# Patient Record
Sex: FEMALE | Race: WHITE | Employment: OTHER | URBAN - METROPOLITAN AREA
[De-identification: names, ages, dates, MRNs, and addresses within clinical notes are randomized per-mention and may not be internally consistent; named-entity substitution may affect disease eponyms.]

---

## 2017-03-21 PROBLEM — M79.89 PAIN AND SWELLING OF RIGHT LOWER LEG: Status: ACTIVE | Noted: 2017-03-21

## 2017-10-04 PROBLEM — M15.0 PRIMARY OSTEOARTHRITIS INVOLVING MULTIPLE JOINTS: Status: ACTIVE | Noted: 2017-10-04

## 2018-05-05 PROBLEM — I27.20 MODERATE TO SEVERE PULMONARY HYPERTENSION (HCC): Status: ACTIVE | Noted: 2018-05-05

## 2018-08-19 PROBLEM — M62.830 MUSCLE SPASM OF BACK: Status: ACTIVE | Noted: 2018-08-19

## 2018-09-17 PROBLEM — N18.30 STAGE 3 CHRONIC KIDNEY DISEASE (HCC): Status: ACTIVE | Noted: 2018-09-17

## 2018-11-30 PROBLEM — E66.813 CLASS 3 SEVERE OBESITY DUE TO EXCESS CALORIES WITH SERIOUS COMORBIDITY AND BODY MASS INDEX (BMI) OF 50.0 TO 59.9 IN ADULT: Status: ACTIVE | Noted: 2018-11-30

## 2018-11-30 PROBLEM — M15.0 PRIMARY OSTEOARTHRITIS INVOLVING MULTIPLE JOINTS: Status: RESOLVED | Noted: 2017-10-04 | Resolved: 2018-11-30

## 2020-11-09 ENCOUNTER — TELEPHONE (OUTPATIENT)
Dept: FAMILY MEDICINE CLINIC | Age: 65
End: 2020-11-09

## 2020-11-09 DIAGNOSIS — I82.511 CHRONIC DEEP VEIN THROMBOSIS (DVT) OF FEMORAL VEIN OF RIGHT LOWER EXTREMITY (HCC): Primary | ICD-10-CM

## 2021-08-24 PROBLEM — R06.02 CHRONIC SHORTNESS OF BREATH: Status: ACTIVE | Noted: 2021-08-24

## 2021-08-24 PROBLEM — C54.1 ENDOMETRIAL ADENOCARCINOMA (HCC): Status: ACTIVE | Noted: 2018-08-29

## 2023-07-25 PROBLEM — K43.9 VENTRAL HERNIA WITHOUT OBSTRUCTION OR GANGRENE: Status: ACTIVE | Noted: 2023-07-25

## 2023-08-03 PROBLEM — L98.492 ABDOMINAL WALL SKIN ULCER, WITH FAT LAYER EXPOSED (HCC): Status: ACTIVE | Noted: 2023-08-03

## 2023-09-08 NOTE — DISCHARGE INSTRUCTIONS
[address] -Phone: [phone] Fax: [phone]    Visit  Discharge Instructions / Physician Orders     DATE: 9/14/2023     Home Care: [facility] [address] [place] [address] [phone]     [address]: Home Health     Wound Location: Abdomen     Cleanse with: Vashe -let soak for at least 5 minutes during dressing changes     Dressing Orders: Collagen with Silver, Cover with silicone border bandage, Abdominal Binder     Frequency: 3 times per week     Additional Orders: Increase protein to diet (meat, cheese, eggs, fish, peanut butter, nuts and beans)  Referral to Dr. Case Marie, [place], [place]     Your next appointment with [address] [place] [address] is in 2 weeks     (Please note your next appointment above and if you are unable to keep, kindly give a 24 hour notice. Thank you.)  If more than 15 min late we cannot guarantee you will be seen due to clinician schedule  Per Policy, Excessive cancellation will call for dismissal from program.     If you experience any of the following, please call the [address] [place] [address] during business hours:  [phone]  Your Phone call may be forwarded to Duglas Portillo during business hours that [address] [place] [address] is closed. * Increase in Pain  * Temperature over 101  * Increase in drainage from your wound  * Drainage with a foul odor  * Bleeding  * Increase in swelling  * Need for compression bandage changes due to slippage, breakthrough drainage. If you need medical attention outside of the business hours of the [address] [place] [address] please contact your PCP or go to the nearest emergency room. The information contained in the After Visit Summary has been reviewed with me, the patient and/or responsible adult, by my health care provider(s). I had the opportunity to ask questions regarding this information.  I have elected to receive;      []After Visit Summary  [x]Comprehensive Discharge Instruction        Patient

## 2023-09-12 ENCOUNTER — HOSPITAL ENCOUNTER (OUTPATIENT)
Dept: PHARMACY | Age: 68
Setting detail: THERAPIES SERIES
Discharge: HOME | End: 2023-09-12
Payer: MEDICARE

## 2023-09-12 DIAGNOSIS — I26.99 PULMONARY EMBOLISM ON RIGHT (HCC): ICD-10-CM

## 2023-09-12 DIAGNOSIS — I82.511 CHRONIC DEEP VEIN THROMBOSIS (DVT) OF FEMORAL VEIN OF RIGHT LOWER EXTREMITY (HCC): Primary | ICD-10-CM

## 2023-09-12 DIAGNOSIS — Z86.718 HISTORY OF DVT (DEEP VEIN THROMBOSIS): ICD-10-CM

## 2023-09-12 LAB
INR BLD: 2.8
PROTIME: 33.3 SECONDS

## 2023-09-12 PROCEDURE — 99211 OFF/OP EST MAY X REQ PHY/QHP: CPT

## 2023-09-12 PROCEDURE — 85610 PROTHROMBIN TIME: CPT

## 2023-09-12 NOTE — PROGRESS NOTES
Patient seen in clinic for warfarin management due to DVT and PE with an INR goal of 2.5-3.5. Estimated duration of therapy is indefinite. Patient states compliant all of the time with regimen. No bleeding or thromboembolic side effects noted. No significant med or dietary changes. No significant recent illness or disease state changes. PT/INR done in office per protocol. INR is 2.8 which is therapeutic. Warfarin regimen will be continued at current dose 8mg Tues/Thurs, 4mg all other days. Will retest in 4 weeks. Patient understands dosing directions and information discussed. Dosing schedule and follow up appointment given to patient. Progress note routed to referring physicians office. Discussed with patient the Pharmacist Collaborative Practice Agreement. Patient provided verbal and/or electronic (ex. [facility]) consent to participate in the collaborative practice agreement between the pharmacist and referred patient. This is in lieu of paper consent due to COVID-19 precautions and the use of remote/virtual visits.        For Pharmacy Admin Tracking Only    Intervention Detail:   Total # of Interventions Recommended: 0  Total # of Interventions Accepted: 0  Time Spent (min): 15

## 2023-09-14 ENCOUNTER — HOSPITAL ENCOUNTER (OUTPATIENT)
Dept: WOUND CARE | Age: 68
Discharge: HOME | End: 2023-09-14

## 2023-09-18 NOTE — DISCHARGE INSTRUCTIONS
[address] -Phone: [phone] Fax: [phone]    Visit  Discharge Instructions / Physician Orders     DATE: 9/21/2023     [facility]: [facility] [address] [place] [address] [phone])     [address]: Home Health     Wound Location: Abdomen     Cleanse with: Vashe -let soak for at least 5 minutes during dressing changes     Dressing Orders: Collagen with Silver, Cover with silicone border bandage, Abdominal Binder     Frequency: 3 times per week     Additional Orders: Increase protein to diet (meat, cheese, eggs, fish, peanut butter, nuts and beans)  Referral to Dr. Lorraine Howard, [place], [place]     Your next appointment with Jennifer Cancino is in 2 weeks     (Please note your next appointment above and if you are unable to keep, kindly give a 24 hour notice. Thank you.)  If more than 15 min late we cannot guarantee you will be seen due to clinician schedule  Per Policy, Excessive cancellation will call for dismissal from program.     If you experience any of the following, please call the [facility] [place] during business hours:  [phone]  Your Phone call may be forwarded to Duglas Portillo during business hours that [facility] is closed. * Increase in Pain  * Temperature over 101  * Increase in drainage from your wound  * Drainage with a foul odor  * Bleeding  * Increase in swelling  * Need for compression bandage changes due to slippage, breakthrough drainage. If you need medical attention outside of the business hours of the [address] [place] [address] please contact your PCP or go to the nearest emergency room. The information contained in the After Visit Summary has been reviewed with me, the patient and/or responsible adult, by my health care provider(s). I had the opportunity to ask questions regarding this information.  I have elected to receive;      []After Visit Summary  [x]Comprehensive Discharge Instruction        Patient

## 2023-09-21 ENCOUNTER — HOSPITAL ENCOUNTER (OUTPATIENT)
Dept: WOUND CARE | Age: 68
Discharge: HOME | End: 2023-09-21
Payer: MEDICARE

## 2023-09-21 VITALS
DIASTOLIC BLOOD PRESSURE: 64 MMHG | HEART RATE: 87 BPM | SYSTOLIC BLOOD PRESSURE: 123 MMHG | RESPIRATION RATE: 16 BRPM | TEMPERATURE: 98.6 F

## 2023-09-21 DIAGNOSIS — L98.492 ABDOMINAL WALL SKIN ULCER, WITH FAT LAYER EXPOSED (HCC): Primary | ICD-10-CM

## 2023-09-21 PROCEDURE — 99213 OFFICE O/P EST LOW 20 MIN: CPT

## 2023-09-21 PROCEDURE — 99212 OFFICE O/P EST SF 10 MIN: CPT

## 2023-09-21 RX ORDER — GENTAMICIN SULFATE 1 MG/G
OINTMENT TOPICAL ONCE
OUTPATIENT
Start: 2023-09-21 | End: 2023-09-21

## 2023-09-21 RX ORDER — GINSENG 100 MG
CAPSULE ORAL ONCE
OUTPATIENT
Start: 2023-09-21 | End: 2023-09-21

## 2023-09-21 RX ORDER — LIDOCAINE HYDROCHLORIDE 20 MG/ML
JELLY TOPICAL ONCE
OUTPATIENT
Start: 2023-09-21 | End: 2023-09-21

## 2023-09-21 RX ORDER — TRIAMCINOLONE ACETONIDE 1 MG/G
OINTMENT TOPICAL ONCE
OUTPATIENT
Start: 2023-09-21 | End: 2023-09-21

## 2023-09-21 RX ORDER — IBUPROFEN 200 MG
TABLET ORAL ONCE
OUTPATIENT
Start: 2023-09-21 | End: 2023-09-21

## 2023-09-21 RX ORDER — BETAMETHASONE DIPROPIONATE 0.05 %
OINTMENT (GRAM) TOPICAL ONCE
OUTPATIENT
Start: 2023-09-21 | End: 2023-09-21

## 2023-09-21 RX ORDER — CLOBETASOL PROPIONATE 0.5 MG/G
OINTMENT TOPICAL ONCE
OUTPATIENT
Start: 2023-09-21 | End: 2023-09-21

## 2023-09-21 RX ORDER — SODIUM CHLOR/HYPOCHLOROUS ACID 0.033 %
SOLUTION, IRRIGATION IRRIGATION ONCE
OUTPATIENT
Start: 2023-09-21 | End: 2023-09-21

## 2023-09-21 RX ORDER — LIDOCAINE 50 MG/G
OINTMENT TOPICAL ONCE
OUTPATIENT
Start: 2023-09-21 | End: 2023-09-21

## 2023-09-21 RX ORDER — LIDOCAINE 40 MG/G
CREAM TOPICAL ONCE
OUTPATIENT
Start: 2023-09-21 | End: 2023-09-21

## 2023-09-21 RX ORDER — BACITRACIN ZINC AND POLYMYXIN B SULFATE 500; 1000 [USP'U]/G; [USP'U]/G
OINTMENT TOPICAL ONCE
OUTPATIENT
Start: 2023-09-21 | End: 2023-09-21

## 2023-09-21 RX ORDER — LIDOCAINE HYDROCHLORIDE 40 MG/ML
SOLUTION TOPICAL ONCE
OUTPATIENT
Start: 2023-09-21 | End: 2023-09-21

## 2023-09-21 RX ORDER — LIDOCAINE HYDROCHLORIDE 20 MG/ML
JELLY TOPICAL ONCE
Status: COMPLETED | OUTPATIENT
Start: 2023-09-21 | End: 2023-09-21

## 2023-09-21 RX ADMIN — LIDOCAINE HYDROCHLORIDE 6 ML: 20 JELLY TOPICAL at 13:20

## 2023-09-21 NOTE — PROGRESS NOTES
03/1966       FAMILY HISTORY    Family History   Problem Relation Age of Onset    Asthma Mother     Mental Illness Mother     COPD Mother     Cancer Father 80        hairy cell leukemia, and leimyoma sarcoma     Stroke Father 80        minor    Parkinsonism Maternal Grandfather     Cancer Paternal Grandmother     Stroke Paternal Grandmother        SOCIAL HISTORY    Social History     Tobacco Use    Smoking status: Never     Passive exposure: Past    Smokeless tobacco: Never   Vaping Use    Vaping Use: Never used   Substance Use Topics    Alcohol use: Not Currently     Comment: rare    Drug use: No       ALLERGIES    Allergies   Allergen Reactions    Amoxil [Amoxicillin Trihydrate] Hives    Penicillins     Codeine      headaches    Neosporin [Bacitracin-Neomycin-Polymyxin] Rash    Polysporin [Bacitracin-Polymyxin B] Rash       MEDICATIONS    Current Outpatient Medications on File Prior to Encounter   Medication Sig Dispense Refill    budesonide (PULMICORT) 0.5 MG/2ML nebulizer suspension inhale contents of 1 vial ( 2 milliliters ) in nebulizer twice a day 120 mL 3    warfarin (COUMADIN) 4 MG tablet Take 1-2 tablets by mouth daily As directed by [facility] 180 tablet 1    ipratropium 0.5 mg-albuterol 2.5 mg (DUONEB) 0.5-2.5 (3) MG/3ML SOLN nebulizer solution inhale contents of 1 vial ( 3 milliliters ) in nebulizer by mouth and INTO THE LUNGS three times a day 360 mL 1    pravastatin (PRAVACHOL) 80 MG tablet Take 1 tablet by mouth every evening 30 tablet 4    losartan (COZAAR) 100 MG tablet take 1 tablet by mouth once daily 90 tablet 3    dilTIAZem (CARTIA XT) 120 MG extended release capsule take 1 capsule by mouth once daily 90 capsule 3    montelukast (SINGULAIR) 10 MG tablet TAKE 1 TABLET NIGHTLY 90 tablet 3    busPIRone (BUSPAR) 5 MG tablet Take 1 tablet by mouth 2 times daily (Patient not taking: Reported on 6/22/2023)      metaxalone (SKELAXIN) 800 MG tablet Take 1 tablet by mouth 3 times

## 2023-09-29 ENCOUNTER — TELEMEDICINE (OUTPATIENT)
Dept: FAMILY MEDICINE CLINIC | Age: 68
End: 2023-09-29
Payer: MEDICARE

## 2023-09-29 ENCOUNTER — TELEPHONE (OUTPATIENT)
Dept: FAMILY MEDICINE CLINIC | Age: 68
End: 2023-09-29

## 2023-09-29 DIAGNOSIS — E78.2 HYPERLIPEMIA, MIXED: ICD-10-CM

## 2023-09-29 DIAGNOSIS — I50.813 ACUTE ON CHRONIC RIGHT-SIDED HEART FAILURE (HCC): ICD-10-CM

## 2023-09-29 DIAGNOSIS — R63.5 ABNORMAL WEIGHT GAIN: ICD-10-CM

## 2023-09-29 DIAGNOSIS — I50.9 ACUTE ON CHRONIC CONGESTIVE HEART FAILURE, UNSPECIFIED HEART FAILURE TYPE (HCC): ICD-10-CM

## 2023-09-29 DIAGNOSIS — E11.9 DIABETES MELLITUS TYPE 2, NONINSULIN DEPENDENT (HCC): ICD-10-CM

## 2023-09-29 DIAGNOSIS — I27.20 PULMONARY HTN (HCC): Primary | ICD-10-CM

## 2023-09-29 PROCEDURE — 99214 OFFICE O/P EST MOD 30 MIN: CPT | Performed by: NURSE PRACTITIONER

## 2023-09-29 PROCEDURE — 1123F ACP DISCUSS/DSCN MKR DOCD: CPT | Performed by: NURSE PRACTITIONER

## 2023-09-29 RX ORDER — ALBUTEROL SULFATE 90 UG/1
1 AEROSOL, METERED RESPIRATORY (INHALATION) EVERY 6 HOURS PRN
Qty: 3 EACH | Refills: 0 | Status: SHIPPED | OUTPATIENT
Start: 2023-09-29

## 2023-09-29 RX ORDER — HYDROCHLOROTHIAZIDE 12.5 MG/1
12.5 CAPSULE, GELATIN COATED ORAL EVERY MORNING
Qty: 90 CAPSULE | Refills: 1 | Status: SHIPPED | OUTPATIENT
Start: 2023-09-29

## 2023-09-29 RX ORDER — PRAVASTATIN SODIUM 80 MG/1
80 TABLET ORAL EVERY EVENING
Qty: 90 TABLET | Refills: 4 | Status: SHIPPED | OUTPATIENT
Start: 2023-09-29

## 2023-09-29 ASSESSMENT — PATIENT HEALTH QUESTIONNAIRE - PHQ9
SUM OF ALL RESPONSES TO PHQ QUESTIONS 1-9: 0
1. LITTLE INTEREST OR PLEASURE IN DOING THINGS: 0
10. IF YOU CHECKED OFF ANY PROBLEMS, HOW DIFFICULT HAVE THESE PROBLEMS MADE IT FOR YOU TO DO YOUR WORK, TAKE CARE OF THINGS AT HOME, OR GET ALONG WITH OTHER PEOPLE: 0
8. MOVING OR SPEAKING SO SLOWLY THAT OTHER PEOPLE COULD HAVE NOTICED. OR THE OPPOSITE, BEING SO FIGETY OR RESTLESS THAT YOU HAVE BEEN MOVING AROUND A LOT MORE THAN USUAL: 0
3. TROUBLE FALLING OR STAYING ASLEEP: 0
7. TROUBLE CONCENTRATING ON THINGS, SUCH AS READING THE NEWSPAPER OR WATCHING TELEVISION: 0
SUM OF ALL RESPONSES TO PHQ QUESTIONS 1-9: 0
2. FEELING DOWN, DEPRESSED OR HOPELESS: 0
SUM OF ALL RESPONSES TO PHQ9 QUESTIONS 1 & 2: 0
SUM OF ALL RESPONSES TO PHQ QUESTIONS 1-9: 0
SUM OF ALL RESPONSES TO PHQ QUESTIONS 1-9: 0
5. POOR APPETITE OR OVEREATING: 0
9. THOUGHTS THAT YOU WOULD BE BETTER OFF DEAD, OR OF HURTING YOURSELF: 0
4. FEELING TIRED OR HAVING LITTLE ENERGY: 0
6. FEELING BAD ABOUT YOURSELF - OR THAT YOU ARE A FAILURE OR HAVE LET YOURSELF OR YOUR FAMILY DOWN: 0

## 2023-09-29 ASSESSMENT — LIFESTYLE VARIABLES
HOW OFTEN DO YOU HAVE A DRINK CONTAINING ALCOHOL: NEVER
HOW MANY STANDARD DRINKS CONTAINING ALCOHOL DO YOU HAVE ON A TYPICAL DAY: PATIENT DOES NOT DRINK

## 2023-09-29 NOTE — PROGRESS NOTES
of ataxia         [x] Normal range of motion of neck        [] Abnormal -     Neurological:        [x] No Facial Asymmetry (Cranial nerve 7 motor function) (limited exam due to video visit)          [x] No gaze palsy        [] Abnormal -          Skin:        [x] No significant exanthematous lesions or discoloration noted on facial skin         [] Abnormal -            Psychiatric:       [x] Normal Affect [] Abnormal -        [x] No Hallucinations    Other pertinent observable physical exam findings:-     Pt wearing o2 cannula       Please note that this chart was generated using voice recognition Dragon dictation software. Although every effort was made to ensure the accuracy of this automated transcription, some errors in transcription may have occurred.       --Allan Alanis, APRBHAVIK - CNP

## 2023-09-29 NOTE — TELEPHONE ENCOUNTER
[facility] I need her to get to a [facility] lab in the next few days.  If worsening symptoms please be seen in ER

## 2023-09-29 NOTE — TELEPHONE ENCOUNTER
Pt called stating the [facility] are unable to draw her blood, and they did state she has pitting edema. Did not give grade of severity.

## 2023-10-05 ENCOUNTER — HOSPITAL ENCOUNTER (OUTPATIENT)
Dept: WOUND CARE | Age: 68
Discharge: HOME | End: 2023-10-05

## 2023-10-09 NOTE — DISCHARGE INSTRUCTIONS
[address] -Phone: [phone] Fax: [phone]    Visit  Discharge Instructions / Physician Orders     DATE: 10/12/2023     Home Care: [facility] [address] [place] [address] ([facility] [phone])     [address]: Home Health     Wound Location: Abdomen     Cleanse with: Vashe -let soak for at least 5 minutes during dressing changes     Dressing Orders: Collagen with Silver, Cover with silicone border bandage, Abdominal Binder     Frequency: 3 times per week     Additional Orders: Increase protein to diet (meat, cheese, eggs, fish, peanut butter, nuts and beans)  Referral to Dr. Aaron Cole, [place], [place]     [facility]     (Please note your next appointment above and if you are unable to keep, kindly give a 24 hour notice. Thank you.)  If more than 15 min late we cannot guarantee you will be seen due to clinician schedule  Per Policy, Excessive cancellation will call for dismissal from program.     If you experience any of the following, please call the [address] [place] [address] during business hours:  [phone]  Your Phone call may be forwarded to Duglas Portillo during business hours that [facility] is closed. * Increase in Pain  * Temperature over 101  * Increase in drainage from your wound  * Drainage with a foul odor  * Bleeding  * Increase in swelling  * Need for compression bandage changes due to slippage, breakthrough drainage. If you need medical attention outside of the business hours of the [address] [place] [address] please contact your PCP or go to the nearest emergency room. The information contained in the After Visit Summary has been reviewed with me, the patient and/or responsible adult, by my health care provider(s). I had the opportunity to ask questions regarding this information.  I have elected to receive;      []After Visit Summary  [x]Comprehensive Discharge Instruction        Patient
[Negative] : Gastrointestinal

## 2023-10-10 ENCOUNTER — APPOINTMENT (OUTPATIENT)
Dept: PHARMACY | Age: 68
End: 2023-10-10
Payer: MEDICARE

## 2023-10-12 ENCOUNTER — HOSPITAL ENCOUNTER (OUTPATIENT)
Dept: WOUND CARE | Age: 68
Discharge: HOME | End: 2023-10-12

## 2023-10-14 ENCOUNTER — HOSPITAL ENCOUNTER (INPATIENT)
Age: 68
LOS: 2 days | Discharge: HOME HEALTH | DRG: 178 | End: 2023-10-17
Attending: STUDENT IN AN ORGANIZED HEALTH CARE EDUCATION/TRAINING PROGRAM | Admitting: INTERNAL MEDICINE
Payer: MEDICARE

## 2023-10-14 DIAGNOSIS — R79.1 SUPRATHERAPEUTIC INR: ICD-10-CM

## 2023-10-14 DIAGNOSIS — T14.8XXA BLEEDING FROM WOUND: Primary | ICD-10-CM

## 2023-10-14 DIAGNOSIS — D64.9 ANEMIA, UNSPECIFIED TYPE: ICD-10-CM

## 2023-10-14 DIAGNOSIS — J96.20 ACUTE ON CHRONIC RESPIRATORY FAILURE, UNSPECIFIED WHETHER WITH HYPOXIA OR HYPERCAPNIA (HCC): ICD-10-CM

## 2023-10-14 LAB
ANION GAP SERPL CALCULATED.3IONS-SCNC: 4 MMOL/L (ref 9–17)
BASOPHILS # BLD: 0 K/UL (ref 0–0.2)
BASOPHILS NFR BLD: 0 %
BNP SERPL-MCNC: 116 PG/ML
BUN SERPL-MCNC: 23 MG/DL (ref 8–23)
BUN/CREAT SERPL: 19 (ref 9–20)
CALCIUM SERPL-MCNC: 9.7 MG/DL (ref 8.6–10.4)
CHLORIDE SERPL-SCNC: 94 MMOL/L (ref 98–107)
CO2 SERPL-SCNC: 41 MMOL/L (ref 20–31)
CREAT SERPL-MCNC: 1.2 MG/DL (ref 0.5–0.9)
EOSINOPHIL # BLD: 0.11 K/UL (ref 0–0.4)
EOSINOPHILS RELATIVE PERCENT: 2 % (ref 1–4)
ERYTHROCYTE [DISTWIDTH] IN BLOOD BY AUTOMATED COUNT: 17.6 % (ref 11.8–14.4)
GFR SERPL CREATININE-BSD FRML MDRD: 50 ML/MIN/1.73M2
GLUCOSE BLD-MCNC: 147 MG/DL (ref 65–105)
GLUCOSE BLD-MCNC: 154 MG/DL (ref 65–105)
GLUCOSE BLD-MCNC: 156 MG/DL (ref 65–105)
GLUCOSE SERPL-MCNC: 143 MG/DL (ref 70–99)
HCT VFR BLD AUTO: 32.5 % (ref 36.3–47.1)
HGB BLD-MCNC: 9 G/DL (ref 11.9–15.1)
IMM GRANULOCYTES # BLD AUTO: 0 K/UL (ref 0–0.3)
IMM GRANULOCYTES NFR BLD: 0 %
INR PPP: 6.8
LYMPHOCYTES NFR BLD: 0.97 K/UL (ref 1–4.8)
LYMPHOCYTES RELATIVE PERCENT: 18 % (ref 24–44)
MCH RBC QN AUTO: 25.6 PG (ref 25.2–33.5)
MCHC RBC AUTO-ENTMCNC: 27.7 G/DL (ref 28.4–34.8)
MCV RBC AUTO: 92.6 FL (ref 82.6–102.9)
MONOCYTES NFR BLD: 0.32 K/UL (ref 0.2–0.8)
MONOCYTES NFR BLD: 6 % (ref 1–7)
MORPHOLOGY: ABNORMAL
NEUTROPHILS NFR BLD: 74 % (ref 36–66)
NEUTS SEG NFR BLD: 4 K/UL (ref 1.8–7.7)
NRBC BLD-RTO: 0 PER 100 WBC
PARTIAL THROMBOPLASTIN TIME: 70.1 SEC (ref 23.9–33.8)
PLATELET # BLD AUTO: 275 K/UL (ref 138–453)
PMV BLD AUTO: 8.9 FL (ref 8.1–13.5)
POTASSIUM SERPL-SCNC: 4.6 MMOL/L (ref 3.7–5.3)
PROTHROMBIN TIME: 58.1 SEC (ref 11.5–14.2)
RBC # BLD AUTO: 3.51 M/UL (ref 3.95–5.11)
SODIUM SERPL-SCNC: 139 MMOL/L (ref 135–144)
WBC OTHER # BLD: 5.4 K/UL (ref 3.5–11.3)

## 2023-10-14 PROCEDURE — 82947 ASSAY GLUCOSE BLOOD QUANT: CPT

## 2023-10-14 PROCEDURE — 2700000000 HC OXYGEN THERAPY PER DAY

## 2023-10-14 PROCEDURE — 6370000000 HC RX 637 (ALT 250 FOR IP): Performed by: NURSE PRACTITIONER

## 2023-10-14 PROCEDURE — G0378 HOSPITAL OBSERVATION PER HR: HCPCS

## 2023-10-14 PROCEDURE — 99285 EMERGENCY DEPT VISIT HI MDM: CPT

## 2023-10-14 PROCEDURE — 80048 BASIC METABOLIC PNL TOTAL CA: CPT

## 2023-10-14 PROCEDURE — 83880 ASSAY OF NATRIURETIC PEPTIDE: CPT

## 2023-10-14 PROCEDURE — 94761 N-INVAS EAR/PLS OXIMETRY MLT: CPT

## 2023-10-14 PROCEDURE — 99223 1ST HOSP IP/OBS HIGH 75: CPT | Performed by: NURSE PRACTITIONER

## 2023-10-14 PROCEDURE — 6360000002 HC RX W HCPCS: Performed by: STUDENT IN AN ORGANIZED HEALTH CARE EDUCATION/TRAINING PROGRAM

## 2023-10-14 PROCEDURE — 83036 HEMOGLOBIN GLYCOSYLATED A1C: CPT

## 2023-10-14 PROCEDURE — 85730 THROMBOPLASTIN TIME PARTIAL: CPT

## 2023-10-14 PROCEDURE — 85025 COMPLETE CBC W/AUTO DIFF WBC: CPT

## 2023-10-14 PROCEDURE — 2580000003 HC RX 258: Performed by: NURSE PRACTITIONER

## 2023-10-14 PROCEDURE — 94640 AIRWAY INHALATION TREATMENT: CPT

## 2023-10-14 PROCEDURE — 85610 PROTHROMBIN TIME: CPT

## 2023-10-14 PROCEDURE — 6370000000 HC RX 637 (ALT 250 FOR IP): Performed by: STUDENT IN AN ORGANIZED HEALTH CARE EDUCATION/TRAINING PROGRAM

## 2023-10-14 RX ORDER — ALBUTEROL SULFATE 2.5 MG/3ML
2.5 SOLUTION RESPIRATORY (INHALATION) ONCE
Status: COMPLETED | OUTPATIENT
Start: 2023-10-14 | End: 2023-10-14

## 2023-10-14 RX ORDER — ALBUTEROL SULFATE 90 UG/1
1 AEROSOL, METERED RESPIRATORY (INHALATION) EVERY 6 HOURS PRN
Status: DISCONTINUED | OUTPATIENT
Start: 2023-10-14 | End: 2023-10-15

## 2023-10-14 RX ORDER — ONDANSETRON 4 MG/1
4 TABLET, ORALLY DISINTEGRATING ORAL EVERY 8 HOURS PRN
Status: DISCONTINUED | OUTPATIENT
Start: 2023-10-14 | End: 2023-10-17 | Stop reason: HOSPADM

## 2023-10-14 RX ORDER — SODIUM CHLORIDE 0.9 % (FLUSH) 0.9 %
5-40 SYRINGE (ML) INJECTION EVERY 12 HOURS SCHEDULED
Status: DISCONTINUED | OUTPATIENT
Start: 2023-10-14 | End: 2023-10-17 | Stop reason: HOSPADM

## 2023-10-14 RX ORDER — HYDROCHLOROTHIAZIDE 12.5 MG/1
12.5 TABLET ORAL EVERY MORNING
Status: DISCONTINUED | OUTPATIENT
Start: 2023-10-14 | End: 2023-10-17 | Stop reason: HOSPADM

## 2023-10-14 RX ORDER — PHYTONADIONE 5 MG/1
5 TABLET ORAL ONCE
Status: COMPLETED | OUTPATIENT
Start: 2023-10-14 | End: 2023-10-14

## 2023-10-14 RX ORDER — POTASSIUM CHLORIDE 7.45 MG/ML
10 INJECTION INTRAVENOUS PRN
Status: DISCONTINUED | OUTPATIENT
Start: 2023-10-14 | End: 2023-10-17 | Stop reason: HOSPADM

## 2023-10-14 RX ORDER — LOSARTAN POTASSIUM 100 MG/1
100 TABLET ORAL DAILY
Status: DISCONTINUED | OUTPATIENT
Start: 2023-10-14 | End: 2023-10-17 | Stop reason: HOSPADM

## 2023-10-14 RX ORDER — POLYETHYLENE GLYCOL 3350 17 G/17G
17 POWDER, FOR SOLUTION ORAL DAILY PRN
Status: DISCONTINUED | OUTPATIENT
Start: 2023-10-14 | End: 2023-10-17 | Stop reason: HOSPADM

## 2023-10-14 RX ORDER — SODIUM CHLORIDE 9 MG/ML
INJECTION, SOLUTION INTRAVENOUS PRN
Status: DISCONTINUED | OUTPATIENT
Start: 2023-10-14 | End: 2023-10-17 | Stop reason: HOSPADM

## 2023-10-14 RX ORDER — SODIUM CHLORIDE 0.9 % (FLUSH) 0.9 %
10 SYRINGE (ML) INJECTION PRN
Status: DISCONTINUED | OUTPATIENT
Start: 2023-10-14 | End: 2023-10-17 | Stop reason: HOSPADM

## 2023-10-14 RX ORDER — POTASSIUM CHLORIDE 20 MEQ/1
40 TABLET, EXTENDED RELEASE ORAL PRN
Status: DISCONTINUED | OUTPATIENT
Start: 2023-10-14 | End: 2023-10-17 | Stop reason: HOSPADM

## 2023-10-14 RX ORDER — BUDESONIDE 0.5 MG/2ML
0.5 INHALANT ORAL
Status: DISCONTINUED | OUTPATIENT
Start: 2023-10-14 | End: 2023-10-17 | Stop reason: HOSPADM

## 2023-10-14 RX ORDER — ACETAMINOPHEN 325 MG/1
650 TABLET ORAL EVERY 6 HOURS PRN
Status: DISCONTINUED | OUTPATIENT
Start: 2023-10-14 | End: 2023-10-17 | Stop reason: HOSPADM

## 2023-10-14 RX ORDER — DILTIAZEM HYDROCHLORIDE 120 MG/1
120 CAPSULE, COATED, EXTENDED RELEASE ORAL DAILY
Status: DISCONTINUED | OUTPATIENT
Start: 2023-10-14 | End: 2023-10-17 | Stop reason: HOSPADM

## 2023-10-14 RX ORDER — MAGNESIUM SULFATE 1 G/100ML
1000 INJECTION INTRAVENOUS PRN
Status: DISCONTINUED | OUTPATIENT
Start: 2023-10-14 | End: 2023-10-17 | Stop reason: HOSPADM

## 2023-10-14 RX ORDER — PRAVASTATIN SODIUM 40 MG
80 TABLET ORAL EVERY EVENING
Status: DISCONTINUED | OUTPATIENT
Start: 2023-10-14 | End: 2023-10-17 | Stop reason: HOSPADM

## 2023-10-14 RX ORDER — ONDANSETRON 2 MG/ML
4 INJECTION INTRAMUSCULAR; INTRAVENOUS EVERY 6 HOURS PRN
Status: DISCONTINUED | OUTPATIENT
Start: 2023-10-14 | End: 2023-10-17 | Stop reason: HOSPADM

## 2023-10-14 RX ORDER — ACETAMINOPHEN 650 MG/1
650 SUPPOSITORY RECTAL EVERY 6 HOURS PRN
Status: DISCONTINUED | OUTPATIENT
Start: 2023-10-14 | End: 2023-10-17 | Stop reason: HOSPADM

## 2023-10-14 RX ADMIN — PHYTONADIONE 5 MG: 5 TABLET ORAL at 07:37

## 2023-10-14 RX ADMIN — HYDROCHLOROTHIAZIDE 12.5 MG: 12.5 TABLET ORAL at 12:12

## 2023-10-14 RX ADMIN — SODIUM CHLORIDE, PRESERVATIVE FREE 10 ML: 5 INJECTION INTRAVENOUS at 21:50

## 2023-10-14 RX ADMIN — ALBUTEROL SULFATE 2.5 MG: 2.5 SOLUTION RESPIRATORY (INHALATION) at 06:44

## 2023-10-14 RX ADMIN — ALBUTEROL SULFATE 1 PUFF: 90 AEROSOL, METERED RESPIRATORY (INHALATION) at 20:21

## 2023-10-14 RX ADMIN — ALBUTEROL SULFATE 1 PUFF: 90 AEROSOL, METERED RESPIRATORY (INHALATION) at 10:51

## 2023-10-14 RX ADMIN — SODIUM CHLORIDE, PRESERVATIVE FREE 10 ML: 5 INJECTION INTRAVENOUS at 12:13

## 2023-10-14 RX ADMIN — DILTIAZEM HYDROCHLORIDE 120 MG: 120 CAPSULE, COATED, EXTENDED RELEASE ORAL at 12:12

## 2023-10-14 RX ADMIN — LOSARTAN POTASSIUM 100 MG: 100 TABLET, FILM COATED ORAL at 12:12

## 2023-10-14 ASSESSMENT — ENCOUNTER SYMPTOMS
WHEEZING: 0
SINUS PAIN: 0
SHORTNESS OF BREATH: 0
ABDOMINAL PAIN: 1
DIARRHEA: 0
VOMITING: 0
CONSTIPATION: 0
NAUSEA: 0
PHOTOPHOBIA: 0
SINUS PRESSURE: 0
COUGH: 0

## 2023-10-14 ASSESSMENT — PAIN - FUNCTIONAL ASSESSMENT: PAIN_FUNCTIONAL_ASSESSMENT: 0-10

## 2023-10-14 ASSESSMENT — PAIN SCALES - GENERAL: PAINLEVEL_OUTOF10: 2

## 2023-10-14 NOTE — PROGRESS NOTES
[address] NOTE    Room # 2005/2005-01   Name: Jeri Arroyo              Reason for visit: Routine    I visited the patient. Admit Date & Time: 10/14/2023  5:07 AM    Assessment:  Jeri Arroyo is a 78 y.o. female. Upon entering the room patient states well, states no major needs or prayers. Patient kindly declines Spiritual Care visit.  leaves prayer card for patient for possible follow up as needed. Intervention:   provided a ministry presence. Outcome:  Patient grateful for the visit. Plan:  Chaplains will remain available to offer spiritual and emotional support as needed. Electronically signed by Chaplain Tabitha, on 10/14/2023 at [address] [place] [address]      10/14/23 1330   Encounter Summary   Encounter Overview/Reason  Initial Encounter   Service Provided For: Patient   Referral/Consult From: [facility]   Support System Unknown   Last Encounter  10/14/23   Complexity of Encounter Low   Begin Time 1215   End Time  1217   Total Time Calculated 2 min   Assessment/Intervention/Outcome   Assessment Unable to assess;Passive   Intervention Sustaining Presence/Ministry of presence   Outcome Refused/Declined

## 2023-10-14 NOTE — ED NOTES
Patient reports she resides at [facility]. She does report having wound care. Noted the dressing does have red dried bloody discharge. No odor.        Heidi Purdy  10/14/23 8531

## 2023-10-14 NOTE — PROGRESS NOTES
[facility]  Office: [address], DO, Kendra Badillo, DO, Shirin Epps, DO, Smitha Thomas, DO, Richard Silverio MD, Tran Wilburn MD, Emeli Terrazas MD, Ioana Lamb MD,  Mak Ngo MD, Vanda West MD, Rishi Marquez, DO, Ata Mckeon MD,  Orion Smallwood MD, Lucius Cloud MD, Lorraine Wood, DO, Maira Calvillo MD,  Chiara Ortiz, DO, Radha Dowling MD, Robert Huber MD, Michelle Bright MD, Willie Minor MD,  Samaria Salazar MD, Ashley Mo MD, Nicholas Howe MD, Yael Cummins MD, Astrid Mix DO, Clifford Valentin MD,  Lexis Martinez MD, Andres Flores MD, Shell Alanis, CNP,  Paulino Sanders, CNP,, Klaudia Avendano, CNP,  Sakshi [facility], Lamine Benites CNP, Leandro Cabrera, CNP, Vikki Rodriguez, CNP, Raphael Wright, CNP, Ml Boateng, CNP, Fuentes Santiago, CNP, Paloma Nation, CNS, Ariella West, CNP, Ana Hewitt, [address] [place] [address]    HISTORY AND PHYSICAL EXAMINATION            Date:   10/14/2023  Patient name:  Olimpia Kwok  Date of admission:  10/14/2023  5:07 AM  MRN:   [id number]  Account:  [de-identified]  YOB: 1955  PCP:    ARACELIS Christian CNP  Room:   2005/2005-01  Code Status:    Full Code    Chief Complaint:     Chief Complaint   Patient presents with    Hernia     Bleeding from hernia site       History Obtained From:     patient    History of Present Illness:     Olimpia Kwok is a 78 y.o. Declined female who presents with Hernia (Bleeding from hernia site)   and is admitted to the hospital for the management of Supratherapeutic INR. Patient reports to the emergency department with acute blood loss to abdominal wall. Patient has a chronic skin ulcer which is been under the care of wound care. This morning at 4 AM she awoke and found her bed in person to be covered in blood. She takes Coumadin for chronic DVT/PE.   She has been on CKD  Continue home regimen, avoid nephrotoxic  Endometrial adenocarcinoma  Outpatient follow-up with oncology    Consultations:   IP CONSULT TO HOSPITALIST  IP CONSULT TO SOCIAL WORK        ARACELIS Michel NP  10/14/2023  11:04 AM    Copy sent to ARACELIS Fraser - LISA

## 2023-10-14 NOTE — ED TRIAGE NOTES
Pt arrived via squad from home d/t hernia pushing on skin and causing bleeding. Per pt went to the wound clinic here and had it dressed yesterday at 1pm and noticed the bleeding. Per pt has had issues with bleeding and needing wound treatment since late March.

## 2023-10-14 NOTE — ED PROVIDER NOTES
Sami      Pt Name: Sherrill Severino  MRN: [id number]  [address] 1955  Date of evaluation: 10/14/23    CHIEF COMPLAINT       Chief Complaint   Patient presents with    Hernia     Bleeding from hernia site       HISTORY OF PRESENT ILLNESS   Sherrill Severino is a 78 y.o. female who presents with bleeding from chronic abdominal wound. States she is anticoagulated on warfarin. Last had it checked a month ago due to feeling under the weather recently. Does see wound clinic. Most recent dressing was applied from wound clinic. On warfarin for history of DVT and PE. Noticed some bleeding this morning from site. Was controlled prior to arrival.    PASTMEDICAL HISTORY     Past Medical History:   Diagnosis Date    Anxiety     Asthma     Bronchitis     DDD (degenerative disc disease), lumbar     Depression     Diabetes mellitus ([address])     Elevated glucose     Headache(784.0)     Hernia of abdominal cavity     HH (hiatus hernia)     Hyperlipemia, mixed     Hypertension     Osteoarthritis     Right femoral vein DVT ([address]) May, 2009    Dr. Cris Gonzalez    Uterine hyperplasia      Past Problem List  Patient Active Problem List   Diagnosis Code    Anxiety F41.9    Depression F32. A    Asthma J45.909    DDD (degenerative disc disease), lumbar M51.36    Seasonal allergies J30.2    Hyperlipemia, mixed E78.2    Anticoagulated on Coumadin Z79.01    Family history of breast cancer Z80.3    Endometrial cancer (HCC) C54.1    S/P FABBY-BSO (total abdominal hysterectomy and bilateral salpingo-oophorectomy) Z90.710, Z90.722, Z90.79    Essential hypertension I10    Blood loss anemia D50.0    BPPV (benign paroxysmal positional vertigo) H81.10    Stage 3a chronic kidney disease (HCC) N18.31    Chronic deep vein thrombosis (DVT) of femoral vein of right lower extremity ([facility]) I82.511    Pain and swelling of right lower leg M79.661, M79.89    BMI 60.0-69.9, adult (HCC) Z68.44    Pulmonary embolism on right ([address])

## 2023-10-14 NOTE — CARE COORDINATION
Assistance with the following:, Cooking, Housework  Current functional level: Cooking, Housework    PT AM-PAC:   /24  OT AM-PAC:   /24    Family can provide assistance at DC: No  Would you like Case Management to discuss the discharge plan with any other family members/significant others, and if so, who? No  Plans to Return to Present Housing: Yes  Other Identified Issues/Barriers to RETURNING to current housing: none   Potential Assistance needed at discharge: Home Care            Potential DME:    Patient expects to discharge to: Assisted living  Plan for transportation at discharge: Rajan Chavez    Payor: Eric Avelar / Plan: Rene Gabriel PPO / Product Type: Medicare /     Does insurance require precert for SNF: Yes    Potential assistance Purchasing Medications: No  Meds-to-Beds request:        [facility] [phone]  [employer] [facility] [employer] [address]  Phone: [phone] Fax: [phone] [address] [place] [address] [phone] - f [phone]  [address] [place] [address]  Phone: [phone] Fax: [phone]      Notes:    Factors facilitating achievement of predicted outcomes: Cooperative and Pleasant    Barriers to discharge: No family support, Decreased endurance, Upper extremity weakness, Lower extremity weakness, Long standing deficits, and Medical complications    Additional Case Management Notes:   Patient lives at [address] [place] [address]. She has motorized wc that she uses to get down to dining márquez. She uses rw in [facility]. She wears 02 at 4 liters all the time. Has a pulse ox, sc,cane, neb, bsc, rw, motorized wheelchair. Her last PCP appt was virtual.      [facility] a [facility]. Sent over referral in epic to continue services.   [facility] and last visit was on 10/12    She comes to [facility]

## 2023-10-14 NOTE — PLAN OF CARE
Problem: Respiratory - Adult  Goal: Achieves optimal ventilation and oxygenation  Flowsheets (Taken 10/14/2023 1054)  Achieves optimal ventilation and oxygenation:   Assess for changes in respiratory status   Position to facilitate oxygenation and minimize respiratory effort   Encourage broncho-pulmonary hygiene including cough, deep breathe, incentive spirometry   Assess and instruct to report shortness of breath or any respiratory difficulty   Respiratory therapy support as indicated

## 2023-10-14 NOTE — ACP (ADVANCE CARE PLANNING)
Advance Care Planning     Advance Care Planning Activator (Inpatient)  Conversation Note      Date of ACP Conversation: 10/14/2023     Conversation Conducted with: Patient with Decision Making Capacity    ACP Activator: Emmanuelle Arnold RN          Health Care Decision Maker:     Current Designated Health Care Decision Maker:     Primary Decision Maker: Carmina Carnes - Other - [phone]    Secondary Decision Maker: Rosa Koo - Other - [phone]  Click here to complete Healthcare Decision Makers including section of the Healthcare Decision Maker Relationship (ie \"Primary\")  Today we discussed [address] The patient is considering options. Care Preferences    Ventilation: \"If you were in your present state of health and suddenly became very ill and were unable to breathe on your own, what would your preference be about the use of a ventilator (breathing machine) if it were available to you? \"      Would the patient desire the use of ventilator (breathing machine)?: yes    \"If your health worsens and it becomes clear that your chance of recovery is unlikely, what would your preference be about the use of a ventilator (breathing machine) if it were available to you? \"     Would the patient desire the use of ventilator (breathing machine)?: No      Resuscitation  \"CPR works best to restart the heart when there is a sudden event, like a heart attack, in someone who is otherwise healthy. Unfortunately, CPR does not typically restart the heart for people who have serious health conditions or who are very sick. \"    \"In the event your heart stopped as a result of an underlying serious health condition, would you want attempts to be made to restart your heart (answer \"yes\" for attempt to resuscitate) or would you prefer a natural death (answer \"no\" for do not attempt to resuscitate)? \" yes       [] Yes   [] No   Educated Patient / Elrabroderick Russell regarding differences between Advance

## 2023-10-14 NOTE — PLAN OF CARE
Problem: Discharge Planning  Goal: Discharge to home or other facility with appropriate resources  Outcome: Progressing   Will return to [facility] at d/c  Problem: Pain  Goal: Verbalizes/displays adequate comfort level or baseline comfort level  Outcome: Progressing   Minimal to no pain this shift

## 2023-10-15 ENCOUNTER — APPOINTMENT (OUTPATIENT)
Dept: GENERAL RADIOLOGY | Age: 68
DRG: 178 | End: 2023-10-15
Payer: MEDICARE

## 2023-10-15 PROBLEM — U07.1 COVID: Status: ACTIVE | Noted: 2023-10-15

## 2023-10-15 LAB
ANION GAP SERPL CALCULATED.3IONS-SCNC: 5 MMOL/L (ref 9–17)
BUN SERPL-MCNC: 21 MG/DL (ref 8–23)
BUN/CREAT SERPL: 19 (ref 9–20)
CALCIUM SERPL-MCNC: 9.5 MG/DL (ref 8.6–10.4)
CHLORIDE SERPL-SCNC: 97 MMOL/L (ref 98–107)
CO2 SERPL-SCNC: 39 MMOL/L (ref 20–31)
CREAT SERPL-MCNC: 1.1 MG/DL (ref 0.5–0.9)
ERYTHROCYTE [DISTWIDTH] IN BLOOD BY AUTOMATED COUNT: 17.5 % (ref 11.8–14.4)
EST. AVERAGE GLUCOSE BLD GHB EST-MCNC: 148 MG/DL
GFR SERPL CREATININE-BSD FRML MDRD: 55 ML/MIN/1.73M2
GLUCOSE BLD-MCNC: 130 MG/DL (ref 65–105)
GLUCOSE BLD-MCNC: 130 MG/DL (ref 65–105)
GLUCOSE BLD-MCNC: 131 MG/DL (ref 65–105)
GLUCOSE BLD-MCNC: 147 MG/DL (ref 65–105)
GLUCOSE SERPL-MCNC: 128 MG/DL (ref 70–99)
HBA1C MFR BLD: 6.8 % (ref 4–6)
HCT VFR BLD AUTO: 32 % (ref 36.3–47.1)
HGB BLD-MCNC: 8.9 G/DL (ref 11.9–15.1)
INR PPP: 4.5
INR PPP: 4.7
MCH RBC QN AUTO: 25.7 PG (ref 25.2–33.5)
MCHC RBC AUTO-ENTMCNC: 27.8 G/DL (ref 28.4–34.8)
MCV RBC AUTO: 92.5 FL (ref 82.6–102.9)
NRBC BLD-RTO: 0 PER 100 WBC
PLATELET # BLD AUTO: 275 K/UL (ref 138–453)
PMV BLD AUTO: 9.4 FL (ref 8.1–13.5)
POTASSIUM SERPL-SCNC: 4.2 MMOL/L (ref 3.7–5.3)
PROTHROMBIN TIME: 41.8 SEC (ref 11.5–14.2)
PROTHROMBIN TIME: 43.5 SEC (ref 11.5–14.2)
RBC # BLD AUTO: 3.46 M/UL (ref 3.95–5.11)
SARS-COV-2 RDRP RESP QL NAA+PROBE: DETECTED
SODIUM SERPL-SCNC: 141 MMOL/L (ref 135–144)
SPECIMEN DESCRIPTION: ABNORMAL
TROPONIN I SERPL HS-MCNC: 29 NG/L (ref 0–14)
TROPONIN I SERPL HS-MCNC: 32 NG/L (ref 0–14)
WBC OTHER # BLD: 6 K/UL (ref 3.5–11.3)

## 2023-10-15 PROCEDURE — 93005 ELECTROCARDIOGRAM TRACING: CPT | Performed by: NURSE PRACTITIONER

## 2023-10-15 PROCEDURE — 84484 ASSAY OF TROPONIN QUANT: CPT

## 2023-10-15 PROCEDURE — 87635 SARS-COV-2 COVID-19 AMP PRB: CPT

## 2023-10-15 PROCEDURE — 2580000003 HC RX 258: Performed by: NURSE PRACTITIONER

## 2023-10-15 PROCEDURE — 1200000000 HC SEMI PRIVATE

## 2023-10-15 PROCEDURE — 97530 THERAPEUTIC ACTIVITIES: CPT

## 2023-10-15 PROCEDURE — 80048 BASIC METABOLIC PNL TOTAL CA: CPT

## 2023-10-15 PROCEDURE — 71045 X-RAY EXAM CHEST 1 VIEW: CPT

## 2023-10-15 PROCEDURE — 94761 N-INVAS EAR/PLS OXIMETRY MLT: CPT

## 2023-10-15 PROCEDURE — 85027 COMPLETE CBC AUTOMATED: CPT

## 2023-10-15 PROCEDURE — 82947 ASSAY GLUCOSE BLOOD QUANT: CPT

## 2023-10-15 PROCEDURE — 2700000000 HC OXYGEN THERAPY PER DAY

## 2023-10-15 PROCEDURE — 6370000000 HC RX 637 (ALT 250 FOR IP): Performed by: NURSE PRACTITIONER

## 2023-10-15 PROCEDURE — 97163 PT EVAL HIGH COMPLEX 45 MIN: CPT

## 2023-10-15 PROCEDURE — 36415 COLL VENOUS BLD VENIPUNCTURE: CPT

## 2023-10-15 PROCEDURE — 94640 AIRWAY INHALATION TREATMENT: CPT

## 2023-10-15 PROCEDURE — 6360000002 HC RX W HCPCS: Performed by: NURSE PRACTITIONER

## 2023-10-15 PROCEDURE — 97166 OT EVAL MOD COMPLEX 45 MIN: CPT

## 2023-10-15 PROCEDURE — 6370000000 HC RX 637 (ALT 250 FOR IP): Performed by: INTERNAL MEDICINE

## 2023-10-15 PROCEDURE — 85610 PROTHROMBIN TIME: CPT

## 2023-10-15 PROCEDURE — 99232 SBSQ HOSP IP/OBS MODERATE 35: CPT | Performed by: NURSE PRACTITIONER

## 2023-10-15 PROCEDURE — 97535 SELF CARE MNGMENT TRAINING: CPT

## 2023-10-15 RX ORDER — AZITHROMYCIN 250 MG/1
500 TABLET, FILM COATED ORAL ONCE
Status: COMPLETED | OUTPATIENT
Start: 2023-10-15 | End: 2023-10-15

## 2023-10-15 RX ORDER — GUAIFENESIN 600 MG/1
600 TABLET, EXTENDED RELEASE ORAL 2 TIMES DAILY
Status: DISCONTINUED | OUTPATIENT
Start: 2023-10-15 | End: 2023-10-17 | Stop reason: HOSPADM

## 2023-10-15 RX ORDER — IPRATROPIUM BROMIDE AND ALBUTEROL SULFATE 2.5; .5 MG/3ML; MG/3ML
1 SOLUTION RESPIRATORY (INHALATION)
Status: DISCONTINUED | OUTPATIENT
Start: 2023-10-15 | End: 2023-10-17 | Stop reason: HOSPADM

## 2023-10-15 RX ORDER — IPRATROPIUM BROMIDE AND ALBUTEROL SULFATE 2.5; .5 MG/3ML; MG/3ML
1 SOLUTION RESPIRATORY (INHALATION)
Status: DISCONTINUED | OUTPATIENT
Start: 2023-10-15 | End: 2023-10-15

## 2023-10-15 RX ORDER — MONTELUKAST SODIUM 10 MG/1
10 TABLET ORAL NIGHTLY
Status: DISCONTINUED | OUTPATIENT
Start: 2023-10-15 | End: 2023-10-17 | Stop reason: HOSPADM

## 2023-10-15 RX ORDER — AZITHROMYCIN 250 MG/1
250 TABLET, FILM COATED ORAL DAILY
Status: DISCONTINUED | OUTPATIENT
Start: 2023-10-16 | End: 2023-10-17

## 2023-10-15 RX ORDER — ALBUTEROL SULFATE 90 UG/1
1 AEROSOL, METERED RESPIRATORY (INHALATION) EVERY 4 HOURS PRN
Status: DISCONTINUED | OUTPATIENT
Start: 2023-10-15 | End: 2023-10-17 | Stop reason: HOSPADM

## 2023-10-15 RX ORDER — ALBUTEROL SULFATE 90 UG/1
2 AEROSOL, METERED RESPIRATORY (INHALATION)
Status: DISCONTINUED | OUTPATIENT
Start: 2023-10-15 | End: 2023-10-15

## 2023-10-15 RX ADMIN — BUDESONIDE INHALATION 500 MCG: 0.5 SUSPENSION RESPIRATORY (INHALATION) at 09:35

## 2023-10-15 RX ADMIN — GUAIFENESIN 600 MG: 600 TABLET ORAL at 12:39

## 2023-10-15 RX ADMIN — PRAVASTATIN SODIUM 80 MG: 40 TABLET ORAL at 18:02

## 2023-10-15 RX ADMIN — AZITHROMYCIN DIHYDRATE 500 MG: 250 TABLET ORAL at 12:39

## 2023-10-15 RX ADMIN — MONTELUKAST 10 MG: 10 TABLET, FILM COATED ORAL at 22:03

## 2023-10-15 RX ADMIN — NIRMATRELVIR AND RITONAVIR 3 TABLET: KIT at 22:11

## 2023-10-15 RX ADMIN — SODIUM CHLORIDE, PRESERVATIVE FREE 10 ML: 5 INJECTION INTRAVENOUS at 22:04

## 2023-10-15 RX ADMIN — HYDROCHLOROTHIAZIDE 12.5 MG: 12.5 TABLET ORAL at 07:46

## 2023-10-15 RX ADMIN — IPRATROPIUM BROMIDE AND ALBUTEROL SULFATE 1 DOSE: 2.5; .5 SOLUTION RESPIRATORY (INHALATION) at 09:35

## 2023-10-15 RX ADMIN — SODIUM CHLORIDE, PRESERVATIVE FREE 10 ML: 5 INJECTION INTRAVENOUS at 07:46

## 2023-10-15 RX ADMIN — IPRATROPIUM BROMIDE AND ALBUTEROL SULFATE 1 DOSE: 2.5; .5 SOLUTION RESPIRATORY (INHALATION) at 14:59

## 2023-10-15 RX ADMIN — GUAIFENESIN 600 MG: 600 TABLET ORAL at 22:04

## 2023-10-15 RX ADMIN — IPRATROPIUM BROMIDE AND ALBUTEROL SULFATE 1 DOSE: 2.5; .5 SOLUTION RESPIRATORY (INHALATION) at 19:41

## 2023-10-15 RX ADMIN — DILTIAZEM HYDROCHLORIDE 120 MG: 120 CAPSULE, COATED, EXTENDED RELEASE ORAL at 07:46

## 2023-10-15 RX ADMIN — BUDESONIDE INHALATION 500 MCG: 0.5 SUSPENSION RESPIRATORY (INHALATION) at 19:41

## 2023-10-15 RX ADMIN — NIRMATRELVIR AND RITONAVIR 3 TABLET: KIT at 12:40

## 2023-10-15 RX ADMIN — LOSARTAN POTASSIUM 100 MG: 100 TABLET, FILM COATED ORAL at 07:46

## 2023-10-15 RX ADMIN — ALBUTEROL SULFATE 1 PUFF: 90 AEROSOL, METERED RESPIRATORY (INHALATION) at 02:33

## 2023-10-15 NOTE — PLAN OF CARE
Problem: Discharge Planning  Goal: Discharge to home or other facility with appropriate resources  10/15/2023 0223 by Haydee Aldana RN  Outcome: Progressing     Problem: Skin/Tissue Integrity  Goal: Absence of new skin breakdown  Outcome: Progressing     Problem: Safety - Adult  Goal: Free from fall injury  Outcome: Progressing     Problem: Respiratory - Adult  Goal: Achieves optimal ventilation and oxygenation  10/15/2023 0223 by Haydee Aldana RN  Outcome: Progressing     Problem: Chronic Conditions and Co-morbidities  Goal: Patient's chronic conditions and co-morbidity symptoms are monitored and maintained or improved  Outcome: Progressing

## 2023-10-15 NOTE — PROGRESS NOTES
restrictions: 3L O2 NC, up with assist    Subjective   General  Chart Reviewed: Yes  Patient assessed for rehabilitation services?: Yes  Family / Caregiver Present: No  Subjective  Subjective: pt is pleasant and cooperative for OT eval     Social/Functional History  Social/Functional History  Lives With: Alone  Type of Home: Assisted living [facility]  Home Layout: One level  Home Access: Level entry, Elevator  Bathroom Shower/Tub: Walk-in shower (with threshold)  Bathroom Toilet: Standard (commode with rails over toilet)  Bathroom Equipment: Shower chair, 3-in-1 commode  Home Equipment: Doristine Grecia, rolling, Wheelchair-manual, Cane, Oxygen (3L O2 24/7)  Has the patient had two or more falls in the past year or any fall with injury in the past year?: No (pt denies recent falls)  ADL Assistance: Needs assistance (assist with bathing)  Homemaking Assistance: Needs assistance  Homemaking Responsibilities: No (facility provides meals, housekeeping, laundry)  Ambulation Assistance: Independent (RW for short distances, electric w/c for longer distances)  Transfer Assistance: Independent  Active : No  Occupation: Retired  Type of Occupation: teacher  Leisure & Hobbies: tv, reading       Objective        Observation/Palpation  Observation: BMI 68.56  Edema: BLE/BUE  Safety Devices  Type of Devices: Call light within reach;Nurse notified;Gait belt;Left in bed    Bed Mobility Training  Bed Mobility Training: Yes  Overall Level of Assistance: Moderate assistance;Assist X2  Interventions: Verbal cues (Min cues for proper bed mob tech and assist with lines. HOB raised throughout. Pt reports she normally sleeps in a lift chair)  Supine to Sit: Contact-guard assistance  Sit to Supine: Moderate assistance;Assist X2 (assist with BLE and trunk)  Scooting:  Moderate assistance;Assist X2 (to boost toward [facility] in trendelendberg position)    Transfer Training  Transfer Training: Yes  Overall Level of Assistance: Contact-guard assistance (STS from EOB, pt stood ~2-3 mins)  Interventions: Safety awareness training;Verbal cues (Min cues for assist with lines, controlled sit<>stand, pacing, all to inc safety/reduce fall risk)  Sit to Stand: Contact-guard assistance  Stand to Sit: Contact-guard assistance    Functional Mobility  Overall Level of Assistance: Contact-guard assistance (With RW, pt completed functional mob from bedside toward door and back. Pt limited d/t short line length.)  Interventions: Safety awareness training (Min cues for RW safety/mgmt, assist with lines, and pacing)  Assistive Device: Walker, rolling;Gait belt       AROM: Generally decreased, functional (L shoulder ~70 deg, rest WFL)  PROM: Within functional limits  Strength: Generally decreased, functional (4/5)  Coordination: Generally decreased, functional  Tone: Normal  Sensation: Intact (pt denies N/T)    ADL  Feeding: Setup  Grooming: Stand by assistance (seated)  UE Bathing: Stand by assistance  LE Bathing: Moderate assistance  UE Dressing: Minimal assistance  UE Dressing Skilled Clinical Factors: to adjust hosp gown for modesty  LE Dressing: Maximum assistance;Dependent/Total  LE Dressing Skilled Clinical Factors: DEP to catracho B socks while sitting EOB and doff B socks while supine  Toileting: Maximum assistance  Toileting Skilled Clinical Factors: pt has ext cath  Additional Comments: self-care is limited d/t dec activity tolerance, weakness, and pain. Pt is very motivated for therapy and puts effort toward her mobility                Vision  Vision: Impaired  Vision Exceptions: Wears glasses at all times  Hearing  Hearing: Within functional limits  Cognition  Overall Cognitive Status: Exceptions  Arousal/Alertness: Appropriate responses to stimuli  Following Commands:  Follows all commands without difficulty  Attention Span: Appears intact  Memory: Appears intact  Safety Judgement: Decreased awareness of need for assistance;Decreased awareness of

## 2023-10-15 NOTE — PROGRESS NOTES
[facility]  Office: [address], DO, Sanam Coe, DO, Keaton Garcia, DO, Lauren Thomas, DO, Sonny De Leon MD, Chelsea Bobo MD, Cassy Prince MD, Alayna Villeda MD,  Yana Ruth MD, Alvino Quiros MD, Sara Braga DO, Lyle Cason MD,  Christophe Arauz MD, Jarrett Larson MD, Addison Norman DO, Herrera Alvarenga MD,  Tj Roberts DO, Gerson Chaves MD, Alexa Dow MD, John Hollingsworth MD, Janet Shannon MD,  Beverly Frausto MD, Elvin Euceda MD, Rodrick Eubanks MD, Chandrakant Hernández MD, Merry Venegas DO, John Carballo MD,  Roosevelt Vidal MD, Zayra Maddox MD, Dennis Langston, [facility],  Moustapha Byers, CNP,, Odalis Mccormick, CNP,  Zackery Barrientos, [facility], Juliann Coulter, CNP, Ninoska Edward, CNP, Jose Solomon, CNP, Gisele Mackey, CNP, Amna Durham, CNP, Toñito Montes, CNP, Sarah Chandler, CNS, Alin Ramos, CNP, [place], [address] [place] [address]    Progress Note    10/15/2023    10:15 AM    Name:   Kelly Bailey  MRN:     [id number]     [address]:      [de-identified]   Room:   2005/2005-01   Day:  0  Admit Date:  10/14/2023  5:07 AM    PCP:   ARACELIS Chicas CNP  Code Status:  Full Code    Subjective:     C/C:   Chief Complaint   Patient presents with    Hernia     Bleeding from hernia site     Interval History Status: improved. Condition has improved. INR trending down, 4.7 today. Does endorse chest tightness/pain with deep inspiration. Chest pain and respiratory work-up underway. Patient tested positive for COVID. This explains her chest tightness. She is having thick mucus cough this morning. On her baseline level of oxygen supplementation. Minimally elevated troponin, twelve-lead unremarkable, low suspicion for ACS this chest pain/tightness is explained by COVID.     Brief History:     10/14 - Patient reports to the emergency department with acute blood loss to abdominal wall. Patient has a chronic skin ulcer which is been under the care of wound care. This morning at 4 AM she awoke and found her bed in person to be covered in blood. She takes Coumadin for chronic DVT/PE. She has been on this for quite some time and had a recurrent DVT despite having therapeutic INR so her goal is 2.5-3.5. She advised that last week she was not feeling well so she skipped her INR checks and continues to take her Coumadin as scheduled dosages. Emergency department valuation is completed patient found to be supratherapeutic. She received a milligram of vitamin K.  Bleeding stopped. Endorses generalized fatigue but no evidence of acute internal hemorrhage. 10/15 - INR trending down, 4.7 today. Does endorse chest tightness/pain with deep inspiration. Chest pain and respiratory work-up underway. Patient tested positive for COVID. This explains her chest tightness. She is having thick mucus cough this morning. On her baseline level of oxygen supplementation. Review of Systems:     Constitutional:  negative for chills, fevers, sweats  Respiratory: Endorses cough and mucus production. Chest pain on deep inspiration. Cardiovascular:  negative for chest pain, chest pressure/discomfort, lower extremity edema, palpitations  Gastrointestinal:  negative for abdominal pain, constipation, diarrhea, nausea, vomiting  Neurological:  negative for dizziness, headache    Medications: Allergies:     Allergies   Allergen Reactions    Amoxil [Amoxicillin Trihydrate] Hives    Penicillins     Codeine      headaches    Neosporin [Bacitracin-Neomycin-Polymyxin] Rash    Polysporin [Bacitracin-Polymyxin B] Rash       Current Meds:   Scheduled Meds:    [START ON 10/16/2023] azithromycin  250 mg Oral Daily    azithromycin  500 mg Oral Once    ipratropium 0.5 mg-albuterol 2.5 mg  1 Dose Inhalation TID RT    sodium chloride flush  5-40 mL IntraVENous 2 times per day    budesonide  0.5 mg

## 2023-10-15 NOTE — PROGRESS NOTES
Patient tested positive for Covid-19 and is now in droplet plus isolation. Aside from this,she had an uneventful shift. She was free of pain/discomfort complaints this shift.

## 2023-10-15 NOTE — PLAN OF CARE
Problem: Respiratory - Adult  Goal: Achieves optimal ventilation and oxygenation  10/14/2023 2015 by Ole Monroe RCP  Outcome: Progressing

## 2023-10-15 NOTE — PLAN OF CARE
Problem: Respiratory - Adult  Goal: Achieves optimal ventilation and oxygenation  Outcome: Progressing  Flowsheets (Taken 10/15/2023 0800 by Chloe Shukla RN)  Achieves optimal ventilation and oxygenation: Assess for changes in mentation and behavior

## 2023-10-15 NOTE — PROGRESS NOTES
Physical Therapy  Facility/Department: [facility] SURG  Physical Therapy Initial Assessment    Name: Dwayne Butler  : 1955  MRN: [id number]  Date of Service: 10/15/2023    Discharge Recommendations:  Patient would benefit from continued therapy after discharge          Patient Diagnosis(es): The primary encounter diagnosis was Bleeding from wound. Diagnoses of Supratherapeutic INR, Anemia, unspecified type, and Acute on chronic respiratory failure, unspecified whether with hypoxia or hypercapnia ([address]) were also pertinent to this visit. Past Medical History:  has a past medical history of Anxiety, Asthma, Bronchitis, DDD (degenerative disc disease), lumbar, Depression, Diabetes mellitus ([address]), Elevated glucose, Headache(784.0), Hernia of abdominal cavity, HH (hiatus hernia), Hyperlipemia, mixed, Hypertension, Osteoarthritis, Right femoral vein DVT ([address]), and Uterine hyperplasia. Past Surgical History:  has a past surgical history that includes Dilation and curettage of uterus (10/08 and ); Breast reduction surgery (1997); Breast reduction surgery (1997); Tympanostomy tube placement (1967); Tonsillectomy and adenoidectomy (); and Hysterectomy (7/17/15). Assessment   Body Structures, Functions, Activity Limitations Requiring Skilled Therapeutic Intervention: Decreased functional mobility ; Decreased endurance;Decreased balance  Assessment: Pt limited by poor activity tolerance, and knee pain w/ mobility  Therapy Prognosis: Good  Decision Making: High Complexity  Requires PT Follow-Up: Yes  Activity Tolerance  Activity Tolerance: Patient limited by endurance; Patient tolerated treatment well     Plan   Physcial Therapy Plan  General Plan: 5-7 times per week  Current Treatment Recommendations: Balance training, Functional mobility training, Transfer training, Gait training, Endurance training, Therapeutic activities  Safety Devices  Type of Devices: Bed alarm in place, Left in bed, Call light Arm  MAP (Calculated): 80  Respirations: 17  SpO2: 96 %  O2 Device: Nasal cannula     Observation/Palpation  Posture: Fair  Gross Assessment  AROM: Within functional limits  Strength: Within functional limits  Coordination: Within functional limits  Tone: Normal  Sensation: Intact        Strength RLE  Strength RLE: WFL  Comment: 4+/5 to 5/5 B LE's  Strength LLE  Strength LLE: WFL  Strength RUE  Comment: See OT laura for B UE MMT        Bed Mobility Training  Bed Mobility Training: Yes  Overall Level of Assistance: Moderate assistance;Assist X2  Interventions: Verbal cues (Min cues for proper bed mob tech and assist with lines. HOB raised throughout. Pt reports she normally sleeps in a lift chair)  Supine to Sit: Contact-guard assistance  Sit to Supine: Moderate assistance;Assist X2 (assist with BLE and trunk)  Scooting: Moderate assistance;Assist X2 (to boost toward [facility] in trendelendberg position)  Transfer Training  Transfer Training: Yes  Overall Level of Assistance: Contact-guard assistance (STS from EOB, pt stood ~2-3 mins)  Interventions: Safety awareness training;Verbal cues (Min cues for assist with lines, controlled sit<>stand, pacing, all to inc safety/reduce fall risk)  Sit to Stand: Contact-guard assistance  Stand to Sit: Contact-guard assistance  Gait  Overall Level of Assistance: Contact-guard assistance (With RW, pt completed functional mob from bedside toward door and back. Pt limited d/t short line length.)  Interventions: Safety awareness training (Min cues for RW safety/mgmt, assist with lines, and pacing)  Assistive Device: Walker, rolling;Gait belt  Bed mobility  Rolling to Left: Stand by assistance  Rolling to Right: Stand by assistance  Supine to Sit: Contact guard assistance  Sit to Supine:  Moderate assistance;2 Person assistance  Scooting: Maximal assistance;2 Person assistance  Transfers  Sit to Stand: Contact guard assistance  Stand to Sit: Contact guard assistance  Stand Pivot Transfers:

## 2023-10-16 PROBLEM — T14.8XXA BLEEDING FROM WOUND: Status: ACTIVE | Noted: 2023-10-16

## 2023-10-16 LAB
EKG ATRIAL RATE: 77 BPM
EKG P AXIS: -3 DEGREES
EKG P-R INTERVAL: 154 MS
EKG Q-T INTERVAL: 360 MS
EKG QRS DURATION: 88 MS
EKG QTC CALCULATION (BAZETT): 407 MS
EKG R AXIS: 29 DEGREES
EKG T AXIS: 67 DEGREES
EKG VENTRICULAR RATE: 77 BPM
GLUCOSE BLD-MCNC: 126 MG/DL (ref 65–105)
GLUCOSE BLD-MCNC: 134 MG/DL (ref 65–105)
GLUCOSE BLD-MCNC: 161 MG/DL (ref 65–105)
GLUCOSE BLD-MCNC: 164 MG/DL (ref 65–105)
INR PPP: 3.8
INR PPP: 4
PROTHROMBIN TIME: 37.2 SEC (ref 11.5–14.2)
PROTHROMBIN TIME: 38.6 SEC (ref 11.5–14.2)

## 2023-10-16 PROCEDURE — 97110 THERAPEUTIC EXERCISES: CPT

## 2023-10-16 PROCEDURE — 97530 THERAPEUTIC ACTIVITIES: CPT

## 2023-10-16 PROCEDURE — 6370000000 HC RX 637 (ALT 250 FOR IP): Performed by: NURSE PRACTITIONER

## 2023-10-16 PROCEDURE — 36415 COLL VENOUS BLD VENIPUNCTURE: CPT

## 2023-10-16 PROCEDURE — 94640 AIRWAY INHALATION TREATMENT: CPT

## 2023-10-16 PROCEDURE — 6370000000 HC RX 637 (ALT 250 FOR IP): Performed by: INTERNAL MEDICINE

## 2023-10-16 PROCEDURE — 97116 GAIT TRAINING THERAPY: CPT

## 2023-10-16 PROCEDURE — 6360000002 HC RX W HCPCS: Performed by: NURSE PRACTITIONER

## 2023-10-16 PROCEDURE — 85610 PROTHROMBIN TIME: CPT

## 2023-10-16 PROCEDURE — 99213 OFFICE O/P EST LOW 20 MIN: CPT

## 2023-10-16 PROCEDURE — 99232 SBSQ HOSP IP/OBS MODERATE 35: CPT | Performed by: INTERNAL MEDICINE

## 2023-10-16 PROCEDURE — 94761 N-INVAS EAR/PLS OXIMETRY MLT: CPT

## 2023-10-16 PROCEDURE — 2580000003 HC RX 258: Performed by: NURSE PRACTITIONER

## 2023-10-16 PROCEDURE — 82947 ASSAY GLUCOSE BLOOD QUANT: CPT

## 2023-10-16 PROCEDURE — 1200000000 HC SEMI PRIVATE

## 2023-10-16 PROCEDURE — 97535 SELF CARE MNGMENT TRAINING: CPT

## 2023-10-16 PROCEDURE — 2700000000 HC OXYGEN THERAPY PER DAY

## 2023-10-16 RX ADMIN — IPRATROPIUM BROMIDE AND ALBUTEROL SULFATE 1 DOSE: 2.5; .5 SOLUTION RESPIRATORY (INHALATION) at 07:56

## 2023-10-16 RX ADMIN — PRAVASTATIN SODIUM 80 MG: 40 TABLET ORAL at 17:52

## 2023-10-16 RX ADMIN — GUAIFENESIN 600 MG: 600 TABLET ORAL at 08:53

## 2023-10-16 RX ADMIN — MONTELUKAST 10 MG: 10 TABLET, FILM COATED ORAL at 22:16

## 2023-10-16 RX ADMIN — BUDESONIDE INHALATION 500 MCG: 0.5 SUSPENSION RESPIRATORY (INHALATION) at 20:36

## 2023-10-16 RX ADMIN — SODIUM CHLORIDE, PRESERVATIVE FREE 10 ML: 5 INJECTION INTRAVENOUS at 22:20

## 2023-10-16 RX ADMIN — GUAIFENESIN 600 MG: 600 TABLET ORAL at 22:16

## 2023-10-16 RX ADMIN — LOSARTAN POTASSIUM 100 MG: 100 TABLET, FILM COATED ORAL at 08:53

## 2023-10-16 RX ADMIN — BUDESONIDE INHALATION 500 MCG: 0.5 SUSPENSION RESPIRATORY (INHALATION) at 07:56

## 2023-10-16 RX ADMIN — NIRMATRELVIR AND RITONAVIR 3 TABLET: KIT at 10:41

## 2023-10-16 RX ADMIN — IPRATROPIUM BROMIDE AND ALBUTEROL SULFATE 1 DOSE: 2.5; .5 SOLUTION RESPIRATORY (INHALATION) at 14:14

## 2023-10-16 RX ADMIN — IPRATROPIUM BROMIDE AND ALBUTEROL SULFATE 1 DOSE: 2.5; .5 SOLUTION RESPIRATORY (INHALATION) at 20:36

## 2023-10-16 RX ADMIN — HYDROCHLOROTHIAZIDE 12.5 MG: 12.5 TABLET ORAL at 08:53

## 2023-10-16 RX ADMIN — AZITHROMYCIN DIHYDRATE 250 MG: 250 TABLET ORAL at 08:53

## 2023-10-16 RX ADMIN — POLYETHYLENE GLYCOL 3350 17 G: 17 POWDER, FOR SOLUTION ORAL at 13:44

## 2023-10-16 RX ADMIN — NIRMATRELVIR AND RITONAVIR 3 TABLET: KIT at 22:17

## 2023-10-16 RX ADMIN — SODIUM CHLORIDE, PRESERVATIVE FREE 10 ML: 5 INJECTION INTRAVENOUS at 08:53

## 2023-10-16 RX ADMIN — DILTIAZEM HYDROCHLORIDE 120 MG: 120 CAPSULE, COATED, EXTENDED RELEASE ORAL at 08:53

## 2023-10-16 NOTE — DISCHARGE INSTR - COC
Continuity of Care Form    Patient Name: Roberta Reyes   :    MRN:  [id number]    Admit date:  10/14/2023  Discharge date:  10/17/23    Code Status Order: Full Code   Advance Directives:     Admitting Physician:  Joselito Solo DO  PCP: ARACELIS Jeffries - CNP    Discharging Nurse: [facility] Unit/Room#:   Discharging Unit Phone Number: [phone]    Emergency Contact:   Extended Emergency Contact Information  Primary Emergency Contact: [address]  Mobile Phone: [phone]  Relation: Other  Preferred language: English   needed?  No  Secondary Emergency Contact: Lea Bryan  Address: [facility] [address] [address] [address] Phone: [phone]  Mobile Phone: [phone]  Relation: Other    Past Surgical History:  Past Surgical History:   Procedure Laterality Date    BREAST REDUCTION SURGERY  1997    BREAST REDUCTION SURGERY  1997    DILATION AND CURETTAGE OF UTERUS  10/08 and     HYSTERECTOMY (CERVIX STATUS UNKNOWN)  7/17/15    Clive Miles, Dr Dalila Kern, endometial cancer, FIGO grade 1    TONSILLECTOMY AND ADENOIDECTOMY      TYMPANOSTOMY TUBE PLACEMENT  1967       Immunization History:   Immunization History   Administered Date(s) Administered    COVID-19, MODERNA Bivalent, (age 12y+), IM, 48 mcg/0.5 mL 2023    COVID-19, MODERNA Booster BLUE border, (age 18y+), IM, 50mcg/0.25mL 2022, 10/07/2022    FLUARIX 3 YEARS AND OVER 10/26/2015    Hepatitis B 1991    Influenza A (S8Y7-71) Vaccine IM 2009    Influenza Virus Vaccine 2014, 2014, 10/08/2018, 10/17/2019    Influenza Whole 2009    Influenza, AFLURIA (age 1 yrs+), FLUZONE, (age 10 mo+), MDV, 0.5mL 2016    Influenza, FLUAD, (age 72 y+), Adjuvanted, 0.5mL 10/07/2022    Influenza, FLUARIX, FLULAVAL, FLUZONE (age 10 mo+) AND AFLURIA, (age 1 y+), PF, 0.5mL 10/26/2015, 10/17/2019, 2020    Pneumococcal, PPSV23, erythema; Intact; Warm 10/16/23 1332   Margins Attached edges; Defined edges 10/16/23 1332   Number of days:        Wound Abdomen Distal inferior wound (Active)   Wound Image   10/16/23 1332   Wound Etiology Other 10/16/23 1332   Dressing Status Clean;Dry; Intact; New dressing applied 10/16/23 1332   Wound Cleansed Cleansed with saline 10/16/23 1332   Wound Length (cm) 0.4 cm 10/16/23 1332   Wound Width (cm) 0.6 cm 10/16/23 1332   Wound Depth (cm) 0.1 cm 10/16/23 1332   Wound Surface Area (cm^2) 0.24 cm^2 10/16/23 1332   Wound Volume (cm^3) 0.024 cm^3 10/16/23 1332   Wound Assessment Slough;Pink/red 10/16/23 1332   Drainage Amount Small (< 25%) 10/16/23 1332   Drainage Description Serosanguinous 10/16/23 1332   Odor None 10/16/23 1332   Suzette-wound Assessment Warm; Intact; Hyperpigmented 10/16/23 1332   Margins Defined edges; Attached edges 10/16/23 1332   Number of days:      Abdominal wounds - Cleanse with Vashe -let soak for at least 5 minutes during dressing changes,  Collagen with Silver, Cover with silicone border bandage, Abdominal Binder. Change 3 times weekly    Elimination:  Continence: Bowel: Yes  Bladder: Yes  Urinary Catheter: None   Colostomy/Ileostomy/Ileal Conduit: No       Date of Last BM: 10/16/23    Intake/Output Summary (Last 24 hours) at 10/16/2023 1348  Last data filed at 10/16/2023 1142  Gross per 24 hour   Intake --   Output 2475 ml   Net -2475 ml     I/O last 3 completed shifts:  In: -   Out: 4600 [Urine:4600]    Safety Concerns: At Risk for Falls    Impairments/Disabilities:      None    Nutrition Therapy:  Current Nutrition Therapy:   - Oral Diet:  General    Routes of Feeding: Oral  Liquids: No Restrictions  Daily Fluid Restriction: no  Last Modified Barium Swallow with Video (Video Swallowing Test): not done    Treatments at the Time of Hospital Discharge:   Respiratory Treatments: duoneb, albuterol inhaler  Oxygen Therapy:  is on oxygen at 3 L/min per nasal cannula.   Ventilator:    - No

## 2023-10-16 NOTE — PROGRESS NOTES
Wound Volume (cm^3) 0.494 cm^3 10/16/23 1332   Wound Assessment Pink/red 10/16/23 1332   Drainage Amount Scant (moist but unmeasurable) 10/16/23 1332   Drainage Description Serosanguinous 10/16/23 1332   Odor None 10/16/23 1332   Suzette-wound Assessment Blanchable erythema; Intact; Warm 10/16/23 1332   Margins Attached edges; Defined edges 10/16/23 1332   Number of days:        Wound Abdomen Distal inferior wound (Active)   Wound Image   10/16/23 1332   Wound Etiology Other 10/16/23 1332   Dressing Status Clean;Dry; Intact; New dressing applied 10/16/23 1332   Wound Cleansed Cleansed with saline 10/16/23 1332   Wound Length (cm) 0.4 cm 10/16/23 1332   Wound Width (cm) 0.6 cm 10/16/23 1332   Wound Depth (cm) 0.1 cm 10/16/23 1332   Wound Surface Area (cm^2) 0.24 cm^2 10/16/23 1332   Wound Volume (cm^3) 0.024 cm^3 10/16/23 1332   Wound Assessment Slough;Pink/red 10/16/23 1332   Drainage Amount Small (< 25%) 10/16/23 1332   Drainage Description Serosanguinous 10/16/23 1332   Odor None 10/16/23 1332   Suzette-wound Assessment Warm; Intact; Hyperpigmented 10/16/23 1332   Margins Defined edges; Attached edges 10/16/23 1332   Number of days:                  Plan:     Plan of Care:     -abdominal wounds -  cleanse with saline, pat dry. Cover wound with Maxorb cut to fit. Apply foam dressing to cover. Change three times weekly and as needed if loose or soiled. Upon discharge home - resume prior orders from [facility]. [x] Turn and reposition every 2 hours while in bed. [x] Float heels off of bed with pillows under calves. [] Heel protective boots (heel medix boots) at all times while in bed.    [] Sacral foam dressing to sacrococcygeal area. Use skin barrier film prior to placement. Peel back dressing, inspect skin beneath, and re-secure every shift. Change every 3 days and as needed if loose or soiled. Discontinue sacral foam if repeatedly soiled by incontinence.     [] Apply zinc oxide cream twice daily and as needed after

## 2023-10-16 NOTE — PLAN OF CARE
18)  Achieves optimal ventilation and oxygenation:   Assess for changes in respiratory status   Respiratory therapy support as indicated  Note: BRONCHOSPASM/BRONCHOCONSTRICTION    IMPROVE  AERATION/BREATHSOUNDS  ADMINISTER BRONCHODILATOR THERAPY AS APPROPRIATE  ASSESS BREATH SOUNDS  PATIENT EDUCATION AS NEEDED      Problem: Chronic Conditions and Co-morbidities  Goal: Patient's chronic conditions and co-morbidity symptoms are monitored and maintained or improved  Outcome: Progressing  Flowsheets (Taken 10/16/2023 2241)  Care Plan - Patient's Chronic Conditions and Co-Morbidity Symptoms are Monitored and Maintained or Improved:   Monitor and assess patient's chronic conditions and comorbid symptoms for stability, deterioration, or improvement   Collaborate with multidisciplinary team to address chronic and comorbid conditions and prevent exacerbation or deterioration   Update acute care plan with appropriate goals if chronic or comorbid symptoms are exacerbated and prevent overall improvement and discharge     Problem: Hematologic - Adult  Goal: Maintains hematologic stability  Outcome: Progressing

## 2023-10-16 NOTE — PLAN OF CARE
Problem: Discharge Planning  Goal: Discharge to home or other facility with appropriate resources  Outcome: Progressing     Problem: Pain  Goal: Verbalizes/displays adequate comfort level or baseline comfort level  Outcome: Progressing     Problem: ABCDS Injury Assessment  Goal: Absence of physical injury  Outcome: Progressing     Problem: Skin/Tissue Integrity  Goal: Absence of new skin breakdown  Description: 1. Monitor for areas of redness and/or skin breakdown  2. Assess vascular access sites hourly  3. Every 4-6 hours minimum:  Change oxygen saturation probe site  4. Every 4-6 hours:  If on nasal continuous positive airway pressure, respiratory therapy assess nares and determine need for appliance change or resting period.   Outcome: Progressing     Problem: Safety - Adult  Goal: Free from fall injury  Outcome: Progressing     Problem: Respiratory - Adult  Goal: Achieves optimal ventilation and oxygenation  10/16/2023 0052 by Melida Zacarias RN  Outcome: Progressing  10/15/2023 1942 by Maxwell Bal RCP  Outcome: Progressing  Flowsheets (Taken 10/15/2023 0800 by Rosa Smith RN)  Achieves optimal ventilation and oxygenation: Assess for changes in mentation and behavior     Problem: Chronic Conditions and Co-morbidities  Goal: Patient's chronic conditions and co-morbidity symptoms are monitored and maintained or improved  Outcome: Progressing

## 2023-10-16 NOTE — PROGRESS NOTES
Physical Therapy  Facility/Department: [facility]  Rehabilitation Physical Therapy Treatment Note    NAME: Irving Trejo  : 1955 (54 y.o.)  MRN: [id number]  CODE STATUS: Full Code    Date of Service: 10/16/23   Pt currently functioning below baseline. Recommend daily inpatient skilled therapy at time of discharge to maximize long term outcomes and prevent re-admission. Please refer to AM-PAC score for current level of function. Restrictions:  Restrictions/Precautions: Contact Precautions, Isolation, General Precautions, Up as Tolerated  Position Activity Restriction  Other position/activity restrictions: 3L O2 NC, up with assist     SUBJECTIVE  Subjective  Subjective: Pt in bed upon arrival and agreeable to therapy at this time. RN states patient is appropriate for PT treatment this date. OBJECTIVE  Cognition  Overall Cognitive Status: WNL  Arousal/Alertness: Appropriate responses to stimuli  Following Commands: Follows all commands without difficulty  Attention Span: Appears intact  Memory: Appears intact  Safety Judgement: Decreased awareness of need for assistance;Decreased awareness of need for safety  Problem Solving: Able to problem solve independently  Insights: Fully aware of deficits  Initiation: Does not require cues  Sequencing: Requires cues for some  Orientation  Overall Orientation Status: Within Normal Limits  Orientation Level: Oriented X4    Functional Mobility  Bed Mobility  Overall Assistance Level: Moderate Assistance; Requires x 2 Assistance  Additional Factors: Set-up; Verbal cues; Increased time to complete; Head of bed raised; With handrails  Roll Left  Assistance Level: Moderate assistance; Requires x 2 assistance  Roll Right  Assistance Level: Moderate assistance; Requires x 2 assistance  Skilled Clinical Factors: vc's for hand placement and progression technique. Sit to Supine  Assistance Level: Moderate assistance; Requires x 2 assistance  Supine to Sit  Assistance Level: skilled therapy professionals to address individual discipline's goals. PT addressing pre gait trunk strengthening, weight shifting prior to transfers, transfer training, and postural control in sitting.      Erwin Norwood, PTA, 10/16/23 at 12:31 PM

## 2023-10-16 NOTE — PLAN OF CARE
Problem: Respiratory - Adult  Goal: Achieves optimal ventilation and oxygenation  10/16/2023 0758 by Rey Barnard RCP  Outcome: Progressing  Flowsheets (Taken 10/16/2023 0758)  Achieves optimal ventilation and oxygenation:   Assess for changes in respiratory status   Respiratory therapy support as indicated  Note: BRONCHOSPASM/BRONCHOCONSTRICTION    IMPROVE  AERATION/BREATHSOUNDS  ADMINISTER BRONCHODILATOR THERAPY AS APPROPRIATE  ASSESS BREATH SOUNDS  PATIENT EDUCATION AS NEEDED   10/16/2023 0052 by Elsie Felton RN  Outcome: Progressing  10/15/2023 1942 by Prabhakar Suárez RCP  Outcome: Progressing  Flowsheets (Taken 10/15/2023 0800 by Khadijah Lovett RN)  Achieves optimal ventilation and oxygenation: Assess for changes in mentation and behavior

## 2023-10-16 NOTE — PROGRESS NOTES
[facility]  Office: [phone]  Martha Patrick, DO, Chin Curet, DO, Yebroderick Pineda, DO, Christine Dougherty Blood, DO, Dami Locke MD, Betito Ramirez MD, Mallory Jones MD, Flora Armendariz MD,  Kip Snyder MD, Balta Alvarez MD, Monika Carter, DO, Reid Ruiz MD,  Yvette Jernigan MD, Kinza Sky MD, Shannon Mensah DO, Markell Pisano MD,  Remy Montes De Oca DO, Tam Fernandez MD, Melissa Vanessa MD, Cori Castillo MD, René Page MD,  Amado Colmenares MD, Riki Balderas MD, Vilma Burton MD, Josephine Connolly MD, Rhea Ro, DO, Philis Halsted, MD,  Maria M Russell MD, Cheng Shields MD, Rashard Earl, CNP,  Emily Mac, CNP,, Shekhar Ortega, CNP,  Cheryle Balding, DNP, Valdemar Peabody, CNP, Yao Moscoso, CNP, Cecilia Godinez, CNP, Kb Lane, CNP, Karla Rodríguez, CNP, Rosa Valentin, CNP, Ori Aiken, CNS, Doc Blank, CNP, Neelam Carrillo, [address] [facility]    Progress Note    10/16/2023    3:21 PM    Name:   Jeri Arroyo  MRN:     [id number]     [address]:      [de-identified]   Room:   2005/2005-01   Day:  1  Admit Date:  10/14/2023  5:07 AM    PCP:   ARACELIS Warren CNP  Code Status:  Full Code    Subjective:     C/C:   Chief Complaint   Patient presents with    Hernia     Bleeding from hernia site     Interval History Status: improved. Patient is resting in bed, denies any complaints of chest pain, shortness of breath, nausea or vomiting, fevers or chills. No further bleeding at wound site. Brief History:     Per prior documentation  \"10/14 - Patient reports to the emergency department with acute blood loss to abdominal wall. Patient has a chronic skin ulcer which is been under the care of wound care. This morning at 4 AM she awoke and found her bed in person to be covered in blood. She takes Coumadin for chronic DVT/PE.   She has been on this for quite some time and had a recurrent splenomegaly  Extremities:  no edema, redness, tenderness in the calves  Skin:  no gross lesions, rashes, induration    Assessment:        Hospital Problems             Last Modified POA    * (Principal) Supratherapeutic INR 10/14/2023 Yes    Anticoagulated on Coumadin 10/14/2023 Yes    Essential hypertension 10/14/2023 Yes    Stage 3a chronic kidney disease ([address]) 10/14/2023 Yes    Pulmonary embolism on right ([address]) 10/14/2023 Yes    History of DVT (deep vein thrombosis) 10/14/2023 Yes    Endometrial adenocarcinoma ([address]) 10/14/2023 Yes    Ventral hernia without obstruction or gangrene 10/14/2023 Yes    Abdominal wall skin ulcer, with fat layer exposed ([address]) 10/14/2023 Yes    COVID 10/15/2023 Yes    Bleeding from wound 10/16/2023 Yes       Plan:        Continue to hold Coumadin, Coumadin per pharmacy  Monitor and control blood pressure  Droplet plus isolation, Paxlovid as ordered  GI and DVT prophylaxis, on chronic Coumadin therapy  Monitor renal function, avoid nephrotoxic agents  Outpatient follow-up at [facility], [facility] or [facility] for complex hernia, referral being arranged as an outpatient prior to admission.     Jayne Mack DO  10/16/2023  3:21 PM

## 2023-10-16 NOTE — PROGRESS NOTES
Occupational Therapy  Facility/Department: [facility] MED SURG  Rehabilitation Occupational Therapy Daily Treatment Note    Date: 10/16/23  Patient Name: Hugo Rob       Room:   MRN: [id number]  Account: [de-identified]   : 1955  (69 y.o.) Gender: female   Past Medical History:  has a past medical history of Anxiety, Asthma, Bronchitis, DDD (degenerative disc disease), lumbar, Depression, Diabetes mellitus ([address]), Elevated glucose, Headache(784.0), Hernia of abdominal cavity, HH (hiatus hernia), Hyperlipemia, mixed, Hypertension, Osteoarthritis, Right femoral vein DVT ([address]), and Uterine hyperplasia. Past Surgical History:   has a past surgical history that includes Dilation and curettage of uterus (10/08 and ); Breast reduction surgery (1997); Breast reduction surgery (1997); Tympanostomy tube placement (1967); Tonsillectomy and adenoidectomy (); and Hysterectomy (7/17/15). Restrictions  Restrictions/Precautions: Contact Precautions, Isolation, General Precautions, Up as Tolerated  Other position/activity restrictions: 3L O2 NC, up with assist  Required Braces or Orthoses?: No    Subjective  Subjective: Pt. supine in bed and agreeable for treatment. Restrictions/Precautions: Contact Precautions;Isolation;General Precautions; Up as Tolerated   Objective     Cognition  Overall Cognitive Status: WNL  Arousal/Alertness: Appropriate responses to stimuli  Following Commands:  Follows all commands without difficulty  Attention Span: Appears intact  Memory: Appears intact  Safety Judgement: Decreased awareness of need for assistance;Decreased awareness of need for safety  Problem Solving: Able to problem solve independently  Insights: Fully aware of deficits  Initiation: Does not require cues  Sequencing: Requires cues for some  Orientation  Overall Orientation Status: Within Normal Limits  Orientation Level: Oriented X4         ADL  Upper Extremity Dressing  Assistance Level: Minimal assistance  Skilled Clinical Factors: Min assist with gown while sitting EOB  Putting On/Taking Off Footwear  Assistance Level: Dependent  Skilled Clinical Factors: Dependent for donning socks  Toileting  Skilled Clinical Factors: No need at this time. Functional Mobility  Device: Rolling walker  Activity:  (ADL transfer)  Assistance Level: Moderate assistance; Requires x 2 assistance  Skilled Clinical Factors: Mod assist x2 with time and verbal cues on posture control to stand at EOB and side step to head of bed. Bed Mobility  Overall Assistance Level: Moderate Assistance; Requires x 2 Assistance  Additional Factors: Set-up; Verbal cues; Increased time to complete; Head of bed raised; With handrails  Bridging  Skilled Clinical Factors: unable  Sit to Supine  Assistance Level: Moderate assistance; Requires x 2 assistance  Skilled Clinical Factors: Mod assist x2 to return to supine with assist with lifting legs up to bed  Supine to Sit  Assistance Level: Moderate assistance; Requires x 2 assistance  Skilled Clinical Factors: Mod assist x2 with much time to adjust positioning with head of bed up with use of bedrails. Scooting  Assistance Level: Moderate assistance; Requires x 2 assistance  Skilled Clinical Factors: Mod assist x2 with time and tech to shift hips to move forward. Transfers  Surface: From bed; To bed  Additional Factors: Set-up; Verbal cues; Hand placement cues; Increased time to complete; With handrails  Device: Walker  Sit to Stand  Assistance Level: Moderate assistance; Requires x 2 assistance  Skilled Clinical Factors: Mod assist x2 with much time to push self up to standing position  Stand to Sit  Assistance Level: Moderate assistance; Requires x 2 assistance  Skilled Clinical Factors: Mod assist x2 with verbal cues to reach back for sitting surface. OT Exercises  Exercise Treatment: Pt. completed AROM with B UE to promote functional endurance and mobility for selfcare while sitting EOB.  Pt. completed one

## 2023-10-17 ENCOUNTER — TELEPHONE (OUTPATIENT)
Dept: PHARMACY | Age: 68
End: 2023-10-17

## 2023-10-17 ENCOUNTER — APPOINTMENT (OUTPATIENT)
Dept: PHARMACY | Age: 68
End: 2023-10-17
Payer: MEDICARE

## 2023-10-17 VITALS
HEIGHT: 65 IN | BODY MASS INDEX: 48.82 KG/M2 | HEART RATE: 77 BPM | DIASTOLIC BLOOD PRESSURE: 54 MMHG | OXYGEN SATURATION: 93 % | TEMPERATURE: 98.1 F | WEIGHT: 293 LBS | RESPIRATION RATE: 18 BRPM | SYSTOLIC BLOOD PRESSURE: 144 MMHG

## 2023-10-17 DIAGNOSIS — I82.511 CHRONIC DEEP VEIN THROMBOSIS (DVT) OF FEMORAL VEIN OF RIGHT LOWER EXTREMITY (HCC): Primary | ICD-10-CM

## 2023-10-17 DIAGNOSIS — Z86.718 HISTORY OF DVT (DEEP VEIN THROMBOSIS): ICD-10-CM

## 2023-10-17 DIAGNOSIS — I26.99 PULMONARY EMBOLISM ON RIGHT (HCC): ICD-10-CM

## 2023-10-17 LAB
GLUCOSE BLD-MCNC: 130 MG/DL (ref 65–105)
GLUCOSE BLD-MCNC: 140 MG/DL (ref 65–105)
GLUCOSE BLD-MCNC: 149 MG/DL (ref 65–105)
INR PPP: 3.4
PROTHROMBIN TIME: 33.4 SEC (ref 11.5–14.2)

## 2023-10-17 PROCEDURE — 2700000000 HC OXYGEN THERAPY PER DAY

## 2023-10-17 PROCEDURE — 2580000003 HC RX 258: Performed by: NURSE PRACTITIONER

## 2023-10-17 PROCEDURE — 6360000002 HC RX W HCPCS: Performed by: NURSE PRACTITIONER

## 2023-10-17 PROCEDURE — 99239 HOSP IP/OBS DSCHRG MGMT >30: CPT | Performed by: INTERNAL MEDICINE

## 2023-10-17 PROCEDURE — 36415 COLL VENOUS BLD VENIPUNCTURE: CPT

## 2023-10-17 PROCEDURE — 94761 N-INVAS EAR/PLS OXIMETRY MLT: CPT

## 2023-10-17 PROCEDURE — 6370000000 HC RX 637 (ALT 250 FOR IP): Performed by: NURSE PRACTITIONER

## 2023-10-17 PROCEDURE — 94640 AIRWAY INHALATION TREATMENT: CPT

## 2023-10-17 PROCEDURE — 82947 ASSAY GLUCOSE BLOOD QUANT: CPT

## 2023-10-17 PROCEDURE — 85610 PROTHROMBIN TIME: CPT

## 2023-10-17 PROCEDURE — 6370000000 HC RX 637 (ALT 250 FOR IP): Performed by: INTERNAL MEDICINE

## 2023-10-17 RX ADMIN — GUAIFENESIN 600 MG: 600 TABLET ORAL at 08:52

## 2023-10-17 RX ADMIN — AZITHROMYCIN DIHYDRATE 250 MG: 250 TABLET ORAL at 08:52

## 2023-10-17 RX ADMIN — LOSARTAN POTASSIUM 100 MG: 100 TABLET, FILM COATED ORAL at 08:52

## 2023-10-17 RX ADMIN — PRAVASTATIN SODIUM 80 MG: 40 TABLET ORAL at 17:01

## 2023-10-17 RX ADMIN — HYDROCHLOROTHIAZIDE 12.5 MG: 12.5 TABLET ORAL at 08:52

## 2023-10-17 RX ADMIN — DILTIAZEM HYDROCHLORIDE 120 MG: 120 CAPSULE, COATED, EXTENDED RELEASE ORAL at 08:52

## 2023-10-17 RX ADMIN — IPRATROPIUM BROMIDE AND ALBUTEROL SULFATE 1 DOSE: 2.5; .5 SOLUTION RESPIRATORY (INHALATION) at 14:23

## 2023-10-17 RX ADMIN — BUDESONIDE INHALATION 500 MCG: 0.5 SUSPENSION RESPIRATORY (INHALATION) at 07:44

## 2023-10-17 RX ADMIN — NIRMATRELVIR AND RITONAVIR 3 TABLET: KIT at 12:02

## 2023-10-17 RX ADMIN — IPRATROPIUM BROMIDE AND ALBUTEROL SULFATE 1 DOSE: 2.5; .5 SOLUTION RESPIRATORY (INHALATION) at 07:44

## 2023-10-17 RX ADMIN — SODIUM CHLORIDE, PRESERVATIVE FREE 10 ML: 5 INJECTION INTRAVENOUS at 08:53

## 2023-10-17 NOTE — PROGRESS NOTES
Physical Therapy  DATE: 10/17/2023    NAME: Nona Lanza  MRN: [id number]   : 1955    Patient not seen this date for Physical Therapy due to:      [] Cancel by RN or physician due to:    [] Hemodialysis    [] Critical Lab Value Level     [] Blood transfusion in progress    [] Acute or unstable cardiovascular status   _MAP < 55 or more than >115  _HR < 40 or > 130    [] Acute or unstable pulmonary status   -FiO2 > 60%   _RR < 5 or >40    _O2 sats < 85%    [] Strict Bedrest    [] Off Unit for surgery or procedure    [] Off Unit for testing       [] Pending imaging to R/O fracture    [x] Refusal by Patient  Patient states she does not wish to participate in therapy and only willing to eat breakfast even though staff offers to heat breakfast upon conclusion of treatment. [] Other      [] PT being discontinued at this time. Patient independent. No further needs. [] PT being discontinued at this time as the patient has been transferred to hospice care. No further needs.       Dior Mustafa, PTA

## 2023-10-17 NOTE — DISCHARGE SUMMARY
[facility]  Office: [address], DO, Yuli Wu DO, Sincere Uribe DO, Manuel Thomas DO, Brandon Lozoya MD, Adelina Barnard MD, Jeana Long MD, Leigha Martin MD,  Adalberto Salazar MD, Michael Jones MD, Moreno Zaldivar, DO, Isra Hernandez MD,  Prema Sims DO, Dayton Colmenares MD, Veronica Troy MD, Elaina Cota, DO, Marilynn Guidry MD,  Brandon Dickens DO, Darcy Garnett MD, Anil Peña MD, Abdoulaye Benites MD, Pravin iDa MD,  Trever Rosario MD, Alejandro Catherine MD, Carolina Ferrera MD, Cherie Deluca MD, Nadir De Paz DO, Kurtis Al MD,  Bunny White MD, Ann Washington MD, Prabhakar Ramos, CNP,  Lisset Ferrara, CNP,, Shonda Ruiz, CNP,  Luz Maria Betancourt, [facility], Augustine Ordoñez, CNP, Benny Butler, CNP, Robin Rice, CNP, Kun Vora, CNP, Yeison Gary, CNP, Alessandro Giordano, CNP, Silvana Leon, CNS, Minh Crockett, CNP, Alicia Harvey, [address]    Discharge Summary     Patient ID: Michelle Edwards  :  1955   MRN: [id number]     ACCOUNT:  [de-identified]   Patient's PCP: ARACELIS Khan CNP  Admit Date: 10/14/2023   Discharge Date: 10/17/2023     Length of Stay: 2  Code Status:  Full Code  Admitting Physician: Lang Cristobal DO  Discharge Physician: Sakshi Thomas DO     Active Discharge Diagnoses:     Hospital Problem Lists:  Principal Problem:    Supratherapeutic INR  Active Problems:    Anticoagulated on Coumadin    Essential hypertension    Stage 3a chronic kidney disease [facility])    Pulmonary embolism on right [facility])    History of DVT (deep vein thrombosis)    Endometrial adenocarcinoma ([address])    Ventral hernia without obstruction or gangrene    Abdominal wall skin ulcer, with fat layer exposed ([address])    COVID    Bleeding from wound  Resolved Problems:    * No resolved hospital problems.  *      Admission Condition:  fair     Discharged Condition: stable    Hospital Stay: Hospital Course:  Eunice Sosa is a 78 y.o. female who was admitted for the management of  Supratherapeutic INR , presented to ER with Hernia (Bleeding from hernia site)    61-year-old female from assisted living presented with a supratherapeutic INR with bleeding from her abdominal wound. She was given 5 mg of vitamin K in the emergency room and the bleeding subsequently stopped. Her INR was allowed to drift down from over 6 down to 3.4 upon discharge. Also tested positive for COVID was placed on Paxlovid.   Initially she was also placed on Zithromax for upper respiratory infection and then this was discontinued once it was determined that this was viral not bacterial      Significant therapeutic interventions: see above    Significant Diagnostic Studies:   Labs / Micro:  CBC:   Lab Results   Component Value Date/Time    WBC 6.0 10/15/2023 05:19 AM    RBC 3.46 10/15/2023 05:19 AM    RBC 4.44 02/25/2012 09:38 AM    HGB 8.9 10/15/2023 05:19 AM    HCT 32.0 10/15/2023 05:19 AM    MCV 92.5 10/15/2023 05:19 AM    MCH 25.7 10/15/2023 05:19 AM    MCHC 27.8 10/15/2023 05:19 AM    RDW 17.5 10/15/2023 05:19 AM     10/15/2023 05:19 AM     02/25/2012 09:38 AM     CMP:    Lab Results   Component Value Date/Time    GLUCOSE 128 10/15/2023 05:19 AM    GLUCOSE 119 02/25/2012 09:38 AM     10/15/2023 05:19 AM    K 4.2 10/15/2023 05:19 AM    CL 97 10/15/2023 05:19 AM    CO2 39 10/15/2023 05:19 AM    BUN 21 10/15/2023 05:19 AM    CREATININE 1.1 10/15/2023 05:19 AM    ANIONGAP 5 10/15/2023 05:19 AM    ALKPHOS 64 06/28/2018 05:30 AM    ALT 15 06/28/2018 05:30 AM    AST 16 06/28/2018 05:30 AM    BILITOT 0.42 06/28/2018 05:30 AM    LABALBU 3.2 06/28/2018 05:30 AM    LABALBU 4.0 02/25/2012 09:38 AM    ALBUMIN NOT REPORTED 06/28/2018 05:30 AM    LABGLOM 55 10/15/2023 05:19 AM    GFRAA 54 08/18/2022 06:17 AM    GFR      08/18/2022 06:17 AM    PROT 6.2 06/28/2018 05:30 AM    CALCIUM 9.5 10/15/2023 05:19 AM

## 2023-10-17 NOTE — PROGRESS NOTES
[facility]  Office: 7900  1826, DO, Neville Galan, DO, Yashira Hanson, DO, Brian Thomas, DO, Allen Parada MD, Yisel River MD, Rossy Lopez MD, Rosie Mccormick MD,  Shaista Doshi MD, Eunice Espana MD, Francia Hay, DO, Cierra Toribio MD,  Obdulio Sanchez MD, Wayne Oconnor MD, Seema Slater DO, Milton Calvin MD,  Olivia Hussein DO, Marjorie Montes MD, Aminta Hayes MD, Daryl Rogel MD, Ruma Ogden MD,  Kaiden Johnson MD, Nuris Flores MD, Bubba Schwab, MD, Matt Faulkner MD, Tae Gordon DO, Krish Cross MD,  Angelia Sweet MD, Tala Scott MD, Jodi Goode, CNP,  iEleen Tavarez CNP,, Sony Coreas, LISA,  Cleve Mckee, DIOGENES, Tran Butler, LISA, Adan Teran, CNP, Belén Limon, CNP, Tom Lennon, CNP, Janet Anthony, CNP, Roseanna Ceron, CNP, Kathy Ramirez, [facility], Garry Yo, CNP, Lisa Shaver, [address] [facility]    Progress Note    10/17/2023    12:11 PM    Name:   Amber Robison  MRN:     [id number]     [address]:      [de-identified]   Room:   2005/2005-01  IP Day:  2  Admit Date:  10/14/2023  5:07 AM    PCP:   ARACELIS Plummer CNP  Code Status:  Full Code    Subjective:     C/C:   Chief Complaint   Patient presents with    Hernia     Bleeding from hernia site     Interval History Status: improved. Overall feels better  No further bleeding seen    Inr drifting down    Brief History:     Per my JEROME:  \"Shazia Judge is a 78 y.o. Declined female who presents with Hernia (Bleeding from hernia site)   and is admitted to the hospital for the management of Supratherapeutic INR. Patient reports to the emergency department with acute blood loss to abdominal wall. Patient has a chronic skin ulcer which is been under the care of wound care. This morning at 4 AM she awoke and found her bed in person to be covered in blood.   She takes Coumadin for chronic right ([address]) 10/14/2023 Yes    History of DVT (deep vein thrombosis) 10/14/2023 Yes    Endometrial adenocarcinoma ([address]) 10/14/2023 Yes    Ventral hernia without obstruction or gangrene 10/14/2023 Yes    Abdominal wall skin ulcer, with fat layer exposed ([address]) 10/14/2023 Yes    COVID 10/15/2023 Yes    Bleeding from wound 10/16/2023 Yes       Plan:        Dc zithromax-this is covid, not bacterial  Dc back to [place]  Called and left message for coumadin clinic per patient request to determine what coumadin dose she should resume; I feel like should be her usual dose, with close monitoring (zithromax being stopped so less increase in inr, as well as had received 5mg vit k this admission in ER)-they want her on 4mg today and tomorrow and will recheck inr thursday    Judy Thomas, DO  10/17/2023  12:11 PM

## 2023-10-17 NOTE — PLAN OF CARE
Problem: Discharge Planning  Goal: Discharge to home or other facility with appropriate resources  10/16/2023 2018 by Diana Moulton RN  Outcome: Progressing  10/16/2023 1801 by Merry Mix RN  Outcome: Progressing  Flowsheets (Taken 10/16/2023 7073)  Discharge to home or other facility with appropriate resources:   Identify barriers to discharge with patient and caregiver   Arrange for needed discharge resources and transportation as appropriate   Identify discharge learning needs (meds, wound care, etc)   Refer to discharge planning if patient needs post-hospital services based on physician order or complex needs related to functional status, cognitive ability or social support system     Problem: Pain  Goal: Verbalizes/displays adequate comfort level or baseline comfort level  10/16/2023 2018 by Diana Moulton RN  Outcome: Progressing  10/16/2023 1801 by Merry Mix RN  Outcome: Progressing     Problem: ABCDS Injury Assessment  Goal: Absence of physical injury  10/16/2023 2018 by Diana Moulton RN  Outcome: Progressing  10/16/2023 1801 by Merry Mix RN  Outcome: Progressing     Problem: Skin/Tissue Integrity  Goal: Absence of new skin breakdown  Description: 1. Monitor for areas of redness and/or skin breakdown  2. Assess vascular access sites hourly  3. Every 4-6 hours minimum:  Change oxygen saturation probe site  4. Every 4-6 hours:  If on nasal continuous positive airway pressure, respiratory therapy assess nares and determine need for appliance change or resting period.   10/16/2023 2018 by Diana Moulton RN  Outcome: Progressing  10/16/2023 1801 by Merry Mix RN  Outcome: Progressing     Problem: Safety - Adult  Goal: Free from fall injury  10/16/2023 2018 by Diana Moulton RN  Outcome: Progressing  10/16/2023 1801 by Merry Mix RN  Outcome: Progressing     Problem: Respiratory - Adult  Goal: Achieves optimal ventilation and oxygenation  10/16/2023 2018 by Jennifer Bautista Kathren Duverney, RN  Outcome: Progressing  10/16/2023 1801 by Merle Love RN  Outcome: Progressing  10/16/2023 0758 by Prosper Koch RCP  Outcome: Progressing  Flowsheets (Taken 10/16/2023 0758)  Achieves optimal ventilation and oxygenation:   Assess for changes in respiratory status   Respiratory therapy support as indicated  Note: BRONCHOSPASM/BRONCHOCONSTRICTION    IMPROVE  AERATION/BREATHSOUNDS  ADMINISTER BRONCHODILATOR THERAPY AS APPROPRIATE  ASSESS BREATH SOUNDS  PATIENT EDUCATION AS NEEDED      Problem: Chronic Conditions and Co-morbidities  Goal: Patient's chronic conditions and co-morbidity symptoms are monitored and maintained or improved  10/16/2023 2018 by Terrance Lyles RN  Outcome: Progressing  10/16/2023 1801 by Merle Love RN  Outcome: Progressing  Flowsheets (Taken 10/16/2023 0850)  Care Plan - Patient's Chronic Conditions and Co-Morbidity Symptoms are Monitored and Maintained or Improved:   Monitor and assess patient's chronic conditions and comorbid symptoms for stability, deterioration, or improvement   Collaborate with multidisciplinary team to address chronic and comorbid conditions and prevent exacerbation or deterioration   Update acute care plan with appropriate goals if chronic or comorbid symptoms are exacerbated and prevent overall improvement and discharge     Problem: Hematologic - Adult  Goal: Maintains hematologic stability  10/16/2023 2018 by Terrance Lyles RN  Outcome: Progressing  10/16/2023 1802 by Merle Love RN  Outcome: Progressing

## 2023-10-17 NOTE — TELEPHONE ENCOUNTER
Spoke with Dr. Thomas regarding dosing plan for patient at discharge. Recommended 4mg daily x 2 days then retest in clinic following scheduled wound care appointment 10/19.

## 2023-10-17 NOTE — DISCHARGE INSTRUCTIONS
[address] -Phone: [phone] Fax: [phone]    Visit  Discharge Instructions / Physician Orders     DATE: 10/19/2023     Home Care: [facility] [address] [place] [address] [phone])     [address]: Home Health     Wound Location: Abdomen     Cleanse with: Vashe -let soak for at least 5 minutes during dressing changes     Dressing Orders: Collagen with Silver, Cover with silicone border bandage, Abdominal Binder     Frequency: 3 times per week     Additional Orders: Increase protein to diet (meat, cheese, eggs, fish, peanut butter, nuts and beans)  Referral to Dr. Radha Walden, [place], [place]     [facility]     (Please note your next appointment above and if you are unable to keep, kindly give a 24 hour notice. Thank you.)  If more than 15 min late we cannot guarantee you will be seen due to clinician schedule  Per Policy, Excessive cancellation will call for dismissal from program.     If you experience any of the following, please call the [address] [place] [address] during business hours:  [phone]  Your Phone call may be forwarded to Duglas Portillo during business hours that [facility] is closed. * Increase in Pain  * Temperature over 101  * Increase in drainage from your wound  * Drainage with a foul odor  * Bleeding  * Increase in swelling  * Need for compression bandage changes due to slippage, breakthrough drainage. If you need medical attention outside of the business hours of the [address] [place] [address] please contact your PCP or go to the nearest emergency room. The information contained in the After Visit Summary has been reviewed with me, the patient and/or responsible adult, by my health care provider(s). I had the opportunity to ask questions regarding this information.  I have elected to receive;      []After Visit Summary  [x]Comprehensive Discharge Instruction        Patient

## 2023-10-17 NOTE — PROGRESS NOTES
[address]  Occupational Therapy Not Seen    DATE: 10/17/2023    NAME: Roberta Reyes  MRN: [id number]   : 1955    Patient not seen this date for Occupational Therapy due to:      [] Cancel by RN or physician due to:    [] Hemodialysis    [] Critical Lab Value Level     [] Blood transfusion in progress    [] Acute or unstable cardiovascular status   _MAP < 55 or more than >115  _HR < 40 or > 130    [] Acute or unstable pulmonary status   -FiO2 > 60%   _RR < 5 or >40    _O2 sats < 85%    [] Strict Bedrest    [] Off Unit for surgery or procedure    [] Off Unit for testing       [] Pending imaging to R/O fracture    [x] Refusal by Patient ( Patient states she does not wish to participate in therapy and only willing to eat breakfast even though staff offers to heat breakfast upon conclusion of treatment). [] Other      [] OT being discontinued at this time. Patient independent. No further needs. [] OT being discontinued at this time as the patient has been transferred to hospice care. No further needs.       DOMENICA Longoria

## 2023-10-17 NOTE — TELEPHONE ENCOUNTER
Pt was scheduled today for INR appt in clinic but she is currently admitted to [facility]. Will follow up when appropriate after discharge.

## 2023-10-17 NOTE — PLAN OF CARE
Pt A&Ox4, abd midline lower dressings x2 remain CDI, pt verbalized understanding of ongoing education provided at this time.

## 2023-10-17 NOTE — PLAN OF CARE
Problem: Respiratory - Adult  Goal: Able to breathe comfortably  10/16/2023 2045 by Gregorio Chadwick RCP  Outcome: Progressing     Problem: Respiratory - Adult  Goal: Patient's breath sounds will be clear and equal  10/16/2023 2045 by Gregorio Chadwick RCP  Outcome: Progressing     Problem: Respiratory - Adult  Goal: Adequate oxygenation  Description: Adequate oxygenation  10/16/2023 2045 by Gregorio Chadwick RCP  Outcome: Progressing       BRONCHOSPASM/BRONCHOCONSTRICTION    IMPROVE  AERATION/BREATHSOUNDS  ADMINISTER BRONCHODILATOR THERAPY AS APPROPRIATE  ASSESS BREATH SOUNDS  INITIATE AEROSOL PROTOCOL IF ORDERED TO DO SO  PATIENT EDUCATION AS NEEDED       PROVIDE ADEQUATE OXYGENATION WITH ACCEPTABLE SP02/ABG'S    [x]  IDENTIFY APPROPRIATE OXYGEN THERAPY  [x]   MONITOR SP02/ABG'S AS NEEDED   [x]   PATIENT EDUCATION AS NEEDED

## 2023-10-17 NOTE — PLAN OF CARE
Problem: Respiratory - Adult  Goal: Able to breathe comfortably  10/17/2023 0747 by Pam Medal, RCP  Outcome: Progressing  10/16/2023 2045 by Slade Bail, RCP  Outcome: Progressing  10/16/2023 2045 by Slade Bail, RCP  Outcome: Progressing     Problem: Respiratory - Adult  Goal: Patient's breath sounds will be clear and equal  10/17/2023 0747 by Pamfredy Coppola, RCP  Outcome: Progressing  10/16/2023 2045 by Slade Bail, RCP  Outcome: Progressing  10/16/2023 2045 by Slade Bail, RCP  Outcome: Progressing     Problem: Respiratory - Adult  Goal: Adequate oxygenation  Description: Adequate oxygenation  10/17/2023 0747 by Pam Coppola RCP  Outcome: Progressing  10/16/2023 2045 by Slade Bail, RCP  Outcome: Progressing  10/16/2023 2045 by Slade Bail, RCP  Outcome: Progressing

## 2023-10-17 NOTE — PLAN OF CARE
Problem: Discharge Planning  Goal: Discharge to home or other facility with appropriate resources  10/17/2023 1919 by Gina Ortiz RN  Outcome: Completed  10/17/2023 1603 by Shiloh Yang RN  Outcome: Adequate for Discharge     Problem: Pain  Goal: Verbalizes/displays adequate comfort level or baseline comfort level  10/17/2023 1919 by Gina Ortiz RN  Outcome: Completed  10/17/2023 1603 by Shiloh Yang RN  Outcome: Adequate for Discharge     Problem: ABCDS Injury Assessment  Goal: Absence of physical injury  10/17/2023 1919 by Gina Ortiz RN  Outcome: Completed  10/17/2023 1603 by Shiloh Yang RN  Outcome: Adequate for Discharge     Problem: Skin/Tissue Integrity  Goal: Absence of new skin breakdown  Description: 1. Monitor for areas of redness and/or skin breakdown  2. Assess vascular access sites hourly  3. Every 4-6 hours minimum:  Change oxygen saturation probe site  4. Every 4-6 hours:  If on nasal continuous positive airway pressure, respiratory therapy assess nares and determine need for appliance change or resting period.   10/17/2023 1919 by Gina Ortiz RN  Outcome: Completed  10/17/2023 1603 by Shiloh Yang RN  Outcome: Progressing     Problem: Safety - Adult  Goal: Free from fall injury  10/17/2023 1919 by Gina Ortiz RN  Outcome: Completed  10/17/2023 1603 by Shiloh Yang RN  Outcome: Adequate for Discharge     Problem: Chronic Conditions and Co-morbidities  Goal: Patient's chronic conditions and co-morbidity symptoms are monitored and maintained or improved  10/17/2023 1919 by Gina Ortiz RN  Outcome: Completed  10/17/2023 1603 by hSiloh Yang RN  Outcome: Progressing     Problem: Hematologic - Adult  Goal: Maintains hematologic stability  10/17/2023 1919 by Gina Ortiz RN  Outcome: Completed  10/17/2023 1603 by Shiloh Yang RN  Outcome: Adequate for Discharge

## 2023-10-17 NOTE — PLAN OF CARE
Problem: Skin/Tissue Integrity  Goal: Absence of new skin breakdown  Description: 1. Monitor for areas of redness and/or skin breakdown  2. Assess vascular access sites hourly  3. Every 4-6 hours minimum:  Change oxygen saturation probe site  4. Every 4-6 hours:  If on nasal continuous positive airway pressure, respiratory therapy assess nares and determine need for appliance change or resting period.   Outcome: Progressing     Problem: Respiratory - Adult  Goal: Able to breathe comfortably  10/17/2023 0747 by Gisel Marroquin RCP  Outcome: Progressing  Goal: Patient's breath sounds will be clear and equal  10/17/2023 0747 by Gisel Marroquin RCP  Outcome: Progressing  Goal: Adequate oxygenation  Description: Adequate oxygenation  10/17/2023 0747 by Gisel Marroquin RCP  Outcome: Progressing     Problem: Chronic Conditions and Co-morbidities  Goal: Patient's chronic conditions and co-morbidity symptoms are monitored and maintained or improved  Outcome: Progressing

## 2023-10-17 NOTE — PROGRESS NOTES
Discharge instructions given and signed  Patient awaiting transportation at 2000, Cristina Allen RN taking over care until discharge.

## 2023-10-17 NOTE — PROGRESS NOTES
ANTIMICROBIAL STEWARDSHIP  Upon review of the patient's chart, the committee has the following recommendation for your consideration:    Indication: Covid positive   Day of Antimicrobial Therapy: 3    Recommendation   Patient is on empiric zithromax and has received it for 3 days. Covid + on paxlovid, no acute process on CXR    We recommend discontinuing zithromax. Recent Labs     10/15/23  0519   WBC 6.0     No results for input(s): \"PROCAL\" in the last 72 hours. Unnecessary or inappropriate antimicrobial use increases the risk of subsequent infections with resistant bacterial infections and drug-associated toxicities including C. difficile infection. Thank you. Beverley Bliss, [facility], PharmD  10/17/2023  9:48 AM    The Antimicrobial Stewardship Committee at [address] [place] [address] is led by  Daksha Garcia MD, Infectious Diseases Section Chair.

## 2023-10-18 ENCOUNTER — CARE COORDINATION (OUTPATIENT)
Dept: CASE MANAGEMENT | Age: 68
End: 2023-10-18

## 2023-10-18 DIAGNOSIS — R79.1 SUPRATHERAPEUTIC INR: Primary | ICD-10-CM

## 2023-10-18 PROCEDURE — 1111F DSCHRG MED/CURRENT MED MERGE: CPT | Performed by: NURSE PRACTITIONER

## 2023-10-18 RX ORDER — MONTELUKAST SODIUM 10 MG/1
TABLET ORAL
Qty: 90 TABLET | Refills: 3 | Status: SHIPPED | OUTPATIENT
Start: 2023-10-18

## 2023-10-18 NOTE — CARE COORDINATION
Care Transitions Initial Follow Up Call    Call within 2 business days of discharge: Yes    Patient Current Location:  Home: [address] [place] [address]    Care Transition Nurse contacted the patient by telephone to perform post hospital discharge assessment. Verified name and  with patient as identifiers. Provided introduction to self, and explanation of the Care Transition Nurse role. Patient: Hugo Rob Patient : 1955   MRN: [id number]  Reason for Admission: Bleeding from wound/supra therapeutic INR/Covid  Discharge Date: 10/17/23 RARS: Readmission Risk Score: 18.8      Last Discharge Facility       Date Complaint Diagnosis Description Type Department Provider    10/14/23 Hernia Bleeding from wound . .. ED to Hosp-Admission (Discharged) (ADMITTED) [facility], Pan Roberts DO; DEACON Walker. Was this an external facility discharge? No Discharge Facility: [facility]    Challenges to be reviewed by the provider   Additional needs identified to be addressed with provider: Yes  7 day hospital follow up appointment               Method of communication with provider: chart routing. Was able to contact [facility] for initial transitional outreach. She stated that she was doing all right. She said that her abdominal wound was not currently bleeding and that looks a lot better than it has before. She said that she still has some Covid symptoms, such as nasal congestion/drainage, and cough. She said that she is on oxygen / and does her nebulizer treatments 3 times a day for chest tightness. Reviewed medication and she stated that she had all her medications. Reviewed stopped medication. She did not think she was to stop the ASA and will check with medication management tomorrow about it. Attempted to schedule hospital follow up appointment, but she was unable to go to the days available. Will message office to assist with scheduling follow up appointment.   She does

## 2023-10-18 NOTE — FLOWSHEET NOTE
Discharge Note:      All discharge instructions given at this time as well as all patient belongings returned to patient. Pt denies any further questions regarding discharge at this time. Pt given discharge packet with unit letter, discharge instructions/restrictions and medication handouts regarding all discharge medications and side effects. Pt denies any further issues at this time. Pt wheeled out by wheelchair to front discharge doors with transport this time. Pt left premises without any issues with transport at this time.

## 2023-10-19 ENCOUNTER — APPOINTMENT (OUTPATIENT)
Dept: PHARMACY | Age: 68
End: 2023-10-19
Payer: MEDICARE

## 2023-10-19 ENCOUNTER — TELEPHONE (OUTPATIENT)
Dept: PHARMACY | Age: 68
End: 2023-10-19

## 2023-10-19 ENCOUNTER — HOSPITAL ENCOUNTER (OUTPATIENT)
Dept: WOUND CARE | Age: 68
Discharge: HOME | End: 2023-10-19

## 2023-10-19 DIAGNOSIS — I82.511 CHRONIC DEEP VEIN THROMBOSIS (DVT) OF FEMORAL VEIN OF RIGHT LOWER EXTREMITY (HCC): Primary | ICD-10-CM

## 2023-10-19 DIAGNOSIS — Z86.718 HISTORY OF DVT (DEEP VEIN THROMBOSIS): ICD-10-CM

## 2023-10-19 DIAGNOSIS — I26.99 PULMONARY EMBOLISM ON RIGHT (HCC): ICD-10-CM

## 2023-10-19 NOTE — TELEPHONE ENCOUNTER
Patient called to reschedule INR due to transportation issue at [facility]. She rescheduled for Tuesday 10/24 and was instructed to continue 4mg daily to be conservative given recent elevated INR along with the fact she would like to continue drinking about 6oz cranberry juice daily.

## 2023-10-20 ENCOUNTER — TELEMEDICINE (OUTPATIENT)
Dept: FAMILY MEDICINE CLINIC | Age: 68
End: 2023-10-20
Payer: MEDICARE

## 2023-10-20 DIAGNOSIS — M54.2 NECK PAIN, MUSCULOSKELETAL: ICD-10-CM

## 2023-10-20 DIAGNOSIS — D64.9 ANEMIA, UNSPECIFIED TYPE: ICD-10-CM

## 2023-10-20 DIAGNOSIS — U07.1 COVID-19: Primary | ICD-10-CM

## 2023-10-20 DIAGNOSIS — E11.9 DIABETES MELLITUS TYPE 2, NONINSULIN DEPENDENT (HCC): ICD-10-CM

## 2023-10-20 DIAGNOSIS — L98.499 ULCER OF ABDOMEN WALL, UNSPECIFIED ULCER STAGE (HCC): ICD-10-CM

## 2023-10-20 DIAGNOSIS — E11.9 WELL CONTROLLED TYPE 2 DIABETES MELLITUS (HCC): ICD-10-CM

## 2023-10-20 PROCEDURE — 99214 OFFICE O/P EST MOD 30 MIN: CPT | Performed by: NURSE PRACTITIONER

## 2023-10-20 PROCEDURE — 1124F ACP DISCUSS-NO DSCNMKR DOCD: CPT | Performed by: NURSE PRACTITIONER

## 2023-10-20 PROCEDURE — 3044F HG A1C LEVEL LT 7.0%: CPT | Performed by: NURSE PRACTITIONER

## 2023-10-20 RX ORDER — BLOOD-GLUCOSE METER
1 KIT MISCELLANEOUS DAILY
Qty: 200 EACH | Refills: 5 | Status: SHIPPED | OUTPATIENT
Start: 2023-10-20

## 2023-10-20 ASSESSMENT — PATIENT HEALTH QUESTIONNAIRE - PHQ9
SUM OF ALL RESPONSES TO PHQ QUESTIONS 1-9: 0
1. LITTLE INTEREST OR PLEASURE IN DOING THINGS: 0
7. TROUBLE CONCENTRATING ON THINGS, SUCH AS READING THE NEWSPAPER OR WATCHING TELEVISION: 0
4. FEELING TIRED OR HAVING LITTLE ENERGY: 0
8. MOVING OR SPEAKING SO SLOWLY THAT OTHER PEOPLE COULD HAVE NOTICED. OR THE OPPOSITE, BEING SO FIGETY OR RESTLESS THAT YOU HAVE BEEN MOVING AROUND A LOT MORE THAN USUAL: 0
6. FEELING BAD ABOUT YOURSELF - OR THAT YOU ARE A FAILURE OR HAVE LET YOURSELF OR YOUR FAMILY DOWN: 0
9. THOUGHTS THAT YOU WOULD BE BETTER OFF DEAD, OR OF HURTING YOURSELF: 0
10. IF YOU CHECKED OFF ANY PROBLEMS, HOW DIFFICULT HAVE THESE PROBLEMS MADE IT FOR YOU TO DO YOUR WORK, TAKE CARE OF THINGS AT HOME, OR GET ALONG WITH OTHER PEOPLE: 0
SUM OF ALL RESPONSES TO PHQ QUESTIONS 1-9: 0
SUM OF ALL RESPONSES TO PHQ QUESTIONS 1-9: 0
3. TROUBLE FALLING OR STAYING ASLEEP: 0
SUM OF ALL RESPONSES TO PHQ QUESTIONS 1-9: 0
5. POOR APPETITE OR OVEREATING: 0
2. FEELING DOWN, DEPRESSED OR HOPELESS: 0
SUM OF ALL RESPONSES TO PHQ9 QUESTIONS 1 & 2: 0

## 2023-10-20 ASSESSMENT — COLUMBIA-SUICIDE SEVERITY RATING SCALE - C-SSRS
4. HAVE YOU HAD THESE THOUGHTS AND HAD SOME INTENTION OF ACTING ON THEM?: NO
3. HAVE YOU BEEN THINKING ABOUT HOW YOU MIGHT KILL YOURSELF?: NO
7. DID THIS OCCUR IN THE LAST THREE MONTHS: NO
5. HAVE YOU STARTED TO WORK OUT OR WORKED OUT THE DETAILS OF HOW TO KILL YOURSELF? DO YOU INTEND TO CARRY OUT THIS PLAN?: NO

## 2023-10-20 NOTE — PROGRESS NOTES
Dirk Coreas, was evaluated through a synchronous (real-time) audio-video encounter. The patient (or guardian if applicable) is aware that this is a billable service, which includes applicable co-pays. This Virtual Visit was conducted with patient's (and/or legal guardian's) consent. Patient identification was verified, and a caregiver was present when appropriate. The patient was located at Home: [address] [place] [address]  Provider was located at [address] [place] [address] ([facility]): [address] [place] [address] (:  1955) is a Established patient, presenting virtually for evaluation of the following:    Assessment & Plan   Below is the assessment and plan developed based on review of pertinent history, physical exam, labs, studies, and medications. 1. COVID-19  Stable and resolving  Declines more testing or need for other meds  push fluids ( water, Gatorade, tea), and rest as much as able  Call INB or worsening    2. Ulcer of abdomen wall, unspecified ulcer stage ([address])  Continues with home care for wound care  Stable problem now    3. Well controlled type 2 diabetes mellitus ([address])  Converted to type 2  Low carb, low sugar, high fiber, protein diet  Smaller potions advised for weight loss and better sugar control  Declines meds for now and does not want metformin if needed in future  Monitor feet daily    -     blood glucose test strips (FREESTYLE LITE) strip; 1 each by In Vitro route daily, Disp-200 each, R-5Normal  4. Neck pain, musculoskeletal  Declines testing  Continue with home PT and stretches/good posture     5.  Anemia, unspecified type  Ongoing issue  Unable to complete colonoscopy d/t not able to do prep  Send back cologaurd kit asap  Lab Results   Component Value Date    YZIMTLCV85 512 2023     Lab Results   Component Value Date    IRON 43 2023    TIBC 236 (L) 2023    FERRITIN 175 (H) 2023     Lab Results   Component Value Date    FERRITIN 175 (H) 2023

## 2023-10-20 NOTE — PROGRESS NOTES
Visit Information    Have you changed or started any medications since your last visit including any over-the-counter medicines, vitamins, or herbal medicines? no   Have you stopped taking any of your medications? Is so, why? -  no  Are you having any side effects from any of your medications? - no    Have you seen any other physician or provider since your last visit?  no   Have you had any other diagnostic tests since your last visit?  no   Have you been seen in the emergency room and/or had an admission in a hospital since we last saw you?  no   Have you had your routine dental cleaning in the past 6 months?  no     Do you have an active MyChart account? If no, what is the barrier?   Yes    Patient Care Team:  ARACELIS Govea CNP as PCP - General (Family Nurse Practitioner)  ARACELIS Govea CNP as PCP - Empaneled Provider  Emily Ch MD (Obstetrics & Gynecology)  Kalyani Roth OD (Optometry)  Piedad Brown DO (Obstetrics & Gynecology)  Rosario Karimi RN as Care Transitions Nurse    Medical History Review  Past Medical, Family, and Social History reviewed and  contribute to the patient presenting condition    Health Maintenance   Topic Date Due    Hepatitis B vaccine (2 of 3 - Risk 3-dose series) 12/15/1991    Pneumococcal 65+ years Vaccine (2 - PCV) 01/12/1999    Colorectal Cancer Screen  Never done    Diabetic retinal exam  09/05/2018    Diabetic foot exam  08/14/2019    Diabetic Alb to Cr ratio (uACR) test  12/13/2020    Breast cancer screen  12/26/2020    COVID-19 Vaccine (2 - Caprice Model series) 06/02/2023    Flu vaccine (1) 08/01/2023    Annual Wellness Visit (AWV)  08/31/2023    Lipids  01/03/2024    Depression Monitoring  09/29/2024    A1C test (Diabetic or Prediabetic)  10/14/2024    GFR test (Diabetes, CKD 3-4, OR last GFR 15-59)  10/15/2024    DTaP/Tdap/Td vaccine (3 - Td or Tdap) 08/30/2028    DEXA (modify frequency per FRAX score)  Completed    Shingles vaccine

## 2023-10-23 NOTE — DISCHARGE INSTRUCTIONS
[address] -Phone: [phone] Fax: [phone]    Visit  Discharge Instructions / Physician Orders     DATE: 10/26/2023     Home Care: [facility] [address] [place] [address] [phone])     [address]: Home Health     Wound Location: Abdomen     Cleanse with: Vashe -let soak for at least 5 minutes during dressing changes     Dressing Orders: Collagen with Silver, Cover with silicone border bandage, Abdominal Binder     Frequency: 3 times per week     Additional Orders: Increase protein to diet (meat, cheese, eggs, fish, peanut butter, nuts and beans)  Referral to Dr. Case Marei [place], [place]  10/26/23 epifix IVR     Your next appointment with Jennifer Cancino is in 2 weeks     (Please note your next appointment above and if you are unable to keep, kindly give a 24 hour notice. Thank you.)  If more than 15 min late we cannot guarantee you will be seen due to clinician schedule  Per Policy, Excessive cancellation will call for dismissal from program.     If you experience any of the following, please call the [address] [place] [address] during business hours:  [phone]  Your Phone call may be forwarded to Duglas Portillo during business hours that [facility] is closed. * Increase in Pain  * Temperature over 101  * Increase in drainage from your wound  * Drainage with a foul odor  * Bleeding  * Increase in swelling  * Need for compression bandage changes due to slippage, breakthrough drainage. If you need medical attention outside of the business hours of the [address] [place] [address] please contact your PCP or go to the nearest emergency room. The information contained in the After Visit Summary has been reviewed with me, the patient and/or responsible adult, by my health care provider(s). I had the opportunity to ask questions regarding this information.  I have elected to receive;      []After Visit Summary  [x]Comprehensive Discharge Instruction

## 2023-10-24 ENCOUNTER — HOSPITAL ENCOUNTER (OUTPATIENT)
Dept: PHARMACY | Age: 68
Setting detail: THERAPIES SERIES
Discharge: HOME | End: 2023-10-24
Payer: MEDICARE

## 2023-10-24 DIAGNOSIS — Z86.718 HISTORY OF DVT (DEEP VEIN THROMBOSIS): ICD-10-CM

## 2023-10-24 DIAGNOSIS — I82.511 CHRONIC DEEP VEIN THROMBOSIS (DVT) OF FEMORAL VEIN OF RIGHT LOWER EXTREMITY (HCC): Primary | ICD-10-CM

## 2023-10-24 DIAGNOSIS — I26.99 PULMONARY EMBOLISM ON RIGHT (HCC): ICD-10-CM

## 2023-10-24 LAB
INR BLD: 1.7
PROTIME: 19.9 SECONDS

## 2023-10-24 PROCEDURE — 85610 PROTHROMBIN TIME: CPT

## 2023-10-24 PROCEDURE — 99212 OFFICE O/P EST SF 10 MIN: CPT

## 2023-10-24 NOTE — PROGRESS NOTES
Patient seen in clinic for warfarin management due to DVT and PE with an INR goal of 2.5-3.5. Estimated duration of therapy is indefinite. Patient states compliant all of the time with instructed reduced regimen of 4mg daily since hospital discharge. She stated she has also not been drinking her usual cranberry juice since discharge home last week but is restarting it today. Recently in hospital from 10/14 to 10/17 with elevated INR and bleeding from an abdominal wound. She was placed on Zithromax initially then later Paxlovid during the admission for COVID. PT/INR done in office per protocol. INR is 1.7 which is subtherapeutic from taking reduced dose. Warfarin regimen will be resumed at her usual dose of 8mg on Tue/Thur and 4mg all other days. Will retest in 2 weeks. Patient understands dosing directions and information discussed. Dosing schedule and follow up appointment given to patient. Progress note routed to referring physicians office. Discussed with patient the Pharmacist Collaborative Practice Agreement. Patient provided verbal and/or electronic (ex. Holidoghart) consent to participate in the collaborative practice agreement between the pharmacist and referred patient. This is in lieu of paper consent due to COVID-19 precautions and the use of remote/virtual visits.        For Pharmacy Admin Tracking Only    Intervention Detail: Dose Adjustment: 1, reason: Therapy Optimization  Total # of Interventions Recommended: 1  Total # of Interventions Accepted: 1  Time Spent (min): 15

## 2023-10-25 ENCOUNTER — CARE COORDINATION (OUTPATIENT)
Dept: CASE MANAGEMENT | Age: 68
End: 2023-10-25

## 2023-10-25 NOTE — CARE COORDINATION
Care Transitions Follow Up Call    Patient Current Location:  Home: [address] [place] [address]    Care Transition Nurse contacted the patient by telephone to follow up after admission on 10/14/23. Verified name and  with patient as identifiers. Patient: Santos Apgar  Patient : 1955   MRN: [id number]  Reason for Admission: Bleeding from wound/supra therapeutic INR/Covid  Discharge Date: 10/17/23 RARS: Readmission Risk Score: 18.8      Needs to be reviewed by the provider   Additional needs identified to be addressed with provider: No  none             Method of communication with provider: none. Was able to contact Todd [facility] for transitional outreach. She stated that she was doing \"good\". She stated that her Covid symptoms were better. She denied any bleeding from her abdomen wound. She did have her INR checked and it was sub therapeutic. Dosage was adjusted by Medication management. She said that she forgot to ask the pharmacist why her ASA was stopped. She will be calling them today. She did have a VV with her PCP and no new medications were added. She had no further questions or concerns. Addressed changes since last contact:  none  Discussed follow-up appointments. If no appointment was previously scheduled, appointment scheduling offered: Yes. Is follow up appointment scheduled within 7 days of discharge? Yes. Follow Up  Future Appointments   Date Time Provider [facility] [address]   10/26/2023 12:45 PM Odalis Chandler, APRN - CNP [facility]fe[facility]   2023  1:00 PM [facility] Transition Nurse reviewed medical action plan with patient and discussed any barriers to care and/or understanding of plan of care after discharge. Discussed appropriate site of care based on symptoms and resources available to patient including: PCP  Specialist  Home health  When to call 911.  The patient agrees to contact the PCP office for

## 2023-10-26 ENCOUNTER — HOSPITAL ENCOUNTER (OUTPATIENT)
Dept: WOUND CARE | Age: 68
Discharge: HOME | End: 2023-10-26
Payer: MEDICARE

## 2023-10-26 DIAGNOSIS — L98.492 ABDOMINAL WALL SKIN ULCER, WITH FAT LAYER EXPOSED (HCC): Primary | ICD-10-CM

## 2023-10-26 PROCEDURE — 11042 DBRDMT SUBQ TIS 1ST 20SQCM/<: CPT

## 2023-10-26 RX ORDER — LIDOCAINE HYDROCHLORIDE 20 MG/ML
JELLY TOPICAL ONCE
Status: COMPLETED | OUTPATIENT
Start: 2023-10-26 | End: 2023-10-26

## 2023-10-26 RX ORDER — GINSENG 100 MG
CAPSULE ORAL ONCE
OUTPATIENT
Start: 2023-10-26 | End: 2023-10-26

## 2023-10-26 RX ORDER — SODIUM CHLOR/HYPOCHLOROUS ACID 0.033 %
SOLUTION, IRRIGATION IRRIGATION ONCE
OUTPATIENT
Start: 2023-10-26 | End: 2023-10-26

## 2023-10-26 RX ORDER — BACITRACIN ZINC AND POLYMYXIN B SULFATE 500; 1000 [USP'U]/G; [USP'U]/G
OINTMENT TOPICAL ONCE
OUTPATIENT
Start: 2023-10-26 | End: 2023-10-26

## 2023-10-26 RX ORDER — LIDOCAINE HYDROCHLORIDE 40 MG/ML
SOLUTION TOPICAL ONCE
OUTPATIENT
Start: 2023-10-26 | End: 2023-10-26

## 2023-10-26 RX ORDER — GENTAMICIN SULFATE 1 MG/G
OINTMENT TOPICAL ONCE
OUTPATIENT
Start: 2023-10-26 | End: 2023-10-26

## 2023-10-26 RX ORDER — LIDOCAINE 40 MG/G
CREAM TOPICAL ONCE
OUTPATIENT
Start: 2023-10-26 | End: 2023-10-26

## 2023-10-26 RX ORDER — TRIAMCINOLONE ACETONIDE 1 MG/G
OINTMENT TOPICAL ONCE
OUTPATIENT
Start: 2023-10-26 | End: 2023-10-26

## 2023-10-26 RX ORDER — BETAMETHASONE DIPROPIONATE 0.05 %
OINTMENT (GRAM) TOPICAL ONCE
OUTPATIENT
Start: 2023-10-26 | End: 2023-10-26

## 2023-10-26 RX ORDER — LIDOCAINE HYDROCHLORIDE 20 MG/ML
JELLY TOPICAL ONCE
OUTPATIENT
Start: 2023-10-26 | End: 2023-10-26

## 2023-10-26 RX ORDER — LIDOCAINE 50 MG/G
OINTMENT TOPICAL ONCE
OUTPATIENT
Start: 2023-10-26 | End: 2023-10-26

## 2023-10-26 RX ORDER — CLOBETASOL PROPIONATE 0.5 MG/G
OINTMENT TOPICAL ONCE
OUTPATIENT
Start: 2023-10-26 | End: 2023-10-26

## 2023-10-26 RX ORDER — IBUPROFEN 200 MG
TABLET ORAL ONCE
OUTPATIENT
Start: 2023-10-26 | End: 2023-10-26

## 2023-10-26 RX ADMIN — LIDOCAINE HYDROCHLORIDE 6 ML: 20 JELLY TOPICAL at 13:04

## 2023-10-26 NOTE — PROGRESS NOTES
leimyoma sarcoma     Stroke Father 80        minor    Parkinsonism Maternal Grandfather     Cancer Paternal Grandmother     Stroke Paternal Grandmother        SOCIAL HISTORY    Social History     Tobacco Use    Smoking status: Never     Passive exposure: Past    Smokeless tobacco: Never   Vaping Use    Vaping Use: Never used   Substance Use Topics    Alcohol use: Not Currently     Comment: rare    Drug use: No       ALLERGIES    Allergies   Allergen Reactions    Amoxil [Amoxicillin Trihydrate] Hives    Penicillins     Codeine      headaches    Neosporin [Bacitracin-Neomycin-Polymyxin] Rash    Polysporin [Bacitracin-Polymyxin B] Rash       MEDICATIONS    Current Outpatient Medications on File Prior to Encounter   Medication Sig Dispense Refill    blood glucose test strips (FREESTYLE LITE) strip 1 each by In Vitro route daily 200 each 5    montelukast (SINGULAIR) 10 MG tablet take 1 tablet by mouth at bedtime 90 tablet 3    nirmatrelvir/ritonavir 300/100 (PAXLOVID) 20 x 150 MG & 10 x 100MG TBPK Take 3 tablets (two 150 mg nirmatrelvir and one 100 mg ritonavir tablets) by mouth every 12 hours for 5 days.  30 tablet 0    pravastatin (PRAVACHOL) 80 MG tablet Take 1 tablet by mouth every evening 90 tablet 4    albuterol sulfate HFA (PROVENTIL;VENTOLIN;PROAIR) 108 (90 Base) MCG/ACT inhaler Inhale 1 puff into the lungs every 6 hours as needed for Shortness of Breath 3 each 0    hydroCHLOROthiazide (MICROZIDE) 12.5 MG capsule Take 1 capsule by mouth every morning 90 capsule 1    budesonide (PULMICORT) 0.5 MG/2ML nebulizer suspension inhale contents of 1 vial ( 2 milliliters ) in nebulizer twice a day 120 mL 3    warfarin (COUMADIN) 4 MG tablet Take 1-2 tablets by mouth daily As directed by [facility] 180 tablet 1    ipratropium 0.5 mg-albuterol 2.5 mg (DUONEB) 0.5-2.5 (3) MG/3ML SOLN nebulizer solution inhale contents of 1 vial ( 3 milliliters ) in nebulizer by mouth and INTO THE LUNGS three times a day 360

## 2023-10-26 NOTE — PLAN OF CARE
Problem: Wound:  Goal: Will show signs of wound healing; wound closure and no evidence of infection  Description: Will show signs of wound healing; wound closure and no evidence of infection  Outcome: Progressing     Problem: Safety - Adult  Goal: Free from fall injury  Outcome: Progressing

## 2023-11-01 ENCOUNTER — CARE COORDINATION (OUTPATIENT)
Dept: CASE MANAGEMENT | Age: 68
End: 2023-11-01

## 2023-11-01 NOTE — CARE COORDINATION
Care Transitions Follow Up Call    Patient Current Location:  Home: [address] [place] [address]    Care Transition Nurse contacted the patient by telephone to follow up after admission on 10/14/23. Verified name and  with patient as identifiers. Patient: Carlton العراقي  Patient : 1955   MRN: [id number]  Reason for Admission: Bleeding from wound/supra therapeutic INR/Covid  Discharge Date: 10/17/23 RARS: Readmission Risk Score: 18.8      Needs to be reviewed by the provider   Additional needs identified to be addressed with provider: No  none             Method of communication with provider: none. Was able contact Kindra Troncoso for transitional outreach. She stated that she was doing \"pretty good\". She said that the only Covid symptom that she still has, is a raspy voice. She said that her wound was healing and will be having her INR checked next week. No further question/concerns. Addressed changes since last contact:  none  Discussed follow-up appointments. If no appointment was previously scheduled, appointment scheduling offered: Yes. Is follow up appointment scheduled within 7 days of discharge? Yes. Follow Up  Future Appointments   Date Time Provider [address]   2023  1:00 PM [facility]   2023  1:15 PM [facility]   2023 12:45 PM Eben Chandler, APRN - CNP [facility] Transition Nurse reviewed medical action plan with patient and discussed any barriers to care and/or understanding of plan of care after discharge. Discussed appropriate site of care based on symptoms and resources available to patient including: PCP  Specialist  When to call 911. The patient agrees to contact the PCP office for questions related to their healthcare.      Patients top risk factors for readmission: medical condition-elevated INR/covid  Interventions to address risk factors: Obtained and

## 2023-11-07 ENCOUNTER — APPOINTMENT (OUTPATIENT)
Dept: PHARMACY | Age: 68
End: 2023-11-07
Payer: MEDICARE

## 2023-11-07 ENCOUNTER — HOSPITAL ENCOUNTER (OUTPATIENT)
Dept: PHARMACY | Age: 68
Setting detail: THERAPIES SERIES
Discharge: HOME | End: 2023-11-07
Payer: MEDICARE

## 2023-11-07 DIAGNOSIS — Z86.718 HISTORY OF DVT (DEEP VEIN THROMBOSIS): ICD-10-CM

## 2023-11-07 DIAGNOSIS — I82.511 CHRONIC DEEP VEIN THROMBOSIS (DVT) OF FEMORAL VEIN OF RIGHT LOWER EXTREMITY (HCC): Primary | ICD-10-CM

## 2023-11-07 DIAGNOSIS — I26.99 PULMONARY EMBOLISM ON RIGHT (HCC): ICD-10-CM

## 2023-11-07 LAB
INR BLD: 4.7
PROTIME: 56.4 SECONDS

## 2023-11-07 PROCEDURE — 99212 OFFICE O/P EST SF 10 MIN: CPT

## 2023-11-07 PROCEDURE — 85610 PROTHROMBIN TIME: CPT

## 2023-11-07 NOTE — PROGRESS NOTES
Patient seen in clinic for warfarin management due to DVT and PE with an INR goal of 2.5-3.5. Estimated duration of therapy is indefinite. Patient states compliant all of the time with regimen. No bleeding or thromboembolic side effects noted. No significant med or dietary changes. No significant recent illness or disease state changes. She declined the flu vaccine today as she states she has a severe allergy to neomycin; per the package insert the vaccine may contain trace amounts of neomycin from the manufacturing process. She did receive the flu vaccine last year with no adverse reaction but she wanted to think on it some more; she will let us know if she decides to get the vaccine. PT/INR done in office per protocol. INR is 4.7 which is supratherapeutic. Warfarin regimen will be held for 1 day then decreased to 8mg Tuesdays, 4mg all other days. Will retest in 1 week. Patient understands dosing directions and information discussed. Dosing schedule and follow up appointment given to patient. Progress note routed to referring physicians office. Discussed with patient the Pharmacist Collaborative Practice Agreement. Patient provided verbal and/or electronic (ex. ADP) consent to participate in the collaborative practice agreement between the pharmacist and referred patient. This is in lieu of paper consent due to COVID-19 precautions and the use of remote/virtual visits.        For Pharmacy Admin Tracking Only    Intervention Detail: Dose Adjustment: 1, reason: Therapy De-escalation  Total # of Interventions Recommended: 1  Total # of Interventions Accepted: 1  Time Spent (min): 15

## 2023-11-08 ENCOUNTER — CARE COORDINATION (OUTPATIENT)
Dept: CASE MANAGEMENT | Age: 68
End: 2023-11-08

## 2023-11-08 RX ORDER — IPRATROPIUM BROMIDE AND ALBUTEROL SULFATE 2.5; .5 MG/3ML; MG/3ML
SOLUTION RESPIRATORY (INHALATION)
Qty: 360 ML | Refills: 1 | Status: SHIPPED | OUTPATIENT
Start: 2023-11-08

## 2023-11-08 NOTE — CARE COORDINATION
Care Transitions Follow Up Call      Patient: Dwayne Butler  Patient : 1955   MRN: [id number]  Reason for Admission: Bleeding from wound/supra therapeutic INR/Covid  Discharge Date: 10/17/23 RARS: Readmission Risk Score: 18.8      Needs to be reviewed by the provider   Additional needs identified to be addressed with provider: No  none             Method of communication with provider: none. 1st attempt to reach patient for Care Transitions. Located within [facility] requesting return call. Contact information provided.   [phone]      Follow Up  Future Appointments   Date Time Provider [address]   2023 12:45 PM Luis Chandler, APRN - CNP [facility] [place] [address]   2023  1:00 PM [facility] MEDICATION MGMT [facility] MGMT [place] Transitions Subsequent and Final Call    Subsequent and Final Calls  Are you currently active with any services?: Home Health  Care Transitions Interventions                          Other Interventions:             Plan for next call: symptom management-symptoms of covid and or bleeding can end if doing well  follow-up appointment-wound clinic    Chi Abernathy RN

## 2023-11-09 ENCOUNTER — HOSPITAL ENCOUNTER (OUTPATIENT)
Dept: WOUND CARE | Age: 68
Discharge: HOME | End: 2023-11-09

## 2023-11-09 NOTE — DISCHARGE INSTRUCTIONS
[address] -Phone: [phone] Fax: [phone]    Visit  Discharge Instructions / Physician Orders     DATE: 11/9/2023     Home Care: [facility] [address] [place] [address] (O [phone])     [address]: Home Health     Wound Location: Abdomen     Cleanse with: Vashe -let soak for at least 5 minutes during dressing changes     Dressing Orders: Collagen with Silver, Cover with silicone border bandage, Abdominal Binder     Frequency: 3 times per week     Additional Orders: Increase protein to diet (meat, cheese, eggs, fish, peanut butter, nuts and beans)  Referral to Dr. Marilynn Figueroa, [place], [place]  10/26/23 epifix IVR     Your next appointment with Jennifer Lang Pollock is in 2 weeks     (Please note your next appointment above and if you are unable to keep, kindly give a 24 hour notice. Thank you.)  If more than 15 min late we cannot guarantee you will be seen due to clinician schedule  Per Policy, Excessive cancellation will call for dismissal from program.     If you experience any of the following, please call the [address] [place] [address] during business hours:  [phone]  Your Phone call may be forwarded to Duglas Portillo during business hours that [facility] is closed. * Increase in Pain  * Temperature over 101  * Increase in drainage from your wound  * Drainage with a foul odor  * Bleeding  * Increase in swelling  * Need for compression bandage changes due to slippage, breakthrough drainage. If you need medical attention outside of the business hours of the [address] [place] [address] please contact your PCP or go to the nearest emergency room. The information contained in the After Visit Summary has been reviewed with me, the patient and/or responsible adult, by my health care provider(s). I had the opportunity to ask questions regarding this information.  I have elected to receive;      []After Visit Summary  [x]Comprehensive Discharge Instruction

## 2023-11-10 ENCOUNTER — CARE COORDINATION (OUTPATIENT)
Dept: CASE MANAGEMENT | Age: 68
End: 2023-11-10

## 2023-11-10 NOTE — CARE COORDINATION
Care Transitions Outreach Attempt #2      Attempt #2 to reach patient for transitions of care follow up. Unable to reach patient. Left VM requesting call back. Noted pt resides in asst living, has [address] 3 x per week, has home 02 at 4 LPM. Pt canceled wound care visit yesterday, is scheduled again on . CTN sign off if no return call received. Patient: Bernabe Lam Patient : 1955 MRN: [id number]    Last Discharge Facility       Date Complaint Diagnosis Description Type Department Provider    10/14/23 Hernia Bleeding from wound . .. ED to Hosp-Admission (Discharged) (ADMITTED) Lilly Burns, ; DEACON Walker. Was this an external facility discharge? No Discharge Facility Name: [facility]    Noted following upcoming appointments from discharge chart review:   [facility] follow up appointment(s):   Future Appointments   Date Time Provider [address]   2023  1:00 PM [facility] MEDICATION MGMT [facility] MGMT [facility]   2023  1:00 PM [address] Chandler, APRN - CNP [facility] HELMSD [place] [address] He[address] Henley BSN [facility]  Care Transitions Nurse  [phone]   [email or web address]

## 2023-11-13 NOTE — DISCHARGE INSTRUCTIONS
[address] -Phone: [phone] Fax: [phone]    Visit  Discharge Instructions / Physician Orders     DATE: 11/16/2023     [facility] Care: [facility] [address] [place] [address] (DANIA [phone])     [address]: Home Health     Wound Location: Abdomen     Cleanse with: Vashe -let soak for at least 5 minutes during dressing changes     Dressing Orders: Collagen with Silver (make sure natalie gets into undermining), Cover with silicone border bandage, Abdominal Binder     Frequency: 3 times per week     Additional Orders: Increase protein to diet (meat, cheese, eggs, fish, peanut butter, nuts and beans)  Referral to Dr. Lorraine Howard [place], [place]  10/26/23 epifix IVR     Your next appointment with Jennifer Cancino is in 2 weeks (11/28/23 @1:00pm) at [address] [place] [address]     (Please note your next appointment above and if you are unable to keep, kindly give a 24 hour notice. Thank you.)  If more than 15 min late we cannot guarantee you will be seen due to clinician schedule  Per Policy, Excessive cancellation will call for dismissal from program.     If you experience any of the following, please call the [facility] during business hours:  [phone]  Your Phone call may be forwarded to Duglas Portillo during business hours that [facility] is closed. * Increase in Pain  * Temperature over 101  * Increase in drainage from your wound  * Drainage with a foul odor  * Bleeding  * Increase in swelling  * Need for compression bandage changes due to slippage, breakthrough drainage. If you need medical attention outside of the business hours of the [facility] please contact your PCP or go to the nearest emergency room. The information contained in the After Visit Summary has been reviewed with me, the patient and/or responsible adult, by my health care provider(s). I had the opportunity to ask questions regarding this information.  I have elected to receive;

## 2023-11-14 ENCOUNTER — APPOINTMENT (OUTPATIENT)
Dept: GENERAL RADIOLOGY | Age: 68
End: 2023-11-14
Payer: MEDICARE

## 2023-11-14 ENCOUNTER — HOSPITAL ENCOUNTER (EMERGENCY)
Age: 68
Discharge: HOME | End: 2023-11-14
Attending: EMERGENCY MEDICINE
Payer: MEDICARE

## 2023-11-14 ENCOUNTER — HOSPITAL ENCOUNTER (OUTPATIENT)
Dept: PHARMACY | Age: 68
Setting detail: THERAPIES SERIES
Discharge: HOME | End: 2023-11-14
Payer: MEDICARE

## 2023-11-14 VITALS
DIASTOLIC BLOOD PRESSURE: 44 MMHG | RESPIRATION RATE: 24 BRPM | HEART RATE: 83 BPM | TEMPERATURE: 98.7 F | BODY MASS INDEX: 47.09 KG/M2 | OXYGEN SATURATION: 91 % | WEIGHT: 293 LBS | SYSTOLIC BLOOD PRESSURE: 117 MMHG | HEIGHT: 66 IN

## 2023-11-14 DIAGNOSIS — Z86.718 HISTORY OF DVT (DEEP VEIN THROMBOSIS): ICD-10-CM

## 2023-11-14 DIAGNOSIS — S83.92XA SPRAIN OF LEFT KNEE, UNSPECIFIED LIGAMENT, INITIAL ENCOUNTER: ICD-10-CM

## 2023-11-14 DIAGNOSIS — I82.511 CHRONIC DEEP VEIN THROMBOSIS (DVT) OF FEMORAL VEIN OF RIGHT LOWER EXTREMITY (HCC): Primary | ICD-10-CM

## 2023-11-14 DIAGNOSIS — I26.99 PULMONARY EMBOLISM ON RIGHT (HCC): ICD-10-CM

## 2023-11-14 DIAGNOSIS — S20.212A CONTUSION OF RIB ON LEFT SIDE, INITIAL ENCOUNTER: Primary | ICD-10-CM

## 2023-11-14 LAB
INR BLD: 2.9
PROTIME: 34.5 SECONDS

## 2023-11-14 PROCEDURE — 90694 VACC AIIV4 NO PRSRV 0.5ML IM: CPT | Performed by: INTERNAL MEDICINE

## 2023-11-14 PROCEDURE — 85610 PROTHROMBIN TIME: CPT

## 2023-11-14 PROCEDURE — 6360000002 HC RX W HCPCS: Performed by: INTERNAL MEDICINE

## 2023-11-14 PROCEDURE — G0008 ADMIN INFLUENZA VIRUS VAC: HCPCS | Performed by: INTERNAL MEDICINE

## 2023-11-14 PROCEDURE — 71101 X-RAY EXAM UNILAT RIBS/CHEST: CPT

## 2023-11-14 PROCEDURE — 73562 X-RAY EXAM OF KNEE 3: CPT

## 2023-11-14 PROCEDURE — 99283 EMERGENCY DEPT VISIT LOW MDM: CPT

## 2023-11-14 PROCEDURE — 99211 OFF/OP EST MAY X REQ PHY/QHP: CPT

## 2023-11-14 RX ORDER — TRAMADOL HYDROCHLORIDE 50 MG/1
50 TABLET ORAL EVERY 4 HOURS PRN
Qty: 18 TABLET | Refills: 0 | Status: SHIPPED | OUTPATIENT
Start: 2023-11-14 | End: 2023-11-17

## 2023-11-14 RX ADMIN — INFLUENZA A VIRUS A/VICTORIA/4897/2022 IVR-238 (H1N1) ANTIGEN (FORMALDEHYDE INACTIVATED), INFLUENZA A VIRUS A/DARWIN/6/2021 IVR-227 (H3N2) ANTIGEN (FORMALDEHYDE INACTIVATED), INFLUENZA B VIRUS B/AUSTRIA/1359417/2021 BVR-26 ANTIGEN (FORMALDEHYDE INACTIVATED), INFLUENZA B VIRUS B/PHUKET/3073/2013 BVR-1B ANTIGEN (FORMALDEHYDE INACTIVATED) 0.5 ML: 15; 15; 15; 15 INJECTION, SUSPENSION INTRAMUSCULAR at 13:00

## 2023-11-14 NOTE — PROGRESS NOTES
Patient seen in clinic for warfarin management due to DVT and PE with an INR goal of 2.0-3.0. Estimated duration of therapy is indefinite. Patient states compliant all of the time with regimen. No bleeding or thromboembolic side effects noted. No significant med changes. No significant recent illness or disease state changes. She stopped drinking her daily cranberry juice after last week's appt and she does not plan to resume it at this time. She elected to get the Fluad HD vaccine today. PT/INR done in office per protocol. INR is 2.9 which is therapeutic. Warfarin regimen will be continued at current dose 8mg Tuesdays, 4mg all other days. Will retest in 2 weeks. Patient understands dosing directions and information discussed. Dosing schedule and follow up appointment given to patient. Progress note routed to referring physicians office. Discussed with patient the Pharmacist Collaborative Practice Agreement. Patient provided verbal and/or electronic (ex. [email or web address]) consent to participate in the collaborative practice agreement between the pharmacist and referred patient. This is in lieu of paper consent due to COVID-19 precautions and the use of remote/virtual visits.        For Pharmacy Admin Tracking Only    Intervention Detail: Vaccine Recommended/Administered  Total # of Interventions Recommended: 1  Total # of Interventions Accepted: 1  Time Spent (min): 15
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
## 2023-11-15 ENCOUNTER — TELEPHONE (OUTPATIENT)
Dept: PHARMACY | Age: 68
End: 2023-11-15

## 2023-11-15 NOTE — TELEPHONE ENCOUNTER
Pt notified clinic that she fell yesterday after the appt. She did not hit her head. She has a bruise on her knee so she only took 4mg warfarin yesterday instead of the instructed 8mg. I advised this is appropriate for the circumstance and she can continue with her usual warfarin regimen.

## 2023-11-16 ENCOUNTER — HOSPITAL ENCOUNTER (OUTPATIENT)
Dept: WOUND CARE | Age: 68
Discharge: HOME | End: 2023-11-16
Payer: MEDICARE

## 2023-11-16 VITALS
WEIGHT: 293 LBS | BODY MASS INDEX: 48.82 KG/M2 | HEIGHT: 65 IN | DIASTOLIC BLOOD PRESSURE: 49 MMHG | RESPIRATION RATE: 22 BRPM | HEART RATE: 90 BPM | TEMPERATURE: 98.1 F | SYSTOLIC BLOOD PRESSURE: 117 MMHG

## 2023-11-16 DIAGNOSIS — L98.492 ABDOMINAL WALL SKIN ULCER, WITH FAT LAYER EXPOSED (HCC): Primary | ICD-10-CM

## 2023-11-16 DIAGNOSIS — S80.02XA TRAUMATIC HEMATOMA OF LEFT KNEE, INITIAL ENCOUNTER: ICD-10-CM

## 2023-11-16 PROCEDURE — 11042 DBRDMT SUBQ TIS 1ST 20SQCM/<: CPT

## 2023-11-16 RX ORDER — GINSENG 100 MG
CAPSULE ORAL ONCE
OUTPATIENT
Start: 2023-11-16 | End: 2023-11-16

## 2023-11-16 RX ORDER — IBUPROFEN 200 MG
TABLET ORAL ONCE
OUTPATIENT
Start: 2023-11-16 | End: 2023-11-16

## 2023-11-16 RX ORDER — LIDOCAINE HYDROCHLORIDE 20 MG/ML
JELLY TOPICAL ONCE
OUTPATIENT
Start: 2023-11-16 | End: 2023-11-16

## 2023-11-16 RX ORDER — LIDOCAINE 50 MG/G
OINTMENT TOPICAL ONCE
OUTPATIENT
Start: 2023-11-16 | End: 2023-11-16

## 2023-11-16 RX ORDER — BETAMETHASONE DIPROPIONATE 0.05 %
OINTMENT (GRAM) TOPICAL ONCE
OUTPATIENT
Start: 2023-11-16 | End: 2023-11-16

## 2023-11-16 RX ORDER — TRIAMCINOLONE ACETONIDE 1 MG/G
OINTMENT TOPICAL ONCE
OUTPATIENT
Start: 2023-11-16 | End: 2023-11-16

## 2023-11-16 RX ORDER — GENTAMICIN SULFATE 1 MG/G
OINTMENT TOPICAL ONCE
OUTPATIENT
Start: 2023-11-16 | End: 2023-11-16

## 2023-11-16 RX ORDER — SODIUM CHLOR/HYPOCHLOROUS ACID 0.033 %
SOLUTION, IRRIGATION IRRIGATION ONCE
OUTPATIENT
Start: 2023-11-16 | End: 2023-11-16

## 2023-11-16 RX ORDER — LIDOCAINE 40 MG/G
CREAM TOPICAL ONCE
OUTPATIENT
Start: 2023-11-16 | End: 2023-11-16

## 2023-11-16 RX ORDER — BACITRACIN ZINC AND POLYMYXIN B SULFATE 500; 1000 [USP'U]/G; [USP'U]/G
OINTMENT TOPICAL ONCE
OUTPATIENT
Start: 2023-11-16 | End: 2023-11-16

## 2023-11-16 RX ORDER — LIDOCAINE HYDROCHLORIDE 40 MG/ML
SOLUTION TOPICAL ONCE
OUTPATIENT
Start: 2023-11-16 | End: 2023-11-16

## 2023-11-16 RX ORDER — CLOBETASOL PROPIONATE 0.5 MG/G
OINTMENT TOPICAL ONCE
OUTPATIENT
Start: 2023-11-16 | End: 2023-11-16

## 2023-11-16 RX ORDER — DOXYCYCLINE HYCLATE 100 MG
100 TABLET ORAL 2 TIMES DAILY
Qty: 14 TABLET | Refills: 0 | Status: SHIPPED | OUTPATIENT
Start: 2023-11-16 | End: 2023-11-23

## 2023-11-16 RX ORDER — LIDOCAINE HYDROCHLORIDE 20 MG/ML
JELLY TOPICAL ONCE
Status: COMPLETED | OUTPATIENT
Start: 2023-11-16 | End: 2023-11-16

## 2023-11-16 RX ADMIN — LIDOCAINE HYDROCHLORIDE 6 ML: 20 JELLY TOPICAL at 13:10

## 2023-11-16 ASSESSMENT — PAIN SCALES - GENERAL: PAINLEVEL_OUTOF10: 2

## 2023-11-16 NOTE — PROGRESS NOTES
Response to treatment:  Well tolerated by patient. Plan:     -cont natalie, pack into wound undermining    -f/u with surgical wound clinic next week to eval abdominal wound (will likely go to [facility] as she is unable to have transportation on Mondays to see Dr. Eduardo Jacobs)    -I have reviewed instructions with the patient, answering all questions to satisfaction.    -Patient to call or return to clinic as needed if wound symptoms worsen or fail to improve as anticipated.    -See Discharge Instructions for wound management orders. Written patient dismissal instructions given to patient and signed by patient or POA.            Electronically signed by ARACELIS Liu CNP on 11/16/2023 at 1:35 PM

## 2023-11-26 NOTE — DISCHARGE INSTRUCTIONS
[facility]- Phone [phone] Fax [phone]  Visit  Discharge Instructions / Physician Orders     DATE: 11/282023     Home Care: [facility] [address] [place] [address] ([facility] [phone])     [address]: Home Health     Wound Location: Abdomen     Cleanse with: Vashe -let soak for at least 5 minutes during dressing changes     Dressing Orders: Collagen with Silver (make sure natalie gets into undermining), Cover with silicone border bandage, Abdominal Binder     Frequency: 3 times per week     Additional Orders: Increase protein to diet (meat, cheese, eggs, fish, peanut butter, nuts and beans)  Referral to Dr. Hilary Brunson, [place], [place]  10/26/23 epifix IVR     Your next appointment with Jennifer Cancino is in 2 weeks (11/28/23 @1:00pm) at [facility]     (Please note your next appointment above and if you are unable to keep, kindly give a 24 hour notice. Thank you.)  If more than 15 min late we cannot guarantee you will be seen due to clinician schedule  Per Policy, Excessive cancellation will call for dismissal from program.     If you experience any of the following, [facility] hours:  [phone]  Your Phone call may be forwarded to Duglas Portillo during business hours that [facility] is closed. * Increase in Pain  * Temperature over 101  * Increase in drainage from your wound  * Drainage with a foul odor  * Bleeding  * Increase in swelling  * Need for compression bandage changes due to slippage, breakthrough drainage. If you need medical attention outside of the business hours of the [facility] please contact your PCP or go to the nearest emergency room. The information contained in the After Visit Summary has been reviewed with me, the patient and/or responsible adult, by my health care provider(s). I had the opportunity to ask questions regarding this information.  I have

## 2023-11-28 ENCOUNTER — APPOINTMENT (OUTPATIENT)
Dept: PHARMACY | Age: 68
End: 2023-11-28
Payer: MEDICARE

## 2023-11-28 ENCOUNTER — HOSPITAL ENCOUNTER (OUTPATIENT)
Dept: WOUND CARE | Age: 68
Discharge: HOME | End: 2023-11-28

## 2023-11-29 NOTE — DISCHARGE INSTRUCTIONS
[address] -Phone: [phone] Fax: [phone]    Visit  Discharge Instructions / Physician Orders     DATE: 12/4/2023     Home Care: [facility] [address] [place] [address] (GRISEL [phone])     [address]: Home Health     Wound Location: Abdomen     Cleanse with: Vashe -let soak for at least 5 minutes during dressing changes     Dressing Orders: Collagen with Silver (make sure natalie gets into undermining), Cover with silicone border bandage, Abdominal Binder     Frequency: 3 times per week     Additional Orders: Increase protein to diet (meat, cheese, eggs, fish, peanut butter, nuts and beans)  Referral to Dr. Radha Walden [place], [place]  10/26/23 epifix IVR     [facility]      (Please note your next appointment above and if you are unable to keep, kindly give a 24 hour notice. Thank you.)  If more than 15 min late we cannot guarantee you will be seen due to clinician schedule  Per Policy, Excessive cancellation will call for dismissal from program.     If you experience any of the following, please call the [address] [place] [address] during business hours:  [phone]  Your Phone call may be forwarded to Duglas Zhongolas during business hours that [employer] is closed. * Increase in Pain  * Temperature over 101  * Increase in drainage from your wound  * Drainage with a foul odor  * Bleeding  * Increase in swelling  * Need for compression bandage changes due to slippage, breakthrough drainage. If you need medical attention outside of the business hours of the [address] [place] [address] please contact your PCP or go to the nearest emergency room. The information contained in the After Visit Summary has been reviewed with me, the patient and/or responsible adult, by my health care provider(s). I had the opportunity to ask questions regarding this information.  I have elected to receive;      []After Visit

## 2023-12-04 ENCOUNTER — HOSPITAL ENCOUNTER (OUTPATIENT)
Dept: WOUND CARE | Age: 68
Discharge: HOME | End: 2023-12-04
Payer: MEDICARE

## 2023-12-04 ENCOUNTER — HOSPITAL ENCOUNTER (OUTPATIENT)
Dept: PHARMACY | Age: 68
Setting detail: THERAPIES SERIES
Discharge: HOME | End: 2023-12-04
Payer: MEDICARE

## 2023-12-04 VITALS
SYSTOLIC BLOOD PRESSURE: 136 MMHG | RESPIRATION RATE: 20 BRPM | BODY MASS INDEX: 48.82 KG/M2 | HEIGHT: 65 IN | WEIGHT: 293 LBS | HEART RATE: 83 BPM | DIASTOLIC BLOOD PRESSURE: 64 MMHG | TEMPERATURE: 98.1 F

## 2023-12-04 DIAGNOSIS — I26.99 PULMONARY EMBOLISM ON RIGHT (HCC): ICD-10-CM

## 2023-12-04 DIAGNOSIS — Z86.718 HISTORY OF DVT (DEEP VEIN THROMBOSIS): ICD-10-CM

## 2023-12-04 DIAGNOSIS — L98.492 ABDOMINAL WALL SKIN ULCER, WITH FAT LAYER EXPOSED (HCC): Primary | ICD-10-CM

## 2023-12-04 DIAGNOSIS — I82.511 CHRONIC DEEP VEIN THROMBOSIS (DVT) OF FEMORAL VEIN OF RIGHT LOWER EXTREMITY (HCC): Primary | ICD-10-CM

## 2023-12-04 LAB
INR BLD: 5.3
PROTIME: 63.6 SECONDS

## 2023-12-04 PROCEDURE — 99213 OFFICE O/P EST LOW 20 MIN: CPT

## 2023-12-04 PROCEDURE — 85610 PROTHROMBIN TIME: CPT

## 2023-12-04 PROCEDURE — 99212 OFFICE O/P EST SF 10 MIN: CPT

## 2023-12-04 PROCEDURE — 99213 OFFICE O/P EST LOW 20 MIN: CPT | Performed by: PLASTIC SURGERY

## 2023-12-04 RX ORDER — SODIUM CHLOR/HYPOCHLOROUS ACID 0.033 %
SOLUTION, IRRIGATION IRRIGATION ONCE
OUTPATIENT
Start: 2023-12-04 | End: 2023-12-04

## 2023-12-04 RX ORDER — LIDOCAINE 40 MG/G
CREAM TOPICAL ONCE
OUTPATIENT
Start: 2023-12-04 | End: 2023-12-04

## 2023-12-04 RX ORDER — GENTAMICIN SULFATE 1 MG/G
OINTMENT TOPICAL ONCE
OUTPATIENT
Start: 2023-12-04 | End: 2023-12-04

## 2023-12-04 RX ORDER — BACITRACIN ZINC AND POLYMYXIN B SULFATE 500; 1000 [USP'U]/G; [USP'U]/G
OINTMENT TOPICAL ONCE
OUTPATIENT
Start: 2023-12-04 | End: 2023-12-04

## 2023-12-04 RX ORDER — LIDOCAINE HYDROCHLORIDE 20 MG/ML
JELLY TOPICAL ONCE
OUTPATIENT
Start: 2023-12-04 | End: 2023-12-04

## 2023-12-04 RX ORDER — BETAMETHASONE DIPROPIONATE 0.05 %
OINTMENT (GRAM) TOPICAL ONCE
OUTPATIENT
Start: 2023-12-04 | End: 2023-12-04

## 2023-12-04 RX ORDER — TRIAMCINOLONE ACETONIDE 1 MG/G
OINTMENT TOPICAL ONCE
OUTPATIENT
Start: 2023-12-04 | End: 2023-12-04

## 2023-12-04 RX ORDER — CLOBETASOL PROPIONATE 0.5 MG/G
OINTMENT TOPICAL ONCE
OUTPATIENT
Start: 2023-12-04 | End: 2023-12-04

## 2023-12-04 RX ORDER — IBUPROFEN 200 MG
TABLET ORAL ONCE
OUTPATIENT
Start: 2023-12-04 | End: 2023-12-04

## 2023-12-04 RX ORDER — LIDOCAINE HYDROCHLORIDE 40 MG/ML
SOLUTION TOPICAL ONCE
OUTPATIENT
Start: 2023-12-04 | End: 2023-12-04

## 2023-12-04 RX ORDER — LIDOCAINE 50 MG/G
OINTMENT TOPICAL ONCE
OUTPATIENT
Start: 2023-12-04 | End: 2023-12-04

## 2023-12-04 RX ORDER — GINSENG 100 MG
CAPSULE ORAL ONCE
OUTPATIENT
Start: 2023-12-04 | End: 2023-12-04

## 2023-12-04 ASSESSMENT — PAIN DESCRIPTION - ORIENTATION: ORIENTATION: RIGHT;LOWER

## 2023-12-04 ASSESSMENT — PAIN DESCRIPTION - LOCATION: LOCATION: ABDOMEN

## 2023-12-04 ASSESSMENT — PAIN DESCRIPTION - DESCRIPTORS: DESCRIPTORS: SHOOTING

## 2023-12-04 ASSESSMENT — PAIN SCALES - GENERAL: PAINLEVEL_OUTOF10: 2
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
## 2023-12-04 NOTE — PROGRESS NOTES
DISPLAY PLAN FREE TEXT
([address])    Acute on chronic respiratory failure with hypoxia ([address])    Diabetes mellitus type 2, noninsulin dependent ([address])    Pulmonary HTN ([address])    Urinary retention    History of DVT (deep vein thrombosis)    Bilateral leg edema    CKD (chronic kidney disease) stage 3, GFR 30-59 ml/min ([facility])    Class 3 severe obesity due to excess calories with serious comorbidity and body mass index (BMI) of 60.0 to 69.9 in adult [facility])    Osteopenia    Balance problems    Endometrial adenocarcinoma (HCC)    Dyspnea    Mild asthma    Open abdominal wall wound    Ventral hernia without obstruction or gangrene    Abdominal wall skin ulcer, with fat layer exposed ([address])    Supratherapeutic INR    COVID    Bleeding from wound    Traumatic hematoma of left knee       Plan:  Wound care orders as per discharge instructions. Patient's INR was 5.3 today. Patient was advised by the Coumadin clinic regarding how to take her Coumadin. No debridement was performed. Patient is also not checking her glucose patient was advised that she needs to do so. Patient is to follow-up in 2 weeks.        Electronically signed by:  Roberto Campa MD 12/4/2023

## 2023-12-07 ENCOUNTER — TELEPHONE (OUTPATIENT)
Dept: PHARMACY | Age: 68
End: 2023-12-07

## 2023-12-07 DIAGNOSIS — I26.99 PULMONARY EMBOLISM ON RIGHT (HCC): ICD-10-CM

## 2023-12-07 DIAGNOSIS — I82.511 CHRONIC DEEP VEIN THROMBOSIS (DVT) OF FEMORAL VEIN OF RIGHT LOWER EXTREMITY (HCC): Primary | ICD-10-CM

## 2023-12-07 DIAGNOSIS — Z86.718 HISTORY OF DVT (DEEP VEIN THROMBOSIS): ICD-10-CM

## 2023-12-07 NOTE — TELEPHONE ENCOUNTER
Patient called to let us know she was having some substantial bleeding from her wound and held her dose Tuesday 12/5 and took 2mg Wed 12/6. She is calling to get direction on how to proceed. Advised patient to resume 4mg tonight and we will keep INR check as scheduled on 12/12.

## 2023-12-12 ENCOUNTER — HOSPITAL ENCOUNTER (OUTPATIENT)
Dept: PHARMACY | Age: 68
Setting detail: THERAPIES SERIES
Discharge: HOME | End: 2023-12-12
Payer: MEDICARE

## 2023-12-12 DIAGNOSIS — Z86.718 HISTORY OF DVT (DEEP VEIN THROMBOSIS): ICD-10-CM

## 2023-12-12 DIAGNOSIS — I26.99 PULMONARY EMBOLISM ON RIGHT (HCC): ICD-10-CM

## 2023-12-12 DIAGNOSIS — I82.511 CHRONIC DEEP VEIN THROMBOSIS (DVT) OF FEMORAL VEIN OF RIGHT LOWER EXTREMITY (HCC): Primary | ICD-10-CM

## 2023-12-12 LAB
INR BLD: 2.7
PROTIME: 32.3 SECONDS

## 2023-12-12 PROCEDURE — 85610 PROTHROMBIN TIME: CPT

## 2023-12-12 PROCEDURE — 99211 OFF/OP EST MAY X REQ PHY/QHP: CPT
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
## 2023-12-12 NOTE — DISCHARGE INSTRUCTIONS
[address] -Phone: [phone] Fax: [phone]    Visit  Discharge Instructions / Physician Orders     DATE: 12/14/2023     Home Care: [facility] [address] [place] [address] [phone])     [address]: Home Health     Wound Location: Abdomen     Cleanse with: Vashe -let soak for at least 5 minutes during dressing changes     Dressing Orders: Collagen with Silver (make sure natalie gets into undermining), Cover with silicone border bandage, Abdominal Binder     Frequency: 3 times per week     Additional Orders: Increase protein to diet (meat, cheese, eggs, fish, peanut butter, nuts and beans)  Referral to Dr. Evita Mcnally [place], [place]  10/26/23 epifix IVR  Doxycycline Ordered 12/14/23     Your next appointment with Jennifer [facility] is in 1 week     (Please note your next appointment above and if you are unable to keep, kindly give a 24 hour notice. Thank you.)  If more than 15 min late we cannot guarantee you will be seen due to clinician schedule  Per Policy, Excessive cancellation will call for dismissal from program.     If you experience any of the following, please call the [address] [place] [address] during business hours:  [phone]  Your Phone call may be forwarded to Duglas Portillo during business hours that [address] [place] [address] is closed. * Increase in Pain  * Temperature over 101  * Increase in drainage from your wound  * Drainage with a foul odor  * Bleeding  * Increase in swelling  * Need for compression bandage changes due to slippage, breakthrough drainage. If you need medical attention outside of the business hours of the [address] [place] [address] please contact your PCP or go to the nearest emergency room. The information contained in the After Visit Summary has been reviewed with me, the patient and/or responsible adult, by my health care provider(s). I had the opportunity to ask questions regarding this information.  I have elected to receive;

## 2023-12-14 ENCOUNTER — HOSPITAL ENCOUNTER (OUTPATIENT)
Dept: WOUND CARE | Age: 68
Discharge: HOME | End: 2023-12-14
Payer: MEDICARE

## 2023-12-14 VITALS
HEART RATE: 91 BPM | TEMPERATURE: 97 F | RESPIRATION RATE: 18 BRPM | HEIGHT: 65 IN | WEIGHT: 293 LBS | SYSTOLIC BLOOD PRESSURE: 134 MMHG | DIASTOLIC BLOOD PRESSURE: 68 MMHG | BODY MASS INDEX: 48.82 KG/M2

## 2023-12-14 DIAGNOSIS — L03.311 CELLULITIS OF ABDOMINAL WALL: ICD-10-CM

## 2023-12-14 DIAGNOSIS — L98.492 ABDOMINAL WALL SKIN ULCER, WITH FAT LAYER EXPOSED (HCC): Primary | ICD-10-CM

## 2023-12-14 PROCEDURE — 99214 OFFICE O/P EST MOD 30 MIN: CPT | Performed by: INTERNAL MEDICINE

## 2023-12-14 PROCEDURE — 99213 OFFICE O/P EST LOW 20 MIN: CPT

## 2023-12-14 RX ORDER — TRIAMCINOLONE ACETONIDE 1 MG/G
OINTMENT TOPICAL ONCE
OUTPATIENT
Start: 2023-12-14 | End: 2023-12-14

## 2023-12-14 RX ORDER — BETAMETHASONE DIPROPIONATE 0.05 %
OINTMENT (GRAM) TOPICAL ONCE
OUTPATIENT
Start: 2023-12-14 | End: 2023-12-14

## 2023-12-14 RX ORDER — IBUPROFEN 200 MG
TABLET ORAL ONCE
OUTPATIENT
Start: 2023-12-14 | End: 2023-12-14

## 2023-12-14 RX ORDER — LIDOCAINE HYDROCHLORIDE 40 MG/ML
SOLUTION TOPICAL ONCE
OUTPATIENT
Start: 2023-12-14 | End: 2023-12-14

## 2023-12-14 RX ORDER — GINSENG 100 MG
CAPSULE ORAL ONCE
OUTPATIENT
Start: 2023-12-14 | End: 2023-12-14

## 2023-12-14 RX ORDER — CLOBETASOL PROPIONATE 0.5 MG/G
OINTMENT TOPICAL ONCE
OUTPATIENT
Start: 2023-12-14 | End: 2023-12-14

## 2023-12-14 RX ORDER — LIDOCAINE 40 MG/G
CREAM TOPICAL ONCE
OUTPATIENT
Start: 2023-12-14 | End: 2023-12-14

## 2023-12-14 RX ORDER — GENTAMICIN SULFATE 1 MG/G
OINTMENT TOPICAL ONCE
OUTPATIENT
Start: 2023-12-14 | End: 2023-12-14

## 2023-12-14 RX ORDER — BACITRACIN ZINC AND POLYMYXIN B SULFATE 500; 1000 [USP'U]/G; [USP'U]/G
OINTMENT TOPICAL ONCE
OUTPATIENT
Start: 2023-12-14 | End: 2023-12-14

## 2023-12-14 RX ORDER — LIDOCAINE HYDROCHLORIDE 20 MG/ML
JELLY TOPICAL ONCE
Status: COMPLETED | OUTPATIENT
Start: 2023-12-14 | End: 2023-12-14

## 2023-12-14 RX ORDER — LIDOCAINE HYDROCHLORIDE 20 MG/ML
JELLY TOPICAL ONCE
OUTPATIENT
Start: 2023-12-14 | End: 2023-12-14

## 2023-12-14 RX ORDER — DOXYCYCLINE HYCLATE 100 MG
100 TABLET ORAL 2 TIMES DAILY
Qty: 14 TABLET | Refills: 0 | Status: SHIPPED | OUTPATIENT
Start: 2023-12-14 | End: 2023-12-21

## 2023-12-14 RX ORDER — LIDOCAINE 50 MG/G
OINTMENT TOPICAL ONCE
OUTPATIENT
Start: 2023-12-14 | End: 2023-12-14

## 2023-12-14 RX ORDER — SODIUM CHLOR/HYPOCHLOROUS ACID 0.033 %
SOLUTION, IRRIGATION IRRIGATION ONCE
OUTPATIENT
Start: 2023-12-14 | End: 2023-12-14

## 2023-12-14 RX ADMIN — LIDOCAINE HYDROCHLORIDE 5 ML: 20 JELLY TOPICAL at 10:04

## 2023-12-14 ASSESSMENT — PAIN DESCRIPTION - LOCATION: LOCATION: ABDOMEN

## 2023-12-14 ASSESSMENT — PAIN SCALES - GENERAL: PAINLEVEL_OUTOF10: 2

## 2023-12-14 ASSESSMENT — PAIN DESCRIPTION - DESCRIPTORS: DESCRIPTORS: ACHING;BURNING

## 2023-12-14 ASSESSMENT — PAIN DESCRIPTION - ORIENTATION: ORIENTATION: RIGHT;LOWER

## 2023-12-14 NOTE — PROGRESS NOTES
[facility]   Progress Note and Procedure Note      [facility] RECORD NUMBER:  [id number]  AGE: 80 y.o. GENDER: female  : 1955  EPISODE DATE:  2023    Subjective:     Chief Complaint   Patient presents with    Wound Check     Abdominal          HISTORY of PRESENT ILLNESS HPI     Sherrill Severino is a 80 y.o. female who presents today for wound/ulcer evaluation. History of Wound Context: The patient is here for open abdominal wall wounds that bleeds easily. There is redness and swelling surrounding the proximal abdominal wall wound with no purulent drainage. The patient had a large abdominal hernia, was evaluated by surgery and was told to lose 100 pounds prior to surgical intervention. She does have chronic shortness of breath on home oxygen . The patient was evaluated at the ER on 2023 was treated with a course of oral clindamycin that was completed. She had history of pulmonary embolism is on Coumadin. The patient had an issue with elevated INR, most recent was 2.7 on 2023.   Ulcer Identification:  Ulcer Type: pressure  Contributing Factors: edema, diabetes, chronic pressure, obesity, and anticoagulation therapy    Wound: N/A        PAST MEDICAL HISTORY        Diagnosis Date    Anxiety     Asthma     Bronchitis     DDD (degenerative disc disease), lumbar     Depression     Diabetes mellitus (HCC)     Elevated glucose     Headache(784.0)     Hernia of abdominal cavity     HH (hiatus hernia)     Hyperlipemia, mixed     Hypertension     Osteoarthritis     Right femoral vein DVT ([address]) May, 2009    Dr. Cris Gonzalez    Uterine hyperplasia        PAST SURGICAL HISTORY    Past Surgical History:   Procedure Laterality Date    BREAST REDUCTION SURGERY  1997    BREAST REDUCTION SURGERY  1997    DILATION AND CURETTAGE OF UTERUS  10/08 and     HYSTERECTOMY (CERVIX STATUS UNKNOWN)  7/17/15    Clive Miles, Dr Cindy Curiel, endometial cancer, [facility]

## 2023-12-28 ENCOUNTER — HOSPITAL ENCOUNTER (OUTPATIENT)
Dept: PHARMACY | Age: 68
Setting detail: THERAPIES SERIES
Discharge: HOME | End: 2023-12-28
Payer: MEDICARE

## 2023-12-28 DIAGNOSIS — Z86.718 HISTORY OF DVT (DEEP VEIN THROMBOSIS): ICD-10-CM

## 2023-12-28 DIAGNOSIS — I26.99 PULMONARY EMBOLISM ON RIGHT (HCC): ICD-10-CM

## 2023-12-28 DIAGNOSIS — I82.511 CHRONIC DEEP VEIN THROMBOSIS (DVT) OF FEMORAL VEIN OF RIGHT LOWER EXTREMITY (HCC): Primary | ICD-10-CM

## 2023-12-28 LAB
INR BLD: 3.5
PROTIME: 41.7 SECONDS

## 2023-12-28 PROCEDURE — 99211 OFF/OP EST MAY X REQ PHY/QHP: CPT

## 2023-12-28 PROCEDURE — 85610 PROTHROMBIN TIME: CPT

## 2023-12-28 NOTE — PROGRESS NOTES
Patient seen in clinic for warfarin management due to DVT and PE with an INR goal of 2.5-3.5. Estimated duration of therapy is indefinite. Patient states compliant all of the time with regimen. No bleeding or thromboembolic side effects noted. No significant med or dietary changes. No significant recent illness or disease state changes. PT/INR done in office per protocol. INR is 3.5 which is therapeutic. Warfarin regimen will be continued at current dose 2mg Wednesdays, 4mg all other days. Will retest in 1 week to assess. Patient understands dosing directions and information discussed. Dosing schedule and follow up appointment given to patient. Progress note routed to referring physicians office. Discussed with patient the Pharmacist Collaborative Practice Agreement. Patient provided verbal and/or electronic (ex. [email or web address]) consent to participate in the collaborative practice agreement between the pharmacist and referred patient. This is in lieu of paper consent due to COVID-19 precautions and the use of remote/virtual visits.        For Pharmacy Admin Tracking Only    Intervention Detail:   Total # of Interventions Recommended: 0  Total # of Interventions Accepted: 0  Time Spent (min): 15

## 2023-12-29 NOTE — DISCHARGE INSTRUCTIONS
[facility] -Phone: [phone] Fax: [phone]    Visit  Discharge Instructions / Physician Orders     DATE: 1/4/2024     Home Care: [facility] (Fax [phone])     SUPPLIES ORDERED THRU: Home Health     Wound Location: Abdomen     Cleanse with: Vashe -let soak for at least 5 minutes during dressing changes     Dressing Orders: Collagen with Silver, Cover with silicone border bandage, Abdominal Binder     Frequency: 3 times per week     Additional Orders: Increase protein to diet (meat, cheese, eggs, fish, peanut butter, nuts and beans)  [facility]- [address], [place]  10/26/23 epifix IVR  Doxycycline Ordered 12/14/23     Your next appointment with Wound Care Center is in 2 weeks     (Please note your next appointment above and if you are unable to keep, kindly give a 24 hour notice. Thank you.)  If more than 15 min late we cannot guarantee you will be seen due to clinician schedule  Per Policy, Excessive cancellation will call for dismissal from program.     If you experience any of the following, please call the Wound Care Center during business hours:  [phone]  Your Phone call may be forwarded to [facility] during business hours that [place] is closed.     * Increase in Pain  * Temperature over 101  * Increase in drainage from your wound  * Drainage with a foul odor  * Bleeding  * Increase in swelling  * Need for compression bandage changes due to slippage, breakthrough drainage.     If you need medical attention outside of the business hours of the Wound Care Centers please contact your PCP or go to the nearest emergency room.     The information contained in the After Visit Summary has been reviewed with me, the patient and/or responsible adult, by my health care provider(s). I had the opportunity to ask questions regarding this information. I have elected to receive;      []After Visit Summary  [x]Comprehensive

## 2024-01-04 ENCOUNTER — HOSPITAL ENCOUNTER (OUTPATIENT)
Dept: PHARMACY | Age: 69
Setting detail: THERAPIES SERIES
Discharge: HOME | End: 2024-01-04
Payer: MEDICARE

## 2024-01-04 ENCOUNTER — HOSPITAL ENCOUNTER (OUTPATIENT)
Dept: WOUND CARE | Age: 69
Discharge: HOME | End: 2024-01-04
Payer: MEDICARE

## 2024-01-04 VITALS
HEIGHT: 65 IN | SYSTOLIC BLOOD PRESSURE: 100 MMHG | BODY MASS INDEX: 48.82 KG/M2 | HEART RATE: 93 BPM | TEMPERATURE: 96.8 F | WEIGHT: 293 LBS | DIASTOLIC BLOOD PRESSURE: 58 MMHG | RESPIRATION RATE: 19 BRPM

## 2024-01-04 DIAGNOSIS — L98.492 ABDOMINAL WALL SKIN ULCER, WITH FAT LAYER EXPOSED (HCC): Primary | ICD-10-CM

## 2024-01-04 DIAGNOSIS — I82.511 CHRONIC DEEP VEIN THROMBOSIS (DVT) OF FEMORAL VEIN OF RIGHT LOWER EXTREMITY (HCC): Primary | ICD-10-CM

## 2024-01-04 DIAGNOSIS — I26.99 PULMONARY EMBOLISM ON RIGHT (HCC): ICD-10-CM

## 2024-01-04 DIAGNOSIS — Z86.718 HISTORY OF DVT (DEEP VEIN THROMBOSIS): ICD-10-CM

## 2024-01-04 LAB
INR BLD: 2.9
PROTIME: 34.3 SECONDS

## 2024-01-04 PROCEDURE — 85610 PROTHROMBIN TIME: CPT

## 2024-01-04 PROCEDURE — 11042 DBRDMT SUBQ TIS 1ST 20SQCM/<: CPT

## 2024-01-04 PROCEDURE — 99211 OFF/OP EST MAY X REQ PHY/QHP: CPT

## 2024-01-04 RX ORDER — GINSENG 100 MG
CAPSULE ORAL ONCE
OUTPATIENT
Start: 2024-01-04 | End: 2024-01-04

## 2024-01-04 RX ORDER — LIDOCAINE 50 MG/G
OINTMENT TOPICAL ONCE
OUTPATIENT
Start: 2024-01-04 | End: 2024-01-04

## 2024-01-04 RX ORDER — LIDOCAINE HYDROCHLORIDE 20 MG/ML
JELLY TOPICAL ONCE
OUTPATIENT
Start: 2024-01-04 | End: 2024-01-04

## 2024-01-04 RX ORDER — LIDOCAINE HYDROCHLORIDE 40 MG/ML
SOLUTION TOPICAL ONCE
OUTPATIENT
Start: 2024-01-04 | End: 2024-01-04

## 2024-01-04 RX ORDER — BETAMETHASONE DIPROPIONATE 0.05 %
OINTMENT (GRAM) TOPICAL ONCE
OUTPATIENT
Start: 2024-01-04 | End: 2024-01-04

## 2024-01-04 RX ORDER — SODIUM CHLOR/HYPOCHLOROUS ACID 0.033 %
SOLUTION, IRRIGATION IRRIGATION ONCE
OUTPATIENT
Start: 2024-01-04 | End: 2024-01-04

## 2024-01-04 RX ORDER — IBUPROFEN 200 MG
TABLET ORAL ONCE
OUTPATIENT
Start: 2024-01-04 | End: 2024-01-04

## 2024-01-04 RX ORDER — BACITRACIN ZINC AND POLYMYXIN B SULFATE 500; 1000 [USP'U]/G; [USP'U]/G
OINTMENT TOPICAL ONCE
OUTPATIENT
Start: 2024-01-04 | End: 2024-01-04

## 2024-01-04 RX ORDER — GENTAMICIN SULFATE 1 MG/G
OINTMENT TOPICAL ONCE
OUTPATIENT
Start: 2024-01-04 | End: 2024-01-04

## 2024-01-04 RX ORDER — TRIAMCINOLONE ACETONIDE 1 MG/G
OINTMENT TOPICAL ONCE
OUTPATIENT
Start: 2024-01-04 | End: 2024-01-04

## 2024-01-04 RX ORDER — LIDOCAINE HYDROCHLORIDE 20 MG/ML
JELLY TOPICAL ONCE
Status: COMPLETED | OUTPATIENT
Start: 2024-01-04 | End: 2024-01-04

## 2024-01-04 RX ORDER — LIDOCAINE 40 MG/G
CREAM TOPICAL ONCE
OUTPATIENT
Start: 2024-01-04 | End: 2024-01-04

## 2024-01-04 RX ORDER — CLOBETASOL PROPIONATE 0.5 MG/G
OINTMENT TOPICAL ONCE
OUTPATIENT
Start: 2024-01-04 | End: 2024-01-04

## 2024-01-04 RX ADMIN — LIDOCAINE HYDROCHLORIDE 3 ML: 20 JELLY TOPICAL at 10:59

## 2024-01-04 ASSESSMENT — PAIN SCALES - GENERAL: PAINLEVEL_OUTOF10: 0

## 2024-01-04 NOTE — PROGRESS NOTES
[facility]   Progress Note and Procedure Note      Shazia Dickson  MEDICAL RECORD NUMBER:  [id number]  AGE: 68 y.o.   GENDER: female  : 1955  EPISODE DATE:  2024    Subjective:     Chief Complaint   Patient presents with    Wound Check     Abdominal         HISTORY of PRESENT ILLNESS HPI     Shazia Dickson is a 68 y.o. female who presents today for wound/ulcer evaluation.   History of Wound Context: The patient is here for open abdominal wall wound with no purulent drainage or surrounding redness.  She denied fever, no other complaints.  The patient had a large abdominal hernia  She does have chronic shortness of breath on home oxygen .  She had history of pulmonary embolism is on Coumadin.            PAST MEDICAL HISTORY        Diagnosis Date    Anxiety     Asthma     Bronchitis     DDD (degenerative disc disease), lumbar     Depression     Diabetes mellitus (HCC)     Elevated glucose     Headache(784.0)     Hernia of abdominal cavity     HH (hiatus hernia)     Hyperlipemia, mixed     Hypertension     Osteoarthritis     Right femoral vein DVT (HCC) May, 2009    Dr. Elizabeth    Uterine hyperplasia        PAST SURGICAL HISTORY    Past Surgical History:   Procedure Laterality Date    BREAST REDUCTION SURGERY  1997    BREAST REDUCTION SURGERY  1997    DILATION AND CURETTAGE OF UTERUS  10/08 and     HYSTERECTOMY (CERVIX STATUS UNKNOWN)  7/17/15    Dr Kaitlynn Jaime, endometial cancer, FIGO grade 1    TONSILLECTOMY AND ADENOIDECTOMY      TYMPANOSTOMY TUBE PLACEMENT  1967       FAMILY HISTORY    Family History   Problem Relation Age of Onset    Asthma Mother     Mental Illness Mother     COPD Mother     Cancer Father 86        hairy cell leukemia, and leimyoma sarcoma     Stroke Father 86        minor    Parkinsonism Maternal Grandfather     Cancer Paternal Grandmother     Stroke Paternal Grandmother        SOCIAL HISTORY    Social History     Tobacco

## 2024-01-04 NOTE — PROGRESS NOTES
Patient seen in clinic for warfarin management due to DVT and PE with an INR goal of 2.5-3.5.  Estimated duration of therapy is indefinite.     Patient states  she missed her dose  .  No bleeding or thromboembolic side effects noted.  No significant med or dietary changes.  No significant recent illness or disease state changes.      PT/INR done in office per protocol.  INR is 2.9 which is therapeutic.     Warfarin regimen will be continued at current dose of 2mg Wednesday and 4mg all other days.  Will retest in 2 weeks.    Patient understands dosing directions and information discussed. Dosing schedule and follow up appointment given to patient.   Progress note routed to referring physicians office. Discussed with patient the Pharmacist Collaborative Practice Agreement.  Patient provided verbal and/or electronic (ex. [employer]) consent to participate in the collaborative practice agreement between the pharmacist and referred patient. This is in lieu of paper consent due to COVID-19 precautions and the use of remote/virtual visits.       For Pharmacy Admin Tracking Only    Intervention Detail: Adherence Monitorin  Total # of Interventions Recommended: 1  Total # of Interventions Accepted: 1  Time Spent (min): 15

## 2024-01-09 RX ORDER — BUDESONIDE 0.5 MG/2ML
INHALANT ORAL
Qty: 120 ML | Refills: 3 | Status: SHIPPED | OUTPATIENT
Start: 2024-01-09

## 2024-01-15 RX ORDER — BUDESONIDE 0.5 MG/2ML
INHALANT ORAL
Qty: 120 ML | Refills: 3 | Status: SHIPPED | OUTPATIENT
Start: 2024-01-15

## 2024-01-16 NOTE — DISCHARGE INSTRUCTIONS
[facility] -Phone: [phone] Fax: [phone]    Visit  Discharge Instructions / Physician Orders     DATE: 1/18/2024     Home Care: [facility] (Fax [phone])     SUPPLIES ORDERED THRU: Home Health     Wound Location: Abdomen     Cleanse with: Vashe -let soak for at least 5 minutes during dressing changes     Dressing Orders: Collagen with Silver, Cover with silicone border bandage, Abdominal Binder     Frequency: 3 times per week     Additional Orders: Increase protein to diet (meat, cheese, eggs, fish, peanut butter, nuts and beans)  [facility] to [facility]- [address], [place]  1/19/24 epifix IVR  Doxycycline Ordered 12/14/23     Your next appointment with Wound Care Center is in 2 weeks      (Please note your next appointment above and if you are unable to keep, kindly give a 24 hour notice. Thank you.)  If more than 15 min late we cannot guarantee you will be seen due to clinician schedule  Per Policy, Excessive cancellation will call for dismissal from program.     If you experience any of the following, please call the Wound Care Center during business hours:  [phone]  Your Phone call may be forwarded to [facility] during business hours that [place] is closed.     * Increase in Pain  * Temperature over 101  * Increase in drainage from your wound  * Drainage with a foul odor  * Bleeding  * Increase in swelling  * Need for compression bandage changes due to slippage, breakthrough drainage.     If you need medical attention outside of the business hours of the Wound Care Centers please contact your PCP or go to the nearest emergency room.     The information contained in the After Visit Summary has been reviewed with me, the patient and/or responsible adult, by my health care provider(s). I had the opportunity to ask questions regarding this information. I have elected to receive;      []After Visit Summary  [x]Comprehensive

## 2024-01-18 ENCOUNTER — HOSPITAL ENCOUNTER (OUTPATIENT)
Dept: WOUND CARE | Age: 69
Discharge: HOME | End: 2024-01-18
Payer: MEDICARE

## 2024-01-18 ENCOUNTER — HOSPITAL ENCOUNTER (OUTPATIENT)
Dept: PHARMACY | Age: 69
Setting detail: THERAPIES SERIES
Discharge: HOME | End: 2024-01-18
Payer: MEDICARE

## 2024-01-18 VITALS
WEIGHT: 293 LBS | HEIGHT: 65 IN | RESPIRATION RATE: 20 BRPM | SYSTOLIC BLOOD PRESSURE: 124 MMHG | DIASTOLIC BLOOD PRESSURE: 60 MMHG | TEMPERATURE: 98.6 F | HEART RATE: 93 BPM | BODY MASS INDEX: 48.82 KG/M2

## 2024-01-18 DIAGNOSIS — I82.511 CHRONIC DEEP VEIN THROMBOSIS (DVT) OF FEMORAL VEIN OF RIGHT LOWER EXTREMITY (HCC): Primary | ICD-10-CM

## 2024-01-18 DIAGNOSIS — I26.99 PULMONARY EMBOLISM ON RIGHT (HCC): ICD-10-CM

## 2024-01-18 DIAGNOSIS — S31.109D OPEN WOUND OF ABDOMINAL WALL, SUBSEQUENT ENCOUNTER: ICD-10-CM

## 2024-01-18 DIAGNOSIS — L98.492 ABDOMINAL WALL SKIN ULCER, WITH FAT LAYER EXPOSED (HCC): Primary | ICD-10-CM

## 2024-01-18 DIAGNOSIS — Z86.718 HISTORY OF DVT (DEEP VEIN THROMBOSIS): ICD-10-CM

## 2024-01-18 LAB
INR BLD: 4.1
PROTIME: 49.8 SECONDS

## 2024-01-18 PROCEDURE — 85610 PROTHROMBIN TIME: CPT

## 2024-01-18 PROCEDURE — 99211 OFF/OP EST MAY X REQ PHY/QHP: CPT

## 2024-01-18 PROCEDURE — 99212 OFFICE O/P EST SF 10 MIN: CPT

## 2024-01-18 PROCEDURE — 11042 DBRDMT SUBQ TIS 1ST 20SQCM/<: CPT

## 2024-01-18 RX ORDER — LIDOCAINE 40 MG/G
CREAM TOPICAL ONCE
OUTPATIENT
Start: 2024-01-18 | End: 2024-01-18

## 2024-01-18 RX ORDER — LIDOCAINE HYDROCHLORIDE 40 MG/ML
SOLUTION TOPICAL ONCE
OUTPATIENT
Start: 2024-01-18 | End: 2024-01-18

## 2024-01-18 RX ORDER — GENTAMICIN SULFATE 1 MG/G
OINTMENT TOPICAL ONCE
OUTPATIENT
Start: 2024-01-18 | End: 2024-01-18

## 2024-01-18 RX ORDER — SODIUM CHLOR/HYPOCHLOROUS ACID 0.033 %
SOLUTION, IRRIGATION IRRIGATION ONCE
OUTPATIENT
Start: 2024-01-18 | End: 2024-01-18

## 2024-01-18 RX ORDER — LIDOCAINE 50 MG/G
OINTMENT TOPICAL ONCE
OUTPATIENT
Start: 2024-01-18 | End: 2024-01-18

## 2024-01-18 RX ORDER — LIDOCAINE HYDROCHLORIDE 20 MG/ML
JELLY TOPICAL ONCE
OUTPATIENT
Start: 2024-01-18 | End: 2024-01-18

## 2024-01-18 RX ORDER — TRIAMCINOLONE ACETONIDE 1 MG/G
OINTMENT TOPICAL ONCE
OUTPATIENT
Start: 2024-01-18 | End: 2024-01-18

## 2024-01-18 RX ORDER — LIDOCAINE HYDROCHLORIDE 20 MG/ML
JELLY TOPICAL ONCE
Status: COMPLETED | OUTPATIENT
Start: 2024-01-18 | End: 2024-01-18

## 2024-01-18 RX ORDER — BACITRACIN ZINC AND POLYMYXIN B SULFATE 500; 1000 [USP'U]/G; [USP'U]/G
OINTMENT TOPICAL ONCE
OUTPATIENT
Start: 2024-01-18 | End: 2024-01-18

## 2024-01-18 RX ORDER — CLOBETASOL PROPIONATE 0.5 MG/G
OINTMENT TOPICAL ONCE
OUTPATIENT
Start: 2024-01-18 | End: 2024-01-18

## 2024-01-18 RX ORDER — IBUPROFEN 200 MG
TABLET ORAL ONCE
OUTPATIENT
Start: 2024-01-18 | End: 2024-01-18

## 2024-01-18 RX ORDER — BETAMETHASONE DIPROPIONATE 0.05 %
OINTMENT (GRAM) TOPICAL ONCE
OUTPATIENT
Start: 2024-01-18 | End: 2024-01-18

## 2024-01-18 RX ORDER — GINSENG 100 MG
CAPSULE ORAL ONCE
OUTPATIENT
Start: 2024-01-18 | End: 2024-01-18

## 2024-01-18 RX ADMIN — LIDOCAINE HYDROCHLORIDE 6 ML: 20 JELLY TOPICAL at 11:04

## 2024-01-18 ASSESSMENT — PAIN SCALES - GENERAL: PAINLEVEL_OUTOF10: 3

## 2024-01-18 NOTE — PROGRESS NOTES
Patient seen in clinic for warfarin management due to DVT and PE with an INR goal of 2.5-3.5.  Estimated duration of therapy is indefinite.     Patient states compliant all of the time with regimen.  No bleeding or thromboembolic side effects noted.  No significant med or dietary changes.  No significant recent illness or disease state changes.      PT/INR done in office per protocol.  INR is 4.1 which is subtherapeutic.     Warfarin regimen will be held today and then reduced to a total weekly target of 24m mg every Wed, Sat and 4 mg all other days.  Will retest in 2 weeks per patient request.    Patient understands dosing directions and information discussed. Dosing schedule and follow up appointment given to patient.   Progress note routed to referring physicians office. Discussed with patient the Pharmacist Collaborative Practice Agreement.  Patient provided verbal and/or electronic (ex. [employer]) consent to participate in the collaborative practice agreement between the pharmacist and referred patient.       For Pharmacy Admin Tracking Only    Intervention Detail: Dose Adjustment: 1, reason: Therapy De-escalation  Total # of Interventions Recommended: 0  Total # of Interventions Accepted: 0  Time Spent (min): 15

## 2024-01-18 NOTE — PLAN OF CARE
Problem: Chronic Conditions and Co-morbidities  Goal: Patient's chronic conditions and co-morbidity symptoms are monitored and maintained or improved  Outcome: Progressing     Problem: Pain  Goal: Verbalizes/displays adequate comfort level or baseline comfort level  Outcome: Progressing     Problem: Wound:  Goal: Will show signs of wound healing; wound closure and no evidence of infection  Description: Will show signs of wound healing; wound closure and no evidence of infection  Outcome: Progressing

## 2024-01-18 NOTE — PROGRESS NOTES
Starts ___ O'Clock 0600 12/21/23 1028   Undermining Ends___ O'Clock 1000 12/21/23 1028   Undermining Maxium Distance (cm) 1.3 @8 12/21/23 1028   Wound Assessment Pink/red;Slough 01/18/24 1107   Drainage Amount Moderate (25-50%) 01/18/24 1107   Drainage Description Serosanguinous 01/18/24 1107   Odor None 01/18/24 1107   Suzette-wound Assessment Intact 01/18/24 1107   Margins Defined edges 01/18/24 1107   Wound Thickness Description not for Pressure Injury Full thickness 01/18/24 1107   Number of days: 139       Wound 12/04/23 Abdomen Lower;Right;Proximal #1 (Active)   Wound Image   01/04/24 1048   Wound Etiology Other 01/18/24 1107   Dressing Status New drainage noted;Old drainage noted 01/18/24 1107   Wound Cleansed Cleansed with saline 01/18/24 1107   Dressing/Treatment Other (comment) 12/04/23 1006   Wound Length (cm) 2.7 cm 01/18/24 1107   Wound Width (cm) 1.3 cm 01/18/24 1107   Wound Depth (cm) 0.1 cm 01/18/24 1107   Wound Surface Area (cm^2) 3.51 cm^2 01/18/24 1107   Change in Wound Size % (l*w) -387.5 01/18/24 1107   Wound Volume (cm^3) 0.351 cm^3 01/18/24 1107   Wound Healing % -144 01/18/24 1107   Post-Procedure Length (cm) 2.7 cm 01/18/24 1107   Post-Procedure Width (cm) 1.3 cm 01/18/24 1107   Post-Procedure Depth (cm) 0.1 cm 01/18/24 1107   Post-Procedure Surface Area (cm^2) 3.51 cm^2 01/18/24 1107   Post-Procedure Volume (cm^3) 0.351 cm^3 01/18/24 1107   Undermining Starts ___ O'Clock 0700 12/04/23 1006   Undermining Ends___ O'Clock 0900 12/04/23 1006   Undermining Maxium Distance (cm) .3 @ 9 12/04/23 1006   Wound Assessment Pink/red 01/18/24 1107   Drainage Amount Moderate (25-50%) 01/18/24 1107   Drainage Description Serosanguinous 01/18/24 1107   Odor None 01/18/24 1107   Suzette-wound Assessment Blanchable erythema 01/18/24 1107   Margins Defined edges 01/18/24 1107   Wound Thickness Description not for Pressure Injury Full thickness 01/18/24 1107   Number of days: 45                 Percent of

## 2024-02-01 ENCOUNTER — HOSPITAL ENCOUNTER (OUTPATIENT)
Dept: WOUND CARE | Age: 69
Discharge: HOME | End: 2024-02-01
Payer: MEDICARE

## 2024-02-01 ENCOUNTER — HOSPITAL ENCOUNTER (OUTPATIENT)
Dept: PHARMACY | Age: 69
Setting detail: THERAPIES SERIES
Discharge: HOME | End: 2024-02-01
Payer: MEDICARE

## 2024-02-01 VITALS
TEMPERATURE: 97.5 F | BODY MASS INDEX: 48.82 KG/M2 | HEART RATE: 98 BPM | WEIGHT: 293 LBS | RESPIRATION RATE: 19 BRPM | HEIGHT: 65 IN | SYSTOLIC BLOOD PRESSURE: 129 MMHG | DIASTOLIC BLOOD PRESSURE: 62 MMHG

## 2024-02-01 DIAGNOSIS — I82.511 CHRONIC DEEP VEIN THROMBOSIS (DVT) OF FEMORAL VEIN OF RIGHT LOWER EXTREMITY (HCC): Primary | ICD-10-CM

## 2024-02-01 DIAGNOSIS — I26.99 PULMONARY EMBOLISM ON RIGHT (HCC): ICD-10-CM

## 2024-02-01 DIAGNOSIS — Z86.718 HISTORY OF DVT (DEEP VEIN THROMBOSIS): ICD-10-CM

## 2024-02-01 DIAGNOSIS — S31.109D OPEN WOUND OF ABDOMINAL WALL, SUBSEQUENT ENCOUNTER: ICD-10-CM

## 2024-02-01 DIAGNOSIS — L98.492 ABDOMINAL WALL SKIN ULCER, WITH FAT LAYER EXPOSED (HCC): Primary | ICD-10-CM

## 2024-02-01 LAB
INR BLD: 3.5
PROTIME: 41.5 SECONDS

## 2024-02-01 PROCEDURE — 11042 DBRDMT SUBQ TIS 1ST 20SQCM/<: CPT

## 2024-02-01 PROCEDURE — 99212 OFFICE O/P EST SF 10 MIN: CPT

## 2024-02-01 PROCEDURE — 85610 PROTHROMBIN TIME: CPT

## 2024-02-01 RX ORDER — LIDOCAINE HYDROCHLORIDE 20 MG/ML
JELLY TOPICAL ONCE
Status: COMPLETED | OUTPATIENT
Start: 2024-02-01 | End: 2024-02-01

## 2024-02-01 RX ORDER — LIDOCAINE 50 MG/G
OINTMENT TOPICAL ONCE
OUTPATIENT
Start: 2024-02-01 | End: 2024-02-01

## 2024-02-01 RX ORDER — LIDOCAINE HYDROCHLORIDE 20 MG/ML
JELLY TOPICAL ONCE
OUTPATIENT
Start: 2024-02-01 | End: 2024-02-01

## 2024-02-01 RX ORDER — SODIUM CHLOR/HYPOCHLOROUS ACID 0.033 %
SOLUTION, IRRIGATION IRRIGATION ONCE
OUTPATIENT
Start: 2024-02-01 | End: 2024-02-01

## 2024-02-01 RX ORDER — GINSENG 100 MG
CAPSULE ORAL ONCE
OUTPATIENT
Start: 2024-02-01 | End: 2024-02-01

## 2024-02-01 RX ORDER — BETAMETHASONE DIPROPIONATE 0.05 %
OINTMENT (GRAM) TOPICAL ONCE
OUTPATIENT
Start: 2024-02-01 | End: 2024-02-01

## 2024-02-01 RX ORDER — TRIAMCINOLONE ACETONIDE 1 MG/G
OINTMENT TOPICAL ONCE
OUTPATIENT
Start: 2024-02-01 | End: 2024-02-01

## 2024-02-01 RX ORDER — BACITRACIN ZINC AND POLYMYXIN B SULFATE 500; 1000 [USP'U]/G; [USP'U]/G
OINTMENT TOPICAL ONCE
OUTPATIENT
Start: 2024-02-01 | End: 2024-02-01

## 2024-02-01 RX ORDER — CLOBETASOL PROPIONATE 0.5 MG/G
OINTMENT TOPICAL ONCE
OUTPATIENT
Start: 2024-02-01 | End: 2024-02-01

## 2024-02-01 RX ORDER — IBUPROFEN 200 MG
TABLET ORAL ONCE
OUTPATIENT
Start: 2024-02-01 | End: 2024-02-01

## 2024-02-01 RX ORDER — LIDOCAINE HYDROCHLORIDE 40 MG/ML
SOLUTION TOPICAL ONCE
OUTPATIENT
Start: 2024-02-01 | End: 2024-02-01

## 2024-02-01 RX ORDER — LIDOCAINE 40 MG/G
CREAM TOPICAL ONCE
OUTPATIENT
Start: 2024-02-01 | End: 2024-02-01

## 2024-02-01 RX ORDER — GENTAMICIN SULFATE 1 MG/G
OINTMENT TOPICAL ONCE
OUTPATIENT
Start: 2024-02-01 | End: 2024-02-01

## 2024-02-01 RX ADMIN — LIDOCAINE HYDROCHLORIDE 6 ML: 20 JELLY TOPICAL at 10:37

## 2024-02-01 NOTE — DISCHARGE INSTRUCTIONS
Discharge Instruction        Patient signature______________________________________Date:________  Electronically signed by Iraj Katz RN on 2/1/2024 at 10:46 AM  Electronically signed by Davion Cha MD on 2/1/2024 at 10:48 AM

## 2024-02-01 NOTE — PROGRESS NOTES
(COZAAR) 100 MG tablet take 1 tablet by mouth once daily 90 tablet 3    dilTIAZem (CARTIA XT) 120 MG extended release capsule take 1 capsule by mouth once daily 90 capsule 3    metaxalone (SKELAXIN) 800 MG tablet Take 1 tablet by mouth 3 times daily as needed for Pain      traZODone (DESYREL) 50 MG tablet Take 1 tablet by mouth nightly      OXYGEN Inhale 4 L into the lungs daily      Cholecalciferol (VITAMIN D) 50 MCG (2000 UT) TABS tablet Take 1 tablet by mouth daily 90 tablet 3    FREESTYLE LANCETS MISC Test daily and if needed 200 each 5    Blood Glucose Monitoring Suppl (BLOOD GLUCOSE MONITOR SYSTEM) w/Device KIT Check glucose daily and as needed. 1 kit 0    Omega-3 Fatty Acids (FISH OIL) 1200 MG CAPS Take 1,200 mg by mouth 2 times daily      Coenzyme Q10 (COQ10) 200 MG CAPS Take  by mouth daily.        acetaminophen (TYLENOL) 500 MG tablet Take 1 tablet by mouth every 6 hours as needed      Multiple Vitamin (MULTI-VITAMIN PO) Take by mouth daily       Glucosamine-Chondroit-Vit C-Mn (GLUCOSAMINE CHONDR 500 COMPLEX) CAPS Take  by mouth 2 times daily.         No current facility-administered medications on file prior to encounter.       REVIEW OF SYSTEMS    Pertinent items are noted in HPI.  Other than above 12 system review was negative    Objective:      /62   Pulse 98   Temp 97.5 °F (36.4 °C) (Tympanic)   Resp 19   Ht 1.651 m (5' 5\")   Wt (!) 186 kg (410 lb)   LMP 07/16/2014   BMI 68.23 kg/m²     Wt Readings from Last 3 Encounters:   02/01/24 (!) 186 kg (410 lb)   01/18/24 (!) 186 kg (410 lb)   01/04/24 (!) 186 kg (410 lb)       PHYSICAL EXAM    General Appearance: alert and oriented to person, place and time, well developed and well- nourished, in no acute distress  Skin: warm and dry, open abdominal wounds with mild redness and swelling to the proximal abdominal wound.  Head: normocephalic and atraumatic  Eyes: pupils equal, round,  conjunctivae normal  Neck: supple and non-tender

## 2024-02-01 NOTE — PROGRESS NOTES
Patient seen in clinic for warfarin management due to DVT and PE with an INR goal of 2.5-3.5.  Estimated duration of therapy is indefinite.     Patient states compliant all of the time with regimen.  No bleeding or thromboembolic side effects noted.  No significant med or dietary changes.  No significant recent illness or disease state changes.      PT/INR done in office per protocol.  INR is 3.5 which is therapeutic.     Warfarin regimen will be continued with slightly reduced target as patient would like to integrate cranberry juice into her diet regularly for urinary tract health and we anticipate possible elevation of the INR. Given we are on the upper limit of normal we would like to reduce dose a bit to accommodate for potential elevation.  Will retest in 2 weeks to assess.    Patient understands dosing directions and information discussed. Dosing schedule and follow up appointment given to patient.   Progress note routed to referring physicians office. Discussed with patient the Pharmacist Collaborative Practice Agreement.  Patient provided verbal and/or electronic (ex. [employer]) consent to participate in the collaborative practice agreement between the pharmacist and referred patient. This is in lieu of paper consent due to COVID-19 precautions and the use of remote/virtual visits.       For Pharmacy Admin Tracking Only    Intervention Detail: Dose Adjustment: 1, reason: Therapy De-escalation  Total # of Interventions Recommended: 1  Total # of Interventions Accepted: 1  Time Spent (min): 15

## 2024-02-06 DIAGNOSIS — I10 ESSENTIAL HYPERTENSION: ICD-10-CM

## 2024-02-07 RX ORDER — DILTIAZEM HYDROCHLORIDE 120 MG/1
CAPSULE, COATED, EXTENDED RELEASE ORAL
Qty: 90 CAPSULE | Refills: 3 | Status: SHIPPED | OUTPATIENT
Start: 2024-02-07

## 2024-02-09 ENCOUNTER — TELEPHONE (OUTPATIENT)
Dept: FAMILY MEDICINE CLINIC | Age: 69
End: 2024-02-09

## 2024-02-09 DIAGNOSIS — N18.31 STAGE 3A CHRONIC KIDNEY DISEASE (HCC): ICD-10-CM

## 2024-02-09 DIAGNOSIS — I27.20 PULMONARY HTN (HCC): ICD-10-CM

## 2024-02-09 DIAGNOSIS — I50.813 ACUTE ON CHRONIC RIGHT-SIDED HEART FAILURE (HCC): ICD-10-CM

## 2024-02-09 DIAGNOSIS — Z99.81 ON HOME O2: ICD-10-CM

## 2024-02-09 DIAGNOSIS — E11.9 DIABETES MELLITUS TYPE 2, NONINSULIN DEPENDENT (HCC): ICD-10-CM

## 2024-02-09 DIAGNOSIS — R26.89 BALANCE PROBLEMS: ICD-10-CM

## 2024-02-09 DIAGNOSIS — I10 ESSENTIAL HYPERTENSION: Primary | ICD-10-CM

## 2024-02-09 NOTE — TELEPHONE ENCOUNTER
She can double up on her HCTZ over next 3 days to see if this helps and if not advise ER. I would also like to set her up with home visit providers to take over care since it very difficult for her to come into office to see me, let me know if she is ok with referral.

## 2024-02-09 NOTE — TELEPHONE ENCOUNTER
Yusef called stating that she weighed the pt Wednesday and her weight was 409, today she weighed the pt and she weighs 415.6. States that you can see water retaining in her feet and bilateral lower w/ extremities. Yusef comes in for wound care 3x/week. Pt is also having some nasal congestion but she thinks it is her allergies from construction being done in the facility     Please advise

## 2024-02-15 ENCOUNTER — HOSPITAL ENCOUNTER (OUTPATIENT)
Dept: PHARMACY | Age: 69
Setting detail: THERAPIES SERIES
Discharge: HOME | End: 2024-02-15
Payer: MEDICARE

## 2024-02-15 DIAGNOSIS — Z86.718 HISTORY OF DVT (DEEP VEIN THROMBOSIS): ICD-10-CM

## 2024-02-15 DIAGNOSIS — I82.511 CHRONIC DEEP VEIN THROMBOSIS (DVT) OF FEMORAL VEIN OF RIGHT LOWER EXTREMITY (HCC): Primary | ICD-10-CM

## 2024-02-15 DIAGNOSIS — I26.99 PULMONARY EMBOLISM ON RIGHT (HCC): ICD-10-CM

## 2024-02-15 LAB
INR BLD: 3
PROTIME: 36.4 SECONDS

## 2024-02-15 PROCEDURE — 85610 PROTHROMBIN TIME: CPT

## 2024-02-15 PROCEDURE — 99211 OFF/OP EST MAY X REQ PHY/QHP: CPT

## 2024-02-15 NOTE — PROGRESS NOTES
Patient seen in clinic for warfarin management due to DVT and PE with an INR goal of 2.0-3.0.  Estimated duration of therapy is indefinite.     Patient states compliant all of the time with regimen.  No bleeding or thromboembolic side effects noted.  No significant med or dietary changes.  No significant recent illness or disease state changes.      PT/INR done in office per protocol.  INR is 3.0 which is therapeutic.     Warfarin regimen will be continued at current dose of 2mg Mon/Wed/Sat and 4mg all other days.  Will retest in 2 weeks to coordinate with wound care f/u appt. If stable will move to 4 week check.    Patient understands dosing directions and information discussed. Dosing schedule and follow up appointment given to patient.   Progress note routed to referring physicians office. Discussed with patient the Pharmacist Collaborative Practice Agreement.  Patient provided verbal and/or electronic (ex. [employer]) consent to participate in the collaborative practice agreement between the pharmacist and referred patient.     For Pharmacy Admin Tracking Only    Intervention Detail:   Total # of Interventions Recommended: 0  Total # of Interventions Accepted: 0  Time Spent (min): 15

## 2024-02-20 DIAGNOSIS — I10 ESSENTIAL HYPERTENSION: ICD-10-CM

## 2024-02-21 RX ORDER — LOSARTAN POTASSIUM 100 MG/1
TABLET ORAL
Qty: 90 TABLET | Refills: 3 | Status: SHIPPED | OUTPATIENT
Start: 2024-02-21

## 2024-02-26 RX ORDER — ALBUTEROL SULFATE 90 UG/1
1 AEROSOL, METERED RESPIRATORY (INHALATION) EVERY 6 HOURS PRN
Qty: 3 EACH | Refills: 0 | Status: SHIPPED | OUTPATIENT
Start: 2024-02-26

## 2024-02-27 NOTE — DISCHARGE INSTRUCTIONS
[facility] -Phone: [phone] Fax: [phone]    Visit  Discharge Instructions / Physician Orders     DATE: 2/29/2024     Home Care: [facility] (Fax [phone])     SUPPLIES ORDERED THRU: Home Health     Wound Location: Abdomen     Cleanse with: Vashe -let soak for at least 5 minutes during dressing changes     Dressing Orders: Collagen with Silver, Cover with silicone border bandage, Abdominal Binder     Frequency: 3 times per week     Additional Orders: Increase protein to diet (meat, cheese, eggs, fish, peanut butter, nuts and beans)  [facility]- [address], [place]  Doxycycline Ordered 12/14/23     Your next appointment with Wound Care Center is in 2 weeks      (Please note your next appointment above and if you are unable to keep, kindly give a 24 hour notice. Thank you.)  If more than 15 min late we cannot guarantee you will be seen due to clinician schedule  Per Policy, Excessive cancellation will call for dismissal from program.     If you experience any of the following, please call the Wound Care Center during business hours:  [phone]  Your Phone call may be forwarded to [facility] during business hours that [place] is closed.     * Increase in Pain  * Temperature over 101  * Increase in drainage from your wound  * Drainage with a foul odor  * Bleeding  * Increase in swelling  * Need for compression bandage changes due to slippage, breakthrough drainage.     If you need medical attention outside of the business hours of the Wound Care Centers please contact your PCP or go to the nearest emergency room.     The information contained in the After Visit Summary has been reviewed with me, the patient and/or responsible adult, by my health care provider(s). I had the opportunity to ask questions regarding this information. I have elected to receive;      []After Visit Summary  [x]Comprehensive Discharge

## 2024-02-28 ENCOUNTER — TELEPHONE (OUTPATIENT)
Dept: FAMILY MEDICINE CLINIC | Age: 69
End: 2024-02-28

## 2024-02-28 NOTE — TELEPHONE ENCOUNTER
I had referred pt to home care earlier in feb. With home visiting providers to take over her care as I can not get her into office for appts. Did they not get set up with her?

## 2024-02-28 NOTE — TELEPHONE ENCOUNTER
[facility]ef [facility] stating that the pt has some sinuses and congestion going on. No fever. Crackles on the right side of her throat, RR increased. 02 is 94%. Cough. 4L of oxygen. Asking if something can be prescribed     Please advise

## 2024-02-29 ENCOUNTER — HOSPITAL ENCOUNTER (OUTPATIENT)
Dept: WOUND CARE | Age: 69
Discharge: HOME | End: 2024-02-29
Payer: MEDICARE

## 2024-02-29 ENCOUNTER — APPOINTMENT (OUTPATIENT)
Dept: PHARMACY | Age: 69
End: 2024-02-29
Payer: MEDICARE

## 2024-02-29 VITALS
HEART RATE: 90 BPM | RESPIRATION RATE: 18 BRPM | TEMPERATURE: 97.1 F | DIASTOLIC BLOOD PRESSURE: 67 MMHG | SYSTOLIC BLOOD PRESSURE: 135 MMHG | WEIGHT: 293 LBS | HEIGHT: 65 IN | BODY MASS INDEX: 48.82 KG/M2

## 2024-02-29 DIAGNOSIS — L98.492 ABDOMINAL WALL SKIN ULCER, WITH FAT LAYER EXPOSED (HCC): Primary | ICD-10-CM

## 2024-02-29 DIAGNOSIS — I26.99 PULMONARY EMBOLISM ON RIGHT (HCC): ICD-10-CM

## 2024-02-29 DIAGNOSIS — I82.511 CHRONIC DEEP VEIN THROMBOSIS (DVT) OF FEMORAL VEIN OF RIGHT LOWER EXTREMITY (HCC): Primary | ICD-10-CM

## 2024-02-29 DIAGNOSIS — Z86.718 HISTORY OF DVT (DEEP VEIN THROMBOSIS): ICD-10-CM

## 2024-02-29 LAB
INR BLD: 2.9
PROTIME: 34.5 SECONDS

## 2024-02-29 PROCEDURE — 11042 DBRDMT SUBQ TIS 1ST 20SQCM/<: CPT

## 2024-02-29 PROCEDURE — 85610 PROTHROMBIN TIME: CPT

## 2024-02-29 PROCEDURE — 99211 OFF/OP EST MAY X REQ PHY/QHP: CPT

## 2024-02-29 PROCEDURE — 11042 DBRDMT SUBQ TIS 1ST 20SQCM/<: CPT | Performed by: INTERNAL MEDICINE

## 2024-02-29 RX ORDER — LIDOCAINE HYDROCHLORIDE 20 MG/ML
JELLY TOPICAL ONCE
OUTPATIENT
Start: 2024-02-29 | End: 2024-02-29

## 2024-02-29 RX ORDER — GENTAMICIN SULFATE 1 MG/G
OINTMENT TOPICAL ONCE
OUTPATIENT
Start: 2024-02-29 | End: 2024-02-29

## 2024-02-29 RX ORDER — LIDOCAINE HYDROCHLORIDE 20 MG/ML
JELLY TOPICAL ONCE
Status: COMPLETED | OUTPATIENT
Start: 2024-02-29 | End: 2024-02-29

## 2024-02-29 RX ORDER — CLOBETASOL PROPIONATE 0.5 MG/G
OINTMENT TOPICAL ONCE
OUTPATIENT
Start: 2024-02-29 | End: 2024-02-29

## 2024-02-29 RX ORDER — IBUPROFEN 200 MG
TABLET ORAL ONCE
OUTPATIENT
Start: 2024-02-29 | End: 2024-02-29

## 2024-02-29 RX ORDER — SODIUM CHLOR/HYPOCHLOROUS ACID 0.033 %
SOLUTION, IRRIGATION IRRIGATION ONCE
OUTPATIENT
Start: 2024-02-29 | End: 2024-02-29

## 2024-02-29 RX ORDER — BETAMETHASONE DIPROPIONATE 0.05 %
OINTMENT (GRAM) TOPICAL ONCE
OUTPATIENT
Start: 2024-02-29 | End: 2024-02-29

## 2024-02-29 RX ORDER — LIDOCAINE HYDROCHLORIDE 40 MG/ML
SOLUTION TOPICAL ONCE
OUTPATIENT
Start: 2024-02-29 | End: 2024-02-29

## 2024-02-29 RX ORDER — GINSENG 100 MG
CAPSULE ORAL ONCE
OUTPATIENT
Start: 2024-02-29 | End: 2024-02-29

## 2024-02-29 RX ORDER — BACITRACIN ZINC AND POLYMYXIN B SULFATE 500; 1000 [USP'U]/G; [USP'U]/G
OINTMENT TOPICAL ONCE
OUTPATIENT
Start: 2024-02-29 | End: 2024-02-29

## 2024-02-29 RX ORDER — TRIAMCINOLONE ACETONIDE 1 MG/G
OINTMENT TOPICAL ONCE
OUTPATIENT
Start: 2024-02-29 | End: 2024-02-29

## 2024-02-29 RX ORDER — LIDOCAINE 40 MG/G
CREAM TOPICAL ONCE
OUTPATIENT
Start: 2024-02-29 | End: 2024-02-29

## 2024-02-29 RX ORDER — LIDOCAINE 50 MG/G
OINTMENT TOPICAL ONCE
OUTPATIENT
Start: 2024-02-29 | End: 2024-02-29

## 2024-02-29 RX ADMIN — LIDOCAINE HYDROCHLORIDE 6 ML: 20 JELLY TOPICAL at 10:21

## 2024-02-29 NOTE — TELEPHONE ENCOUNTER
Pt has never been set up with a in home provider Annamarie stated was not aware of this is asking for a z pac to be sent to pt pharmacy to get over the problem at hand please call annamarie at [phone]

## 2024-02-29 NOTE — TELEPHONE ENCOUNTER
Please see last home care referral and fax and update pt on this. Does she have any fever>? Colored mucous? Symptoms given are very vague

## 2024-02-29 NOTE — PROGRESS NOTES
and non-tender   Pulmonary/Chest:  normal air movement, no respiratory distress  Abdomen: soft, abdominal hernia with skin laceration to the hernia site, reducible.  Extremities: no cyanosis, clubbing, bilateral lower extremity edema  Neurologic:coordination and speech normal      Assessment:     Problem List Items Addressed This Visit       Abdominal wall skin ulcer, with fat layer exposed (HCC) - Primary    Relevant Orders    Initiate Outpatient Wound Care Protocol        Procedure Note  Indications:  Based on my examination of this patient's wound(s)/ulcer(s) today, debridement is required to promote healing and evaluate the wound base.          Consent obtained:  Yes     Time out taken:  Yes     Pain Control: Anesthetic  Anesthetic: 2% Lidocaine Gel Topical      Debridement:Excisional Debridement     Using curette the wound(s)/ulcer(s) was/were sharply debrided down through and including the removal of subcutaneous tissue.         Devitalized Tissue Debrided:  fibrin, biofilm, and slough     Pre Debridement Measurements:  Are located in the Wound/Ulcer Documentation Flow Sheet     Wound/Ulcer #: 3      Wound 08/31/23 Abdomen Lower;Medial;Distal #3 (Active)   Wound Image   02/29/24 1017   Wound Etiology Other 02/29/24 1017   Dressing Status Old drainage noted;New drainage noted 02/29/24 1017   Wound Cleansed Cleansed with saline 02/29/24 1017   Dressing/Treatment Other (comment) 12/04/23 1006   Wound Length (cm) 0.3 cm 02/29/24 1017   Wound Width (cm) 1.1 cm 02/29/24 1017   Wound Depth (cm) 0.3 cm 02/29/24 1017   Wound Surface Area (cm^2) 0.33 cm^2 02/29/24 1017   Change in Wound Size % (l*w) 34 02/29/24 1017   Wound Volume (cm^3) 0.099 cm^3 02/29/24 1017   Wound Healing % -98 02/29/24 1017   Post-Procedure Length (cm) 0.3 cm 02/29/24 1017   Post-Procedure Width (cm) 1.1 cm 02/29/24 1017   Post-Procedure Depth (cm) 0.3 cm 02/29/24 1017   Post-Procedure Surface Area (cm^2) 0.33 cm^2 02/29/24 1017   Post-Procedure

## 2024-02-29 NOTE — PROGRESS NOTES
"Chief Complaint   Patient presents with     IUD     removal       Initial /78 mmHg  Pulse 130  Temp(Src) 97  F (36.1  C) (Temporal)  Resp 18  Ht 5' 11.75\" (1.822 m)  Wt 339 lb 1.6 oz (153.815 kg)  BMI 46.33 kg/m2  SpO2 97%  Breastfeeding? No Estimated body mass index is 46.33 kg/(m^2) as calculated from the following:    Height as of this encounter: 5' 11.75\" (1.822 m).    Weight as of this encounter: 339 lb 1.6 oz (153.815 kg).  BP completed using cuff size: large    Health Maintenance Due   Topic Date Due     HPV IMMUNIZATION (3 of 3 - Female 3 Dose Series) 06/15/2008     PAP SCREENING Q3 YR (SYSTEM ASSIGNED)  11/19/2014     TETANUS IMMUNIZATION (SYSTEM ASSIGNED)  08/10/2015     Angelia Porter CMA      " Patient seen in clinic for warfarin management due to DVT and PE with an INR goal of 2.5-3.5.  Estimated duration of therapy is indefinite.     Patient states compliant all of the time with regimen.  No bleeding or thromboembolic side effects noted.  No significant med or dietary changes.  No significant recent illness or disease state changes.      PT/INR done in office per protocol.  INR is 2.9 which is therapeutic.     Warfarin regimen will be continued at current dose of 2mg Mon/Wed/Sat and 4mg all other days.  Will retest in 2 weeks due to wound care schedule. Will move to 4 weeks if therapeutic and patient schedule permits.    Patient understands dosing directions and information discussed. Dosing schedule and follow up appointment given to patient.   Progress note routed to referring physicians office. Discussed with patient the Pharmacist Collaborative Practice Agreement.  Patient provided verbal and/or electronic (ex. [employer]) consent to participate in the collaborative practice agreement between the pharmacist and referred patient.     For Pharmacy Admin Tracking Only    Intervention Detail:   Total # of Interventions Recommended: 0  Total # of Interventions Accepted: 0  Time Spent (min): 15

## 2024-03-04 RX ORDER — DOXYCYCLINE HYCLATE 100 MG
100 TABLET ORAL 2 TIMES DAILY
Qty: 14 TABLET | Refills: 0 | Status: SHIPPED | OUTPATIENT
Start: 2024-03-04 | End: 2024-03-11

## 2024-03-04 NOTE — TELEPHONE ENCOUNTER
Rx called in continue with coumadin clinic for INR mgmt and continue with plan to have home care team take over care

## 2024-03-04 NOTE — TELEPHONE ENCOUNTER
Called and spoke with patient, I asked if she was feeling any better, she states no she is still coughing up \"crud\" patient states no fever, her mucous is sometimes cloudy, sometimes green and also has been clear. She is also experiencing nose bleeds currently. She does state that nose bleeds for her this time of year are normal for her but since she's had congestion and runny nose she's been having lots of blood come out when she's blowing her nose. ([facility] on [address] for pharmacy)     I did inquire about visiting physician taking over care, patient states she was not aware of who to contact or any referrals that were placed.     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 She sees her 3 times a week for her abdominal wound along with seeing . Ann wound care weekly.     Called [facility] and they did not receive patients referral that was faxed on 02/09, I have re-faxed referral along with demographics and last office notes/med list and dx list. They stated once referral is received they'll review to make sure they can accept patient and they are scheduling out 2-3 weeks.

## 2024-03-04 NOTE — TELEPHONE ENCOUNTER
Called and left message for patient to call back,    Needs to know about message miri sent and that referral has been re-faxed to [facility] and can call to follow up/schedule. [phone]

## 2024-03-07 RX ORDER — IPRATROPIUM BROMIDE AND ALBUTEROL SULFATE 2.5; .5 MG/3ML; MG/3ML
SOLUTION RESPIRATORY (INHALATION)
Qty: 360 ML | Refills: 1 | Status: SHIPPED | OUTPATIENT
Start: 2024-03-07

## 2024-03-11 NOTE — DISCHARGE INSTRUCTIONS
[facility] -Phone: [phone] Fax: [phone]    Visit  Discharge Instructions / Physician Orders     DATE: 3/14/2024     Home Care: [facility] (Fax [phone])     SUPPLIES ORDERED THRU: Home Health     Wound Location: Abdomen     Cleanse with: Vashe -let soak for at least 5 minutes during dressing changes     Dressing Orders: Collagen with Silver, Cover with silicone border bandage, Abdominal Binder     Frequency: 3 times per week     Additional Orders: Increase protein to diet (meat, cheese, eggs, fish, peanut butter, nuts and beans)  [facility]- [address], [place]  Doxycycline Ordered 12/14/23     Your next appointment with Wound Care Center is in 2 weeks      (Please note your next appointment above and if you are unable to keep, kindly give a 24 hour notice. Thank you.)  If more than 15 min late we cannot guarantee you will be seen due to clinician schedule  Per Policy, Excessive cancellation will call for dismissal from program.     If you experience any of the following, please call the Wound Care Center during business hours:  [phone]  Your Phone call may be forwarded to [facility] during business hours that [place] is closed.     * Increase in Pain  * Temperature over 101  * Increase in drainage from your wound  * Drainage with a foul odor  * Bleeding  * Increase in swelling  * Need for compression bandage changes due to slippage, breakthrough drainage.     If you need medical attention outside of the business hours of the Wound Care Centers please contact your PCP or go to the nearest emergency room.     The information contained in the After Visit Summary has been reviewed with me, the patient and/or responsible adult, by my health care provider(s). I had the opportunity to ask questions regarding this information. I have elected to receive;      []After Visit Summary  [x]Comprehensive Discharge

## 2024-03-14 ENCOUNTER — HOSPITAL ENCOUNTER (OUTPATIENT)
Dept: WOUND CARE | Age: 69
Discharge: HOME | End: 2024-03-14
Payer: MEDICARE

## 2024-03-14 ENCOUNTER — HOSPITAL ENCOUNTER (OUTPATIENT)
Dept: PHARMACY | Age: 69
Setting detail: THERAPIES SERIES
Discharge: HOME | End: 2024-03-14
Payer: MEDICARE

## 2024-03-14 VITALS
HEART RATE: 88 BPM | TEMPERATURE: 97.1 F | HEIGHT: 65 IN | RESPIRATION RATE: 20 BRPM | WEIGHT: 293 LBS | SYSTOLIC BLOOD PRESSURE: 127 MMHG | DIASTOLIC BLOOD PRESSURE: 56 MMHG | BODY MASS INDEX: 48.82 KG/M2

## 2024-03-14 DIAGNOSIS — I26.99 PULMONARY EMBOLISM ON RIGHT (HCC): ICD-10-CM

## 2024-03-14 DIAGNOSIS — I82.511 CHRONIC DEEP VEIN THROMBOSIS (DVT) OF FEMORAL VEIN OF RIGHT LOWER EXTREMITY (HCC): Primary | ICD-10-CM

## 2024-03-14 DIAGNOSIS — L98.492 ABDOMINAL WALL SKIN ULCER, WITH FAT LAYER EXPOSED (HCC): Primary | ICD-10-CM

## 2024-03-14 DIAGNOSIS — Z86.718 HISTORY OF DVT (DEEP VEIN THROMBOSIS): ICD-10-CM

## 2024-03-14 LAB
INR BLD: 2.5
PROTIME: 30.1 SECONDS

## 2024-03-14 PROCEDURE — 99213 OFFICE O/P EST LOW 20 MIN: CPT

## 2024-03-14 PROCEDURE — 99213 OFFICE O/P EST LOW 20 MIN: CPT | Performed by: INTERNAL MEDICINE

## 2024-03-14 PROCEDURE — 99211 OFF/OP EST MAY X REQ PHY/QHP: CPT

## 2024-03-14 PROCEDURE — 85610 PROTHROMBIN TIME: CPT

## 2024-03-14 RX ORDER — GENTAMICIN SULFATE 1 MG/G
OINTMENT TOPICAL ONCE
OUTPATIENT
Start: 2024-03-14 | End: 2024-03-14

## 2024-03-14 RX ORDER — LIDOCAINE HYDROCHLORIDE 20 MG/ML
JELLY TOPICAL ONCE
Status: COMPLETED | OUTPATIENT
Start: 2024-03-14 | End: 2024-03-14

## 2024-03-14 RX ORDER — LIDOCAINE HYDROCHLORIDE 20 MG/ML
JELLY TOPICAL ONCE
OUTPATIENT
Start: 2024-03-14 | End: 2024-03-14

## 2024-03-14 RX ORDER — LIDOCAINE 40 MG/G
CREAM TOPICAL ONCE
OUTPATIENT
Start: 2024-03-14 | End: 2024-03-14

## 2024-03-14 RX ORDER — BACITRACIN ZINC AND POLYMYXIN B SULFATE 500; 1000 [USP'U]/G; [USP'U]/G
OINTMENT TOPICAL ONCE
OUTPATIENT
Start: 2024-03-14 | End: 2024-03-14

## 2024-03-14 RX ORDER — TRIAMCINOLONE ACETONIDE 1 MG/G
OINTMENT TOPICAL ONCE
OUTPATIENT
Start: 2024-03-14 | End: 2024-03-14

## 2024-03-14 RX ORDER — BETAMETHASONE DIPROPIONATE 0.05 %
OINTMENT (GRAM) TOPICAL ONCE
OUTPATIENT
Start: 2024-03-14 | End: 2024-03-14

## 2024-03-14 RX ORDER — SODIUM CHLOR/HYPOCHLOROUS ACID 0.033 %
SOLUTION, IRRIGATION IRRIGATION ONCE
OUTPATIENT
Start: 2024-03-14 | End: 2024-03-14

## 2024-03-14 RX ORDER — GINSENG 100 MG
CAPSULE ORAL ONCE
OUTPATIENT
Start: 2024-03-14 | End: 2024-03-14

## 2024-03-14 RX ORDER — LIDOCAINE HYDROCHLORIDE 40 MG/ML
SOLUTION TOPICAL ONCE
OUTPATIENT
Start: 2024-03-14 | End: 2024-03-14

## 2024-03-14 RX ORDER — LIDOCAINE 50 MG/G
OINTMENT TOPICAL ONCE
OUTPATIENT
Start: 2024-03-14 | End: 2024-03-14

## 2024-03-14 RX ORDER — IBUPROFEN 200 MG
TABLET ORAL ONCE
OUTPATIENT
Start: 2024-03-14 | End: 2024-03-14

## 2024-03-14 RX ORDER — CLOBETASOL PROPIONATE 0.5 MG/G
OINTMENT TOPICAL ONCE
OUTPATIENT
Start: 2024-03-14 | End: 2024-03-14

## 2024-03-14 RX ADMIN — LIDOCAINE HYDROCHLORIDE 6 ML: 20 JELLY TOPICAL at 10:32

## 2024-03-14 NOTE — PROGRESS NOTES
Wound Image   02/29/24 1017   Wound Etiology Other 02/29/24 1017   Dressing Status New drainage noted;Old drainage noted 02/29/24 1017   Wound Cleansed Cleansed with saline 02/29/24 1017   Dressing/Treatment Other (comment) 12/04/23 1006   Wound Length (cm) 0 cm 03/14/24 1032   Wound Width (cm) 0 cm 03/14/24 1032   Wound Depth (cm) 0 cm 03/14/24 1032   Wound Surface Area (cm^2) 0 cm^2 03/14/24 1032   Change in Wound Size % (l*w) 100 03/14/24 1032   Wound Volume (cm^3) 0 cm^3 03/14/24 1032   Wound Healing % 100 03/14/24 1032   Post-Procedure Length (cm) 0 cm 03/14/24 1032   Post-Procedure Width (cm) 0 cm 03/14/24 1032   Post-Procedure Depth (cm) 0 cm 03/14/24 1032   Post-Procedure Surface Area (cm^2) 0 cm^2 03/14/24 1032   Post-Procedure Volume (cm^3) 0 cm^3 03/14/24 1032   Undermining Starts ___ O'Clock 0700 12/04/23 1006   Undermining Ends___ O'Clock 0900 12/04/23 1006   Undermining Maxium Distance (cm) .3 @ 9 12/04/23 1006   Wound Assessment Pink/red 02/29/24 1017   Drainage Amount Moderate (25-50%) 02/29/24 1017   Drainage Description Serosanguinous 02/29/24 1017   Odor None 02/29/24 1017   Suzette-wound Assessment Blanchable erythema 02/29/24 1017   Margins Defined edges 02/29/24 1017   Wound Thickness Description not for Pressure Injury Full thickness 02/29/24 1017   Number of days: 100              Plan:     Treatment Note please see Discharge Instructions    Written patient dismissal instructions given to patient and signed by patient or POA.             Electronically signed by Davion Cha MD on 3/14/2024 at 10:50 AM

## 2024-03-14 NOTE — PROGRESS NOTES
Patient seen in clinic for warfarin management due to DVT and PE with an INR goal of 2.5-3.5.  Estimated duration of therapy is indefinite.     Patient states compliant all of the time with regimen.  No bleeding or thromboembolic side effects noted.  Patient states that her dietary vitamin K intake can be unpredictable as she eats the meals provided by her senior living facility. Patient also started a 1 week course of doxycycline last Thursday. No significant recent illness or disease state changes.      PT/INR done in office per protocol.  INR is 2.5 which is therapeutic.     Warfarin regimen will be continued at current dose 2 mg every Mon, Wed, Sat; 4 mg all other days.  Will retest in 2 weeks due to wound care schedule. Will move to 4 weeks if therapeutic and patient schedule permits.     Patient understands dosing directions and information discussed. Dosing schedule and follow up appointment given to patient.   Progress note routed to referring physicians office. Discussed with patient the Pharmacist Collaborative Practice Agreement.  Patient provided verbal and/or electronic (ex. Ghosteryhart) consent to participate in the collaborative practice agreement between the pharmacist and referred patient.     For Pharmacy Admin Tracking Only    Intervention Detail:   Total # of Interventions Recommended:   Total # of Interventions Accepted:   Time Spent (min): 15

## 2024-03-26 NOTE — DISCHARGE INSTRUCTIONS
[facility] -Phone: [phone] Fax: [phone]    Visit  Discharge Instructions / Physician Orders     DATE: 3/28/2024     Home Care: [facility] (Fax [phone])     SUPPLIES ORDERED THRU: Home Health     Wound Location: Abdomen     Cleanse with: Vashe -let soak for at least 5 minutes during dressing changes     Dressing Orders: Collagen with Silver, Cover with silicone border bandage, Abdominal Binder     Frequency: 3 times per week     Additional Orders: Increase protein to diet (meat, cheese, eggs, fish, peanut butter, nuts and beans)  [facility]erral to [facility]- [address], [place]  Doxycycline Ordered 12/14/23     Your next appointment with Wound Care Center is in 2 weeks      (Please note your next appointment above and if you are unable to keep, kindly give a 24 hour notice. Thank you.)  If more than 15 min late we cannot guarantee you will be seen due to clinician schedule  Per Policy, Excessive cancellation will call for dismissal from program.     If you experience any of the following, please call the Wound Care Center during business hours:  [phone]  Your Phone call may be forwarded to [place] during business hours that [place] is closed.     * Increase in Pain  * Temperature over 101  * Increase in drainage from your wound  * Drainage with a foul odor  * Bleeding  * Increase in swelling  * Need for compression bandage changes due to slippage, breakthrough drainage.     If you need medical attention outside of the business hours of the Wound Care Centers please contact your PCP or go to the nearest emergency room.     The information contained in the After Visit Summary has been reviewed with me, the patient and/or responsible adult, by my health care provider(s). I had the opportunity to ask questions regarding this information. I have elected to receive;      []After Visit Summary  [x]Comprehensive Discharge

## 2024-03-28 ENCOUNTER — HOSPITAL ENCOUNTER (OUTPATIENT)
Dept: WOUND CARE | Age: 69
Discharge: HOME | End: 2024-03-28
Payer: MEDICARE

## 2024-03-28 ENCOUNTER — HOSPITAL ENCOUNTER (OUTPATIENT)
Dept: PHARMACY | Age: 69
Setting detail: THERAPIES SERIES
Discharge: HOME | End: 2024-03-28
Payer: MEDICARE

## 2024-03-28 VITALS
HEIGHT: 65 IN | RESPIRATION RATE: 20 BRPM | WEIGHT: 293 LBS | DIASTOLIC BLOOD PRESSURE: 64 MMHG | SYSTOLIC BLOOD PRESSURE: 120 MMHG | HEART RATE: 85 BPM | BODY MASS INDEX: 48.82 KG/M2 | TEMPERATURE: 97.4 F

## 2024-03-28 DIAGNOSIS — Z86.718 HISTORY OF DVT (DEEP VEIN THROMBOSIS): ICD-10-CM

## 2024-03-28 DIAGNOSIS — L98.492 ABDOMINAL WALL SKIN ULCER, WITH FAT LAYER EXPOSED (HCC): Primary | ICD-10-CM

## 2024-03-28 DIAGNOSIS — I82.511 CHRONIC DEEP VEIN THROMBOSIS (DVT) OF FEMORAL VEIN OF RIGHT LOWER EXTREMITY (HCC): Primary | ICD-10-CM

## 2024-03-28 DIAGNOSIS — I26.99 PULMONARY EMBOLISM ON RIGHT (HCC): ICD-10-CM

## 2024-03-28 LAB
INR BLD: 2.5
PROTIME: 29.7 SECONDS

## 2024-03-28 PROCEDURE — 99213 OFFICE O/P EST LOW 20 MIN: CPT | Performed by: INTERNAL MEDICINE

## 2024-03-28 PROCEDURE — 99211 OFF/OP EST MAY X REQ PHY/QHP: CPT

## 2024-03-28 PROCEDURE — 85610 PROTHROMBIN TIME: CPT

## 2024-03-28 PROCEDURE — 99213 OFFICE O/P EST LOW 20 MIN: CPT

## 2024-03-28 RX ORDER — LIDOCAINE HYDROCHLORIDE 20 MG/ML
JELLY TOPICAL ONCE
OUTPATIENT
Start: 2024-03-28 | End: 2024-03-28

## 2024-03-28 RX ORDER — LIDOCAINE 40 MG/G
CREAM TOPICAL ONCE
OUTPATIENT
Start: 2024-03-28 | End: 2024-03-28

## 2024-03-28 RX ORDER — LIDOCAINE 50 MG/G
OINTMENT TOPICAL ONCE
OUTPATIENT
Start: 2024-03-28 | End: 2024-03-28

## 2024-03-28 RX ORDER — TRIAMCINOLONE ACETONIDE 1 MG/G
OINTMENT TOPICAL ONCE
OUTPATIENT
Start: 2024-03-28 | End: 2024-03-28

## 2024-03-28 RX ORDER — GENTAMICIN SULFATE 1 MG/G
OINTMENT TOPICAL ONCE
OUTPATIENT
Start: 2024-03-28 | End: 2024-03-28

## 2024-03-28 RX ORDER — IBUPROFEN 200 MG
TABLET ORAL ONCE
OUTPATIENT
Start: 2024-03-28 | End: 2024-03-28

## 2024-03-28 RX ORDER — BACITRACIN ZINC AND POLYMYXIN B SULFATE 500; 1000 [USP'U]/G; [USP'U]/G
OINTMENT TOPICAL ONCE
OUTPATIENT
Start: 2024-03-28 | End: 2024-03-28

## 2024-03-28 RX ORDER — SODIUM CHLOR/HYPOCHLOROUS ACID 0.033 %
SOLUTION, IRRIGATION IRRIGATION ONCE
OUTPATIENT
Start: 2024-03-28 | End: 2024-03-28

## 2024-03-28 RX ORDER — GINSENG 100 MG
CAPSULE ORAL ONCE
OUTPATIENT
Start: 2024-03-28 | End: 2024-03-28

## 2024-03-28 RX ORDER — LIDOCAINE HYDROCHLORIDE 40 MG/ML
SOLUTION TOPICAL ONCE
OUTPATIENT
Start: 2024-03-28 | End: 2024-03-28

## 2024-03-28 RX ORDER — CLOBETASOL PROPIONATE 0.5 MG/G
OINTMENT TOPICAL ONCE
OUTPATIENT
Start: 2024-03-28 | End: 2024-03-28

## 2024-03-28 RX ORDER — LIDOCAINE HYDROCHLORIDE 20 MG/ML
JELLY TOPICAL ONCE
Status: COMPLETED | OUTPATIENT
Start: 2024-03-28 | End: 2024-03-28

## 2024-03-28 RX ORDER — BETAMETHASONE DIPROPIONATE 0.05 %
OINTMENT (GRAM) TOPICAL ONCE
OUTPATIENT
Start: 2024-03-28 | End: 2024-03-28

## 2024-03-28 RX ADMIN — LIDOCAINE HYDROCHLORIDE 6 ML: 20 JELLY TOPICAL at 10:15

## 2024-03-28 NOTE — PROGRESS NOTES
Patient seen in clinic for warfarin management due to DVT and PE with an INR goal of 2.5-3.5.  Estimated duration of therapy is indefinite.     Patient states compliant all of the time with regimen.  No bleeding or thromboembolic side effects noted.  No significant dietary changes. Patient reports finishing her doxycycline course on 3/15. No significant recent illness or disease state changes.      PT/INR done in office per protocol.  INR is 2.5 which is therapeutic.     Warfarin regimen will be continued at current dose 2 mg every Mon, Wed, Sat; 4 mg all other days. Patient reports that she will add some cranberry juice into her diet to help raise her INR.  Will retest in 4 weeks.    Patient understands dosing directions and information discussed. Dosing schedule and follow up appointment given to patient.   Progress note routed to referring physicians office. Discussed with patient the Pharmacist Collaborative Practice Agreement.  Patient provided verbal and/or electronic (ex. Keepstreamhart) consent to participate in the collaborative practice agreement between the pharmacist and referred patient.     For Pharmacy Admin Tracking Only    Intervention Detail:   Total # of Interventions Recommended: 0  Total # of Interventions Accepted: 0  Time Spent (min): 15

## 2024-03-28 NOTE — PROGRESS NOTES
Take 1-2 tablets by mouth daily As directed by  [facility] 180 tablet 1    metaxalone (SKELAXIN) 800 MG tablet Take 1 tablet by mouth 3 times daily as needed for Pain      traZODone (DESYREL) 50 MG tablet Take 1 tablet by mouth nightly      OXYGEN Inhale 4 L into the lungs daily      Cholecalciferol (VITAMIN D) 50 MCG (2000 UT) TABS tablet Take 1 tablet by mouth daily 90 tablet 3    FREESTYLE LANCETS MISC Test daily and if needed 200 each 5    Blood Glucose Monitoring Suppl (BLOOD GLUCOSE MONITOR SYSTEM) w/Device KIT Check glucose daily and as needed. 1 kit 0    Omega-3 Fatty Acids (FISH OIL) 1200 MG CAPS Take 1,200 mg by mouth 2 times daily      Coenzyme Q10 (COQ10) 200 MG CAPS Take  by mouth daily.        acetaminophen (TYLENOL) 500 MG tablet Take 1 tablet by mouth every 6 hours as needed      Multiple Vitamin (MULTI-VITAMIN PO) Take by mouth daily       Glucosamine-Chondroit-Vit C-Mn (GLUCOSAMINE CHONDR 500 COMPLEX) CAPS Take  by mouth 2 times daily.         No current facility-administered medications on file prior to encounter.       REVIEW OF SYSTEMS    Pertinent items are noted in HPI.  Other than above 12 system review was negative    Objective:      /64   Pulse 85   Temp 97.4 °F (36.3 °C) (Tympanic)   Resp 20   Ht 1.651 m (5' 5\")   Wt (!) 186 kg (410 lb)   LMP 07/16/2014   BMI 68.23 kg/m²     Wt Readings from Last 3 Encounters:   03/28/24 (!) 186 kg (410 lb)   03/14/24 (!) 186 kg (410 lb)   02/29/24 (!) 186 kg (410 lb)       PHYSICAL EXAM    General Appearance: alert and oriented to person, place and time, well developed and well- nourished, in no acute distress  Skin: warm and dry  Head: normocephalic and atraumatic  Eyes: pupils equal, round,  conjunctivae normal  Neck: supple and non-tender   Pulmonary/Chest:  normal air movement, no respiratory distress  Abdomen: soft, abdominal hernia , small open wound with no purulent drainage or surrounding erythema  Extremities: no

## 2024-04-03 ENCOUNTER — TELEPHONE (OUTPATIENT)
Dept: FAMILY MEDICINE CLINIC | Age: 69
End: 2024-04-03

## 2024-04-03 RX ORDER — DOXYCYCLINE HYCLATE 100 MG
100 TABLET ORAL 2 TIMES DAILY
Qty: 14 TABLET | Refills: 0 | Status: ON HOLD | OUTPATIENT
Start: 2024-04-03 | End: 2024-04-05

## 2024-04-03 NOTE — TELEPHONE ENCOUNTER
Patient advised referral faxed. She is asking if there is any nasal decongestion she can take while she is on warfarin

## 2024-04-03 NOTE — TELEPHONE ENCOUNTER
Home health nurse annamarie is calling, where patient lives at [facility] there is a lot of sickness going around. Patient has bronchitis in both lungs, no fever, she is currently on 4L of oxygen, states patient is having a hard time and requesting antibiotics to be sent in if possible     She did find Linh Calderón with [facility] was in the same building as the home care and was able to speak with Linh regarding patient and she advised to refax referral. Referral faxed to [phone]    Annamarie # [phone]

## 2024-04-04 ENCOUNTER — APPOINTMENT (OUTPATIENT)
Dept: GENERAL RADIOLOGY | Age: 69
End: 2024-04-04
Payer: MEDICARE

## 2024-04-04 ENCOUNTER — HOSPITAL ENCOUNTER (INPATIENT)
Age: 69
LOS: 15 days | Discharge: OTHER INSTITUTION | End: 2024-04-19
Attending: EMERGENCY MEDICINE
Payer: MEDICARE

## 2024-04-04 DIAGNOSIS — J10.1 INFLUENZA A: ICD-10-CM

## 2024-04-04 DIAGNOSIS — R68.89 INCREASED OXYGEN DEMAND: ICD-10-CM

## 2024-04-04 DIAGNOSIS — I27.20 PULMONARY HTN (HCC): ICD-10-CM

## 2024-04-04 DIAGNOSIS — R79.1 SUPRATHERAPEUTIC INR: ICD-10-CM

## 2024-04-04 DIAGNOSIS — J44.1 COPD EXACERBATION (HCC): Primary | ICD-10-CM

## 2024-04-04 PROBLEM — J96.01 ACUTE RESPIRATORY FAILURE WITH HYPOXIA (HCC): Status: ACTIVE | Noted: 2024-04-04

## 2024-04-04 LAB
ABSOLUTE BANDS: 0.05 K/UL
ALBUMIN SERPL-MCNC: 3.3 G/DL (ref 3.5–5.2)
ALP SERPL-CCNC: 63 U/L (ref 35–104)
ALT SERPL-CCNC: 19 U/L (ref 5–33)
ANION GAP SERPL CALCULATED.3IONS-SCNC: 5 MMOL/L (ref 9–17)
AST SERPL-CCNC: 28 U/L
ATYPICAL LYMPHOCYTE ABSOLUTE COUNT: 0.03 K/UL
ATYPICAL LYMPHOCYTES: 1 %
BANDS: 2 % (ref 1–15)
BASOPHILS # BLD: 0 K/UL (ref 0–0.2)
BASOPHILS NFR BLD: 0 %
BILIRUB DIRECT SERPL-MCNC: <0.1 MG/DL
BILIRUB INDIRECT SERPL-MCNC: ABNORMAL MG/DL (ref 0–1)
BILIRUB SERPL-MCNC: 0.2 MG/DL (ref 0.3–1.2)
BNP SERPL-MCNC: 109 PG/ML
BUN SERPL-MCNC: 26 MG/DL (ref 8–23)
BUN/CREAT SERPL: 22 (ref 9–20)
CALCIUM SERPL-MCNC: 9.2 MG/DL (ref 8.6–10.4)
CHLORIDE SERPL-SCNC: 95 MMOL/L (ref 98–107)
CO2 SERPL-SCNC: 38 MMOL/L (ref 20–31)
CREAT SERPL-MCNC: 1.2 MG/DL (ref 0.5–0.9)
CRP SERPL HS-MCNC: 29.5 MG/L (ref 0–5)
EOSINOPHIL # BLD: 0.08 K/UL (ref 0–0.4)
EOSINOPHILS RELATIVE PERCENT: 3 % (ref 1–4)
ERYTHROCYTE [DISTWIDTH] IN BLOOD BY AUTOMATED COUNT: 18.6 % (ref 11.8–14.4)
ERYTHROCYTE [SEDIMENTATION RATE] IN BLOOD BY PHOTOMETRIC METHOD: 101 MM/HR (ref 0–30)
FLUAV RNA RESP QL NAA+PROBE: DETECTED
FLUBV RNA RESP QL NAA+PROBE: NOT DETECTED
GFR SERPL CREATININE-BSD FRML MDRD: 50 ML/MIN/1.73M2
GLUCOSE SERPL-MCNC: 116 MG/DL (ref 70–99)
HCT VFR BLD AUTO: 33.4 % (ref 36.3–47.1)
HGB BLD-MCNC: 9.1 G/DL (ref 11.9–15.1)
IMM GRANULOCYTES # BLD AUTO: 0 K/UL (ref 0–0.3)
IMM GRANULOCYTES NFR BLD: 0 %
IMM RETICS NFR: 18.8 % (ref 2.7–18.3)
INR PPP: 2.2
LYMPHOCYTES NFR BLD: 0.59 K/UL (ref 1–4.8)
LYMPHOCYTES RELATIVE PERCENT: 22 % (ref 24–44)
MAGNESIUM SERPL-MCNC: 2.1 MG/DL (ref 1.6–2.6)
MCH RBC QN AUTO: 25.3 PG (ref 25.2–33.5)
MCHC RBC AUTO-ENTMCNC: 27.2 G/DL (ref 28.4–34.8)
MCV RBC AUTO: 92.8 FL (ref 82.6–102.9)
MONOCYTES NFR BLD: 0.16 K/UL (ref 0.2–0.8)
MONOCYTES NFR BLD: 6 % (ref 1–7)
MORPHOLOGY: ABNORMAL
NEUTROPHILS NFR BLD: 66 % (ref 36–66)
NEUTS SEG NFR BLD: 1.79 K/UL (ref 1.8–7.7)
NRBC BLD-RTO: 0 PER 100 WBC
PLATELET # BLD AUTO: 153 K/UL (ref 138–453)
PMV BLD AUTO: 8.9 FL (ref 8.1–13.5)
POTASSIUM SERPL-SCNC: 4.2 MMOL/L (ref 3.7–5.3)
PROT SERPL-MCNC: 7.4 G/DL (ref 6.4–8.3)
PROTHROMBIN TIME: 24.4 SEC (ref 11.5–14.2)
RBC # BLD AUTO: 3.6 M/UL (ref 3.95–5.11)
RETIC HEMOGLOBIN: 21.3 PG (ref 28.2–35.7)
RETICS # AUTO: 0.03 M/UL (ref 0.03–0.08)
RETICS/RBC NFR AUTO: 0.9 % (ref 0.5–1.9)
SARS-COV-2 RNA RESP QL NAA+PROBE: NOT DETECTED
SODIUM SERPL-SCNC: 138 MMOL/L (ref 135–144)
SOURCE: ABNORMAL
SPECIMEN DESCRIPTION: ABNORMAL
TROPONIN I SERPL HS-MCNC: 24 NG/L (ref 0–14)
TROPONIN I SERPL HS-MCNC: 25 NG/L (ref 0–14)
WBC OTHER # BLD: 2.7 K/UL (ref 3.5–11.3)

## 2024-04-04 PROCEDURE — 6370000000 HC RX 637 (ALT 250 FOR IP): Performed by: EMERGENCY MEDICINE

## 2024-04-04 PROCEDURE — 80076 HEPATIC FUNCTION PANEL: CPT

## 2024-04-04 PROCEDURE — 2700000000 HC OXYGEN THERAPY PER DAY

## 2024-04-04 PROCEDURE — 6360000002 HC RX W HCPCS: Performed by: EMERGENCY MEDICINE

## 2024-04-04 PROCEDURE — 2580000003 HC RX 258: Performed by: EMERGENCY MEDICINE

## 2024-04-04 PROCEDURE — 85025 COMPLETE CBC W/AUTO DIFF WBC: CPT

## 2024-04-04 PROCEDURE — 99223 1ST HOSP IP/OBS HIGH 75: CPT | Performed by: INTERNAL MEDICINE

## 2024-04-04 PROCEDURE — 85045 AUTOMATED RETICULOCYTE COUNT: CPT

## 2024-04-04 PROCEDURE — 94761 N-INVAS EAR/PLS OXIMETRY MLT: CPT

## 2024-04-04 PROCEDURE — 83735 ASSAY OF MAGNESIUM: CPT

## 2024-04-04 PROCEDURE — 87636 SARSCOV2 & INF A&B AMP PRB: CPT

## 2024-04-04 PROCEDURE — 2060000000 HC ICU INTERMEDIATE R&B

## 2024-04-04 PROCEDURE — 80048 BASIC METABOLIC PNL TOTAL CA: CPT

## 2024-04-04 PROCEDURE — 83880 ASSAY OF NATRIURETIC PEPTIDE: CPT

## 2024-04-04 PROCEDURE — 86140 C-REACTIVE PROTEIN: CPT

## 2024-04-04 PROCEDURE — 96375 TX/PRO/DX INJ NEW DRUG ADDON: CPT

## 2024-04-04 PROCEDURE — 94640 AIRWAY INHALATION TREATMENT: CPT

## 2024-04-04 PROCEDURE — 96365 THER/PROPH/DIAG IV INF INIT: CPT

## 2024-04-04 PROCEDURE — 84484 ASSAY OF TROPONIN QUANT: CPT

## 2024-04-04 PROCEDURE — 99285 EMERGENCY DEPT VISIT HI MDM: CPT

## 2024-04-04 PROCEDURE — 71045 X-RAY EXAM CHEST 1 VIEW: CPT

## 2024-04-04 PROCEDURE — 85652 RBC SED RATE AUTOMATED: CPT

## 2024-04-04 PROCEDURE — 84145 PROCALCITONIN (PCT): CPT

## 2024-04-04 PROCEDURE — 93005 ELECTROCARDIOGRAM TRACING: CPT | Performed by: EMERGENCY MEDICINE

## 2024-04-04 PROCEDURE — 85610 PROTHROMBIN TIME: CPT

## 2024-04-04 RX ORDER — OSELTAMIVIR PHOSPHATE 75 MG/1
75 CAPSULE ORAL 2 TIMES DAILY
Status: DISCONTINUED | OUTPATIENT
Start: 2024-04-04 | End: 2024-04-04 | Stop reason: SDUPTHER

## 2024-04-04 RX ORDER — POLYETHYLENE GLYCOL 3350 17 G/17G
17 POWDER, FOR SOLUTION ORAL DAILY PRN
Status: DISCONTINUED | OUTPATIENT
Start: 2024-04-04 | End: 2024-04-19 | Stop reason: HOSPADM

## 2024-04-04 RX ORDER — OSELTAMIVIR PHOSPHATE 75 MG/1
75 CAPSULE ORAL ONCE
Status: COMPLETED | OUTPATIENT
Start: 2024-04-04 | End: 2024-04-04

## 2024-04-04 RX ORDER — POTASSIUM CHLORIDE 7.45 MG/ML
10 INJECTION INTRAVENOUS PRN
Status: DISCONTINUED | OUTPATIENT
Start: 2024-04-04 | End: 2024-04-19 | Stop reason: HOSPADM

## 2024-04-04 RX ORDER — MONTELUKAST SODIUM 10 MG/1
10 TABLET ORAL NIGHTLY
Status: DISCONTINUED | OUTPATIENT
Start: 2024-04-04 | End: 2024-04-19 | Stop reason: HOSPADM

## 2024-04-04 RX ORDER — OSELTAMIVIR PHOSPHATE 75 MG/1
75 CAPSULE ORAL 2 TIMES DAILY
Status: COMPLETED | OUTPATIENT
Start: 2024-04-05 | End: 2024-04-08

## 2024-04-04 RX ORDER — SODIUM CHLORIDE 9 MG/ML
INJECTION, SOLUTION INTRAVENOUS PRN
Status: DISCONTINUED | OUTPATIENT
Start: 2024-04-04 | End: 2024-04-19 | Stop reason: HOSPADM

## 2024-04-04 RX ORDER — BUDESONIDE 0.5 MG/2ML
0.5 INHALANT ORAL ONCE
Status: DISCONTINUED | OUTPATIENT
Start: 2024-04-04 | End: 2024-04-05 | Stop reason: SDUPTHER

## 2024-04-04 RX ORDER — WARFARIN SODIUM 2 MG/1
4 TABLET ORAL ONCE
Status: COMPLETED | OUTPATIENT
Start: 2024-04-04 | End: 2024-04-05

## 2024-04-04 RX ORDER — ONDANSETRON 2 MG/ML
4 INJECTION INTRAMUSCULAR; INTRAVENOUS EVERY 6 HOURS PRN
Status: DISCONTINUED | OUTPATIENT
Start: 2024-04-04 | End: 2024-04-19 | Stop reason: HOSPADM

## 2024-04-04 RX ORDER — ONDANSETRON 4 MG/1
4 TABLET, ORALLY DISINTEGRATING ORAL EVERY 8 HOURS PRN
Status: DISCONTINUED | OUTPATIENT
Start: 2024-04-04 | End: 2024-04-19 | Stop reason: HOSPADM

## 2024-04-04 RX ORDER — MIDODRINE HYDROCHLORIDE 5 MG/1
5 TABLET ORAL 3 TIMES DAILY PRN
Status: DISCONTINUED | OUTPATIENT
Start: 2024-04-04 | End: 2024-04-19 | Stop reason: HOSPADM

## 2024-04-04 RX ORDER — ACETAMINOPHEN 325 MG/1
650 TABLET ORAL EVERY 6 HOURS PRN
Status: DISCONTINUED | OUTPATIENT
Start: 2024-04-04 | End: 2024-04-19 | Stop reason: HOSPADM

## 2024-04-04 RX ORDER — SODIUM CHLORIDE 0.9 % (FLUSH) 0.9 %
5-40 SYRINGE (ML) INJECTION PRN
Status: DISCONTINUED | OUTPATIENT
Start: 2024-04-04 | End: 2024-04-19 | Stop reason: HOSPADM

## 2024-04-04 RX ORDER — ALBUTEROL SULFATE 2.5 MG/3ML
10 SOLUTION RESPIRATORY (INHALATION) CONTINUOUS
Status: ACTIVE | OUTPATIENT
Start: 2024-04-04 | End: 2024-04-04

## 2024-04-04 RX ORDER — SODIUM CHLORIDE 0.9 % (FLUSH) 0.9 %
5-40 SYRINGE (ML) INJECTION EVERY 12 HOURS SCHEDULED
Status: DISCONTINUED | OUTPATIENT
Start: 2024-04-04 | End: 2024-04-19 | Stop reason: HOSPADM

## 2024-04-04 RX ORDER — MAGNESIUM SULFATE IN WATER 40 MG/ML
2000 INJECTION, SOLUTION INTRAVENOUS ONCE
Status: COMPLETED | OUTPATIENT
Start: 2024-04-04 | End: 2024-04-04

## 2024-04-04 RX ORDER — ALBUTEROL SULFATE 2.5 MG/3ML
10 SOLUTION RESPIRATORY (INHALATION) EVERY 4 HOURS PRN
Status: DISCONTINUED | OUTPATIENT
Start: 2024-04-04 | End: 2024-04-05

## 2024-04-04 RX ORDER — ALBUTEROL SULFATE 90 UG/1
1 AEROSOL, METERED RESPIRATORY (INHALATION) EVERY 6 HOURS PRN
Status: DISCONTINUED | OUTPATIENT
Start: 2024-04-04 | End: 2024-04-05

## 2024-04-04 RX ORDER — POTASSIUM CHLORIDE 20 MEQ/1
40 TABLET, EXTENDED RELEASE ORAL PRN
Status: DISCONTINUED | OUTPATIENT
Start: 2024-04-04 | End: 2024-04-19 | Stop reason: HOSPADM

## 2024-04-04 RX ORDER — LORAZEPAM 2 MG/ML
1 INJECTION INTRAMUSCULAR EVERY 6 HOURS PRN
Status: DISCONTINUED | OUTPATIENT
Start: 2024-04-04 | End: 2024-04-19 | Stop reason: HOSPADM

## 2024-04-04 RX ORDER — IPRATROPIUM BROMIDE AND ALBUTEROL SULFATE 2.5; .5 MG/3ML; MG/3ML
1 SOLUTION RESPIRATORY (INHALATION)
Status: DISCONTINUED | OUTPATIENT
Start: 2024-04-04 | End: 2024-04-05

## 2024-04-04 RX ORDER — ACETAMINOPHEN 650 MG/1
650 SUPPOSITORY RECTAL EVERY 6 HOURS PRN
Status: DISCONTINUED | OUTPATIENT
Start: 2024-04-04 | End: 2024-04-19 | Stop reason: HOSPADM

## 2024-04-04 RX ORDER — MAGNESIUM SULFATE IN WATER 40 MG/ML
2000 INJECTION, SOLUTION INTRAVENOUS PRN
Status: DISCONTINUED | OUTPATIENT
Start: 2024-04-04 | End: 2024-04-19 | Stop reason: HOSPADM

## 2024-04-04 RX ORDER — DILTIAZEM HYDROCHLORIDE 120 MG/1
120 CAPSULE, COATED, EXTENDED RELEASE ORAL DAILY
Status: DISCONTINUED | OUTPATIENT
Start: 2024-04-05 | End: 2024-04-19 | Stop reason: HOSPADM

## 2024-04-04 RX ADMIN — IPRATROPIUM BROMIDE 0.5 MG: 0.5 SOLUTION RESPIRATORY (INHALATION) at 19:21

## 2024-04-04 RX ADMIN — MAGNESIUM SULFATE HEPTAHYDRATE 2000 MG: 40 INJECTION, SOLUTION INTRAVENOUS at 19:24

## 2024-04-04 RX ADMIN — ALBUTEROL SULFATE 10 MG: 2.5 SOLUTION RESPIRATORY (INHALATION) at 19:25

## 2024-04-04 RX ADMIN — OSELTAMIVIR PHOSPHATE 75 MG: 75 CAPSULE ORAL at 22:06

## 2024-04-04 RX ADMIN — WATER 125 MG: 1 INJECTION INTRAMUSCULAR; INTRAVENOUS; SUBCUTANEOUS at 19:16

## 2024-04-04 ASSESSMENT — PAIN SCALES - GENERAL: PAINLEVEL_OUTOF10: 10

## 2024-04-04 ASSESSMENT — PAIN - FUNCTIONAL ASSESSMENT: PAIN_FUNCTIONAL_ASSESSMENT: 0-10

## 2024-04-04 NOTE — RT PROTOCOL NOTE
RT Inhaler-Nebulizer Bronchodilator Protocol Note    There is a bronchodilator order in the chart from a provider indicating to follow the RT Bronchodilator Protocol and there is an “Initiate RT Inhaler-Nebulizer Bronchodilator Protocol” order as well (see protocol at bottom of note).    CXR Findings:  No results found.    The findings from the last RT Protocol Assessment were as follows:   History Pulmonary Disease: Chronic pulmonary disease  Respiratory Pattern: Mild dyspnea at rest, irregular pattern, or RR 21-25 bpm  Breath Sounds: Inspiratory and expiratory or bilateral wheezing and/or rhonchi  Cough: Strong, spontaneous, non-productive  Indication for Bronchodilator Therapy: Decreased or absent breath sounds, On home bronchodilators, Wheezing associated with pulm disorder  Bronchodilator Assessment Score: 13    Aerosolized bronchodilator medication orders have been revised according to the RT Inhaler-Nebulizer Bronchodilator Protocol below.    Respiratory Therapist to perform RT Therapy Protocol Assessment initially then follow the protocol.  Repeat RT Therapy Protocol Assessment PRN for score 0-3 or on second treatment, BID, and PRN for scores above 3.    No Indications - adjust the frequency to every 6 hours PRN wheezing or bronchospasm, if no treatments needed after 48 hours then discontinue using Per Protocol order mode.     If indication present, adjust the RT bronchodilator orders based on the Bronchodilator Assessment Score as indicated below.  Use Inhaler orders unless patient has one or more of the following: on home nebulizer, not able to hold breath for 10 seconds, is not alert and oriented, cannot activate and use MDI correctly, or respiratory rate 25 breaths per minute or more, then use the equivalent nebulizer order(s) with same Frequency and PRN reasons based on the score.  If a patient is on this medication at home then do not decrease Frequency below that used at home.    0-3 - enter or revise  RT bronchodilator order(s) to equivalent RT Bronchodilator order with Frequency of every 4 hours PRN for wheezing or increased work of breathing using Per Protocol order mode.        4-6 - enter or revise RT Bronchodilator order(s) to two equivalent RT bronchodilator orders with one order with BID Frequency and one order with Frequency of every 4 hours PRN wheezing or increased work of breathing using Per Protocol order mode.        7-10 - enter or revise RT Bronchodilator order(s) to two equivalent RT bronchodilator orders with one order with TID Frequency and one order with Frequency of every 4 hours PRN wheezing or increased work of breathing using Per Protocol order mode.       11-13 - enter or revise RT Bronchodilator order(s) to one equivalent RT bronchodilator order with QID Frequency and an Albuterol order with Frequency of every 4 hours PRN wheezing or increased work of breathing using Per Protocol order mode.      Greater than 13 - enter or revise RT Bronchodilator order(s) to one equivalent RT bronchodilator order with every 4 hours Frequency and an Albuterol order with Frequency of every 2 hours PRN wheezing or increased work of breathing using Per Protocol order mode.     RT to enter RT Home Evaluation for COPD & MDI Assessment order using Per Protocol order mode.    Electronically signed by MONY TEJADA RCP on 4/4/2024 at 7:33 PM

## 2024-04-04 NOTE — PLAN OF CARE
Problem: Respiratory - Adult  Goal: Able to breathe comfortably  Outcome: Progressing     Problem: Respiratory - Adult  Goal: Patient's breath sounds will be clear and equal  Outcome: Progressing     Problem: Respiratory - Adult  Goal: Adequate oxygenation  Description: Adequate oxygenation  Outcome: Progressing      BRONCHOSPASM/BRONCHOCONSTRICTION    IMPROVE  AERATION/BREATHSOUNDS  ADMINISTER BRONCHODILATOR THERAPY AS APPROPRIATE  ASSESS BREATH SOUNDS  INITIATE AEROSOL PROTOCOL IF ORDERED TO DO SO  PATIENT EDUCATION AS NEEDED       PROVIDE ADEQUATE OXYGENATION WITH ACCEPTABLE SP02/ABG'S    [x]  IDENTIFY APPROPRIATE OXYGEN THERAPY  [x]   MONITOR SP02/ABG'S AS NEEDED   [x]   PATIENT EDUCATION AS NEEDED

## 2024-04-04 NOTE — ED NOTES
Pt presenting to the ED with complaints of shortness of breath. Pt called 911. Pt reports she wears 4 LO NC at all times. Pt reports she has been dealing with respiratory illnesses for the last few weeks. EMS gave duo-neb en route. Pt 62% on EMS arrival to ED with 4 L NC, pt placed on 6 L NC with improvement up to 86%. Dr. Taylor called to bedside for eval.

## 2024-04-05 ENCOUNTER — APPOINTMENT (OUTPATIENT)
Dept: GENERAL RADIOLOGY | Age: 69
End: 2024-04-05
Payer: MEDICARE

## 2024-04-05 PROBLEM — R68.89 INCREASED OXYGEN DEMAND: Status: ACTIVE | Noted: 2024-04-05

## 2024-04-05 PROBLEM — J10.1 INFLUENZA A: Status: ACTIVE | Noted: 2024-04-05

## 2024-04-05 PROBLEM — D75.9 CYTOPENIA: Status: ACTIVE | Noted: 2024-04-05

## 2024-04-05 PROBLEM — J44.1 COPD EXACERBATION (HCC): Status: ACTIVE | Noted: 2024-04-05

## 2024-04-05 LAB
ANION GAP SERPL CALCULATED.3IONS-SCNC: 4 MMOL/L (ref 9–17)
BASOPHILS # BLD: 0 K/UL (ref 0–0.2)
BASOPHILS NFR BLD: 0 %
BUN SERPL-MCNC: 25 MG/DL (ref 8–23)
BUN/CREAT SERPL: 23 (ref 9–20)
CALCIUM SERPL-MCNC: 9.2 MG/DL (ref 8.6–10.4)
CHLORIDE SERPL-SCNC: 96 MMOL/L (ref 98–107)
CO2 SERPL-SCNC: 42 MMOL/L (ref 20–31)
CREAT SERPL-MCNC: 1.1 MG/DL (ref 0.5–0.9)
CRITICAL ACTION: NORMAL
CRITICAL NOTIFICATION DATE/TIME: NORMAL
CRITICAL NOTIFICATION: NORMAL
CRITICAL VALUE READ BACK: YES
EKG ATRIAL RATE: 97 BPM
EKG Q-T INTERVAL: 322 MS
EKG QRS DURATION: 84 MS
EKG QTC CALCULATION (BAZETT): 408 MS
EKG R AXIS: -17 DEGREES
EKG T AXIS: 56 DEGREES
EKG VENTRICULAR RATE: 97 BPM
EOSINOPHIL # BLD: 0 K/UL (ref 0–0.4)
EOSINOPHILS RELATIVE PERCENT: 0 % (ref 1–4)
ERYTHROCYTE [DISTWIDTH] IN BLOOD BY AUTOMATED COUNT: 18.5 % (ref 11.8–14.4)
FOLATE SERPL-MCNC: >20 NG/ML (ref 4.8–24.2)
GFR SERPL CREATININE-BSD FRML MDRD: 55 ML/MIN/1.73M2
GLUCOSE SERPL-MCNC: 183 MG/DL (ref 70–99)
HCO3 VENOUS: 37.8 MMOL/L (ref 22–29)
HCT VFR BLD AUTO: 35.6 % (ref 36.3–47.1)
HGB BLD-MCNC: 9.6 G/DL (ref 11.9–15.1)
IMM GRANULOCYTES # BLD AUTO: 0.03 K/UL (ref 0–0.3)
IMM GRANULOCYTES NFR BLD: 1 %
IMM RETICS NFR: 21 % (ref 2.7–18.3)
INR PPP: 2.3
LYMPHOCYTES NFR BLD: 0.28 K/UL (ref 1–4.8)
LYMPHOCYTES RELATIVE PERCENT: 10 % (ref 24–44)
MCH RBC QN AUTO: 25.2 PG (ref 25.2–33.5)
MCHC RBC AUTO-ENTMCNC: 27 G/DL (ref 28.4–34.8)
MCV RBC AUTO: 93.4 FL (ref 82.6–102.9)
MONOCYTES NFR BLD: 0.08 K/UL (ref 0.2–0.8)
MONOCYTES NFR BLD: 3 % (ref 1–7)
MORPHOLOGY: ABNORMAL
NEUTROPHILS NFR BLD: 86 % (ref 36–66)
NEUTS SEG NFR BLD: 2.41 K/UL (ref 1.8–7.7)
NRBC BLD-RTO: 0 PER 100 WBC
O2 SAT, VEN: 86.9 % (ref 60–85)
PCO2, VEN: 74.9 MM HG (ref 41–51)
PH VENOUS: 7.31 (ref 7.32–7.43)
PLATELET # BLD AUTO: 147 K/UL (ref 138–453)
PMV BLD AUTO: 9 FL (ref 8.1–13.5)
PO2, VEN: 60.6 MM HG (ref 30–50)
POSITIVE BASE EXCESS, VEN: 8.9 MMOL/L (ref 0–3)
POTASSIUM SERPL-SCNC: 4.9 MMOL/L (ref 3.7–5.3)
PROCALCITONIN SERPL-MCNC: 0.09 NG/ML (ref 0–0.09)
PROTHROMBIN TIME: 24.7 SEC (ref 11.5–14.2)
RBC # BLD AUTO: 3.81 M/UL (ref 3.95–5.11)
RETIC HEMOGLOBIN: 20.3 PG (ref 28.2–35.7)
RETICS # AUTO: 0.04 M/UL (ref 0.03–0.08)
RETICS/RBC NFR AUTO: 1.2 % (ref 0.5–1.9)
SODIUM SERPL-SCNC: 142 MMOL/L (ref 135–144)
VIT B12 SERPL-MCNC: 847 PG/ML (ref 232–1245)
WBC OTHER # BLD: 2.8 K/UL (ref 3.5–11.3)

## 2024-04-05 PROCEDURE — 82607 VITAMIN B-12: CPT

## 2024-04-05 PROCEDURE — 80048 BASIC METABOLIC PNL TOTAL CA: CPT

## 2024-04-05 PROCEDURE — 94761 N-INVAS EAR/PLS OXIMETRY MLT: CPT

## 2024-04-05 PROCEDURE — 86038 ANTINUCLEAR ANTIBODIES: CPT

## 2024-04-05 PROCEDURE — 85045 AUTOMATED RETICULOCYTE COUNT: CPT

## 2024-04-05 PROCEDURE — 97163 PT EVAL HIGH COMPLEX 45 MIN: CPT

## 2024-04-05 PROCEDURE — 82803 BLOOD GASES ANY COMBINATION: CPT

## 2024-04-05 PROCEDURE — 6360000002 HC RX W HCPCS: Performed by: INTERNAL MEDICINE

## 2024-04-05 PROCEDURE — 6370000000 HC RX 637 (ALT 250 FOR IP): Performed by: INTERNAL MEDICINE

## 2024-04-05 PROCEDURE — 99213 OFFICE O/P EST LOW 20 MIN: CPT

## 2024-04-05 PROCEDURE — 86225 DNA ANTIBODY NATIVE: CPT

## 2024-04-05 PROCEDURE — 97535 SELF CARE MNGMENT TRAINING: CPT

## 2024-04-05 PROCEDURE — 99232 SBSQ HOSP IP/OBS MODERATE 35: CPT | Performed by: STUDENT IN AN ORGANIZED HEALTH CARE EDUCATION/TRAINING PROGRAM

## 2024-04-05 PROCEDURE — 85025 COMPLETE CBC W/AUTO DIFF WBC: CPT

## 2024-04-05 PROCEDURE — 6370000000 HC RX 637 (ALT 250 FOR IP): Performed by: NURSE PRACTITIONER

## 2024-04-05 PROCEDURE — 2060000000 HC ICU INTERMEDIATE R&B

## 2024-04-05 PROCEDURE — 94669 MECHANICAL CHEST WALL OSCILL: CPT

## 2024-04-05 PROCEDURE — 2580000003 HC RX 258: Performed by: INTERNAL MEDICINE

## 2024-04-05 PROCEDURE — 85610 PROTHROMBIN TIME: CPT

## 2024-04-05 PROCEDURE — 2700000000 HC OXYGEN THERAPY PER DAY

## 2024-04-05 PROCEDURE — 97167 OT EVAL HIGH COMPLEX 60 MIN: CPT

## 2024-04-05 PROCEDURE — 99223 1ST HOSP IP/OBS HIGH 75: CPT | Performed by: INTERNAL MEDICINE

## 2024-04-05 PROCEDURE — 36415 COLL VENOUS BLD VENIPUNCTURE: CPT

## 2024-04-05 PROCEDURE — 82746 ASSAY OF FOLIC ACID SERUM: CPT

## 2024-04-05 PROCEDURE — 94640 AIRWAY INHALATION TREATMENT: CPT

## 2024-04-05 PROCEDURE — 97530 THERAPEUTIC ACTIVITIES: CPT

## 2024-04-05 PROCEDURE — 6370000000 HC RX 637 (ALT 250 FOR IP): Performed by: STUDENT IN AN ORGANIZED HEALTH CARE EDUCATION/TRAINING PROGRAM

## 2024-04-05 PROCEDURE — 71045 X-RAY EXAM CHEST 1 VIEW: CPT

## 2024-04-05 PROCEDURE — 97116 GAIT TRAINING THERAPY: CPT

## 2024-04-05 RX ORDER — IPRATROPIUM BROMIDE AND ALBUTEROL SULFATE 2.5; .5 MG/3ML; MG/3ML
1 SOLUTION RESPIRATORY (INHALATION)
Status: DISCONTINUED | OUTPATIENT
Start: 2024-04-05 | End: 2024-04-15

## 2024-04-05 RX ORDER — WARFARIN SODIUM 2 MG/1
2 TABLET ORAL
Status: COMPLETED | OUTPATIENT
Start: 2024-04-05 | End: 2024-04-05

## 2024-04-05 RX ORDER — PANTOPRAZOLE SODIUM 40 MG/1
40 TABLET, DELAYED RELEASE ORAL
Status: DISCONTINUED | OUTPATIENT
Start: 2024-04-06 | End: 2024-04-19 | Stop reason: HOSPADM

## 2024-04-05 RX ORDER — FLUTICASONE PROPIONATE 50 MCG
1 SPRAY, SUSPENSION (ML) NASAL DAILY
Status: DISCONTINUED | OUTPATIENT
Start: 2024-04-05 | End: 2024-04-19 | Stop reason: HOSPADM

## 2024-04-05 RX ORDER — BUDESONIDE 0.5 MG/2ML
0.5 INHALANT ORAL
Status: DISCONTINUED | OUTPATIENT
Start: 2024-04-05 | End: 2024-04-17

## 2024-04-05 RX ORDER — ALBUTEROL SULFATE 2.5 MG/3ML
2.5 SOLUTION RESPIRATORY (INHALATION)
Status: DISCONTINUED | OUTPATIENT
Start: 2024-04-05 | End: 2024-04-19 | Stop reason: HOSPADM

## 2024-04-05 RX ORDER — PRAVASTATIN SODIUM 40 MG
80 TABLET ORAL EVERY EVENING
Status: DISCONTINUED | OUTPATIENT
Start: 2024-04-05 | End: 2024-04-19 | Stop reason: HOSPADM

## 2024-04-05 RX ORDER — METAXALONE 800 MG/1
800 TABLET ORAL 3 TIMES DAILY PRN
Status: DISCONTINUED | OUTPATIENT
Start: 2024-04-05 | End: 2024-04-05

## 2024-04-05 RX ADMIN — SODIUM CHLORIDE, PRESERVATIVE FREE 10 ML: 5 INJECTION INTRAVENOUS at 20:22

## 2024-04-05 RX ADMIN — IPRATROPIUM BROMIDE AND ALBUTEROL SULFATE 1 DOSE: 2.5; .5 SOLUTION RESPIRATORY (INHALATION) at 06:08

## 2024-04-05 RX ADMIN — PRAVASTATIN SODIUM 80 MG: 40 TABLET ORAL at 02:14

## 2024-04-05 RX ADMIN — WARFARIN SODIUM 4 MG: 2 TABLET ORAL at 00:48

## 2024-04-05 RX ADMIN — IPRATROPIUM BROMIDE AND ALBUTEROL SULFATE 1 DOSE: 2.5; .5 SOLUTION RESPIRATORY (INHALATION) at 09:48

## 2024-04-05 RX ADMIN — MONTELUKAST 10 MG: 10 TABLET, FILM COATED ORAL at 20:20

## 2024-04-05 RX ADMIN — WATER 60 MG: 1 INJECTION INTRAMUSCULAR; INTRAVENOUS; SUBCUTANEOUS at 06:18

## 2024-04-05 RX ADMIN — OSELTAMIVIR PHOSPHATE 75 MG: 75 CAPSULE ORAL at 20:20

## 2024-04-05 RX ADMIN — SODIUM CHLORIDE, PRESERVATIVE FREE 10 ML: 5 INJECTION INTRAVENOUS at 02:14

## 2024-04-05 RX ADMIN — DILTIAZEM HYDROCHLORIDE 120 MG: 120 CAPSULE, EXTENDED RELEASE ORAL at 09:03

## 2024-04-05 RX ADMIN — FLUTICASONE PROPIONATE 1 SPRAY: 50 SPRAY, METERED NASAL at 09:03

## 2024-04-05 RX ADMIN — IPRATROPIUM BROMIDE AND ALBUTEROL SULFATE 1 DOSE: 2.5; .5 SOLUTION RESPIRATORY (INHALATION) at 20:52

## 2024-04-05 RX ADMIN — BUDESONIDE 500 MCG: 0.5 INHALANT ORAL at 20:53

## 2024-04-05 RX ADMIN — MONTELUKAST 10 MG: 10 TABLET, FILM COATED ORAL at 02:14

## 2024-04-05 RX ADMIN — WATER 60 MG: 1 INJECTION INTRAMUSCULAR; INTRAVENOUS; SUBCUTANEOUS at 20:20

## 2024-04-05 RX ADMIN — BUDESONIDE 500 MCG: 0.5 INHALANT ORAL at 06:10

## 2024-04-05 RX ADMIN — SODIUM CHLORIDE, PRESERVATIVE FREE 10 ML: 5 INJECTION INTRAVENOUS at 09:03

## 2024-04-05 RX ADMIN — WARFARIN SODIUM 2 MG: 2 TABLET ORAL at 16:42

## 2024-04-05 RX ADMIN — OSELTAMIVIR PHOSPHATE 75 MG: 75 CAPSULE ORAL at 09:03

## 2024-04-05 RX ADMIN — IPRATROPIUM BROMIDE AND ALBUTEROL SULFATE 1 DOSE: 2.5; .5 SOLUTION RESPIRATORY (INHALATION) at 13:33

## 2024-04-05 RX ADMIN — PRAVASTATIN SODIUM 80 MG: 40 TABLET ORAL at 16:42

## 2024-04-05 NOTE — H&P
[facility]  Office: [phone]  Carlos Caro DO, Felipe Drew, DO, Jerman Mack DO, Leonel Thomas, DO, Larry Pisano MD, Monserrat Rosado MD, Sharon Perez MD, Beverley Curtis MD,  Jimi Messina MD, Kevin Cowan MD, Theresa Chavira MD,  Luis Coffman DO, Florencia Hernandez MD, Giorgi Salas MD, Ramone Caro DO, Mariel Bradshaw MD,  Bandar Anderson DO, Michelle Helm MD, Mireille Hermosillo MD, Yamileth Chong MD, Zo Montgomery MD,  Jordan Hernandez MD, Pilar Salcedo MD, Kevin Greenwood MD, Kevin Landon MD, Ramon Ellsworth MD, Ken Luna MD, Luis French DO, Porter Moore DO, Shayna Woodall MD,  Alvaro Reid MD, Shirley Waterhouse, CNP,  Octavia Coreas CNP, Chemo Pelayo, CNP,  Юлия Calero, DNP, Kaci Rodgers, CNP, Princess Mandujano, CNP, Madelaine Burns CNP, Ivone Mancia, CNP, Val Hanson, PA-C, Joyce Jackson PA-C, Isabel Bustos, CNP, Johnna Horta, CNP, Karma Yost, CNP, Rosio Resendez, CNP, Jessi Montiel, CNP, Sandie Palmer, CNS, Balbina Hernández, CNP, Dolly Tamez CNP, Tracy Schwab, CNP         [facility]   IN-PATIENT SERVICE   [facility]    HISTORY AND PHYSICAL EXAMINATION            Date:   4/4/2024  Patient name:  Shazia Dickson  Date of admission:  4/4/2024  6:36 PM  MRN:   [id number]  Account:  [id number]  YOB: 1955  PCP:    Jyoti Mauricio APRN - [facility]  Room:   [place] [address]  Code Status:    Prior    Chief Complaint:     Chief Complaint   Patient presents with    Shortness of Breath     60% on 4 L @ home       History Obtained From:     patient    History of Present Illness:     Shazia Dickson is a 68 y.o. with dm2, asthma, COPD on home 4 L, hypertension, DVT on warfarin presents the ED for several days of cough, congestion, runny nose and worsening shortness of breath mostly with exertion. She endorses body achs and URI symptoms. She dneies leg swelling , pnd, orthopnea. She reports sob, post nasal drainage causing  Value Ref Range    Protime 24.4 (H) 11.5 - 14.2 sec    INR 2.2    COVID-19 & Influenza Combo    Collection Time: 04/04/24  6:50 PM    Specimen: Nasopharyngeal Swab   Result Value Ref Range    Specimen Description .NASOPHARYNGEAL SWAB     Source .NASOPHARYNGEAL SWAB     SARS-CoV-2 RNA, RT PCR Not Detected Not Detected    INFLUENZA A DETECTED (A) Not Detected    INFLUENZA B Not Detected Not Detected   Troponin    Collection Time: 04/04/24  9:47 PM   Result Value Ref Range    Troponin, High Sensitivity 25 (H) 0 - 14 ng/L       Imaging/Diagnostics:  XR CHEST PORTABLE    Result Date: 4/4/2024  1. Significantly limited study due to underexposure. 2. Cardiomegaly and vascular congestion without interstitial prominence to suggest interstitial edema, but the interstitium is evaluated to a limited degree. 3. No confluent airspace consolidation, but evaluation of the left lung base is limited due to cardiomegaly and underexposure.       Assessment :      #acute on chronic hypoxic  hypercarbic respiratory failure  #influenza  -on coumadin therapeutic inr, pe less likely  -influenza +, neutropenic, concern for superimposed infection risks  -chronic co2 retainer  -probnp unrevealing  -wbc 2.7, hgb unchanged, on coumadin  -concern for pulmonary hypertension and Right CHF component  -cxr poor study: Cardiomegaly and vascular congestion without interstitial prominence to suggest interstitial edema,  -echo given concern for coexisting pathology, pulmonary hypertension. No signs of acute chf unless right heart chf but lactic , lft, bnp unrevealing.   -improving on steroids, can reduce dose, continue inhalers, tamiful, symptomatic mgt, oncoloy for neurtropenia. Close monitoring for progression to bacterial infection. Neutropenic precautions   -cpap/bipap prn , wean down to home 02 if possible. 88 to 92% is goal spo2.   -respiratory panel      #Hx of DVT  -stable, on coumadin, inr at goal, no signs of bleeding   -lfts

## 2024-04-05 NOTE — CONSULTS
Today's Date: 4/5/2024  Patient Name: Shazia Dickson  Date of admission: 4/4/2024  6:36 PM  Patient's age: 68 y.o., 1955  Admission Dx: Influenza A [J10.1]  COPD exacerbation (HCC) [J44.1]  Acute respiratory failure with hypoxia (HCC) [J96.01]  Increased oxygen demand [R68.89]    Reason for Consult: management recommendations  Requesting Physician: Leonel Thomas DO    CHIEF COMPLAINT: Shortness of breath.  Leukopenia.    History Obtained From:  patient, electronic medical record    HISTORY OF PRESENT ILLNESS:      The patient is a 68 y.o.  female who is admitted to the hospital with chief complaint of shortness of breath.  Complains of generalized bodyaches and upper respite tract infection symptoms.  Patient recently had been seen infectious disease team for healing abdominal wall wound.  Dr. Nunez.  Per records patient has chronic shortness of breath.    Hematology oncology team consulted due to cytopenias.  Patient CBC from 10/23 shows WBC count 6.0 hemoglobin 8.9 platelet count 225.  Patient CBC at presentation shows WBC count of 2.7 hemoglobin 9.1 and creatinine of 153.  Patient ANC was 1.79.  Patient just finished course of antibiotics.    Plan past Medical History:   has a past medical history of Anxiety, Asthma, Bronchitis, DDD (degenerative disc disease), lumbar, Depression, Diabetes mellitus (HCC), Elevated glucose, Headache(784.0), Hernia of abdominal cavity, HH (hiatus hernia), Hyperlipemia, mixed, Hypertension, Osteoarthritis, Right femoral vein DVT (HCC), and Uterine hyperplasia.    Past Surgical History:   has a past surgical history that includes Dilation and curettage of uterus (10/08 and 09/07); Breast reduction surgery (12/1997); Breast reduction surgery (12/1997); Tympanostomy tube placement (03/1967); Tonsillectomy and adenoidectomy (1962); and Hysterectomy (7/17/15).     Medications:    Prior to Admission medications    Medication Sig Start Date End Date Taking?  Phosphatase 63 35 - 104 U/L    ALT 19 5 - 33 U/L    AST 28 <32 U/L    Total Bilirubin 0.2 (L) 0.3 - 1.2 mg/dL    Bilirubin, Direct <0.1 <0.3 mg/dL    Bilirubin, Indirect Can not be calculated 0.0 - 1.0 mg/dL    Total Protein 7.4 6.4 - 8.3 g/dL   Magnesium   Result Value Ref Range    Magnesium 2.1 1.6 - 2.6 mg/dL   Procalcitonin   Result Value Ref Range    Procalcitonin 0.09 0.00 - 0.09 ng/mL   Reticulocytes   Result Value Ref Range    Retic % 0.9 0.5 - 1.9 %    Absolute Retic # 0.032 0.030 - 0.080 M/uL    Immature Retic Fract 18.8 (H) 2.7 - 18.3 %    Retic Hemoglobin 21.3 (L) 28.2 - 35.7 pg   Sedimentation Rate   Result Value Ref Range    Sed Rate 101 (H) 0 - 30 mm/Hr   Venous Blood Gas, POC   Result Value Ref Range    pH, Miles 7.311 (L) 7.320 - 7.430    pCO2, Miles 74.9 (HH) 41.0 - 51.0 mm Hg    pO2, Miles 60.6 (H) 30.0 - 50.0 mm Hg    HCO3, Venous 37.8 (H) 22.0 - 29.0 mmol/L    Positive Base Excess, Miles 8.9 (H) 0.0 - 3.0 mmol/L    O2 Sat, Miles 86.9 (H) 60.0 - 85.0 %   Notification Panel, POC   Result Value Ref Range    CRITICAL ACTION NotifyRN     Critical Notification emilyp     Critical Value Read Back Yes     CRITICAL NOTIFICATION DATE/TIME 04/05/202406:29:00          IMAGING DATA:    XR CHEST PORTABLE    Result Date: 4/4/2024  EXAMINATION: ONE XRAY VIEW OF THE CHEST 4/4/2024 6:04 pm COMPARISON: 11/14/2023. HISTORY: ORDERING SYSTEM PROVIDED HISTORY: SOB, cough TECHNOLOGIST PROVIDED HISTORY: SOB, cough Reason for Exam: SOB FINDINGS: The study is significantly under exposed.  There is cardiomegaly and vascular congestion.  There is no interstitial prominence.  No airspace consolidation or effusion is identified, but evaluation of the left lung base is limited.     1. Significantly limited study due to underexposure. 2. Cardiomegaly and vascular congestion without interstitial prominence to suggest interstitial edema, but the interstitium is evaluated to a limited degree. 3. No confluent airspace consolidation, but

## 2024-04-05 NOTE — RT PROTOCOL NOTE
RT Inhaler-Nebulizer Bronchodilator Protocol Note    There is a bronchodilator order in the chart from a provider indicating to follow the RT Bronchodilator Protocol and there is an “Initiate RT Inhaler-Nebulizer Bronchodilator Protocol” order as well (see protocol at bottom of note).    CXR Findings:  XR CHEST PORTABLE    Result Date: 4/5/2024  Cardiomegaly with scattered infiltrates.     XR CHEST PORTABLE    Result Date: 4/4/2024  1. Significantly limited study due to underexposure. 2. Cardiomegaly and vascular congestion without interstitial prominence to suggest interstitial edema, but the interstitium is evaluated to a limited degree. 3. No confluent airspace consolidation, but evaluation of the left lung base is limited due to cardiomegaly and underexposure.       The findings from the last RT Protocol Assessment were as follows:   History Pulmonary Disease: Chronic pulmonary disease  Respiratory Pattern: Mild dyspnea at rest, irregular pattern, or RR 21-25 bpm  Breath Sounds: Inspiratory and expiratory or bilateral wheezing and/or rhonchi  Cough: Strong, spontaneous, non-productive  Indication for Bronchodilator Therapy: Decreased or absent breath sounds, On home bronchodilators, Wheezing associated with pulm disorder  Bronchodilator Assessment Score: 12    Aerosolized bronchodilator medication orders have been revised according to the RT Inhaler-Nebulizer Bronchodilator Protocol below.    Respiratory Therapist to perform RT Therapy Protocol Assessment initially then follow the protocol.  Repeat RT Therapy Protocol Assessment PRN for score 0-3 or on second treatment, BID, and PRN for scores above 3.    No Indications - adjust the frequency to every 6 hours PRN wheezing or bronchospasm, if no treatments needed after 48 hours then discontinue using Per Protocol order mode.     If indication present, adjust the RT bronchodilator orders based on the Bronchodilator Assessment Score as indicated below.  Use Inhaler

## 2024-04-05 NOTE — ED NOTES
Patient incontinent of urine. Patient cleaned new chucks applied and purewick  attached to suction. Tolerated. Call light in reach. Ice water provided . Denies any needs.

## 2024-04-05 NOTE — CONSULTS
Warfarin Dosing - Pharmacy Consult Note  Consulting Provider: Dr. Barbara Dillard  Indication:  History of  VTE/PE  Warfarin Dose prior to admission: 2mg MWSat, 4mg AOD   Concurrent anticoagulants/antiplatelets: none  Significant Drug Interactions:  solumedrol, budesonide  Recent Labs     04/04/24  1847   INR 2.2   HGB 9.1*        Recent warfarin administrations        No warfarin orders with administrations found.            Orders not given:            warfarin (COUMADIN) tablet 4 mg    warfarin placeholder: dosing by pharmacy                   Date   INR    Dose  4/4/24    2.2    Assessment/Plan  (Goal INR: 2.5 - 3.5)  Warfarin 4mg tonight    Active problem list reviewed.  INR orders are placed.  Chart reviewed for pertinent labs, drug/diet interactions, and past doses.  Documentation of patient's clinical condition was reviewed.    Pharmacy Dosing:  Pharmacy will continue to follow.

## 2024-04-05 NOTE — PLAN OF CARE
Tara is resting well at this time with no s/s or c/o pain. She has supplemental O2 in place via nc and reports she feels better than she did when she first came in to ER. Inspiratory/expiratory wheeze and crackles noted on auscultation. She has purewick in place draining clear, yellow urine. Reviewed medications with patient and restarted home meds. Clarified Tamiflu vs. Doxy. She has a wound to her abdomen that she says someone comes out and cleanses it with Vasche and covers with Mepilex. NP ordered wound care eval and tx. Pt has call light in reach and is using appropriately; safety maintained.    Problem: Respiratory - Adult  Goal: Achieves optimal ventilation and oxygenation  Outcome: Progressing  Flowsheets (Taken 4/5/2024 0135)  Achieves optimal ventilation and oxygenation:   Assess for changes in respiratory status   Assess for changes in mentation and behavior   Position to facilitate oxygenation and minimize respiratory effort   Oxygen supplementation based on oxygen saturation or arterial blood gases   Respiratory therapy support as indicated     Problem: Pain  Goal: Verbalizes/displays adequate comfort level or baseline comfort level  Outcome: Progressing  Flowsheets (Taken 4/5/2024 0115)  Verbalizes/displays adequate comfort level or baseline comfort level:   Encourage patient to monitor pain and request assistance   Assess pain using appropriate pain scale     Problem: Skin/Tissue Integrity - Adult  Goal: Incisions, wounds, or drain sites healing without S/S of infection  Outcome: Progressing  Flowsheets (Taken 4/5/2024 0135)  Incisions, Wounds, or Drain Sites Healing Without Sign and Symptoms of Infection: ADMISSION and DAILY: Assess and document risk factors for pressure ulcer development     Problem: Musculoskeletal - Adult  Goal: Return ADL status to a safe level of function  Outcome: Progressing  Flowsheets (Taken 4/5/2024 0135)  Return ADL Status to a Safe Level of Function:   Assess activities

## 2024-04-05 NOTE — PROGRESS NOTES
Physician Progress Note      PATIENT:               SHOBHA GEORGE  CSN #:                  [id number]  :                       1955  ADMIT DATE:       2024 6:36 PM  DISCH DATE:  RESPONDING  PROVIDER #:        Theresa AMARAL MD          QUERY TEXT:    Pt admitted with acute on chronic hypoxic  hypercarbic respiratory failure and   influenza. Pt noted to have WBC 2.7, RR 18-25, HR . If possible, please   document in the progress notes and discharge summary if you are evaluating   and /or treating any of the following:    The medical record reflects the following:  Risk Factors: Influenza, advanced age  Clinical Indicators: WBC 2.7, RR 18-25, HR , acute on chronic hypoxic    hypercarbic respiratory failure and influenza  Treatment: tamiflu, labs, oxygen, imaging    Thank you,  Madelaine Gonzalez RN BSN  Clinical   Options provided:  -- Viral Sepsis, present on admission due to Influenza  -- Influenza without Sepsis  -- Other - I will add my own diagnosis  -- Disagree - Not applicable / Not valid  -- Disagree - Clinically unable to determine / Unknown  -- Refer to Clinical Documentation Reviewer    PROVIDER RESPONSE TEXT:    This patient has Viral sepsis due to Influenza which was present on admission.    Query created by: Madelaine Gonzalez on 2024 2:53 PM      Electronically signed by:  Theresa AMARAL MD 2024 3:30 PM

## 2024-04-05 NOTE — CARE COORDINATION
Case Management Assessment  Initial Evaluation    Date/Time of Evaluation: 4/5/2024 2:25 PM  Assessment Completed by: Clair Ramirez    If patient is discharged prior to next notation, then this note serves as note for discharge by case management.    Patient Name: Shazia Dickson                   YOB: 1955  Diagnosis: Influenza A [J10.1]  COPD exacerbation (HCC) [J44.1]  Acute respiratory failure with hypoxia (HCC) [J96.01]  Increased oxygen demand [R68.89]                   Date / Time: 4/4/2024  6:36 PM    Patient Admission Status: Inpatient   Readmission Risk (Low < 19, Mod (19-27), High > 27): Readmission Risk Score: 20.1    Current PCP: Jyoti Mauricio, ARACELIS - CNP  PCP verified by CM? Yes    Chart Reviewed: Yes      History Provided by: Patient  Patient Orientation: Alert and Oriented    Patient Cognition: Alert    Hospitalization in the last 30 days (Readmission):  No    If yes, Readmission Assessment in CM Navigator will be completed.    Advance Directives:      Code Status: Full Code   Patient's Primary Decision Maker is:  (states her friend Edita is her POA)    Primary Decision Maker: AnthonyEdita - Other - [phone]    Secondary Decision Maker: Lea Bryan - Other - [phone]    Discharge Planning:    Patient lives with: Alone Type of Home: Assisted living  Primary Care Giver: Self  Patient Support Systems include: Friends/Neighbors   Current Financial resources: Medicare  Current community resources: Assisted Living (lives at [facility])  Current services prior to admission: Durable Medical Equipment, Home Care            Current DME: Bedside Commode, Oxygen Therapy (Comment), Shower Chair, Walker, Home Aerosol, Other (Comment) (motorized scooter, O2 4L 24/7 with portability supplier unknown, lift chair)            Type of Home Care services:  OT, PT, Aide Services, Nursing Services    ADLS  Prior functional level: Assistance with the following:, Bathing, Cooking,

## 2024-04-05 NOTE — ACP (ADVANCE CARE PLANNING)
Advance Care Planning   Advance Care Planning Clinical Specialist  Conversation Note      Date of ACP Conversation: 4/5/2024    Conversation Conducted with:   Patient with Decision Making Capacity   Healthcare Decision Maker: Named in Advance Directive or Healthcare Power of      ACP Clinical Specialist: Clair Ramirez      *When Decision Maker makes decisions on behalf of the incapacitated patient: Decision Maker is asked to consider and make decisions based on patient values, known preferences, or best interests.       Current Designated Health Care Decision Maker:   Primary Decision Maker: Edita Cruz - Other - [phone]    Secondary Decision Maker: Lea Bryan - Other - [phone]  (as entered in ACP Activity Healthcare Decision Maker field.  Validate  this information as still accurate & up-to-date; edit Healthcare Decision Maker field as needed.)    If no Decision Maker listed above or available through scanned documents, then:    Choose Health Care Decision Maker   Who do you trust to make healthcare decisions for you?  Name:   Edita Cruz POA  Phone  Number: [phone]  Can this person be reached and be able to respond quickly, such as within a few minutes or hours? Yes    Who would be your back-up decision maker?  Name Lea Bryan  Phone Number:[phone]    For below questions, when conducting conversation with Health Care Decision Maker, substitute \"she\" and \"her\" for \"you\" and \"your\".      Hospitalization  If your health were to worsen and it became clear that your chance of recovery was unlikely, what would your preferences be regarding hospitalization?:    Choice:  []  The patient would want hospitalization  []  The patient would prefer comfort-focused treatment without hospitalization.    Ventilation  If you were in your present state of health and suddenly became very ill and were unable to breathe on your own, what would your preference be about the use of a  ventilator (breathing machine) if it were available to you?      If patient would desire the use of a ventilator (breathing machine), answer \"yes\", if not \"no\":yes    If your health were to worsen and it became clear that your chance of recovery was unlikely, would that change your answer? No    Resuscitation  CPR works best to restart the heart when there is a sudden event, like a heart attack, in someone who is otherwise healthy. Unfortunately, CPR does not typically restart the heart for people who have serious health conditions or who are very sick.    In the event your heart stopped, would you want attempts to restart your heart (answer \"yes\") or would you prefer a natural death (answer \"no\")? yes    If your health were to worsen and it became clear that your chance of recovery was unlikely, would that change your answer? No    [] Yes  [] No   Educated Patient / Decision Maker regarding differences between Advance Directives and portable DNR orders.    Length of ACP Conversation in minutes:  5 minutes    Conversation Outcomes:  [] ACP discussion completed  [] Existing advance directive reviewed with patient; no changes to patient's previously recorded wishes   [] New Advance Directive completed   [] Portable Do Not Rescitate prepared for Provider review and signature  [] POLST/POST/MOLST/MOST prepared for Provider review and signature      Follow-up plan:    [] Schedule follow-up conversation to continue planning  [] Referred individual to Provider for additional questions/concerns   [] Advised patient/agent/surrogate to review completed ACP document and update if needed with changes in condition, patient preferences or care setting     [] This note routed to one or more involved healthcare providers

## 2024-04-05 NOTE — PROGRESS NOTES
Physical Therapy  Facility/Department: [facility] CARE  Physical Therapy Initial Assessment    Name: Shazia Dickson  : 1955  MRN: [id number]  Date of Service: 2024    Discharge Recommendations:  Therapy recommended at discharge. .Pt currently functioning below baseline.  Recommend daily inpatient skilled therapy at time of discharge to maximize long term outcomes and prevent re-admission. Please refer to AM-PAC score for current level of function.       HPI (per chart):  68-year-old female with history of asthma, COPD on home 4 L, hypertension, DVT on warfarin presents the ED for several days of cough, congestion, runny nose and worsening shortness of breath. Patient states that this evening the shortness of breath was much more severe so she came to the ED. Patient states that she has been using her breathing machine 3 times per day as prescribed but it does not seem to be enough. Patient denies any chest pain, fever/chills, abdominal pain, diarrhea, dysuria or any other acute complaints.      Patient Diagnosis(es): The primary encounter diagnosis was COPD exacerbation (HCC). Diagnoses of Influenza A and Increased oxygen demand were also pertinent to this visit.  Past Medical History:  has a past medical history of Anxiety, Asthma, Bronchitis, DDD (degenerative disc disease), lumbar, Depression, Diabetes mellitus (HCC), Elevated glucose, Headache(784.0), Hernia of abdominal cavity, HH (hiatus hernia), Hyperlipemia, mixed, Hypertension, Osteoarthritis, Right femoral vein DVT (HCC), and Uterine hyperplasia.  Past Surgical History:  has a past surgical history that includes Dilation and curettage of uterus (10/08 and ); Breast reduction surgery (1997); Breast reduction surgery (1997); Tympanostomy tube placement (1967); Tonsillectomy and adenoidectomy (); and Hysterectomy (7/17/15).    Assessment   Body Structures, Functions, Activity Limitations Requiring Skilled Therapeutic  identify errors made  Insights: Fully aware of deficits  Initiation: Requires cues for some  Sequencing: Requires cues for some  Cognition Comment: J Luis jones some word finding problems.     Objective   SP02 94% at rest, down to 84% with transfers and standing.     Observation/Palpation  Posture: Fair (flexed posture)  Observation: Intermittent N/T in feet and left hand. BMI 65.  Edema: BLE edema        AROM RLE (degrees)  RLE General AROM: hip and knee 0-90 (limited by adipose tissue), ankle WFL  AROM LLE (degrees)  LLE General AROM: hip and knee 0-90 (limited by adipose tissue), ankle WFL  Strength RLE  Comment: hip 3-/5, knee 3-/5, ankle WFL  Strength LLE  Comment: hip 3+/5, knee 4-/5, ankle WFL           Bed mobility  Rolling to Left: Maximum assistance;2 Person assistance  Rolling to Right: Maximum assistance;2 Person assistance  Supine to Sit: Maximum assistance;2 Person assistance (Head of bed fully elevated and required use of bed rails)  Sit to Supine: Maximum assistance;2 Person assistance  Scooting: Maximal assistance;2 Person assistance (with bed in trendelenburg)  Bed Mobility Comments: Required bed rails for all bed mobility. Patient requires verbal cues for sequencing and techniques. Patient very SOB with bed mobility, SP02 86% with supine to sit.  Transfers  Sit to Stand: Maximum Assistance;2 Person Assistance  Stand to Sit: Maximum Assistance;2 Person Assistance  Comment: Patient performs sit to stand with left leg outside of RW and pulling on walker with Left hand, pushing from bed with right hand. Patient not receptive to safety education with transfers. Patient uses momentum to complete sit to stand.  Ambulation  Surface: Level tile  Device: Rolling Walker  Assistance: Maximum assistance;2 Person assistance  Quality of Gait: Patient very SOB with gait. Sp02 down to 82%, required about 5 minutes to recover to 90%  Gait Deviations: Slow Marleni;Increased ALPA;Decreased step length;Decreased step

## 2024-04-05 NOTE — PLAN OF CARE
Problem: Respiratory - Adult  Goal: Able to breathe comfortably  Outcome: Progressing  Goal: Patient's breath sounds will be clear and equal  Outcome: Progressing  Goal: Adequate oxygenation  Description: Adequate oxygenation  Outcome: Progressing  Goal: Achieves optimal ventilation and oxygenation  4/5/2024 0951 by Sabine Garcia RCP  Outcome: Progressing  4/5/2024 0421 by Mena Camargo, RN  Outcome: Progressing  Flowsheets (Taken 4/5/2024 0135)  Achieves optimal ventilation and oxygenation:   Assess for changes in respiratory status   Assess for changes in mentation and behavior   Position to facilitate oxygenation and minimize respiratory effort   Oxygen supplementation based on oxygen saturation or arterial blood gases   Respiratory therapy support as indicated

## 2024-04-05 NOTE — ED PROVIDER NOTES
EMERGENCY DEPARTMENT ENCOUNTER    Pt Name: Shazia Dickson  MRN: [id number]  Birthdate 1955  Date of evaluation: 4/4/24  CHIEF COMPLAINT       Chief Complaint   Patient presents with    Shortness of Breath     60% on 4 L @ home     HISTORY OF PRESENT ILLNESS   HPI    68-year-old female with history of asthma, COPD on home 4 L, hypertension, DVT on warfarin presents the ED for several days of cough, congestion, runny nose and worsening shortness of breath.  Patient states that this evening the shortness of breath was much more severe so she came to the ED.  Patient states that she has been using her breathing machine 3 times per day as prescribed but it does not seem to be enough.  Patient denies any chest pain, fever/chills, abdominal pain, diarrhea, dysuria or any other acute complaints.    REVIEW OF SYSTEMS     Review of Systems   All other systems reviewed and are negative.    PASTMEDICAL HISTORY     Past Medical History:   Diagnosis Date    Anxiety     Asthma     Bronchitis     DDD (degenerative disc disease), lumbar     Depression     Diabetes mellitus (HCC)     Elevated glucose     Headache(784.0)     Hernia of abdominal cavity     HH (hiatus hernia)     Hyperlipemia, mixed     Hypertension     Osteoarthritis     Right femoral vein DVT (HCC) May, 2009    Dr. Elizabeth    Uterine hyperplasia      SURGICAL HISTORY       Past Surgical History:   Procedure Laterality Date    BREAST REDUCTION SURGERY  12/1997    BREAST REDUCTION SURGERY  12/1997    DILATION AND CURETTAGE OF UTERUS  10/08 and 09/07    HYSTERECTOMY (CERVIX STATUS UNKNOWN)  7/17/15    Dr Kaitlynn Jaime, endometial cancer, FIGO grade 1    TONSILLECTOMY AND ADENOIDECTOMY  1962    TYMPANOSTOMY TUBE PLACEMENT  03/1967     CURRENT MEDICATIONS       Previous Medications    ACETAMINOPHEN (TYLENOL) 500 MG TABLET    Take 1 tablet by mouth every 6 hours as needed    ALBUTEROL SULFATE HFA (PROVENTIL;VENTOLIN;PROAIR) 108 (90 BASE) MCG/ACT INHALER    Inhale 1  components:    Chloride 95 (*)     CO2 38 (*)     Anion Gap 5 (*)     Glucose 116 (*)     BUN 26 (*)     Creatinine 1.2 (*)     Est, Glom Filt Rate 50 (*)     Bun/Cre Ratio 22 (*)     All other components within normal limits   TROPONIN - Abnormal; Notable for the following components:    Troponin, High Sensitivity 24 (*)     All other components within normal limits   PROTIME-INR   TROPONIN                Vitals:    Vitals:    04/04/24 1841 04/04/24 1842 04/04/24 1853 04/04/24 1921   BP:       Pulse:   98 96   Resp:   18 18   SpO2: (!) 88% (!) 88% 90% 90%   Weight:       Height:           The patient was given the following medications while in the emergency department:  Orders Placed This Encounter   Medications    magnesium sulfate 2000 mg in 50 mL IVPB premix    methylPREDNISolone sodium succ (SOLU-MEDROL) 125 mg in sterile water 2 mL injection    albuterol (PROVENTIL) (2.5 MG/3ML) 0.083% nebulizer solution     Order Specific Question:   Initiate RT Bronchodilator Protocol     Answer:   No    ipratropium (ATROVENT) 0.02 % nebulizer solution 0.5 mg     Order Specific Question:   Initiate RT Bronchodilator Protocol     Answer:   No    albuterol (PROVENTIL) (2.5 MG/3ML) 0.083% nebulizer solution 10 mg     Order Specific Question:   Initiate RT Bronchodilator Protocol     Answer:   No    oseltamivir (TAMIFLU) capsule 75 mg     CONSULTS:  IP CONSULT TO INTERNAL MEDICINE    FINAL IMPRESSION      1. COPD exacerbation (HCC)    2. Influenza A    3. Increased oxygen demand          DISPOSITION/PLAN   DISPOSITION Decision To Admit 04/04/2024 08:15:10 PM      PATIENT REFERRED TO:  No follow-up provider specified.  DISCHARGE MEDICATIONS:  New Prescriptions    No medications on file     Sherif Arita MD  Attending Emergency Physician      CRITICAL CARE: There was a high probability of clinically significant/life threatening deterioration in this patient's condition which required my urgent intervention.  Total critical

## 2024-04-05 NOTE — PROGRESS NOTES
[facility]  Consult Note       NAME:  Shazia Dickson  MEDICAL RECORD NUMBER:  [id number]  AGE: 68 y.o.   GENDER: female  : 1955  TODAY'S DATE:  2024    Subjective:      Shazia Dickson is a 68 y.o. female with inpatient referral to [facility] for:  \"abd wound\"       Wound Identification:  Wound Type:  other  Contributing Factors: edema, lymphedema, decreased mobility, and obesity    Wound History: patient with wound to abdomen that is being treated at [facility]. Last visit 3/28/2024.   Current Wound Care Treatment:  foam dressing removed at time of [facility] nurse assessment    Patient Goal of Care:  [x] Wound Healing  [] Odor Control  [] Palliative Care  [] Pain Control   [] Other:         PAST MEDICAL HISTORY        Diagnosis Date    Anxiety     Asthma     Bronchitis     DDD (degenerative disc disease), lumbar     Depression     Diabetes mellitus (HCC)     Elevated glucose     Headache(784.0)     Hernia of abdominal cavity     HH (hiatus hernia)     Hyperlipemia, mixed     Hypertension     Osteoarthritis     Right femoral vein DVT (HCC) May, 2009    Dr. Elizabeth    Uterine hyperplasia        PAST SURGICAL HISTORY    Past Surgical History:   Procedure Laterality Date    BREAST REDUCTION SURGERY  1997    BREAST REDUCTION SURGERY  1997    DILATION AND CURETTAGE OF UTERUS  10/08 and     HYSTERECTOMY (CERVIX STATUS UNKNOWN)  7/17/15    Dr Kaitlynn Jaime, endometial cancer, FIGO grade 1    TONSILLECTOMY AND ADENOIDECTOMY      TYMPANOSTOMY TUBE PLACEMENT  1967       FAMILY HISTORY    Family History   Problem Relation Age of Onset    Asthma Mother     Mental Illness Mother     COPD Mother     Cancer Father 86        hairy cell leukemia, and leimyoma sarcoma     Stroke Father 86        minor    Parkinsonism Maternal Grandfather     Cancer Paternal Grandmother     Stroke Paternal Grandmother        SOCIAL HISTORY    Social

## 2024-04-05 NOTE — DISCHARGE INSTR - COC
Continuity of Care Form    Patient Name: Shazia Dickson   :  1955  MRN:  [id number]    Admit date:  2024  Discharge date:  2024    Code Status Order: Full Code   Advance Directives:     Admitting Physician:  Leonel Thomas DO  PCP: Jyoti Mauricio, APRN - CNP    Discharging Nurse: Z[facility] Mack [facility] Unit/Room#: 1023/1023-01  Discharging Unit Phone Number: [phone]    Emergency Contact:   Extended Emergency Contact Information  Primary Emergency Contact: DequanLea juarez  Address: [place] [id number] [facility]  Home Phone: [phone]  Mobile Phone: [phone]  Relation: Other  Secondary Emergency Contact: Edita Cruz  Mobile Phone: [phone]  Relation: Other  Preferred language: English   needed? No    Past Surgical History:  Past Surgical History:   Procedure Laterality Date    BREAST REDUCTION SURGERY  1997    BREAST REDUCTION SURGERY  1997    DILATION AND CURETTAGE OF UTERUS  10/08 and     HYSTERECTOMY (CERVIX STATUS UNKNOWN)  7/17/15    Dr Kaitlynn Jaime, endometial cancer, FIGO grade 1    TONSILLECTOMY AND ADENOIDECTOMY      TYMPANOSTOMY TUBE PLACEMENT  1967       Immunization History:   Immunization History   Administered Date(s) Administered    COVID-19, MODERNA Bivalent, (age 12y+), IM, 50 mcg/0.5 mL 2023    COVID-19, MODERNA Booster BLUE border, (age 18y+), IM, 50mcg/0.25mL 2022, 10/07/2022    FLUARIX 3 YEARS AND OVER 10/26/2015    Hepatitis B 1991    Influenza A (Q3T1-18) Vaccine IM 2009    Influenza Virus Vaccine 2014, 2014, 10/08/2018, 10/17/2019    Influenza Whole 2009    Influenza, AFLURIA (age 3 yrs+), FLUZONE, (age 6 mo+), MDV, 0.5mL 2016    Influenza, FLUAD, (age 65 y+), Adjuvanted, 0.5mL 10/07/2022, 2023    Influenza, FLUARIX, FLULAVAL, FLUZONE (age 6 mo+) AND AFLURIA, (age 3 y+), PF, 0.5mL 10/26/2015, 10/17/2019, 2020    Pneumococcal,

## 2024-04-05 NOTE — PROGRESS NOTES
[facility]  Office: [phone]  Carlos Caro, DO, Felipe Drew, DO, Jerman Mack DO, Leonel Thomas, DO, Larry Pisano MD, Monserrat Rosado MD, Sharon Perez MD, Beverley Curtis MD,  Jimi Messina MD, Kevin Cowan MD, Theresa Chavira MD,  Luis Coffman DO, Florencia Hernandez MD, Giorgi Salas MD, Ramone Caro DO, Mariel Bradshaw MD,  Bandar Anderson DO, Michelle Helm MD, Mireille Hermosillo MD, Yamileth Chong MD, Zo Montgomery MD,  Jordan Hernandez MD, Pilar Salcedo MD, Kevin Greenwood MD, Kevin Landon MD, Ramon Ellsworth MD, Ken Luna MD, Luis French DO, Porter Moore DO, Shayna Woodall MD,  Alvaro Reid MD, Shirley Waterhouse, CNP,  Octavia Coreas CNP, Chemo Pelayo, CNP,  Юлия Calero, DNP, Kaci Rodgers, CNP, Princess Mandujano, CNP, Madelaine Burns, CNP, Ivone Mancia, CNP, Val Hanson, PA-C, Joyce Jackson PA-C, Isabel Bustos, CNP, Johnna Horta, CNP, Karma Yost, CNP, Rosio Resendez, CNP, Jessi Montiel, CNP, Sandie Palmer, CNS, Balbina Hernández, CNP, Dolly Tamez CNP, Tracy Schwab, CNP         [facility]   IN-PATIENT SERVICE   [facility]    Progress Note    4/5/2024    4:09 PM    Name:   Shazia Dickson  MRN:     [id number]     Acct:      [id number]   Room:   1023/1023-01   Day:  1  Admit Date:  4/4/2024  6:36 PM    PCP:   Jyoti Mauricio APRN - CNP  Code Status:  Full Code    Subjective:     C/C:   Chief Complaint   Patient presents with    Shortness of Breath     60% on 4 L @ home     Interval History Status: improved.     Kasia seen and examined. Still ahving cough, shorntess of breath improving. No other issues.    Brief History:     68 year old female with past medical history history of COPD 4L , DVT on warfarin, Depression, hypertension, history of PE, obesity, history of endometrial carcinoma, allergic rhinitis, anxiety who presents with cough shortness of breath and hypoxia.  Found to be positive for influenza.  Currently being  **OR** potassium chloride, magnesium sulfate, ondansetron **OR** ondansetron, polyethylene glycol, acetaminophen **OR** acetaminophen, midodrine, LORazepam    Data:     Past Medical History:   has a past medical history of Anxiety, Asthma, Bronchitis, DDD (degenerative disc disease), lumbar, Depression, Diabetes mellitus (HCC), Elevated glucose, Headache(784.0), Hernia of abdominal cavity, HH (hiatus hernia), Hyperlipemia, mixed, Hypertension, Osteoarthritis, Right femoral vein DVT (HCC), and Uterine hyperplasia.    Social History:   reports that she has never smoked. She has been exposed to tobacco smoke. She has never used smokeless tobacco. She reports that she does not currently use alcohol. She reports that she does not use drugs.     Family History:   Family History   Problem Relation Age of Onset    Asthma Mother     Mental Illness Mother     COPD Mother     Cancer Father 86        hairy cell leukemia, and leimyoma sarcoma     Stroke Father 86        minor    Parkinsonism Maternal Grandfather     Cancer Paternal Grandmother     Stroke Paternal Grandmother        Vitals:  BP (!) 141/75   Pulse 97   Temp 98.7 °F (37.1 °C) (Oral)   Resp 20   Ht 1.651 m (5' 5\")   Wt (!) 177.3 kg (390 lb 14.4 oz)   LMP 2014   SpO2 92%   BMI 65.05 kg/m²   Temp (24hrs), Av.3 °F (36.8 °C), Min:97.8 °F (36.6 °C), Max:98.7 °F (37.1 °C)    No results for input(s): \"POCGLU\" in the last 72 hours.    I/O (24Hr):    Intake/Output Summary (Last 24 hours) at 2024 1609  Last data filed at 2024 0745  Gross per 24 hour   Intake 50 ml   Output 900 ml   Net -850 ml       Labs:  Hematology:  Recent Labs     24  1847 24  2147 24  0608   WBC 2.7*  --  2.8*   RBC 3.60*  --  3.81*   HGB 9.1*  --  9.6*   HCT 33.4*  --  35.6*   MCV 92.8  --  93.4   MCH 25.3  --  25.2   MCHC 27.2*  --  27.0*   RDW 18.6*  --  18.5*     --  147   MPV 8.9  --  9.0   SEDRATE 101*  --   --    CRP  --  29.5*  --    INR 2.2  --

## 2024-04-05 NOTE — PROGRESS NOTES
Occupational Therapy  Facility/Department: [facility] PROGRESSIVE CARE  Occupational Therapy Initial Assessment    Name: Shazia Dickson  : 1955  MRN: [id number]  Date of Service: 2024    JOSUÉ PEREIRA reports patient is medically stable for therapy treatment this date.    Chart reviewed prior to treatment and patient is agreeable for therapy.  All lines intact and patient positioned comfortably at end of treatment.  All patient needs addressed prior to ending therapy session.         Pt currently functioning below baseline.  Recommend daily inpatient skilled therapy at time of discharge to maximize long term outcomes and prevent re-admission. Please refer to AM-PAC score for current level of function.         Discharge Recommendations:  Patient would benefit from continued therapy after discharge  OT Equipment Recommendations  Equipment Needed: Yes  Mobility Devices: ADL Assistive Devices  ADL Assistive Devices: Reacher;Sock-Aid Soft;Long-handled Shoe Horn;Long-handled Sponge;Emergency Alert System       Patient Diagnosis(es): The primary encounter diagnosis was COPD exacerbation (HCC). Diagnoses of Influenza A and Increased oxygen demand were also pertinent to this visit.  Past Medical History:  has a past medical history of Anxiety, Asthma, Bronchitis, DDD (degenerative disc disease), lumbar, Depression, Diabetes mellitus (HCC), Elevated glucose, Headache(784.0), Hernia of abdominal cavity, HH (hiatus hernia), Hyperlipemia, mixed, Hypertension, Osteoarthritis, Right femoral vein DVT (HCC), and Uterine hyperplasia.  Past Surgical History:  has a past surgical history that includes Dilation and curettage of uterus (10/08 and ); Breast reduction surgery (1997); Breast reduction surgery (1997); Tympanostomy tube placement (1967); Tonsillectomy and adenoidectomy (); and Hysterectomy (7/17/15).      PER H&P:    Shazia Dickson is a 68 y.o. with dm2, asthma, COPD on home 4 L, hypertension, DVT on

## 2024-04-05 NOTE — PLAN OF CARE
Pt had a good day overall. Pt had no complaints of pain wcm. Pt turned as needed. Pt has had 1600 out of puriwick. Puriwick replaced. Pt is on 5L nc. Bariatric ordered for pt. Bed in lowest position, call light within reach, all questions asked and answered wcm. Pt safety maintained, VSS. Pt is Aox4.  Problem: Respiratory - Adult  Goal: Achieves optimal ventilation and oxygenation  4/5/2024 1824 by Luis Simon RN  Outcome: Progressing     Problem: Skin/Tissue Integrity - Adult  Goal: Incisions, wounds, or drain sites healing without S/S of infection  Outcome: Progressing     Problem: Musculoskeletal - Adult  Goal: Return ADL status to a safe level of function  Outcome: Progressing     Problem: Infection - Adult  Goal: Absence of infection at discharge  Outcome: Progressing     Problem: Discharge Planning  Goal: Discharge to home or other facility with appropriate resources  Outcome: Progressing     Problem: Pain  Goal: Verbalizes/displays adequate comfort level or baseline comfort level  Outcome: Progressing     Problem: Skin/Tissue Integrity  Goal: Absence of new skin breakdown  Description: 1.  Monitor for areas of redness and/or skin breakdown  2.  Assess vascular access sites hourly  3.  Every 4-6 hours minimum:  Change oxygen saturation probe site  4.  Every 4-6 hours:  If on nasal continuous positive airway pressure, respiratory therapy assess nares and determine need for appliance change or resting period.  Outcome: Progressing     Problem: Safety - Adult  Goal: Free from fall injury  Outcome: Progressing     Problem: ABCDS Injury Assessment  Goal: Absence of physical injury  Outcome: Progressing     Problem: Chronic Conditions and Co-morbidities  Goal: Patient's chronic conditions and co-morbidity symptoms are monitored and maintained or improved  Outcome: Progressing

## 2024-04-05 NOTE — PROGRESS NOTES
Warfarin Dosing - Pharmacy Consult Note  Consulting Provider: Dr. Barbara Dillard  Indication:  History of  VTE/PE  Warfarin Dose prior to admission: 2mg M-W-Sat, 4mg all other days   Concurrent anticoagulants/antiplatelets: none  Significant Drug Interactions:  Solu-medrol  Recent Labs     04/04/24  1847 04/04/24  2147 04/05/24  0608   INR 2.2  --  2.3   HGB 9.1*  --  9.6*     --  147   LABALBU  --  3.3*  --      Recent warfarin administrations                     warfarin (COUMADIN) tablet 4 mg (mg) 4 mg Given 04/05/24 0048                   Date   INR    Dose  4/4         2.2        4 mg  4/5         2.3    Assessment/Plan  (Goal INR: 2 - 3)  INR within goal. Continue home dosing. Coumadin 2mg today. INR in AM.    Active problem list reviewed.  INR orders are placed.  Chart reviewed for pertinent labs, drug/diet interactions, and past doses.  Documentation of patient's clinical condition was reviewed.    Pharmacy Dosing:  Pharmacy will continue to follow.

## 2024-04-06 LAB
ANION GAP SERPL CALCULATED.3IONS-SCNC: 4 MMOL/L (ref 9–17)
BASOPHILS # BLD: 0 K/UL (ref 0–0.2)
BASOPHILS NFR BLD: 0 % (ref 0–2)
BUN SERPL-MCNC: 29 MG/DL (ref 8–23)
BUN/CREAT SERPL: 32 (ref 9–20)
CALCIUM SERPL-MCNC: 9 MG/DL (ref 8.6–10.4)
CHLORIDE SERPL-SCNC: 95 MMOL/L (ref 98–107)
CO2 SERPL-SCNC: 38 MMOL/L (ref 20–31)
CREAT SERPL-MCNC: 0.9 MG/DL (ref 0.5–0.9)
EOSINOPHIL # BLD: 0 K/UL (ref 0–0.44)
EOSINOPHILS RELATIVE PERCENT: 0 % (ref 1–4)
ERYTHROCYTE [DISTWIDTH] IN BLOOD BY AUTOMATED COUNT: 18 % (ref 11.8–14.4)
GFR SERPL CREATININE-BSD FRML MDRD: 70 ML/MIN/1.73M2
GLUCOSE SERPL-MCNC: 201 MG/DL (ref 70–99)
HCT VFR BLD AUTO: 34.3 % (ref 36.3–47.1)
HGB BLD-MCNC: 9.3 G/DL (ref 11.9–15.1)
IMM GRANULOCYTES # BLD AUTO: 0 K/UL (ref 0–0.3)
IMM GRANULOCYTES NFR BLD: 0 %
INR PPP: 3.2
LYMPHOCYTES NFR BLD: 0.71 K/UL (ref 1.1–3.7)
LYMPHOCYTES RELATIVE PERCENT: 10 % (ref 24–43)
MCH RBC QN AUTO: 25.3 PG (ref 25.2–33.5)
MCHC RBC AUTO-ENTMCNC: 27.1 G/DL (ref 28.4–34.8)
MCV RBC AUTO: 93.2 FL (ref 82.6–102.9)
MONOCYTES NFR BLD: 0.14 K/UL (ref 0.1–1.2)
MONOCYTES NFR BLD: 2 % (ref 3–12)
MORPHOLOGY: ABNORMAL
NEUTROPHILS NFR BLD: 88 % (ref 36–65)
NEUTS SEG NFR BLD: 6.25 K/UL (ref 1.5–8.1)
NRBC BLD-RTO: 0 PER 100 WBC
PLATELET # BLD AUTO: 159 K/UL (ref 138–453)
PMV BLD AUTO: 9.3 FL (ref 8.1–13.5)
POTASSIUM SERPL-SCNC: 5 MMOL/L (ref 3.7–5.3)
PROTHROMBIN TIME: 32.5 SEC (ref 11.5–14.2)
RBC # BLD AUTO: 3.68 M/UL (ref 3.95–5.11)
SODIUM SERPL-SCNC: 137 MMOL/L (ref 135–144)
WBC OTHER # BLD: 7.1 K/UL (ref 3.5–11.3)

## 2024-04-06 PROCEDURE — 6360000002 HC RX W HCPCS: Performed by: INTERNAL MEDICINE

## 2024-04-06 PROCEDURE — 99232 SBSQ HOSP IP/OBS MODERATE 35: CPT | Performed by: STUDENT IN AN ORGANIZED HEALTH CARE EDUCATION/TRAINING PROGRAM

## 2024-04-06 PROCEDURE — 6370000000 HC RX 637 (ALT 250 FOR IP): Performed by: INTERNAL MEDICINE

## 2024-04-06 PROCEDURE — 6370000000 HC RX 637 (ALT 250 FOR IP): Performed by: STUDENT IN AN ORGANIZED HEALTH CARE EDUCATION/TRAINING PROGRAM

## 2024-04-06 PROCEDURE — 36415 COLL VENOUS BLD VENIPUNCTURE: CPT

## 2024-04-06 PROCEDURE — 94640 AIRWAY INHALATION TREATMENT: CPT

## 2024-04-06 PROCEDURE — 80048 BASIC METABOLIC PNL TOTAL CA: CPT

## 2024-04-06 PROCEDURE — 94660 CPAP INITIATION&MGMT: CPT

## 2024-04-06 PROCEDURE — 94761 N-INVAS EAR/PLS OXIMETRY MLT: CPT

## 2024-04-06 PROCEDURE — 2060000000 HC ICU INTERMEDIATE R&B

## 2024-04-06 PROCEDURE — 6360000002 HC RX W HCPCS: Performed by: STUDENT IN AN ORGANIZED HEALTH CARE EDUCATION/TRAINING PROGRAM

## 2024-04-06 PROCEDURE — 2580000003 HC RX 258: Performed by: STUDENT IN AN ORGANIZED HEALTH CARE EDUCATION/TRAINING PROGRAM

## 2024-04-06 PROCEDURE — 99232 SBSQ HOSP IP/OBS MODERATE 35: CPT | Performed by: INTERNAL MEDICINE

## 2024-04-06 PROCEDURE — 2580000003 HC RX 258: Performed by: INTERNAL MEDICINE

## 2024-04-06 PROCEDURE — 85025 COMPLETE CBC W/AUTO DIFF WBC: CPT

## 2024-04-06 PROCEDURE — 85610 PROTHROMBIN TIME: CPT

## 2024-04-06 PROCEDURE — 6370000000 HC RX 637 (ALT 250 FOR IP): Performed by: NURSE PRACTITIONER

## 2024-04-06 PROCEDURE — 2700000000 HC OXYGEN THERAPY PER DAY

## 2024-04-06 RX ORDER — TRAMADOL HYDROCHLORIDE 50 MG/1
50 TABLET ORAL PRN
COMMUNITY

## 2024-04-06 RX ORDER — FUROSEMIDE 10 MG/ML
40 INJECTION INTRAMUSCULAR; INTRAVENOUS ONCE
Status: COMPLETED | OUTPATIENT
Start: 2024-04-06 | End: 2024-04-06

## 2024-04-06 RX ORDER — DOXYCYCLINE HYCLATE 100 MG
100 TABLET ORAL 2 TIMES DAILY
Status: ON HOLD | COMMUNITY
End: 2024-04-16 | Stop reason: HOSPADM

## 2024-04-06 RX ADMIN — IPRATROPIUM BROMIDE AND ALBUTEROL SULFATE 1 DOSE: 2.5; .5 SOLUTION RESPIRATORY (INHALATION) at 11:17

## 2024-04-06 RX ADMIN — AZITHROMYCIN MONOHYDRATE 500 MG: 500 INJECTION, POWDER, LYOPHILIZED, FOR SOLUTION INTRAVENOUS at 12:46

## 2024-04-06 RX ADMIN — PRAVASTATIN SODIUM 80 MG: 40 TABLET ORAL at 17:13

## 2024-04-06 RX ADMIN — OSELTAMIVIR PHOSPHATE 75 MG: 75 CAPSULE ORAL at 08:16

## 2024-04-06 RX ADMIN — BUDESONIDE 500 MCG: 0.5 INHALANT ORAL at 20:38

## 2024-04-06 RX ADMIN — WATER 60 MG: 1 INJECTION INTRAMUSCULAR; INTRAVENOUS; SUBCUTANEOUS at 18:24

## 2024-04-06 RX ADMIN — SODIUM CHLORIDE: 9 INJECTION, SOLUTION INTRAVENOUS at 11:23

## 2024-04-06 RX ADMIN — OSELTAMIVIR PHOSPHATE 75 MG: 75 CAPSULE ORAL at 20:51

## 2024-04-06 RX ADMIN — SODIUM CHLORIDE, PRESERVATIVE FREE 10 ML: 5 INJECTION INTRAVENOUS at 08:17

## 2024-04-06 RX ADMIN — FLUTICASONE PROPIONATE 1 SPRAY: 50 SPRAY, METERED NASAL at 08:17

## 2024-04-06 RX ADMIN — WATER 60 MG: 1 INJECTION INTRAMUSCULAR; INTRAVENOUS; SUBCUTANEOUS at 06:36

## 2024-04-06 RX ADMIN — ALBUTEROL SULFATE 2.5 MG: 2.5 SOLUTION RESPIRATORY (INHALATION) at 00:32

## 2024-04-06 RX ADMIN — BUDESONIDE 500 MCG: 0.5 INHALANT ORAL at 08:22

## 2024-04-06 RX ADMIN — SODIUM CHLORIDE, PRESERVATIVE FREE 10 ML: 5 INJECTION INTRAVENOUS at 20:52

## 2024-04-06 RX ADMIN — FUROSEMIDE 40 MG: 10 INJECTION, SOLUTION INTRAMUSCULAR; INTRAVENOUS at 17:11

## 2024-04-06 RX ADMIN — LORAZEPAM 1 MG: 2 INJECTION INTRAMUSCULAR; INTRAVENOUS at 21:38

## 2024-04-06 RX ADMIN — IPRATROPIUM BROMIDE AND ALBUTEROL SULFATE 1 DOSE: 2.5; .5 SOLUTION RESPIRATORY (INHALATION) at 15:11

## 2024-04-06 RX ADMIN — MONTELUKAST 10 MG: 10 TABLET, FILM COATED ORAL at 20:51

## 2024-04-06 RX ADMIN — DILTIAZEM HYDROCHLORIDE 120 MG: 120 CAPSULE, EXTENDED RELEASE ORAL at 08:16

## 2024-04-06 RX ADMIN — IPRATROPIUM BROMIDE AND ALBUTEROL SULFATE 1 DOSE: 2.5; .5 SOLUTION RESPIRATORY (INHALATION) at 20:38

## 2024-04-06 RX ADMIN — PANTOPRAZOLE SODIUM 40 MG: 40 TABLET, DELAYED RELEASE ORAL at 06:36

## 2024-04-06 RX ADMIN — IPRATROPIUM BROMIDE AND ALBUTEROL SULFATE 1 DOSE: 2.5; .5 SOLUTION RESPIRATORY (INHALATION) at 08:22

## 2024-04-06 RX ADMIN — POLYETHYLENE GLYCOL 3350 17 G: 17 POWDER, FOR SOLUTION ORAL at 08:16

## 2024-04-06 NOTE — PROGRESS NOTES
Today's Date: 4/6/2024  Patient Name: Shazia Dickson  Date of admission: 4/4/2024  6:36 PM  Patient's age: 68 y.o., 1955  Admission Dx: Influenza A [J10.1]  COPD exacerbation (HCC) [J44.1]  Acute respiratory failure with hypoxia (HCC) [J96.01]  Increased oxygen demand [R68.89]    Reason for Consult: management recommendations  Requesting Physician: Leonel Thomas DO    CHIEF COMPLAINT: Shortness of breath.  Leukopenia.    INTERIM HISTORY  Patient is seen and evaluated.  Overall feels well.  No bleeding but she has multiple bruises.  No fever or chills.  Abdominal wound is not bleeding.    HISTORY OF PRESENT ILLNESS:      The patient is a 68 y.o.  female who is admitted to the hospital with chief complaint of shortness of breath.  Complains of generalized bodyaches and upper respite tract infection symptoms.  Patient recently had been seen infectious disease team for healing abdominal wall wound.  Dr. Nunez.  Per records patient has chronic shortness of breath.    Hematology oncology team consulted due to cytopenias.  Patient CBC from 10/23 shows WBC count 6.0 hemoglobin 8.9 platelet count 225.  Patient CBC at presentation shows WBC count of 2.7 hemoglobin 9.1 and creatinine of 153.  Patient ANC was 1.79.  Patient just finished course of antibiotics.    Plan past Medical History:   has a past medical history of Anxiety, Asthma, Bronchitis, DDD (degenerative disc disease), lumbar, Depression, Diabetes mellitus (HCC), Elevated glucose, Headache(784.0), Hernia of abdominal cavity, HH (hiatus hernia), Hyperlipemia, mixed, Hypertension, Osteoarthritis, Right femoral vein DVT (HCC), and Uterine hyperplasia.    Past Surgical History:   has a past surgical history that includes Dilation and curettage of uterus (10/08 and 09/07); Breast reduction surgery (12/1997); Breast reduction surgery (12/1997); Tympanostomy tube placement (03/1967); Tonsillectomy and adenoidectomy (1962); and Hysterectomy  Range    Ventricular Rate 97 BPM    Atrial Rate 97 BPM    QRS Duration 84 ms    Q-T Interval 322 ms    QTc Calculation (Bazett) 408 ms    R Axis -17 degrees    T Axis 56 degrees         IMAGING DATA:    XR CHEST PORTABLE    Result Date: 4/4/2024  EXAMINATION: ONE XRAY VIEW OF THE CHEST 4/4/2024 6:04 pm COMPARISON: 11/14/2023. HISTORY: ORDERING SYSTEM PROVIDED HISTORY: SOB, cough TECHNOLOGIST PROVIDED HISTORY: SOB, cough Reason for Exam: SOB FINDINGS: The study is significantly under exposed.  There is cardiomegaly and vascular congestion.  There is no interstitial prominence.  No airspace consolidation or effusion is identified, but evaluation of the left lung base is limited.     1. Significantly limited study due to underexposure. 2. Cardiomegaly and vascular congestion without interstitial prominence to suggest interstitial edema, but the interstitium is evaluated to a limited degree. 3. No confluent airspace consolidation, but evaluation of the left lung base is limited due to cardiomegaly and underexposure.         IMPRESSION:   Primary Problem  Acute respiratory failure with hypoxia (HCC)    Active Hospital Problems    Diagnosis Date Noted    Cytopenia [D75.9] 04/05/2024    Influenza A [J10.1] 04/05/2024    COPD exacerbation (HCC) [J44.1] 04/05/2024    Increased oxygen demand [R68.89] 04/05/2024    Acute respiratory failure with hypoxia (HCC) [J96.01] 04/04/2024    Pulmonary HTN (HCC) [I27.20] 05/05/2018    Diabetes mellitus type 2, noninsulin dependent (HCC) [E11.9] 05/04/2018    Acute on chronic respiratory failure with hypoxia (HCC) [J96.21] 05/04/2018    Pulmonary embolism on right (HCC) [I26.99] 05/04/2018    BMI 60.0-69.9, adult (HCC) [Z68.44] 10/04/2017    Chronic deep vein thrombosis (DVT) of femoral vein of right lower extremity (HCC) [I82.511] 06/08/2016    Stage 3a chronic kidney disease (HCC) [N18.31] 02/03/2016    Essential hypertension [I10] 08/30/2015    Endometrial cancer (HCC) [C54.1]

## 2024-04-06 NOTE — PLAN OF CARE
Problem: Respiratory - Adult  Goal: Able to breathe comfortably  4/5/2024 2057 by Balbina Mcdaniels RCP  Outcome: Progressing     Problem: Respiratory - Adult  Goal: Patient's breath sounds will be clear and equal  4/5/2024 2057 by Balbina Mcdaniels RCP  Outcome: Progressing     Problem: Respiratory - Adult  Goal: Adequate oxygenation  Description: Adequate oxygenation  4/5/2024 2057 by Balbina Mcdaniels RCP  Outcome: Progressing     Problem: Respiratory - Adult  Goal: Achieves optimal ventilation and oxygenation  4/5/2024 2057 by Balbina Mcdaniels RCP  Outcome: Progressing        BRONCHOSPASM/BRONCHOCONSTRICTION    IMPROVE  AERATION/BREATHSOUNDS  ADMINISTER BRONCHODILATOR THERAPY AS APPROPRIATE  ASSESS BREATH SOUNDS  INITIATE AEROSOL PROTOCOL IF ORDERED TO DO SO  PATIENT EDUCATION AS NEEDED      PROVIDE ADEQUATE OXYGENATION WITH ACCEPTABLE SP02/ABG'S    [x]  IDENTIFY APPROPRIATE OXYGEN THERAPY  [x]   MONITOR SP02/ABG'S AS NEEDED   [x]   PATIENT EDUCATION AS NEEDED

## 2024-04-06 NOTE — PLAN OF CARE
Pt transferred into [facility] at 0300. She is currently lying in bed comfortably with no complaints of pain. Purewick replaced and complete bed change done. Vitals remained stable. All morning scheduled meds given, see MAR.     Standard safety measures in place. Call light within reach. Bed in locked and lowest position.     Problem: Respiratory - Adult  Goal: Achieves optimal ventilation and oxygenation  4/6/2024 0452 by Chip Goodman RN  Outcome: Progressing     Problem: Discharge Planning  Goal: Discharge to home or other facility with appropriate resources  4/6/2024 0452 by Chip Goodman RN  Outcome: Progressing     Problem: Pain  Goal: Verbalizes/displays adequate comfort level or baseline comfort level  4/6/2024 0452 by Chip Goodman RN  Outcome: Progressing     Problem: Skin/Tissue Integrity - Adult  Goal: Incisions, wounds, or drain sites healing without S/S of infection  4/6/2024 0452 by Chip Goodman RN  Outcome: Progressing     Problem: Musculoskeletal - Adult  Goal: Return ADL status to a safe level of function  4/6/2024 0452 by Chip Goodman RN  Outcome: Progressing     Problem: Infection - Adult  Goal: Absence of infection at discharge  4/6/2024 0452 by Chip Goodman RN  Outcome: Progressing

## 2024-04-06 NOTE — RT PROTOCOL NOTE
RT Inhaler-Nebulizer Bronchodilator Protocol Note    There is a bronchodilator order in the chart from a provider indicating to follow the RT Bronchodilator Protocol and there is an “Initiate RT Inhaler-Nebulizer Bronchodilator Protocol” order as well (see protocol at bottom of note).    CXR Findings:  XR CHEST PORTABLE    Result Date: 4/5/2024  Cardiomegaly with scattered infiltrates.     XR CHEST PORTABLE    Result Date: 4/4/2024  1. Significantly limited study due to underexposure. 2. Cardiomegaly and vascular congestion without interstitial prominence to suggest interstitial edema, but the interstitium is evaluated to a limited degree. 3. No confluent airspace consolidation, but evaluation of the left lung base is limited due to cardiomegaly and underexposure.       The findings from the last RT Protocol Assessment were as follows:   History Pulmonary Disease: Chronic pulmonary disease  Respiratory Pattern: Dyspnea on exertion or RR 21-25 bpm  Breath Sounds: Severe inspiratory and expiratory wheezing or severely diminished (No wheezing. Very diminished,.)  Cough: Strong, productive  Indication for Bronchodilator Therapy: Decreased or absent breath sounds, On home bronchodilators  Bronchodilator Assessment Score: 13    Aerosolized bronchodilator medication orders have been revised according to the RT Inhaler-Nebulizer Bronchodilator Protocol below.    Respiratory Therapist to perform RT Therapy Protocol Assessment initially then follow the protocol.  Repeat RT Therapy Protocol Assessment PRN for score 0-3 or on second treatment, BID, and PRN for scores above 3.    No Indications - adjust the frequency to every 6 hours PRN wheezing or bronchospasm, if no treatments needed after 48 hours then discontinue using Per Protocol order mode.     If indication present, adjust the RT bronchodilator orders based on the Bronchodilator Assessment Score as indicated below.  Use Inhaler orders unless patient has one or more of

## 2024-04-06 NOTE — PLAN OF CARE
Pt remains free of falls as well as acute events throughout the shift.  Vital Signs Stable.  Pt on 4L of oxygen via nasal cannula.   Pt received P.O Tamiflu.  Pt received IV Zithromax, IV Lasix, and IV Solumedrol.  Safety measures in place, call light within reach, all questions answered.   Problem: Respiratory - Adult  Goal: Achieves optimal ventilation and oxygenation  4/6/2024 1325 by Sorin Caban RN  Outcome: Progressing     Problem: Discharge Planning  Goal: Discharge to home or other facility with appropriate resources  4/6/2024 1325 by Sorin Caban RN  Outcome: Progressing     Problem: Pain  Goal: Verbalizes/displays adequate comfort level or baseline comfort level  4/6/2024 1325 by Sorin Caban RN  Outcome: Progressing     Problem: Skin/Tissue Integrity  Goal: Absence of new skin breakdown  Description: 1.  Monitor for areas of redness and/or skin breakdown  2.  Assess vascular access sites hourly  3.  Every 4-6 hours minimum:  Change oxygen saturation probe site  4.  Every 4-6 hours:  If on nasal continuous positive airway pressure, respiratory therapy assess nares and determine need for appliance change or resting period.  Outcome: Progressing     Problem: Safety - Adult  Goal: Free from fall injury  4/6/2024 1325 by Sorin Caban RN  Outcome: Progressing     Problem: ABCDS Injury Assessment  Goal: Absence of physical injury  Outcome: Progressing     Problem: Skin/Tissue Integrity - Adult  Goal: Incisions, wounds, or drain sites healing without S/S of infection  4/6/2024 1325 by Sorin Caban RN  Outcome: Progressing     Problem: Musculoskeletal - Adult  Goal: Return ADL status to a safe level of function  4/6/2024 1325 by Sorin Caban RN  Outcome: Progressing     Problem: Infection - Adult  Goal: Absence of infection at discharge  4/6/2024 1325 by Sorin Caban RN  Outcome: Progressing     Problem: Chronic Conditions and Co-morbidities  Goal: Patient's chronic conditions and co-morbidity

## 2024-04-06 NOTE — PLAN OF CARE
Tara is resting well with no s/s or c/o pain this shift. She is incontinent of bladder and has pure wick in place. Pt is afebrile on therapy for influenza with no s/s of adverse reaction noted. Lung sounds continue to be adventitious. Pt has call light in reach and is using appropriately; safety maintained.    Problem: Pain  Goal: Verbalizes/displays adequate comfort level or baseline comfort level  4/6/2024 0601 by Mena Camargo, RN  Flowsheets (Taken 4/5/2024 1957)  Verbalizes/displays adequate comfort level or baseline comfort level:   Encourage patient to monitor pain and request assistance   Assess pain using appropriate pain scale     Problem: Respiratory - Adult  Goal: Achieves optimal ventilation and oxygenation  4/6/2024 0558 by Mena Camargo RN  Outcome: Progressing  Flowsheets (Taken 4/5/2024 2000 by Mena Camargo, RN)  Achieves optimal ventilation and oxygenation:   Assess for changes in respiratory status   Assess for changes in mentation and behavior   Position to facilitate oxygenation and minimize respiratory effort   Oxygen supplementation based on oxygen saturation or arterial blood gases   Respiratory therapy support as indicated     Problem: Skin/Tissue Integrity - Adult  Goal: Incisions, wounds, or drain sites healing without S/S of infection  4/6/2024 0558 by Mena Camargo, RN  Outcome: Progressing     Problem: Safety - Adult  Goal: Free from fall injury  4/6/2024 0558 by Mena Camargo, RN  Outcome: Progressing

## 2024-04-06 NOTE — PROGRESS NOTES
Warfarin Dosing - Pharmacy Consult Note  Consulting Provider: Dr. Barbara Dillard  Indication:  History of  VTE/PE  Warfarin Dose prior to admission: 2mg M-W-Sat, 4mg all other days   Concurrent anticoagulants/antiplatelets: none  Significant Drug Interactions:  Solu-medrol  Recent Labs     04/04/24  1847 04/04/24  2147 04/05/24  0608 04/06/24  0504   INR 2.2  --  2.3 3.2   HGB 9.1*  --  9.6* 9.3*     --  147 159   LABALBU  --  3.3*  --   --      Recent warfarin administrations                     warfarin (COUMADIN) tablet 2 mg (mg) 2 mg Given 04/05/24 1642    warfarin (COUMADIN) tablet 4 mg (mg) 4 mg Given 04/05/24 0048                   Date   INR    Dose  4/4         2.2        4 mg  4/5         2.3        2 mg  4/6         3.2     Assessment/Plan  (Goal INR: 2 - 3)  INR increased rapidly to 3.2 this AM. Hold warfarin dose.     Active problem list reviewed.  INR orders are placed.  Chart reviewed for pertinent labs, drug/diet interactions, and past doses.  Documentation of patient's clinical condition was reviewed.    Pharmacy Dosing:  Pharmacy will continue to follow.

## 2024-04-06 NOTE — PROGRESS NOTES
05/05/2018 03:12 PM    [id number] 93 05/05/2018 03:12 PM    FIO2 100.0 05/05/2018 03:12 PM     Lab Results   Component Value Date/Time    SPECIAL NOT REPORTED 05/18/2018 03:00 AM     Lab Results   Component Value Date/Time    CULTURE NORMAL RESPIRATORY SHEBA HEAVY GROWTH 08/10/2022 05:20 AM       Radiology:  XR CHEST PORTABLE    Result Date: 4/5/2024  Cardiomegaly with scattered infiltrates.     XR CHEST PORTABLE    Result Date: 4/4/2024  1. Significantly limited study due to underexposure. 2. Cardiomegaly and vascular congestion without interstitial prominence to suggest interstitial edema, but the interstitium is evaluated to a limited degree. 3. No confluent airspace consolidation, but evaluation of the left lung base is limited due to cardiomegaly and underexposure.       Physical Examination:     Physical Exam  Constitutional:       Appearance: She is obese.   HENT:      Mouth/Throat:      Mouth: Mucous membranes are moist.   Cardiovascular:      Rate and Rhythm: Normal rate and regular rhythm.   Abdominal:      General: Abdomen is flat.      Palpations: Abdomen is soft.   Musculoskeletal:      Comments: Lymphedema in lower extremity   Skin:     General: Skin is warm.   Neurological:      General: No focal deficit present.      Mental Status: She is alert.   Psychiatric:         Mood and Affect: Mood normal.         Assessment:     Hospital Problems             Last Modified POA    * (Principal) Acute respiratory failure with hypoxia (HCC) 4/4/2024 Yes    Anxiety 4/5/2024 Yes    Depression 4/5/2024 Yes    Seasonal allergies 4/5/2024 Yes    Hyperlipemia, mixed 4/5/2024 Yes    Anticoagulated on Coumadin 4/5/2024 Yes    Endometrial cancer (HCC) 4/5/2024 Yes    Overview Signed 3/21/2017  5:39 PM by Rajinder Tejada DO     Following with Dr Calderón of gynecology.          Essential hypertension 4/5/2024 Yes    Stage 3a chronic kidney disease (HCC) 4/5/2024 Yes    Chronic deep vein thrombosis (DVT) of femoral vein of

## 2024-04-06 NOTE — RT PROTOCOL NOTE
RT Inhaler-Nebulizer Bronchodilator Protocol Note    There is a bronchodilator order in the chart from a provider indicating to follow the RT Bronchodilator Protocol and there is an “Initiate RT Inhaler-Nebulizer Bronchodilator Protocol” order as well (see protocol at bottom of note).    CXR Findings:  XR CHEST PORTABLE    Result Date: 4/5/2024  Cardiomegaly with scattered infiltrates.     XR CHEST PORTABLE    Result Date: 4/4/2024  1. Significantly limited study due to underexposure. 2. Cardiomegaly and vascular congestion without interstitial prominence to suggest interstitial edema, but the interstitium is evaluated to a limited degree. 3. No confluent airspace consolidation, but evaluation of the left lung base is limited due to cardiomegaly and underexposure.       The findings from the last RT Protocol Assessment were as follows:   History Pulmonary Disease: Chronic pulmonary disease  Respiratory Pattern: Mild dyspnea at rest, irregular pattern, or RR 21-25 bpm  Breath Sounds: Inspiratory and expiratory or bilateral wheezing and/or rhonchi  Cough: Strong, spontaneous, non-productive  Indication for Bronchodilator Therapy: Decreased or absent breath sounds  Bronchodilator Assessment Score: 12    Aerosolized bronchodilator medication orders have been revised according to the RT Inhaler-Nebulizer Bronchodilator Protocol below.    Respiratory Therapist to perform RT Therapy Protocol Assessment initially then follow the protocol.  Repeat RT Therapy Protocol Assessment PRN for score 0-3 or on second treatment, BID, and PRN for scores above 3.    No Indications - adjust the frequency to every 6 hours PRN wheezing or bronchospasm, if no treatments needed after 48 hours then discontinue using Per Protocol order mode.     If indication present, adjust the RT bronchodilator orders based on the Bronchodilator Assessment Score as indicated below.  Use Inhaler orders unless patient has one or more of the following: on home  nebulizer, not able to hold breath for 10 seconds, is not alert and oriented, cannot activate and use MDI correctly, or respiratory rate 25 breaths per minute or more, then use the equivalent nebulizer order(s) with same Frequency and PRN reasons based on the score.  If a patient is on this medication at home then do not decrease Frequency below that used at home.    0-3 - enter or revise RT bronchodilator order(s) to equivalent RT Bronchodilator order with Frequency of every 4 hours PRN for wheezing or increased work of breathing using Per Protocol order mode.        4-6 - enter or revise RT Bronchodilator order(s) to two equivalent RT bronchodilator orders with one order with BID Frequency and one order with Frequency of every 4 hours PRN wheezing or increased work of breathing using Per Protocol order mode.        7-10 - enter or revise RT Bronchodilator order(s) to two equivalent RT bronchodilator orders with one order with TID Frequency and one order with Frequency of every 4 hours PRN wheezing or increased work of breathing using Per Protocol order mode.       11-13 - enter or revise RT Bronchodilator order(s) to one equivalent RT bronchodilator order with QID Frequency and an Albuterol order with Frequency of every 4 hours PRN wheezing or increased work of breathing using Per Protocol order mode.      Greater than 13 - enter or revise RT Bronchodilator order(s) to one equivalent RT bronchodilator order with every 4 hours Frequency and an Albuterol order with Frequency of every 2 hours PRN wheezing or increased work of breathing using Per Protocol order mode.     RT to enter RT Home Evaluation for COPD & MDI Assessment order using Per Protocol order mode.    Electronically signed by Sabine Stewart RCP on 4/6/2024 at 8:24 AM

## 2024-04-06 NOTE — PROGRESS NOTES
Transitions of Care Pharmacy Service   Medication Review    The patient's list of current home medications has been reviewed.     Source(s) of information: Patient; Surescripts    Based on information provided by the above source(s), I have updated the patient's home med list as described below.     Please review the ACTION REQUESTED section of this note below for any discrepancies on current hospital orders.      I changed or updated the following medications on the patient's home medication list:  Removed Hydrochlorothiazide  Paxlovid     Added Doxycycline  Tramadol     Adjusted   None   Other Notes None         PROVIDER ACTION REQUESTED  Medications that need to be addressed by a physician/nurse practitioner:    Medication Action Requested        Home med list updated and ready for provider review.          Please feel free to call me with any questions about this encounter. Thank you.    Edin Meyers RPH   Transitions of Care Pharmacy Service  Phone:  [phone]  Fax: [phone]      Electronically signed by Edin Meyers RPH on 4/6/2024 at 4:06 PM         Medications Prior to Admission:   traMADol (ULTRAM) 50 MG tablet, Take 1 tablet by mouth as needed for Pain.  doxycycline hyclate (VIBRA-TABS) 100 MG tablet, Take 1 tablet by mouth 2 times daily Prescribed for possible bronchitis; pt has not started yet.  ipratropium 0.5 mg-albuterol 2.5 mg (DUONEB) 0.5-2.5 (3) MG/3ML SOLN nebulizer solution, inhale contents of 1 vial ( 3 milliliters ) in nebulizer by mouth and INTO THE LUNGS three times a day  albuterol sulfate HFA (PROVENTIL;VENTOLIN;PROAIR) 108 (90 Base) MCG/ACT inhaler, Inhale 1 puff into the lungs every 6 hours as needed for Shortness of Breath  losartan (COZAAR) 100 MG tablet, take 1 tablet by mouth once daily  dilTIAZem (CARTIA XT) 120 MG extended release capsule, take 1 capsule by mouth once daily  budesonide (PULMICORT) 0.5 MG/2ML nebulizer suspension, inhale contents of 1 vial ( 2  milliliters ) in nebulizer twice a day  blood glucose test strips (FREESTYLE LITE) strip, 1 each by In Vitro route daily  montelukast (SINGULAIR) 10 MG tablet, take 1 tablet by mouth at bedtime  pravastatin (PRAVACHOL) 80 MG tablet, Take 1 tablet by mouth every evening  warfarin (COUMADIN) 4 MG tablet, Take 1-2 tablets by mouth daily As directed by [facility] Medication Management  metaxalone (SKELAXIN) 800 MG tablet, Take 1 tablet by mouth 3 times daily as needed for Pain  OXYGEN, Inhale 4 L into the lungs daily  Cholecalciferol (VITAMIN D) 50 MCG (2000 UT) TABS tablet, Take 1 tablet by mouth daily  FREESTYLE LANCETS MISC, Test daily and if needed  Blood Glucose Monitoring Suppl (BLOOD GLUCOSE MONITOR SYSTEM) w/Device KIT, Check glucose daily and as needed.  Omega-3 Fatty Acids (FISH OIL) 1200 MG CAPS, Take 1,200 mg by mouth 2 times daily  Coenzyme Q10 (COQ10) 200 MG CAPS, Take  by mouth daily.    acetaminophen (TYLENOL) 500 MG tablet, Take 1 tablet by mouth every 6 hours as needed  Multiple Vitamin (MULTI-VITAMIN PO), Take 1 tablet by mouth daily  Glucosamine-Chondroit-Vit C-Mn (GLUCOSAMINE CHONDR 500 COMPLEX) CAPS, Take  by mouth 2 times daily.

## 2024-04-06 NOTE — PLAN OF CARE
Problem: Respiratory - Adult  Goal: Able to breathe comfortably  4/6/2024 0822 by Sabine Stewart RCP  Outcome: Progressing     Problem: Respiratory - Adult  Goal: Patient's breath sounds will be clear and equal  4/6/2024 0822 by Sabine Stewart RCP  Outcome: Progressing     Problem: Respiratory - Adult  Goal: Adequate oxygenation  Description: Adequate oxygenation  4/6/2024 0822 by Sabine Stewart RCP  Outcome: Progressing     Problem: Respiratory - Adult  Goal: Achieves optimal ventilation and oxygenation  4/6/2024 0822 by Sabine Stewart RCP  Outcome: Progressing

## 2024-04-06 NOTE — CONSULTS
04/06/2024 05:04 AM     02/25/2012 09:38 AM    MCV 93.2 04/06/2024 05:04 AM    MCH 25.3 04/06/2024 05:04 AM    MCHC 27.1 04/06/2024 05:04 AM    RDW 18.0 04/06/2024 05:04 AM    LYMPHOPCT 10 04/06/2024 05:04 AM    MONOPCT 2 04/06/2024 05:04 AM    BASOPCT 0 04/06/2024 05:04 AM    MONOSABS 0.14 04/06/2024 05:04 AM    LYMPHSABS 0.71 04/06/2024 05:04 AM    EOSABS 0.00 04/06/2024 05:04 AM    BASOSABS 0.00 04/06/2024 05:04 AM    DIFFTYPE NOT REPORTED 12/13/2019 02:19 PM     BMP   Lab Results   Component Value Date/Time     04/06/2024 05:04 AM    K 5.0 04/06/2024 05:04 AM    CL 95 04/06/2024 05:04 AM    CO2 38 04/06/2024 05:04 AM    BUN 29 04/06/2024 05:04 AM    CREATININE 0.9 04/06/2024 05:04 AM    GLUCOSE 201 04/06/2024 05:04 AM    GLUCOSE 119 02/25/2012 09:38 AM    CALCIUM 9.0 04/06/2024 05:04 AM    MG 2.1 04/04/2024 09:47 PM    PHOS 4.2 05/21/2018 04:49 AM     LFTS  Lab Results   Component Value Date/Time    ALKPHOS 63 04/04/2024 09:47 PM    ALT 19 04/04/2024 09:47 PM    AST 28 04/04/2024 09:47 PM    PROT 7.4 04/04/2024 09:47 PM    BILITOT 0.2 04/04/2024 09:47 PM    BILIDIR <0.1 04/04/2024 09:47 PM    IBILI Can not be calculated 04/04/2024 09:47 PM    LABALBU 3.3 04/04/2024 09:47 PM    LABALBU 4.0 02/25/2012 09:38 AM       Lab Results   Component Value Date    APTT 70.1 (H) 10/14/2023     INR   Lab Results   Component Value Date    INR 3.2 04/06/2024    INR 2.3 04/05/2024    INR 2.2 04/04/2024    PROTIME 32.5 (H) 04/06/2024    PROTIME 24.7 (H) 04/05/2024    PROTIME 24.4 (H) 04/04/2024      Latest Reference Range & Units 04/05/24 06:26   pH, Miles 7.320 - 7.430  7.311 (L)   pCO2, Miles 41.0 - 51.0 mm Hg 74.9 (HH)   pO2, Miles 30.0 - 50.0 mm Hg 60.6 (H)   HCO3, Venous 22.0 - 29.0 mmol/L 37.8 (H)   Positive Base Excess, Miles 0.0 - 3.0 mmol/L 8.9 (H)   O2 Sat, Miles 60.0 - 85.0 % 86.9 (H)   Critical Notification  emilyp   (HH): Data is critically high  (L): Data is abnormally low  (H): Data is abnormally  high    Radiology    CXR  4/5  There are scattered infiltrates.  There is no pneumothorax.  The heart is enlarged.  The upper abdomen is unremarkable.  The extrathoracic soft tissues are unremarkable.      (See actual reports for details)    \"Thank you for asking us to see this patient\"    Case discussed with nurse and patient/family.  Questions and concerns addressed.    Electronically signed by     Keon Collins MD on 4/6/2024 at 4:24 PM

## 2024-04-07 ENCOUNTER — APPOINTMENT (OUTPATIENT)
Dept: GENERAL RADIOLOGY | Age: 69
End: 2024-04-07
Payer: MEDICARE

## 2024-04-07 LAB
INR PPP: 3.5
PROTHROMBIN TIME: 34.9 SEC (ref 11.5–14.2)

## 2024-04-07 PROCEDURE — 36415 COLL VENOUS BLD VENIPUNCTURE: CPT

## 2024-04-07 PROCEDURE — 2700000000 HC OXYGEN THERAPY PER DAY

## 2024-04-07 PROCEDURE — 2580000003 HC RX 258: Performed by: STUDENT IN AN ORGANIZED HEALTH CARE EDUCATION/TRAINING PROGRAM

## 2024-04-07 PROCEDURE — 2060000000 HC ICU INTERMEDIATE R&B

## 2024-04-07 PROCEDURE — 6370000000 HC RX 637 (ALT 250 FOR IP): Performed by: INTERNAL MEDICINE

## 2024-04-07 PROCEDURE — 94660 CPAP INITIATION&MGMT: CPT

## 2024-04-07 PROCEDURE — 6360000002 HC RX W HCPCS: Performed by: INTERNAL MEDICINE

## 2024-04-07 PROCEDURE — 85610 PROTHROMBIN TIME: CPT

## 2024-04-07 PROCEDURE — 5A09357 ASSISTANCE WITH RESPIRATORY VENTILATION, LESS THAN 24 CONSECUTIVE HOURS, CONTINUOUS POSITIVE AIRWAY PRESSURE: ICD-10-PCS | Performed by: STUDENT IN AN ORGANIZED HEALTH CARE EDUCATION/TRAINING PROGRAM

## 2024-04-07 PROCEDURE — 71045 X-RAY EXAM CHEST 1 VIEW: CPT

## 2024-04-07 PROCEDURE — 6370000000 HC RX 637 (ALT 250 FOR IP): Performed by: NURSE PRACTITIONER

## 2024-04-07 PROCEDURE — 6370000000 HC RX 637 (ALT 250 FOR IP): Performed by: STUDENT IN AN ORGANIZED HEALTH CARE EDUCATION/TRAINING PROGRAM

## 2024-04-07 PROCEDURE — 6360000002 HC RX W HCPCS: Performed by: STUDENT IN AN ORGANIZED HEALTH CARE EDUCATION/TRAINING PROGRAM

## 2024-04-07 PROCEDURE — 2580000003 HC RX 258: Performed by: INTERNAL MEDICINE

## 2024-04-07 PROCEDURE — 94761 N-INVAS EAR/PLS OXIMETRY MLT: CPT

## 2024-04-07 PROCEDURE — 99232 SBSQ HOSP IP/OBS MODERATE 35: CPT | Performed by: INTERNAL MEDICINE

## 2024-04-07 PROCEDURE — 94640 AIRWAY INHALATION TREATMENT: CPT

## 2024-04-07 RX ORDER — WARFARIN SODIUM 2 MG/1
2 TABLET ORAL
COMMUNITY

## 2024-04-07 RX ORDER — FUROSEMIDE 10 MG/ML
40 INJECTION INTRAMUSCULAR; INTRAVENOUS ONCE
Status: COMPLETED | OUTPATIENT
Start: 2024-04-07 | End: 2024-04-07

## 2024-04-07 RX ADMIN — PRAVASTATIN SODIUM 80 MG: 40 TABLET ORAL at 17:21

## 2024-04-07 RX ADMIN — SODIUM CHLORIDE: 9 INJECTION, SOLUTION INTRAVENOUS at 09:45

## 2024-04-07 RX ADMIN — MICONAZOLE NITRATE: 20 CREAM TOPICAL at 20:15

## 2024-04-07 RX ADMIN — OSELTAMIVIR PHOSPHATE 75 MG: 75 CAPSULE ORAL at 20:14

## 2024-04-07 RX ADMIN — MICONAZOLE NITRATE: 20 CREAM TOPICAL at 14:30

## 2024-04-07 RX ADMIN — MONTELUKAST 10 MG: 10 TABLET, FILM COATED ORAL at 20:14

## 2024-04-07 RX ADMIN — BUDESONIDE 500 MCG: 0.5 INHALANT ORAL at 06:16

## 2024-04-07 RX ADMIN — ACETAMINOPHEN 650 MG: 325 TABLET ORAL at 20:29

## 2024-04-07 RX ADMIN — SODIUM CHLORIDE, PRESERVATIVE FREE 10 ML: 5 INJECTION INTRAVENOUS at 20:30

## 2024-04-07 RX ADMIN — OSELTAMIVIR PHOSPHATE 75 MG: 75 CAPSULE ORAL at 08:49

## 2024-04-07 RX ADMIN — IPRATROPIUM BROMIDE AND ALBUTEROL SULFATE 1 DOSE: 2.5; .5 SOLUTION RESPIRATORY (INHALATION) at 20:51

## 2024-04-07 RX ADMIN — IPRATROPIUM BROMIDE AND ALBUTEROL SULFATE 1 DOSE: 2.5; .5 SOLUTION RESPIRATORY (INHALATION) at 14:25

## 2024-04-07 RX ADMIN — BUDESONIDE 500 MCG: 0.5 INHALANT ORAL at 20:51

## 2024-04-07 RX ADMIN — FUROSEMIDE 40 MG: 10 INJECTION, SOLUTION INTRAMUSCULAR; INTRAVENOUS at 16:43

## 2024-04-07 RX ADMIN — FLUTICASONE PROPIONATE 1 SPRAY: 50 SPRAY, METERED NASAL at 08:50

## 2024-04-07 RX ADMIN — IPRATROPIUM BROMIDE AND ALBUTEROL SULFATE 1 DOSE: 2.5; .5 SOLUTION RESPIRATORY (INHALATION) at 09:54

## 2024-04-07 RX ADMIN — SODIUM CHLORIDE, PRESERVATIVE FREE 10 ML: 5 INJECTION INTRAVENOUS at 08:52

## 2024-04-07 RX ADMIN — WATER 40 MG: 1 INJECTION INTRAMUSCULAR; INTRAVENOUS; SUBCUTANEOUS at 23:44

## 2024-04-07 RX ADMIN — AZITHROMYCIN MONOHYDRATE 500 MG: 500 INJECTION, POWDER, LYOPHILIZED, FOR SOLUTION INTRAVENOUS at 09:46

## 2024-04-07 RX ADMIN — PANTOPRAZOLE SODIUM 40 MG: 40 TABLET, DELAYED RELEASE ORAL at 05:23

## 2024-04-07 RX ADMIN — DILTIAZEM HYDROCHLORIDE 120 MG: 120 CAPSULE, EXTENDED RELEASE ORAL at 08:48

## 2024-04-07 RX ADMIN — WATER 60 MG: 1 INJECTION INTRAMUSCULAR; INTRAVENOUS; SUBCUTANEOUS at 05:23

## 2024-04-07 RX ADMIN — WATER 40 MG: 1 INJECTION INTRAMUSCULAR; INTRAVENOUS; SUBCUTANEOUS at 16:39

## 2024-04-07 RX ADMIN — IPRATROPIUM BROMIDE AND ALBUTEROL SULFATE 1 DOSE: 2.5; .5 SOLUTION RESPIRATORY (INHALATION) at 06:16

## 2024-04-07 ASSESSMENT — PAIN DESCRIPTION - ORIENTATION
ORIENTATION: LOWER
ORIENTATION: LOWER

## 2024-04-07 ASSESSMENT — PAIN DESCRIPTION - PAIN TYPE
TYPE: ACUTE PAIN
TYPE: ACUTE PAIN

## 2024-04-07 ASSESSMENT — PAIN DESCRIPTION - FREQUENCY
FREQUENCY: INTERMITTENT
FREQUENCY: INTERMITTENT

## 2024-04-07 ASSESSMENT — PAIN DESCRIPTION - LOCATION
LOCATION: BACK
LOCATION: BACK

## 2024-04-07 ASSESSMENT — PAIN DESCRIPTION - DESCRIPTORS
DESCRIPTORS: ACHING;DULL
DESCRIPTORS: ACHING;DULL

## 2024-04-07 ASSESSMENT — PAIN SCALES - GENERAL
PAINLEVEL_OUTOF10: 3
PAINLEVEL_OUTOF10: 0

## 2024-04-07 ASSESSMENT — PAIN DESCRIPTION - ONSET
ONSET: GRADUAL
ONSET: GRADUAL

## 2024-04-07 ASSESSMENT — PAIN - FUNCTIONAL ASSESSMENT
PAIN_FUNCTIONAL_ASSESSMENT: ACTIVITIES ARE NOT PREVENTED
PAIN_FUNCTIONAL_ASSESSMENT: ACTIVITIES ARE NOT PREVENTED

## 2024-04-07 NOTE — PLAN OF CARE
Tara is resting on and off with no s/s or c/o pain this shift. She is incontinent of bladder and has pure wick in place. Pt is afebrile on therapy for influenza with no s/s of adverse reaction noted. Lung sounds continue to be adventitious and saturations not maintaining on supplemental O2 via salter. Respiratory placed pt on bipap, and pt stated she didn't think she could keep it on. Asked her if she wanted Ativan and she said yes. Gave medication and this helped her sleep until shortly before 0200, at which time she woke up confused. She took off bipap and staff placed O2 via nc. She didn't know where she was, why she was 'in this rubber bed', or why she was here. She stated she was not going to put 'that mask back on'. Was able to reorient pt and get her to replace bipap. She fell asleep again, and staff alerted to room when continuous pulse ox started alarming. Pt's saturations decreased to low 80s on bipap and no alerts noted on bipap. Pt's sats rebounded once she woke. Respiratory rounded on patient and updated her on shift events; CXR ordered for AM. She is satting 90 to 92 at this time. Pt has call light in reach and is using appropriately; safety maintained.     Problem: Respiratory - Adult  Goal: Achieves optimal ventilation and oxygenation  4/7/2024 0352 by Mena Camargo, RN  Outcome: Not Progressing  Flowsheets  Taken 4/7/2024 0305 by Annika Thompson RCP  Achieves optimal ventilation and oxygenation:   Assess for changes in respiratory status   Oxygen supplementation based on oxygen saturation or arterial blood gases   Respiratory therapy support as indicated    Problem: Pain  Goal: Verbalizes/displays adequate comfort level or baseline comfort level  Outcome: Progressing  Flowsheets (Taken 4/6/2024 1942)  Verbalizes/displays adequate comfort level or baseline comfort level:   Encourage patient to monitor pain and request assistance   Assess pain using appropriate pain scale     Problem: Infection -  Adult  Goal: Absence of infection at discharge  Outcome: Progressing  Flowsheets (Taken 4/6/2024 2030)  Absence of infection at discharge:   Assess and monitor for signs and symptoms of infection   Monitor lab/diagnostic results   Monitor all insertion sites i.e., indwelling lines, tubes and drains   Administer medications as ordered     Problem: Safety - Adult  Goal: Free from fall injury  Outcome: Progressing

## 2024-04-07 NOTE — PROGRESS NOTES
Warfarin Dosing - Pharmacy Consult Note  Consulting Provider: Dr. Barbara Dillard  Indication:  History of  VTE/PE  Warfarin Dose prior to admission: 2mg M-W-Sat, 4mg all other days   Concurrent anticoagulants/antiplatelets: none  Significant Drug Interactions:  Solu-medrol , azithromycin  Recent Labs     04/04/24  1847 04/04/24  2147 04/05/24  0608 04/06/24  0504 04/07/24  0501   INR 2.2  --  2.3 3.2 3.5   HGB 9.1*  --  9.6* 9.3*  --      --  147 159  --    LABALBU  --  3.3*  --   --   --      Recent warfarin administrations                     warfarin (COUMADIN) tablet 2 mg (mg) 2 mg Given 04/05/24 1642    warfarin (COUMADIN) tablet 4 mg (mg) 4 mg Given 04/05/24 0048                   Date   INR    Dose  4/4         2.2        4 mg  4/5         2.3        2 mg  4/6         3.2       hold  4/7         3.5     Assessment/Plan  (Goal INR: 2 - 3)  INR up to 3.5 this AM. Hold warfarin dose.     Active problem list reviewed.  INR orders are placed.  Chart reviewed for pertinent labs, drug/diet interactions, and past doses.  Documentation of patient's clinical condition was reviewed.    Pharmacy Dosing:  Pharmacy will continue to follow.

## 2024-04-07 NOTE — PROGRESS NOTES
[facility]  Office: [phone]  Carlos Caro DO, Felipe Drew, DO, Jerman Mack DO, Leonel Thomas, DO, Larry Pisano MD, Monserrat Rosado MD, Sharon Perez MD, Beverley Curtis MD,  Jimi Messina MD, Kevin Cowan MD, Theresa Chavira MD,  Luis Coffman DO, Florencia Hernandez MD, Giorgi Salas MD, Ramone Caro DO, Mariel Bradshaw MD,  Bandar Anderson DO, Michelle Helm MD, Mireille Hermosillo MD, Yamileth Chong MD, Zo Montgomery MD,  Jordan Hernandez MD, Pilar Salcedo MD, Kevin Greenwood MD, Kevin Landon MD, Ramon Ellsworth MD, Ken Luna MD, Luis French DO, Porter Moore DO, Shayna Woodall MD,  Alvaro Reid MD, Shirley Waterhouse, CNP,  Octavia Coreas CNP, Chemo Pelayo, CNP,  Юлия Calero, DNP, Kaci Rodgers, CNP, Princess Mandujano, CNP, Madelaine Burns CNP, Ivone Mancia CNP, Val Hanson, PA-C, Joyce Jackson PA-C, Isabel Bustos, CNP, Johnna Horta, CNP, Karma Yost, CNP, Rosio Resendez, CNP, Jessi Montiel CNP, Sandie Palmer, CNS, Balbina Hernández, CNP, Dolly Tamez CNP, Tracy Schwab, CNP         [facility]   IN-PATIENT SERVICE   [facility]    Progress Note    4/7/2024    8:34 AM    Name:   Shazia Dickson  MRN:     [id number]     Acct:      [id number]   Room:   [facility]/1023-01   Day:  3  Admit Date:  4/4/2024  6:36 PM    PCP:   Jyoti Mauricio APRN - CNP  Code Status:  Full Code    Subjective:     She is feeling about 70% of her normal.  Overnight has some issues with her oxygen delivery system since has resolved.  SpO2 is 100% on 4 L, goal is 88 to 92%.  No fevers, chills.      Brief History:     68-year-old female present to the hospital for evaluation of shortness of breath.  Has history of COPD and on admission did test positive for influenza.    Medications:     Allergies:    Allergies   Allergen Reactions    Amoxil [Amoxicillin Trihydrate] Hives    Neomycin     Penicillins     Codeine      headaches    Neosporin

## 2024-04-07 NOTE — PLAN OF CARE
Problem: Respiratory - Adult  Goal: Able to breathe comfortably  4/6/2024 2036 by Annika Thompson RCP  Outcome: Progressing     Problem: Respiratory - Adult  Goal: Patient's breath sounds will be clear and equal  4/6/2024 2036 by Annika Thompson RCP  Outcome: Progressing     Problem: Respiratory - Adult  Goal: Adequate oxygenation  Description: Adequate oxygenation  4/6/2024 2036 by Annika Thompson RCP  Outcome: Progressing     Problem: Respiratory - Adult  Goal: Achieves optimal ventilation and oxygenation  4/6/2024 2036 by Annika Thompson RCP  Outcome: Progressing

## 2024-04-07 NOTE — PROGRESS NOTES
Pulmonary Critical Care Progress Note    Patient seen for the follow up of Acute respiratory failure with hypoxia (HCC)     Subjective:     She developed desaturation on 4 L oxygen I was contacted by RN advised that RT tried 6 L and still she had desaturation.  She was placed on BiPAP and tolerated it well.  She was down weaned down to oxygen at 4 L after changing the circuit claims that was an issue with the connection as she reports.  Shortness breath improved.  She has occasional dry cough.    Examination:    Vitals: BP (!) 148/61   Pulse 89   Temp 98.1 °F (36.7 °C) (Oral)   Resp 18   Ht 1.651 m (5' 5\")   Wt (!) 176.9 kg (390 lb)   LMP 2014   SpO2 95%   BMI 64.90 kg/m²   SpO2  Av.4 %  Min: 86 %  Max: 100 %  General appearance: alert and cooperative with exam  Neck: No JVD  Lungs:  decreased breath sounds no crackles or wheezing  Heart: regular rate and rhythm, S1, S2 normal, no gallop  Abdomen: Soft, non tender, + BS  Extremities: no cyanosis or clubbing. No significant edema    LABs:    CBC:   Recent Labs     24  0608 24  0504   WBC 2.8* 7.1   HGB 9.6* 9.3*   HCT 35.6* 34.3*    159     BMP:   Recent Labs     24  1740 24  0504    137   K 4.9 5.0   CO2 42* 38*   BUN 25* 29*   CREATININE 1.1* 0.9   LABGLOM 55* 70   GLUCOSE 183* 201*     PT/INR:   Recent Labs     24  0504 24  0501   PROTIME 32.5* 34.9*   INR 3.2 3.5     APTT:No results for input(s): \"APTT\" in the last 72 hours.  LIVER PROFILE:  Recent Labs     24  2147   AST 28   ALT 19   LABALBU 3.3*       Radiology:     Chest x-ray   1. Stable diffuse bilateral airspace disease throughout the lungs, unchanged  from the prior exam.  2. No new focal airspace disease.     Impression /recommendations;    Chronic hypoxic and hypercarbic respiratory failure   Oxygen by nasal cannula   Incentive spirometer q.1h awake   BiPAP support       Acute exacerbation of Asthma /influenza A  DuoNeb every 4

## 2024-04-07 NOTE — PROGRESS NOTES
Today's Date: 4/7/2024  Patient Name: Shazia Dickson  Date of admission: 4/4/2024  6:36 PM  Patient's age: 68 y.o., 1955  Admission Dx: Influenza A [J10.1]  COPD exacerbation (HCC) [J44.1]  Acute respiratory failure with hypoxia (HCC) [J96.01]  Increased oxygen demand [R68.89]    Reason for Consult: management recommendations  Requesting Physician: Leonel Thomas DO    CHIEF COMPLAINT: Shortness of breath.  Leukopenia.    INTERIM HISTORY  Patient is seen and evaluated.  Overall feels well.  Overnight, she was put on BiPAP and she is having hard time weaning off at this moment.  She feels okay.  No bleeding but she has multiple bruises.  No fever or chills.  Abdominal wound is not bleeding.    HISTORY OF PRESENT ILLNESS:      The patient is a 68 y.o.  female who is admitted to the hospital with chief complaint of shortness of breath.  Complains of generalized bodyaches and upper respite tract infection symptoms.  Patient recently had been seen infectious disease team for healing abdominal wall wound.  Dr. Nunez.  Per records patient has chronic shortness of breath.    Hematology oncology team consulted due to cytopenias.  Patient CBC from 10/23 shows WBC count 6.0 hemoglobin 8.9 platelet count 225.  Patient CBC at presentation shows WBC count of 2.7 hemoglobin 9.1 and creatinine of 153.  Patient ANC was 1.79.  Patient just finished course of antibiotics.    Plan past Medical History:   has a past medical history of Anxiety, Asthma, Bronchitis, DDD (degenerative disc disease), lumbar, Depression, Diabetes mellitus (HCC), Elevated glucose, Headache(784.0), Hernia of abdominal cavity, HH (hiatus hernia), Hyperlipemia, mixed, Hypertension, Osteoarthritis, Right femoral vein DVT (HCC), and Uterine hyperplasia.    Past Surgical History:   has a past surgical history that includes Dilation and curettage of uterus (10/08 and 09/07); Breast reduction surgery (12/1997); Breast reduction surgery  Automated 0.0 0.0 per 100 WBC    Neutrophils % 66 36 - 66 %    Lymphocytes % 22 (L) 24 - 44 %    Atypical Lymphocytes 1 %    Monocytes % 6 1 - 7 %    Eosinophils % 3 1 - 4 %    Basophils % 0 %    Bands 2 1 - 15 %    Immature Granulocytes % 0 0 %    Neutrophils Absolute 1.79 (L) 1.8 - 7.7 k/uL    Lymphocytes Absolute 0.59 (L) 1.0 - 4.8 k/uL    Atypical Lymphocytes Absolute 0.03 k/uL    Monocytes Absolute 0.16 (L) 0.2 - 0.8 k/uL    Eosinophils Absolute 0.08 0.0 - 0.4 k/uL    Basophils Absolute 0.00 0.0 - 0.2 k/uL    Absolute Bands 0.05 k/uL    Immature Granulocytes Absolute 0.00 0.00 - 0.30 k/uL    Morphology HYPOCHROMIA PRESENT    BMP   Result Value Ref Range    Sodium 138 135 - 144 mmol/L    Potassium 4.2 3.7 - 5.3 mmol/L    Chloride 95 (L) 98 - 107 mmol/L    CO2 38 (H) 20 - 31 mmol/L    Anion Gap 5 (L) 9 - 17 mmol/L    Glucose 116 (H) 70 - 99 mg/dL    BUN 26 (H) 8 - 23 mg/dL    Creatinine 1.2 (H) 0.5 - 0.9 mg/dL    Est, Glom Filt Rate 50 (L) >60 mL/min/1.73m2    Bun/Cre Ratio 22 (H) 9 - 20    Calcium 9.2 8.6 - 10.4 mg/dL   Troponin   Result Value Ref Range    Troponin, High Sensitivity 24 (H) 0 - 14 ng/L   Troponin   Result Value Ref Range    Troponin, High Sensitivity 25 (H) 0 - 14 ng/L   Protime-INR   Result Value Ref Range    Protime 24.4 (H) 11.5 - 14.2 sec    INR 2.2    CBC auto differential   Result Value Ref Range    WBC 2.8 (L) 3.5 - 11.3 k/uL    RBC 3.81 (L) 3.95 - 5.11 m/uL    Hemoglobin 9.6 (L) 11.9 - 15.1 g/dL    Hematocrit 35.6 (L) 36.3 - 47.1 %    MCV 93.4 82.6 - 102.9 fL    MCH 25.2 25.2 - 33.5 pg    MCHC 27.0 (L) 28.4 - 34.8 g/dL    RDW 18.5 (H) 11.8 - 14.4 %    Platelets 147 138 - 453 k/uL    MPV 9.0 8.1 - 13.5 fL    NRBC Automated 0.0 0.0 per 100 WBC    Neutrophils % 86 (H) 36 - 66 %    Lymphocytes % 10 (L) 24 - 44 %    Monocytes % 3 1 - 7 %    Eosinophils % 0 (L) 1 - 4 %    Basophils % 0 %    Immature Granulocytes % 1 (H) 0 %    Neutrophils Absolute 2.41 1.8 - 7.7 k/uL    Lymphocytes Absolute

## 2024-04-07 NOTE — RT PROTOCOL NOTE
RT Inhaler-Nebulizer Bronchodilator Protocol Note    There is a bronchodilator order in the chart from a provider indicating to follow the RT Bronchodilator Protocol and there is an “Initiate RT Inhaler-Nebulizer Bronchodilator Protocol” order as well (see protocol at bottom of note).    CXR Findings:  XR CHEST PORTABLE    Result Date: 4/5/2024  Cardiomegaly with scattered infiltrates.       The findings from the last RT Protocol Assessment were as follows:   History Pulmonary Disease: Chronic pulmonary disease  Respiratory Pattern: Dyspnea on exertion or RR 21-25 bpm  Breath Sounds: Severe inspiratory and expiratory wheezing or severely diminished  Cough: Strong, productive  Indication for Bronchodilator Therapy: On home bronchodilators, Decreased or absent breath sounds  Bronchodilator Assessment Score: 13    Aerosolized bronchodilator medication orders have been revised according to the RT Inhaler-Nebulizer Bronchodilator Protocol below.    Respiratory Therapist to perform RT Therapy Protocol Assessment initially then follow the protocol.  Repeat RT Therapy Protocol Assessment PRN for score 0-3 or on second treatment, BID, and PRN for scores above 3.    No Indications - adjust the frequency to every 6 hours PRN wheezing or bronchospasm, if no treatments needed after 48 hours then discontinue using Per Protocol order mode.     If indication present, adjust the RT bronchodilator orders based on the Bronchodilator Assessment Score as indicated below.  Use Inhaler orders unless patient has one or more of the following: on home nebulizer, not able to hold breath for 10 seconds, is not alert and oriented, cannot activate and use MDI correctly, or respiratory rate 25 breaths per minute or more, then use the equivalent nebulizer order(s) with same Frequency and PRN reasons based on the score.  If a patient is on this medication at home then do not decrease Frequency below that used at home.    0-3 - enter or revise RT  bronchodilator order(s) to equivalent RT Bronchodilator order with Frequency of every 4 hours PRN for wheezing or increased work of breathing using Per Protocol order mode.        4-6 - enter or revise RT Bronchodilator order(s) to two equivalent RT bronchodilator orders with one order with BID Frequency and one order with Frequency of every 4 hours PRN wheezing or increased work of breathing using Per Protocol order mode.        7-10 - enter or revise RT Bronchodilator order(s) to two equivalent RT bronchodilator orders with one order with TID Frequency and one order with Frequency of every 4 hours PRN wheezing or increased work of breathing using Per Protocol order mode.       11-13 - enter or revise RT Bronchodilator order(s) to one equivalent RT bronchodilator order with QID Frequency and an Albuterol order with Frequency of every 4 hours PRN wheezing or increased work of breathing using Per Protocol order mode.      Greater than 13 - enter or revise RT Bronchodilator order(s) to one equivalent RT bronchodilator order with every 4 hours Frequency and an Albuterol order with Frequency of every 2 hours PRN wheezing or increased work of breathing using Per Protocol order mode.     RT to enter RT Home Evaluation for COPD & MDI Assessment order using Per Protocol order mode.    Electronically signed by Annika Thompson RCP on 4/6/2024 at 8:46 PM

## 2024-04-07 NOTE — PLAN OF CARE
Pt resting comfortably in bed and remains free of fall as well as acute events throughout the shift.   Pt taken off Bipap and placed on 3.5 liters of oxygen.   Vital Signs Stable.   Pt received IV Zithro and IV Solumedrol.   Vital signs stable.   Safety measures in place, call light within reach, all questions addressed.   Problem: Respiratory - Adult  Goal: Achieves optimal ventilation and oxygenation  4/7/2024 1347 by Sorin Caban, RN  Outcome: Progressing  Flowsheets (Taken 4/7/2024 0616 by Annika Thompson RCP)  Achieves optimal ventilation and oxygenation:   Assess for changes in respiratory status   Oxygen supplementation based on oxygen saturation or arterial blood gases   Respiratory therapy support as indicated  4/7/2024 0352 by Mena Camargo, RN  Outcome: Not Progressing  Flowsheets  Taken 4/7/2024 0305 by Annika Thompson RCP  Achieves optimal ventilation and oxygenation:   Assess for changes in respiratory status   Oxygen supplementation based on oxygen saturation or arterial blood gases   Respiratory therapy support as indicated  Taken 4/6/2024 2130 by Annika Thompson RCP  Achieves optimal ventilation and oxygenation:   Assess for changes in respiratory status   Oxygen supplementation based on oxygen saturation or arterial blood gases   Respiratory therapy support as indicated

## 2024-04-08 ENCOUNTER — APPOINTMENT (OUTPATIENT)
Age: 69
End: 2024-04-08
Attending: NURSE PRACTITIONER
Payer: MEDICARE

## 2024-04-08 ENCOUNTER — APPOINTMENT (OUTPATIENT)
Dept: GENERAL RADIOLOGY | Age: 69
End: 2024-04-08
Payer: MEDICARE

## 2024-04-08 PROBLEM — D61.818 PANCYTOPENIA (HCC): Status: ACTIVE | Noted: 2024-04-08

## 2024-04-08 LAB
ANA SER QL IA: NEGATIVE
DSDNA IGG SER QL IA: <0.5 IU/ML
INR PPP: 3.5
NUCLEAR IGG SER IA-RTO: 0.2 U/ML
PATH REV BLD -IMP: NORMAL
PROTHROMBIN TIME: 34.4 SEC (ref 11.5–14.2)
SURGICAL PATHOLOGY REPORT: NORMAL

## 2024-04-08 PROCEDURE — 2580000003 HC RX 258: Performed by: INTERNAL MEDICINE

## 2024-04-08 PROCEDURE — 6370000000 HC RX 637 (ALT 250 FOR IP): Performed by: STUDENT IN AN ORGANIZED HEALTH CARE EDUCATION/TRAINING PROGRAM

## 2024-04-08 PROCEDURE — 99231 SBSQ HOSP IP/OBS SF/LOW 25: CPT | Performed by: INTERNAL MEDICINE

## 2024-04-08 PROCEDURE — 94660 CPAP INITIATION&MGMT: CPT

## 2024-04-08 PROCEDURE — 6370000000 HC RX 637 (ALT 250 FOR IP): Performed by: INTERNAL MEDICINE

## 2024-04-08 PROCEDURE — 36415 COLL VENOUS BLD VENIPUNCTURE: CPT

## 2024-04-08 PROCEDURE — 94669 MECHANICAL CHEST WALL OSCILL: CPT

## 2024-04-08 PROCEDURE — 85610 PROTHROMBIN TIME: CPT

## 2024-04-08 PROCEDURE — 6370000000 HC RX 637 (ALT 250 FOR IP): Performed by: NURSE PRACTITIONER

## 2024-04-08 PROCEDURE — 6360000002 HC RX W HCPCS: Performed by: INTERNAL MEDICINE

## 2024-04-08 PROCEDURE — 2060000000 HC ICU INTERMEDIATE R&B

## 2024-04-08 PROCEDURE — 71045 X-RAY EXAM CHEST 1 VIEW: CPT

## 2024-04-08 PROCEDURE — 2580000003 HC RX 258: Performed by: STUDENT IN AN ORGANIZED HEALTH CARE EDUCATION/TRAINING PROGRAM

## 2024-04-08 PROCEDURE — 94761 N-INVAS EAR/PLS OXIMETRY MLT: CPT

## 2024-04-08 PROCEDURE — 94640 AIRWAY INHALATION TREATMENT: CPT

## 2024-04-08 PROCEDURE — 97535 SELF CARE MNGMENT TRAINING: CPT

## 2024-04-08 PROCEDURE — 2700000000 HC OXYGEN THERAPY PER DAY

## 2024-04-08 PROCEDURE — 97530 THERAPEUTIC ACTIVITIES: CPT

## 2024-04-08 PROCEDURE — 99232 SBSQ HOSP IP/OBS MODERATE 35: CPT | Performed by: INTERNAL MEDICINE

## 2024-04-08 PROCEDURE — 6360000002 HC RX W HCPCS: Performed by: STUDENT IN AN ORGANIZED HEALTH CARE EDUCATION/TRAINING PROGRAM

## 2024-04-08 RX ORDER — METOPROLOL TARTRATE 1 MG/ML
2.5 INJECTION, SOLUTION INTRAVENOUS ONCE
Status: COMPLETED | OUTPATIENT
Start: 2024-04-09 | End: 2024-04-09

## 2024-04-08 RX ADMIN — BUDESONIDE 500 MCG: 0.5 INHALANT ORAL at 18:10

## 2024-04-08 RX ADMIN — SODIUM CHLORIDE: 9 INJECTION, SOLUTION INTRAVENOUS at 10:59

## 2024-04-08 RX ADMIN — BUDESONIDE 500 MCG: 0.5 INHALANT ORAL at 05:20

## 2024-04-08 RX ADMIN — IPRATROPIUM BROMIDE AND ALBUTEROL SULFATE 1 DOSE: 2.5; .5 SOLUTION RESPIRATORY (INHALATION) at 05:19

## 2024-04-08 RX ADMIN — WATER 40 MG: 1 INJECTION INTRAMUSCULAR; INTRAVENOUS; SUBCUTANEOUS at 16:19

## 2024-04-08 RX ADMIN — MICONAZOLE NITRATE: 20 CREAM TOPICAL at 09:47

## 2024-04-08 RX ADMIN — IPRATROPIUM BROMIDE AND ALBUTEROL SULFATE 1 DOSE: 2.5; .5 SOLUTION RESPIRATORY (INHALATION) at 10:34

## 2024-04-08 RX ADMIN — IPRATROPIUM BROMIDE AND ALBUTEROL SULFATE 1 DOSE: 2.5; .5 SOLUTION RESPIRATORY (INHALATION) at 14:07

## 2024-04-08 RX ADMIN — OSELTAMIVIR PHOSPHATE 75 MG: 75 CAPSULE ORAL at 21:07

## 2024-04-08 RX ADMIN — MICONAZOLE NITRATE: 20 CREAM TOPICAL at 21:15

## 2024-04-08 RX ADMIN — PRAVASTATIN SODIUM 80 MG: 40 TABLET ORAL at 17:40

## 2024-04-08 RX ADMIN — WATER 40 MG: 1 INJECTION INTRAMUSCULAR; INTRAVENOUS; SUBCUTANEOUS at 09:40

## 2024-04-08 RX ADMIN — OSELTAMIVIR PHOSPHATE 75 MG: 75 CAPSULE ORAL at 09:40

## 2024-04-08 RX ADMIN — AZITHROMYCIN MONOHYDRATE 500 MG: 500 INJECTION, POWDER, LYOPHILIZED, FOR SOLUTION INTRAVENOUS at 10:55

## 2024-04-08 RX ADMIN — DILTIAZEM HYDROCHLORIDE 120 MG: 120 CAPSULE, EXTENDED RELEASE ORAL at 09:40

## 2024-04-08 RX ADMIN — PANTOPRAZOLE SODIUM 40 MG: 40 TABLET, DELAYED RELEASE ORAL at 05:39

## 2024-04-08 RX ADMIN — IPRATROPIUM BROMIDE AND ALBUTEROL SULFATE 1 DOSE: 2.5; .5 SOLUTION RESPIRATORY (INHALATION) at 18:10

## 2024-04-08 RX ADMIN — SODIUM CHLORIDE, PRESERVATIVE FREE 10 ML: 5 INJECTION INTRAVENOUS at 09:42

## 2024-04-08 RX ADMIN — FLUTICASONE PROPIONATE 1 SPRAY: 50 SPRAY, METERED NASAL at 09:40

## 2024-04-08 RX ADMIN — MONTELUKAST 10 MG: 10 TABLET, FILM COATED ORAL at 21:07

## 2024-04-08 ASSESSMENT — PAIN SCALES - GENERAL
PAINLEVEL_OUTOF10: 0
PAINLEVEL_OUTOF10: 2
PAINLEVEL_OUTOF10: 0
PAINLEVEL_OUTOF10: 2

## 2024-04-08 ASSESSMENT — PAIN DESCRIPTION - ONSET
ONSET: GRADUAL
ONSET: GRADUAL

## 2024-04-08 ASSESSMENT — PAIN DESCRIPTION - LOCATION
LOCATION: BACK
LOCATION: BACK

## 2024-04-08 ASSESSMENT — PAIN DESCRIPTION - FREQUENCY
FREQUENCY: INTERMITTENT
FREQUENCY: INTERMITTENT

## 2024-04-08 ASSESSMENT — PAIN DESCRIPTION - DESCRIPTORS
DESCRIPTORS: ACHING;DULL
DESCRIPTORS: ACHING;DULL

## 2024-04-08 ASSESSMENT — PAIN DESCRIPTION - PAIN TYPE
TYPE: ACUTE PAIN
TYPE: ACUTE PAIN

## 2024-04-08 ASSESSMENT — PAIN DESCRIPTION - ORIENTATION
ORIENTATION: LOWER
ORIENTATION: LOWER

## 2024-04-08 NOTE — PROGRESS NOTES
Occupational Therapy  Facility/Department: [facility] PROGRESSIVE CARE  Rehabilitation Occupational Therapy Daily Treatment Note    Date: 24  Patient Name: Shazia Dickson       Room: 1026/1026-01  MRN: [id number]  Account: [id number]   : 1955  (68 y.o.) Gender: female                    Past Medical History:  has a past medical history of Anxiety, Asthma, Bronchitis, DDD (degenerative disc disease), lumbar, Depression, Diabetes mellitus (HCC), Elevated glucose, Headache(784.0), Hernia of abdominal cavity, HH (hiatus hernia), Hyperlipemia, mixed, Hypertension, Osteoarthritis, Right femoral vein DVT (HCC), and Uterine hyperplasia.  Past Surgical History:   has a past surgical history that includes Dilation and curettage of uterus (10/08 and ); Breast reduction surgery (1997); Breast reduction surgery (1997); Tympanostomy tube placement (1967); Tonsillectomy and adenoidectomy (); and Hysterectomy (7/17/15).    Restrictions  Restrictions/Precautions: Fall Risk, General Precautions, Up as Tolerated  Other position/activity restrictions: pure wick, 3 liters 02, telemetry, continuous Sp02, alarms, pt is 390lbs, droplet precautions/neutropenic  Required Braces or Orthoses?: No    Subjective  Subjective: Pt in bed upon arrival. Pt initially hesitant about therapy due to concerns with IV running and pure wick not working. Pt agreeable after encouragement and reassurance of all lines staying intact.  Restrictions/Precautions: Fall Risk;General Precautions;Up as Tolerated             Objective     Cognition  Overall Cognitive Status: Exceptions  Arousal/Alertness: Appropriate responses to stimuli  Following Commands: Follows multistep commands with increased time;Follows multistep commands with repitition  Attention Span: Appears intact  Memory: Appears intact  Safety Judgement: Decreased awareness of need for safety;Decreased awareness of need for assistance  Problem Solving: Assistance required to  T-Scale Score : 32.03 (04/08/24 1230)  ADL Inpatient CMS 0-100% Score: 63.03 (04/08/24 1230)  ADL Inpatient CMS G-Code Modifier : CL (04/08/24 1230)      Therapy Time   Individual Concurrent Group Co-treatment   Time In       1127   Time Out       1205   Minutes       38       Co-treatment with PT warranted secondary to decreased safety and independence requiring 2 skilled therapy professionals to address individual discipline's goals. OT addressing preparation for ADL transfer, sitting balance for increased ADL performance, sitting/activity tolerance, functional reaching, environmental safety/scanning, fall prevention, functional mobility for ADL transfers, ability to sequence and follow directions, bed mobility tech, and functional UE strength.      Upon Co-Signature of this note, appropriate supervision of DOMENICA has been delivered with regards to plan of care, evaluation/re-evaluation findings, any appropriate modifications to treatment plan, and relevant discharge planning. Instructions and training unique to this patient and this practitioner have been considered and implemented.      DOMENICA Wakefield

## 2024-04-08 NOTE — PLAN OF CARE
Problem: Respiratory - Adult  Goal: Able to breathe comfortably  Outcome: Progressing     Problem: Respiratory - Adult  Goal: Patient's breath sounds will be clear and equal  Outcome: Progressing     Problem: Respiratory - Adult  Goal: Adequate oxygenation  Description: Adequate oxygenation  Outcome: Progressing     Problem: Respiratory - Adult  Goal: Achieves optimal ventilation and oxygenation  4/7/2024 2034 by Annika Thompson RCP  Outcome: Progressing

## 2024-04-08 NOTE — PROGRESS NOTES
SaO2 = 95% on 3L/NC.  Wears BIPAP at nighttime.  PRN Tylenol given for back pain, which was effective.  Utilized Maxi-Armand to lift patient up in the bed.  Incontinent of urine, Purewick & brief changed.  Patient concerned about not receiving Warfarin, stating that her therapeutic range was changed at [facility] to 2.5 to 3.5.  Informed by pharmacist that INR increased from 3.2 to 3.5, which is why Warfarin was held.  Patient turned in bed every two hours.  Side rails x2 raised.    Patient states she had difficulty sleeping with BIPAP on last evening.

## 2024-04-08 NOTE — PLAN OF CARE
Pt has been resting comfortably for majority of the day. Pt got washed up and got up in the chair with PT/OT. Pt got back to bed using the lift after lunch. Pt uses external cath, and can be incontinent at times. Pt received IV Zithromax and solu-medrol. Pt remains on 4L of O2 NC which is her home use. Writer changed dressing on abdomen per orders. All questions and concerns addressed. Bed is locked and in the lowest position with the call light in reach.    Problem: Respiratory - Adult  Goal: Achieves optimal ventilation and oxygenation  Outcome: Progressing    Problem: Discharge Planning  Goal: Discharge to home or other facility with appropriate resources  Outcome: Progressing     Problem: Pain  Goal: Verbalizes/displays adequate comfort level or baseline comfort level  Outcome: Progressing     Problem: Skin/Tissue Integrity  Goal: Absence of new skin breakdown  Description: 1.  Monitor for areas of redness and/or skin breakdown  2.  Assess vascular access sites hourly  3.  Every 4-6 hours minimum:  Change oxygen saturation probe site  4.  Every 4-6 hours:  If on nasal continuous positive airway pressure, respiratory therapy assess nares and determine need for appliance change or resting period.  Outcome: Progressing     Problem: Safety - Adult  Goal: Free from fall injury  Outcome: Progressing     Problem: ABCDS Injury Assessment  Goal: Absence of physical injury  Outcome: Progressing     Problem: Skin/Tissue Integrity - Adult  Goal: Incisions, wounds, or drain sites healing without S/S of infection  Outcome: Progressing     Problem: Musculoskeletal - Adult  Goal: Return ADL status to a safe level of function  Outcome: Progressing     Problem: Infection - Adult  Goal: Absence of infection at discharge  Outcome: Progressing     Problem: Chronic Conditions and Co-morbidities  Goal: Patient's chronic conditions and co-morbidity symptoms are monitored and maintained or improved  Outcome: Progressing

## 2024-04-08 NOTE — CARE COORDINATION
Therapy recommending bariatric SNF.    Spoke to pt and she would like to discuss options with friend that is a nurse.    Informed Юлия NAZARIO and they will follow for choices.  Pt would also want a facility that has a pulmonary dept.    SNF list given.

## 2024-04-08 NOTE — PROGRESS NOTES
Today's Date: 4/8/2024  Patient Name: Shazia Dickson  Date of admission: 4/4/2024  6:36 PM  Patient's age: 68 y.o., 1955  Admission Dx: Influenza A [J10.1]  COPD exacerbation (HCC) [J44.1]  Acute respiratory failure with hypoxia (HCC) [J96.01]  Increased oxygen demand [R68.89]    Reason for Consult: management recommendations  Requesting Physician: Leonel Thomas DO    CHIEF COMPLAINT: Shortness of breath.  Leukopenia.    INTERIM HISTORY  Patient is seen and evaluated.  Overall feels well.  O she is off the BiPAP and tolerating oxygen by nasal cannula fairly well.  No nausea or vomiting.  INR is 3.5.    HISTORY OF PRESENT ILLNESS:      The patient is a 68 y.o.  female who is admitted to the hospital with chief complaint of shortness of breath.  Complains of generalized bodyaches and upper respite tract infection symptoms.  Patient recently had been seen infectious disease team for healing abdominal wall wound.  Dr. Nunez.  Per records patient has chronic shortness of breath.    Hematology oncology team consulted due to cytopenias.  Patient CBC from 10/23 shows WBC count 6.0 hemoglobin 8.9 platelet count 225.  Patient CBC at presentation shows WBC count of 2.7 hemoglobin 9.1 and creatinine of 153.  Patient ANC was 1.79.  Patient just finished course of antibiotics.    Plan past Medical History:   has a past medical history of Anxiety, Asthma, Bronchitis, DDD (degenerative disc disease), lumbar, Depression, Diabetes mellitus (HCC), Elevated glucose, Headache(784.0), Hernia of abdominal cavity, HH (hiatus hernia), Hyperlipemia, mixed, Hypertension, Osteoarthritis, Right femoral vein DVT (HCC), and Uterine hyperplasia.    Past Surgical History:   has a past surgical history that includes Dilation and curettage of uterus (10/08 and 09/07); Breast reduction surgery (12/1997); Breast reduction surgery (12/1997); Tympanostomy tube placement (03/1967); Tonsillectomy and adenoidectomy (1962); and  Basophils % 0 %    Immature Granulocytes % 1 (H) 0 %    Neutrophils Absolute 2.41 1.8 - 7.7 k/uL    Lymphocytes Absolute 0.28 (L) 1.0 - 4.8 k/uL    Monocytes Absolute 0.08 (L) 0.2 - 0.8 k/uL    Eosinophils Absolute 0.00 0.0 - 0.4 k/uL    Basophils Absolute 0.00 0.0 - 0.2 k/uL    Immature Granulocytes Absolute 0.03 0.00 - 0.30 k/uL    Morphology HYPOCHROMIA PRESENT    Protime-INR   Result Value Ref Range    Protime 24.7 (H) 11.5 - 14.2 sec    INR 2.3    Brain Natriuretic Peptide   Result Value Ref Range    Pro- <300 pg/mL   C-Reactive Protein   Result Value Ref Range    CRP 29.5 (H) 0.0 - 5.0 mg/L   Hepatic Function Panel   Result Value Ref Range    Albumin 3.3 (L) 3.5 - 5.2 g/dL    Alkaline Phosphatase 63 35 - 104 U/L    ALT 19 5 - 33 U/L    AST 28 <32 U/L    Total Bilirubin 0.2 (L) 0.3 - 1.2 mg/dL    Bilirubin, Direct <0.1 <0.3 mg/dL    Bilirubin, Indirect Can not be calculated 0.0 - 1.0 mg/dL    Total Protein 7.4 6.4 - 8.3 g/dL   Magnesium   Result Value Ref Range    Magnesium 2.1 1.6 - 2.6 mg/dL   Procalcitonin   Result Value Ref Range    Procalcitonin 0.09 0.00 - 0.09 ng/mL   Reticulocytes   Result Value Ref Range    Retic % 0.9 0.5 - 1.9 %    Absolute Retic # 0.032 0.030 - 0.080 M/uL    Immature Retic Fract 18.8 (H) 2.7 - 18.3 %    Retic Hemoglobin 21.3 (L) 28.2 - 35.7 pg   Sedimentation Rate   Result Value Ref Range    Sed Rate 101 (H) 0 - 30 mm/Hr   Path Review, Smear   Result Value Ref Range    Pathologist Review ELECTRONICALLY SIGNED. TARA MOYER M.D.    RANJAN   Result Value Ref Range    RANJAN NEGATIVE NEGATIVE    CHUCKY Antibodies Screen 0.2 <0.7 U/mL    Anti ds DNA <0.5 <10.0 IU/mL   Vitamin B12 & Folate   Result Value Ref Range    Vitamin B-12 847 232 - 1245 pg/mL    Folate >20.0 4.8 - 24.2 ng/mL   Reticulocytes   Result Value Ref Range    Retic % 1.2 0.5 - 1.9 %    Absolute Retic # 0.040 0.030 - 0.080 M/uL    Immature Retic Fract 21.0 (H) 2.7 - 18.3 %    Retic Hemoglobin 20.3 (L) 28.2 - 35.7 pg   Basic

## 2024-04-08 NOTE — PLAN OF CARE
for appliance change or resting period.  4/7/2024 2233 by Rios Ching RN  Outcome: Progressing  Note: Continuing to monitor for skin integrity risks. Patient independent with turning/repositioning. Turning/repositioning encouraged at least once every 2 hrs, and prn basis. Hygiene care being completed independently per patient; assistance provided when deemed necessary.  4/7/2024 2233 by Rios Ching RN  Outcome: Not Progressing  4/7/2024 1347 by Sorin Caban RN  Outcome: Progressing     Problem: Safety - Adult  Goal: Free from fall injury  4/7/2024 2233 by Rios Ching RN  Outcome: Progressing  Note: Pt fall risk, fall band present, falling star, safety alarm activated and in use as needed. Hourly rounding performed. Pt encouraged to use call light. See Das fall risk assessment.  4/7/2024 2233 by Rios Ching RN  Outcome: Not Progressing  4/7/2024 1347 by Sorin Caban RN  Outcome: Progressing     Problem: ABCDS Injury Assessment  Goal: Absence of physical injury  4/7/2024 2233 by Rios Ching RN  Outcome: Progressing  Note: Non-skid socks in place, up with assistance, bed in lowest position, bed exit & alarm as needed, provide toileting every 2 hours an d as needed.  4/7/2024 2233 by Rios Ching RN  Outcome: Not Progressing  4/7/2024 1347 by Sorin Caban RN  Outcome: Progressing     Problem: Skin/Tissue Integrity - Adult  Goal: Incisions, wounds, or drain sites healing without S/S of infection  4/7/2024 2233 by Rios Ching RN  Outcome: Progressing  Note: Continuing to monitor for skin integrity risks. Patient independent with turning/repositioning. Turning/repositioning encouraged at least once every 2 hrs, and prn basis. Hygiene care being completed independently per patient; assistance provided when deemed necessary.  4/7/2024 2233 by Rios Ching RN  Outcome: Not Progressing  4/7/2024 1347 by Sorin Caban RN  Outcome: Progressing

## 2024-04-08 NOTE — PROGRESS NOTES
[facility]  Office: [phone]  Carlos Caro DO, Felipe Drew, DO, Jerman Mack DO, Leonel Thomas, DO, Larry Pisano MD, Monserrat Rosado MD, Sharon Perez MD, Beverley Curtis MD,  Jimi Messina MD, Kevin Cowan MD, Theresa Chavira MD,  Luis Coffman DO, Florencia Hernandez MD, Giorgi Salas MD, Ramone Caro DO, Mariel Bradshaw MD,  Bandar Anderson DO, Michelle Helm MD, Mireille Hermosillo MD, Yamileth Chong MD, Zo Montgomery MD,  Jordan Hernandez MD, Pilar Salcedo MD, Kevin Greenwood MD, Kevin Landon MD, Ramon Ellsworth MD, Ken Luna MD, Luis French DO, Porter Moore DO, Shayna Woodall MD,  Alvaro Reid MD, Shirley Waterhouse, CNP,  Octavia Coreas CNP, Chemo Pelayo, CNP,  Юлия Calero, DNP, Kaci Rodgers, CNP, Princess Mandujano, CNP, Madelaine Burns CNP, Ivone Mancia CNP, Val Hanson, PA-C, Joyce Jackson PA-C, Isabel Bustos, CNP, Johnna Horta, CNP, Karma Yost, CNP, Rosio Resendez, CNP, Jessi Montiel CNP, Sandie Palmer, CNS, Balbina Hernández, CNP, Dolly Tamez CNP, Tracy Schwab, CNP         [facility]   IN-PATIENT SERVICE   [facility]    Progress Note    4/8/2024    10:03 AM    Name:   Shazia Dickson  MRN:     [id number]     Acct:      [id number]   Room:   1026/1026-01   Day:  4  Admit Date:  4/4/2024  6:36 PM    PCP:   Jyoti Mauricio APRN - CNP  Code Status:  Full Code    Subjective:     She is feeling back to  normal. No issues overnight. She is still very weak.       Brief History:     68-year-old female present to the hospital for evaluation of shortness of breath.  Has history of COPD and on admission did test positive for influenza.    Medications:     Allergies:    Allergies   Allergen Reactions    Amoxil [Amoxicillin Trihydrate] Hives    Neomycin     Penicillins     Codeine      headaches    Neosporin [Bacitracin-Neomycin-Polymyxin] Rash    Polysporin [Bacitracin-Polymyxin B] Rash       Current Meds:   Scheduled Meds:

## 2024-04-08 NOTE — PROGRESS NOTES
Warfarin Dosing - Pharmacy Consult Note  Consulting Provider: Barbara Dillard MD  Indication:  History of  VTE/PE  Warfarin Dose prior to admission: 2mg M-W-Sat, 4mg all other days    Concurrent anticoagulants/antiplatelets: none  Significant Drug Interactions:  methylprednisolone, azithromycin  Recent Labs     04/06/24  0504 04/07/24  0501 04/08/24  0518   INR 3.2 3.5 3.5   HGB 9.3*  --   --      --   --      Recent warfarin administrations                     warfarin (COUMADIN) tablet 2 mg (mg) 2 mg Given 04/05/24 1642                   Date   INR    Dose  4/4         2.2        4 mg  4/5         2.3        2 mg  4/6         3.2        hold  4/7         3.5        hold  4/8         3.5    Assessment/Plan  (Goal INR: 2.5 - 3.5)  INR is at the very high end of therapeutic range. Hold warfarin again today to avoid going above range. Will proceed cautiously tomorrow depending on INR result. INR in the morning.    Active problem list reviewed.  INR orders are placed.  Chart reviewed for pertinent labs, drug/diet interactions, and past doses.  Documentation of patient's clinical condition was reviewed.    Pharmacy Dosing:  Pharmacy will continue to follow.       [Large Joint Injection] : Large joint injection [Left] : of the left [Knee] : knee [Pain] : pain [Inflammation] : inflammation [X-ray evidence of Osteoarthritis on this or prior visit] : x-ray evidence of Osteoarthritis on this or prior visit [Alcohol] : alcohol [Betadine] : betadine [Ethyl Chloride sprayed topically] : ethyl chloride sprayed topically [Sterile technique used] : sterile technique used [Euflexxa(20mg)] : 20mg of Euflexxa [#1] : series #1 [] : Patient tolerated procedure well [Call if redness, pain or fever occur] : call if redness, pain or fever occur [Apply ice for 15min out of every hour for the next 12-24 hours as tolerated] : apply ice for 15 minutes out of every hour for the next 12-24 hours as tolerated [Previous OTC use and PT nontherapeutic] : patient has tried OTC's including aspirin, Ibuprofen, Aleve, etc or prescription NSAIDS, and/or exercises at home and/or physical therapy without satisfactory response [Patient had decreased mobility in the joint] : patient had decreased mobility in the joint [Risks, benefits, alternatives discussed / Verbal consent obtained] : the risks benefits, and alternatives have been discussed, and verbal consent was obtained [Prior failure or difficult injection] : prior failure or difficult injection [Altered anatomic landmarks d/t erosive arthritis] : altered anatomic landmarks d/t erosive arthritis [All ultrasound images have been permanently captured and stored accordingly in our picture archiving and communication system] : All ultrasound images have been permanently captured and stored accordingly in our picture archiving and communication system

## 2024-04-08 NOTE — CARE COORDINATION
Social work: spoke to Edita one of the two friends on the contact list. She said [facility] are both ok [facility]. Pt was sleeping she has only one brother in [place] so friends are the helpers for this pt.  Will need narcisa at discharge. Referrals sent. Юлия luong

## 2024-04-08 NOTE — RT PROTOCOL NOTE
RT Inhaler-Nebulizer Bronchodilator Protocol Note    There is a bronchodilator order in the chart from a provider indicating to follow the RT Bronchodilator Protocol and there is an “Initiate RT Inhaler-Nebulizer Bronchodilator Protocol” order as well (see protocol at bottom of note).    CXR Findings:  XR CHEST PORTABLE    Result Date: 4/7/2024  1. Stable diffuse bilateral airspace disease throughout the lungs, unchanged from the prior exam. 2. No new focal airspace disease.       The findings from the last RT Protocol Assessment were as follows:   History Pulmonary Disease: Chronic pulmonary disease  Respiratory Pattern: Dyspnea on exertion or RR 21-25 bpm  Breath Sounds: Severe inspiratory and expiratory wheezing or severely diminished  Cough: Strong, productive  Indication for Bronchodilator Therapy: On home bronchodilators, Decreased or absent breath sounds  Bronchodilator Assessment Score: 13    Aerosolized bronchodilator medication orders have been revised according to the RT Inhaler-Nebulizer Bronchodilator Protocol below.    Respiratory Therapist to perform RT Therapy Protocol Assessment initially then follow the protocol.  Repeat RT Therapy Protocol Assessment PRN for score 0-3 or on second treatment, BID, and PRN for scores above 3.    No Indications - adjust the frequency to every 6 hours PRN wheezing or bronchospasm, if no treatments needed after 48 hours then discontinue using Per Protocol order mode.     If indication present, adjust the RT bronchodilator orders based on the Bronchodilator Assessment Score as indicated below.  Use Inhaler orders unless patient has one or more of the following: on home nebulizer, not able to hold breath for 10 seconds, is not alert and oriented, cannot activate and use MDI correctly, or respiratory rate 25 breaths per minute or more, then use the equivalent nebulizer order(s) with same Frequency and PRN reasons based on the score.  If a patient is on this medication at

## 2024-04-08 NOTE — PROGRESS NOTES
Physical Therapy  Facility/Department: [facility] PROGRESSIVE CARE  Daily Treatment Note  NAME: Shazia Dickson  : 1955  MRN: [id number]    Date of Service: 2024    Discharge Recommendations:  Therapy recommended at discharge        Patient Diagnosis(es): The primary encounter diagnosis was COPD exacerbation (HCC). Diagnoses of Influenza A, Increased oxygen demand, and Pulmonary HTN (HCC) were also pertinent to this visit.    Assessment   Assessment: Pt presents with tachicardia w/ light activity and O2 sats dropping temporarily during mobility. Pt motivted and hopeful to mobilize but during attempts to stand almost slid off bed. Pt assisted back into bed and used lift to assist patient to chair. Pt fearful of falling with attempt - pt normally uses lift chair at home so did attempt to raise bed but the push was so strong - bed moved. Will continue to work towards regaining baseline function. Would recommend to work on sit to stand from chair instead of bariatric bed. Will continue to progress as baseline function is patient able to mobilize independently at home with her chair lift and other devices.    Activity Tolerance: Other (comment) (weakness)     Plan    Physical Therapy Plan  General Plan: 5-7 times per week  Specific Instructions for Next Treatment: Bed mobility, sit to stand transfers  Current Treatment Recommendations: Strengthening;Balance training;Functional mobility training;Transfer training;Endurance training;Gait training;Home exercise program;Safety education & training;Patient/Caregiver education & training;Therapeutic activities;Co-Treatment     Restrictions  Restrictions/Precautions  Restrictions/Precautions: Fall Risk, General Precautions, Up as Tolerated  Required Braces or Orthoses?: No  Position Activity Restriction  Other position/activity restrictions: pure wick, 3 liters 02, telemetry, continuous Sp02, alarms, pt is 390lbs, droplet precautions/neutropenic     Subjective     Subjective  Subjective: Pt in bed; concerned that the bed has a big hole in it and it isn't working correctly. Per report; physician would like patient out of bed.  Pain: Pain controlled during therapy.      Objective   Vitals  O2 Device: Nasal cannula  Bed Mobility Training  Bed Mobility Training: Yes  Rolling: Maximum assistance;Assist X2;Additional time;Moderate assistance  Supine to Sit: Maximum assistance;Moderate assistance;Assist X2  Sit to Supine: Maximum assistance;Assist X2;Additional time  Balance  Sitting: Intact  Standing: With support  Transfer Training  Transfer Training: No  Overall Level of Assistance: Total assistance  Interventions: Safety awareness training  Sit to Stand: Total assistance    Attempted sit to stand from bed height and from elevated height - pt unable to achieve full standing and nearly slipped off edge of bed. Pt requiring max 2 assist to return to supine.      Ceiling lift to recliner to allow safe transfer to chair and improve strength.  Pt reports she hasn't been up since Friday.      Neuromuscular Education  NDT Treatment: Sitting  PT Exercises  Exercise Treatment: Bed mobility; arom willie LE's - heel slides; ankle pumps; hip rotations.        Other Treatments/Modalities: pressure relief; deep breathing; OOB  Safety Devices  Type of Devices: Call light within reach;Gait belt;Nurse notified;Left in chair       Goals  Short Term Goals  Time Frame for Short Term Goals: 12 visits  Short Term Goal 1: Patient will be mod assist for bed mobility.  Short Term Goal 2: Patient will be mod assist for transfers.  Short Term Goal 3: Patient will amb 20 feet with RW and min assist.  Short Term Goal 4: Patient will tolerate 30 minutes of ther-ex and ther-act.  Short Term Goal 5: Patient will be positioned to prevent skin breakdown.  Patient Goals   Patient Goals : Return to [facility]  Patient Education  Education Given To: Patient  Education Provided: Role of Therapy;Plan

## 2024-04-08 NOTE — PROGRESS NOTES
Pulmonary Critical Care Progress Note       Patient seen for the follow up of  Acute respiratory failure with hypoxia (HCC)     Subjective:  Patient resting in bed, complains of trouble sleeping while wearing BiPAP. She denies chest pain. Mild occasional cough, mostly dry. Shortness of breath not is a little better.    Examination:  Vitals: BP (!) 159/82   Pulse 78   Temp 98.1 °F (36.7 °C) (Oral)   Resp 18   Ht 1.651 m (5' 5\")   Wt (!) 176.9 kg (390 lb)   LMP 07/16/2014   SpO2 96%   BMI 64.90 kg/m²   General appearance: in no acute distress, alert and cooperative with exam  Neck: No JVD  Lungs: decreased air exchange, no wheeze or crackles   Heart: regular rate and rhythm, S1, S2 normal, no gallop  Abdomen: Soft, non tender, + BS  Extremities: no cyanosis or clubbing. No significant edema    LABs:  CBC:   Recent Labs     04/06/24  0504   WBC 7.1   HGB 9.3*   HCT 34.3*        BMP:   Recent Labs     04/05/24  1740 04/06/24  0504    137   K 4.9 5.0   CO2 42* 38*   BUN 25* 29*   CREATININE 1.1* 0.9   LABGLOM 55* 70   GLUCOSE 183* 201*     PT/INR:   Recent Labs     04/06/24  0504 04/07/24  0501 04/08/24  0518   PROTIME 32.5* 34.9* 34.4*   INR 3.2 3.5 3.5     APTT:No results for input(s): \"APTT\" in the last 72 hours.  LIVER PROFILE:No results for input(s): \"AST\", \"ALT\", \"LABALBU\" in the last 72 hours.  ABG:  Lab Results   Component Value Date/Time    [id number] 44 05/05/2018 03:12 PM    FIO2 100.0 05/05/2018 03:12 PM       Lab Results   Component Value Date/Time    POCPH 7.29 05/05/2018 03:12 PM    POCPCO2 85 05/05/2018 03:12 PM    POCPO2 81 05/05/2018 03:12 PM    POCHCO3 41.2 05/05/2018 03:12 PM    PBEA 15 05/05/2018 03:12 PM    CTT4BNA 44 05/05/2018 03:12 PM    UNZH4ROE 93 05/05/2018 03:12 PM    FIO2 100.0 05/05/2018 03:12 PM     Radiology:  X-ray chest 4/8/2024      Impression & Recommendations:  Chronic hypoxic and hypercarbic respiratory failure  Oxygen by nasal cannula  Incentive spirometer q.1h  awake  BiPAP support    Acute exacerbation of Asthma /influenza A  DuoNeb every 4 hours while awake and as needed   Pulmicort 0.5 b.I.d. by nebulizer  Keep Solu-Medrol 40 IV q8h   Zithromax  Tamiflu  Droplet isolation      Pulmonary edema/Pulmonary hypertension, RVSP 55-60 mmHG by echo 2018   Follow-up echocardiogram    Obstructive sleep apnea/ Morbid Obesity  Outpatient Sleep evaluation     History of PE/DVT  on Coumadin    peptic ulcer disease prophylaxis  DVT prophylaxis on Coumadin    Electronically signed by   Ayah Ragland MD on 4/8/2024 at 10:24 AM  Pulmonary Critical Care and Sleep Medicine,  [facility]  Office: [phone]

## 2024-04-09 ENCOUNTER — APPOINTMENT (OUTPATIENT)
Age: 69
End: 2024-04-09
Attending: NURSE PRACTITIONER
Payer: MEDICARE

## 2024-04-09 LAB
ANION GAP SERPL CALCULATED.3IONS-SCNC: 2 MMOL/L (ref 9–17)
BASOPHILS # BLD: 0 K/UL (ref 0–0.2)
BASOPHILS NFR BLD: 0 %
BUN SERPL-MCNC: 41 MG/DL (ref 8–23)
BUN/CREAT SERPL: 41 (ref 9–20)
CALCIUM SERPL-MCNC: 8.7 MG/DL (ref 8.6–10.4)
CHLORIDE SERPL-SCNC: 94 MMOL/L (ref 98–107)
CO2 SERPL-SCNC: 42 MMOL/L (ref 20–31)
CREAT SERPL-MCNC: 1 MG/DL (ref 0.5–0.9)
EOSINOPHIL # BLD: 0 K/UL (ref 0–0.4)
EOSINOPHILS RELATIVE PERCENT: 0 % (ref 1–4)
ERYTHROCYTE [DISTWIDTH] IN BLOOD BY AUTOMATED COUNT: 17.9 % (ref 11.8–14.4)
EST. AVERAGE GLUCOSE BLD GHB EST-MCNC: 134 MG/DL
GFR SERPL CREATININE-BSD FRML MDRD: 62 ML/MIN/1.73M2
GLUCOSE BLD-MCNC: 233 MG/DL (ref 65–105)
GLUCOSE BLD-MCNC: 254 MG/DL (ref 65–105)
GLUCOSE BLD-MCNC: 268 MG/DL (ref 65–105)
GLUCOSE BLD-MCNC: 295 MG/DL (ref 65–105)
GLUCOSE SERPL-MCNC: 317 MG/DL (ref 70–99)
HBA1C MFR BLD: 6.3 % (ref 4–6)
HCT VFR BLD AUTO: 36.5 % (ref 36.3–47.1)
HGB BLD-MCNC: 10.2 G/DL (ref 11.9–15.1)
IMM GRANULOCYTES # BLD AUTO: 0 K/UL (ref 0–0.3)
IMM GRANULOCYTES NFR BLD: 0 %
INR PPP: 3.4
LYMPHOCYTES NFR BLD: 0.48 K/UL (ref 1–4.8)
LYMPHOCYTES RELATIVE PERCENT: 7 % (ref 24–44)
MCH RBC QN AUTO: 25.4 PG (ref 25.2–33.5)
MCHC RBC AUTO-ENTMCNC: 27.9 G/DL (ref 28.4–34.8)
MCV RBC AUTO: 91 FL (ref 82.6–102.9)
MONOCYTES NFR BLD: 0.14 K/UL (ref 0.2–0.8)
MONOCYTES NFR BLD: 2 % (ref 1–7)
MORPHOLOGY: ABNORMAL
NEUTROPHILS NFR BLD: 91 % (ref 36–66)
NEUTS SEG NFR BLD: 6.28 K/UL (ref 1.8–7.7)
NRBC BLD-RTO: 0 PER 100 WBC
PLATELET # BLD AUTO: 178 K/UL (ref 138–453)
PMV BLD AUTO: 9.7 FL (ref 8.1–13.5)
POTASSIUM SERPL-SCNC: 5 MMOL/L (ref 3.7–5.3)
PROTHROMBIN TIME: 33.9 SEC (ref 11.5–14.2)
RBC # BLD AUTO: 4.01 M/UL (ref 3.95–5.11)
SODIUM SERPL-SCNC: 138 MMOL/L (ref 135–144)
WBC OTHER # BLD: 6.9 K/UL (ref 3.5–11.3)

## 2024-04-09 PROCEDURE — 85610 PROTHROMBIN TIME: CPT

## 2024-04-09 PROCEDURE — 2580000003 HC RX 258: Performed by: INTERNAL MEDICINE

## 2024-04-09 PROCEDURE — 93306 TTE W/DOPPLER COMPLETE: CPT

## 2024-04-09 PROCEDURE — 6370000000 HC RX 637 (ALT 250 FOR IP): Performed by: NURSE PRACTITIONER

## 2024-04-09 PROCEDURE — 94761 N-INVAS EAR/PLS OXIMETRY MLT: CPT

## 2024-04-09 PROCEDURE — 99231 SBSQ HOSP IP/OBS SF/LOW 25: CPT | Performed by: INTERNAL MEDICINE

## 2024-04-09 PROCEDURE — 6370000000 HC RX 637 (ALT 250 FOR IP): Performed by: STUDENT IN AN ORGANIZED HEALTH CARE EDUCATION/TRAINING PROGRAM

## 2024-04-09 PROCEDURE — 2500000003 HC RX 250 WO HCPCS: Performed by: NURSE PRACTITIONER

## 2024-04-09 PROCEDURE — 2060000000 HC ICU INTERMEDIATE R&B

## 2024-04-09 PROCEDURE — 94640 AIRWAY INHALATION TREATMENT: CPT

## 2024-04-09 PROCEDURE — 6360000002 HC RX W HCPCS: Performed by: INTERNAL MEDICINE

## 2024-04-09 PROCEDURE — 82947 ASSAY GLUCOSE BLOOD QUANT: CPT

## 2024-04-09 PROCEDURE — 2580000003 HC RX 258: Performed by: NURSE PRACTITIONER

## 2024-04-09 PROCEDURE — 2700000000 HC OXYGEN THERAPY PER DAY

## 2024-04-09 PROCEDURE — 6360000002 HC RX W HCPCS: Performed by: NURSE PRACTITIONER

## 2024-04-09 PROCEDURE — 83036 HEMOGLOBIN GLYCOSYLATED A1C: CPT

## 2024-04-09 PROCEDURE — 99232 SBSQ HOSP IP/OBS MODERATE 35: CPT | Performed by: STUDENT IN AN ORGANIZED HEALTH CARE EDUCATION/TRAINING PROGRAM

## 2024-04-09 PROCEDURE — 85025 COMPLETE CBC W/AUTO DIFF WBC: CPT

## 2024-04-09 PROCEDURE — 6370000000 HC RX 637 (ALT 250 FOR IP): Performed by: INTERNAL MEDICINE

## 2024-04-09 PROCEDURE — 94669 MECHANICAL CHEST WALL OSCILL: CPT

## 2024-04-09 PROCEDURE — 80048 BASIC METABOLIC PNL TOTAL CA: CPT

## 2024-04-09 PROCEDURE — 36415 COLL VENOUS BLD VENIPUNCTURE: CPT

## 2024-04-09 RX ORDER — FENTANYL CITRATE 0.05 MG/ML
25 INJECTION, SOLUTION INTRAMUSCULAR; INTRAVENOUS ONCE
Status: DISCONTINUED | OUTPATIENT
Start: 2024-04-09 | End: 2024-04-09

## 2024-04-09 RX ORDER — GUAIFENESIN 600 MG/1
600 TABLET, EXTENDED RELEASE ORAL 2 TIMES DAILY
Status: DISCONTINUED | OUTPATIENT
Start: 2024-04-09 | End: 2024-04-19 | Stop reason: HOSPADM

## 2024-04-09 RX ORDER — CARVEDILOL 12.5 MG/1
12.5 TABLET ORAL 2 TIMES DAILY WITH MEALS
Status: DISCONTINUED | OUTPATIENT
Start: 2024-04-09 | End: 2024-04-19 | Stop reason: HOSPADM

## 2024-04-09 RX ORDER — INSULIN LISPRO 100 [IU]/ML
0-4 INJECTION, SOLUTION INTRAVENOUS; SUBCUTANEOUS NIGHTLY
Status: DISCONTINUED | OUTPATIENT
Start: 2024-04-09 | End: 2024-04-10

## 2024-04-09 RX ORDER — INSULIN LISPRO 100 [IU]/ML
0-8 INJECTION, SOLUTION INTRAVENOUS; SUBCUTANEOUS
Status: DISCONTINUED | OUTPATIENT
Start: 2024-04-09 | End: 2024-04-10

## 2024-04-09 RX ORDER — HYDRALAZINE HYDROCHLORIDE 20 MG/ML
5 INJECTION INTRAMUSCULAR; INTRAVENOUS EVERY 6 HOURS PRN
Status: DISCONTINUED | OUTPATIENT
Start: 2024-04-09 | End: 2024-04-19 | Stop reason: HOSPADM

## 2024-04-09 RX ORDER — INSULIN GLARGINE 100 [IU]/ML
10 INJECTION, SOLUTION SUBCUTANEOUS 2 TIMES DAILY
Status: DISCONTINUED | OUTPATIENT
Start: 2024-04-09 | End: 2024-04-11

## 2024-04-09 RX ORDER — DEXTROSE MONOHYDRATE 100 MG/ML
INJECTION, SOLUTION INTRAVENOUS CONTINUOUS PRN
Status: DISCONTINUED | OUTPATIENT
Start: 2024-04-09 | End: 2024-04-19 | Stop reason: HOSPADM

## 2024-04-09 RX ADMIN — SODIUM CHLORIDE, PRESERVATIVE FREE 10 ML: 5 INJECTION INTRAVENOUS at 21:11

## 2024-04-09 RX ADMIN — PRAVASTATIN SODIUM 80 MG: 40 TABLET ORAL at 17:49

## 2024-04-09 RX ADMIN — BUDESONIDE 500 MCG: 0.5 INHALANT ORAL at 18:22

## 2024-04-09 RX ADMIN — INSULIN LISPRO 4 UNITS: 100 INJECTION, SOLUTION INTRAVENOUS; SUBCUTANEOUS at 17:49

## 2024-04-09 RX ADMIN — DILTIAZEM HYDROCHLORIDE 120 MG: 120 CAPSULE, EXTENDED RELEASE ORAL at 09:18

## 2024-04-09 RX ADMIN — INSULIN GLARGINE 10 UNITS: 100 INJECTION, SOLUTION SUBCUTANEOUS at 21:13

## 2024-04-09 RX ADMIN — BUDESONIDE 500 MCG: 0.5 INHALANT ORAL at 05:12

## 2024-04-09 RX ADMIN — CARVEDILOL 12.5 MG: 12.5 TABLET, FILM COATED ORAL at 17:49

## 2024-04-09 RX ADMIN — GUAIFENESIN 600 MG: 600 TABLET ORAL at 11:05

## 2024-04-09 RX ADMIN — METOPROLOL TARTRATE 2.5 MG: 5 INJECTION INTRAVENOUS at 00:16

## 2024-04-09 RX ADMIN — MICONAZOLE NITRATE: 20 CREAM TOPICAL at 21:22

## 2024-04-09 RX ADMIN — WATER 40 MG: 1 INJECTION INTRAMUSCULAR; INTRAVENOUS; SUBCUTANEOUS at 00:14

## 2024-04-09 RX ADMIN — IPRATROPIUM BROMIDE AND ALBUTEROL SULFATE 1 DOSE: 2.5; .5 SOLUTION RESPIRATORY (INHALATION) at 05:12

## 2024-04-09 RX ADMIN — INSULIN GLARGINE 10 UNITS: 100 INJECTION, SOLUTION SUBCUTANEOUS at 09:18

## 2024-04-09 RX ADMIN — PANTOPRAZOLE SODIUM 40 MG: 40 TABLET, DELAYED RELEASE ORAL at 05:54

## 2024-04-09 RX ADMIN — SODIUM CHLORIDE, PRESERVATIVE FREE 10 ML: 5 INJECTION INTRAVENOUS at 00:15

## 2024-04-09 RX ADMIN — GUAIFENESIN 600 MG: 600 TABLET ORAL at 21:12

## 2024-04-09 RX ADMIN — IPRATROPIUM BROMIDE AND ALBUTEROL SULFATE 1 DOSE: 2.5; .5 SOLUTION RESPIRATORY (INHALATION) at 10:49

## 2024-04-09 RX ADMIN — INSULIN LISPRO 4 UNITS: 100 INJECTION, SOLUTION INTRAVENOUS; SUBCUTANEOUS at 09:19

## 2024-04-09 RX ADMIN — CARVEDILOL 12.5 MG: 12.5 TABLET, FILM COATED ORAL at 09:18

## 2024-04-09 RX ADMIN — IPRATROPIUM BROMIDE AND ALBUTEROL SULFATE 1 DOSE: 2.5; .5 SOLUTION RESPIRATORY (INHALATION) at 13:48

## 2024-04-09 RX ADMIN — MICONAZOLE NITRATE: 20 CREAM TOPICAL at 09:18

## 2024-04-09 RX ADMIN — SODIUM CHLORIDE, PRESERVATIVE FREE 10 ML: 5 INJECTION INTRAVENOUS at 09:18

## 2024-04-09 RX ADMIN — INSULIN LISPRO 2 UNITS: 100 INJECTION, SOLUTION INTRAVENOUS; SUBCUTANEOUS at 12:23

## 2024-04-09 RX ADMIN — IPRATROPIUM BROMIDE AND ALBUTEROL SULFATE 1 DOSE: 2.5; .5 SOLUTION RESPIRATORY (INHALATION) at 18:22

## 2024-04-09 RX ADMIN — POLYETHYLENE GLYCOL 3350 17 G: 17 POWDER, FOR SOLUTION ORAL at 21:19

## 2024-04-09 RX ADMIN — ALBUTEROL SULFATE 2.5 MG: 2.5 SOLUTION RESPIRATORY (INHALATION) at 23:08

## 2024-04-09 RX ADMIN — WATER 40 MG: 1 INJECTION INTRAMUSCULAR; INTRAVENOUS; SUBCUTANEOUS at 09:18

## 2024-04-09 RX ADMIN — MONTELUKAST 10 MG: 10 TABLET, FILM COATED ORAL at 21:14

## 2024-04-09 RX ADMIN — FLUTICASONE PROPIONATE 1 SPRAY: 50 SPRAY, METERED NASAL at 09:18

## 2024-04-09 RX ADMIN — WATER 30 MG: 1 INJECTION INTRAMUSCULAR; INTRAVENOUS; SUBCUTANEOUS at 21:09

## 2024-04-09 ASSESSMENT — PAIN SCALES - GENERAL
PAINLEVEL_OUTOF10: 0
PAINLEVEL_OUTOF10: 0

## 2024-04-09 NOTE — PLAN OF CARE
Problem: Respiratory - Adult  Goal: Able to breathe comfortably  4/9/2024 0553 by Balbina Mcdaniels RCP  Outcome: Progressing     Problem: Respiratory - Adult  Goal: Patient's breath sounds will be clear and equal  4/9/2024 0553 by Balbina Mcdaniels RCP  Outcome: Progressing     Problem: Respiratory - Adult  Goal: Adequate oxygenation  Description: Adequate oxygenation  4/9/2024 0553 by Balbina Mcdaniels RCP  Outcome: Progressing     Problem: Respiratory - Adult  Goal: Achieves optimal ventilation and oxygenation  4/9/2024 0553 by Balbina Mcdaniels RCP  Outcome: Progressing           BRONCHOSPASM/BRONCHOCONSTRICTION    IMPROVE  AERATION/BREATHSOUNDS  ADMINISTER BRONCHODILATOR THERAPY AS APPROPRIATE  ASSESS BREATH SOUNDS  INITIATE AEROSOL PROTOCOL IF ORDERED TO DO SO  PATIENT EDUCATION AS NEEDED      PROVIDE ADEQUATE OXYGENATION WITH ACCEPTABLE SP02/ABG'S    [x]  IDENTIFY APPROPRIATE OXYGEN THERAPY  [x]   MONITOR SP02/ABG'S AS NEEDED   [x]   PATIENT EDUCATION AS NEEDED

## 2024-04-09 NOTE — PROGRESS NOTES
Occupational Therapy  [facility]  Occupational Therapy Not Seen    DATE: 2024    NAME: Shazia Dickson  MRN: [id number]   : 1955    Patient not seen this date for Occupational Therapy due to:      [] Cancel by RN or physician due to:    [] Hemodialysis    [] Critical Lab Value Level     [] Blood transfusion in progress    [] Acute or unstable cardiovascular status   _MAP < 55 or more than >115  _HR < 40 or > 130    [] Acute or unstable pulmonary status   -FiO2 > 60%   _RR < 5 or >40    _O2 sats < 85%    [] Strict Bedrest    [] Off Unit for surgery or procedure    [] Off Unit for testing       [] Pending imaging to R/O fracture    [x] Refusal by Patient (Pt reported \"I can't breathe!\" Refusing therapy at this time and RN notified. Pt's SpO2 97%).      [] Other      [] OT being discontinued at this time. Patient independent. No further needs.     [] OT being discontinued at this time as the patient has been transferred to hospice care. No further needs.      DOMENICA VARGAS

## 2024-04-09 NOTE — PLAN OF CARE
Problem: Respiratory - Adult  Goal: Able to breathe comfortably  Outcome: Progressing     Problem: Respiratory - Adult  Goal: Patient's breath sounds will be clear and equal  Outcome: Progressing     Problem: Respiratory - Adult  Goal: Adequate oxygenation  Description: Adequate oxygenation  Outcome: Progressing     Problem: Respiratory - Adult  Goal: Achieves optimal ventilation and oxygenation  4/9/2024 0432 by Nirmal Salazar, MATT  Outcome: Progressing

## 2024-04-09 NOTE — PLAN OF CARE
Problem: Respiratory - Adult  Goal: Achieves optimal ventilation and oxygenation  4/8/2024 2021 by Rios Ching RN  Outcome: Progressing  Note: Assess breath sounds every shift and as needed.  Assess oxygenation level & respiration rate.  Encourage coughing & deep breathing.  Encourage use of incentive spirometer.  Assess cough & sputum.  Administer oxygen as needed.  4/8/2024 1437 by Elly Denny RN  Outcome: Progressing  Flowsheets (Taken 4/8/2024 0335 by Annika Thompson ANEESH)  Achieves optimal ventilation and oxygenation:   Assess for changes in respiratory status   Oxygen supplementation based on oxygen saturation or arterial blood gases   Respiratory therapy support as indicated     Problem: Discharge Planning  Goal: Discharge to home or other facility with appropriate resources  4/8/2024 2021 by Rios Ching RN  Outcome: Progressing  Note: Discharge teaching and instructions for diagnosis/procedure explained with patient using teachback method. Patient voiced understanding regarding prescriptions, follow up appointments, and care of self at home.   4/8/2024 1437 by Elly Denny RN  Outcome: Progressing     Problem: Pain  Goal: Verbalizes/displays adequate comfort level or baseline comfort level  4/8/2024 2021 by Rios Ching RN  Outcome: Progressing  Note: Monitoring pain with each assessment and prn. AXEL 0-10 pain scale utilized. Non-pharmacological measures to be encouraged prior to pharmacological measures.   4/8/2024 1437 by Elly Denny RN  Outcome: Progressing     Problem: Skin/Tissue Integrity  Goal: Absence of new skin breakdown  Description: 1.  Monitor for areas of redness and/or skin breakdown  2.  Assess vascular access sites hourly  3.  Every 4-6 hours minimum:  Change oxygen saturation probe site  4.  Every 4-6 hours:  If on nasal continuous positive airway pressure, respiratory therapy assess nares and determine need for appliance change or resting  period.  4/8/2024 2021 by Rios Ching RN  Outcome: Progressing  Note: Continuing to monitor for skin integrity risks. Patient independent with turning/repositioning. Turning/repositioning encouraged at least once every 2 hrs, and prn basis. Hygiene care being completed independently per patient; assistance provided when deemed necessary.  4/8/2024 1437 by Elly Denny RN  Outcome: Progressing     Problem: Safety - Adult  Goal: Free from fall injury  4/8/2024 2021 by Rios Ching RN  Outcome: Progressing  Note: Pt fall risk, fall band present, falling star, safety alarm activated and in use as needed. Hourly rounding performed. Pt encouraged to use call light. See Das fall risk assessment.  4/8/2024 1437 by Elly Denny RN  Outcome: Progressing     Problem: ABCDS Injury Assessment  Goal: Absence of physical injury  4/8/2024 2021 by Rios Ching RN  Outcome: Progressing  Note: Non-skid socks in place, up with assistance, bed in lowest position, bed exit & alarm as needed, provide toileting every 2 hours an d as needed.  4/8/2024 1437 by Elly Denny RN  Outcome: Progressing     Problem: Skin/Tissue Integrity - Adult  Goal: Incisions, wounds, or drain sites healing without S/S of infection  4/8/2024 2021 by Rios Ching RN  Outcome: Progressing  Note: Continuing to monitor for skin integrity risks. Patient independent with turning/repositioning. Turning/repositioning encouraged at least once every 2 hrs, and prn basis. Hygiene care being completed independently per patient; assistance provided when deemed necessary.  4/8/2024 1437 by Elly Denny RN  Outcome: Progressing     Problem: Musculoskeletal - Adult  Goal: Return ADL status to a safe level of function  4/8/2024 2021 by Rios Ching RN  Outcome: Progressing  Note: Assessed musculoskeletal functional level.  Assessed ROM & ability to care for self.  4/8/2024 1437 by Elly Denny RN  Outcome: Progressing

## 2024-04-09 NOTE — PLAN OF CARE
Problem: Discharge Planning  Goal: Discharge to home or other facility with appropriate resources  Outcome: Progressing  Note: Patient remains free from falls this shift. Patient had been in bed most of day, but did get up to chair for approximately an hour and a a half, tolerated well. No new signs of skin breakdown noted, barbara changed this shift. Patient started on sliding scale insulin as well as lantus BID. Patient's warfarin continued to be held, INR this morning is WNL. Patient also started on carvedilol BID (see mar for detail). Discharge planning pending to rehab, safety measures continued.      Problem: Skin/Tissue Integrity  Goal: Absence of new skin breakdown  Description: 1.  Monitor for areas of redness and/or skin breakdown  2.  Assess vascular access sites hourly  3.  Every 4-6 hours minimum:  Change oxygen saturation probe site  4.  Every 4-6 hours:  If on nasal continuous positive airway pressure, respiratory therapy assess nares and determine need for appliance change or resting period.  Outcome: Progressing     Problem: Chronic Conditions and Co-morbidities  Goal: Patient's chronic conditions and co-morbidity symptoms are monitored and maintained or improved  Outcome: Progressing     Problem: Musculoskeletal - Adult  Goal: Return ADL status to a safe level of function  Outcome: Progressing     Problem: Skin/Tissue Integrity - Adult  Goal: Incisions, wounds, or drain sites healing without S/S of infection  Outcome: Progressing     Problem: Respiratory - Adult  Goal: Achieves optimal ventilation and oxygenation  4/9/2024 1731 by Grey Nelson RN  Outcome: Progressing

## 2024-04-09 NOTE — PROGRESS NOTES
Pulmonary Critical Care Progress Note       Patient seen for the follow up of  Acute respiratory failure with hypoxia (HCC)     Subjective:  Patient resting in bed, slept better overnight without BiPAP.  She complains of difficulty expectorating phlegm.  She denies chest pain. Shortness of breath not is a little better.    Examination:  Vitals: BP (!) 154/70   Pulse 80   Temp 97.7 °F (36.5 °C) (Oral)   Resp 18   Ht 1.651 m (5' 5\")   Wt (!) 176.9 kg (390 lb)   LMP 07/16/2014   SpO2 97%   BMI 64.90 kg/m²   General appearance: in no acute distress, alert and cooperative with exam  Neck: No JVD  Lungs: decreased air exchange, no wheeze or crackles   Heart: regular rate and rhythm, S1, S2 normal, no gallop  Abdomen: Soft, non tender, + BS  Extremities: no cyanosis or clubbing. No significant edema    LABs:  CBC:   Recent Labs     04/09/24  0457   WBC 6.9   HGB 10.2*   HCT 36.5          BMP:   Recent Labs     04/09/24  0457      K 5.0   CO2 42*   BUN 41*   CREATININE 1.0*   LABGLOM 62   GLUCOSE 317*       PT/INR:   Recent Labs     04/07/24  0501 04/08/24  0518 04/09/24  0457   PROTIME 34.9* 34.4* 33.9*   INR 3.5 3.5 3.4       APTT:No results for input(s): \"APTT\" in the last 72 hours.  LIVER PROFILE:No results for input(s): \"AST\", \"ALT\", \"LABALBU\" in the last 72 hours.  ABG:  Lab Results   Component Value Date/Time    [id number] 44 05/05/2018 03:12 PM    FIO2 100.0 05/05/2018 03:12 PM       Lab Results   Component Value Date/Time    POCPH 7.29 05/05/2018 03:12 PM    POCPCO2 85 05/05/2018 03:12 PM    POCPO2 81 05/05/2018 03:12 PM    POCHCO3 41.2 05/05/2018 03:12 PM    PBEA 15 05/05/2018 03:12 PM    VQJ1CHP 44 05/05/2018 03:12 PM    MCBN6QJI 93 05/05/2018 03:12 PM    FIO2 100.0 05/05/2018 03:12 PM     Radiology:  X-ray chest 4/8/2024      Impression & Recommendations:  Chronic hypoxic and hypercarbic respiratory failure  Oxygen by nasal cannula  Incentive spirometer q.1h awake  BiPAP support    Acute

## 2024-04-09 NOTE — PROGRESS NOTES
cardiomegaly and underexposure.       Physical Examination:     General appearance:  alert, chronically ill-appearing, obese body habitus, on supplemental oxygen.  Mental Status:  oriented to person, place and time and normal affect  Lungs:  clear to auscultation bilaterally however diminished at the bases with normal respiratory rate.  On oxygen nasal cannula.  Heart:  regular rate and rhythm, no murmur  Abdomen:  soft, nontender, nondistended, normal bowel sounds, no masses, hepatomegaly, splenomegaly,   Extremities:  no edema, redness, tenderness in the calves  Skin:  no gross lesions, rashes, induration, erythema noted skin folds    Assessment:     Hospital Problems             Last Modified POA    * (Principal) Acute respiratory failure with hypoxia ([facility]) 4/4/2024 Yes    Anxiety 4/5/2024 Yes    Depression 4/5/2024 Yes    Hyperlipemia, mixed 4/5/2024 Yes    Anticoagulated on Coumadin 4/5/2024 Yes    Essential hypertension 4/5/2024 Yes    Stage 3a chronic kidney disease ([facility]) 4/5/2024 Yes    Chronic deep vein thrombosis (DVT) of femoral vein of right lower extremity ([facility]) 4/5/2024 Yes    BMI 60.0-69.9, adult ([facility]) 4/5/2024 Yes    Pulmonary embolism on right ([facility]) 4/5/2024 Yes    Acute on chronic respiratory failure with hypoxia ([facility]) 4/5/2024 Yes    Diabetes mellitus type 2, noninsulin dependent ([facility]) 4/5/2024 Yes    Cytopenia 4/5/2024 Yes    Influenza A 4/5/2024 Yes    COPD exacerbation ([facility]) 4/5/2024 Yes    Pancytopenia ([facility]) 4/8/2024 Yes     Plan:     Acute on chronic hypoxic respiratory failure: requiring oxygen supplement likely due to COPD exacerbation due to influenza, continue with COPD management, DuoNeb, oxygen supplement, IV azithromycin, Tamiflu, patient to her baseline oxygen 4 L.  Continue IV steroids, respiratory treatment with inhaled steroids and bronchodilator.  History of DVT and PE, on Coumadin  GERD continue PPI  Pancytopenia resolved  Hypertension continue Cardizem, blood pressure on the  lower side, started her on Coreg 12.5.  Continue to monitor her blood pressure.  If her respiratory distress worsens, will DC beta-blocker and start her on Norvasc.  Although due to concern for bilateral lower extremity swelling, currently did not start her on Norvasc.  Dyslipidemia continue statin  PT-OT  DVT prophylaxis on warfarin  Patient is back to her baseline oxygen, just started her insulin today and added Coreg for appropriate blood pressure control.  Anticipate discharge in 24 hours if okay with consult services.  Medical Decision Making: Medium    Dispo: stable for d/c once SNF arranged.   Ramon Ellsworth MD  4/9/2024  6:23 PM

## 2024-04-09 NOTE — CARE COORDINATION
Social work: [facility] cannot accept.   Second choice is [facility] and [facility] submitted precert.   Writer met with patient and provided update; she would like to speak with liaison from [facility] to get further details on facility.  [facility] will come today.  If snf, patient would like snf with pulmonary rehab, which would include [facility], [facility] and [facility].

## 2024-04-09 NOTE — PROGRESS NOTES
Physical Therapy  DATE: 2024    NAME: Shazia Dickson  MRN: [id number]   : 1955    Patient not seen this date for Physical Therapy due to:      [] Cancel by RN or physician due to:    [] Hemodialysis    [] Critical Lab Value Level     [] Blood transfusion in progress    [] Acute or unstable cardiovascular status   _MAP < 55 or more than >115  _HR < 40 or > 130    [] Acute or unstable pulmonary status   -FiO2 > 60%   _RR < 5 or >40    _O2 sats < 85%    [] Strict Bedrest    [] Off Unit for surgery or procedure    [] Off Unit for testing       [] Pending imaging to R/O fracture    [x] Refusal by Patient   (Pt reported \"I can't breathe!\" Refusing therapy at this time and RN notified. Pt's SpO2 97%).    will check back if time allows                 [] Other      [] PT being discontinued at this time. Patient independent. No further needs.     [] PT being discontinued at this time as the patient has been transferred to hospice care. No further needs.      KELLIE NARAYANAN, PTA

## 2024-04-09 NOTE — PROGRESS NOTES
Today's Date: 4/9/2024  Patient Name: Shazia Dickson  Date of admission: 4/4/2024  6:36 PM  Patient's age: 68 y.o., 1955  Admission Dx: Influenza A [J10.1]  COPD exacerbation (HCC) [J44.1]  Acute respiratory failure with hypoxia (HCC) [J96.01]  Increased oxygen demand [R68.89]    Reason for Consult: management recommendations  Requesting Physician: Monserrat Rosado MD    CHIEF COMPLAINT: Shortness of breath.  Leukopenia.    INTERIM HISTORY  Patient is seen and evaluated.  Overall feels well. Slept much better .  Off BIPAP  No bleeding      HISTORY OF PRESENT ILLNESS:      The patient is a 68 y.o.  female who is admitted to the hospital with chief complaint of shortness of breath.  Complains of generalized bodyaches and upper respite tract infection symptoms.  Patient recently had been seen infectious disease team for healing abdominal wall wound.  Dr. Nunez.  Per records patient has chronic shortness of breath.    Hematology oncology team consulted due to cytopenias.  Patient CBC from 10/23 shows WBC count 6.0 hemoglobin 8.9 platelet count 225.  Patient CBC at presentation shows WBC count of 2.7 hemoglobin 9.1 and creatinine of 153.  Patient ANC was 1.79.  Patient just finished course of antibiotics.    Plan past Medical History:   has a past medical history of Anxiety, Asthma, Bronchitis, DDD (degenerative disc disease), lumbar, Depression, Diabetes mellitus (HCC), Elevated glucose, Headache(784.0), Hernia of abdominal cavity, HH (hiatus hernia), Hyperlipemia, mixed, Hypertension, Osteoarthritis, Right femoral vein DVT (HCC), and Uterine hyperplasia.    Past Surgical History:   has a past surgical history that includes Dilation and curettage of uterus (10/08 and 09/07); Breast reduction surgery (12/1997); Breast reduction surgery (12/1997); Tympanostomy tube placement (03/1967); Tonsillectomy and adenoidectomy (1962); and Hysterectomy (7/17/15).     Medications:    Prior to Admission  failure with hypoxia (HCC) [J96.21] 05/04/2018    Pulmonary embolism on right (HCC) [I26.99] 05/04/2018    BMI 60.0-69.9, adult (HCC) [Z68.44] 10/04/2017    Chronic deep vein thrombosis (DVT) of femoral vein of right lower extremity (HCC) [I82.511] 06/08/2016    Stage 3a chronic kidney disease (HCC) [N18.31] 02/03/2016    Essential hypertension [I10] 08/30/2015    Anticoagulated on Coumadin [Z79.01] 01/14/2014    Hyperlipemia, mixed [E78.2]     Depression [F32.A]     Anxiety [F41.9]        Leukopenia, due to acute illness, resolved   Anemia, improving   Abdominal wall wound  COPD  Acute influenza, improving     RECOMMENDATIONS:  I reviewed the labs/imaging available to me,outside records and discussed with the patient.I explained to the patient the nature of this problem. I explained the significance of these abnormalities and possible etiology and management options  CBC is improving , WBC and  PLT are normal, Hgb above 10   Continue anticoagulation, INR is within range (2.5-3.5)  Continue current therapy unchanged   Respiratory status is improving   Discharge planning is underway   Please call us as needed                      Luis Meza MD  Hematologist/Medical Oncologist  [facility]            This note is created with the assistance of a speech recognition program.  While intending to generate a document that actually reflects the content of the visit, the document can still have some errors including those of syntax and sound a like substitutions which may escape proof reading.  It such instances, actual meaning can be extrapolated by contextual diversion.

## 2024-04-09 NOTE — PROGRESS NOTES
Patient remains in droplet precautions from flu.  SBP elevated in 160s treated with one-time Lopressor.  Patient concerned her home dose of Losartan not ordered.  Per NP, informed her potassium = 5.  BMP ordered for the morning.  Patient refused BIPAP, stating she did not sleep well last evening.  Wears 4L/NC last night.  Incontinent of urine in time despite having Purewick.  Patient has daily abdominal wound care, with Mepilex.  Patient lifted in bed via Maxi-Armand.  Warfarin held due to elevated INR.  Side rails x2 raised for patient safety.  Will monitor.

## 2024-04-09 NOTE — PROGRESS NOTES
Warfarin Dosing - Pharmacy Consult Note  Consulting Provider: Nishant Jimenez MD  Indication:  History of  VTE/PE  Warfarin Dose prior to admission: 2mg M-W-Sat, 4mg all other days   Concurrent anticoagulants/antiplatelets: none  Significant Drug Interactions:  Solu-Medrol   Recent Labs     04/07/24  0501 04/08/24  0518 04/09/24  0457   INR 3.5 3.5 3.4   HGB  --   --  10.2*   PLT  --   --  178     Recent warfarin administrations        No warfarin orders with administrations found.            Orders not given:            warfarin placeholder: dosing by pharmacy                   Date   INR    Dose  4/4         2.2        4 mg  4/5         2.3        2 mg  4/6         3.2        hold  4/7         3.5        none  4/8         3.5        none  4/9         3.4     Assessment/Plan  (Goal INR: 2.5 - 3.5)  INR still within goal. Continue to hold warfarin. INR in AM.    Active problem list reviewed.  INR orders are placed.  Chart reviewed for pertinent labs, drug/diet interactions, and past doses.  Documentation of patient's clinical condition was reviewed.    Pharmacy Dosing:  Pharmacy will continue to follow.

## 2024-04-10 ENCOUNTER — APPOINTMENT (OUTPATIENT)
Dept: GENERAL RADIOLOGY | Age: 69
End: 2024-04-10
Payer: MEDICARE

## 2024-04-10 LAB
ANION GAP SERPL CALCULATED.3IONS-SCNC: 3 MMOL/L
BASOPHILS # BLD: 0 K/UL (ref 0–0.2)
BASOPHILS NFR BLD: 0 % (ref 0–2)
BUN SERPL-MCNC: 45 MG/DL (ref 8–23)
BUN/CREAT SERPL: 45 (ref 9–20)
CALCIUM SERPL-MCNC: 8.5 MG/DL (ref 8.6–10.4)
CHLORIDE SERPL-SCNC: 96 MMOL/L (ref 98–107)
CO2 SERPL-SCNC: 41 MMOL/L (ref 20–31)
CREAT SERPL-MCNC: 1 MG/DL (ref 0.5–0.9)
ECHO AO ROOT DIAM: 2.9 CM
ECHO AO ROOT INDEX: 1.1 CM/M2
ECHO AV MEAN GRADIENT: 5 MMHG
ECHO AV MEAN VELOCITY: 1 M/S
ECHO AV PEAK GRADIENT: 12 MMHG
ECHO AV PEAK VELOCITY: 1.8 M/S
ECHO AV VELOCITY RATIO: 0.61
ECHO AV VTI: 28.3 CM
ECHO BSA: 2.85 M2
ECHO LA AREA 2C: 22.1 CM2
ECHO LA AREA 4C: 25.7 CM2
ECHO LA DIAMETER INDEX: 1.52 CM/M2
ECHO LA DIAMETER: 4 CM
ECHO LA MAJOR AXIS: 6.6 CM
ECHO LA MINOR AXIS: 5.3 CM
ECHO LA TO AORTIC ROOT RATIO: 1.38
ECHO LA VOL BP: 86 ML (ref 22–52)
ECHO LA VOL MOD A2C: 74 ML (ref 22–52)
ECHO LA VOL MOD A4C: 80 ML (ref 22–52)
ECHO LA VOL/BSA BIPLANE: 33 ML/M2 (ref 16–34)
ECHO LA VOLUME INDEX MOD A2C: 28 ML/M2 (ref 16–34)
ECHO LA VOLUME INDEX MOD A4C: 30 ML/M2 (ref 16–34)
ECHO LV E' LATERAL VELOCITY: 12 CM/S
ECHO LV E' SEPTAL VELOCITY: 7 CM/S
ECHO LV FRACTIONAL SHORTENING: 31 % (ref 28–44)
ECHO LV INTERNAL DIMENSION DIASTOLE INDEX: 1.83 CM/M2
ECHO LV INTERNAL DIMENSION DIASTOLIC: 4.8 CM (ref 3.9–5.3)
ECHO LV INTERNAL DIMENSION SYSTOLIC INDEX: 1.25 CM/M2
ECHO LV INTERNAL DIMENSION SYSTOLIC: 3.3 CM
ECHO LV IVSD: 1.3 CM (ref 0.6–0.9)
ECHO LV MASS 2D: 245.7 G (ref 67–162)
ECHO LV MASS INDEX 2D: 93.4 G/M2 (ref 43–95)
ECHO LV POSTERIOR WALL DIASTOLIC: 1.3 CM (ref 0.6–0.9)
ECHO LV RELATIVE WALL THICKNESS RATIO: 0.54
ECHO LVOT PEAK GRADIENT: 5 MMHG
ECHO LVOT PEAK VELOCITY: 1.1 M/S
ECHO MV A VELOCITY: 0.94 M/S
ECHO MV E DECELERATION TIME (DT): 123 MS
ECHO MV E VELOCITY: 1.1 M/S
ECHO MV E/A RATIO: 1.17
ECHO MV E/E' LATERAL: 9.17
ECHO MV E/E' RATIO (AVERAGED): 12.44
EOSINOPHIL # BLD: 0 K/UL (ref 0–0.44)
EOSINOPHILS RELATIVE PERCENT: 0 % (ref 1–4)
ERYTHROCYTE [DISTWIDTH] IN BLOOD BY AUTOMATED COUNT: 17.5 % (ref 11.8–14.4)
GFR SERPL CREATININE-BSD FRML MDRD: 62 ML/MIN/1.73M2
GLUCOSE BLD-MCNC: 238 MG/DL (ref 65–105)
GLUCOSE BLD-MCNC: 244 MG/DL (ref 65–105)
GLUCOSE BLD-MCNC: 272 MG/DL (ref 65–105)
GLUCOSE BLD-MCNC: 272 MG/DL (ref 65–105)
GLUCOSE SERPL-MCNC: 284 MG/DL (ref 70–99)
HCT VFR BLD AUTO: 37.8 % (ref 36.3–47.1)
HGB BLD-MCNC: 10.4 G/DL (ref 11.9–15.1)
IMM GRANULOCYTES # BLD AUTO: 0 K/UL (ref 0–0.3)
IMM GRANULOCYTES NFR BLD: 0 %
INR PPP: 3.4
LYMPHOCYTES NFR BLD: 0.42 K/UL (ref 1.1–3.7)
LYMPHOCYTES RELATIVE PERCENT: 5 % (ref 24–43)
MCH RBC QN AUTO: 25.2 PG (ref 25.2–33.5)
MCHC RBC AUTO-ENTMCNC: 27.5 G/DL (ref 28.4–34.8)
MCV RBC AUTO: 91.7 FL (ref 82.6–102.9)
MONOCYTES NFR BLD: 0.34 K/UL (ref 0.1–1.2)
MONOCYTES NFR BLD: 4 % (ref 3–12)
MORPHOLOGY: ABNORMAL
NEUTROPHILS NFR BLD: 91 % (ref 36–65)
NEUTS SEG NFR BLD: 7.64 K/UL (ref 1.5–8.1)
NRBC BLD-RTO: 0 PER 100 WBC
PLATELET # BLD AUTO: 170 K/UL (ref 138–453)
PMV BLD AUTO: 9 FL (ref 8.1–13.5)
POTASSIUM SERPL-SCNC: 5.3 MMOL/L (ref 3.7–5.3)
PROTHROMBIN TIME: 33.7 SEC (ref 11.5–14.2)
RBC # BLD AUTO: 4.12 M/UL (ref 3.95–5.11)
SODIUM SERPL-SCNC: 140 MMOL/L (ref 135–144)
WBC OTHER # BLD: 8.4 K/UL (ref 3.5–11.3)

## 2024-04-10 PROCEDURE — 6370000000 HC RX 637 (ALT 250 FOR IP): Performed by: NURSE PRACTITIONER

## 2024-04-10 PROCEDURE — 2060000000 HC ICU INTERMEDIATE R&B

## 2024-04-10 PROCEDURE — 80048 BASIC METABOLIC PNL TOTAL CA: CPT

## 2024-04-10 PROCEDURE — 82947 ASSAY GLUCOSE BLOOD QUANT: CPT

## 2024-04-10 PROCEDURE — 97530 THERAPEUTIC ACTIVITIES: CPT

## 2024-04-10 PROCEDURE — 97110 THERAPEUTIC EXERCISES: CPT

## 2024-04-10 PROCEDURE — 6360000002 HC RX W HCPCS: Performed by: INTERNAL MEDICINE

## 2024-04-10 PROCEDURE — 99231 SBSQ HOSP IP/OBS SF/LOW 25: CPT | Performed by: INTERNAL MEDICINE

## 2024-04-10 PROCEDURE — 2580000003 HC RX 258: Performed by: INTERNAL MEDICINE

## 2024-04-10 PROCEDURE — 6370000000 HC RX 637 (ALT 250 FOR IP): Performed by: INTERNAL MEDICINE

## 2024-04-10 PROCEDURE — 2580000003 HC RX 258: Performed by: NURSE PRACTITIONER

## 2024-04-10 PROCEDURE — 94669 MECHANICAL CHEST WALL OSCILL: CPT

## 2024-04-10 PROCEDURE — 6370000000 HC RX 637 (ALT 250 FOR IP): Performed by: STUDENT IN AN ORGANIZED HEALTH CARE EDUCATION/TRAINING PROGRAM

## 2024-04-10 PROCEDURE — 6360000002 HC RX W HCPCS: Performed by: NURSE PRACTITIONER

## 2024-04-10 PROCEDURE — 99232 SBSQ HOSP IP/OBS MODERATE 35: CPT | Performed by: STUDENT IN AN ORGANIZED HEALTH CARE EDUCATION/TRAINING PROGRAM

## 2024-04-10 PROCEDURE — 94640 AIRWAY INHALATION TREATMENT: CPT

## 2024-04-10 PROCEDURE — 94761 N-INVAS EAR/PLS OXIMETRY MLT: CPT

## 2024-04-10 PROCEDURE — 85610 PROTHROMBIN TIME: CPT

## 2024-04-10 PROCEDURE — 74018 RADEX ABDOMEN 1 VIEW: CPT

## 2024-04-10 PROCEDURE — 36415 COLL VENOUS BLD VENIPUNCTURE: CPT

## 2024-04-10 PROCEDURE — 2700000000 HC OXYGEN THERAPY PER DAY

## 2024-04-10 PROCEDURE — 97535 SELF CARE MNGMENT TRAINING: CPT

## 2024-04-10 PROCEDURE — 85025 COMPLETE CBC W/AUTO DIFF WBC: CPT

## 2024-04-10 RX ORDER — PREDNISONE 20 MG/1
40 TABLET ORAL DAILY
Status: COMPLETED | OUTPATIENT
Start: 2024-04-10 | End: 2024-04-13

## 2024-04-10 RX ORDER — PREDNISONE 20 MG/1
20 TABLET ORAL DAILY
Status: DISCONTINUED | OUTPATIENT
Start: 2024-04-18 | End: 2024-04-19 | Stop reason: HOSPADM

## 2024-04-10 RX ORDER — INSULIN LISPRO 100 [IU]/ML
0-4 INJECTION, SOLUTION INTRAVENOUS; SUBCUTANEOUS NIGHTLY
Status: DISCONTINUED | OUTPATIENT
Start: 2024-04-10 | End: 2024-04-18

## 2024-04-10 RX ORDER — INSULIN LISPRO 100 [IU]/ML
0-16 INJECTION, SOLUTION INTRAVENOUS; SUBCUTANEOUS
Status: DISCONTINUED | OUTPATIENT
Start: 2024-04-10 | End: 2024-04-18

## 2024-04-10 RX ORDER — PREDNISONE 10 MG/1
10 TABLET ORAL DAILY
Status: DISCONTINUED | OUTPATIENT
Start: 2024-04-22 | End: 2024-04-19 | Stop reason: HOSPADM

## 2024-04-10 RX ADMIN — BUDESONIDE 500 MCG: 0.5 INHALANT ORAL at 06:09

## 2024-04-10 RX ADMIN — MICONAZOLE NITRATE: 20 CREAM TOPICAL at 08:34

## 2024-04-10 RX ADMIN — INSULIN LISPRO 8 UNITS: 100 INJECTION, SOLUTION INTRAVENOUS; SUBCUTANEOUS at 12:49

## 2024-04-10 RX ADMIN — BUDESONIDE 500 MCG: 0.5 INHALANT ORAL at 20:53

## 2024-04-10 RX ADMIN — CARVEDILOL 12.5 MG: 12.5 TABLET, FILM COATED ORAL at 08:31

## 2024-04-10 RX ADMIN — INSULIN GLARGINE 10 UNITS: 100 INJECTION, SOLUTION SUBCUTANEOUS at 20:49

## 2024-04-10 RX ADMIN — IPRATROPIUM BROMIDE AND ALBUTEROL SULFATE 1 DOSE: 2.5; .5 SOLUTION RESPIRATORY (INHALATION) at 10:58

## 2024-04-10 RX ADMIN — WATER 30 MG: 1 INJECTION INTRAMUSCULAR; INTRAVENOUS; SUBCUTANEOUS at 08:31

## 2024-04-10 RX ADMIN — GUAIFENESIN 600 MG: 600 TABLET ORAL at 20:49

## 2024-04-10 RX ADMIN — SODIUM CHLORIDE, PRESERVATIVE FREE 10 ML: 5 INJECTION INTRAVENOUS at 20:49

## 2024-04-10 RX ADMIN — IPRATROPIUM BROMIDE AND ALBUTEROL SULFATE 1 DOSE: 2.5; .5 SOLUTION RESPIRATORY (INHALATION) at 15:47

## 2024-04-10 RX ADMIN — DILTIAZEM HYDROCHLORIDE 120 MG: 120 CAPSULE, EXTENDED RELEASE ORAL at 08:31

## 2024-04-10 RX ADMIN — IPRATROPIUM BROMIDE AND ALBUTEROL SULFATE 1 DOSE: 2.5; .5 SOLUTION RESPIRATORY (INHALATION) at 06:09

## 2024-04-10 RX ADMIN — MICONAZOLE NITRATE: 20 CREAM TOPICAL at 21:21

## 2024-04-10 RX ADMIN — FLUTICASONE PROPIONATE 1 SPRAY: 50 SPRAY, METERED NASAL at 08:34

## 2024-04-10 RX ADMIN — GUAIFENESIN 600 MG: 600 TABLET ORAL at 08:35

## 2024-04-10 RX ADMIN — INSULIN LISPRO 2 UNITS: 100 INJECTION, SOLUTION INTRAVENOUS; SUBCUTANEOUS at 08:30

## 2024-04-10 RX ADMIN — PRAVASTATIN SODIUM 80 MG: 40 TABLET ORAL at 17:28

## 2024-04-10 RX ADMIN — INSULIN LISPRO 4 UNITS: 100 INJECTION, SOLUTION INTRAVENOUS; SUBCUTANEOUS at 17:29

## 2024-04-10 RX ADMIN — CARVEDILOL 12.5 MG: 12.5 TABLET, FILM COATED ORAL at 17:29

## 2024-04-10 RX ADMIN — IPRATROPIUM BROMIDE AND ALBUTEROL SULFATE 1 DOSE: 2.5; .5 SOLUTION RESPIRATORY (INHALATION) at 20:53

## 2024-04-10 RX ADMIN — PANTOPRAZOLE SODIUM 40 MG: 40 TABLET, DELAYED RELEASE ORAL at 05:22

## 2024-04-10 RX ADMIN — INSULIN GLARGINE 10 UNITS: 100 INJECTION, SOLUTION SUBCUTANEOUS at 08:30

## 2024-04-10 RX ADMIN — MONTELUKAST 10 MG: 10 TABLET, FILM COATED ORAL at 20:49

## 2024-04-10 RX ADMIN — SODIUM CHLORIDE, PRESERVATIVE FREE 10 ML: 5 INJECTION INTRAVENOUS at 08:35

## 2024-04-10 RX ADMIN — PREDNISONE 40 MG: 20 TABLET ORAL at 17:29

## 2024-04-10 ASSESSMENT — PAIN SCALES - GENERAL
PAINLEVEL_OUTOF10: 0
PAINLEVEL_OUTOF10: 0

## 2024-04-10 NOTE — PLAN OF CARE
Problem: Respiratory - Adult  Goal: Able to breathe comfortably  4/10/2024 1101 by Sabine Garcia RCP  Outcome: Progressing  4/10/2024 0544 by Nirmal Salazar RCP  Outcome: Progressing  Goal: Patient's breath sounds will be clear and equal  4/10/2024 1101 by Sabine Garcia RCP  Outcome: Progressing  4/10/2024 0544 by Nirmal Salazar RCP  Outcome: Progressing  Goal: Adequate oxygenation  Description: Adequate oxygenation  4/10/2024 1101 by Sabine Garcia RCP  Outcome: Progressing  4/10/2024 0544 by Nirmal Salazar RCP  Outcome: Progressing  Goal: Achieves optimal ventilation and oxygenation  4/10/2024 1101 by Sabine Garcia RCP  Outcome: Progressing  4/10/2024 0544 by Nirmal Salazar RCP  Outcome: Progressing

## 2024-04-10 NOTE — PROGRESS NOTES
Today's Date: 4/10/2024  Patient Name: Shazia Dickson  Date of admission: 4/4/2024  6:36 PM  Patient's age: 68 y.o., 1955  Admission Dx: Influenza A [J10.1]  COPD exacerbation (HCC) [J44.1]  Acute respiratory failure with hypoxia (HCC) [J96.01]  Increased oxygen demand [R68.89]    Reason for Consult: management recommendations  Requesting Physician: No admitting provider for patient encounter.    CHIEF COMPLAINT: Shortness of breath.  Leukopenia.    INTERIM HISTORY  Patient is seen and evaluated.  Overall feels well. Slept much better .  Off BIPAP  No bleeding, she has multiple bruises.  Hemoglobin and platelets are almost at baseline      HISTORY OF PRESENT ILLNESS:      The patient is a 68 y.o.  female who is admitted to the hospital with chief complaint of shortness of breath.  Complains of generalized bodyaches and upper respite tract infection symptoms.  Patient recently had been seen infectious disease team for healing abdominal wall wound.  Dr. Nunez.  Per records patient has chronic shortness of breath.    Hematology oncology team consulted due to cytopenias.  Patient CBC from 10/23 shows WBC count 6.0 hemoglobin 8.9 platelet count 225.  Patient CBC at presentation shows WBC count of 2.7 hemoglobin 9.1 and creatinine of 153.  Patient ANC was 1.79.  Patient just finished course of antibiotics.    Plan past Medical History:   has a past medical history of Anxiety, Asthma, Bronchitis, DDD (degenerative disc disease), lumbar, Depression, Diabetes mellitus (HCC), Elevated glucose, Headache(784.0), Hernia of abdominal cavity, HH (hiatus hernia), Hyperlipemia, mixed, Hypertension, Osteoarthritis, Right femoral vein DVT (HCC), and Uterine hyperplasia.    Past Surgical History:   has a past surgical history that includes Dilation and curettage of uterus (10/08 and 09/07); Breast reduction surgery (12/1997); Breast reduction surgery (12/1997); Tympanostomy tube placement (03/1967); Tonsillectomy

## 2024-04-10 NOTE — PROGRESS NOTES
Comprehensive Nutrition Assessment    Type and Reason for Visit:  Initial, RD Nutrition Re-Screen/LOS    Nutrition Recommendations/Plan:   Provide diet rx as ordered   Monitor labs as they become available   Monitor skin integrity/weights      Malnutrition Assessment:  Malnutrition Status:  No malnutrition (04/10/24 1043)    Context:        Findings of the 6 clinical characteristics of malnutrition:  Energy Intake:     Weight Loss:        Body Fat Loss:        Muscle Mass Loss:       Fluid Accumulation:        Strength:       Nutrition Assessment:    68-year-old female with history of asthma, COPD on home 4 L, hypertension, DVT on warfarin presents the ED for several days of cough, congestion, runny nose and worsening shortness of breath.  Patient states that this evening the shortness of breath was much more severe so she came to the ED. Seen by this writer for length of stay she is eating and drinking well, weight on 4/9 was 390#, 4/4 was 397#, 2/29 was 410# had a 20# loss may be related to scale error. Monitor po intakes, weights, labs, skin integrity.    Nutrition Related Findings:    Edema BLE +2 non-pitting, active sounds, labs and meds reviewed. Wound Type: None       Current Nutrition Intake & Therapies:    Average Meal Intake: %     ADULT DIET; Regular    Anthropometric Measures:  Height: 165.1 cm (5' 5\")  Ideal Body Weight (IBW): 125 lbs (57 kg)    Admission Body Weight: 176.9 kg (390 lb)  Current Body Weight: 176.9 kg (390 lb), 312 % IBW. Weight Source: Not Specified  Current BMI (kg/m2): 64.9        Weight Adjustment For: No Adjustment                 BMI Categories: Obese Class 3 (BMI 40.0 or greater)    Estimated Daily Nutrient Needs:  Energy Requirements Based On: Kcal/kg  Weight Used for Energy Requirements: Current  Energy (kcal/day): 9321-9522 kcals per day using 10-12 g/kg of actual body weight  Weight Used for Protein Requirements: Ideal  Protein (g/day): 68-74 grams per day basilia

## 2024-04-10 NOTE — PLAN OF CARE
Pt A&Ox4 during shift has remained free from falls and injuries. Pt got up to chair with 2 assist with maxisky lift during shift.  Pt on 4L nasal cannula sating mid-high 90s and she states 4L is her home baseline.   Vital signs stable no complaints of pain and pt normal sinus on monitor.   Pt has wound on mid lower abd dressing changed during this shift per wound care order. All other skin remains intact upon assessment.  Blood sugar elevated for break, lunch, and dinner checks are required Humalog coverage.   Pt switched from IV solumedrol to PO prednisone taper during this shift per pulm.   Pt awaiting referrals sent to SNFs.     Problem: Respiratory - Adult  Goal: Achieves optimal ventilation and oxygenation  4/10/2024 1937 by Desiree Arthur RN  Outcome: Progressing     Problem: Discharge Planning  Goal: Discharge to home or other facility with appropriate resources  Outcome: Progressing     Problem: Pain  Goal: Verbalizes/displays adequate comfort level or baseline comfort level  Outcome: Progressing     Problem: Skin/Tissue Integrity  Goal: Absence of new skin breakdown  Description: 1.  Monitor for areas of redness and/or skin breakdown  2.  Assess vascular access sites hourly  3.  Every 4-6 hours minimum:  Change oxygen saturation probe site  4.  Every 4-6 hours:  If on nasal continuous positive airway pressure, respiratory therapy assess nares and determine need for appliance change or resting period.  Outcome: Progressing     Problem: Safety - Adult  Goal: Free from fall injury  Outcome: Progressing     Problem: ABCDS Injury Assessment  Goal: Absence of physical injury  Outcome: Progressing     Problem: Skin/Tissue Integrity - Adult  Goal: Incisions, wounds, or drain sites healing without S/S of infection  Outcome: Progressing     Problem: Musculoskeletal - Adult  Goal: Return ADL status to a safe level of function  Outcome: Progressing     Problem: Infection - Adult  Goal: Absence of infection at  discharge  Outcome: Progressing     Problem: Chronic Conditions and Co-morbidities  Goal: Patient's chronic conditions and co-morbidity symptoms are monitored and maintained or improved  Outcome: Progressing     Problem: Nutrition Deficit:  Goal: Optimize nutritional status  Outcome: Progressing

## 2024-04-10 NOTE — PLAN OF CARE
Problem: Respiratory - Adult  Goal: Achieves optimal ventilation and oxygenation  4/9/2024 2021 by Rios Ching, RN  Outcome: Progressing  Note: Assess breath sounds every shift and as needed.  Assess oxygenation level & respiration rate.  Encourage coughing & deep breathing.  Encourage use of incentive spirometer.  Assess cough & sputum.  Administer oxygen as needed.  4/9/2024 1731 by Grey Nelson RN  Outcome: Progressing     Problem: Discharge Planning  Goal: Discharge to home or other facility with appropriate resources  4/9/2024 2021 by Rios Ching RN  Outcome: Progressing  Note: Discharge teaching and instructions for diagnosis/procedure explained with patient using teachback method. Patient voiced understanding regarding prescriptions, follow up appointments, and care of self at home.   4/9/2024 1731 by Grey Nelson RN  Outcome: Progressing  Note: Patient remains free from falls this shift. Patient had been in bed most of day, but did get up to chair for approximately an hour and a a half, tolerated well. No new signs of skin breakdown noted, purewick changed this shift. Patient started on sliding scale insulin as well as lantus BID. Patient's warfarin continued to be held, INR this morning is WNL. Patient also started on carvedilol BID (see mar for detail). Discharge planning pending to rehab, safety measures continued.      Problem: Pain  Goal: Verbalizes/displays adequate comfort level or baseline comfort level  Outcome: Progressing  Note: Monitoring pain with each assessment and prn. AXEL 0-10 pain scale utilized. Non-pharmacological measures to be encouraged prior to pharmacological measures.      Problem: Skin/Tissue Integrity  Goal: Absence of new skin breakdown  Description: 1.  Monitor for areas of redness and/or skin breakdown  2.  Assess vascular access sites hourly  3.  Every 4-6 hours minimum:  Change oxygen saturation probe site  4.  Every 4-6 hours:  If on nasal continuous

## 2024-04-10 NOTE — PROGRESS NOTES
[facility]  Office: [phone]  Carlos Caro DO, Felipe Drew, DO, Jerman Mack DO, Leonel Thomas DO, Larry Pisano MD, Monserrat Rosado MD, Sharon Perez MD, Beverley Curtis MD,  Jimi Messina MD, Kevin Cowan MD, Theresa Chavira MD,  Luis Coffman DO, Florencia Hernandez MD, Giorgi Salas MD, Ramone Caro DO, Mariel Bradshaw MD,  Bandar Anderson DO, Michelle Helm MD, Mireille Hermosillo MD, Yamileth Chong MD, Zo Montgomery MD,  Joradn Hernandez MD, Pilar Salcedo MD, Kevin Greenwood MD, Kevin Landon MD, Ramon Ellsworth MD, Ken Luna MD, Luis French DO, Porter Moore DO, Shayna Woodall MD,  Alvaro Reid MD, Shirley Waterhouse, CNP,  Octavia Coreas CNP, Chemo Pelayo, CNP,  Юлия Calero, DNP, Kaci Rodgers, CNP, Princess Mandujano, CNP, Madelaine Burns CNP, Ivone Mancia, CNP, Val Hanson, PA-C, Joyce Jackson PA-C, Isabel Bustos, CNP, Johnna Horta, CNP, Karma Yost, CNP, Rosio Resendez, CNP, Jessi Montiel, CNP, Sandie Palmer, CNS, Balbina Hernández, CNP, Dolly Tamez CNP, Tracy Schwab, CNP         [facility]   IN-PATIENT SERVICE   [facility]    Progress Note    4/10/2024    9:50 AM    Name:   Shazia Dickson  MRN:     [id number]     Acct:      [id number]   Room:   1026/1026-01   Day:  6  Admit Date:  4/4/2024  6:36 PM    PCP:   Jyoti Mauricio, ARACELIS - CNP  Code Status:  Full Code    Subjective:     Pt seen and examined, sat well on 4L NC  Remained afebrile, BP still in 150-160s overnight.  Tolerating PO diet, didn't have BM since Saturday. Getting miralax.  Will get KUB.  B glucose 244.   CXR from yesterday still showing congestion.  Pul following.    Brief History:     68-year-old female present to the hospital for evaluation of shortness of breath.  Has history of COPD and on admission did test positive for influenza.    Medications:     Allergies:    Allergies   Allergen Reactions    Amoxil [Amoxicillin Trihydrate] Hives    Neomycin

## 2024-04-10 NOTE — PLAN OF CARE
Problem: Respiratory - Adult  Goal: Able to breathe comfortably  Outcome: Progressing     Problem: Respiratory - Adult  Goal: Patient's breath sounds will be clear and equal  Outcome: Progressing     Problem: Respiratory - Adult  Goal: Adequate oxygenation  Description: Adequate oxygenation  Outcome: Progressing     Problem: Respiratory - Adult  Goal: Achieves optimal ventilation and oxygenation  4/10/2024 0544 by Nirmal Salazar, MATT  Outcome: Progressing

## 2024-04-10 NOTE — PROGRESS NOTES
Occupational Therapy  Facility/Department: [facility] PROGRESSIVE CARE  Rehabilitation Occupational Therapy Daily Treatment Note    Date: 4/10/24  Patient Name: Shazia Dickson       Room: 1026/1026-01  MRN: [id number]  Account: [id number]   : 1955  (68 y.o.) Gender: female     Past Medical History:  has a past medical history of Anxiety, Asthma, Bronchitis, DDD (degenerative disc disease), lumbar, Depression, Diabetes mellitus (HCC), Elevated glucose, Headache(784.0), Hernia of abdominal cavity, HH (hiatus hernia), Hyperlipemia, mixed, Hypertension, Osteoarthritis, Right femoral vein DVT (HCC), and Uterine hyperplasia.  Past Surgical History:   has a past surgical history that includes Dilation and curettage of uterus (10/08 and ); Breast reduction surgery (1997); Breast reduction surgery (1997); Tympanostomy tube placement (1967); Tonsillectomy and adenoidectomy (); and Hysterectomy (7/17/15).    Restrictions  Restrictions/Precautions: Fall Risk, General Precautions, Up as Tolerated  Other position/activity restrictions: pure wick, 3 liters 02, telemetry, continuous Sp02, alarms, pt is 390lbs, droplet precautions/neutropenic  Required Braces or Orthoses?: No    Subjective  Subjective: Pt. in bed and requesting breathing treatment. Pt. required positive encouragement and agreed for treatment.  Restrictions/Precautions: Fall Risk;General Precautions;Up as Tolerated   Objective     Cognition  Overall Cognitive Status: Exceptions  Arousal/Alertness: Appropriate responses to stimuli  Following Commands: Follows multistep commands with increased time;Follows multistep commands with repitition  Attention Span: Appears intact  Memory: Appears intact  Safety Judgement: Decreased awareness of need for safety;Decreased awareness of need for assistance  Problem Solving: Assistance required to identify errors made;Assistance required to correct errors made;Decreased awareness of errors  Insights:  by fatigue;Patient limited by endurance  Discharge Recommendations: Patient would benefit from continued therapy after discharge  OT Equipment Recommendations  Equipment Needed: Yes  ADL Assistive Devices: Reacher;Sock-Aid Soft;Long-handled Shoe Horn;Long-handled Sponge;Emergency Alert System  Safety Devices  Safety Devices in place: Yes  Type of devices: All fall risk precautions in place;Left in chair;Nurse notified;Call light within reach;Gait belt;Patient at risk for falls  Restraints  Initially in place: No    Patient Education  Education  Education Given To: Patient  Education Provided: Mobility Training;Fall Prevention Strategies;Transfer Training;Energy Conservation;Precautions;Safety;Equipment  Education Provided Comments: Pt. educated on importance of changing position and sitting upright to promote functional endurance. Pt. attentive to information.  Education Method: Verbal  Barriers to Learning: None  Education Outcome: Verbalized understanding;Demonstrated understanding    Plan  Occupational Therapy Plan  Times Per Week: 5x/week 1x/day as jenny  Current Treatment Recommendations: Strengthening;ROM;Balance training;Functional mobility training;Endurance training;Safety education & training;Patient/Caregiver education & training;Neuromuscular re-education;Equipment evaluation, education, & procurement;Pain management;Positioning;Self-Care / ADL;Home management training;Cognitive/Perceptual training;Coordination training  Additional Comments: Cont with stated POC    Goals  Patient Goals   Patient goals : Pt states she wants to go home!  Short Term Goals  Time Frame for Short Term Goals: by discharge, pt to demo  Short Term Goal 1: bed mob tasks with use of rail as needed to min assist of 1.   JM OTR  Short Term Goal 2: increase L shld AROM by a 10-15 degrees/increase BUE stength by a 1/2 grade to assist with ADL's.  Short Term Goal 3: UB ADL to set up and LB ADL to max assist of 1 supine in bed/standing

## 2024-04-10 NOTE — PROGRESS NOTES
Physical Therapy  Facility/Department: [facility] CARE  Rehabilitation Physical Therapy Treatment Note    NAME: Shazia Dickson  : 1955 (68 y.o.)  MRN: [id number]  CODE STATUS: Full Code    Date of Service: 4/10/24       Restrictions:  Restrictions/Precautions: Fall Risk, General Precautions, Up as Tolerated  Position Activity Restriction  Other position/activity restrictions: pure wick, 3 liters 02, telemetry, continuous Sp02, alarms, pt is 390lbs, droplet precautions/neutropenic     SUBJECTIVE  Subjective  Subjective: pt agreeable to PT with MAX encouragement              OBJECTIVE  Cognition  Overall Cognitive Status: Exceptions  Arousal/Alertness: Appropriate responses to stimuli  Following Commands: Follows multistep commands with increased time;Follows multistep commands with repitition  Attention Span: Appears intact  Memory: Appears intact  Safety Judgement: Decreased awareness of need for safety;Decreased awareness of need for assistance  Problem Solving: Assistance required to identify errors made;Assistance required to correct errors made;Decreased awareness of errors  Insights: Decreased awareness of deficits  Initiation: Requires cues for all  Sequencing: Requires cues for some  Cognition Comment: J Luis jones some word finding problems.  Orientation  Overall Orientation Status: Within Normal Limits  Orientation Level: Oriented X4    Functional Mobility  Bed Mobility  Overall Assistance Level: Moderate Assistance;Requires x 2 Assistance  Roll Left  Assistance Level: Minimal assistance;Moderate assistance;Requires x 2 assistance  Roll Right  Assistance Level: Minimal assistance;Moderate assistance;Requires x 2 assistance  Transfers  Surface: From bed;To chair with arms  Device:  (marsha lift)             PT Exercises  Exercise Treatment: Bed mobility; arom willie LE's - heel slides; ankle pumps; hip rotations.      ASSESSMENT/PROGRESS TOWARDS GOALS  Vital Signs  BP Location: Right lower arm  O2

## 2024-04-10 NOTE — PROGRESS NOTES
Pulmonary Critical Care Progress Note       Patient seen for the follow up of  Acute respiratory failure with hypoxia (HCC)     Subjective:  Patient resting in bed, slept better overnight without BiPAP.  She feels a little better today and is able to cough up phlegm.  She denies chest pain. Shortness of breath not is a little better.    Examination:  Vitals: BP (!) 150/66   Pulse 97   Temp 98.1 °F (36.7 °C) (Oral)   Resp 18   Ht 1.651 m (5' 5\")   Wt (!) 176.9 kg (390 lb)   LMP 07/16/2014   SpO2 94%   BMI 64.90 kg/m²   General appearance: in no acute distress, alert and cooperative with exam  Neck: No JVD  Lungs: decreased air exchange, no wheeze or crackles   Heart: regular rate and rhythm, S1, S2 normal, no gallop  Abdomen: Soft, non tender, + BS  Extremities: no cyanosis or clubbing. No significant edema    LABs:  CBC:   Recent Labs     04/09/24  0457 04/10/24  0956   WBC 6.9 8.4   HGB 10.2* 10.4*   HCT 36.5 37.8    170       BMP:   Recent Labs     04/09/24  0457 04/10/24  0956    140   K 5.0 5.3   CO2 42* 41*   BUN 41* 45*   CREATININE 1.0* 1.0*   LABGLOM 62 62   GLUCOSE 317* 284*       PT/INR:   Recent Labs     04/08/24  0518 04/09/24  0457 04/10/24  0517   PROTIME 34.4* 33.9* 33.7*   INR 3.5 3.4 3.4     ABG:  Lab Results   Component Value Date/Time    SUF1DWJ 44 05/05/2018 03:12 PM    FIO2 100.0 05/05/2018 03:12 PM       Lab Results   Component Value Date/Time    POCPH 7.29 05/05/2018 03:12 PM    POCPCO2 85 05/05/2018 03:12 PM    POCPO2 81 05/05/2018 03:12 PM    POCHCO3 41.2 05/05/2018 03:12 PM    PBEA 15 05/05/2018 03:12 PM    WZB9HLW 44 05/05/2018 03:12 PM    AEVD7TMJ 93 05/05/2018 03:12 PM    FIO2 100.0 05/05/2018 03:12 PM     Radiology:  X-ray chest 4/8/2024      Impression & Recommendations:  Chronic hypoxic and hypercarbic respiratory failure  Oxygen by nasal cannula  Incentive spirometer q.1h awake  BiPAP support    Acute exacerbation of Asthma /influenza A  DuoNeb every 4 hours  while awake and as needed   Pulmicort 0.5 b.I.d. by nebulizer  Stop Solu-medrol and start prednisone taper  Zithromax complete  Tamiflu complete  Continue guaifenesin  Droplet isolation      Pulmonary edema/Pulmonary hypertension, RVSP 55-60 mmHG by echo 2018   Follow-up echocardiogram    Obstructive sleep apnea/ Morbid Obesity  Outpatient Sleep evaluation     History of PE/DVT  on Coumadin    peptic ulcer disease prophylaxis  DVT prophylaxis on Coumadin  Discharge planning    Electronically signed by     Ayah Ragland MD on 4/10/2024 at 3:51 PM  Pulmonary Critical Care and Sleep Medicine,  [facility]  Office: [phone]

## 2024-04-10 NOTE — PROGRESS NOTES
Warfarin Dosing - Pharmacy Consult Note  Consulting Provider: Nishant Jimenez MD  Indication:  History of  VTE/PE  Warfarin Dose prior to admission: 2mg M-W-Sat, 4mg all other days    Concurrent anticoagulants/antiplatelets: none  Significant Drug Interactions: methylprednisolone  Recent Labs     04/08/24  0518 04/09/24  0457 04/10/24  0517   INR 3.5 3.4 3.4   HGB  --  10.2*  --    PLT  --  178  --         Date   INR    Dose  4/4         2.2        4 mg  4/5         2.3        2 mg  4/6         3.2        hold  4/7         3.5        none  4/8         3.5        none  4/9         3.4        none  4/10       3.4    Assessment/Plan  (Goal INR: 2.5 - 3.5)  INR therapeutic. Hold warfarin tonight to avoid going above range.   INR in the morning.    Active problem list reviewed.  INR orders are placed.  Chart reviewed for pertinent labs, drug/diet interactions, and past doses.  Documentation of patient's clinical condition was reviewed.    Pharmacy Dosing:  Pharmacy will continue to follow.

## 2024-04-11 LAB
GLUCOSE BLD-MCNC: 208 MG/DL (ref 65–105)
GLUCOSE BLD-MCNC: 221 MG/DL (ref 65–105)
GLUCOSE BLD-MCNC: 223 MG/DL (ref 65–105)
GLUCOSE BLD-MCNC: 252 MG/DL (ref 65–105)
INR PPP: 3.2
PROTHROMBIN TIME: 32.4 SEC (ref 11.5–14.2)

## 2024-04-11 PROCEDURE — 94669 MECHANICAL CHEST WALL OSCILL: CPT

## 2024-04-11 PROCEDURE — 6360000002 HC RX W HCPCS: Performed by: INTERNAL MEDICINE

## 2024-04-11 PROCEDURE — 85610 PROTHROMBIN TIME: CPT

## 2024-04-11 PROCEDURE — 97535 SELF CARE MNGMENT TRAINING: CPT

## 2024-04-11 PROCEDURE — 6370000000 HC RX 637 (ALT 250 FOR IP): Performed by: INTERNAL MEDICINE

## 2024-04-11 PROCEDURE — 6370000000 HC RX 637 (ALT 250 FOR IP): Performed by: STUDENT IN AN ORGANIZED HEALTH CARE EDUCATION/TRAINING PROGRAM

## 2024-04-11 PROCEDURE — 82947 ASSAY GLUCOSE BLOOD QUANT: CPT

## 2024-04-11 PROCEDURE — 97530 THERAPEUTIC ACTIVITIES: CPT

## 2024-04-11 PROCEDURE — 94640 AIRWAY INHALATION TREATMENT: CPT

## 2024-04-11 PROCEDURE — 99232 SBSQ HOSP IP/OBS MODERATE 35: CPT | Performed by: STUDENT IN AN ORGANIZED HEALTH CARE EDUCATION/TRAINING PROGRAM

## 2024-04-11 PROCEDURE — 6370000000 HC RX 637 (ALT 250 FOR IP): Performed by: NURSE PRACTITIONER

## 2024-04-11 PROCEDURE — 2580000003 HC RX 258: Performed by: INTERNAL MEDICINE

## 2024-04-11 PROCEDURE — 94761 N-INVAS EAR/PLS OXIMETRY MLT: CPT

## 2024-04-11 PROCEDURE — 2060000000 HC ICU INTERMEDIATE R&B

## 2024-04-11 PROCEDURE — 97116 GAIT TRAINING THERAPY: CPT

## 2024-04-11 PROCEDURE — 2700000000 HC OXYGEN THERAPY PER DAY

## 2024-04-11 PROCEDURE — 36415 COLL VENOUS BLD VENIPUNCTURE: CPT

## 2024-04-11 RX ORDER — WARFARIN SODIUM 2 MG/1
2 TABLET ORAL
Status: COMPLETED | OUTPATIENT
Start: 2024-04-11 | End: 2024-04-11

## 2024-04-11 RX ORDER — INSULIN GLARGINE 100 [IU]/ML
15 INJECTION, SOLUTION SUBCUTANEOUS 2 TIMES DAILY
Status: DISCONTINUED | OUTPATIENT
Start: 2024-04-11 | End: 2024-04-19 | Stop reason: HOSPADM

## 2024-04-11 RX ADMIN — DILTIAZEM HYDROCHLORIDE 120 MG: 120 CAPSULE, EXTENDED RELEASE ORAL at 08:27

## 2024-04-11 RX ADMIN — PRAVASTATIN SODIUM 80 MG: 40 TABLET ORAL at 17:45

## 2024-04-11 RX ADMIN — SODIUM CHLORIDE, PRESERVATIVE FREE 10 ML: 5 INJECTION INTRAVENOUS at 20:55

## 2024-04-11 RX ADMIN — IPRATROPIUM BROMIDE AND ALBUTEROL SULFATE 1 DOSE: 2.5; .5 SOLUTION RESPIRATORY (INHALATION) at 15:22

## 2024-04-11 RX ADMIN — CARVEDILOL 12.5 MG: 12.5 TABLET, FILM COATED ORAL at 08:27

## 2024-04-11 RX ADMIN — GUAIFENESIN 600 MG: 600 TABLET ORAL at 08:28

## 2024-04-11 RX ADMIN — INSULIN LISPRO 8 UNITS: 100 INJECTION, SOLUTION INTRAVENOUS; SUBCUTANEOUS at 17:45

## 2024-04-11 RX ADMIN — SODIUM CHLORIDE, PRESERVATIVE FREE 10 ML: 5 INJECTION INTRAVENOUS at 08:29

## 2024-04-11 RX ADMIN — IPRATROPIUM BROMIDE AND ALBUTEROL SULFATE 1 DOSE: 2.5; .5 SOLUTION RESPIRATORY (INHALATION) at 11:00

## 2024-04-11 RX ADMIN — WARFARIN SODIUM 2 MG: 2 TABLET ORAL at 17:45

## 2024-04-11 RX ADMIN — MONTELUKAST 10 MG: 10 TABLET, FILM COATED ORAL at 20:54

## 2024-04-11 RX ADMIN — PREDNISONE 40 MG: 20 TABLET ORAL at 08:27

## 2024-04-11 RX ADMIN — MICONAZOLE NITRATE: 20 CREAM TOPICAL at 15:01

## 2024-04-11 RX ADMIN — FLUTICASONE PROPIONATE 1 SPRAY: 50 SPRAY, METERED NASAL at 08:29

## 2024-04-11 RX ADMIN — BUDESONIDE 500 MCG: 0.5 INHALANT ORAL at 07:34

## 2024-04-11 RX ADMIN — IPRATROPIUM BROMIDE AND ALBUTEROL SULFATE 1 DOSE: 2.5; .5 SOLUTION RESPIRATORY (INHALATION) at 20:19

## 2024-04-11 RX ADMIN — CARVEDILOL 12.5 MG: 12.5 TABLET, FILM COATED ORAL at 17:44

## 2024-04-11 RX ADMIN — INSULIN GLARGINE 15 UNITS: 100 INJECTION, SOLUTION SUBCUTANEOUS at 20:54

## 2024-04-11 RX ADMIN — INSULIN LISPRO 4 UNITS: 100 INJECTION, SOLUTION INTRAVENOUS; SUBCUTANEOUS at 13:05

## 2024-04-11 RX ADMIN — INSULIN LISPRO 4 UNITS: 100 INJECTION, SOLUTION INTRAVENOUS; SUBCUTANEOUS at 08:28

## 2024-04-11 RX ADMIN — PANTOPRAZOLE SODIUM 40 MG: 40 TABLET, DELAYED RELEASE ORAL at 08:31

## 2024-04-11 RX ADMIN — INSULIN GLARGINE 10 UNITS: 100 INJECTION, SOLUTION SUBCUTANEOUS at 08:28

## 2024-04-11 RX ADMIN — IPRATROPIUM BROMIDE AND ALBUTEROL SULFATE 1 DOSE: 2.5; .5 SOLUTION RESPIRATORY (INHALATION) at 07:34

## 2024-04-11 RX ADMIN — BUDESONIDE 500 MCG: 0.5 INHALANT ORAL at 20:19

## 2024-04-11 RX ADMIN — MICONAZOLE NITRATE: 20 CREAM TOPICAL at 21:02

## 2024-04-11 RX ADMIN — ALBUTEROL SULFATE 2.5 MG: 2.5 SOLUTION RESPIRATORY (INHALATION) at 04:43

## 2024-04-11 ASSESSMENT — PAIN SCALES - GENERAL: PAINLEVEL_OUTOF10: 3

## 2024-04-11 ASSESSMENT — PAIN DESCRIPTION - DESCRIPTORS: DESCRIPTORS: ACHING

## 2024-04-11 ASSESSMENT — PAIN DESCRIPTION - LOCATION: LOCATION: THROAT

## 2024-04-11 ASSESSMENT — PAIN DESCRIPTION - ORIENTATION: ORIENTATION: MID

## 2024-04-11 NOTE — PROGRESS NOTES
Physician Progress Note      PATIENT:               SHOBHA GEORGE  CSN #:                  [id number]  :                       1955  ADMIT DATE:       2024 6:36 PM  DISCH DATE:  RESPONDING  PROVIDER #:        Ramon Ellsworth MD          QUERY TEXT:    Pt admitted with Sepsis and Acute hypoxic respiratory failure requiring oxygen   supplement likely due to COPD exacerbation due to influenza.  Pulmonology   note states \"Pulmonary edema\". Patient received 2 doses of IV lasix. If   possible, please document in the progress notes and discharge summary if you   are evaluating and/or treating any of the following:    The medical record reflects the following:  Risk Factors: Pulmonary hypertension, advanced age  Clinical Indicators:  Pulmonology note states \"Pulmonary edema\". Acute   hypoxic respiratory failure  Treatment: IV lasix x2, labs, imaging, increased oxygen, Pulmonology consult    Thank you,  Madelaine Gonzalez RN BSN  Clinical   Options provided:  -- Noncardiogenic acute pulmonary edema  -- Other - I will add my own diagnosis  -- Disagree - Not applicable / Not valid  -- Disagree - Clinically unable to determine / Unknown  -- Refer to Clinical Documentation Reviewer    PROVIDER RESPONSE TEXT:    This patient has noncardiogenic acute pulmonary edema.    Query created by: Madelaine Gonzalez on 2024 10:08 AM      Electronically signed by:  Ramon Ellsworth MD 2024 1:16 PM

## 2024-04-11 NOTE — PROGRESS NOTES
Physical Therapy  Facility/Department: [facility] CARE  Rehabilitation Physical Therapy Treatment Note    NAME: Shazia Dickson  : 1955 (68 y.o.)  MRN: [id number]  CODE STATUS: Full Code    Date of Service: 24       Restrictions:  Restrictions/Precautions: Fall Risk, General Precautions, Up as Tolerated  Position Activity Restriction  Other position/activity restrictions: pure wick, 3 liters 02, telemetry, continuous Sp02, alarms, pt is 390lbs, droplet precautions/neutropenic     SUBJECTIVE  Subjective  Subjective: pt agreeable to PT with MAX encouragement               OBJECTIVE  Cognition  Overall Cognitive Status: Exceptions  Arousal/Alertness: Appropriate responses to stimuli  Following Commands: Follows multistep commands with increased time;Follows multistep commands with repitition  Attention Span: Appears intact  Memory: Appears intact  Safety Judgement: Decreased awareness of need for safety;Decreased awareness of need for assistance  Problem Solving: Assistance required to identify errors made;Assistance required to correct errors made;Decreased awareness of errors  Insights: Decreased awareness of deficits  Initiation: Requires cues for all  Sequencing: Requires cues for some  Cognition Comment: Pt. required cues for all movements and positive encouragement.  Orientation  Overall Orientation Status: Within Normal Limits  Orientation Level: Oriented X4    Functional Mobility  Bed Mobility  Overall Assistance Level: Moderate Assistance;Requires x 2 Assistance  Roll Right  Assistance Level: Minimal assistance;Moderate assistance;Requires x 2 assistance  Supine to Sit  Assistance Level: Moderate assistance;Requires x 2 assistance  Scooting  Assistance Level: Moderate assistance;Requires x 2 assistance  Transfers  Surface: From bed;To chair with arms  Additional Factors: Mat raised;Verbal cues  Device: Walker  Sit to Stand  Assistance Level: Moderate assistance;Requires x 2 assistance  Stand

## 2024-04-11 NOTE — PROGRESS NOTES
Warfarin Dosing - Pharmacy Consult Note  Consulting Provider: Nishant Jimenez MD   Indication:  History of  VTE/PE  Warfarin Dose prior to admission: 2mg M-W-Sat, 4mg all other days    Concurrent anticoagulants/antiplatelets: none  Significant Drug Interactions:  prednisone  Recent Labs     04/09/24  0457 04/10/24  0517 04/10/24  0956 04/11/24  0508   INR 3.4 3.4  --  3.2   HGB 10.2*  --  10.4*  --      --  170  --      Recent warfarin administrations        No warfarin orders with administrations found.            Orders not given:            warfarin placeholder: dosing by pharmacy                   Date   INR    Dose  4/4         2.2        4 mg  4/5         2.3        2 mg  4/6         3.2        hold  4/7         3.5        none  4/8         3.5        none  4/9         3.4        none  4/10       3.4        none  4/11       3.2    Assessment/Plan  (Goal INR: 2.5 - 3.5)  INR therapeutic, will give 2 mg tonight and follow INR in AM.     Active problem list reviewed.  INR orders are placed.  Chart reviewed for pertinent labs, drug/diet interactions, and past doses.  Documentation of patient's clinical condition was reviewed.    Pharmacy Dosing:  Pharmacy will continue to follow.

## 2024-04-11 NOTE — PLAN OF CARE
Problem: Respiratory - Adult  Goal: Achieves optimal ventilation and oxygenation  4/11/2024 1938 by Desiree Arthur RN  Outcome: Progressing     Problem: Discharge Planning  Goal: Discharge to home or other facility with appropriate resources  4/11/2024 1938 by Desiree Arthur RN  Outcome: Progressing     Problem: Pain  Goal: Verbalizes/displays adequate comfort level or baseline comfort level  4/11/2024 1938 by Desiree Arthur RN  Outcome: Progressing     Problem: Skin/Tissue Integrity  Goal: Absence of new skin breakdown  Description: 1.  Monitor for areas of redness and/or skin breakdown  2.  Assess vascular access sites hourly  3.  Every 4-6 hours minimum:  Change oxygen saturation probe site  4.  Every 4-6 hours:  If on nasal continuous positive airway pressure, respiratory therapy assess nares and determine need for appliance change or resting period.  4/11/2024 1938 by Desiree Arthur RN  Outcome: Progressing     Problem: Safety - Adult  Goal: Free from fall injury  4/11/2024 1938 by Desiree Arthur RN  Outcome: Progressing     Problem: ABCDS Injury Assessment  Goal: Absence of physical injury  4/11/2024 1938 by Desiree Arthur RN  Outcome: Progressing     Problem: Skin/Tissue Integrity - Adult  Goal: Incisions, wounds, or drain sites healing without S/S of infection  4/11/2024 1938 by Desiree Arthur RN  Outcome: Progressing     Problem: Musculoskeletal - Adult  Goal: Return ADL status to a safe level of function  4/11/2024 1938 by Desiree Arthur RN  Outcome: Progressing     Problem: Infection - Adult  Goal: Absence of infection at discharge  4/11/2024 1938 by Desiree Arthur RN  Outcome: Progressing     Problem: Chronic Conditions and Co-morbidities  Goal: Patient's chronic conditions and co-morbidity symptoms are monitored and maintained or improved  4/11/2024 1938 by Desiree Arthur RN  Outcome: Progressing

## 2024-04-11 NOTE — PROGRESS NOTES
Occupational Therapy  Facility/Department: [facility] PROGRESSIVE CARE  Rehabilitation Occupational Therapy Daily Treatment Note    Date: 24  Patient Name: Shazia Dickson       Room: 1026/1026-01  MRN: [id number]  Account: [id number]   : 1955  (68 y.o.) Gender: female   Past Medical History:  has a past medical history of Anxiety, Asthma, Bronchitis, DDD (degenerative disc disease), lumbar, Depression, Diabetes mellitus (HCC), Elevated glucose, Headache(784.0), Hernia of abdominal cavity, HH (hiatus hernia), Hyperlipemia, mixed, Hypertension, Osteoarthritis, Right femoral vein DVT (HCC), and Uterine hyperplasia.  Past Surgical History:   has a past surgical history that includes Dilation and curettage of uterus (10/08 and ); Breast reduction surgery (1997); Breast reduction surgery (1997); Tympanostomy tube placement (1967); Tonsillectomy and adenoidectomy (); and Hysterectomy (7/17/15).    Restrictions  Restrictions/Precautions: Fall Risk, General Precautions, Up as Tolerated  Other position/activity restrictions: pure wick, 3 liters 02, telemetry, continuous Sp02, alarms, pt is 390lbs, droplet precautions/neutropenic  Required Braces or Orthoses?: No    Subjective  Subjective: \"I am scared.....I haven't been on my feet in a week\".  Restrictions/Precautions: Fall Risk;General Precautions;Up as Tolerated  Objective     Cognition  Overall Cognitive Status: Exceptions  Arousal/Alertness: Appropriate responses to stimuli  Following Commands: Follows multistep commands with increased time;Follows multistep commands with repitition  Attention Span: Appears intact  Memory: Appears intact  Safety Judgement: Decreased awareness of need for safety;Decreased awareness of need for assistance  Problem Solving: Assistance required to identify errors made;Assistance required to correct errors made;Decreased awareness of errors  Insights: Decreased awareness of deficits  Initiation: Requires cues for  RENETTA, DOMENICA

## 2024-04-11 NOTE — PLAN OF CARE
Problem: Respiratory - Adult  Goal: Able to breathe comfortably  4/11/2024 0738 by Sharon Marvin, MATT  Outcome: Progressing  4/10/2024 2050 by Annika Thompson RCP  Outcome: Progressing  4/10/2024 2050 by Annika Thompson RCP  Outcome: Progressing  Goal: Patient's breath sounds will be clear and equal  4/11/2024 0738 by Sharon Marvin RCP  Outcome: Progressing  4/10/2024 2050 by Annika Thompson RCP  Outcome: Progressing  Goal: Adequate oxygenation  Description: Adequate oxygenation  4/11/2024 0738 by Sharon Marvin RCP  Outcome: Progressing  4/10/2024 2050 by Annika Thompson RCP  Outcome: Progressing  Goal: Achieves optimal ventilation and oxygenation  4/11/2024 0738 by Sharon Marvin RCP  Outcome: Progressing  4/11/2024 0551 by Dasia Rogers, RN  Outcome: Progressing  4/10/2024 2050 by Annika Thompson RCP  Outcome: Progressing  4/10/2024 2050 by Annika Thompson RCP  Outcome: Progressing  Flowsheets (Taken 4/10/2024 2000 by Dasia Rogers, RN)  Achieves optimal ventilation and oxygenation: Assess for changes in respiratory status  4/10/2024 1937 by Desiree Arthur, RN  Outcome: Progressing

## 2024-04-11 NOTE — RT PROTOCOL NOTE
RT Inhaler-Nebulizer Bronchodilator Protocol Note    There is a bronchodilator order in the chart from a provider indicating to follow the RT Bronchodilator Protocol and there is an “Initiate RT Inhaler-Nebulizer Bronchodilator Protocol” order as well (see protocol at bottom of note).    CXR Findings:  No results found.    The findings from the last RT Protocol Assessment were as follows:   History Pulmonary Disease: Chronic pulmonary disease  Respiratory Pattern: Dyspnea on exertion or RR 21-25 bpm  Breath Sounds: Severe inspiratory and expiratory wheezing or severely diminished  Cough: Strong, productive  Indication for Bronchodilator Therapy: Decreased or absent breath sounds, On home bronchodilators, Wheezing associated with pulm disorder  Bronchodilator Assessment Score: 13    Aerosolized bronchodilator medication orders have been revised according to the RT Inhaler-Nebulizer Bronchodilator Protocol below.    Respiratory Therapist to perform RT Therapy Protocol Assessment initially then follow the protocol.  Repeat RT Therapy Protocol Assessment PRN for score 0-3 or on second treatment, BID, and PRN for scores above 3.    No Indications - adjust the frequency to every 6 hours PRN wheezing or bronchospasm, if no treatments needed after 48 hours then discontinue using Per Protocol order mode.     If indication present, adjust the RT bronchodilator orders based on the Bronchodilator Assessment Score as indicated below.  Use Inhaler orders unless patient has one or more of the following: on home nebulizer, not able to hold breath for 10 seconds, is not alert and oriented, cannot activate and use MDI correctly, or respiratory rate 25 breaths per minute or more, then use the equivalent nebulizer order(s) with same Frequency and PRN reasons based on the score.  If a patient is on this medication at home then do not decrease Frequency below that used at home.    0-3 - enter or revise RT bronchodilator order(s) to  equivalent RT Bronchodilator order with Frequency of every 4 hours PRN for wheezing or increased work of breathing using Per Protocol order mode.        4-6 - enter or revise RT Bronchodilator order(s) to two equivalent RT bronchodilator orders with one order with BID Frequency and one order with Frequency of every 4 hours PRN wheezing or increased work of breathing using Per Protocol order mode.        7-10 - enter or revise RT Bronchodilator order(s) to two equivalent RT bronchodilator orders with one order with TID Frequency and one order with Frequency of every 4 hours PRN wheezing or increased work of breathing using Per Protocol order mode.       11-13 - enter or revise RT Bronchodilator order(s) to one equivalent RT bronchodilator order with QID Frequency and an Albuterol order with Frequency of every 4 hours PRN wheezing or increased work of breathing using Per Protocol order mode.      Greater than 13 - enter or revise RT Bronchodilator order(s) to one equivalent RT bronchodilator order with every 4 hours Frequency and an Albuterol order with Frequency of every 2 hours PRN wheezing or increased work of breathing using Per Protocol order mode.     RT to enter RT Home Evaluation for COPD & MDI Assessment order using Per Protocol order mode.    Electronically signed by KIMBERLY KILLIAN RCP on 4/11/2024 at 7:39 AM

## 2024-04-11 NOTE — PLAN OF CARE
Pt continues on 4L NC overnight.   Repositioned pt twice, has maxisky lift in room to assist with repositioning.   Purewick remains in place and is working well.   Bed locked and in lowest position, call light within reach, safety maintained.     Problem: Respiratory - Adult  Goal: Achieves optimal ventilation and oxygenation  4/11/2024 0551 by Dasia Rogers RN  Outcome: Progressing     Problem: Discharge Planning  Goal: Discharge to home or other facility with appropriate resources  4/11/2024 0551 by Dasia Rogers RN  Outcome: Progressing  Flowsheets (Taken 4/10/2024 2000)  Discharge to home or other facility with appropriate resources: Identify barriers to discharge with patient and caregiver     Problem: Pain  Goal: Verbalizes/displays adequate comfort level or baseline comfort level  4/11/2024 0551 by Dasia Rogers RN  Outcome: Progressing     Problem: Skin/Tissue Integrity  Goal: Absence of new skin breakdown  Description: 1.  Monitor for areas of redness and/or skin breakdown  2.  Assess vascular access sites hourly  3.  Every 4-6 hours minimum:  Change oxygen saturation probe site  4.  Every 4-6 hours:  If on nasal continuous positive airway pressure, respiratory therapy assess nares and determine need for appliance change or resting period.  4/11/2024 0551 by Dasia Rogers RN  Outcome: Progressing     Problem: Safety - Adult  Goal: Free from fall injury  4/11/2024 0551 by Dasia Rogers RN  Outcome: Progressing     Problem: ABCDS Injury Assessment  Goal: Absence of physical injury  4/11/2024 0551 by Dasia Rogers RN  Outcome: Progressing  4/10/2024 1937 by Desiree Arthur RN  Outcome: Progressing     Problem: Skin/Tissue Integrity - Adult  Goal: Incisions, wounds, or drain sites healing without S/S of infection  4/11/2024 0551 by Dasia Rogers RN  Outcome: Progressing  Flowsheets (Taken 4/10/2024 2000)  Incisions, Wounds, or Drain Sites Healing Without

## 2024-04-11 NOTE — RT PROTOCOL NOTE
RT Inhaler-Nebulizer Bronchodilator Protocol Note    There is a bronchodilator order in the chart from a provider indicating to follow the RT Bronchodilator Protocol and there is an “Initiate RT Inhaler-Nebulizer Bronchodilator Protocol” order as well (see protocol at bottom of note).    CXR Findings:  No results found.    The findings from the last RT Protocol Assessment were as follows:   History Pulmonary Disease: Chronic pulmonary disease  Respiratory Pattern: Dyspnea on exertion or RR 21-25 bpm  Breath Sounds: Severe inspiratory and expiratory wheezing or severely diminished  Cough: Strong, productive  Indication for Bronchodilator Therapy: On home bronchodilators  Bronchodilator Assessment Score: 13    Aerosolized bronchodilator medication orders have been revised according to the RT Inhaler-Nebulizer Bronchodilator Protocol below.    Respiratory Therapist to perform RT Therapy Protocol Assessment initially then follow the protocol.  Repeat RT Therapy Protocol Assessment PRN for score 0-3 or on second treatment, BID, and PRN for scores above 3.    No Indications - adjust the frequency to every 6 hours PRN wheezing or bronchospasm, if no treatments needed after 48 hours then discontinue using Per Protocol order mode.     If indication present, adjust the RT bronchodilator orders based on the Bronchodilator Assessment Score as indicated below.  Use Inhaler orders unless patient has one or more of the following: on home nebulizer, not able to hold breath for 10 seconds, is not alert and oriented, cannot activate and use MDI correctly, or respiratory rate 25 breaths per minute or more, then use the equivalent nebulizer order(s) with same Frequency and PRN reasons based on the score.  If a patient is on this medication at home then do not decrease Frequency below that used at home.    0-3 - enter or revise RT bronchodilator order(s) to equivalent RT Bronchodilator order with Frequency of every 4 hours PRN for

## 2024-04-11 NOTE — PLAN OF CARE
Problem: Respiratory - Adult  Goal: Able to breathe comfortably  4/10/2024 2050 by Annika Thompson RCP  Outcome: Progressing     Problem: Respiratory - Adult  Goal: Patient's breath sounds will be clear and equal  4/10/2024 1101 by Sabine Garcia RCP  Outcome: Progressing     Problem: Respiratory - Adult  Goal: Able to breathe comfortably  4/10/2024 2050 by Annika Thompson RCP  Outcome: Progressing     Problem: Respiratory - Adult  Goal: Patient's breath sounds will be clear and equal  4/10/2024 2050 by Annika Thompson RCP  Outcome: Progressing     Problem: Respiratory - Adult  Goal: Adequate oxygenation  Description: Adequate oxygenation  4/10/2024 2050 by Annika Thompson RCP  Outcome: Progressing     Problem: Respiratory - Adult  Goal: Achieves optimal ventilation and oxygenation  4/10/2024 2050 by Annika Thompson RCP  Outcome: Progressing

## 2024-04-11 NOTE — CARE COORDINATION
Social work: P2P being offered for admission to [facility].   Dr Viera to assist.  SW called [phone] option 4 to schedule for between 1-3p today.   Ref # [id number]    UPDATE 14:52- P2P denied for admission to [facility].   Met with patient at bedside to discuss alternative plan, she would like writer to contact her brother, Álvaro, for arrangements.   Of note, Tara would like a facility with pulmonary rehab.  Referral to [facility], [facility], [facility], advanced SNF, [facility] and [facility].   [facility] and  cannot accept.           Take NPH (cloudy insulin) 10 units in the morning and 5 units at night.      Take Regular insulin (mealtime insulin) 4 units 30 minutes before eating.     Goal is to have most readings under 180.      Please call if you continue seeing numbers over 250 mg/dL.      A1c is down to 8.6%!!!

## 2024-04-11 NOTE — PROGRESS NOTES
Pulmonary Critical Care Progress Note       Patient seen for the follow up of  Acute respiratory failure with hypoxia (HCC)     Subjective:  Patient resting in bedside chair, she still will not wear BiPAP overnight.  She states she does not feel any better today and did not sleep well overnight.  She denies chest pain. Shortness of breath not is a little better.    Examination:  Vitals: /64   Pulse 87   Temp 98 °F (36.7 °C) (Oral)   Resp 16   Ht 1.651 m (5' 5\")   Wt (!) 179.2 kg (395 lb)   LMP 07/16/2014   SpO2 97%   BMI 65.73 kg/m²   General appearance: in no acute distress, alert and cooperative with exam  Neck: No JVD  Lungs: decreased air exchange, no wheeze or crackles   Heart: regular rate and rhythm, S1, S2 normal, no gallop  Abdomen: Soft, non tender, + BS  Extremities: no cyanosis or clubbing. No significant edema    LABs:  CBC:   Recent Labs     04/09/24  0457 04/10/24  0956   WBC 6.9 8.4   HGB 10.2* 10.4*   HCT 36.5 37.8    170       BMP:   Recent Labs     04/09/24  0457 04/10/24  0956    140   K 5.0 5.3   CO2 42* 41*   BUN 41* 45*   CREATININE 1.0* 1.0*   LABGLOM 62 62   GLUCOSE 317* 284*       PT/INR:   Recent Labs     04/09/24  0457 04/10/24  0517 04/11/24  0508   PROTIME 33.9* 33.7* 32.4*   INR 3.4 3.4 3.2     ABG:  Lab Results   Component Value Date/Time    PMN3TCI 44 05/05/2018 03:12 PM    FIO2 100.0 05/05/2018 03:12 PM       Lab Results   Component Value Date/Time    POCPH 7.29 05/05/2018 03:12 PM    POCPCO2 85 05/05/2018 03:12 PM    POCPO2 81 05/05/2018 03:12 PM    POCHCO3 41.2 05/05/2018 03:12 PM    PBEA 15 05/05/2018 03:12 PM    IHK6TRC 44 05/05/2018 03:12 PM    YGQE1TAV 93 05/05/2018 03:12 PM    FIO2 100.0 05/05/2018 03:12 PM     Radiology:  X-ray chest 4/8/2024      Impression & Recommendations:  Chronic hypoxic and hypercarbic respiratory failure  Oxygen by nasal cannula  Incentive spirometer q.1h awake  BiPAP support    Acute exacerbation of Asthma /influenza  A  DuoNeb every 4 hours while awake and as needed   Pulmicort 0.5 b.I.d. 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  Office: [phone]

## 2024-04-11 NOTE — CARE COORDINATION
Discharge planning    Patient chart reviewed.  Appreciate CM/SS notes. Patient lives at [facility] alone. DME in the home: motorized wc, rw, 02 4 L 24/7, p ox, sc, cane, neb, and bsc. Coumadin is a home med for PE.     Social work notes [facility] started pre cert but patient wanted to speak with liaison.  [facility], [facility] with [facility] wanted. .     Admitted with a/c respiratory failure due to COPD. Pulmonary following and on home dose of 4 L. Oncology following for leukopenia and anemia.

## 2024-04-12 LAB
GLUCOSE BLD-MCNC: 103 MG/DL (ref 65–105)
GLUCOSE BLD-MCNC: 112 MG/DL (ref 65–105)
GLUCOSE BLD-MCNC: 275 MG/DL (ref 65–105)
GLUCOSE BLD-MCNC: 287 MG/DL (ref 65–105)
INR PPP: 2.7
PROTHROMBIN TIME: 28.5 SEC (ref 11.5–14.2)

## 2024-04-12 PROCEDURE — 94640 AIRWAY INHALATION TREATMENT: CPT

## 2024-04-12 PROCEDURE — 6370000000 HC RX 637 (ALT 250 FOR IP): Performed by: STUDENT IN AN ORGANIZED HEALTH CARE EDUCATION/TRAINING PROGRAM

## 2024-04-12 PROCEDURE — 6360000002 HC RX W HCPCS: Performed by: INTERNAL MEDICINE

## 2024-04-12 PROCEDURE — 94761 N-INVAS EAR/PLS OXIMETRY MLT: CPT

## 2024-04-12 PROCEDURE — 6370000000 HC RX 637 (ALT 250 FOR IP): Performed by: NURSE PRACTITIONER

## 2024-04-12 PROCEDURE — 2060000000 HC ICU INTERMEDIATE R&B

## 2024-04-12 PROCEDURE — 85610 PROTHROMBIN TIME: CPT

## 2024-04-12 PROCEDURE — 2700000000 HC OXYGEN THERAPY PER DAY

## 2024-04-12 PROCEDURE — 6370000000 HC RX 637 (ALT 250 FOR IP): Performed by: INTERNAL MEDICINE

## 2024-04-12 PROCEDURE — 36415 COLL VENOUS BLD VENIPUNCTURE: CPT

## 2024-04-12 PROCEDURE — 82947 ASSAY GLUCOSE BLOOD QUANT: CPT

## 2024-04-12 PROCEDURE — 2580000003 HC RX 258: Performed by: INTERNAL MEDICINE

## 2024-04-12 PROCEDURE — 99232 SBSQ HOSP IP/OBS MODERATE 35: CPT | Performed by: STUDENT IN AN ORGANIZED HEALTH CARE EDUCATION/TRAINING PROGRAM

## 2024-04-12 RX ORDER — GUAIFENESIN/DEXTROMETHORPHAN 100-10MG/5
5 SYRUP ORAL EVERY 4 HOURS PRN
Status: DISCONTINUED | OUTPATIENT
Start: 2024-04-12 | End: 2024-04-19 | Stop reason: HOSPADM

## 2024-04-12 RX ORDER — WARFARIN SODIUM 2 MG/1
2 TABLET ORAL
Status: COMPLETED | OUTPATIENT
Start: 2024-04-12 | End: 2024-04-12

## 2024-04-12 RX ORDER — WARFARIN SODIUM 2 MG/1
4 TABLET ORAL
Status: DISCONTINUED | OUTPATIENT
Start: 2024-04-12 | End: 2024-04-12

## 2024-04-12 RX ADMIN — MONTELUKAST 10 MG: 10 TABLET, FILM COATED ORAL at 21:04

## 2024-04-12 RX ADMIN — GUAIFENESIN 600 MG: 600 TABLET ORAL at 09:14

## 2024-04-12 RX ADMIN — FLUTICASONE PROPIONATE 1 SPRAY: 50 SPRAY, METERED NASAL at 11:47

## 2024-04-12 RX ADMIN — BUDESONIDE 500 MCG: 0.5 INHALANT ORAL at 19:40

## 2024-04-12 RX ADMIN — CARVEDILOL 12.5 MG: 12.5 TABLET, FILM COATED ORAL at 18:27

## 2024-04-12 RX ADMIN — IPRATROPIUM BROMIDE AND ALBUTEROL SULFATE 1 DOSE: 2.5; .5 SOLUTION RESPIRATORY (INHALATION) at 14:52

## 2024-04-12 RX ADMIN — CARVEDILOL 12.5 MG: 12.5 TABLET, FILM COATED ORAL at 09:12

## 2024-04-12 RX ADMIN — INSULIN GLARGINE 15 UNITS: 100 INJECTION, SOLUTION SUBCUTANEOUS at 21:04

## 2024-04-12 RX ADMIN — GUAIFENESIN AND DEXTROMETHORPHAN 5 ML: 100; 10 SYRUP ORAL at 21:04

## 2024-04-12 RX ADMIN — SODIUM CHLORIDE, PRESERVATIVE FREE 10 ML: 5 INJECTION INTRAVENOUS at 21:05

## 2024-04-12 RX ADMIN — INSULIN GLARGINE 15 UNITS: 100 INJECTION, SOLUTION SUBCUTANEOUS at 09:12

## 2024-04-12 RX ADMIN — IPRATROPIUM BROMIDE AND ALBUTEROL SULFATE 1 DOSE: 2.5; .5 SOLUTION RESPIRATORY (INHALATION) at 11:31

## 2024-04-12 RX ADMIN — PANTOPRAZOLE SODIUM 40 MG: 40 TABLET, DELAYED RELEASE ORAL at 13:05

## 2024-04-12 RX ADMIN — PRAVASTATIN SODIUM 80 MG: 40 TABLET ORAL at 18:28

## 2024-04-12 RX ADMIN — MICONAZOLE NITRATE: 20 CREAM TOPICAL at 11:46

## 2024-04-12 RX ADMIN — ACETAMINOPHEN 650 MG: 325 TABLET ORAL at 09:13

## 2024-04-12 RX ADMIN — SODIUM CHLORIDE, PRESERVATIVE FREE 10 ML: 5 INJECTION INTRAVENOUS at 11:47

## 2024-04-12 RX ADMIN — IPRATROPIUM BROMIDE AND ALBUTEROL SULFATE 1 DOSE: 2.5; .5 SOLUTION RESPIRATORY (INHALATION) at 19:40

## 2024-04-12 RX ADMIN — MICONAZOLE NITRATE: 20 CREAM TOPICAL at 21:24

## 2024-04-12 RX ADMIN — INSULIN LISPRO 8 UNITS: 100 INJECTION, SOLUTION INTRAVENOUS; SUBCUTANEOUS at 18:27

## 2024-04-12 RX ADMIN — WARFARIN SODIUM 2 MG: 2 TABLET ORAL at 18:27

## 2024-04-12 RX ADMIN — BUDESONIDE 500 MCG: 0.5 INHALANT ORAL at 07:39

## 2024-04-12 RX ADMIN — PREDNISONE 40 MG: 20 TABLET ORAL at 09:12

## 2024-04-12 RX ADMIN — ALBUTEROL SULFATE 2.5 MG: 2.5 SOLUTION RESPIRATORY (INHALATION) at 23:02

## 2024-04-12 RX ADMIN — IPRATROPIUM BROMIDE AND ALBUTEROL SULFATE 1 DOSE: 2.5; .5 SOLUTION RESPIRATORY (INHALATION) at 07:39

## 2024-04-12 RX ADMIN — DILTIAZEM HYDROCHLORIDE 120 MG: 120 CAPSULE, EXTENDED RELEASE ORAL at 09:14

## 2024-04-12 RX ADMIN — GUAIFENESIN 600 MG: 600 TABLET ORAL at 21:04

## 2024-04-12 RX ADMIN — ACETAMINOPHEN 650 MG: 325 TABLET ORAL at 04:17

## 2024-04-12 ASSESSMENT — PAIN DESCRIPTION - LOCATION
LOCATION: NECK
LOCATION: NECK

## 2024-04-12 ASSESSMENT — PAIN DESCRIPTION - DESCRIPTORS
DESCRIPTORS: ACHING
DESCRIPTORS: ACHING

## 2024-04-12 ASSESSMENT — PAIN SCALES - GENERAL
PAINLEVEL_OUTOF10: 1
PAINLEVEL_OUTOF10: 5
PAINLEVEL_OUTOF10: 3

## 2024-04-12 ASSESSMENT — PAIN DESCRIPTION - ORIENTATION: ORIENTATION: POSTERIOR;LOWER;MID;UPPER

## 2024-04-12 ASSESSMENT — PAIN - FUNCTIONAL ASSESSMENT: PAIN_FUNCTIONAL_ASSESSMENT: ACTIVITIES ARE NOT PREVENTED

## 2024-04-12 NOTE — RT PROTOCOL NOTE
RT Inhaler-Nebulizer Bronchodilator Protocol Note    There is a bronchodilator order in the chart from a provider indicating to follow the RT Bronchodilator Protocol and there is an “Initiate RT Inhaler-Nebulizer Bronchodilator Protocol” order as well (see protocol at bottom of note).    CXR Findings:  No results found.    The findings from the last RT Protocol Assessment were as follows:   History Pulmonary Disease: Chronic pulmonary disease  Respiratory Pattern: Dyspnea on exertion or RR 21-25 bpm  Breath Sounds: Severe inspiratory and expiratory wheezing or severely diminished  Cough: Strong, productive  Indication for Bronchodilator Therapy: Decreased or absent breath sounds, On home bronchodilators  Bronchodilator Assessment Score: 13    Aerosolized bronchodilator medication orders have been revised according to the RT Inhaler-Nebulizer Bronchodilator Protocol below.    Respiratory Therapist to perform RT Therapy Protocol Assessment initially then follow the protocol.  Repeat RT Therapy Protocol Assessment PRN for score 0-3 or on second treatment, BID, and PRN for scores above 3.    No Indications - adjust the frequency to every 6 hours PRN wheezing or bronchospasm, if no treatments needed after 48 hours then discontinue using Per Protocol order mode.     If indication present, adjust the RT bronchodilator orders based on the Bronchodilator Assessment Score as indicated below.  Use Inhaler orders unless patient has one or more of the following: on home nebulizer, not able to hold breath for 10 seconds, is not alert and oriented, cannot activate and use MDI correctly, or respiratory rate 25 breaths per minute or more, then use the equivalent nebulizer order(s) with same Frequency and PRN reasons based on the score.  If a patient is on this medication at home then do not decrease Frequency below that used at home.    0-3 - enter or revise RT bronchodilator order(s) to equivalent RT Bronchodilator order with

## 2024-04-12 NOTE — PROGRESS NOTES
Warfarin Dosing - Pharmacy Consult Note  Consulting Provider: Nishant Jimenez MD  Indication:  History of  VTE/PE  Warfarin Dose prior to admission: 2mg M-W-Sat, 4mg all other days    Concurrent anticoagulants/antiplatelets: none  Significant Drug Interactions:  Prednisone  Recent Labs     04/10/24  0517 04/10/24  0956 04/11/24  0508 04/12/24  0521   INR 3.4  --  3.2 2.7   HGB  --  10.4*  --   --    PLT  --  170  --   --      Recent warfarin administrations                     warfarin (COUMADIN) tablet 2 mg (mg) 2 mg Given 04/11/24 1745                   Date   INR    Dose  4/4         2.2        4 mg  4/5         2.3        2 mg  4/6         3.2        hold  4/7         3.5        none  4/8         3.5        none  4/9         3.4        none  4/10       3.4        none  4/11       3.2        2 mg  4/12       2.7    Assessment/Plan  (Goal INR: 2.5 - 3.5)  INR in therapeutic range. Warfarin 2mg today. INR in the morning.    Active problem list reviewed.  INR orders are placed.  Chart reviewed for pertinent labs, drug/diet interactions, and past doses.  Documentation of patient's clinical condition was reviewed.    Pharmacy Dosing:  Pharmacy will continue to follow.

## 2024-04-12 NOTE — PROGRESS NOTES
Pulmonary Critical Care Progress Note       Patient seen for the follow up of  Acute respiratory failure with hypoxia (HCC)     Subjective:  Patient resting in bedside chair, she still will not wear BiPAP overnight.  She complains of neck pain and cites this as a reason for not being out of bed.  She states she does not feel any better today and did not sleep well overnight.  She denies chest pain. Shortness of breath not is a little better.    Examination:  Vitals: BP (!) 151/70   Pulse 97   Temp 98.6 °F (37 °C) (Oral)   Resp 15   Ht 1.651 m (5' 5\")   Wt (!) 178.6 kg (393 lb 11.2 oz)   LMP 07/16/2014   SpO2 96%   BMI 65.52 kg/m²   General appearance: in no acute distress, alert and cooperative with exam  Neck: No JVD  Lungs: decreased air exchange, no wheeze or crackles   Heart: regular rate and rhythm, S1, S2 normal, no gallop  Abdomen: Soft, non tender, + BS  Extremities: no cyanosis or clubbing. No significant edema    LABs:  CBC:   Recent Labs     04/10/24  0956   WBC 8.4   HGB 10.4*   HCT 37.8          BMP:   Recent Labs     04/10/24  0956      K 5.3   CO2 41*   BUN 45*   CREATININE 1.0*   LABGLOM 62   GLUCOSE 284*       PT/INR:   Recent Labs     04/10/24  0517 04/11/24  0508 04/12/24  0521   PROTIME 33.7* 32.4* 28.5*   INR 3.4 3.2 2.7     ABG:  Lab Results   Component Value Date/Time    XVW1LAY 44 05/05/2018 03:12 PM    FIO2 100.0 05/05/2018 03:12 PM       Lab Results   Component Value Date/Time    POCPH 7.29 05/05/2018 03:12 PM    POCPCO2 85 05/05/2018 03:12 PM    POCPO2 81 05/05/2018 03:12 PM    POCHCO3 41.2 05/05/2018 03:12 PM    PBEA 15 05/05/2018 03:12 PM    MPC1CLE 44 05/05/2018 03:12 PM    FWGA6LRA 93 05/05/2018 03:12 PM    FIO2 100.0 05/05/2018 03:12 PM     Radiology:  X-ray chest 4/8/2024      Impression & Recommendations:  Chronic hypoxic and hypercarbic respiratory failure  Oxygen by nasal cannula  Incentive spirometer q.1h awake  BiPAP support    Acute exacerbation of Asthma

## 2024-04-12 NOTE — PROGRESS NOTES
Physical Therapy  DATE: 2024    NAME: Shazia Dickson  MRN: [id number]   : 1955    Patient not seen this date for Physical Therapy due to:      [] Cancel by RN or physician due to:    [] Hemodialysis    [] Critical Lab Value Level     [] Blood transfusion in progress    [] Acute or unstable cardiovascular status   _MAP < 55 or more than >115  _HR < 40 or > 130    [] Acute or unstable pulmonary status   -FiO2 > 60%   _RR < 5 or >40    _O2 sats < 85%    [] Strict Bedrest    [] Off Unit for surgery or procedure    [] Off Unit for testing       [] Pending imaging to R/O fracture    [x] Refusal by Patient  Pt. Refused treatment stating \"Therapy wrecked my neck from yesterday!\" Pt. Agitated and declined all therapy treatment d/t sitting upright in chair yesterday. Nursing informed and PT will cont to follow.                   [] Other      [] PT being discontinued at this time. Patient independent. No further needs.     [] PT being discontinued at this time as the patient has been transferred to hospice care. No further needs.      KELLIE NARAYANAN, PTA

## 2024-04-12 NOTE — PLAN OF CARE
Problem: Respiratory - Adult  Goal: Able to breathe comfortably  4/12/2024 0311 by Earline Saeed RCP  Outcome: Progressing     Problem: Respiratory - Adult  Goal: Patient's breath sounds will be clear and equal  4/12/2024 0311 by Earline Saeed RCP  Outcome: Progressing     Problem: Respiratory - Adult  Goal: Adequate oxygenation  Description: Adequate oxygenation  4/12/2024 0311 by Earline Saeed RCP  Outcome: Progressing     Problem: Respiratory - Adult  Goal: Achieves optimal ventilation and oxygenation  4/12/2024 0311 by Earline Saeed RCP  Outcome: Progressing

## 2024-04-12 NOTE — PLAN OF CARE
Pt A&Ox4 has remained free from falls and injuries.   Pt moved with maxisky lift and two assist.   VS stable BP elevated and received scheduled coreg.   Pt had complaints of pain in neck this morning given PRN tylenol she reports this helped to reduce discomfort.   Pt completed isolation for influenza today.   Wound dressing changed on abd during this shift and remains clean dry and intact.   Pt awaiting precert to [facility].   Problem: Respiratory - Adult  Goal: Achieves optimal ventilation and oxygenation  4/12/2024 1936 by Desiree Arthur RN  Outcome: Progressing     Problem: Discharge Planning  Goal: Discharge to home or other facility with appropriate resources  Outcome: Progressing     Problem: Pain  Goal: Verbalizes/displays adequate comfort level or baseline comfort level  Outcome: Progressing     Problem: Skin/Tissue Integrity  Goal: Absence of new skin breakdown  Description: 1.  Monitor for areas of redness and/or skin breakdown  2.  Assess vascular access sites hourly  3.  Every 4-6 hours minimum:  Change oxygen saturation probe site  4.  Every 4-6 hours:  If on nasal continuous positive airway pressure, respiratory therapy assess nares and determine need for appliance change or resting period.  Outcome: Progressing     Problem: Safety - Adult  Goal: Free from fall injury  Outcome: Progressing     Problem: ABCDS Injury Assessment  Goal: Absence of physical injury  Outcome: Progressing     Problem: Skin/Tissue Integrity - Adult  Goal: Incisions, wounds, or drain sites healing without S/S of infection  Outcome: Progressing     Problem: Musculoskeletal - Adult  Goal: Return ADL status to a safe level of function  Outcome: Progressing     Problem: Infection - Adult  Goal: Absence of infection at discharge  Outcome: Progressing     Problem: Chronic Conditions and Co-morbidities  Goal: Patient's chronic conditions and co-morbidity symptoms are monitored and maintained or improved  Outcome: Progressing

## 2024-04-12 NOTE — PROGRESS NOTES
[facility]  Office: [phone]  Carlos Caro DO, Felipe Drew, DO, Jermna Mack DO, Leonel Thomas, DO, Larry Pisano MD, Monserrat Rosado MD, Sharon Perez MD, Beverley Curtis MD,  Jimi Messina MD, Kevin Cowan MD, Theresa Chavira MD,  Luis Coffman DO, Florencia Hernandez MD, Giorgi Salas MD, Ramone Caro DO, Mariel Bradshaw MD,  Bandar Anderson DO, Michelle Helm MD, Mireille Hermosillo MD, Yamileth Chong MD, Zo Montgomery MD,  Jordan Hernandez MD, Pilar Salcedo MD, Kevin Greenwood MD, Kevin Landon MD, Ramon Ellsworth MD, Ken Luna MD, Luis French DO, Porter Moore DO, Shayna Woodall MD,  Alvaro Reid MD, Shirley Waterhouse, CNP,  Octavia Coreas CNP, Chemo Pelayo, CNP,  Юлия Calero, DNP, Kaci Rodgers, CNP, Princess Mandujano, CNP, Madelaine Burns CNP, Ivone Mancia CNP, Val Hanson, PA-C, Joyce Jackson PA-C, Isabel Bustos, CNP, Johnna Horta, CNP, Karma Yost, CNP, Rosio Resendez, CNP, Jessi Montiel, CNP, Sandie Palmer, CNS, Balbina Hernández, CNP, Dolly Tamez CNP, Tracy Schwab, CNP         [facility]   IN-PATIENT SERVICE   [facility]    Progress Note    4/12/2024    7:22 PM    Name:   Shazia Dickson  MRN:     [id number]     Acct:      [id number]   Room:   [facility] Day:  8  Admit Date:  4/4/2024  6:36 PM    PCP:   Jyoti Mauricio APRN - CNP  Code Status:  Full Code    Subjective:     Patient seen and examined this morning.  Resting comfortably.  On supplemental oxygen, refusing BiPAP overnight.  Consult regarding ambulation.  Vitals within normal range, lab work reviewed.    Brief History:     68-year-old female present to the hospital for evaluation of shortness of breath.  Has history of COPD and on admission did test positive for influenza.    Medications:     Allergies:    Allergies   Allergen Reactions    Amoxil [Amoxicillin Trihydrate] Hives    Neomycin     Penicillins     Codeine      headaches    Neosporin  filed at 4/11/2024 2356  Gross per 24 hour   Intake --   Output 550 ml   Net -550 ml         Labs:  Hematology:  Recent Labs     04/10/24  0517 04/10/24  0956 04/11/24  0508 04/12/24  0521   WBC  --  8.4  --   --    RBC  --  4.12  --   --    HGB  --  10.4*  --   --    HCT  --  37.8  --   --    MCV  --  91.7  --   --    MCH  --  25.2  --   --    MCHC  --  27.5*  --   --    RDW  --  17.5*  --   --    PLT  --  170  --   --    MPV  --  9.0  --   --    INR 3.4  --  3.2 2.7       Chemistry:  Recent Labs     04/10/24  0956      K 5.3   CL 96*   CO2 41*   GLUCOSE 284*   BUN 45*   CREATININE 1.0*   ANIONGAP 3   LABGLOM 62   CALCIUM 8.5*       Recent Labs     04/11/24  1126 04/11/24  1657 04/11/24  2102 04/12/24  0824 04/12/24  1248 04/12/24  1614   POCGLU 208* 252* 221* 112* 103 275*       ABG:  Lab Results   Component Value Date/Time    POCPH 7.29 05/05/2018 03:12 PM    POCPCO2 85 05/05/2018 03:12 PM    POCPO2 81 05/05/2018 03:12 PM    POCHCO3 41.2 05/05/2018 03:12 PM    NBEA NOT REPORTED 05/05/2018 03:12 PM    PBEA 15 05/05/2018 03:12 PM    SLG9NCQ 44 05/05/2018 03:12 PM    LQEU6AMZ 93 05/05/2018 03:12 PM    FIO2 100.0 05/05/2018 03:12 PM     Lab Results   Component Value Date/Time    SPECIAL NOT REPORTED 05/18/2018 03:00 AM     Lab Results   Component Value Date/Time    CULTURE NORMAL RESPIRATORY SHEBA HEAVY GROWTH 08/10/2022 05:20 AM       Radiology:  XR CHEST PORTABLE    Result Date: 4/7/2024  1. Stable diffuse bilateral airspace disease throughout the lungs, unchanged from the prior exam. 2. No new focal airspace disease.     XR CHEST PORTABLE    Result Date: 4/5/2024  Cardiomegaly with scattered infiltrates.     XR CHEST PORTABLE    Result Date: 4/4/2024  1. Significantly limited study due to underexposure. 2. Cardiomegaly and vascular congestion without interstitial prominence to suggest interstitial edema, but the interstitium is evaluated to a limited degree. 3. No confluent airspace consolidation, but

## 2024-04-12 NOTE — CARE COORDINATION
Social work: Ki[facility] at [facility] is able to accept and will submit for precert. JOSE started.   Patient requests transportation before 5:00 to SNF.

## 2024-04-12 NOTE — PLAN OF CARE
Pt resting in bed all evening with no complaints of pain. Pt repositioned with maxisky lift, bed linens changed and new purewick placed.  Pt hopeful to stay inpatient through the weekend, is worried about finding placement at a rehab, wants a place that has resources for pulmonary care.  Pt had some minor neck pain, tylenol given.   Bed locked and in lowest position, call light within reach, safety maintained.     Problem: Respiratory - Adult  Goal: Achieves optimal ventilation and oxygenation  4/12/2024 0109 by Dasia Rogers RN  Outcome: Progressing     Problem: Discharge Planning  Goal: Discharge to home or other facility with appropriate resources  4/12/2024 0109 by Dasia Rogers RN  Outcome: Progressing  Flowsheets (Taken 4/11/2024 2000)  Discharge to home or other facility with appropriate resources: Identify barriers to discharge with patient and caregiver     Problem: Pain  Goal: Verbalizes/displays adequate comfort level or baseline comfort level  4/12/2024 0109 by Dasia Rogers RN  Outcome: Progressing     Problem: Skin/Tissue Integrity  Goal: Absence of new skin breakdown  Description: 1.  Monitor for areas of redness and/or skin breakdown  2.  Assess vascular access sites hourly  3.  Every 4-6 hours minimum:  Change oxygen saturation probe site  4.  Every 4-6 hours:  If on nasal continuous positive airway pressure, respiratory therapy assess nares and determine need for appliance change or resting period.  4/12/2024 0109 by Dasia Rogers RN  Outcome: Progressing     Problem: Safety - Adult  Goal: Free from fall injury  4/12/2024 0109 by Dasia Rogers RN  Outcome: Progressing     Problem: ABCDS Injury Assessment  Goal: Absence of physical injury  4/12/2024 0109 by Dasia Rogers RN  Outcome: Progressing     Problem: Skin/Tissue Integrity - Adult  Goal: Incisions, wounds, or drain sites healing without S/S of infection  4/12/2024 0109 by Dasia Rogers

## 2024-04-12 NOTE — PLAN OF CARE
Problem: Respiratory - Adult  Goal: Able to breathe comfortably  4/11/2024 2021 by Rai Brito RCP  Outcome: Progressing  4/11/2024 0738 by Sharon Marvin, RCANEESH  Outcome: Progressing     Problem: Respiratory - Adult  Goal: Patient's breath sounds will be clear and equal  4/11/2024 2021 by Rai Brito, MARTHAP  Outcome: Progressing  4/11/2024 0738 by Sharon Marvin, RCANEESH  Outcome: Progressing     Problem: Respiratory - Adult  Goal: Adequate oxygenation  Description: Adequate oxygenation  4/11/2024 2021 by Rai Brito RCP  Outcome: Progressing  4/11/2024 0738 by Sharon Marvin, RCP  Outcome: Progressing     Problem: Respiratory - Adult  Goal: Achieves optimal ventilation and oxygenation  4/11/2024 2021 by Rai Brito RCP  Outcome: Progressing  4/11/2024 1938 by Desiree Arthur, RN  Outcome: Progressing  4/11/2024 0738 by Sharon Marvin, RCP  Outcome: Progressing

## 2024-04-12 NOTE — PLAN OF CARE
Problem: Respiratory - Adult  Goal: Able to breathe comfortably  4/12/2024 1919 by Annika Thompson RCP  Outcome: Progressing     Problem: Respiratory - Adult  Goal: Patient's breath sounds will be clear and equal  4/12/2024 1919 by Annika Thompson RCP  Outcome: Progressing     Problem: Respiratory - Adult  Goal: Adequate oxygenation  Description: Adequate oxygenation  4/12/2024 1919 by Annika Thompson RCP  Outcome: Progressing     Problem: Respiratory - Adult  Goal: Achieves optimal ventilation and oxygenation  4/12/2024 1919 by Annika Thompson RCP  Outcome: Progressing      Seroquel

## 2024-04-12 NOTE — RT PROTOCOL NOTE
RT Inhaler-Nebulizer Bronchodilator Protocol Note    There is a bronchodilator order in the chart from a provider indicating to follow the RT Bronchodilator Protocol and there is an “Initiate RT Inhaler-Nebulizer Bronchodilator Protocol” order as well (see protocol at bottom of note).    CXR Findings:  No results found.    The findings from the last RT Protocol Assessment were as follows:   History Pulmonary Disease: Chronic pulmonary disease  Respiratory Pattern: Dyspnea on exertion or RR 21-25 bpm  Breath Sounds: Severe inspiratory and expiratory wheezing or severely diminished  Cough: Strong, productive  Indication for Bronchodilator Therapy: Decreased or absent breath sounds, On home bronchodilators, Wheezing associated with pulm disorder  Bronchodilator Assessment Score: 13    Aerosolized bronchodilator medication orders have been revised according to the RT Inhaler-Nebulizer Bronchodilator Protocol below.    Respiratory Therapist to perform RT Therapy Protocol Assessment initially then follow the protocol.  Repeat RT Therapy Protocol Assessment PRN for score 0-3 or on second treatment, BID, and PRN for scores above 3.    No Indications - adjust the frequency to every 6 hours PRN wheezing or bronchospasm, if no treatments needed after 48 hours then discontinue using Per Protocol order mode.     If indication present, adjust the RT bronchodilator orders based on the Bronchodilator Assessment Score as indicated below.  Use Inhaler orders unless patient has one or more of the following: on home nebulizer, not able to hold breath for 10 seconds, is not alert and oriented, cannot activate and use MDI correctly, or respiratory rate 25 breaths per minute or more, then use the equivalent nebulizer order(s) with same Frequency and PRN reasons based on the score.  If a patient is on this medication at home then do not decrease Frequency below that used at home.    0-3 - enter or revise RT bronchodilator order(s) to

## 2024-04-12 NOTE — PLAN OF CARE
Problem: Respiratory - Adult  Goal: Able to breathe comfortably  4/12/2024 0733 by Sharon Marvin, RCP  Outcome: Progressing  4/12/2024 0311 by Earline Saeed RCP  Outcome: Progressing  4/11/2024 2021 by Rai Brito RCP  Outcome: Progressing  Goal: Patient's breath sounds will be clear and equal  4/12/2024 0733 by Sharon Marvin, RCP  Outcome: Progressing  4/12/2024 0311 by Earline Saeed RCP  Outcome: Progressing  4/11/2024 2021 by Rai Brito RCP  Outcome: Progressing  Goal: Adequate oxygenation  Description: Adequate oxygenation  4/12/2024 0733 by Sharon Marvin, MATT  Outcome: Progressing  4/12/2024 0311 by Earline Saeed RCP  Outcome: Progressing  4/11/2024 2021 by Rai Brito RCP  Outcome: Progressing  Goal: Achieves optimal ventilation and oxygenation  4/12/2024 0733 by Sharon Marvin, MATT  Outcome: Progressing  4/12/2024 0311 by Earline Saeed RCP  Outcome: Progressing  4/12/2024 0109 by Dasia Rogers, RN  Outcome: Progressing  4/11/2024 2021 by Rai Brito RCP  Outcome: Progressing  Flowsheets (Taken 4/11/2024 2000 by Dasia Rogers, RN)  Achieves optimal ventilation and oxygenation: Assess for changes in respiratory status  4/11/2024 1938 by Desiree Arthur, RN  Outcome: Progressing

## 2024-04-12 NOTE — PROGRESS NOTES
[facility]  Office: [phone]  Carlos Caro DO, Felipe Drew, DO, Jerman Mack DO, Leonel Thomas, DO, Larry Pisano MD, Monserrat Rosado MD, Sharon Perez MD, Beverley Curtis MD,  Jimi Messina MD, Kevin Cowan MD, Theresa Chavira MD,  Luis Coffman DO, Florencia Hernandez MD, Giorgi Salas MD, Ramone Caro DO, Mariel Bradshaw MD,  Bandar Anderson DO, Michelle Helm MD, Mireille Hermosillo MD, Yamileth Chong MD, Zo Montgomery MD,  Jordan Hernandez MD, Pilar Salcedo MD, Kevin Greenwood MD, Kevin Landon MD, Ramon Ellsworth MD, Ken Luna MD, Luis French DO, Porter Moore DO, Shayna Woodall MD,  Alvaro Reid MD, Shirley Waterhouse, CNP,  Octavia Coreas CNP, Chemo Pelayo, CNP,  Юлия Calero, DNP, Kaci Rodgers, CNP, Princess Mandujano, CNP, Madelaine Burns CNP, Ivone Mancia CNP, Val Hanson, PA-C, Joyce Jackson PA-C, Isabel Bustos, CNP, Johnna Horta, CNP, Karma Yost, CNP, Rosio Resendez, CNP, Jessi Montiel CNP, Sandie Palmer, CNS, Balbina Hernández, CNP, Dolly Tamez CNP, Tracy Schwab, CNP         [facility]   IN-PATIENT SERVICE   [facility]    Progress Note    4/11/2024    10:29 PM    Name:   Shazia Dickson  MRN:     [id number]     Acct:      [id number]   Room:   [facility] Day:  7  Admit Date:  4/4/2024  6:36 PM    PCP:   Jyoti Mauricio APRN - CNP  Code Status:  Full Code    Subjective:     Pt seen and examined. Sat well on sup O2.  Not ambulating much. Bring out lot of mucous. Encouraged to use IS and acapella.  Still c/o constipation. KUB 4/10 s/o non obstructing bowel gas pattern with moderate stool.  Pul following. CM working on placement.    Brief History:     68-year-old female present to the hospital for evaluation of shortness of breath.  Has history of COPD and on admission did test positive for influenza.    Medications:     Allergies:    Allergies   Allergen Reactions    Amoxil [Amoxicillin Trihydrate] Hives    Neomycin      oxygen supplement, IV azithromycin, Tamiflu, patient to her baseline oxygen 4 L.  Continue IV steroids, respiratory treatment with inhaled steroids and bronchodilator.  History of DVT and PE, on Coumadin  GERD continue PPI  Pancytopenia resolved  Hypertension continue Cardizem, blood pressure on the lower side, started her on Coreg 12.5.  Continue to monitor her blood pressure.  If her respiratory distress worsens, will DC beta-blocker and start her on Norvasc.  Although due to concern for bilateral lower extremity swelling, currently did not start her on Norvasc.   Constipation: Getting bowel regimen as per protocol. S/p KUB s/o moderate stool burden. Encourage ambulation. Due to her body habitus she is not ambulatory.  Dyslipidemia continue statin  PT-OT  DVT prophylaxis on warfarin  Pt is at her baseline 4L O2. CM working on placement.    Medical Decision Making: Medium    Dispo: stable for d/c once SNF arranged.   Ramon Ellsworth MD  4/11/2024  10:29 PM

## 2024-04-12 NOTE — PROGRESS NOTES
Occupational Therapy  DATE: 2024    NAME: Shazia Dickson  MRN: [id number]   : 1955    Patient not seen this date for Occupational Therapy due to:      [] Cancel by RN or physician due to:    [] Hemodialysis    [] Critical Lab Value Level     [] Blood transfusion in progress    [] Acute or unstable cardiovascular status   _MAP < 55 or more than >115  _HR < 40 or > 130    [] Acute or unstable pulmonary status   -FiO2 > 60%   _RR < 5 or >40    _O2 sats < 85%    [] Strict Bedrest    [] Off Unit for surgery or procedure    [] Off Unit for testing       [] Pending imaging to R/O fracture    [x] Refusal by Patient : Pt. Refused treatment stating \"Therapy wrecked my neck from yesterday!\" Pt. Agitated and declined treatment d/t sitting upright in chair yesterday. Nursing informed and OT will cont to follow.     [] Other      [] OT being discontinued at this time. Patient independent. No further needs.     [] OT being discontinued at this time as the patient has been transferred to hospice care. No further needs.      DOMENICA MEJIA

## 2024-04-13 PROBLEM — D69.6 THROMBOCYTOPENIA (HCC): Status: ACTIVE | Noted: 2024-04-13

## 2024-04-13 LAB
GLUCOSE BLD-MCNC: 116 MG/DL (ref 65–105)
GLUCOSE BLD-MCNC: 163 MG/DL (ref 65–105)
GLUCOSE BLD-MCNC: 223 MG/DL (ref 65–105)
GLUCOSE BLD-MCNC: 242 MG/DL (ref 65–105)
INR PPP: 2.6
PROTHROMBIN TIME: 27.8 SEC (ref 11.5–14.2)

## 2024-04-13 PROCEDURE — 99232 SBSQ HOSP IP/OBS MODERATE 35: CPT | Performed by: INTERNAL MEDICINE

## 2024-04-13 PROCEDURE — 6370000000 HC RX 637 (ALT 250 FOR IP): Performed by: INTERNAL MEDICINE

## 2024-04-13 PROCEDURE — 6370000000 HC RX 637 (ALT 250 FOR IP): Performed by: STUDENT IN AN ORGANIZED HEALTH CARE EDUCATION/TRAINING PROGRAM

## 2024-04-13 PROCEDURE — 6370000000 HC RX 637 (ALT 250 FOR IP): Performed by: NURSE PRACTITIONER

## 2024-04-13 PROCEDURE — 2580000003 HC RX 258: Performed by: INTERNAL MEDICINE

## 2024-04-13 PROCEDURE — 94640 AIRWAY INHALATION TREATMENT: CPT

## 2024-04-13 PROCEDURE — 36415 COLL VENOUS BLD VENIPUNCTURE: CPT

## 2024-04-13 PROCEDURE — 99232 SBSQ HOSP IP/OBS MODERATE 35: CPT | Performed by: STUDENT IN AN ORGANIZED HEALTH CARE EDUCATION/TRAINING PROGRAM

## 2024-04-13 PROCEDURE — 2060000000 HC ICU INTERMEDIATE R&B

## 2024-04-13 PROCEDURE — 85610 PROTHROMBIN TIME: CPT

## 2024-04-13 PROCEDURE — 94669 MECHANICAL CHEST WALL OSCILL: CPT

## 2024-04-13 PROCEDURE — 2700000000 HC OXYGEN THERAPY PER DAY

## 2024-04-13 PROCEDURE — 94761 N-INVAS EAR/PLS OXIMETRY MLT: CPT

## 2024-04-13 PROCEDURE — 6360000002 HC RX W HCPCS: Performed by: INTERNAL MEDICINE

## 2024-04-13 PROCEDURE — 82947 ASSAY GLUCOSE BLOOD QUANT: CPT

## 2024-04-13 RX ORDER — WARFARIN SODIUM 2 MG/1
2 TABLET ORAL
Status: COMPLETED | OUTPATIENT
Start: 2024-04-13 | End: 2024-04-13

## 2024-04-13 RX ADMIN — INSULIN GLARGINE 15 UNITS: 100 INJECTION, SOLUTION SUBCUTANEOUS at 09:12

## 2024-04-13 RX ADMIN — CARVEDILOL 12.5 MG: 12.5 TABLET, FILM COATED ORAL at 09:13

## 2024-04-13 RX ADMIN — MONTELUKAST 10 MG: 10 TABLET, FILM COATED ORAL at 20:40

## 2024-04-13 RX ADMIN — GUAIFENESIN 600 MG: 600 TABLET ORAL at 09:12

## 2024-04-13 RX ADMIN — SODIUM CHLORIDE, PRESERVATIVE FREE 10 ML: 5 INJECTION INTRAVENOUS at 20:40

## 2024-04-13 RX ADMIN — FLUTICASONE PROPIONATE 1 SPRAY: 50 SPRAY, METERED NASAL at 09:13

## 2024-04-13 RX ADMIN — IPRATROPIUM BROMIDE AND ALBUTEROL SULFATE 1 DOSE: 2.5; .5 SOLUTION RESPIRATORY (INHALATION) at 14:32

## 2024-04-13 RX ADMIN — MICONAZOLE NITRATE: 20 CREAM TOPICAL at 09:13

## 2024-04-13 RX ADMIN — BUDESONIDE 500 MCG: 0.5 INHALANT ORAL at 05:57

## 2024-04-13 RX ADMIN — CARVEDILOL 12.5 MG: 12.5 TABLET, FILM COATED ORAL at 17:34

## 2024-04-13 RX ADMIN — WARFARIN SODIUM 2 MG: 2 TABLET ORAL at 17:34

## 2024-04-13 RX ADMIN — GUAIFENESIN AND DEXTROMETHORPHAN 5 ML: 100; 10 SYRUP ORAL at 12:34

## 2024-04-13 RX ADMIN — IPRATROPIUM BROMIDE AND ALBUTEROL SULFATE 1 DOSE: 2.5; .5 SOLUTION RESPIRATORY (INHALATION) at 18:12

## 2024-04-13 RX ADMIN — BUDESONIDE 500 MCG: 0.5 INHALANT ORAL at 18:12

## 2024-04-13 RX ADMIN — DILTIAZEM HYDROCHLORIDE 120 MG: 120 CAPSULE, EXTENDED RELEASE ORAL at 09:12

## 2024-04-13 RX ADMIN — IPRATROPIUM BROMIDE AND ALBUTEROL SULFATE 1 DOSE: 2.5; .5 SOLUTION RESPIRATORY (INHALATION) at 10:36

## 2024-04-13 RX ADMIN — INSULIN GLARGINE 15 UNITS: 100 INJECTION, SOLUTION SUBCUTANEOUS at 20:40

## 2024-04-13 RX ADMIN — PANTOPRAZOLE SODIUM 40 MG: 40 TABLET, DELAYED RELEASE ORAL at 05:55

## 2024-04-13 RX ADMIN — ACETAMINOPHEN 650 MG: 325 TABLET ORAL at 12:58

## 2024-04-13 RX ADMIN — SODIUM CHLORIDE, PRESERVATIVE FREE 10 ML: 5 INJECTION INTRAVENOUS at 09:13

## 2024-04-13 RX ADMIN — ACETAMINOPHEN 650 MG: 325 TABLET ORAL at 07:05

## 2024-04-13 RX ADMIN — INSULIN LISPRO 4 UNITS: 100 INJECTION, SOLUTION INTRAVENOUS; SUBCUTANEOUS at 17:34

## 2024-04-13 RX ADMIN — PRAVASTATIN SODIUM 80 MG: 40 TABLET ORAL at 17:34

## 2024-04-13 RX ADMIN — GUAIFENESIN 600 MG: 600 TABLET ORAL at 20:40

## 2024-04-13 RX ADMIN — PREDNISONE 40 MG: 20 TABLET ORAL at 09:12

## 2024-04-13 RX ADMIN — MICONAZOLE NITRATE: 20 CREAM TOPICAL at 20:39

## 2024-04-13 RX ADMIN — IPRATROPIUM BROMIDE AND ALBUTEROL SULFATE 1 DOSE: 2.5; .5 SOLUTION RESPIRATORY (INHALATION) at 05:57

## 2024-04-13 ASSESSMENT — PAIN - FUNCTIONAL ASSESSMENT: PAIN_FUNCTIONAL_ASSESSMENT: ACTIVITIES ARE NOT PREVENTED

## 2024-04-13 ASSESSMENT — PAIN SCALES - GENERAL
PAINLEVEL_OUTOF10: 3
PAINLEVEL_OUTOF10: 0
PAINLEVEL_OUTOF10: 3

## 2024-04-13 ASSESSMENT — PAIN DESCRIPTION - DESCRIPTORS
DESCRIPTORS: ACHING
DESCRIPTORS: ACHING;DISCOMFORT

## 2024-04-13 ASSESSMENT — PAIN DESCRIPTION - LOCATION
LOCATION: ABDOMEN;NECK;BACK
LOCATION: NECK;HEAD

## 2024-04-13 ASSESSMENT — PAIN DESCRIPTION - ORIENTATION: ORIENTATION: MID;RIGHT

## 2024-04-13 NOTE — PROGRESS NOTES
Warfarin Dosing - Pharmacy Consult Note  Consulting Provider: Nishant Jimenez MD  Indication:  History of  VTE/PE  Warfarin Dose prior to admission: 2mg M-W-Sat, 4mg all other days    Concurrent anticoagulants/antiplatelets: none  Significant Drug Interactions:  Prednisone  Recent Labs     04/10/24  0956 04/11/24  0508 04/12/24  0521 04/13/24  0450   INR  --  3.2 2.7 2.6   HGB 10.4*  --   --   --      --   --   --      Recent warfarin administrations                     warfarin (COUMADIN) tablet 2 mg (mg) 2 mg Given 04/12/24 1827    warfarin (COUMADIN) tablet 2 mg (mg) 2 mg Given 04/11/24 1745                   Date   INR    Dose  4/4         2.2        4 mg  4/5         2.3        2 mg  4/6         3.2        hold  4/7         3.5        none  4/8         3.5        none  4/9         3.4        none  4/10       3.4        none  4/11       3.2        2 mg  4/12       2.7        2 mg  4/13       2.6    Assessment/Plan  (Goal INR: 2.5 - 3.5)  INR therapeutic. Warfarin 2mg today. INR in the morning.    Active problem list reviewed.  INR orders are placed.  Chart reviewed for pertinent labs, drug/diet interactions, and past doses.  Documentation of patient's clinical condition was reviewed.    Pharmacy Dosing:  Pharmacy will continue to follow.

## 2024-04-13 NOTE — PROGRESS NOTES
Today's Date: 4/13/2024  Patient Name: Shazia Dickson  Date of admission: 4/4/2024  6:36 PM  Patient's age: 68 y.o., 1955  Admission Dx: Influenza A [J10.1]  COPD exacerbation (HCC) [J44.1]  Acute respiratory failure with hypoxia (HCC) [J96.01]  Increased oxygen demand [R68.89]    Reason for Consult: management recommendations  Requesting Physician: No admitting provider for patient encounter.    CHIEF COMPLAINT: Shortness of breath.  Leukopenia.    INTERIM HISTORY  Patient is seen and evaluated.  Overall feels well. Slept much better .  She is on oxygen 4 L/min.  No respiratory distress.  Labs were reviewed.  Stable results.  No active bleeding.  No fever.    HISTORY OF PRESENT ILLNESS:      The patient is a 68 y.o.  female who is admitted to the hospital with chief complaint of shortness of breath.  Complains of generalized bodyaches and upper respite tract infection symptoms.  Patient recently had been seen infectious disease team for healing abdominal wall wound.  Dr. Nunez.  Per records patient has chronic shortness of breath.    Hematology oncology team consulted due to cytopenias.  Patient CBC from 10/23 shows WBC count 6.0 hemoglobin 8.9 platelet count 225.  Patient CBC at presentation shows WBC count of 2.7 hemoglobin 9.1 and creatinine of 153.  Patient ANC was 1.79.  Patient just finished course of antibiotics.    Plan past Medical History:   has a past medical history of Anxiety, Asthma, Bronchitis, DDD (degenerative disc disease), lumbar, Depression, Diabetes mellitus (HCC), Elevated glucose, Headache(784.0), Hernia of abdominal cavity, HH (hiatus hernia), Hyperlipemia, mixed, Hypertension, Osteoarthritis, Right femoral vein DVT (HCC), and Uterine hyperplasia.    Past Surgical History:   has a past surgical history that includes Dilation and curettage of uterus (10/08 and 09/07); Breast reduction surgery (12/1997); Breast reduction surgery (12/1997); Tympanostomy tube placement

## 2024-04-13 NOTE — PLAN OF CARE
Pt remains free from falls. 2x with use of marsha lift. Pt remains on 4L NC. Connected to continuous pulse ox. VSS. Will continue to monitor.   ACHS. . No Humalog coverage. 15 units of lantus given.   Precert pending to [facility].   Problem: Skin/Tissue Integrity - Adult  Goal: Incisions, wounds, or drain sites healing without S/S of infection  4/13/2024 0506 by Ayla Sadler RN  Outcome: Progressing     Problem: Respiratory - Adult  Goal: Achieves optimal ventilation and oxygenation  4/13/2024 0506 by Ayla Sadler RN  Outcome: Progressing     Problem: Discharge Planning  Goal: Discharge to home or other facility with appropriate resources  4/13/2024 0506 by Ayla Sadler RN  Outcome: Progressing     Problem: Safety - Adult  Goal: Free from fall injury  4/13/2024 0506 by Ayla Sadler, RN  Outcome: Progressing

## 2024-04-13 NOTE — RT PROTOCOL NOTE
RT Inhaler-Nebulizer Bronchodilator Protocol Note    There is a bronchodilator order in the chart from a provider indicating to follow the RT Bronchodilator Protocol and there is an “Initiate RT Inhaler-Nebulizer Bronchodilator Protocol” order as well (see protocol at bottom of note).    CXR Findings:  No results found.    The findings from the last RT Protocol Assessment were as follows:   History Pulmonary Disease: Chronic pulmonary disease  Respiratory Pattern: Dyspnea on exertion or RR 21-25 bpm  Breath Sounds: Severe inspiratory and expiratory wheezing or severely diminished  Cough: Strong, spontaneous, non-productive  Indication for Bronchodilator Therapy: Decreased or absent breath sounds, On home bronchodilators  Bronchodilator Assessment Score: 12    Aerosolized bronchodilator medication orders have been revised according to the RT Inhaler-Nebulizer Bronchodilator Protocol below.    Respiratory Therapist to perform RT Therapy Protocol Assessment initially then follow the protocol.  Repeat RT Therapy Protocol Assessment PRN for score 0-3 or on second treatment, BID, and PRN for scores above 3.    No Indications - adjust the frequency to every 6 hours PRN wheezing or bronchospasm, if no treatments needed after 48 hours then discontinue using Per Protocol order mode.     If indication present, adjust the RT bronchodilator orders based on the Bronchodilator Assessment Score as indicated below.  Use Inhaler orders unless patient has one or more of the following: on home nebulizer, not able to hold breath for 10 seconds, is not alert and oriented, cannot activate and use MDI correctly, or respiratory rate 25 breaths per minute or more, then use the equivalent nebulizer order(s) with same Frequency and PRN reasons based on the score.  If a patient is on this medication at home then do not decrease Frequency below that used at home.    0-3 - enter or revise RT bronchodilator order(s) to equivalent RT Bronchodilator

## 2024-04-13 NOTE — PLAN OF CARE
Problem: Respiratory - Adult  Goal: Able to breathe comfortably  Outcome: Progressing  Goal: Patient's breath sounds will be clear and equal  Outcome: Progressing  Goal: Adequate oxygenation  Description: Adequate oxygenation  Outcome: Progressing  Goal: Achieves optimal ventilation and oxygenation  4/13/2024 1038 by Maribel Read RCP  Outcome: Progressing  4/13/2024 0506 by Ayla Sadler RN  Outcome: Progressing

## 2024-04-13 NOTE — PROGRESS NOTES
[facility]  Office: [phone]  Carlos Caro DO, Felipe Drew, DO, Jerman Mack DO, Leonel Thomas, DO, Larry Pisano MD, Monserrat Rosado MD, Sharon Perez MD, Beverley Curtis MD,  Jimi Messina MD, Kevin Cowan MD, Theresa Chavira MD,  Luis Coffman DO, Florencia Hernandez MD, Giorgi Salas MD, Ramone Caro DO, Mariel Bradshaw MD,  Bandar Anderson DO, Michelle Helm MD, Mireille Hermosillo MD, Yamileth Chong MD, Zo Montgomery MD,  Jordan Hernandez MD, Pilar Salcedo MD, Kevin Greenwood MD, Kevin Landon MD, Ramon Ellsworth MD, Ken Luna MD, Luis French DO, Porter Moore DO, Shayna Woodall MD,  Alvaro Reid MD, Shirley Waterhouse, CNP,  Octavia Coreas CNP, Chemo Pelayo, CNP,  Юлия Calero, DNP, Kaci Rodgers, CNP, Princess Mandujano, CNP, Madelaine Bunrs CNP, Ivone Mancia CNP, Val Hanson, PA-C, Joyce Jackson PA-C, Isabel Bustos, CNP, Johnna Horta, CNP, Karma Yost, CNP, Rosio Resendez, CNP, Jessi Montiel, CNP, Sandie Palmer, CNS, Balbina Hernández, CNP, Dolly Tamez CNP, Tracy Schwab, CNP         [facility]   IN-PATIENT SERVICE   [facility]    Progress Note    4/13/2024    5:37 PM    Name:   Shazia Dickson  MRN:     [id number]     Acct:      [id number]   Room:   [facility]/1026-[facility] Day:  9  Admit Date:  4/4/2024  6:36 PM    PCP:   Jyoti Mauricio APRN - CNP  Code Status:  Full Code    Subjective:     Patient seen and examined, resting comfortably in the bed.  Sitting propped up.  Complaining that she needs to move her legs and she is not able to.  Calling for help.  Remained afebrile, vitals within normal range, 4 L supplemental oxygen.  Blood glucose 163 this morning.  Denied any chest pain or pressure.  No nausea or vomiting.  She did mention that she had a bowel movement.  His constipation has been resolved.    Brief History:     68-year-old female present to the hospital for evaluation of shortness of breath.  Has history of COPD and on  °C)    Recent Labs     04/12/24  1923 04/13/24  0835 04/13/24  1243 04/13/24  1633   POCGLU 287* 116* 163* 242*         I/O (24Hr):    Intake/Output Summary (Last 24 hours) at 4/13/2024 1737  Last data filed at 4/13/2024 0020  Gross per 24 hour   Intake --   Output 1850 ml   Net -1850 ml         Labs:  Hematology:  Recent Labs     04/11/24  0508 04/12/24  0521 04/13/24  0450   INR 3.2 2.7 2.6       Chemistry:  No results for input(s): \"NA\", \"K\", \"CL\", \"CO2\", \"GLUCOSE\", \"BUN\", \"CREATININE\", \"MG\", \"ANIONGAP\", \"LABGLOM\", \"GFRAA\", \"CALCIUM\", \"CAION\", \"PHOS\", \"PSA\", \"PROBNP\", \"TROPHS\", \"CKTOTAL\", \"CKMB\", \"CKMBINDEX\", \"MYOGLOBIN\", \"DIGOXIN\", \"LACTACIDWB\" in the last 72 hours.    Recent Labs     04/12/24  1248 04/12/24  1614 04/12/24  1923 04/13/24  0835 04/13/24  1243 04/13/24  1633   POCGLU 103 275* 287* 116* 163* 242*       ABG:  Lab Results   Component Value Date/Time    POCPH 7.29 05/05/2018 03:12 PM    POCPCO2 85 05/05/2018 03:12 PM    POCPO2 81 05/05/2018 03:12 PM    POCHCO3 41.2 05/05/2018 03:12 PM    NBEA NOT REPORTED 05/05/2018 03:12 PM    PBEA 15 05/05/2018 03:12 PM    QOH3HOH 44 05/05/2018 03:12 PM    RJIR7MLO 93 05/05/2018 03:12 PM    FIO2 100.0 05/05/2018 03:12 PM     Lab Results   Component Value Date/Time    SPECIAL NOT REPORTED 05/18/2018 03:00 AM     Lab Results   Component Value Date/Time    CULTURE NORMAL RESPIRATORY SHEBA HEAVY GROWTH 08/10/2022 05:20 AM       Radiology:  XR CHEST PORTABLE    Result Date: 4/7/2024  1. Stable diffuse bilateral airspace disease throughout the lungs, unchanged from the prior exam. 2. No new focal airspace disease.     XR CHEST PORTABLE    Result Date: 4/5/2024  Cardiomegaly with scattered infiltrates.     XR CHEST PORTABLE    Result Date: 4/4/2024  1. Significantly limited study due to underexposure. 2. Cardiomegaly and vascular congestion without interstitial prominence to suggest interstitial edema, but the interstitium is evaluated to a limited degree. 3. No confluent

## 2024-04-13 NOTE — RT PROTOCOL NOTE
RT Inhaler-Nebulizer Bronchodilator Protocol Note    There is a bronchodilator order in the chart from a provider indicating to follow the RT Bronchodilator Protocol and there is an “Initiate RT Inhaler-Nebulizer Bronchodilator Protocol” order as well (see protocol at bottom of note).    CXR Findings:  No results found.    The findings from the last RT Protocol Assessment were as follows:   History Pulmonary Disease: Chronic pulmonary disease  Respiratory Pattern: Dyspnea on exertion or RR 21-25 bpm  Breath Sounds: Severe inspiratory and expiratory wheezing or severely diminished  Cough: Strong, spontaneous, non-productive  Indication for Bronchodilator Therapy: Decreased or absent breath sounds, On home bronchodilators  Bronchodilator Assessment Score: 12    Aerosolized bronchodilator medication orders have been revised according to the RT Inhaler-Nebulizer Bronchodilator Protocol below.    Respiratory Therapist to perform RT Therapy Protocol Assessment initially then follow the protocol.  Repeat RT Therapy Protocol Assessment PRN for score 0-3 or on second treatment, BID, and PRN for scores above 3.    No Indications - adjust the frequency to every 6 hours PRN wheezing or bronchospasm, if no treatments needed after 48 hours then discontinue using Per Protocol order mode.     If indication present, adjust the RT bronchodilator orders based on the Bronchodilator Assessment Score as indicated below.  Use Inhaler orders unless patient has one or more of the following: on home nebulizer, not able to hold breath for 10 seconds, is not alert and oriented, cannot activate and use MDI correctly, or respiratory rate 25 breaths per minute or more, then use the equivalent nebulizer order(s) with same Frequency and PRN reasons based on the score.  If a patient is on this medication at home then do not decrease Frequency below that used at home.    0-3 - enter or revise RT bronchodilator order(s) to equivalent RT Bronchodilator  order with Frequency of every 4 hours PRN for wheezing or increased work of breathing using Per Protocol order mode.        4-6 - enter or revise RT Bronchodilator order(s) to two equivalent RT bronchodilator orders with one order with BID Frequency and one order with Frequency of every 4 hours PRN wheezing or increased work of breathing using Per Protocol order mode.        7-10 - enter or revise RT Bronchodilator order(s) to two equivalent RT bronchodilator orders with one order with TID Frequency and one order with Frequency of every 4 hours PRN wheezing or increased work of breathing using Per Protocol order mode.       11-13 - enter or revise RT Bronchodilator order(s) to one equivalent RT bronchodilator order with QID Frequency and an Albuterol order with Frequency of every 4 hours PRN wheezing or increased work of breathing using Per Protocol order mode.      Greater than 13 - enter or revise RT Bronchodilator order(s) to one equivalent RT bronchodilator order with every 4 hours Frequency and an Albuterol order with Frequency of every 2 hours PRN wheezing or increased work of breathing using Per Protocol order mode.     RT to enter RT Home Evaluation for COPD & MDI Assessment order using Per Protocol order mode.    Electronically signed by shukri morejon RCP on 4/13/2024 at 10:39 AM

## 2024-04-13 NOTE — PROGRESS NOTES
Pulmonary Critical Care Progress Note       Patient seen for the follow up of  Acute respiratory failure with hypoxia (HCC)     Subjective:  Patient resting in bed.  She complains of neck pain and cites this as a reason for not being out of bed.  She states she does not feel any better today and did not sleep well overnight.  She denies chest pain. Shortness of breath not is a little better.    Examination:  Vitals: BP (!) 135/54   Pulse 89   Temp 98.6 °F (37 °C) (Oral)   Resp 18   Ht 1.651 m (5' 5\")   Wt (!) 175.3 kg (386 lb 6.4 oz)   LMP 07/16/2014   SpO2 94%   BMI 64.30 kg/m²   General appearance: in no acute distress, alert and cooperative with exam  Neck: No JVD  Lungs: decreased air exchange, no wheeze or crackles   Heart: regular rate and rhythm, S1, S2 normal, no gallop  Abdomen: Soft, non tender, + BS  Extremities: no cyanosis or clubbing. No significant edema    LABs:  CBC:   No results for input(s): \"WBC\", \"HGB\", \"HCT\", \"PLT\" in the last 72 hours.    BMP:   No results for input(s): \"NA\", \"K\", \"CO2\", \"BUN\", \"CREATININE\", \"LABGLOM\", \"GLUCOSE\" in the last 72 hours.    PT/INR:   Recent Labs     04/11/24  0508 04/12/24  0521 04/13/24  0450   PROTIME 32.4* 28.5* 27.8*   INR 3.2 2.7 2.6     ABG:  Lab Results   Component Value Date/Time    TDR3QCH 44 05/05/2018 03:12 PM    FIO2 100.0 05/05/2018 03:12 PM       Lab Results   Component Value Date/Time    POCPH 7.29 05/05/2018 03:12 PM    POCPCO2 85 05/05/2018 03:12 PM    POCPO2 81 05/05/2018 03:12 PM    POCHCO3 41.2 05/05/2018 03:12 PM    PBEA 15 05/05/2018 03:12 PM    REQ6QDI 44 05/05/2018 03:12 PM    EDNS7AIV 93 05/05/2018 03:12 PM    FIO2 100.0 05/05/2018 03:12 PM     Radiology:  X-ray chest 4/8/2024      Impression & Recommendations:  Chronic hypoxic and hypercarbic respiratory failure  Oxygen by nasal cannula  Incentive spirometer q.1h awake  BiPAP support    Acute exacerbation of Asthma /influenza A  DuoNeb every 4 hours while awake and as needed    Pulmicort 0.5 b.I.d. 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

## 2024-04-13 NOTE — PLAN OF CARE
Pt is currently on 4L nc at 90-92%. Pt had complaints of pain treated with prn tylenol twice. Pt satisfied. Pt is voiding. Pt VSS, pt is Aox4. Pt safety maintained, call light within reach. Pt turned as needed, abd dressing changed. All questions asked and addressed. Care continues. Awaiting precert for [facility].  Problem: Respiratory - Adult  Goal: Achieves optimal ventilation and oxygenation  4/13/2024 1537 by Luis Simon RN  Outcome: Progressing     Problem: Skin/Tissue Integrity - Adult  Goal: Incisions, wounds, or drain sites healing without S/S of infection  4/13/2024 1537 by Luis Simon RN  Outcome: Progressing     Problem: Musculoskeletal - Adult  Goal: Return ADL status to a safe level of function  Outcome: Progressing     Problem: Infection - Adult  Goal: Absence of infection at discharge  Outcome: Progressing     Problem: Discharge Planning  Goal: Discharge to home or other facility with appropriate resources  4/13/2024 1537 by Luis Simon RN  Outcome: Progressing     Problem: Pain  Goal: Verbalizes/displays adequate comfort level or baseline comfort level  Outcome: Progressing     Problem: Skin/Tissue Integrity  Goal: Absence of new skin breakdown  Description: 1.  Monitor for areas of redness and/or skin breakdown  2.  Assess vascular access sites hourly  3.  Every 4-6 hours minimum:  Change oxygen saturation probe site  4.  Every 4-6 hours:  If on nasal continuous positive airway pressure, respiratory therapy assess nares and determine need for appliance change or resting period.  Outcome: Progressing     Problem: Safety - Adult  Goal: Free from fall injury  4/13/2024 1537 by Luis Simon RN  Outcome: Progressing     Problem: ABCDS Injury Assessment  Goal: Absence of physical injury  Outcome: Progressing     Problem: Chronic Conditions and Co-morbidities  Goal: Patient's chronic conditions and co-morbidity symptoms are monitored and maintained or  improved  Outcome: Progressing

## 2024-04-14 LAB
GLUCOSE BLD-MCNC: 113 MG/DL (ref 65–105)
GLUCOSE BLD-MCNC: 177 MG/DL (ref 65–105)
GLUCOSE BLD-MCNC: 182 MG/DL (ref 65–105)
GLUCOSE BLD-MCNC: 236 MG/DL (ref 65–105)
INR PPP: 2.5
PROTHROMBIN TIME: 26.4 SEC (ref 11.5–14.2)

## 2024-04-14 PROCEDURE — 6370000000 HC RX 637 (ALT 250 FOR IP): Performed by: INTERNAL MEDICINE

## 2024-04-14 PROCEDURE — 85610 PROTHROMBIN TIME: CPT

## 2024-04-14 PROCEDURE — 36415 COLL VENOUS BLD VENIPUNCTURE: CPT

## 2024-04-14 PROCEDURE — 6360000002 HC RX W HCPCS: Performed by: INTERNAL MEDICINE

## 2024-04-14 PROCEDURE — 2060000000 HC ICU INTERMEDIATE R&B

## 2024-04-14 PROCEDURE — 6370000000 HC RX 637 (ALT 250 FOR IP): Performed by: NURSE PRACTITIONER

## 2024-04-14 PROCEDURE — 99232 SBSQ HOSP IP/OBS MODERATE 35: CPT | Performed by: INTERNAL MEDICINE

## 2024-04-14 PROCEDURE — 2580000003 HC RX 258: Performed by: INTERNAL MEDICINE

## 2024-04-14 PROCEDURE — 6370000000 HC RX 637 (ALT 250 FOR IP): Performed by: STUDENT IN AN ORGANIZED HEALTH CARE EDUCATION/TRAINING PROGRAM

## 2024-04-14 PROCEDURE — 99232 SBSQ HOSP IP/OBS MODERATE 35: CPT | Performed by: STUDENT IN AN ORGANIZED HEALTH CARE EDUCATION/TRAINING PROGRAM

## 2024-04-14 PROCEDURE — 94640 AIRWAY INHALATION TREATMENT: CPT

## 2024-04-14 PROCEDURE — 94761 N-INVAS EAR/PLS OXIMETRY MLT: CPT

## 2024-04-14 PROCEDURE — 82947 ASSAY GLUCOSE BLOOD QUANT: CPT

## 2024-04-14 PROCEDURE — 2700000000 HC OXYGEN THERAPY PER DAY

## 2024-04-14 RX ORDER — WARFARIN SODIUM 2 MG/1
4 TABLET ORAL
Status: COMPLETED | OUTPATIENT
Start: 2024-04-14 | End: 2024-04-14

## 2024-04-14 RX ADMIN — BUDESONIDE 500 MCG: 0.5 INHALANT ORAL at 06:27

## 2024-04-14 RX ADMIN — FLUTICASONE PROPIONATE 1 SPRAY: 50 SPRAY, METERED NASAL at 08:03

## 2024-04-14 RX ADMIN — IPRATROPIUM BROMIDE AND ALBUTEROL SULFATE 1 DOSE: 2.5; .5 SOLUTION RESPIRATORY (INHALATION) at 06:28

## 2024-04-14 RX ADMIN — PREDNISONE 30 MG: 20 TABLET ORAL at 08:00

## 2024-04-14 RX ADMIN — MICONAZOLE NITRATE: 20 CREAM TOPICAL at 08:03

## 2024-04-14 RX ADMIN — INSULIN LISPRO 4 UNITS: 100 INJECTION, SOLUTION INTRAVENOUS; SUBCUTANEOUS at 18:18

## 2024-04-14 RX ADMIN — SODIUM CHLORIDE, PRESERVATIVE FREE 10 ML: 5 INJECTION INTRAVENOUS at 08:02

## 2024-04-14 RX ADMIN — GUAIFENESIN 600 MG: 600 TABLET ORAL at 07:59

## 2024-04-14 RX ADMIN — GUAIFENESIN AND DEXTROMETHORPHAN 5 ML: 100; 10 SYRUP ORAL at 01:27

## 2024-04-14 RX ADMIN — DILTIAZEM HYDROCHLORIDE 120 MG: 120 CAPSULE, EXTENDED RELEASE ORAL at 08:00

## 2024-04-14 RX ADMIN — INSULIN GLARGINE 15 UNITS: 100 INJECTION, SOLUTION SUBCUTANEOUS at 07:59

## 2024-04-14 RX ADMIN — MONTELUKAST 10 MG: 10 TABLET, FILM COATED ORAL at 22:38

## 2024-04-14 RX ADMIN — GUAIFENESIN 600 MG: 600 TABLET ORAL at 22:38

## 2024-04-14 RX ADMIN — CARVEDILOL 12.5 MG: 12.5 TABLET, FILM COATED ORAL at 08:00

## 2024-04-14 RX ADMIN — PRAVASTATIN SODIUM 80 MG: 40 TABLET ORAL at 18:17

## 2024-04-14 RX ADMIN — SODIUM CHLORIDE, PRESERVATIVE FREE 10 ML: 5 INJECTION INTRAVENOUS at 22:39

## 2024-04-14 RX ADMIN — BUDESONIDE 500 MCG: 0.5 INHALANT ORAL at 18:27

## 2024-04-14 RX ADMIN — INSULIN GLARGINE 15 UNITS: 100 INJECTION, SOLUTION SUBCUTANEOUS at 22:38

## 2024-04-14 RX ADMIN — WARFARIN SODIUM 4 MG: 2 TABLET ORAL at 18:18

## 2024-04-14 RX ADMIN — ALBUTEROL SULFATE 2.5 MG: 2.5 SOLUTION RESPIRATORY (INHALATION) at 01:29

## 2024-04-14 RX ADMIN — PANTOPRAZOLE SODIUM 40 MG: 40 TABLET, DELAYED RELEASE ORAL at 05:14

## 2024-04-14 RX ADMIN — IPRATROPIUM BROMIDE AND ALBUTEROL SULFATE 1 DOSE: 2.5; .5 SOLUTION RESPIRATORY (INHALATION) at 18:27

## 2024-04-14 RX ADMIN — IPRATROPIUM BROMIDE AND ALBUTEROL SULFATE 1 DOSE: 2.5; .5 SOLUTION RESPIRATORY (INHALATION) at 10:52

## 2024-04-14 RX ADMIN — CARVEDILOL 12.5 MG: 12.5 TABLET, FILM COATED ORAL at 18:18

## 2024-04-14 RX ADMIN — IPRATROPIUM BROMIDE AND ALBUTEROL SULFATE 1 DOSE: 2.5; .5 SOLUTION RESPIRATORY (INHALATION) at 14:05

## 2024-04-14 RX ADMIN — GUAIFENESIN AND DEXTROMETHORPHAN 5 ML: 100; 10 SYRUP ORAL at 08:00

## 2024-04-14 RX ADMIN — MICONAZOLE NITRATE: 20 CREAM TOPICAL at 22:41

## 2024-04-14 RX ADMIN — GUAIFENESIN AND DEXTROMETHORPHAN 5 ML: 100; 10 SYRUP ORAL at 14:46

## 2024-04-14 ASSESSMENT — PAIN SCALES - GENERAL
PAINLEVEL_OUTOF10: 0
PAINLEVEL_OUTOF10: 0

## 2024-04-14 NOTE — PLAN OF CARE
Pt is on 5L nasal canula at 94%. Pt turned as needed. Pt had 1100 out from [employer]. Abd dressing changed per order. Pt has compression socks on. Pt safety maintained, call light within reach, all questions asked and answered wcm. VSS, Aox4. Pt to discharge once precert obtained for [facility].    Problem: Respiratory - Adult  Goal: Achieves optimal ventilation and oxygenation  4/14/2024 1651 by Luis Simon RN  Outcome: Progressing     Problem: Skin/Tissue Integrity - Adult  Goal: Incisions, wounds, or drain sites healing without S/S of infection  4/14/2024 1651 by Luis Simon RN  Outcome: Progressing     Problem: Musculoskeletal - Adult  Goal: Return ADL status to a safe level of function  4/14/2024 1651 by Luis Simon RN  Outcome: Progressing     Problem: Infection - Adult  Goal: Absence of infection at discharge  4/14/2024 1651 by Luis Simon RN  Outcome: Progressing     Problem: Discharge Planning  Goal: Discharge to home or other facility with appropriate resources  4/14/2024 1651 by Luis Simon RN  Outcome: Progressing     Problem: Pain  Goal: Verbalizes/displays adequate comfort level or baseline comfort level  4/14/2024 1651 by Luis Simon RN  Outcome: Progressing     Problem: Skin/Tissue Integrity  Goal: Absence of new skin breakdown  Description: 1.  Monitor for areas of redness and/or skin breakdown  2.  Assess vascular access sites hourly  3.  Every 4-6 hours minimum:  Change oxygen saturation probe site  4.  Every 4-6 hours:  If on nasal continuous positive airway pressure, respiratory therapy assess nares and determine need for appliance change or resting period.  4/14/2024 1651 by Luis Simon RN  Outcome: Progressing     Problem: Safety - Adult  Goal: Free from fall injury  4/14/2024 1651 by Luis Simon RN  Outcome: Progressing     Problem: ABCDS Injury Assessment  Goal: Absence of physical

## 2024-04-14 NOTE — PROGRESS NOTES
Today's Date: 4/14/2024  Patient Name: Shazia Dickson  Date of admission: 4/4/2024  6:36 PM  Patient's age: 68 y.o., 1955  Admission Dx: Influenza A [J10.1]  COPD exacerbation (HCC) [J44.1]  Acute respiratory failure with hypoxia (HCC) [J96.01]  Increased oxygen demand [R68.89]    Reason for Consult: management recommendations  Requesting Physician: No admitting provider for patient encounter.    CHIEF COMPLAINT: Shortness of breath.  Leukopenia.    INTERIM HISTORY  Patient is seen and evaluated.  Overall feels well. Slept much better .  She is on oxygen 4 L/min.  No respiratory distress.  Labs were reviewed.  Stable results.  No active bleeding.  No fever.    HISTORY OF PRESENT ILLNESS:      The patient is a 68 y.o.  female who is admitted to the hospital with chief complaint of shortness of breath.  Complains of generalized bodyaches and upper respite tract infection symptoms.  Patient recently had been seen infectious disease team for healing abdominal wall wound.  Dr. Nunez.  Per records patient has chronic shortness of breath.    Hematology oncology team consulted due to cytopenias.  Patient CBC from 10/23 shows WBC count 6.0 hemoglobin 8.9 platelet count 225.  Patient CBC at presentation shows WBC count of 2.7 hemoglobin 9.1 and creatinine of 153.  Patient ANC was 1.79.  Patient just finished course of antibiotics.    Plan past Medical History:   has a past medical history of Anxiety, Asthma, Bronchitis, DDD (degenerative disc disease), lumbar, Depression, Diabetes mellitus (HCC), Elevated glucose, Headache(784.0), Hernia of abdominal cavity, HH (hiatus hernia), Hyperlipemia, mixed, Hypertension, Osteoarthritis, Right femoral vein DVT (HCC), and Uterine hyperplasia.    Past Surgical History:   has a past surgical history that includes Dilation and curettage of uterus (10/08 and 09/07); Breast reduction surgery (12/1997); Breast reduction surgery (12/1997); Tympanostomy tube placement  Essential hypertension [I10] 08/30/2015    Normocytic anemia [D64.9] 06/02/2015    Anticoagulated on Coumadin [Z79.01] 01/14/2014    Hyperlipemia, mixed [E78.2]     Depression [F32.A]     Anxiety [F41.9]        Leukopenia, due to acute illness, resolved   Anemia, improving   Abdominal wall wound  COPD  Acute influenza, improving     RECOMMENDATIONS:  I reviewed the labs/imaging available to me,outside records and discussed with the patient.I explained to the patient the nature of this problem. I explained the significance of these abnormalities and possible etiology and management options  CBC is improving , WBC and  PLT are normal, Hgb above 10   Continue anticoagulation, INR is within range (2.5-3.5)  Continue current therapy unchanged   Respiratory status is improving.  Currently on oxygen 4 L/min by nasal cannula.  Discharge planning is underway.  Probable placement.                                    Donna Reid MD                          [facility]                            This note is created with the assistance of a speech recognition program.  While intending to generate a document that actually reflects the content of the visit, the document can still have some errors including those of syntax and sound a like substitutions which may escape proof reading.  It such instances, actual meaning can be extrapolated by contextual diversion.

## 2024-04-14 NOTE — PROGRESS NOTES
Warfarin Dosing - Pharmacy Consult Note  Consulting Provider: Nishant Jimenez MD  Indication:  History of  VTE/PE  Warfarin Dose prior to admission: 2mg M-W-Sat, 4mg all other days    Concurrent anticoagulants/antiplatelets: none  Significant Drug Interactions:  prednisone  Recent Labs     04/12/24  0521 04/13/24  0450 04/14/24  0504   INR 2.7 2.6 2.5     Recent warfarin administrations                     warfarin (COUMADIN) tablet 2 mg (mg) 2 mg Given 04/13/24 1734    warfarin (COUMADIN) tablet 2 mg (mg) 2 mg Given 04/12/24 1827    warfarin (COUMADIN) tablet 2 mg (mg) 2 mg Given 04/11/24 1745                   Date   INR    Dose  4/4         2.2        4 mg  4/5         2.3        2 mg  4/6         3.2        hold  4/7         3.5        none  4/8         3.5        none  4/9         3.4        none  4/10       3.4        none  4/11       3.2        2 mg  4/12       2.7        2 mg  4/13       2.6        2 mg  4/14       2.5    Assessment/Plan  (Goal INR: 2.5 - 3.5)  INR therapeutic. Resuming home dose.  Warfarin 4mg today. INR in the morning.    Active problem list reviewed.  INR orders are placed.  Chart reviewed for pertinent labs, drug/diet interactions, and past doses.  Documentation of patient's clinical condition was reviewed.    Pharmacy Dosing:  Pharmacy will continue to follow.

## 2024-04-14 NOTE — PLAN OF CARE
Problem: Respiratory - Adult  Goal: Able to breathe comfortably  Outcome: Progressing     Problem: Respiratory - Adult  Goal: Patient's breath sounds will be clear and equal  Outcome: Progressing     Problem: Respiratory - Adult  Goal: Adequate oxygenation  Description: Adequate oxygenation  Outcome: Progressing     Problem: Respiratory - Adult  Goal: Achieves optimal ventilation and oxygenation  Outcome: Progressing         BRONCHOSPASM/BRONCHOCONSTRICTION    IMPROVE  AERATION/BREATHSOUNDS  ADMINISTER BRONCHODILATOR THERAPY AS APPROPRIATE  ASSESS BREATH SOUNDS  INITIATE AEROSOL PROTOCOL IF ORDERED TO DO SO  PATIENT EDUCATION AS NEEDED      PROVIDE ADEQUATE OXYGENATION WITH ACCEPTABLE SP02/ABG'S    [x]  IDENTIFY APPROPRIATE OXYGEN THERAPY  [x]   MONITOR SP02/ABG'S AS NEEDED   [x]   PATIENT EDUCATION AS NEEDED

## 2024-04-14 NOTE — PROGRESS NOTES
[facility]  Office: [phone]  Carlos Caro DO, Felipe Drew DO, Jerman Mack DO, Leonel Thomas DO, Larry Pisano MD, Monserrat Rosado MD, Sharon Perez MD, Beverley Curtis MD,  Jimi Messina MD, Kevin Cowan MD, Theresa Chavira MD,  Luis Coffman DO, Florencia Hernandez MD, Giorgi Salas MD, Ramone Caro DO, Mariel Bradshaw MD,  Bandar Anderson DO, Michelle Helm MD, Mireille Hermosillo MD, Yamileth Chong MD, Zo Montgomery MD,  Jordan Hernandez MD, Pilar Salcedo MD, Kevin Greenwood MD, Kevin Landon MD, Ramon Ellsworth MD, Ken Luna MD, Luis French DO, Porter Moore DO, Shayna Woodall MD,  Alvaro Reid MD, Shirley Waterhouse, CNP,  Octavia Coreas CNP, Chemo Pelayo, CNP,  Юлия Calero, DIOGENES, Kaci Rodgers, CNP, Princess Mandujano, CNP, Madelaine Burns CNP, Ivone Mancia CNP, Val Hanson, PA-C, Joyce Jackson PA-C, Isabel Bustos, CNP, Johnna Horta, CNP, Karma Yost CNP, Rosio Resendez, CNP, Jessi Montiel CNP, Sandie Palmer, CNS, Balbina Hernández, CNP, Dolly Tamez CNP, Tracy Schwab, CNP         [facility]   IN-PATIENT SERVICE   [facility]    Progress Note    4/14/2024    2:37 PM    Name:   Shazia Dickson  MRN:     [id number]     Acct:      [id number]   Room:   [facility]/1026-01   Day:  10  Admit Date:  4/4/2024  6:36 PM    PCP:   Jyoti Mauricio APRN - CNP  Code Status:  Full Code    Subjective:     Patient seen and examined.  Resting in her bed.  Having her meal.  Did mention that she had a bowel movement yesterday with no bowel.  PT and OT on board.  Although patient is not moving much.  Remained afebrile, vitals within normal range.  Requiring supplemental oxygen 5 L.  Lab work reviewed glucose 177.  Brief History:     68-year-old female present to the hospital for evaluation of shortness of breath.  Has history of COPD and on admission did test positive for influenza.    Medications:     Allergies:    Allergies   Allergen Reactions

## 2024-04-14 NOTE — PROGRESS NOTES
Pulmonary Critical Care Progress Note       Patient seen for the follow up of  Acute respiratory failure with hypoxia (HCC)     Subjective:  Patient resting in bed.  Still states she does not feel any better and did not sleep well overnight.  She denies chest pain. Shortness of breath not is a little better.    Examination:  Vitals: BP (!) 142/57   Pulse 93   Temp 97.9 °F (36.6 °C) (Oral)   Resp 18   Ht 1.651 m (5' 5\")   Wt (!) 175.3 kg (386 lb 6.4 oz)   LMP 07/16/2014   SpO2 94%   BMI 64.30 kg/m²   General appearance: in no acute distress, alert and cooperative with exam  Neck: No JVD  Lungs: decreased air exchange, no wheeze or crackles   Heart: regular rate and rhythm, S1, S2 normal, no gallop  Abdomen: Soft, non tender, + BS  Extremities: no cyanosis or clubbing. No significant edema    LABs:  PT/INR:   Recent Labs     04/12/24  0521 04/13/24  0450 04/14/24  0504   PROTIME 28.5* 27.8* 26.4*   INR 2.7 2.6 2.5     Radiology:  X-ray chest 4/8/2024      Impression & Recommendations:  Chronic hypoxic and hypercarbic respiratory failure  Oxygen by nasal cannula  Incentive spirometer q.1h awake  BiPAP support encouraged    Acute exacerbation of Asthma /influenza A  DuoNeb every 4 hours while awake and as needed   Pulmicort 0.5 b.I.d. 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

## 2024-04-14 NOTE — PLAN OF CARE
Problem: Respiratory - Adult  Goal: Able to breathe comfortably  4/14/2024 1054 by Maribel Read RCP  Outcome: Progressing  4/14/2024 0637 by Balbina Mcdaniels RCP  Outcome: Progressing  Goal: Patient's breath sounds will be clear and equal  4/14/2024 1054 by Maribel Read RCP  Outcome: Progressing  4/14/2024 0637 by Balbina Mcdaniels RCP  Outcome: Progressing  Goal: Adequate oxygenation  Description: Adequate oxygenation  4/14/2024 1054 by Maribel Read RCP  Outcome: Progressing  4/14/2024 0637 by Balbina Mcdaniels RCP  Outcome: Progressing  Goal: Achieves optimal ventilation and oxygenation  4/14/2024 1054 by Maribel Read RCP  Outcome: Progressing  4/14/2024 0637 by Balbina Mcdaniels RCP  Outcome: Progressing

## 2024-04-14 NOTE — PLAN OF CARE
Patient alert and oriented x4 this shift. Pure wick remains in place with adequate output. On 5L NC [place]ter. Given robitussin and albuterol one time this shift. Bed locked in lowest position with alarm on, call light in reach, safety maintained.     Problem: Discharge Planning  Goal: Discharge to home or other facility with appropriate resources  4/14/2024 0420 by Jeny Thompson RN  Outcome: Progressing     Problem: Pain  Goal: Verbalizes/displays adequate comfort level or baseline comfort level  4/14/2024 0420 by Jeny Thompson RN  Outcome: Progressing     Problem: Skin/Tissue Integrity  Goal: Absence of new skin breakdown  Description: 1.  Monitor for areas of redness and/or skin breakdown  2.  Assess vascular access sites hourly  3.  Every 4-6 hours minimum:  Change oxygen saturation probe site  4.  Every 4-6 hours:  If on nasal continuous positive airway pressure, respiratory therapy assess nares and determine need for appliance change or resting period.  4/14/2024 0420 by Jeny Thompson RN  Outcome: Progressing     Problem: Safety - Adult  Goal: Free from fall injury  4/14/2024 0420 by Jeny Thompson RN  Outcome: Progressing     Problem: Skin/Tissue Integrity - Adult  Goal: Incisions, wounds, or drain sites healing without S/S of infection  4/14/2024 0420 by Jeny Thompson RN  Outcome: Progressing     Problem: Musculoskeletal - Adult  Goal: Return ADL status to a safe level of function  4/14/2024 0420 by Jeny Thompson RN  Outcome: Progressing     Problem: Infection - Adult  Goal: Absence of infection at discharge  4/14/2024 0420 by Jeny Thompson RN  Outcome: Progressing     Problem: Chronic Conditions and Co-morbidities  Goal: Patient's chronic conditions and co-morbidity symptoms are monitored and maintained or improved  4/14/2024 0420 by Jeny Thompson RN  Outcome: Progressing     Problem: Nutrition Deficit:  Goal: Optimize nutritional status  4/14/2024 0420 by Jay

## 2024-04-14 NOTE — RT PROTOCOL NOTE
RT Inhaler-Nebulizer Bronchodilator Protocol Note    There is a bronchodilator order in the chart from a provider indicating to follow the RT Bronchodilator Protocol and there is an “Initiate RT Inhaler-Nebulizer Bronchodilator Protocol” order as well (see protocol at bottom of note).    CXR Findings:  No results found.    The findings from the last RT Protocol Assessment were as follows:   History Pulmonary Disease: Chronic pulmonary disease  Respiratory Pattern: Dyspnea on exertion or RR 21-25 bpm  Breath Sounds: Inspiratory and expiratory or bilateral wheezing and/or rhonchi  Cough: Strong, productive  Indication for Bronchodilator Therapy: Decreased or absent breath sounds, On home bronchodilators  Bronchodilator Assessment Score: 11    Aerosolized bronchodilator medication orders have been revised according to the RT Inhaler-Nebulizer Bronchodilator Protocol below.    Respiratory Therapist to perform RT Therapy Protocol Assessment initially then follow the protocol.  Repeat RT Therapy Protocol Assessment PRN for score 0-3 or on second treatment, BID, and PRN for scores above 3.    No Indications - adjust the frequency to every 6 hours PRN wheezing or bronchospasm, if no treatments needed after 48 hours then discontinue using Per Protocol order mode.     If indication present, adjust the RT bronchodilator orders based on the Bronchodilator Assessment Score as indicated below.  Use Inhaler orders unless patient has one or more of the following: on home nebulizer, not able to hold breath for 10 seconds, is not alert and oriented, cannot activate and use MDI correctly, or respiratory rate 25 breaths per minute or more, then use the equivalent nebulizer order(s) with same Frequency and PRN reasons based on the score.  If a patient is on this medication at home then do not decrease Frequency below that used at home.    0-3 - enter or revise RT bronchodilator order(s) to equivalent RT Bronchodilator order with

## 2024-04-15 ENCOUNTER — TELEPHONE (OUTPATIENT)
Dept: PHARMACY | Age: 69
End: 2024-04-15

## 2024-04-15 DIAGNOSIS — I26.99 PULMONARY EMBOLISM ON RIGHT (HCC): ICD-10-CM

## 2024-04-15 DIAGNOSIS — I82.511 CHRONIC DEEP VEIN THROMBOSIS (DVT) OF FEMORAL VEIN OF RIGHT LOWER EXTREMITY (HCC): Primary | ICD-10-CM

## 2024-04-15 DIAGNOSIS — Z86.718 HISTORY OF DVT (DEEP VEIN THROMBOSIS): ICD-10-CM

## 2024-04-15 LAB
GLUCOSE BLD-MCNC: 105 MG/DL (ref 65–105)
GLUCOSE BLD-MCNC: 184 MG/DL (ref 65–105)
GLUCOSE BLD-MCNC: 195 MG/DL (ref 65–105)
GLUCOSE BLD-MCNC: 251 MG/DL (ref 65–105)
INR PPP: 2.4
PROTHROMBIN TIME: 26.1 SEC (ref 11.5–14.2)

## 2024-04-15 PROCEDURE — 85610 PROTHROMBIN TIME: CPT

## 2024-04-15 PROCEDURE — 97110 THERAPEUTIC EXERCISES: CPT

## 2024-04-15 PROCEDURE — 6370000000 HC RX 637 (ALT 250 FOR IP): Performed by: STUDENT IN AN ORGANIZED HEALTH CARE EDUCATION/TRAINING PROGRAM

## 2024-04-15 PROCEDURE — 94761 N-INVAS EAR/PLS OXIMETRY MLT: CPT

## 2024-04-15 PROCEDURE — 82947 ASSAY GLUCOSE BLOOD QUANT: CPT

## 2024-04-15 PROCEDURE — 97535 SELF CARE MNGMENT TRAINING: CPT

## 2024-04-15 PROCEDURE — 2700000000 HC OXYGEN THERAPY PER DAY

## 2024-04-15 PROCEDURE — 94640 AIRWAY INHALATION TREATMENT: CPT

## 2024-04-15 PROCEDURE — 6370000000 HC RX 637 (ALT 250 FOR IP): Performed by: INTERNAL MEDICINE

## 2024-04-15 PROCEDURE — 99232 SBSQ HOSP IP/OBS MODERATE 35: CPT | Performed by: INTERNAL MEDICINE

## 2024-04-15 PROCEDURE — 97530 THERAPEUTIC ACTIVITIES: CPT

## 2024-04-15 PROCEDURE — 94669 MECHANICAL CHEST WALL OSCILL: CPT

## 2024-04-15 PROCEDURE — 36415 COLL VENOUS BLD VENIPUNCTURE: CPT

## 2024-04-15 PROCEDURE — 99232 SBSQ HOSP IP/OBS MODERATE 35: CPT | Performed by: STUDENT IN AN ORGANIZED HEALTH CARE EDUCATION/TRAINING PROGRAM

## 2024-04-15 PROCEDURE — 2580000003 HC RX 258: Performed by: INTERNAL MEDICINE

## 2024-04-15 PROCEDURE — 6370000000 HC RX 637 (ALT 250 FOR IP): Performed by: NURSE PRACTITIONER

## 2024-04-15 PROCEDURE — 2060000000 HC ICU INTERMEDIATE R&B

## 2024-04-15 PROCEDURE — 6360000002 HC RX W HCPCS: Performed by: INTERNAL MEDICINE

## 2024-04-15 RX ORDER — WARFARIN SODIUM 2 MG/1
4 TABLET ORAL
Status: COMPLETED | OUTPATIENT
Start: 2024-04-15 | End: 2024-04-15

## 2024-04-15 RX ORDER — WARFARIN SODIUM 2 MG/1
2 TABLET ORAL
Status: DISCONTINUED | OUTPATIENT
Start: 2024-04-15 | End: 2024-04-15

## 2024-04-15 RX ORDER — IPRATROPIUM BROMIDE AND ALBUTEROL SULFATE 2.5; .5 MG/3ML; MG/3ML
1 SOLUTION RESPIRATORY (INHALATION) 3 TIMES DAILY
Status: DISCONTINUED | OUTPATIENT
Start: 2024-04-15 | End: 2024-04-19 | Stop reason: HOSPADM

## 2024-04-15 RX ADMIN — ACETAMINOPHEN 650 MG: 325 TABLET ORAL at 07:21

## 2024-04-15 RX ADMIN — MICONAZOLE NITRATE: 20 CREAM TOPICAL at 20:57

## 2024-04-15 RX ADMIN — IPRATROPIUM BROMIDE AND ALBUTEROL SULFATE 1 DOSE: 2.5; .5 SOLUTION RESPIRATORY (INHALATION) at 04:31

## 2024-04-15 RX ADMIN — CARVEDILOL 12.5 MG: 12.5 TABLET, FILM COATED ORAL at 08:30

## 2024-04-15 RX ADMIN — MICONAZOLE NITRATE: 20 CREAM TOPICAL at 08:34

## 2024-04-15 RX ADMIN — PRAVASTATIN SODIUM 80 MG: 40 TABLET ORAL at 17:41

## 2024-04-15 RX ADMIN — MONTELUKAST 10 MG: 10 TABLET, FILM COATED ORAL at 20:48

## 2024-04-15 RX ADMIN — GUAIFENESIN 600 MG: 600 TABLET ORAL at 08:28

## 2024-04-15 RX ADMIN — INSULIN LISPRO 8 UNITS: 100 INJECTION, SOLUTION INTRAVENOUS; SUBCUTANEOUS at 17:41

## 2024-04-15 RX ADMIN — SODIUM CHLORIDE, PRESERVATIVE FREE 10 ML: 5 INJECTION INTRAVENOUS at 09:34

## 2024-04-15 RX ADMIN — GUAIFENESIN AND DEXTROMETHORPHAN 5 ML: 100; 10 SYRUP ORAL at 03:53

## 2024-04-15 RX ADMIN — INSULIN GLARGINE 15 UNITS: 100 INJECTION, SOLUTION SUBCUTANEOUS at 20:48

## 2024-04-15 RX ADMIN — IPRATROPIUM BROMIDE AND ALBUTEROL SULFATE 1 DOSE: 2.5; .5 SOLUTION RESPIRATORY (INHALATION) at 21:10

## 2024-04-15 RX ADMIN — WARFARIN SODIUM 4 MG: 2 TABLET ORAL at 17:42

## 2024-04-15 RX ADMIN — BUDESONIDE 500 MCG: 0.5 INHALANT ORAL at 04:31

## 2024-04-15 RX ADMIN — FLUTICASONE PROPIONATE 1 SPRAY: 50 SPRAY, METERED NASAL at 08:33

## 2024-04-15 RX ADMIN — DILTIAZEM HYDROCHLORIDE 120 MG: 120 CAPSULE, EXTENDED RELEASE ORAL at 08:29

## 2024-04-15 RX ADMIN — BUDESONIDE 500 MCG: 0.5 INHALANT ORAL at 21:10

## 2024-04-15 RX ADMIN — PREDNISONE 30 MG: 20 TABLET ORAL at 08:29

## 2024-04-15 RX ADMIN — IPRATROPIUM BROMIDE AND ALBUTEROL SULFATE 1 DOSE: 2.5; .5 SOLUTION RESPIRATORY (INHALATION) at 14:20

## 2024-04-15 RX ADMIN — PANTOPRAZOLE SODIUM 40 MG: 40 TABLET, DELAYED RELEASE ORAL at 06:21

## 2024-04-15 RX ADMIN — GUAIFENESIN AND DEXTROMETHORPHAN 5 ML: 100; 10 SYRUP ORAL at 20:57

## 2024-04-15 RX ADMIN — SODIUM CHLORIDE, PRESERVATIVE FREE 10 ML: 5 INJECTION INTRAVENOUS at 20:49

## 2024-04-15 RX ADMIN — INSULIN GLARGINE 15 UNITS: 100 INJECTION, SOLUTION SUBCUTANEOUS at 08:30

## 2024-04-15 RX ADMIN — GUAIFENESIN 600 MG: 600 TABLET ORAL at 20:48

## 2024-04-15 RX ADMIN — CARVEDILOL 12.5 MG: 12.5 TABLET, FILM COATED ORAL at 17:42

## 2024-04-15 ASSESSMENT — PAIN DESCRIPTION - LOCATION: LOCATION: HEAD

## 2024-04-15 ASSESSMENT — PAIN SCALES - GENERAL
PAINLEVEL_OUTOF10: 3
PAINLEVEL_OUTOF10: 0

## 2024-04-15 ASSESSMENT — PAIN DESCRIPTION - ORIENTATION: ORIENTATION: POSTERIOR

## 2024-04-15 ASSESSMENT — PAIN DESCRIPTION - DESCRIPTORS: DESCRIPTORS: ACHING

## 2024-04-15 NOTE — PROGRESS NOTES
Patient remains on 5-6 L/NC.  Received PRN Robitussin for cough.  Purewick suctioning urine in canister.  Awaiting precert for [facility].  Utilized Max-Armand to elevate patient up in bed.  Will monitor.

## 2024-04-15 NOTE — PROGRESS NOTES
Warfarin Dosing - Pharmacy Consult Note  Consulting Provider: Nishant Jimenez MD  Indication:  History of  VTE/PE  Warfarin Dose prior to admission: 2mg M-W-F, 4mg all other days   Concurrent anticoagulants/antiplatelets: none  Significant Drug Interactions:  Prednisone  Recent Labs     04/13/24  0450 04/14/24  0504 04/15/24  0508   INR 2.6 2.5 2.4     Recent warfarin administrations                     warfarin (COUMADIN) tablet 4 mg (mg) 4 mg Given 04/14/24 1818    warfarin (COUMADIN) tablet 2 mg (mg) 2 mg Given 04/13/24 1734    warfarin (COUMADIN) tablet 2 mg (mg) 2 mg Given 04/12/24 1827                   Date   INR    Dose  4/4         2.2        4 mg  4/5         2.3        2 mg  4/6         3.2        none  4/7         3.5        none  4/8         3.5        none  4/9         3.4        none  4/10       3.4        none  4/11       3.2        2 mg  4/12       2.7        2 mg  4/13       2.6        2 mg  4/14       2.5        4 mg  4/15       2.4     Assessment/Plan  (Goal INR: 2.5 - 3.5)  INR slightly sub-therapeutic. Give warfarin 4mg dose tonight. INR in the morning.    Active problem list reviewed.  INR orders are placed.  Chart reviewed for pertinent labs, drug/diet interactions, and past doses.  Documentation of patient's clinical condition was reviewed.    Pharmacy Dosing:  Pharmacy will continue to follow.

## 2024-04-15 NOTE — CARE COORDINATION
Discharge planning    Pre cert pending [facility].  Updated Grace from PT will need updated therapy notes for precert. Patient is on 5 L NC. Will need to monitor for 02 status. If continues to increase may need to consider LTAC.

## 2024-04-15 NOTE — PROGRESS NOTES
Today's Date: 4/15/2024  Patient Name: Shazia Dickson  Date of admission: 4/4/2024  6:36 PM  Patient's age: 68 y.o., 1955  Admission Dx: Influenza A [J10.1]  COPD exacerbation (HCC) [J44.1]  Acute respiratory failure with hypoxia (HCC) [J96.01]  Increased oxygen demand [R68.89]    Reason for Consult: management recommendations  Requesting Physician: No admitting provider for patient encounter.    CHIEF COMPLAINT: Shortness of breath.  Leukopenia.    INTERIM HISTORY  Patient is seen and evaluated.  Overall feels well. Slept much better .  She is on oxygen 4 L/min.  No respiratory distress.  Labs were reviewed.  Stable results.  No active bleeding.  No fever.    HISTORY OF PRESENT ILLNESS:      The patient is a 68 y.o.  female who is admitted to the hospital with chief complaint of shortness of breath.  Complains of generalized bodyaches and upper respite tract infection symptoms.  Patient recently had been seen infectious disease team for healing abdominal wall wound.  Dr. Nunez.  Per records patient has chronic shortness of breath.    Hematology oncology team consulted due to cytopenias.  Patient CBC from 10/23 shows WBC count 6.0 hemoglobin 8.9 platelet count 225.  Patient CBC at presentation shows WBC count of 2.7 hemoglobin 9.1 and creatinine of 153.  Patient ANC was 1.79.  Patient just finished course of antibiotics.    Plan past Medical History:   has a past medical history of Anxiety, Asthma, Bronchitis, DDD (degenerative disc disease), lumbar, Depression, Diabetes mellitus (HCC), Elevated glucose, Headache(784.0), Hernia of abdominal cavity, HH (hiatus hernia), Hyperlipemia, mixed, Hypertension, Osteoarthritis, Right femoral vein DVT (HCC), and Uterine hyperplasia.    Past Surgical History:   has a past surgical history that includes Dilation and curettage of uterus (10/08 and 09/07); Breast reduction surgery (12/1997); Breast reduction surgery (12/1997); Tympanostomy tube placement

## 2024-04-15 NOTE — PROGRESS NOTES
Pulmonary Critical Care Progress Note       Patient seen for the follow up of  Acute respiratory failure with hypoxia (HCC)     Subjective:  Patient is not ambulating.  She is still on 5 L oxygen.  She still states she does not feel any better and did not sleep well overnight.  She denies chest pain. Shortness of breath  is a little better.    Examination:  Vitals: BP (!) 152/62   Pulse 80   Temp 99 °F (37.2 °C) (Oral)   Resp 18   Ht 1.651 m (5' 5\")   Wt (!) 177.4 kg (391 lb 3.2 oz)   LMP 07/16/2014   SpO2 93%   BMI 65.10 kg/m²   General appearance: in no acute distress, alert and cooperative with exam  Neck: No JVD  Lungs: decreased air exchange, no wheeze or crackles   Heart: regular rate and rhythm, S1, S2 normal, no gallop  Abdomen: Soft, non tender, + BS  Extremities: no cyanosis or clubbing. No significant edema    LABs:  PT/INR:   Recent Labs     04/13/24  0450 04/14/24  0504 04/15/24  0508   PROTIME 27.8* 26.4* 26.1*   INR 2.6 2.5 2.4     Radiology:  X-ray chest 4/8/2024  The cardiac and mediastinal contours appear unchanged. Similar appearance of  vascular congestion.  No new airspace disease identified in the interval.  No  evidence for pneumothorax.        Impression & Recommendations:  Chronic hypoxic and hypercarbic respiratory failure  Oxygen by nasal cannula  Incentive spirometer q.1h awake  BiPAP support encouraged    Acute exacerbation of Asthma /influenza A  DuoNeb every 4 hours while awake and as needed   Pulmicort 0.5 b.I.d. by nebulizer  Make prednisone 30 mg daily taper to stop  Finished Zithromax course  Finished Tamiflu  Continue guaifenesin  Droplet isolation      Pulmonary edema/Pulmonary hypertension, RVSP 55-60 mmHG by echo 2018  Echocardiogram 4/10/24-normal RVSP    Obstructive sleep apnea/ Morbid Obesity  Outpatient Sleep evaluation     History of PE/DVT  on Coumadin/monitor INR    Physical therapy/ambulate  Rehab discharge planning  peptic ulcer disease prophylaxis  DVT

## 2024-04-15 NOTE — PROGRESS NOTES
End Of Shift Note  [facility]  Summary of shift: Pt has been calm and cooperative. Medicated as ordered. Denied pain or further needs. Call light has remained in reach. Pt has been checked on for safety.    Vitals:    Vitals:    04/15/24 0817 04/15/24 1204 04/15/24 1420 04/15/24 1549   BP: (!) 135/57 (!) 148/65  (!) 152/62   Pulse: 85 89  80   Resp: 18 18  18   Temp: 98.6 °F (37 °C) 98.2 °F (36.8 °C)  99 °F (37.2 °C)   TempSrc: Oral Oral  Oral   SpO2: 94% 94% 95% 93%   Weight:       Height:            I&O:   Intake/Output Summary (Last 24 hours) at 4/15/2024 1802  Last data filed at 4/15/2024 0621  Gross per 24 hour   Intake --   Output 1050 ml   Net -1050 ml       Resp Status: 5L nc    Ventilator Settings:     / / /FiO2 : 30 %    Critical Care IV infusions:   dextrose      sodium chloride 100 mL/hr at 04/08/24 1059        LDA:   Peripheral IV 04/06/24 Left Forearm (Active)   Number of days: 9       External Urinary Catheter (Active)   Number of days: 10       Wound 08/31/23 Abdomen Lower;Medial;Distal #3 (Active)   Number of days: 228

## 2024-04-15 NOTE — PLAN OF CARE
breakdown  Description: 1.  Monitor for areas of redness and/or skin breakdown  2.  Assess vascular access sites hourly  3.  Every 4-6 hours minimum:  Change oxygen saturation probe site  4.  Every 4-6 hours:  If on nasal continuous positive airway pressure, respiratory therapy assess nares and determine need for appliance change or resting period.  Outcome: Progressing     Problem: Safety - Adult  Goal: Free from fall injury  Outcome: Progressing     Problem: ABCDS Injury Assessment  Goal: Absence of physical injury  Outcome: Progressing     Problem: Chronic Conditions and Co-morbidities  Goal: Patient's chronic conditions and co-morbidity symptoms are monitored and maintained or improved  Outcome: Progressing  Flowsheets (Taken 4/15/2024 0800)  Care Plan - Patient's Chronic Conditions and Co-Morbidity Symptoms are Monitored and Maintained or Improved: Monitor and assess patient's chronic conditions and comorbid symptoms for stability, deterioration, or improvement     Problem: Nutrition Deficit:  Goal: Optimize nutritional status  Outcome: Progressing

## 2024-04-15 NOTE — PLAN OF CARE
Problem: Respiratory - Adult  Goal: Able to breathe comfortably  4/14/2024 1054 by Maribel Read RCP  Outcome: Progressing  Goal: Patient's breath sounds will be clear and equal  4/14/2024 1054 by Maribel Read RCP  Outcome: Progressing  Goal: Adequate oxygenation  Description: Adequate oxygenation  4/14/2024 1054 by Maribel Read RCP  Outcome: Progressing  Goal: Achieves optimal ventilation and oxygenation  4/14/2024 2332 by Rios Ching RN  Outcome: Progressing  Note: Assess breath sounds every shift and as needed.  Assess oxygenation level & respiration rate.  Encourage coughing & deep breathing.  Encourage use of incentive spirometer.  Assess cough & sputum.  Administer oxygen as needed.  4/14/2024 1651 by Luis Simon RN  Outcome: Progressing  4/14/2024 1054 by Maribel Read RCP  Outcome: Progressing     Problem: Discharge Planning  Goal: Discharge to home or other facility with appropriate resources  4/14/2024 2332 by Rios Ching RN  Outcome: Progressing  Note: Discharge teaching and instructions for diagnosis/procedure explained with patient using teachback method.  Patient voiced understanding regarding prescriptions, follow up appointments, and care of self at home.   4/14/2024 1651 by Luis Simon RN  Outcome: Progressing     Problem: Pain  Goal: Verbalizes/displays adequate comfort level or baseline comfort level  4/14/2024 2332 by Rios Ching RN  Outcome: Progressing  Note: Monitoring pain with each assessment and prn. AXEL 0-10 pain scale utilized. Non-pharmacological measures to be encouraged prior to pharmacological measures.   4/14/2024 1651 by Luis Simon RN  Outcome: Progressing     Problem: Skin/Tissue Integrity  Goal: Absence of new skin breakdown  Description: 1.  Monitor for areas of redness and/or skin breakdown  2.  Assess vascular access sites hourly  3.  Every 4-6 hours minimum:  Change oxygen  saturation probe site  4.  Every 4-6 hours:  If on nasal continuous positive airway pressure, respiratory therapy assess nares and determine need for appliance change or resting period.  4/14/2024 2332 by Rios Ching RN  Outcome: Progressing  Note: Continuing to monitor for skin integrity risks. Patient independent with turning/repositioning. Turning/repositioning encouraged at least once every 2 hrs, and prn basis. Hygiene care being completed independently per patient; assistance provided when deemed necessary.  4/14/2024 1651 by Luis Simon RN  Outcome: Progressing     Problem: Safety - Adult  Goal: Free from fall injury  4/14/2024 2332 by Rios Ching RN  Outcome: Progressing  Note: Pt fall risk, fall band present, falling star, safety alarm activated and in use as needed. Hourly rounding performed. Pt encouraged to use call light. See [place] fall risk assessment.  4/14/2024 1651 by Luis Simon RN  Outcome: Progressing     Problem: ABCDS Injury Assessment  Goal: Absence of physical injury  4/14/2024 2332 by Rios Ching RN  Outcome: Progressing  Note: Non-skid socks in place, up with assistance, bed in lowest position, bed exit & alarm as needed, provide toileting every 2 hours an d as needed.  4/14/2024 1651 by Luis Simon RN  Outcome: Progressing     Problem: Skin/Tissue Integrity - Adult  Goal: Incisions, wounds, or drain sites healing without S/S of infection  4/14/2024 2332 by Rios Ching RN  Outcome: Progressing  Note: Continuing to monitor for skin integrity risks. Patient independent with turning/repositioning. Turning/repositioning encouraged at least once every 2 hrs, and prn basis. Hygiene care being completed independently per patient; assistance provided when deemed necessary.  4/14/2024 1651 by Luis Simon RN  Outcome: Progressing     Problem: Musculoskeletal - Adult  Goal: Return ADL status to a safe level of

## 2024-04-15 NOTE — PROGRESS NOTES
Nutrition Assessment     Type and Reason for Visit: Reassess    Nutrition Recommendations/Plan:   ADULT DIET; Regular; 4 carb choices (60 gm/meal)   Monitor p.o intakes, labs and skin integrity      Malnutrition Assessment:  Malnutrition Status: No malnutrition    Nutrition Assessment:  Patient is receiving steroid breathing treatments for COPD. Glucose levels are variable ranging from 105-195 mg/dL which could be effected by steroids. Patient is eating well and has a good appetite. P.O intakes are % at meals. Patient is at low nutrition risk Continue Regular; 4 carb choices diet and monitor p.o intakes and labs.    Estimated Daily Nutrient Needs:  Energy (kcal):  6805-6795 kcals per day using 10-12 g/kg of actual body weight Weight Used for Energy Requirements: Current     Protein (g):  68-74 grams per day suing 1.2-1.3 g/kg of ideal body weight Weight Used for Protein Requirements: Ideal        Fluid (ml/day):  4455-0003 ml per day using 1ml/kcal Method Used for Fluid Requirements: 1 ml/kcal    Nutrition Related Findings:   Edema: +1 non-pitting BLE.  Active bowel sounds. Glucose: 105, 195 (H) Wound Type:  (abdominal wall wound)    Current Nutrition Therapies:    ADULT DIET; Regular; 4 carb choices (60 gm/meal)    Anthropometric Measures:  Height: 165.1 cm (5' 5\")  Current Body Wt: 176.9 kg (390 lb)   BMI: 64.9    Nutrition Diagnosis:   Altered nutrition-related lab values related to endocrine dysfuntion (Diabetes) as evidenced by lab values    Nutrition Interventions:   Food and/or Nutrient Delivery: Continue Current Diet  Nutrition Education/Counseling: Education not indicated  Coordination of Nutrition Care: Continue to monitor while inpatient       Goals:  Previous Goal Met: Progressing toward Goal(s)  Goals: PO intake 75% or greater       Nutrition Monitoring and Evaluation:   Behavioral-Environmental Outcomes: None Identified  Food/Nutrient Intake Outcomes: Food and Nutrient Intake  Physical

## 2024-04-15 NOTE — PLAN OF CARE
Problem: Respiratory - Adult  Goal: Able to breathe comfortably  Outcome: Progressing  Goal: Patient's breath sounds will be clear and equal  Outcome: Progressing  Goal: Adequate oxygenation  Description: Adequate oxygenation  Outcome: Progressing  Goal: Achieves optimal ventilation and oxygenation  Outcome: Progressing  Flowsheets (Taken 4/15/2024 0800 by Tyler Tolliver RN)  Achieves optimal ventilation and oxygenation: Assess for changes in respiratory status

## 2024-04-15 NOTE — PROGRESS NOTES
[facility]  Office: [phone]  Carlos Caro DO, Felipe Drew, DO, Jerman Mack DO, Leonel Thomas, DO, Larry Pisano MD, Monserrat Rosado MD, Sharon Perez MD, Beverley Curtis MD,  Jimi Messina MD, Kevin Cowan MD, Theresa Chavira MD,  Luis Coffman DO, Florencia Hernandez MD, Giorgi Salas MD, Ramone Caro DO, Mariel Bradshaw MD,  Bandar Anderson DO, Michelle Helm MD, Mireille Hermosillo MD, Yamileth Chong MD, Zo Montgomery MD,  Jordan Hernandez MD, Pilar Salcedo MD, Kevin Greenwood MD, Kevin Landon MD, Ramon Ellsworth MD, Ken Luna MD, Luis French DO, Porter Moore DO, Shayna Woodall MD,  Alvaro Reid MD, Shirley Waterhouse, CNP,  Octavia Coreas CNP, Chemo Pelayo, CNP,  Юлия Calero, DNP, Kaci Rodgers, CNP, Princess Mandujano, CNP, Madelaine Burns CNP, Ivone Mancia CNP, Val Hanson, PA-C, Joyce Jackson PA-C, Isabel Bustos, CNP, Johnna Horta, CNP, Karma Yost, CNP, Rosio Resendez, CNP, Jessi Montiel CNP, Sandie Palmer, CNS, Balbina Hernández, CNP, Dolly Tamez CNP, Tracy Schwab, CNP         [facility]   IN-PATIENT SERVICE   [facility]    Progress Note    4/15/2024    5:01 PM    Name:   Shazia Dickson  MRN:     [id number]     Acct:      [id number]   Room:   [facility] Day:  11  Admit Date:  4/4/2024  6:36 PM    PCP:   Jyoti Mauricio APRN - CNP  Code Status:  Full Code    Subjective:     Patient seen and examined this morning.  Resting comfortably in her bed denied any chest pain, headache, nausea vomiting or constipation.  She is getting bowel regimen as per protocol.  Remained afebrile vitals within normal range, on supplemental oxygen.  Blood glucose fairly controlled, on Lantus and taper dose steroids.  Pulmonology and heme-onc on board.    Patient was moved up to high flow nasal cannula 5-6 L, she is saturating 95 to 96%.  Will discuss with RN and RT to keep the saturation between 88-92%.  When encouraged to use BiPAP  DuoNeb, oxygen supplement, s/p IV azithromycin and Tamiflu, on supplemental oxygen.  Wean as tolerated, keep oxygen saturation 88-92.  S/p IV steroids, currently on p.o. taper dose steroid.  Continue respiratory treatment with inhaled steroids and bronchodilator.  History of DVT and PE, on Coumadin  GERD continue PPI  Pancytopenia resolved  Hypertension : Fairly controlled, continue Cardizem 120 Mg, Coreg 12.5.  Continue to monitor her blood pressure.    Constipation: Resolved.   bowel regimen as per protocol. Encourage ambulation. Due to her body habitus she is not ambulatory.  Dyslipidemia continue statin  PT-OT  DVT prophylaxis on warfarin  CM working on placement.  Pending pre-[facility].    Medical Decision Making: Medium      Ramon Ellsworth MD  4/15/2024  5:01 PM

## 2024-04-15 NOTE — PROGRESS NOTES
Moderate Assistance  Additional Factors: Set-up;Verbal cues;Increased time to complete;Head of bed raised;Head of bed flat;With handrails  Roll Left  Assistance Level: Moderate assistance  Roll Right  Assistance Level: Moderate assistance  Skilled Clinical Factors: patient performs multiple rolls working on bed mobility and pressure relief techniques  Supine to Sit  Skilled Clinical Factors: Patient marsha lifted to recliner  Balance  Sitting Balance: Supervision (Patient requries marsha lift to recliner)  Standing Balance: Maximum assistance (MAX x2)  Standing Balance  Time: up to 5 seconds  Activity: Patient requires MAX x2 for progression into stance  and is limited on tolerance to stance.  Sit to Stand  Assistance Level: Maximum assistance;Requires x 2 assistance  Skilled Clinical Factors: vc's for hand placement and momentum techniques into stance from recliner this date.  Stand to Sit  Assistance Level: Maximum assistance;Requires x 2 assistance  Skilled Clinical Factors: vc's for slow cotrolled descent into seated posture to maximize eccentric quad control    Neuromuscular Education Sitting;Standing    PT Exercises  A/AROM Exercises: Seated LAQ, Mini marches, and hip abd/add  Circulation/Endurance Exercises: AP and Ankle circles.  Pressure Relief Exercises: lateral WS in seated      ASSESSMENT/PROGRESS TOWARDS GOALS     [facility] Inpatient Mobility Raw Score 8   [facility] Inpatient T-Scale Score 28.52   Mobility Inpatient CMS 0-100% Score 86.62   Mobility Inpatient CMS G-Code Modifier CM     Assessment  Assessment: Patient with global deconditioning and requires MAX encouragement throughout treatment for participation. Patient Marsha lifted to recliner and performs 2 sit to stands however is unable to completely stand with a MAX X2 Assist.  Activity Tolerance: Patient limited by fatigue;Patient limited by endurance  Discharge Recommendations: Therapy recommended at discharge;Patient would benefit from continued therapy

## 2024-04-15 NOTE — PROGRESS NOTES
Occupational Therapy  Facility/Department: [facility] PROGRESSIVE CARE  Rehabilitation Occupational Therapy Daily Treatment Note    Date: 4/15/24  Patient Name: Shazia Dickson       Room: 1026/1026-01  MRN: [id number]  Account: [id number]   : 1955  (68 y.o.) Gender: female     Past Medical History:  has a past medical history of Anxiety, Asthma, Bronchitis, DDD (degenerative disc disease), lumbar, Depression, Diabetes mellitus (HCC), Elevated glucose, Headache(784.0), Hernia of abdominal cavity, HH (hiatus hernia), Hyperlipemia, mixed, Hypertension, Osteoarthritis, Right femoral vein DVT (HCC), and Uterine hyperplasia.  Past Surgical History:   has a past surgical history that includes Dilation and curettage of uterus (10/08 and ); Breast reduction surgery (1997); Breast reduction surgery (1997); Tympanostomy tube placement (1967); Tonsillectomy and adenoidectomy (); and Hysterectomy (7/17/15).    Restrictions  Restrictions/Precautions: Fall Risk, General Precautions, Up as Tolerated  Other position/activity restrictions: pure wick, 3 liters 02, telemetry, continuous Sp02, alarms, pt is 390lbs, droplet precautions/neutropenic  Required Braces or Orthoses?: No    Subjective  Subjective: .\"I am not sitting in the chair for 6 hours!\" Pt.encouraged to sit upright in chair x2 hours.  Restrictions/Precautions: Fall Risk;General Precautions;Up as Tolerated   Objective     Cognition  Overall Cognitive Status: Exceptions  Arousal/Alertness: Appropriate responses to stimuli  Following Commands: Follows multistep commands with increased time;Follows multistep commands with repitition  Attention Span: Appears intact  Memory: Appears intact  Safety Judgement: Decreased awareness of need for safety;Decreased awareness of need for assistance  Problem Solving: Assistance required to identify errors made;Assistance required to correct errors made;Decreased awareness of errors  Insights: Decreased awareness of  unable to stand upright for 15 sec stand. Pt. displayed poor endurance and low motivation requiring much time and postive cues to participate. Skilled OT warranted to promote I/safety to return pt to prior living arrangement with assist as needed.  Activity Tolerance: Patient limited by fatigue;Patient limited by endurance  Discharge Recommendations: Patient would benefit from continued therapy after discharge  OT Equipment Recommendations  Equipment Needed: Yes  Mobility Devices: Walker  Walker: Rolling  ADL Assistive Devices: Reacher;Sock-Aid Soft;Long-handled Shoe Horn;Long-handled Sponge;Emergency Alert System  Safety Devices  Safety Devices in place: Yes  Type of devices: All fall risk precautions in place;Left in chair;Nurse notified;Call light within reach;Gait belt;Patient at risk for falls  Restraints  Initially in place: No    Patient Education  Education  Education Given To: Patient  Education Provided: Mobility Training;Fall Prevention Strategies;Transfer Training;Energy Conservation;Precautions;Safety;Equipment  Education Provided Comments: Pt. educated on the importance on moving and changing positions. Pt. ambivalent to suggestions and education.  Education Method: Verbal  Barriers to Learning: None  Education Outcome: Verbalized understanding;Demonstrated understanding    Plan  Occupational Therapy Plan  Times Per Week: 5x/week 1x/day as jenny  Current Treatment Recommendations: Strengthening;ROM;Balance training;Functional mobility training;Endurance training;Safety education & training;Patient/Caregiver education & training;Neuromuscular re-education;Equipment evaluation, education, & procurement;Pain management;Positioning;Self-Care / ADL;Home management training;Cognitive/Perceptual training;Coordination training  Additional Comments: Cont with stated POC    Goals  Patient Goals   Patient goals : Pt states she wants to go home!  Short Term Goals  Time Frame for Short Term Goals: by discharge, pt to

## 2024-04-15 NOTE — RT PROTOCOL NOTE
RT Inhaler-Nebulizer Bronchodilator Protocol Note    There is a bronchodilator order in the chart from a provider indicating to follow the RT Bronchodilator Protocol and there is an “Initiate RT Inhaler-Nebulizer Bronchodilator Protocol” order as well (see protocol at bottom of note).    CXR Findings:  No results found.    The findings from the last RT Protocol Assessment were as follows:   History Pulmonary Disease: Chronic pulmonary disease  Respiratory Pattern: Dyspnea on exertion or RR 21-25 bpm  Breath Sounds: Inspiratory and expiratory or bilateral wheezing and/or rhonchi  Cough: Strong, spontaneous, non-productive  Indication for Bronchodilator Therapy: Decreased or absent breath sounds, On home bronchodilators  Bronchodilator Assessment Score: 10    Aerosolized bronchodilator medication orders have been revised according to the RT Inhaler-Nebulizer Bronchodilator Protocol below.    Respiratory Therapist to perform RT Therapy Protocol Assessment initially then follow the protocol.  Repeat RT Therapy Protocol Assessment PRN for score 0-3 or on second treatment, BID, and PRN for scores above 3.    No Indications - adjust the frequency to every 6 hours PRN wheezing or bronchospasm, if no treatments needed after 48 hours then discontinue using Per Protocol order mode.     If indication present, adjust the RT bronchodilator orders based on the Bronchodilator Assessment Score as indicated below.  Use Inhaler orders unless patient has one or more of the following: on home nebulizer, not able to hold breath for 10 seconds, is not alert and oriented, cannot activate and use MDI correctly, or respiratory rate 25 breaths per minute or more, then use the equivalent nebulizer order(s) with same Frequency and PRN reasons based on the score.  If a patient is on this medication at home then do not decrease Frequency below that used at home.    0-3 - enter or revise RT bronchodilator order(s) to equivalent RT Bronchodilator

## 2024-04-16 ENCOUNTER — APPOINTMENT (OUTPATIENT)
Dept: GENERAL RADIOLOGY | Age: 69
End: 2024-04-16
Payer: MEDICARE

## 2024-04-16 LAB
ANION GAP SERPL CALCULATED.3IONS-SCNC: 5 MMOL/L (ref 9–17)
BASOPHILS # BLD: 0 K/UL (ref 0–0.2)
BASOPHILS NFR BLD: 0 %
BUN SERPL-MCNC: 28 MG/DL (ref 8–23)
BUN/CREAT SERPL: 28 (ref 9–20)
CALCIUM SERPL-MCNC: 9 MG/DL (ref 8.6–10.4)
CHLORIDE SERPL-SCNC: 92 MMOL/L (ref 98–107)
CO2 SERPL-SCNC: 41 MMOL/L (ref 20–31)
CREAT SERPL-MCNC: 1 MG/DL (ref 0.5–0.9)
EOSINOPHIL # BLD: 0.09 K/UL (ref 0–0.4)
EOSINOPHILS RELATIVE PERCENT: 1 % (ref 1–4)
ERYTHROCYTE [DISTWIDTH] IN BLOOD BY AUTOMATED COUNT: 17.2 % (ref 11.8–14.4)
GFR SERPL CREATININE-BSD FRML MDRD: 62 ML/MIN/1.73M2
GLUCOSE BLD-MCNC: 100 MG/DL (ref 65–105)
GLUCOSE BLD-MCNC: 139 MG/DL (ref 65–105)
GLUCOSE BLD-MCNC: 248 MG/DL (ref 65–105)
GLUCOSE BLD-MCNC: 283 MG/DL (ref 65–105)
GLUCOSE SERPL-MCNC: 215 MG/DL (ref 70–99)
HCT VFR BLD AUTO: 35.9 % (ref 36.3–47.1)
HGB BLD-MCNC: 9.8 G/DL (ref 11.9–15.1)
IMM GRANULOCYTES # BLD AUTO: 0 K/UL (ref 0–0.3)
IMM GRANULOCYTES NFR BLD: 0 %
INR PPP: 2.9
LYMPHOCYTES NFR BLD: 0.37 K/UL (ref 1–4.8)
LYMPHOCYTES RELATIVE PERCENT: 4 % (ref 24–44)
MCH RBC QN AUTO: 25.3 PG (ref 25.2–33.5)
MCHC RBC AUTO-ENTMCNC: 27.3 G/DL (ref 28.4–34.8)
MCV RBC AUTO: 92.8 FL (ref 82.6–102.9)
MONOCYTES NFR BLD: 0.56 K/UL (ref 0.2–0.8)
MONOCYTES NFR BLD: 6 % (ref 1–7)
MORPHOLOGY: ABNORMAL
NEUTROPHILS NFR BLD: 89 % (ref 36–66)
NEUTS SEG NFR BLD: 8.28 K/UL (ref 1.8–7.7)
NRBC BLD-RTO: 0 PER 100 WBC
PLATELET # BLD AUTO: 144 K/UL (ref 138–453)
PMV BLD AUTO: 10.5 FL (ref 8.1–13.5)
POTASSIUM SERPL-SCNC: 4.5 MMOL/L (ref 3.7–5.3)
PROTHROMBIN TIME: 29.9 SEC (ref 11.5–14.2)
RBC # BLD AUTO: 3.87 M/UL (ref 3.95–5.11)
SODIUM SERPL-SCNC: 138 MMOL/L (ref 135–144)
WBC OTHER # BLD: 9.3 K/UL (ref 3.5–11.3)

## 2024-04-16 PROCEDURE — 6360000002 HC RX W HCPCS: Performed by: INTERNAL MEDICINE

## 2024-04-16 PROCEDURE — 6370000000 HC RX 637 (ALT 250 FOR IP): Performed by: NURSE PRACTITIONER

## 2024-04-16 PROCEDURE — 99232 SBSQ HOSP IP/OBS MODERATE 35: CPT | Performed by: INTERNAL MEDICINE

## 2024-04-16 PROCEDURE — 6370000000 HC RX 637 (ALT 250 FOR IP): Performed by: INTERNAL MEDICINE

## 2024-04-16 PROCEDURE — 85025 COMPLETE CBC W/AUTO DIFF WBC: CPT

## 2024-04-16 PROCEDURE — 2580000003 HC RX 258: Performed by: INTERNAL MEDICINE

## 2024-04-16 PROCEDURE — 82947 ASSAY GLUCOSE BLOOD QUANT: CPT

## 2024-04-16 PROCEDURE — 85610 PROTHROMBIN TIME: CPT

## 2024-04-16 PROCEDURE — 97530 THERAPEUTIC ACTIVITIES: CPT

## 2024-04-16 PROCEDURE — 99232 SBSQ HOSP IP/OBS MODERATE 35: CPT | Performed by: STUDENT IN AN ORGANIZED HEALTH CARE EDUCATION/TRAINING PROGRAM

## 2024-04-16 PROCEDURE — 2060000000 HC ICU INTERMEDIATE R&B

## 2024-04-16 PROCEDURE — 36415 COLL VENOUS BLD VENIPUNCTURE: CPT

## 2024-04-16 PROCEDURE — 97535 SELF CARE MNGMENT TRAINING: CPT

## 2024-04-16 PROCEDURE — 97110 THERAPEUTIC EXERCISES: CPT

## 2024-04-16 PROCEDURE — 94761 N-INVAS EAR/PLS OXIMETRY MLT: CPT

## 2024-04-16 PROCEDURE — 2700000000 HC OXYGEN THERAPY PER DAY

## 2024-04-16 PROCEDURE — 80048 BASIC METABOLIC PNL TOTAL CA: CPT

## 2024-04-16 PROCEDURE — 94640 AIRWAY INHALATION TREATMENT: CPT

## 2024-04-16 PROCEDURE — 6370000000 HC RX 637 (ALT 250 FOR IP): Performed by: STUDENT IN AN ORGANIZED HEALTH CARE EDUCATION/TRAINING PROGRAM

## 2024-04-16 PROCEDURE — 71045 X-RAY EXAM CHEST 1 VIEW: CPT

## 2024-04-16 RX ORDER — PREDNISONE 20 MG/1
TABLET ORAL
Qty: 5 TABLET | Refills: 0 | Status: SHIPPED | OUTPATIENT
Start: 2024-04-18 | End: 2024-04-24

## 2024-04-16 RX ORDER — CARVEDILOL 12.5 MG/1
12.5 TABLET ORAL 2 TIMES DAILY WITH MEALS
Qty: 60 TABLET | Refills: 3 | Status: SHIPPED | OUTPATIENT
Start: 2024-04-16

## 2024-04-16 RX ORDER — PANTOPRAZOLE SODIUM 40 MG/1
40 TABLET, DELAYED RELEASE ORAL
Qty: 30 TABLET | Refills: 3 | Status: SHIPPED | OUTPATIENT
Start: 2024-04-17

## 2024-04-16 RX ORDER — GUAIFENESIN 600 MG/1
600 TABLET, EXTENDED RELEASE ORAL 2 TIMES DAILY
Qty: 14 TABLET | Refills: 0 | Status: SHIPPED | OUTPATIENT
Start: 2024-04-16 | End: 2024-04-23

## 2024-04-16 RX ORDER — WARFARIN SODIUM 2 MG/1
2 TABLET ORAL
Status: COMPLETED | OUTPATIENT
Start: 2024-04-16 | End: 2024-04-16

## 2024-04-16 RX ORDER — MIDODRINE HYDROCHLORIDE 5 MG/1
5 TABLET ORAL 3 TIMES DAILY PRN
Qty: 90 TABLET | Refills: 3 | Status: SHIPPED | OUTPATIENT
Start: 2024-04-16

## 2024-04-16 RX ADMIN — MONTELUKAST 10 MG: 10 TABLET, FILM COATED ORAL at 20:48

## 2024-04-16 RX ADMIN — INSULIN LISPRO 8 UNITS: 100 INJECTION, SOLUTION INTRAVENOUS; SUBCUTANEOUS at 18:02

## 2024-04-16 RX ADMIN — INSULIN GLARGINE 15 UNITS: 100 INJECTION, SOLUTION SUBCUTANEOUS at 09:51

## 2024-04-16 RX ADMIN — BUDESONIDE 500 MCG: 0.5 INHALANT ORAL at 18:05

## 2024-04-16 RX ADMIN — ALBUTEROL SULFATE 2.5 MG: 2.5 SOLUTION RESPIRATORY (INHALATION) at 11:54

## 2024-04-16 RX ADMIN — WARFARIN SODIUM 2 MG: 2 TABLET ORAL at 18:02

## 2024-04-16 RX ADMIN — SODIUM CHLORIDE, PRESERVATIVE FREE 10 ML: 5 INJECTION INTRAVENOUS at 23:22

## 2024-04-16 RX ADMIN — MICONAZOLE NITRATE: 20 CREAM TOPICAL at 09:52

## 2024-04-16 RX ADMIN — CARVEDILOL 12.5 MG: 12.5 TABLET, FILM COATED ORAL at 09:51

## 2024-04-16 RX ADMIN — INSULIN GLARGINE 15 UNITS: 100 INJECTION, SOLUTION SUBCUTANEOUS at 20:49

## 2024-04-16 RX ADMIN — GUAIFENESIN 600 MG: 600 TABLET ORAL at 20:48

## 2024-04-16 RX ADMIN — MICONAZOLE NITRATE: 20 CREAM TOPICAL at 23:22

## 2024-04-16 RX ADMIN — FLUTICASONE PROPIONATE 1 SPRAY: 50 SPRAY, METERED NASAL at 09:52

## 2024-04-16 RX ADMIN — IPRATROPIUM BROMIDE AND ALBUTEROL SULFATE 1 DOSE: 2.5; .5 SOLUTION RESPIRATORY (INHALATION) at 18:05

## 2024-04-16 RX ADMIN — DILTIAZEM HYDROCHLORIDE 120 MG: 120 CAPSULE, EXTENDED RELEASE ORAL at 09:51

## 2024-04-16 RX ADMIN — GUAIFENESIN 600 MG: 600 TABLET ORAL at 09:51

## 2024-04-16 RX ADMIN — ALBUTEROL SULFATE 2.5 MG: 2.5 SOLUTION RESPIRATORY (INHALATION) at 23:08

## 2024-04-16 RX ADMIN — PRAVASTATIN SODIUM 80 MG: 40 TABLET ORAL at 18:02

## 2024-04-16 RX ADMIN — BUDESONIDE 500 MCG: 0.5 INHALANT ORAL at 06:18

## 2024-04-16 RX ADMIN — SODIUM CHLORIDE, PRESERVATIVE FREE 10 ML: 5 INJECTION INTRAVENOUS at 09:51

## 2024-04-16 RX ADMIN — PREDNISONE 30 MG: 20 TABLET ORAL at 09:50

## 2024-04-16 RX ADMIN — CARVEDILOL 12.5 MG: 12.5 TABLET, FILM COATED ORAL at 18:02

## 2024-04-16 RX ADMIN — IPRATROPIUM BROMIDE AND ALBUTEROL SULFATE 1 DOSE: 2.5; .5 SOLUTION RESPIRATORY (INHALATION) at 06:19

## 2024-04-16 RX ADMIN — PANTOPRAZOLE SODIUM 40 MG: 40 TABLET, DELAYED RELEASE ORAL at 06:10

## 2024-04-16 ASSESSMENT — PAIN SCALES - GENERAL: PAINLEVEL_OUTOF10: 0

## 2024-04-16 NOTE — PROGRESS NOTES
Physical Therapy  Facility/Department: [facility]  Rehabilitation Physical Therapy Treatment Note    NAME: Shazia Dickson  : 1955 (68 y.o.)  MRN: [id number]  CODE STATUS: Full Code    Date of Service: 24     Pt currently functioning below baseline.  Recommend daily inpatient skilled therapy at time of discharge to maximize long term outcomes and prevent re-admission. Please refer to AM-PAC score for current level of function.     Restrictions:  Restrictions/Precautions: Fall Risk, General Precautions, Up as Tolerated  Position Activity Restriction  Other position/activity restrictions: pure wick, 3 liters 02, telemetry, continuous Sp02, alarms, pt is 390lbs, droplet precautions/neutropenic     SUBJECTIVE  Subjective  Subjective: Patient up in bed upon therapist arrival. RN reports patient is medically stable for therapy treatment this date.    Chart reviewed prior to treatment and patient is agreeable for therapy.  All lines intact and patient positioned comfortably at end of treatment. Chair alarm on and tested for patient safety.     OBJECTIVE  Cognition  Overall Cognitive Status: Exceptions  Arousal/Alertness: Appropriate responses to stimuli  Following Commands: Follows multistep commands with increased time;Follows multistep commands with repitition  Attention Span: Appears intact  Memory: Appears intact  Safety Judgement: Decreased awareness of need for safety;Decreased awareness of need for assistance  Problem Solving: Assistance required to identify errors made;Assistance required to correct errors made;Decreased awareness of errors  Insights: Decreased awareness of deficits  Initiation: Requires cues for all  Sequencing: Requires cues for some  Cognition Comment: Pt. required cues for all movements and positive encouragement.  Orientation  Overall Orientation Status: Within Functional Limits  Orientation Level: Oriented X4    Functional Mobility  Bed Mobility  Overall Assistance

## 2024-04-16 NOTE — PLAN OF CARE
Problem: Respiratory - Adult  Goal: Able to breathe comfortably  Outcome: Progressing  Goal: Patient's breath sounds will be clear and equal  Outcome: Progressing  Goal: Adequate oxygenation  Description: Adequate oxygenation  Outcome: Progressing  Goal: Achieves optimal ventilation and oxygenation  4/16/2024 1155 by Miriam Rodríguez RCP  Outcome: Progressing  4/16/2024 0301 by Dasia Rogers, RN  Outcome: Progressing

## 2024-04-16 NOTE — RT PROTOCOL NOTE
RT Inhaler-Nebulizer Bronchodilator Protocol Note    There is a bronchodilator order in the chart from a provider indicating to follow the RT Bronchodilator Protocol and there is an “Initiate RT Inhaler-Nebulizer Bronchodilator Protocol” order as well (see protocol at bottom of note).    CXR Findings:  No results found.    The findings from the last RT Protocol Assessment were as follows:   History Pulmonary Disease: Chronic pulmonary disease  Respiratory Pattern: Mild dyspnea at rest, irregular pattern, or RR 21-25 bpm  Breath Sounds: Slightly diminished and/or crackles  Cough: Strong, productive  Indication for Bronchodilator Therapy: Decreased or absent breath sounds, On home bronchodilators  Bronchodilator Assessment Score: 9    Aerosolized bronchodilator medication orders have been revised according to the RT Inhaler-Nebulizer Bronchodilator Protocol below.    Respiratory Therapist to perform RT Therapy Protocol Assessment initially then follow the protocol.  Repeat RT Therapy Protocol Assessment PRN for score 0-3 or on second treatment, BID, and PRN for scores above 3.    No Indications - adjust the frequency to every 6 hours PRN wheezing or bronchospasm, if no treatments needed after 48 hours then discontinue using Per Protocol order mode.     If indication present, adjust the RT bronchodilator orders based on the Bronchodilator Assessment Score as indicated below.  Use Inhaler orders unless patient has one or more of the following: on home nebulizer, not able to hold breath for 10 seconds, is not alert and oriented, cannot activate and use MDI correctly, or respiratory rate 25 breaths per minute or more, then use the equivalent nebulizer order(s) with same Frequency and PRN reasons based on the score.  If a patient is on this medication at home then do not decrease Frequency below that used at home.    0-3 - enter or revise RT bronchodilator order(s) to equivalent RT Bronchodilator order with Frequency of

## 2024-04-16 NOTE — PROGRESS NOTES
Occupational Therapy  Facility/Department: [facility] PROGRESSIVE CARE  Rehabilitation Occupational Therapy Daily Treatment Note    Date: 24  Patient Name: Shazia Dickson       Room: 1026/1026-01  MRN: [id number]  Account: [id number]   : 1955  (68 y.o.) Gender: female     Past Medical History:  has a past medical history of Anxiety, Asthma, Bronchitis, DDD (degenerative disc disease), lumbar, Depression, Diabetes mellitus (HCC), Elevated glucose, Headache(784.0), Hernia of abdominal cavity, HH (hiatus hernia), Hyperlipemia, mixed, Hypertension, Osteoarthritis, Right femoral vein DVT (HCC), and Uterine hyperplasia.  Past Surgical History:   has a past surgical history that includes Dilation and curettage of uterus (10/08 and ); Breast reduction surgery (1997); Breast reduction surgery (1997); Tympanostomy tube placement (1967); Tonsillectomy and adenoidectomy (); and Hysterectomy (7/17/15).    Restrictions  Restrictions/Precautions: Fall Risk, General Precautions, Up as Tolerated  Other position/activity restrictions: pure wick, 3 liters 02, telemetry, continuous Sp02, alarms, pt is 390lbs, droplet precautions/neutropenic  Required Braces or Orthoses?: No    Subjective  Subjective: \"I will get up but I am only sitting here for two hours!\"  Restrictions/Precautions: Fall Risk;General Precautions;Up as Tolerated    Objective     Cognition  Overall Cognitive Status: Exceptions  Arousal/Alertness: Appropriate responses to stimuli  Following Commands: Follows multistep commands with increased time;Follows multistep commands with repitition  Attention Span: Appears intact  Memory: Appears intact  Safety Judgement: Decreased awareness of need for safety;Decreased awareness of need for assistance  Problem Solving: Assistance required to identify errors made;Assistance required to correct errors made;Decreased awareness of errors  Insights: Decreased awareness of deficits  Initiation: Requires cues  and independence requiring 2 skilled therapy professionals to address individual discipline's goals. OT addressing preparation for ADL transfer, sitting balance for increased ADL performance, sitting/activity tolerance, functional reaching, environmental safety/scanning, fall prevention, functional mobility for ADL transfers, ability to sequence and follow directions, bed mobility tech, and functional UE strength.     Upon Co-Signature of this note, appropriate supervision of DOMENICA has been delivered with regards to plan of care, evaluation/re-evaluation findings, any appropriate modifications to treatment plan, and relevant discharge planning. Instructions and training unique to this patient and this practitioner have been considered and implemented.    DOMENICA MEJIA

## 2024-04-16 NOTE — PROGRESS NOTES
Today's Date: 4/16/2024  Patient Name: Shazia Dickson  Date of admission: 4/4/2024  6:36 PM  Patient's age: 68 y.o., 1955  Admission Dx: Influenza A [J10.1]  COPD exacerbation (HCC) [J44.1]  Acute respiratory failure with hypoxia (HCC) [J96.01]  Increased oxygen demand [R68.89]    Reason for Consult: management recommendations  Requesting Physician: No admitting provider for patient encounter.    CHIEF COMPLAINT: Shortness of breath.  Leukopenia.    INTERIM HISTORY  Patient is seen and evaluated.  Overall feels well. Slept much better .  She is on oxygen 4 L/min.  No respiratory distress.  Labs were reviewed.  Stable results.  No active bleeding.  No fever.    HISTORY OF PRESENT ILLNESS:      The patient is a 68 y.o.  female who is admitted to the hospital with chief complaint of shortness of breath.  Complains of generalized bodyaches and upper respite tract infection symptoms.  Patient recently had been seen infectious disease team for healing abdominal wall wound.  Dr. Nunez.  Per records patient has chronic shortness of breath.    Hematology oncology team consulted due to cytopenias.  Patient CBC from 10/23 shows WBC count 6.0 hemoglobin 8.9 platelet count 225.  Patient CBC at presentation shows WBC count of 2.7 hemoglobin 9.1 and creatinine of 153.  Patient ANC was 1.79.  Patient just finished course of antibiotics.    Plan past Medical History:   has a past medical history of Anxiety, Asthma, Bronchitis, DDD (degenerative disc disease), lumbar, Depression, Diabetes mellitus (HCC), Elevated glucose, Headache(784.0), Hernia of abdominal cavity, HH (hiatus hernia), Hyperlipemia, mixed, Hypertension, Osteoarthritis, Right femoral vein DVT (HCC), and Uterine hyperplasia.    Past Surgical History:   has a past surgical history that includes Dilation and curettage of uterus (10/08 and 09/07); Breast reduction surgery (12/1997); Breast reduction surgery (12/1997); Tympanostomy tube placement  (PROAMATINE) tablet 5 mg  5 mg Oral TID PRN Barbara Dillard MD        warfarin placeholder: dosing by pharmacy   Other RX Placeholder Barbara Dillard MD        LORazepam (ATIVAN) injection 1 mg  1 mg IntraVENous Q6H PRN Barbara Dillard MD   1 mg at 04/06/24 0160       Allergies:  Amoxil [amoxicillin trihydrate], Neomycin, Penicillins, Codeine, Neosporin [bacitracin-neomycin-polymyxin], and Polysporin [bacitracin-polymyxin b]    Social History:   reports that she has never smoked. She has been exposed to tobacco smoke. She has never used smokeless tobacco. She reports that she does not currently use alcohol. She reports that she does not use drugs.     Family History: family history includes Asthma in her mother; COPD in her mother; Cancer in her paternal grandmother; Cancer (age of onset: 86) in her father; Mental Illness in her mother; Parkinsonism in her maternal grandfather; Stroke in her paternal grandmother; Stroke (age of onset: 86) in her father.    REVIEW OF SYSTEMS:      Constitutional: No fever or chills. No night sweats, no weight loss or fatigue  Eyes: No eye discharge, double vision, or eye pain   HEENT: negative for sore mouth, sore throat, hoarseness and voice change   Respiratory: negative for cough , sputum,  wheezing, hemoptysis, chest pain.  Dyspnea is better   Cardiovascular: negative for chest pain, dyspnea, palpitations, orthopnea, PND   Gastrointestinal: negative for nausea, vomiting, diarrhea, constipation, abdominal pain, Dysphagia, hematemesis and hematochezia   Genitourinary: negative for frequency, dysuria, nocturia, urinary incontinence, and hematuria   Integument: negative for rash, skin lesions, bruises.   Hematologic/Lymphatic: negative for easy bruising, bleeding, lymphadenopathy, or petechiae   Endocrine: negative for heat or cold intolerance,weight changes, change in bowel habits and hair loss   Musculoskeletal: negative for myalgias, arthralgias, pain, joint swelling,and bone pain

## 2024-04-16 NOTE — PROGRESS NOTES
[facility]  Office: [phone]  Carlos Caro, DO, Felipe Drew, DO, Jerman Mack DO, Leonel Thomas, DO, Larry Psiano MD, Monserrat Rosado MD, Sharon Perez MD, Beverley Curtis MD,  Jimi Messina MD, Kevin Cowan MD, Theresa Chavira MD,  Luis Coffman DO, Florencia Henrandez MD, Giorgi Salas MD, Ramone Caro DO, Mariel Bradshaw MD,  Bandar Anderson DO, Michelle Helm MD, Mireille Hermosillo MD, Yamileth Chong MD, Zo Montgomery MD,  Jordan Hernandez MD, Pilar Salcedo MD, Kevin Greenwood MD, Kevin Landon MD, Ramon Ellsworth MD, Ken Luna MD, Luis French DO, Porter Moore DO, Shayna Woodall MD,  Alvaro Reid MD, Shirley Waterhouse, CNP,  Octavia Coreas CNP, Chemo Pelayo, CNP,  Юлия Calero, DNP, Kaci Rodgers, CNP, Princess Mandujano, CNP, Madelaine Burns, CNP, Ivone Mancia, CNP, Val Hanson, PA-C, Joyce Jackson PA-C, Isabel Bustos, CNP, Johnna Horta, CNP, Karma Yost, CNP, Rosio Resendez, CNP, Jessi Montiel, CNP, Sandie Palmer, CNS, Balbina Hernández, CNP, Dolly Tamez CNP, Tracy Schwab, CNP         [facility]   IN-PATIENT SERVICE   [facility]    Progress Note    4/16/2024    12:15 PM    Name:   Shazia Dickson  MRN:     [id number]     Acct:      [id number]   Room:   1026/1026-01   Day:  12  Admit Date:  4/4/2024  6:36 PM    PCP:   Jyoti Mauricio APRN - CNP  Code Status:  Full Code    Subjective:     C/C:   Chief Complaint   Patient presents with    Shortness of Breath     60% on 4 L @ home     Interval History Status: not changed.     Patient seen examined at bedside.  Currently saturating well on nasal cannula oxygen.  No acute issues overnight.  Still have intermittent cough somewhat productive sputum.  Appears to be clear.    Brief History:     68-year-old female past medical history of diabetes type 2, COPD on 4 L home oxygen, DVT on warfarin presents with shortness of breath found to have influenza A was given Tamiflu and improved.

## 2024-04-16 NOTE — PROGRESS NOTES
Warfarin Dosing - Pharmacy Consult Note  Consulting Provider: Nishant Jimenez MD  Indication:  History of  VTE/PE  Warfarin Dose prior to admission: 2mg M-W-F, 4mg all other days.   Concurrent anticoagulants/antiplatelets: none  Significant Drug Interactions:  Prednisone  Recent Labs     04/14/24  0504 04/15/24  0508 04/16/24  0516   INR 2.5 2.4 2.9     Recent warfarin administrations                     warfarin (COUMADIN) tablet 4 mg (mg) 4 mg Given 04/15/24 1742    warfarin (COUMADIN) tablet 4 mg (mg) 4 mg Given 04/14/24 1818    warfarin (COUMADIN) tablet 2 mg (mg) 2 mg Given 04/13/24 1734                   Date   INR    Dose  4/10        3.4         none  4/11        3.2          2 mg  4/12        2.7          2 mg  4/13        2.6          2 mg  4/14        2.5          4 mg  4/15        2.4          4 mg  4/16        2.9       Assessment/Plan  (Goal INR: 2.5 - 3.5)  INR within goal. Extra bumped dose yesterday did the trick. Will give coumadin 2mg today since we got a jump in INR from 2.4 to 2.9 with yesterdays dose. INR in AM.     Active problem list reviewed.  INR orders are placed.  Chart reviewed for pertinent labs, drug/diet interactions, and past doses.  Documentation of patient's clinical condition was reviewed.    Pharmacy Dosing:  Pharmacy will continue to follow.

## 2024-04-16 NOTE — PLAN OF CARE
Problem: Respiratory - Adult  Goal: Able to breathe comfortably  4/15/2024 2108 by Balbina Mcdaniels RCP  Outcome: Progressing     Problem: Respiratory - Adult  Goal: Patient's breath sounds will be clear and equal  4/15/2024 2108 by Balbina Mcdaniels RCP  Outcome: Progressing     Problem: Respiratory - Adult  Goal: Adequate oxygenation  Description: Adequate oxygenation  4/15/2024 2108 by Balbina Mcdaniels RCP  Outcome: Progressing     Problem: Respiratory - Adult  Goal: Achieves optimal ventilation and oxygenation  4/15/2024 2108 by Balbina Mcdaniels RCP  Outcome: Progressing         BRONCHOSPASM/BRONCHOCONSTRICTION    IMPROVE  AERATION/BREATHSOUNDS  ADMINISTER BRONCHODILATOR THERAPY AS APPROPRIATE  ASSESS BREATH SOUNDS  INITIATE AEROSOL PROTOCOL IF ORDERED TO DO SO  PATIENT EDUCATION AS NEEDED      PROVIDE ADEQUATE OXYGENATION WITH ACCEPTABLE SP02/ABG'S    [x]  IDENTIFY APPROPRIATE OXYGEN THERAPY  [x]   MONITOR SP02/ABG'S AS NEEDED   [x]   PATIENT EDUCATION AS NEEDED

## 2024-04-16 NOTE — PLAN OF CARE
No complaints of pain from pt this shift.  5L NC continued for pt.   Maxi skylift utilized to help reposition patient.   Robitussen given once this shift for cough.   Bed locked and in lowest position, call light within reach, safety maintained.     Problem: Respiratory - Adult  Goal: Achieves optimal ventilation and oxygenation  4/16/2024 0301 by Dasia Rogers RN  Outcome: Progressing     Problem: Discharge Planning  Goal: Discharge to home or other facility with appropriate resources  4/16/2024 0301 by Dasia Rogers RN  Outcome: Progressing  Flowsheets (Taken 4/15/2024 2000)  Discharge to home or other facility with appropriate resources: Identify barriers to discharge with patient and caregiver     Problem: Pain  Goal: Verbalizes/displays adequate comfort level or baseline comfort level  4/16/2024 0301 by Dasia Rogers RN  Outcome: Progressing     Problem: Skin/Tissue Integrity  Goal: Absence of new skin breakdown  Description: 1.  Monitor for areas of redness and/or skin breakdown  2.  Assess vascular access sites hourly  3.  Every 4-6 hours minimum:  Change oxygen saturation probe site  4.  Every 4-6 hours:  If on nasal continuous positive airway pressure, respiratory therapy assess nares and determine need for appliance change or resting period.  4/16/2024 0301 by Dasia Rogers RN  Outcome: Progressing     Problem: Safety - Adult  Goal: Free from fall injury  4/16/2024 0301 by Dasia Rogers RN  Outcome: Progressing     Problem: ABCDS Injury Assessment  Goal: Absence of physical injury  4/16/2024 0301 by Dasia Rogers RN  Outcome: Progressing     Problem: Skin/Tissue Integrity - Adult  Goal: Incisions, wounds, or drain sites healing without S/S of infection  4/16/2024 0301 by Dasia Rogers RN  Outcome: Progressing  Flowsheets (Taken 4/15/2024 2000)  Incisions, Wounds, or Drain Sites Healing Without Sign and Symptoms of Infection: ADMISSION and DAILY:

## 2024-04-16 NOTE — PROGRESS NOTES
Pulmonary Critical Care Progress Note       Patient seen for the follow up of  Acute respiratory failure with hypoxia (HCC)     Subjective:  Patient is not ambulating.  She is still on 5 L oxygen.    She denies chest pain. Shortness of breath  is  the same.  She complains of dry cough bouts    Examination:  Vitals: BP (!) 135/54   Pulse 85   Temp 98.1 °F (36.7 °C) (Oral)   Resp 18   Ht 1.651 m (5' 5\")   Wt (!) 177.4 kg (391 lb 3.2 oz)   LMP 07/16/2014   SpO2 93%   BMI 65.10 kg/m²   General appearance: in no acute distress, alert and cooperative with exam  Neck: No JVD  Lungs: decreased air exchange, no wheeze or crackles   Heart: regular rate and rhythm, S1, S2 normal, no gallop  Abdomen: Soft, non tender, + BS  Extremities: no cyanosis or clubbing. No significant edema    LABs:  PT/INR:   Recent Labs     04/14/24  0504 04/15/24  0508 04/16/24  0516   PROTIME 26.4* 26.1* 29.9*   INR 2.5 2.4 2.9     Radiology:  X-ray chest 4/8/2024  The cardiac and mediastinal contours appear unchanged. Similar appearance of  vascular congestion.  No new airspace disease identified in the interval.  No  evidence for pneumothorax.        Impression & Recommendations:    Chronic hypoxic and hypercarbic respiratory failure  Oxygen by nasal cannula  Incentive spirometer q.1h awake  BiPAP support encouraged    Acute exacerbation of Asthma /influenza A  DuoNeb every 4 hours while awake and as needed   Pulmicort 0.5 b.I.d. by nebulizer  Keep prednisone 30 mg daily taper to stop  Finished Zithromax course  Finished Tamiflu  Continue guaifenesin  Out of Droplet isolation  Repeat chest x-ray CBC BMP      Pulmonary edema/Pulmonary hypertension, RVSP 55-60 mmHG by echo 2018  Echocardiogram 4/10/24-normal RVSP    Obstructive sleep apnea/ Morbid Obesity  Outpatient Sleep evaluation     History of PE/DVT  on Coumadin/monitor INR    Physical therapy/ambulate  Rehab discharge planning  peptic ulcer disease prophylaxis  DVT prophylaxis on

## 2024-04-16 NOTE — PLAN OF CARE
Pt had a good day overall. Pt up in the chair with therapy. Pt had puriwick changed and is voiding. Pt on 4L nc at 95%. Pt awaiting, pre cert for [facility]. Pt safety maintained, call light within reach, all questions asked and answered wcm. VSS, Aox4.   Problem: Respiratory - Adult  Goal: Achieves optimal ventilation and oxygenation  4/16/2024 1813 by Luis Simon RN  Outcome: Progressing     Problem: Skin/Tissue Integrity - Adult  Goal: Incisions, wounds, or drain sites healing without S/S of infection  Outcome: Progressing     Problem: Musculoskeletal - Adult  Goal: Return ADL status to a safe level of function  Outcome: Progressing     Problem: Infection - Adult  Goal: Absence of infection at discharge  Outcome: Progressing     Problem: Discharge Planning  Goal: Discharge to home or other facility with appropriate resources  Outcome: Progressing     Problem: Pain  Goal: Verbalizes/displays adequate comfort level or baseline comfort level  Outcome: Progressing     Problem: Skin/Tissue Integrity  Goal: Absence of new skin breakdown  Description: 1.  Monitor for areas of redness and/or skin breakdown  2.  Assess vascular access sites hourly  3.  Every 4-6 hours minimum:  Change oxygen saturation probe site  4.  Every 4-6 hours:  If on nasal continuous positive airway pressure, respiratory therapy assess nares and determine need for appliance change or resting period.  Outcome: Progressing     Problem: Safety - Adult  Goal: Free from fall injury  Outcome: Progressing     Problem: ABCDS Injury Assessment  Goal: Absence of physical injury  Outcome: Progressing     Problem: Chronic Conditions and Co-morbidities  Goal: Patient's chronic conditions and co-morbidity symptoms are monitored and maintained or improved  Outcome: Progressing     Problem: Nutrition Deficit:  Goal: Optimize nutritional status  Outcome: Progressing

## 2024-04-17 LAB
GLUCOSE BLD-MCNC: 107 MG/DL (ref 65–105)
GLUCOSE BLD-MCNC: 179 MG/DL (ref 65–105)
GLUCOSE BLD-MCNC: 246 MG/DL (ref 65–105)
GLUCOSE BLD-MCNC: 258 MG/DL (ref 65–105)
INR PPP: 3
PROTHROMBIN TIME: 30.7 SEC (ref 11.5–14.2)

## 2024-04-17 PROCEDURE — 97530 THERAPEUTIC ACTIVITIES: CPT

## 2024-04-17 PROCEDURE — 85610 PROTHROMBIN TIME: CPT

## 2024-04-17 PROCEDURE — 6370000000 HC RX 637 (ALT 250 FOR IP): Performed by: STUDENT IN AN ORGANIZED HEALTH CARE EDUCATION/TRAINING PROGRAM

## 2024-04-17 PROCEDURE — 2700000000 HC OXYGEN THERAPY PER DAY

## 2024-04-17 PROCEDURE — 6370000000 HC RX 637 (ALT 250 FOR IP): Performed by: INTERNAL MEDICINE

## 2024-04-17 PROCEDURE — 94640 AIRWAY INHALATION TREATMENT: CPT

## 2024-04-17 PROCEDURE — 2580000003 HC RX 258: Performed by: INTERNAL MEDICINE

## 2024-04-17 PROCEDURE — 6370000000 HC RX 637 (ALT 250 FOR IP): Performed by: NURSE PRACTITIONER

## 2024-04-17 PROCEDURE — 36415 COLL VENOUS BLD VENIPUNCTURE: CPT

## 2024-04-17 PROCEDURE — 99231 SBSQ HOSP IP/OBS SF/LOW 25: CPT | Performed by: STUDENT IN AN ORGANIZED HEALTH CARE EDUCATION/TRAINING PROGRAM

## 2024-04-17 PROCEDURE — 2060000000 HC ICU INTERMEDIATE R&B

## 2024-04-17 PROCEDURE — 82947 ASSAY GLUCOSE BLOOD QUANT: CPT

## 2024-04-17 PROCEDURE — 97110 THERAPEUTIC EXERCISES: CPT

## 2024-04-17 PROCEDURE — 94761 N-INVAS EAR/PLS OXIMETRY MLT: CPT

## 2024-04-17 PROCEDURE — 6360000002 HC RX W HCPCS: Performed by: INTERNAL MEDICINE

## 2024-04-17 PROCEDURE — 99232 SBSQ HOSP IP/OBS MODERATE 35: CPT | Performed by: INTERNAL MEDICINE

## 2024-04-17 PROCEDURE — 97535 SELF CARE MNGMENT TRAINING: CPT

## 2024-04-17 RX ORDER — WARFARIN SODIUM 2 MG/1
2 TABLET ORAL
Status: COMPLETED | OUTPATIENT
Start: 2024-04-17 | End: 2024-04-17

## 2024-04-17 RX ORDER — BUDESONIDE AND FORMOTEROL FUMARATE DIHYDRATE 160; 4.5 UG/1; UG/1
2 AEROSOL RESPIRATORY (INHALATION)
Status: DISCONTINUED | OUTPATIENT
Start: 2024-04-17 | End: 2024-04-19 | Stop reason: HOSPADM

## 2024-04-17 RX ADMIN — MICONAZOLE NITRATE: 20 CREAM TOPICAL at 20:41

## 2024-04-17 RX ADMIN — GUAIFENESIN AND DEXTROMETHORPHAN 5 ML: 100; 10 SYRUP ORAL at 01:02

## 2024-04-17 RX ADMIN — MICONAZOLE NITRATE: 20 CREAM TOPICAL at 09:59

## 2024-04-17 RX ADMIN — IPRATROPIUM BROMIDE AND ALBUTEROL SULFATE 1 DOSE: 2.5; .5 SOLUTION RESPIRATORY (INHALATION) at 11:31

## 2024-04-17 RX ADMIN — INSULIN GLARGINE 15 UNITS: 100 INJECTION, SOLUTION SUBCUTANEOUS at 20:40

## 2024-04-17 RX ADMIN — DILTIAZEM HYDROCHLORIDE 120 MG: 120 CAPSULE, EXTENDED RELEASE ORAL at 09:53

## 2024-04-17 RX ADMIN — INSULIN GLARGINE 15 UNITS: 100 INJECTION, SOLUTION SUBCUTANEOUS at 09:53

## 2024-04-17 RX ADMIN — PANTOPRAZOLE SODIUM 40 MG: 40 TABLET, DELAYED RELEASE ORAL at 05:32

## 2024-04-17 RX ADMIN — SODIUM CHLORIDE, PRESERVATIVE FREE 10 ML: 5 INJECTION INTRAVENOUS at 10:06

## 2024-04-17 RX ADMIN — WARFARIN SODIUM 2 MG: 2 TABLET ORAL at 17:50

## 2024-04-17 RX ADMIN — GUAIFENESIN 600 MG: 600 TABLET ORAL at 09:54

## 2024-04-17 RX ADMIN — BUDESONIDE 500 MCG: 0.5 INHALANT ORAL at 11:30

## 2024-04-17 RX ADMIN — IPRATROPIUM BROMIDE AND ALBUTEROL SULFATE 1 DOSE: 2.5; .5 SOLUTION RESPIRATORY (INHALATION) at 19:36

## 2024-04-17 RX ADMIN — CARVEDILOL 12.5 MG: 12.5 TABLET, FILM COATED ORAL at 17:50

## 2024-04-17 RX ADMIN — INSULIN LISPRO 4 UNITS: 100 INJECTION, SOLUTION INTRAVENOUS; SUBCUTANEOUS at 17:50

## 2024-04-17 RX ADMIN — ACETAMINOPHEN 650 MG: 325 TABLET ORAL at 09:54

## 2024-04-17 RX ADMIN — PRAVASTATIN SODIUM 80 MG: 40 TABLET ORAL at 17:50

## 2024-04-17 RX ADMIN — CARVEDILOL 12.5 MG: 12.5 TABLET, FILM COATED ORAL at 09:54

## 2024-04-17 RX ADMIN — PREDNISONE 30 MG: 20 TABLET ORAL at 09:53

## 2024-04-17 RX ADMIN — IPRATROPIUM BROMIDE AND ALBUTEROL SULFATE 1 DOSE: 2.5; .5 SOLUTION RESPIRATORY (INHALATION) at 15:02

## 2024-04-17 RX ADMIN — SODIUM CHLORIDE, PRESERVATIVE FREE 10 ML: 5 INJECTION INTRAVENOUS at 20:40

## 2024-04-17 RX ADMIN — GUAIFENESIN 600 MG: 600 TABLET ORAL at 20:40

## 2024-04-17 RX ADMIN — MONTELUKAST 10 MG: 10 TABLET, FILM COATED ORAL at 20:40

## 2024-04-17 RX ADMIN — BUDESONIDE AND FORMOTEROL FUMARATE DIHYDRATE 2 PUFF: 160; 4.5 AEROSOL RESPIRATORY (INHALATION) at 19:36

## 2024-04-17 RX ADMIN — FLUTICASONE PROPIONATE 1 SPRAY: 50 SPRAY, METERED NASAL at 10:00

## 2024-04-17 RX ADMIN — GUAIFENESIN AND DEXTROMETHORPHAN 5 ML: 100; 10 SYRUP ORAL at 05:32

## 2024-04-17 ASSESSMENT — PAIN DESCRIPTION - ORIENTATION: ORIENTATION: MID

## 2024-04-17 ASSESSMENT — PAIN DESCRIPTION - LOCATION: LOCATION: HEAD

## 2024-04-17 ASSESSMENT — PAIN SCALES - GENERAL
PAINLEVEL_OUTOF10: 0
PAINLEVEL_OUTOF10: 0
PAINLEVEL_OUTOF10: 6

## 2024-04-17 ASSESSMENT — PAIN - FUNCTIONAL ASSESSMENT: PAIN_FUNCTIONAL_ASSESSMENT: ACTIVITIES ARE NOT PREVENTED

## 2024-04-17 NOTE — PROGRESS NOTES
Patient Name: Shazia Dickson  Date of admission: 4/4/2024  6:36 PM  Patient's age: 68 y.o., 1955  Admission Dx: Influenza A [J10.1]  COPD exacerbation (HCC) [J44.1]  Acute respiratory failure with hypoxia (HCC) [J96.01]  Increased oxygen demand [R68.89]    Reason for Consult: management recommendations  Requesting Physician: No admitting provider for patient encounter.    CHIEF COMPLAINT: Shortness of breath.  Leukopenia.    INTERIM HISTORY  Patient is seen and evaluated.  Overall feels well. Slept much better .  She is on oxygen 4 L/min.  No respiratory distress.  Labs were reviewed.  Stable results.  No active bleeding.  No fever.    HISTORY OF PRESENT ILLNESS:      The patient is a 68 y.o.  female who is admitted to the hospital with chief complaint of shortness of breath.  Complains of generalized bodyaches and upper respite tract infection symptoms.  Patient recently had been seen infectious disease team for healing abdominal wall wound.  Dr. Nunez.  Per records patient has chronic shortness of breath.    Hematology oncology team consulted due to cytopenias.  Patient CBC from 10/23 shows WBC count 6.0 hemoglobin 8.9 platelet count 225.  Patient CBC at presentation shows WBC count of 2.7 hemoglobin 9.1 and creatinine of 153.  Patient ANC was 1.79.  Patient just finished course of antibiotics.    Plan past Medical History:   has a past medical history of Anxiety, Asthma, Bronchitis, DDD (degenerative disc disease), lumbar, Depression, Diabetes mellitus (HCC), Elevated glucose, Headache(784.0), Hernia of abdominal cavity, HH (hiatus hernia), Hyperlipemia, mixed, Hypertension, Osteoarthritis, Right femoral vein DVT (HCC), and Uterine hyperplasia.    Past Surgical History:   has a past surgical history that includes Dilation and curettage of uterus (10/08 and 09/07); Breast reduction surgery (12/1997); Breast reduction surgery (12/1997); Tympanostomy tube placement (03/1967); Tonsillectomy   80 mg at 04/16/24 1802    fluticasone (FLONASE) 50 MCG/ACT nasal spray 1 spray  1 spray Each Nostril Daily Barbara Dillard MD   1 spray at 04/16/24 0952    budesonide (PULMICORT) nebulizer suspension 500 mcg  0.5 mg Nebulization BID RT Leonel Thomas, DO   500 mcg at 04/16/24 1805    pantoprazole (PROTONIX) tablet 40 mg  40 mg Oral QAM Theresa Pearson MD   40 mg at 04/16/24 0610    montelukast (SINGULAIR) tablet 10 mg  10 mg Oral Nightly Barbara Dillard MD   10 mg at 04/15/24 2048    sodium chloride flush 0.9 % injection 5-40 mL  5-40 mL IntraVENous 2 times per day Barbara Dillard MD   10 mL at 04/16/24 0951    sodium chloride flush 0.9 % injection 5-40 mL  5-40 mL IntraVENous PRN Barbara Dillard MD        0.9 % sodium chloride infusion   IntraVENous PRN Barbara Dillard  mL/hr at 04/08/24 1059 New Bag at 04/08/24 1059    potassium chloride (KLOR-CON M) extended release tablet 40 mEq  40 mEq Oral PRN Barbara Dillard MD        Or    potassium bicarb-citric acid (EFFER-K) effervescent tablet 40 mEq  40 mEq Oral PRN Barbara Dillard MD        Or    potassium chloride 10 mEq/100 mL IVPB (Peripheral Line)  10 mEq IntraVENous PRN Barbara Dillard MD        magnesium sulfate 2000 mg in 50 mL IVPB premix  2,000 mg IntraVENous PRN Barbara Dillard MD        ondansetron (ZOFRAN-ODT) disintegrating tablet 4 mg  4 mg Oral Q8H PRN Barbara Dillard MD        Or    ondansetron (ZOFRAN) injection 4 mg  4 mg IntraVENous Q6H PRN Barbara Dillard MD        polyethylene glycol (GLYCOLAX) packet 17 g  17 g Oral Daily PRN Barbara Dillard MD   17 g at 04/09/24 2119    acetaminophen (TYLENOL) tablet 650 mg  650 mg Oral Q6H PRN Barbara Dillard MD   650 mg at 04/15/24 0721    Or    acetaminophen (TYLENOL) suppository 650 mg  650 mg Rectal Q6H PRN Barbara Dillard MD        dilTIAZem (CARDIZEM CD) extended release capsule 120 mg  120 mg Oral Daily Barbara Dillard MD   120 mg at 04/16/24 0951    midodrine (PROAMATINE) tablet 5 mg  5 mg Oral TID PRN

## 2024-04-17 NOTE — RT PROTOCOL NOTE
RT Inhaler-Nebulizer Bronchodilator Protocol Note    There is a bronchodilator order in the chart from a provider indicating to follow the RT Bronchodilator Protocol and there is an “Initiate RT Inhaler-Nebulizer Bronchodilator Protocol” order as well (see protocol at bottom of note).    CXR Findings:  No results found.    The findings from the last RT Protocol Assessment were as follows:   History Pulmonary Disease: Chronic pulmonary disease  Respiratory Pattern: Dyspnea on exertion or RR 21-25 bpm  Breath Sounds: Intermittent or unilateral wheezes  Cough: Strong, productive  Indication for Bronchodilator Therapy: Decreased or absent breath sounds  Bronchodilator Assessment Score: 9    Aerosolized bronchodilator medication orders have been revised according to the RT Inhaler-Nebulizer Bronchodilator Protocol below.    Respiratory Therapist to perform RT Therapy Protocol Assessment initially then follow the protocol.  Repeat RT Therapy Protocol Assessment PRN for score 0-3 or on second treatment, BID, and PRN for scores above 3.    No Indications - adjust the frequency to every 6 hours PRN wheezing or bronchospasm, if no treatments needed after 48 hours then discontinue using Per Protocol order mode.     If indication present, adjust the RT bronchodilator orders based on the Bronchodilator Assessment Score as indicated below.  Use Inhaler orders unless patient has one or more of the following: on home nebulizer, not able to hold breath for 10 seconds, is not alert and oriented, cannot activate and use MDI correctly, or respiratory rate 25 breaths per minute or more, then use the equivalent nebulizer order(s) with same Frequency and PRN reasons based on the score.  If a patient is on this medication at home then do not decrease Frequency below that used at home.    0-3 - enter or revise RT bronchodilator order(s) to equivalent RT Bronchodilator order with Frequency of every 4 hours PRN for wheezing or increased work  of breathing using Per Protocol order mode.        4-6 - enter or revise RT Bronchodilator order(s) to two equivalent RT bronchodilator orders with one order with BID Frequency and one order with Frequency of every 4 hours PRN wheezing or increased work of breathing using Per Protocol order mode.        7-10 - enter or revise RT Bronchodilator order(s) to two equivalent RT bronchodilator orders with one order with TID Frequency and one order with Frequency of every 4 hours PRN wheezing or increased work of breathing using Per Protocol order mode.       11-13 - enter or revise RT Bronchodilator order(s) to one equivalent RT bronchodilator order with QID Frequency and an Albuterol order with Frequency of every 4 hours PRN wheezing or increased work of breathing using Per Protocol order mode.      Greater than 13 - enter or revise RT Bronchodilator order(s) to one equivalent RT bronchodilator order with every 4 hours Frequency and an Albuterol order with Frequency of every 2 hours PRN wheezing or increased work of breathing using Per Protocol order mode.     RT to enter RT Home Evaluation for COPD & MDI Assessment order using Per Protocol order mode.    Electronically signed by Mis Saeed RCP on 4/16/2024 at 11:11 PM

## 2024-04-17 NOTE — CARE COORDINATION
Discharge planning    Pre cert still pending for [facility]. Patient on 4 L NC her home dose.

## 2024-04-17 NOTE — RT PROTOCOL NOTE
RT Inhaler-Nebulizer Bronchodilator Protocol Note    There is a bronchodilator order in the chart from a provider indicating to follow the RT Bronchodilator Protocol and there is an “Initiate RT Inhaler-Nebulizer Bronchodilator Protocol” order as well (see protocol at bottom of note).    CXR Findings:  No results found.    The findings from the last RT Protocol Assessment were as follows:   History Pulmonary Disease: Chronic pulmonary disease  Respiratory Pattern: Dyspnea on exertion or RR 21-25 bpm  Breath Sounds: Intermittent or unilateral wheezes  Cough: Strong, spontaneous, non-productive  Indication for Bronchodilator Therapy: Decreased or absent breath sounds  Bronchodilator Assessment Score: 8    Aerosolized bronchodilator medication orders have been revised according to the RT Inhaler-Nebulizer Bronchodilator Protocol below.    Respiratory Therapist to perform RT Therapy Protocol Assessment initially then follow the protocol.  Repeat RT Therapy Protocol Assessment PRN for score 0-3 or on second treatment, BID, and PRN for scores above 3.    No Indications - adjust the frequency to every 6 hours PRN wheezing or bronchospasm, if no treatments needed after 48 hours then discontinue using Per Protocol order mode.     If indication present, adjust the RT bronchodilator orders based on the Bronchodilator Assessment Score as indicated below.  Use Inhaler orders unless patient has one or more of the following: on home nebulizer, not able to hold breath for 10 seconds, is not alert and oriented, cannot activate and use MDI correctly, or respiratory rate 25 breaths per minute or more, then use the equivalent nebulizer order(s) with same Frequency and PRN reasons based on the score.  If a patient is on this medication at home then do not decrease Frequency below that used at home.    0-3 - enter or revise RT bronchodilator order(s) to equivalent RT Bronchodilator order with Frequency of every 4 hours PRN for wheezing

## 2024-04-17 NOTE — PLAN OF CARE
Problem: Respiratory - Adult  Goal: Able to breathe comfortably  4/17/2024 1941 by Earline Saeed RCP  Outcome: Progressing     Problem: Respiratory - Adult  Goal: Patient's breath sounds will be clear and equal  4/17/2024 1941 by Earline Saeed RCP  Outcome: Progressing     Problem: Respiratory - Adult  Goal: Adequate oxygenation  Description: Adequate oxygenation  4/17/2024 1941 by Earline Saeed RCP  Outcome: Progressing     Problem: Respiratory - Adult  Goal: Achieves optimal ventilation and oxygenation  4/17/2024 1941 by Earline Saeed RCP  Outcome: Progressing

## 2024-04-17 NOTE — PROGRESS NOTES
Occupational Therapy  Facility/Department: [facility] PROGRESSIVE CARE  Rehabilitation Occupational Therapy Daily Treatment Note    Date: 24  Patient Name: Shazia Dickson       Room: 1026/1026-01  MRN: [id number]  Account: [id number]   : 1955  (68 y.o.) Gender: female     Past Medical History:  has a past medical history of Anxiety, Asthma, Bronchitis, DDD (degenerative disc disease), lumbar, Depression, Diabetes mellitus (HCC), Elevated glucose, Headache(784.0), Hernia of abdominal cavity, HH (hiatus hernia), Hyperlipemia, mixed, Hypertension, Osteoarthritis, Right femoral vein DVT (HCC), and Uterine hyperplasia.  Past Surgical History:   has a past surgical history that includes Dilation and curettage of uterus (10/08 and ); Breast reduction surgery (1997); Breast reduction surgery (1997); Tympanostomy tube placement (1967); Tonsillectomy and adenoidectomy (); and Hysterectomy (7/17/15).    Restrictions  Restrictions/Precautions: Fall Risk, General Precautions, Up as Tolerated  Other position/activity restrictions: pure wick, 3 liters 02, telemetry, continuous Sp02, alarms, pt is 390lbs, droplet precautions/neutropenic  Required Braces or Orthoses?: No    Subjective  Subjective: \"I will get up but I am only sitting here one hour!\"  Restrictions/Precautions: Fall Risk;General Precautions;Up as Tolerated  Objective     Cognition  Overall Cognitive Status: Exceptions  Arousal/Alertness: Appropriate responses to stimuli  Following Commands: Follows multistep commands with increased time;Follows multistep commands with repitition  Attention Span: Appears intact  Memory: Appears intact  Safety Judgement: Decreased awareness of need for safety;Decreased awareness of need for assistance  Problem Solving: Assistance required to identify errors made;Assistance required to correct errors made;Decreased awareness of errors  Insights: Decreased awareness of deficits  Initiation: Requires cues for  management;Positioning;Self-Care / ADL;Home management training;Cognitive/Perceptual training;Coordination training  Additional Comments: Cont with stated POC    Goals  Patient Goals   Patient goals : Pt states she wants to go home!  Short Term Goals  Time Frame for Short Term Goals: by discharge, pt to demo  Short Term Goal 1: bed mob tasks with use of rail as needed to min assist of 1.  Short Term Goal 2: increase L shld AROM by a 10-15 degrees/increase BUE stength by a 1/2 grade to assist with ADL's.  Short Term Goal 3: UB ADL to set up and LB ADL to max assist of 1 supine in bed/standing with AD/AE as needed.  Short Term Goal 4: ADL transfers and functional mob with AD to max assist of 1.  Additional Goals?: No  Long Term Goals  Long Term Goal 1: Pt to complete toileting tasks with use of BSC/AD and grab bar as needed to mod assist of 1.  Long Term Goal 2: Pt/caregivers to be I with edema mgt, pressure relief, BUE HEP, EC/WS and fall prevention, and DME/AE recommendations with use of handouts.    AM-PAC Score        AM-[facility] Inpatient Daily Activity Raw Score: 12 (04/17/24 1201)  AM-PAC Inpatient ADL T-Scale Score : 30.6 (04/17/24 1201)  ADL Inpatient CMS 0-100% Score: 66.57 (04/17/24 1201)  ADL Inpatient CMS G-Code Modifier : CL (04/17/24 1201)      Therapy Time   Individual Concurrent Co-treatment Co-treatment   Time In     1031 0925   Time Out     1109 0940   Minutes     38 15       Co-treatment with PT warranted secondary to decreased safety and independence requiring 2 skilled therapy professionals to address individual discipline's goals. OT addressing preparation for ADL transfer, sitting balance for increased ADL performance, sitting/activity tolerance, functional reaching, environmental safety/scanning, fall prevention, functional mobility for ADL transfers, ability to sequence and follow directions, bed mobility tech, and functional UE strength.     DOMENICA MEJIA    Upon Co-Signature of this note,

## 2024-04-17 NOTE — CARE COORDINATION
Social work: Spoke to Annika/Kiara [place], authorization is still pending at this time; she will reach out to insurance for update.   Transport via [employer] on will-call in anticipation of approval.

## 2024-04-17 NOTE — PROGRESS NOTES
Pulmonary Critical Care Progress Note       Patient seen for the follow up of  Acute respiratory failure with hypoxia (HCC)     Subjective:  Patient is not ambulating.  She is still on 5 L oxygen.    She denies chest pain. Shortness of breath  is  the same.  She complains of dry cough bouts    Examination:  Vitals: BP (!) 116/49   Pulse 81   Temp 98.1 °F (36.7 °C)   Resp 19   Ht 1.651 m (5' 5\")   Wt (!) 177.9 kg (392 lb 3.2 oz)   LMP 07/16/2014   SpO2 93%   BMI 65.27 kg/m²   General appearance: in no acute distress, alert and cooperative with exam  Neck: No JVD  Lungs: decreased air exchange, no wheeze or crackles   Heart: regular rate and rhythm, S1, S2 normal, no gallop  Abdomen: Soft, non tender, + BS  Extremities: no cyanosis or clubbing. No significant edema    LABs:  PT/INR:   Recent Labs     04/15/24  0508 04/16/24  0516 04/17/24  0504   PROTIME 26.1* 29.9* 30.7*   INR 2.4 2.9 3.0     Radiology:  X-ray chest 4/8/2024  The cardiac and mediastinal contours appear unchanged. Similar appearance of  vascular congestion.  No new airspace disease identified in the interval.  No  evidence for pneumothorax.        Impression & Recommendations:    Chronic hypoxic and hypercarbic respiratory failure  Oxygen by nasal cannula  Incentive spirometer q.1h awake  BiPAP support encouraged    Acute exacerbation of Asthma /influenza A  DuoNeb every 4 hours while awake and as needed   Discontinue Pulmicort 0.5 b.I.d. by nebulizer  Symbicort 160 twice daily  Keep prednisone 30 mg daily taper to stop  Finished Zithromax course  Finished Tamiflu  Continue guaifenesin  Out of Droplet isolation  Flutter device      Pulmonary edema/Pulmonary hypertension, RVSP 55-60 mmHG by echo 2018  Echocardiogram 4/10/24-normal RVSP    Obstructive sleep apnea/ Morbid Obesity  Outpatient Sleep evaluation     History of PE/DVT  on Coumadin/monitor INR    Physical therapy/ambulate  Rehab discharge planning awaiting precertification  peptic  disease prophylaxis  DVT prophylaxis on Coumadin    Electronically signed by     Keon Collins MD on 4/17/2024 at 1:33 PM  Pulmonary Critical Care and Sleep Medicine,  [facility]  Office: [phone]

## 2024-04-17 NOTE — PROGRESS NOTES
[facility]  Office: [phone]  Carlos Caro, DO, Felipe Drew, DO, Jerman Mack DO, Leonel Thomas, DO, Larry Pisano MD, Monserrat Rosado MD, Sharon Perez MD, Beverley Curtis MD,  Jimi Messina MD, Kevin Cowan MD, Theresa Chavira MD,  Luis Coffman DO, Florencia Hernandez MD, Giorgi Salas MD, Ramone Caro DO, Mariel Bradshaw MD,  Bandar Anderson DO, Michelle Helm MD, Mireille Hermosillo MD, Yamileth Chong MD, Zo Montgomery MD,  Jordan Hernandez MD, Pilar Salcedo MD, Kevin Greenwood MD, Kevin Landon MD, Ramon Ellsworth MD, Ken Luna MD, Luis French DO, Porter Moore DO, Shayna Woodall MD,  Alvaro Reid MD, Shirley Waterhouse, CNP,  Octavia Coreas CNP, Chemo Pelayo, CNP,  Юлия Calero, DNP, Kaci Rodgers, CNP, Princess Mandujano, CNP, Madelaine Burns, CNP, Ivone Mancia, CNP, Val Hanson, PA-C, Joyce Jackson PA-C, Isabel Bustos, CNP, Johnna Horta, CNP, Karma Yost, CNP, Rosio Resendez, CNP, Jessi Montiel, CNP, Sandie Palmer, CNS, Balbina Hernández, CNP, Dolly Tamez CNP, Tracy Schwab, CNP         [facility]   IN-PATIENT SERVICE   [facility]    Progress Note    4/17/2024    1:05 PM    Name:   Shazia Dickson  MRN:     [id number]     Acct:      [id number]   Room:   1026/1026-01   Day:  13  Admit Date:  4/4/2024  6:36 PM    PCP:   Jyoti Mauricio APRN - CNP  Code Status:  Full Code    Subjective:     C/C:   Chief Complaint   Patient presents with    Shortness of Breath     60% on 4 L @ home     Interval History Status: not changed.     Patient seen examined at bedside. Doing well. About the same as yesterday, no new complaints, intermittent cough.    Brief History:     68-year-old female past medical history of diabetes type 2, COPD on 4 L home oxygen, DVT on warfarin presents with shortness of breath found to have influenza A was given Tamiflu and improved.  Patient being followed up by cardiology, pulmonology and oncology.    Review of Systems:    CO2 41*   GLUCOSE 215*   BUN 28*   CREATININE 1.0*   ANIONGAP 5*   LABGLOM 62   CALCIUM 9.0     Recent Labs     04/16/24  0809 04/16/24  1150 04/16/24  1720 04/16/24 2014 04/17/24  0824 04/17/24  1152   POCGLU 100 139* 283* 248* 107* 179*       ABG:  Lab Results   Component Value Date/Time    POCPH 7.29 05/05/2018 03:12 PM    POCPCO2 85 05/05/2018 03:12 PM    POCPO2 81 05/05/2018 03:12 PM    POCHCO3 41.2 05/05/2018 03:12 PM    NBEA NOT REPORTED 05/05/2018 03:12 PM    PBEA 15 05/05/2018 03:12 PM    VIW8BFS 44 05/05/2018 03:12 PM    NBBP9WKX 93 05/05/2018 03:12 PM    FIO2 100.0 05/05/2018 03:12 PM     Lab Results   Component Value Date/Time    SPECIAL NOT REPORTED 05/18/2018 03:00 AM     Lab Results   Component Value Date/Time    CULTURE NORMAL RESPIRATORY SHEBA HEAVY GROWTH 08/10/2022 05:20 AM       Radiology:  XR ABDOMEN (KUB) (SINGLE AP VIEW)    Result Date: 4/10/2024  Limited evaluation demonstrates nonobstructive small bowel gas pattern. Moderate stool in the proximal colon.       Physical Examination:        Physical Exam  Constitutional:       Appearance: She is obese. She is chronically ill-appearing. She is not toxic-appearing or diaphoretic.   HENT:      Head: Normocephalic.      Right Ear: External ear normal.      Left Ear: External ear normal.      Nose:      Comments: Nasal cannula  Eyes:      Pupils: Pupils are equal, round, and reactive to light.   Cardiovascular:      Rate and Rhythm: Normal rate and regular rhythm.      Heart sounds: Murmur heard.   Pulmonary:      Comments diminished breath sounds in all lung fields, no wheezing, no rhonchi, no crackles  Abdominal:      General: There is no distension.      Palpations: Abdomen is soft.      Tenderness: There is no abdominal tenderness.   Musculoskeletal:      Cervical back: Neck supple.      Right lower leg: No edema.      Left lower leg: No edema.   Skin:     General: Skin is dry.      Capillary Refill: Capillary refill takes 2 to 3 seconds.

## 2024-04-17 NOTE — RT PROTOCOL NOTE
RT Inhaler-Nebulizer Bronchodilator Protocol Note    There is a bronchodilator order in the chart from a provider indicating to follow the RT Bronchodilator Protocol and there is an “Initiate RT Inhaler-Nebulizer Bronchodilator Protocol” order as well (see protocol at bottom of note).    CXR Findings:  No results found.    The findings from the last RT Protocol Assessment were as follows:   History Pulmonary Disease: Chronic pulmonary disease  Respiratory Pattern: Dyspnea on exertion or RR 21-25 bpm  Breath Sounds: Intermittent or unilateral wheezes  Cough: Strong, spontaneous, non-productive  Indication for Bronchodilator Therapy: Decreased or absent breath sounds  Bronchodilator Assessment Score: 8    Aerosolized bronchodilator medication orders have been revised according to the RT Inhaler-Nebulizer Bronchodilator Protocol below.    Respiratory Therapist to perform RT Therapy Protocol Assessment initially then follow the protocol.  Repeat RT Therapy Protocol Assessment PRN for score 0-3 or on second treatment, BID, and PRN for scores above 3.    No Indications - adjust the frequency to every 6 hours PRN wheezing or bronchospasm, if no treatments needed after 48 hours then discontinue using Per Protocol order mode.     If indication present, adjust the RT bronchodilator orders based on the Bronchodilator Assessment Score as indicated below.  Use Inhaler orders unless patient has one or more of the following: on home nebulizer, not able to hold breath for 10 seconds, is not alert and oriented, cannot activate and use MDI correctly, or respiratory rate 25 breaths per minute or more, then use the equivalent nebulizer order(s) with same Frequency and PRN reasons based on the score.  If a patient is on this medication at home then do not decrease Frequency below that used at home.    0-3 - enter or revise RT bronchodilator order(s) to equivalent RT Bronchodilator order with Frequency of every 4 hours PRN for wheezing  or increased work of breathing using Per Protocol order mode.        4-6 - enter or revise RT Bronchodilator order(s) to two equivalent RT bronchodilator orders with one order with BID Frequency and one order with Frequency of every 4 hours PRN wheezing or increased work of breathing using Per Protocol order mode.        7-10 - enter or revise RT Bronchodilator order(s) to two equivalent RT bronchodilator orders with one order with TID Frequency and one order with Frequency of every 4 hours PRN wheezing or increased work of breathing using Per Protocol order mode.       11-13 - enter or revise RT Bronchodilator order(s) to one equivalent RT bronchodilator order with QID Frequency and an Albuterol order with Frequency of every 4 hours PRN wheezing or increased work of breathing using Per Protocol order mode.      Greater than 13 - enter or revise RT Bronchodilator order(s) to one equivalent RT bronchodilator order with every 4 hours Frequency and an Albuterol order with Frequency of every 2 hours PRN wheezing or increased work of breathing using Per Protocol order mode.     RT to enter RT Home Evaluation for COPD & MDI Assessment order using Per Protocol order mode.    Electronically signed by Mis Saeed RCP on 4/17/2024 at 7:42 PM

## 2024-04-17 NOTE — PLAN OF CARE
Problem: Respiratory - Adult  Goal: Able to breathe comfortably  4/16/2024 2310 by Earline Saeed RCP  Outcome: Progressing     Problem: Respiratory - Adult  Goal: Patient's breath sounds will be clear and equal  4/16/2024 2310 by Earline Saeed RCP  Outcome: Progressing     Problem: Respiratory - Adult  Goal: Adequate oxygenation  Description: Adequate oxygenation  4/16/2024 2310 by Earline Saeed RCP  Outcome: Progressing     Problem: Respiratory - Adult  Goal: Achieves optimal ventilation and oxygenation  4/16/2024 2310 by Earline Saeed RCP  Outcome: Progressing

## 2024-04-17 NOTE — PLAN OF CARE
Problem: Respiratory - Adult  Goal: Able to breathe comfortably  4/17/2024 1133 by Andi Chatman RCP  Outcome: Progressing     Problem: Respiratory - Adult  Goal: Patient's breath sounds will be clear and equal  4/17/2024 1133 by Andi Chatman RCP  Outcome: Progressing     Problem: Respiratory - Adult  Goal: Adequate oxygenation  Description: Adequate oxygenation  4/17/2024 1133 by Andi Chatman RCP  Outcome: Progressing     Problem: Respiratory - Adult  Goal: Achieves optimal ventilation and oxygenation  4/17/2024 1133 by Andi Chatman RCP  Outcome: Progressing

## 2024-04-17 NOTE — PROGRESS NOTES
Warfarin Dosing - Pharmacy Consult Note  Consulting Provider: Nishant Jimenez MD  Indication:  History of  VTE/PE  Warfarin Dose prior to admission: 2mg M-W-F, 4mg all other days   Concurrent anticoagulants/antiplatelets: none  Significant Drug Interactions:  Prednisone  Recent Labs     04/15/24  0508 04/16/24  0516 04/16/24  1343 04/17/24  0504   INR 2.4 2.9  --  3.0   HGB  --   --  9.8*  --    PLT  --   --  144  --      Recent warfarin administrations                     warfarin (COUMADIN) tablet 2 mg (mg) 2 mg Given 04/16/24 1802    warfarin (COUMADIN) tablet 4 mg (mg) 4 mg Given 04/15/24 1742    warfarin (COUMADIN) tablet 4 mg (mg) 4 mg Given 04/14/24 1818                   Date   INR    Dose  4/10       3.4       none  4/11       3.2       2 mg  4/12       2.7       2 mg  4/13       2.6       2 mg  4/14       2.5       4 mg  4/15       2.4       4 mg  4/16       2.9       Assessment/Plan  (Goal INR: 2.5 - 3.5)  INR therapeutic. Will continue with home dose schedule.  Warfarin 2mg today. INR in the morning.    Active problem list reviewed.  INR orders are placed.  Chart reviewed for pertinent labs, drug/diet interactions, and past doses.  Documentation of patient's clinical condition was reviewed.    Pharmacy Dosing:  Pharmacy will continue to follow.

## 2024-04-17 NOTE — PLAN OF CARE
4/16/2024 2000)  Incisions, Wounds, or Drain Sites Healing Without Sign and Symptoms of Infection: ADMISSION and DAILY: Assess and document risk factors for pressure ulcer development     Problem: Musculoskeletal - Adult  Goal: Return ADL status to a safe level of function  4/17/2024 0231 by Dasia Rogers, RN  Outcome: Progressing  Flowsheets (Taken 4/16/2024 2000)  Return ADL Status to a Safe Level of Function: Administer medication as ordered     Problem: Infection - Adult  Goal: Absence of infection at discharge  4/17/2024 0231 by Dasia Rogers RN  Outcome: Progressing  Flowsheets (Taken 4/16/2024 2000)  Absence of infection at discharge: Assess and monitor for signs and symptoms of infection     Problem: Chronic Conditions and Co-morbidities  Goal: Patient's chronic conditions and co-morbidity symptoms are monitored and maintained or improved  4/17/2024 0231 by Dasia Rogers RN  Outcome: Progressing  Flowsheets (Taken 4/16/2024 2000)  Care Plan - Patient's Chronic Conditions and Co-Morbidity Symptoms are Monitored and Maintained or Improved: Monitor and assess patient's chronic conditions and comorbid symptoms for stability, deterioration, or improvement     Problem: Nutrition Deficit:  Goal: Optimize nutritional status  4/17/2024 0231 by Dasia Rogers RN  Outcome: Progressing

## 2024-04-18 ENCOUNTER — APPOINTMENT (OUTPATIENT)
Dept: GENERAL RADIOLOGY | Age: 69
End: 2024-04-18
Payer: MEDICARE

## 2024-04-18 ENCOUNTER — HOSPITAL ENCOUNTER (OUTPATIENT)
Dept: WOUND CARE | Age: 69
Discharge: HOME | End: 2024-04-18

## 2024-04-18 PROBLEM — J96.01 ACUTE RESPIRATORY FAILURE WITH HYPOXIA (HCC): Status: RESOLVED | Noted: 2024-04-04 | Resolved: 2024-04-18

## 2024-04-18 LAB
GLUCOSE BLD-MCNC: 124 MG/DL (ref 65–105)
GLUCOSE BLD-MCNC: 206 MG/DL (ref 65–105)
GLUCOSE BLD-MCNC: 231 MG/DL (ref 65–105)
GLUCOSE BLD-MCNC: 86 MG/DL (ref 65–105)
INR PPP: 3
PROTHROMBIN TIME: 30.7 SEC (ref 11.5–14.2)

## 2024-04-18 PROCEDURE — 2580000003 HC RX 258: Performed by: INTERNAL MEDICINE

## 2024-04-18 PROCEDURE — 94640 AIRWAY INHALATION TREATMENT: CPT

## 2024-04-18 PROCEDURE — 82947 ASSAY GLUCOSE BLOOD QUANT: CPT

## 2024-04-18 PROCEDURE — 6370000000 HC RX 637 (ALT 250 FOR IP): Performed by: INTERNAL MEDICINE

## 2024-04-18 PROCEDURE — 6370000000 HC RX 637 (ALT 250 FOR IP): Performed by: NURSE PRACTITIONER

## 2024-04-18 PROCEDURE — 2700000000 HC OXYGEN THERAPY PER DAY

## 2024-04-18 PROCEDURE — 99232 SBSQ HOSP IP/OBS MODERATE 35: CPT | Performed by: INTERNAL MEDICINE

## 2024-04-18 PROCEDURE — 6370000000 HC RX 637 (ALT 250 FOR IP): Performed by: STUDENT IN AN ORGANIZED HEALTH CARE EDUCATION/TRAINING PROGRAM

## 2024-04-18 PROCEDURE — 2060000000 HC ICU INTERMEDIATE R&B

## 2024-04-18 PROCEDURE — 97535 SELF CARE MNGMENT TRAINING: CPT

## 2024-04-18 PROCEDURE — 71045 X-RAY EXAM CHEST 1 VIEW: CPT

## 2024-04-18 PROCEDURE — 85610 PROTHROMBIN TIME: CPT

## 2024-04-18 PROCEDURE — 97530 THERAPEUTIC ACTIVITIES: CPT

## 2024-04-18 PROCEDURE — 94761 N-INVAS EAR/PLS OXIMETRY MLT: CPT

## 2024-04-18 PROCEDURE — 36415 COLL VENOUS BLD VENIPUNCTURE: CPT

## 2024-04-18 PROCEDURE — 99232 SBSQ HOSP IP/OBS MODERATE 35: CPT | Performed by: STUDENT IN AN ORGANIZED HEALTH CARE EDUCATION/TRAINING PROGRAM

## 2024-04-18 PROCEDURE — 94669 MECHANICAL CHEST WALL OSCILL: CPT

## 2024-04-18 RX ORDER — INSULIN LISPRO 100 [IU]/ML
0-4 INJECTION, SOLUTION INTRAVENOUS; SUBCUTANEOUS NIGHTLY
Status: DISCONTINUED | OUTPATIENT
Start: 2024-04-18 | End: 2024-04-19 | Stop reason: HOSPADM

## 2024-04-18 RX ORDER — WARFARIN SODIUM 2 MG/1
4 TABLET ORAL
Status: COMPLETED | OUTPATIENT
Start: 2024-04-18 | End: 2024-04-18

## 2024-04-18 RX ORDER — INSULIN LISPRO 100 [IU]/ML
0-8 INJECTION, SOLUTION INTRAVENOUS; SUBCUTANEOUS
Status: DISCONTINUED | OUTPATIENT
Start: 2024-04-18 | End: 2024-04-19 | Stop reason: HOSPADM

## 2024-04-18 RX ADMIN — GUAIFENESIN AND DEXTROMETHORPHAN 5 ML: 100; 10 SYRUP ORAL at 15:35

## 2024-04-18 RX ADMIN — GUAIFENESIN AND DEXTROMETHORPHAN 5 ML: 100; 10 SYRUP ORAL at 22:00

## 2024-04-18 RX ADMIN — PREDNISONE 20 MG: 20 TABLET ORAL at 09:43

## 2024-04-18 RX ADMIN — CARVEDILOL 12.5 MG: 12.5 TABLET, FILM COATED ORAL at 09:43

## 2024-04-18 RX ADMIN — IPRATROPIUM BROMIDE AND ALBUTEROL SULFATE 1 DOSE: 2.5; .5 SOLUTION RESPIRATORY (INHALATION) at 19:40

## 2024-04-18 RX ADMIN — INSULIN LISPRO 4 UNITS: 100 INJECTION, SOLUTION INTRAVENOUS; SUBCUTANEOUS at 17:37

## 2024-04-18 RX ADMIN — GUAIFENESIN 600 MG: 600 TABLET ORAL at 09:43

## 2024-04-18 RX ADMIN — SODIUM CHLORIDE, PRESERVATIVE FREE 10 ML: 5 INJECTION INTRAVENOUS at 20:37

## 2024-04-18 RX ADMIN — INSULIN GLARGINE 15 UNITS: 100 INJECTION, SOLUTION SUBCUTANEOUS at 09:43

## 2024-04-18 RX ADMIN — IPRATROPIUM BROMIDE AND ALBUTEROL SULFATE 1 DOSE: 2.5; .5 SOLUTION RESPIRATORY (INHALATION) at 07:54

## 2024-04-18 RX ADMIN — IPRATROPIUM BROMIDE AND ALBUTEROL SULFATE 1 DOSE: 2.5; .5 SOLUTION RESPIRATORY (INHALATION) at 15:02

## 2024-04-18 RX ADMIN — MICONAZOLE NITRATE: 20 CREAM TOPICAL at 20:31

## 2024-04-18 RX ADMIN — FLUTICASONE PROPIONATE 1 SPRAY: 50 SPRAY, METERED NASAL at 09:44

## 2024-04-18 RX ADMIN — MICONAZOLE NITRATE: 20 CREAM TOPICAL at 14:13

## 2024-04-18 RX ADMIN — GUAIFENESIN 600 MG: 600 TABLET ORAL at 20:30

## 2024-04-18 RX ADMIN — BUDESONIDE AND FORMOTEROL FUMARATE DIHYDRATE 2 PUFF: 160; 4.5 AEROSOL RESPIRATORY (INHALATION) at 07:55

## 2024-04-18 RX ADMIN — BUDESONIDE AND FORMOTEROL FUMARATE DIHYDRATE 2 PUFF: 160; 4.5 AEROSOL RESPIRATORY (INHALATION) at 19:42

## 2024-04-18 RX ADMIN — CARVEDILOL 12.5 MG: 12.5 TABLET, FILM COATED ORAL at 17:38

## 2024-04-18 RX ADMIN — INSULIN GLARGINE 15 UNITS: 100 INJECTION, SOLUTION SUBCUTANEOUS at 20:35

## 2024-04-18 RX ADMIN — SODIUM CHLORIDE, PRESERVATIVE FREE 10 ML: 5 INJECTION INTRAVENOUS at 09:44

## 2024-04-18 RX ADMIN — ACETAMINOPHEN 650 MG: 325 TABLET ORAL at 12:31

## 2024-04-18 RX ADMIN — PANTOPRAZOLE SODIUM 40 MG: 40 TABLET, DELAYED RELEASE ORAL at 13:42

## 2024-04-18 RX ADMIN — DILTIAZEM HYDROCHLORIDE 120 MG: 120 CAPSULE, EXTENDED RELEASE ORAL at 09:43

## 2024-04-18 RX ADMIN — PRAVASTATIN SODIUM 80 MG: 40 TABLET ORAL at 17:37

## 2024-04-18 RX ADMIN — MONTELUKAST 10 MG: 10 TABLET, FILM COATED ORAL at 20:30

## 2024-04-18 RX ADMIN — WARFARIN SODIUM 4 MG: 2 TABLET ORAL at 17:36

## 2024-04-18 ASSESSMENT — PAIN DESCRIPTION - DESCRIPTORS
DESCRIPTORS: ACHING

## 2024-04-18 ASSESSMENT — PAIN SCALES - GENERAL
PAINLEVEL_OUTOF10: 3
PAINLEVEL_OUTOF10: 1

## 2024-04-18 ASSESSMENT — PAIN DESCRIPTION - LOCATION
LOCATION: HEAD

## 2024-04-18 ASSESSMENT — PAIN DESCRIPTION - ORIENTATION
ORIENTATION: MID;ANTERIOR;LEFT
ORIENTATION: LEFT;UPPER
ORIENTATION: LEFT

## 2024-04-18 NOTE — DISCHARGE SUMMARY
[facility]  Office: [phone]  Carlos Caro DO, Felipe Drew DO, Jerman Mack DO, Leonel Thomas DO, Larry Pisano MD, Monserrat Rosado MD, Sharon Perez MD, Beverley Curtis MD,  Jimi Messina MD, Kevin Cowan MD, Porter Moore DO, Theresa Chavira MD,  Luis Coffman DO, Florencia Hernandez MD, Giorgi Salas MD, Ramone Caro DO, Mariel Bradshaw MD,  Bandar Anderson DO, Michelle Helm MD, Mireille Hermosillo MD, Yamileth Chong MD, Zo Montgomery MD,  Jordan Hernandez MD, Pilar Salcedo MD, Kevin Greenwood MD, Luis French DO, Shayna Woodall MD,  Alvaro Reid MD, Shirley Waterhouse, CNP,  Octavia Coreas, CNP, Rosio Resendez, CNP, Chemo Pelyao, CNP,  Юлия Calero, DNP, Kaci Rodgers, CNP, Princess Mandujano, CNP, Jessi Montiel, CNP, Madelaine Burns, CNP, Ivone Mancia, CNP, Rai Vizcaino PA-C, Sandie Palmer, CNS, Balbina Hernández, CNP, Dolly Tamez, CNP         [facility]   IN-PATIENT SERVICE   [facility]    Discharge Summary     Patient ID: Shazia Dickson  :  1955   MRN: [id number]     ACCOUNT:  [id number]   Patient's PCP: Jyoti Mauricio APRN - CNP  Admit Date: 2024   Discharge Date: 2024  Length of Stay: 15  Code Status:  Full Code  Admitting Physician: No admitting provider for patient encounter.  Discharge Physician: Jerman Mack DO     Active Discharge Diagnoses:     Hospital Problem Lists:  Principal Problem:    Acute on chronic respiratory failure with hypoxia (HCC)  Active Problems:    Anxiety    Depression    Hyperlipemia, mixed    Anticoagulated on Coumadin    Normocytic anemia    Essential hypertension    Stage 3a chronic kidney disease (HCC)    Chronic deep vein thrombosis (DVT) of femoral vein of right lower extremity (HCC)    BMI 60.0-69.9, adult (HCC)    Pulmonary embolism on right (HCC)    Diabetes mellitus type 2, noninsulin dependent (HCC)    Cytopenia    Influenza A    COPD exacerbation (HCC)    Pancytopenia (HCC)     prescriptions for required medications, discharge plan and follow up.    Electronically signed by   Jerman Mack DO  4/19/2024  1:35 PM      Thank you Jyoti Dennis, APRN - LISA for the opportunity to be involved in this patient's care.

## 2024-04-18 NOTE — PLAN OF CARE
Problem: Respiratory - Adult  Goal: Able to breathe comfortably  4/18/2024 1942 by Tyrone Sheriff RCP  Outcome: Progressing     Problem: Respiratory - Adult  Goal: Patient's breath sounds will be clear and equal  4/18/2024 1942 by Tyrone Sheriff RCP  Outcome: Progressing     Problem: Respiratory - Adult  Goal: Adequate oxygenation  Description: Adequate oxygenation  4/18/2024 1942 by Tyrone Sheriff RCP  Outcome: Progressing     Problem: Respiratory - Adult  Goal: Achieves optimal ventilation and oxygenation  4/18/2024 1942 by Tyrone Sheriff RCP  Outcome: Progressing

## 2024-04-18 NOTE — PROGRESS NOTES
Physical Therapy  Facility/Department: [facility] CARE  Rehabilitation Physical Therapy Treatment Note    NAME: Shazia Dickson  : 1955 (68 y.o.)  MRN: [id number]  CODE STATUS: Full Code    Date of Service: 24       Restrictions:  Restrictions/Precautions: Fall Risk, General Precautions, Up as Tolerated  Position Activity Restriction  Other position/activity restrictions: pure wick, 3 liters 02, telemetry, continuous Sp02, alarms, pt is 390lbs,  precautions/neutropenic     SUBJECTIVE  Subjective  Subjective: pt in bed upon arrival agreeable to PT on 3rd attempt               OBJECTIVE  Cognition  Overall Cognitive Status: Exceptions  Arousal/Alertness: Appropriate responses to stimuli  Following Commands: Follows multistep commands with increased time;Follows multistep commands with repitition  Attention Span: Appears intact  Memory: Appears intact  Safety Judgement: Decreased awareness of need for safety;Decreased awareness of need for assistance  Problem Solving: Assistance required to identify errors made;Assistance required to correct errors made;Decreased awareness of errors  Insights: Decreased awareness of deficits  Initiation: Requires cues for all  Sequencing: Requires cues for some  Cognition Comment: Pt. requires cues and encouragement for all tasks.  Orientation  Overall Orientation Status: Within Functional Limits  Orientation Level: Oriented X4    Functional Mobility  Overall Level of Assistance: Total assistance;Moderate assistance;Assist X2 (Skylift used to transfer pt to recliner as pt is in a baratric bed that is unable to lower to a height for pt to stand safetly from without pt being at a high risk of slipping off bedside. Pt completed 2 STS from EOB with RW. Pt skylifted back to bed.)  Interventions: Verbal cues;Safety awareness training (Min cues for proper hand placement on stable surface, controlled sit<>stand, assist with lines, RW safety/mgmt, all to inc safety/reduce

## 2024-04-18 NOTE — CASE COMMUNICATION
Social work: spoke to ash moyer [facility] she is checking daily with [employer] to see why precert is taking so long.  [facility] has accepted pt. Hens is done. Family wants transport to be before 5 pm.  Await precert.   Will need narcisa and Rx at discharge.  Report [phone] and admissions phone: [phone]  Fax: [phone] building office and central intake fax [phone]  Юлия luong

## 2024-04-18 NOTE — PROGRESS NOTES
Warfarin Dosing - Pharmacy Consult Note  Consulting Provider: Nishant Jimenez MD   Indication:  History of  VTE/PE  Warfarin Dose prior to admission: 2mg M-W-F, 4mg all other days    Concurrent anticoagulants/antiplatelets: none  Significant Drug Interactions:  prednisone  Recent Labs     04/16/24  0516 04/16/24  1343 04/17/24  0504 04/18/24  0518   INR 2.9  --  3.0 3.0   HGB  --  9.8*  --   --    PLT  --  144  --   --      Recent warfarin administrations                     warfarin (COUMADIN) tablet 2 mg (mg) 2 mg Given 04/17/24 1750    warfarin (COUMADIN) tablet 2 mg (mg) 2 mg Given 04/16/24 1802    warfarin (COUMADIN) tablet 4 mg (mg) 4 mg Given 04/15/24 1742                   Date   INR    Dose  4/10       3.4       none  4/11       3.2       2 mg  4/12       2.7       2 mg  4/13       2.6       2 mg  4/14       2.5       4 mg  4/15       2.4       4 mg  4/16       2.9       2 mg   4/17       3.0       2 mg  4/18       3.0    Assessment/Plan  (Goal INR: 2 - 2.5)  INR therapeutic, will continue with home dose of 4 mg today. Follow INR in AM.     Active problem list reviewed.  INR orders are placed.  Chart reviewed for pertinent labs, drug/diet interactions, and past doses.  Documentation of patient's clinical condition was reviewed.    Pharmacy Dosing:  Pharmacy will continue to follow.

## 2024-04-18 NOTE — RT PROTOCOL NOTE
RT Inhaler-Nebulizer Bronchodilator Protocol Note    There is a bronchodilator order in the chart from a provider indicating to follow the RT Bronchodilator Protocol and there is an “Initiate RT Inhaler-Nebulizer Bronchodilator Protocol” order as well (see protocol at bottom of note).    CXR Findings:  No results found.    The findings from the last RT Protocol Assessment were as follows:   History Pulmonary Disease: Chronic pulmonary disease  Respiratory Pattern: Dyspnea on exertion or RR 21-25 bpm  Breath Sounds: Intermittent or unilateral wheezes  Cough: Strong, productive  Indication for Bronchodilator Therapy: Decreased or absent breath sounds, On home bronchodilators, Wheezing associated with pulm disorder  Bronchodilator Assessment Score: 9    Aerosolized bronchodilator medication orders have been revised according to the RT Inhaler-Nebulizer Bronchodilator Protocol below.    Respiratory Therapist to perform RT Therapy Protocol Assessment initially then follow the protocol.  Repeat RT Therapy Protocol Assessment PRN for score 0-3 or on second treatment, BID, and PRN for scores above 3.    No Indications - adjust the frequency to every 6 hours PRN wheezing or bronchospasm, if no treatments needed after 48 hours then discontinue using Per Protocol order mode.     If indication present, adjust the RT bronchodilator orders based on the Bronchodilator Assessment Score as indicated below.  Use Inhaler orders unless patient has one or more of the following: on home nebulizer, not able to hold breath for 10 seconds, is not alert and oriented, cannot activate and use MDI correctly, or respiratory rate 25 breaths per minute or more, then use the equivalent nebulizer order(s) with same Frequency and PRN reasons based on the score.  If a patient is on this medication at home then do not decrease Frequency below that used at home.    0-3 - enter or revise RT bronchodilator order(s) to equivalent RT Bronchodilator order

## 2024-04-18 NOTE — CARE COORDINATION
Discharge planning    Patient has had discharge order in since 4/16 4/13 precert started by [facility] [place]  4/17 [facility] admission liaison Annika updated ss that she will reach out to insurance to see why pre cert not obtained as of yet.     Discussed with ss and she will follow up    IMM letter completed for 3rd time.

## 2024-04-18 NOTE — PROGRESS NOTES
Pulmonary Critical Care Progress Note       Patient seen for the follow up of  Acute respiratory failure with hypoxia (HCC)     Subjective:  Patient is not ambulating.  She is weaned down to oxygen 4.5 L.   She denies chest pain. Shortness of breath  is  the same.  She still gets dry cough bouts at times    Examination:  Vitals: BP (!) 143/58   Pulse 81   Temp 98.2 °F (36.8 °C)   Resp 18   Ht 1.651 m (5' 5\")   Wt (!) 177.9 kg (392 lb 3.2 oz)   LMP 07/16/2014   SpO2 94%   BMI 65.27 kg/m²   General appearance: in no acute distress, alert and cooperative with exam  Neck: No JVD  Lungs: decreased air exchange, no wheeze or crackles   Heart: regular rate and rhythm, S1, S2 normal, no gallop  Abdomen: Soft, non tender, + BS  Extremities: no cyanosis or clubbing. No significant edema    LABs:  PT/INR:   Recent Labs     04/16/24  0516 04/17/24  0504 04/18/24  0518   PROTIME 29.9* 30.7* 30.7*   INR 2.9 3.0 3.0     Radiology:  X-ray chest 4/8/2024  The cardiac and mediastinal contours appear unchanged. Similar appearance of  vascular congestion.  No new airspace disease identified in the interval.  No  evidence for pneumothorax.        Impression & Recommendations:    Chronic hypoxic and hypercarbic respiratory failure  Oxygen by nasal cannula  Incentive spirometer q.1h awake  BiPAP support encouraged    Acute exacerbation of Asthma /influenza A  DuoNeb every 4 hours while awake and as needed   Symbicort 160 twice daily  Make prednisone 20 mg daily taper to stop  Finished Zithromax course  Finished Tamiflu  Continue guaifenesin  Out of Droplet isolation  Flutter device  Repeat chest x-ray      Pulmonary edema/Pulmonary hypertension, RVSP 55-60 mmHG by echo 2018  Echocardiogram 4/10/24-normal RVSP    Obstructive sleep apnea/ Morbid Obesity  Outpatient Sleep evaluation     History of PE/DVT  on Coumadin/monitor INR    PT OT/increase ambulation  Rehab discharge planning awaiting precertification  peptic ulcer disease  prophylaxis  DVT prophylaxis on Coumadin    Electronically signed by     Keon Collins MD on 4/18/2024 at 1:06 PM  Pulmonary Critical Care and Sleep Medicine,  [facility]  Office: [phone]

## 2024-04-18 NOTE — PROGRESS NOTES
Occupational Therapy    DATE: 2024    NAME: Shazia Dickson  MRN: [id number]   : 1955    Patient not seen this date for Occupational Therapy due to:      [] Cancel by RN or physician due to:    [] Hemodialysis    [] Critical Lab Value Level     [] Blood transfusion in progress    [] Acute or unstable cardiovascular status   _MAP < 55 or more than >115  _HR < 40 or > 130    [] Acute or unstable pulmonary status   -FiO2 > 60%   _RR < 5 or >40    _O2 sats < 85%    [] Strict Bedrest    [] Off Unit for surgery or procedure    [] Off Unit for testing       [] Pending imaging to R/O fracture    [x] Refusal by Patient: Pt adamently declined therapy this AM as pt stated. \"I need my ext cath changed and I need to be cleaned up before I do anything with you\". Pt checked on ~1-2 hours later, pt stated, \"they changed my ext cath but still haven't cleaned me up enough yet\". JOSUÉ Ramírez notified. Will check back as able.     [] Intubated    [] Other      [] OT being discontinued at this time. Patient independent. No further needs.     [] OT being discontinued at this time as the patient has been transferred to hospice care. No further needs.    Isabel Ahumada OTR/L

## 2024-04-18 NOTE — PLAN OF CARE
Problem: Respiratory - Adult  Goal: Able to breathe comfortably  4/18/2024 0803 by Sharon Marvin, MATT  Outcome: Progressing  4/17/2024 1941 by Earline Saeed RCP  Outcome: Progressing  Goal: Patient's breath sounds will be clear and equal  4/18/2024 0803 by Sharon Marvin RCP  Outcome: Progressing  4/17/2024 1941 by Earline Saeed RCP  Outcome: Progressing  Goal: Adequate oxygenation  Description: Adequate oxygenation  4/18/2024 0803 by Sharon Marvin RCP  Outcome: Progressing  4/17/2024 1941 by Earline Saeed RCP  Outcome: Progressing  Goal: Achieves optimal ventilation and oxygenation  4/18/2024 0803 by Sharon Marvin RCP  Outcome: Progressing  4/17/2024 2150 by Reyes, Brittnie, RN  Outcome: Progressing  4/17/2024 1941 by Earline Saeed RCP  Outcome: Progressing

## 2024-04-18 NOTE — PROGRESS NOTES
without pt being at a high risk of slipping off bedside. Pt completed 2 STS from EOB with RW. Pt skylifted back to bed.)  Interventions: Verbal cues;Safety awareness training (Min cues for proper hand placement on stable surface, controlled sit<>stand, assist with lines, RW safety/mgmt, all to inc safety/reduce fall risk)  Sit to Stand: Moderate assistance;Assist X2  Stand to Sit: Moderate assistance;Assist X2       ADL  LE Dressing: Dependent/Total  LE Dressing Skilled Clinical Factors: to catracho B socks while long sitting in recliner.  Additional Comments: Pt skylifted to recliner. Pt completed 2 STS from EOB with RW, 1x for 30 seconds, pt took a 2-3 min rest, and stood again for 45 seconds (Mod A x2 for each STS). While sitting in recliner resting, pt sat unsupported for ~6-7+mins total. SPO2 WFL throught, pt with Good pursed lip breathing tech while standing. Pt was skylifted back to supine position in bed as pt reported that the chair is painful and she wanted to get washed up in the bed. RN Desiree present to assist pt with DEP bed bath upon exit. Self-care continues to be limited d/t body habitus, dec activity tolerance, and signficant weakness.          Safety Devices  Type of Devices: Call light within reach;Gait belt;Nurse notified;Left in bed (RN present upon exit)     Patient Education  Education Given To: Patient  Education Provided: Role of Therapy;Plan of Care;Energy Conservation;Fall Prevention Strategies;Family Education;Transfer Training  Education Provided Comments: pursed lip breathing, safety in function, call light use, benefits of being up as able, recommendations for continued therapy  Education Method: Demonstration;Verbal  Barriers to Learning: Cognition  Education Outcome: Continued education needed    Goals  Short Term Goals  Time Frame for Short Term Goals: by discharge, pt to demo  Short Term Goal 1: bed mob tasks with use of rail as needed to min assist of 1.  Short Term Goal 2: increase L  discipline's goals. OT addressing preparation for ADL transfer, sitting balance for increased ADL performance, sitting/activity tolerance, functional reaching, environmental safety/scanning, fall prevention, functional mobility for ADL transfers, ability to sequence and follow directions, bed mobility tech, and functional UE strength.    Isabel Ahumada OTR/L

## 2024-04-18 NOTE — PLAN OF CARE
Patient aox4. Remains on baseline of 4L nasal cannula. Insulin provided per sliding scale. Patient sat up in chair for one hour this morning. [place] and brief in place. Awaiting precert to [facility]. VSS, call light within reach, safety maintained        Problem: Respiratory - Adult  Goal: Achieves optimal ventilation and oxygenation  4/17/2024 2150 by Reyes, Brittnie, RN  Outcome: Progressing     Problem: Skin/Tissue Integrity - Adult  Goal: Incisions, wounds, or drain sites healing without S/S of infection  Outcome: Progressing     Problem: Musculoskeletal - Adult  Goal: Return ADL status to a safe level of function  Outcome: Progressing     Problem: Infection - Adult  Goal: Absence of infection at discharge  Outcome: Progressing     Problem: Discharge Planning  Goal: Discharge to home or other facility with appropriate resources  Outcome: Progressing     Problem: Pain  Goal: Verbalizes/displays adequate comfort level or baseline comfort level  Outcome: Progressing     Problem: Skin/Tissue Integrity  Goal: Absence of new skin breakdown  Description: 1.  Monitor for areas of redness and/or skin breakdown  2.  Assess vascular access sites hourly  3.  Every 4-6 hours minimum:  Change oxygen saturation probe site  4.  Every 4-6 hours:  If on nasal continuous positive airway pressure, respiratory therapy assess nares and determine need for appliance change or resting period.  Outcome: Progressing     Problem: Safety - Adult  Goal: Free from fall injury  Outcome: Progressing     Problem: ABCDS Injury Assessment  Goal: Absence of physical injury  Outcome: Progressing     Problem: Chronic Conditions and Co-morbidities  Goal: Patient's chronic conditions and co-morbidity symptoms are monitored and maintained or improved  Outcome: Progressing     Problem: Nutrition Deficit:  Goal: Optimize nutritional status  Outcome: Progressing

## 2024-04-18 NOTE — PROGRESS NOTES
[facility]  Office: [phone]  Carlos Caro, DO, Felipe Drew, DO, Jerman Mack DO, Leonel Thomas, DO, Larry Pisano MD, Monserrat Rosado MD, Sharon Perez MD, Beverley Curtis MD,  Jimi Messina MD, Kevin Cowan MD, Theresa Chavira MD,  Luis Coffman DO, Florencia Hernandez MD, Giorgi Salas MD, Ramone Caro DO, Mariel Bradshaw MD,  Bandar Anderson DO, Michelle Helm MD, Mireille Hermosillo MD, Yamileth Chong MD, Zo Montgomery MD,  Jordan Hernandez MD, Pilar Salcedo MD, Kevin Greenwood MD, Kevin Landon MD, Ramon Ellsworth MD, Ken Luna MD, Luis French DO, Porter Moore DO, Shayna Woodall MD,  Alvaro Reid MD, Shirley Waterhouse, CNP,  Octavia Coreas CNP, Chemo Pelayo, CNP,  Юлия Calero, DNP, Kaci Rodgers, CNP, Princess Mandujano, CNP, Madelaine Burns CNP, Ivone Mancia, CNP, Val Hanson, PA-C, Joyce Jackson PA-C, Isabel Bustos, CNP, Johnna Horta, CNP, Karma Yost, CNP, Rosio Resendez, CNP, Jessi Montiel, CNP, Sandie Palmer, CNS, Balbina Hernández, CNP, Dolly Tamez CNP, Tracy Schwab, CNP         [facility]   IN-PATIENT SERVICE   [facility]    Progress Note    4/18/2024    12:45 PM    Name:   Shazia Dickson  MRN:     [id number]     Acct:      [id number]   Room:   1026/1026-01   Day:  14  Admit Date:  4/4/2024  6:36 PM    PCP:   Jyoti Mauricio APRN - CNP  Code Status:  Full Code    Subjective:     C/C:   Chief Complaint   Patient presents with    Shortness of Breath     60% on 4 L @ home     Interval History Status: not changed.     Patient seen and examined.  Resting well in her bed.  On supplemental oxygen.  No acute events.  Anticipating discharge today.    Brief History:     68-year-old female past medical history of diabetes type 2, COPD on 4 L home oxygen, DVT on warfarin presents with shortness of breath found to have influenza A was given Tamiflu and improved.  Patient being followed up by cardiology, pulmonology and  takes 2 to 3 seconds.      Coloration: Skin is pale.   Neurological:      Mental Status: She is alert and oriented to person, place, and time.      Motor: Weakness present.   Psychiatric:         Mood and Affect: Mood normal.         Behavior: Behavior normal.     Assessment:        Hospital Problems             Last Modified POA    * (Principal) Acute respiratory failure with hypoxia (HCC) 4/4/2024 Yes    Anxiety 4/5/2024 Yes    Depression 4/5/2024 Yes    Hyperlipemia, mixed 4/5/2024 Yes    Anticoagulated on Coumadin 4/5/2024 Yes    Normocytic anemia 4/13/2024 Yes    Essential hypertension 4/5/2024 Yes    Stage 3a chronic kidney disease (HCC) 4/5/2024 Yes    Chronic deep vein thrombosis (DVT) of femoral vein of right lower extremity ([facility]) 4/5/2024 Yes    BMI 60.0-69.9, adult ([facility]) 4/5/2024 Yes    Pulmonary embolism on right ([facility]) 4/5/2024 Yes    Acute on chronic respiratory failure with hypoxia ([facility]) 4/5/2024 Yes    Diabetes mellitus type 2, noninsulin dependent ([facility]) 4/5/2024 Yes    Cytopenia 4/5/2024 Yes    Influenza A 4/5/2024 Yes    COPD exacerbation ([facility]) 4/5/2024 Yes    Pancytopenia ([facility]) 4/8/2024 Yes    Thrombocytopenia ([facility]) 4/13/2024 Yes     Plan:        Acute respiratory failure with hypoxia secondary to influenza A.  Appreciate pulmonology recommendations.  Completed Tamiflu, prednisone taper, continue Robitussin  Hypertension, history of DVT and PE currently in A-fib.  Appreciate cardiology's recommendation.  Continue Cardizem, Coreg, warfarin per cardiology recommendations  Allergic rhinitis Flonase  Hyperlipidemia Pravachol  Discharge planning: Possible discharge today.  Patient would be going to [facility].    Ramon Ellsworth MD  4/18/2024  12:45 PM

## 2024-04-18 NOTE — PLAN OF CARE
Pt A&Ox4 during shift has remained free from falls and injuries up to chair with maxisky and two assist during shift.   Pt had complaints of a headache during this shift and received PRN Tylenol and reported reduction in headache.   Vital signs stable and pt normal sinus on monitor.   Blood sugar WNL for breakfast and lunch, and elevated for dinner and she required insulin coverage.   Pt awaiting precert for [facility].   All questions answered and safety maintained.   Problem: Respiratory - Adult  Goal: Achieves optimal ventilation and oxygenation  4/18/2024 1940 by Desiree Arthur, RN  Outcome: Progressing     Problem: Discharge Planning  Goal: Discharge to home or other facility with appropriate resources  Outcome: Progressing     Problem: Pain  Goal: Verbalizes/displays adequate comfort level or baseline comfort level  Outcome: Progressing     Problem: Skin/Tissue Integrity  Goal: Absence of new skin breakdown  Description: 1.  Monitor for areas of redness and/or skin breakdown  2.  Assess vascular access sites hourly  3.  Every 4-6 hours minimum:  Change oxygen saturation probe site  4.  Every 4-6 hours:  If on nasal continuous positive airway pressure, respiratory therapy assess nares and determine need for appliance change or resting period.  Outcome: Progressing     Problem: Safety - Adult  Goal: Free from fall injury  Outcome: Progressing     Problem: ABCDS Injury Assessment  Goal: Absence of physical injury  Outcome: Progressing     Problem: Musculoskeletal - Adult  Goal: Return ADL status to a safe level of function  Outcome: Progressing     Problem: Infection - Adult  Goal: Absence of infection at discharge  Outcome: Progressing     Problem: Chronic Conditions and Co-morbidities  Goal: Patient's chronic conditions and co-morbidity symptoms are monitored and maintained or improved  Outcome: Progressing     Problem: Nutrition Deficit:  Goal: Optimize nutritional status  Outcome: Progressing

## 2024-04-18 NOTE — DISCHARGE INSTRUCTIONS
Follow-up outpatient with PCP and pulmonology.  Continue medications as prescribed.  Continue to use supplemental oxygen to keep saturation 88-92%.

## 2024-04-18 NOTE — PROGRESS NOTES
day 1/15/24   Jyoti Mauricio APRN - CNP   blood glucose test strips (FREESTYLE LITE) strip 1 each by In Vitro route daily 10/20/23   Jyoti Mauricio APRN - CNP   montelukast (SINGULAIR) 10 MG tablet take 1 tablet by mouth at bedtime 10/18/23   Jyoti Mauricio APRN - CNP   pravastatin (PRAVACHOL) 80 MG tablet Take 1 tablet by mouth every evening 9/29/23   Jyoti Mauricio APRN - CNP   warfarin (COUMADIN) 4 MG tablet Take 1-2 tablets by mouth daily As directed by [facility] Medication Management  Patient taking differently: Take 1 tablet by mouth three times a week Sundays, Tuesdays, Thursdays, Fridays 8/29/23   Jyoti Mauricio APRN - CNP   metaxalone (SKELAXIN) 800 MG tablet Take 1 tablet by mouth 3 times daily as needed for Pain 8/18/22   Monserrat Rosado MD   OXYGEN Inhale 4 L into the lungs daily 8/18/22   Monserrat Rosado MD   Cholecalciferol (VITAMIN D) 50 MCG (2000 UT) TABS tablet Take 1 tablet by mouth daily 12/6/19   Yamileth Pack MD   FREESTYLE LANCETS MISC Test daily and if needed 12/6/19   Yamileth Pack MD   Blood Glucose Monitoring Suppl (BLOOD GLUCOSE MONITOR SYSTEM) w/Device KIT Check glucose daily and as needed. 12/6/19   Yamileth Pack MD   Omega-3 Fatty Acids (FISH OIL) 1200 MG CAPS Take 1,200 mg by mouth 2 times daily    Zeina Castro MD   Coenzyme Q10 (COQ10) 200 MG CAPS Take  by mouth daily.      Zeina Castro MD   acetaminophen (TYLENOL) 500 MG tablet Take 1 tablet by mouth every 6 hours as needed    Zeina Castro MD   Multiple Vitamin (MULTI-VITAMIN PO) Take 1 tablet by mouth daily    Zeina Castro MD   Glucosamine-Chondroit-Vit C-Mn (GLUCOSAMINE CHONDR 500 COMPLEX) CAPS Take  by mouth 2 times daily.      Zeina Castro MD     Current Facility-Administered Medications   Medication Dose Route Frequency Provider Last Rate Last Admin    ipratropium 0.5 mg-albuterol 2.5 mg (DUONEB) nebulizer solution 1 Dose  1 Dose Inhalation TID Ali, Nasser Y,  dizziness, seizures, weakness, numbness    PHYSICAL EXAM:        /63   Pulse 72   Temp 99 °F (37.2 °C) (Oral)   Resp 18   Ht 1.651 m (5' 5\")   Wt (!) 177.4 kg (391 lb 3.2 oz)   LMP 2014   SpO2 92%   BMI 65.10 kg/m²    Temp (24hrs), Av.4 °F (36.9 °C), Min:97.7 °F (36.5 °C), Max:99 °F (37.2 °C)      General appearance -not in acute distress  Mental status - alert and cooperative   Eyes - pupils equal and reactive, extraocular eye movements intact   Ears - bilateral TM's and external ear canals normal   Mouth - mucous membranes moist, pharynx normal without lesions   Neck - supple, no significant adenopathy   Lymphatics - no palpable lymphadenopathy, no hepatosplenomegaly   Chest - clear to auscultation, no wheezes, rales or rhonchi, symmetric air entry   Heart - normal rate, regular rhythm, normal S1, S2, no murmurs  Abdomen - soft, nontender, nondistended, no masses or organomegaly   Neurological - alert, oriented, normal speech, no focal findings or movement disorder noted   Musculoskeletal - no joint tenderness, deformity or swelling   Extremities - peripheral pulses normal, no pedal edema, no clubbing or cyanosis   Skin - normal coloration and turgor, no rashes, no suspicious skin lesions noted ,      DATA:      Labs:      Lab Results   Component Value Date    WBC 9.3 2024    HGB 9.8 (L) 2024    HCT 35.9 (L) 2024    MCV 92.8 2024     2024     Lab Results   Component Value Date    INR 2.9 2024    INR 2.4 04/15/2024    INR 2.5 2024    PROTIME 29.9 (H) 2024    PROTIME 26.1 (H) 04/15/2024    PROTIME 26.4 (H) 2024           IMPRESSION:   Primary Problem  Acute respiratory failure with hypoxia (HCC)    Active Hospital Problems    Diagnosis Date Noted    Thrombocytopenia (HCC) [D69.6] 2024    Pancytopenia (HCC) [D61.818] 2024    Cytopenia [D75.9] 2024    Influenza A [J10.1] 2024    COPD exacerbation (HCC) [J44.1]

## 2024-04-18 NOTE — RT PROTOCOL NOTE
RT Nebulizer Bronchodilator Protocol Note    There is a bronchodilator order in the chart from a provider indicating to follow the RT Bronchodilator Protocol and there is an “Initiate RT Bronchodilator Protocol” order as well (see protocol at bottom of note).    CXR Findings:  No results found.    The findings from the last RT Protocol Assessment were as follows:  Smoking: Chronic pulmonary disease  Respiratory Pattern: Dyspnea on exertion or RR 21-25 bpm  Breath Sounds: Slightly diminished and/or crackles  Cough: Strong, productive  Indication for Bronchodilator Therapy: On home bronchodilators, Decreased or absent breath sounds  Bronchodilator Assessment Score: 7    Aerosolized bronchodilator medication orders have been revised according to the RT Nebulizer Bronchodilator Protocol below.    Respiratory Therapist to perform RT Therapy Protocol Assessment initially then follow the protocol.  Repeat RT Therapy Protocol Assessment PRN for score 0-3 or on second treatment, BID, and PRN for scores above 3.    No Indications - adjust the frequency to every 6 hours PRN wheezing or bronchospasm, if no treatments needed after 48 hours then discontinue using Per Protocol order mode.     If indication present, adjust the RT bronchodilator orders based on the Bronchodilator Assessment Score as indicated below.  If a patient is on this medication at home then do not decrease Frequency below that used at home.    0-3 - enter or revise RT bronchodilator order(s) to equivalent RT Bronchodilator order with Frequency of every 4 hours PRN for wheezing or increased work of breathing using Per Protocol order mode.       4-6 - enter or revise RT Bronchodilator order(s) to two equivalent RT bronchodilator orders with one order with BID Frequency and one order with Frequency of every 4 hours PRN wheezing or increased work of breathing using Per Protocol order mode.         7-10 - enter or revise RT Bronchodilator order(s) to two  equivalent RT bronchodilator orders with one order with TID Frequency and one order with Frequency of every 4 hours PRN wheezing or increased work of breathing using Per Protocol order mode.       11-13 - enter or revise RT Bronchodilator order(s) to one equivalent RT bronchodilator order with QID Frequency and an Albuterol order with Frequency of every 4 hours PRN wheezing or increased work of breathing using Per Protocol order mode.      Greater than 13 - enter or revise RT Bronchodilator order(s) to one equivalent RT bronchodilator order with every 4 hours Frequency and an Albuterol order with Frequency of every 2 hours PRN wheezing or increased work of breathing using Per Protocol order mode.     RT to enter RT Home Evaluation for COPD & MDI Assessment order using Per Protocol order mode.    Electronically signed by STACEY PEOPLES JR, RCP on 4/18/2024 at 7:44 PM

## 2024-04-19 VITALS
HEIGHT: 65 IN | DIASTOLIC BLOOD PRESSURE: 53 MMHG | WEIGHT: 293 LBS | SYSTOLIC BLOOD PRESSURE: 138 MMHG | HEART RATE: 105 BPM | TEMPERATURE: 98.4 F | BODY MASS INDEX: 48.82 KG/M2 | RESPIRATION RATE: 18 BRPM | OXYGEN SATURATION: 94 %

## 2024-04-19 LAB
GLUCOSE BLD-MCNC: 129 MG/DL (ref 65–105)
GLUCOSE BLD-MCNC: 94 MG/DL (ref 65–105)
INR PPP: 3.3
PROTHROMBIN TIME: 32.7 SEC (ref 11.5–14.2)

## 2024-04-19 PROCEDURE — 6370000000 HC RX 637 (ALT 250 FOR IP): Performed by: NURSE PRACTITIONER

## 2024-04-19 PROCEDURE — 94640 AIRWAY INHALATION TREATMENT: CPT

## 2024-04-19 PROCEDURE — 6370000000 HC RX 637 (ALT 250 FOR IP): Performed by: INTERNAL MEDICINE

## 2024-04-19 PROCEDURE — 82947 ASSAY GLUCOSE BLOOD QUANT: CPT

## 2024-04-19 PROCEDURE — 94761 N-INVAS EAR/PLS OXIMETRY MLT: CPT

## 2024-04-19 PROCEDURE — 6360000002 HC RX W HCPCS: Performed by: INTERNAL MEDICINE

## 2024-04-19 PROCEDURE — 6370000000 HC RX 637 (ALT 250 FOR IP): Performed by: STUDENT IN AN ORGANIZED HEALTH CARE EDUCATION/TRAINING PROGRAM

## 2024-04-19 PROCEDURE — 97530 THERAPEUTIC ACTIVITIES: CPT

## 2024-04-19 PROCEDURE — 36415 COLL VENOUS BLD VENIPUNCTURE: CPT

## 2024-04-19 PROCEDURE — 2580000003 HC RX 258: Performed by: INTERNAL MEDICINE

## 2024-04-19 PROCEDURE — 99232 SBSQ HOSP IP/OBS MODERATE 35: CPT | Performed by: INTERNAL MEDICINE

## 2024-04-19 PROCEDURE — 2700000000 HC OXYGEN THERAPY PER DAY

## 2024-04-19 PROCEDURE — 85610 PROTHROMBIN TIME: CPT

## 2024-04-19 RX ORDER — WARFARIN SODIUM 2 MG/1
2 TABLET ORAL
Status: DISCONTINUED | OUTPATIENT
Start: 2024-04-19 | End: 2024-04-19 | Stop reason: HOSPADM

## 2024-04-19 RX ADMIN — IPRATROPIUM BROMIDE AND ALBUTEROL SULFATE 1 DOSE: 2.5; .5 SOLUTION RESPIRATORY (INHALATION) at 07:36

## 2024-04-19 RX ADMIN — MICONAZOLE NITRATE: 20 CREAM TOPICAL at 10:57

## 2024-04-19 RX ADMIN — BUDESONIDE AND FORMOTEROL FUMARATE DIHYDRATE 2 PUFF: 160; 4.5 AEROSOL RESPIRATORY (INHALATION) at 07:38

## 2024-04-19 RX ADMIN — DILTIAZEM HYDROCHLORIDE 120 MG: 120 CAPSULE, EXTENDED RELEASE ORAL at 09:07

## 2024-04-19 RX ADMIN — INSULIN GLARGINE 15 UNITS: 100 INJECTION, SOLUTION SUBCUTANEOUS at 09:06

## 2024-04-19 RX ADMIN — PREDNISONE 20 MG: 20 TABLET ORAL at 09:08

## 2024-04-19 RX ADMIN — CARVEDILOL 12.5 MG: 12.5 TABLET, FILM COATED ORAL at 09:08

## 2024-04-19 RX ADMIN — ALBUTEROL SULFATE 2.5 MG: 2.5 SOLUTION RESPIRATORY (INHALATION) at 11:49

## 2024-04-19 RX ADMIN — GUAIFENESIN 600 MG: 600 TABLET ORAL at 09:07

## 2024-04-19 RX ADMIN — SODIUM CHLORIDE, PRESERVATIVE FREE 10 ML: 5 INJECTION INTRAVENOUS at 09:30

## 2024-04-19 RX ADMIN — GUAIFENESIN AND DEXTROMETHORPHAN 5 ML: 100; 10 SYRUP ORAL at 02:04

## 2024-04-19 RX ADMIN — FLUTICASONE PROPIONATE 1 SPRAY: 50 SPRAY, METERED NASAL at 09:22

## 2024-04-19 RX ADMIN — GUAIFENESIN AND DEXTROMETHORPHAN 5 ML: 100; 10 SYRUP ORAL at 12:15

## 2024-04-19 RX ADMIN — PANTOPRAZOLE SODIUM 40 MG: 40 TABLET, DELAYED RELEASE ORAL at 05:57

## 2024-04-19 RX ADMIN — GUAIFENESIN AND DEXTROMETHORPHAN 5 ML: 100; 10 SYRUP ORAL at 05:57

## 2024-04-19 NOTE — PROGRESS NOTES
Physical Therapy  Facility/Department: [facility]  Rehabilitation Physical Therapy Treatment Note    NAME: Shazia Dickson  : 1955 (68 y.o.)  MRN: [id number]  CODE STATUS: Full Code    Date of Service: 24       Restrictions:  Restrictions/Precautions: Fall Risk, General Precautions, Up as Tolerated  Position Activity Restriction  Other position/activity restrictions: pure wick, 3 liters 02, telemetry, continuous Sp02, alarms, pt is 390lbs,  precautions/neutropenic     SUBJECTIVE  Subjective  Subjective: pt in bed refuses to actively participate as she is being discharged soon  but requesting HEP              OBJECTIVE  Cognition  Overall Cognitive Status: Exceptions  Arousal/Alertness: Appropriate responses to stimuli  Following Commands: Follows multistep commands with increased time;Follows multistep commands with repitition  Attention Span: Appears intact  Memory: Appears intact  Safety Judgement: Decreased awareness of need for safety;Decreased awareness of need for assistance  Problem Solving: Assistance required to identify errors made;Assistance required to correct errors made;Decreased awareness of errors  Insights: Decreased awareness of deficits  Initiation: Requires cues for all  Sequencing: Requires cues for some  Cognition Comment: Pt. requires cues and encouragement for all tasks.  Orientation  Overall Orientation Status: Within Functional Limits  Orientation Level: Oriented X4          PT Exercises  Exercise Treatment: issued pt HEP for LE AROM supine and seated ex reviewed all ex with pt.      ASSESSMENT/PROGRESS TOWARDS GOALS  Vital Signs  O2 Device: Nasal cannula    Assessment  Assessment: Patient with global deconditioning and requires MAX encouragement throughout treatment for participation. issued pt HEP for LE strengthening pt receptive to education  .  Discharge Recommendations: Therapy recommended at discharge;Patient would benefit from continued therapy after

## 2024-04-19 NOTE — PLAN OF CARE
Tara resting on and off this shift with no s/s or c/o pain. She had some discomfort to brief bite on L inner thigh, but cream applied and interdry applied to abdominal folds. No redness noted to abdominal folds, but they were moist. She has pure wick draining yellow, hazy urine. She has supplemental O2 in place at 4L/min, effective to maintain saturations in the 90's when sitting up. She does desat to high 80s if laying flat for changing or repositioning. She has had productive cough, but PRN Tussin given to good effect. Lung sounds are diminished, but no crackles or wheeze noted. She has completed her course of tamiflu. Glucose levels WNL and did not require sliding scale coverage at HS. No s/s of hyper or hypoglycemia. Gave scheduled long acting per order. Pt has call light in reach and is using appropriately; safety maintained.    Problem: Pain  Goal: Verbalizes/displays adequate comfort level or baseline comfort level  4/19/2024 0211 by Mena Camargo, RN  Outcome: Progressing  Flowsheets (Taken 4/18/2024 1951)  Verbalizes/displays adequate comfort level or baseline comfort level:   Encourage patient to monitor pain and request assistance   Assess pain using appropriate pain scale     Problem: Respiratory - Adult  Goal: Achieves optimal ventilation and oxygenation  4/19/2024 0211 by Mena Camargo, RN  Outcome: Progressing  Flowsheets (Taken 4/18/2024 2030)  Achieves optimal ventilation and oxygenation:   Assess for changes in respiratory status   Assess for changes in mentation and behavior   Position to facilitate oxygenation and minimize respiratory effort   Oxygen supplementation based on oxygen saturation or arterial blood gases   Respiratory therapy support as indicated     Problem: Skin/Tissue Integrity - Adult  Goal: Incisions, wounds, or drain sites healing without S/S of infection  4/19/2024 0211 by Mena Camargo, RN  Outcome: Progressing  Flowsheets (Taken 4/18/2024 2030)  Incisions, Wounds, or Drain

## 2024-04-19 NOTE — PLAN OF CARE
Problem: Respiratory - Adult  Goal: Able to breathe comfortably  4/19/2024 0739 by Maribel Read RCP  Outcome: Progressing  Goal: Patient's breath sounds will be clear and equal  4/19/2024 0739 by Maribel Read RCP  Outcome: Progressing  Goal: Adequate oxygenation  Description: Adequate oxygenation  4/19/2024 0739 by Maribel Read RCP  Outcome: Progressing  Goal: Achieves optimal ventilation and oxygenation  4/19/2024 1946 by Kristy Siddiqi RN  Outcome: Progressing  4/19/2024 0739 by Maribel Read RCP  Outcome: Progressing     Problem: Discharge Planning  Goal: Discharge to home or other facility with appropriate resources  Outcome: Progressing     Problem: Skin/Tissue Integrity  Goal: Absence of new skin breakdown  Description: 1.  Monitor for areas of redness and/or skin breakdown  2.  Assess vascular access sites hourly  3.  Every 4-6 hours minimum:  Change oxygen saturation probe site  4.  Every 4-6 hours:  If on nasal continuous positive airway pressure, respiratory therapy assess nares and determine need for appliance change or resting period.  Outcome: Progressing     Problem: Skin/Tissue Integrity - Adult  Goal: Incisions, wounds, or drain sites healing without S/S of infection  Outcome: Progressing     Problem: Musculoskeletal - Adult  Goal: Return ADL status to a safe level of function  Outcome: Progressing     Problem: Chronic Conditions and Co-morbidities  Goal: Patient's chronic conditions and co-morbidity symptoms are monitored and maintained or improved  Outcome: Progressing     Problem: Nutrition Deficit:  Goal: Optimize nutritional status  Outcome: Progressing     Problem: Metabolic/Fluid and Electrolytes - Adult  Goal: Glucose maintained within prescribed range  Outcome: Progressing

## 2024-04-19 NOTE — RT PROTOCOL NOTE
RT Inhaler-Nebulizer Bronchodilator Protocol Note    There is a bronchodilator order in the chart from a provider indicating to follow the RT Bronchodilator Protocol and there is an “Initiate RT Inhaler-Nebulizer Bronchodilator Protocol” order as well (see protocol at bottom of note).    CXR Findings:  No results found.    The findings from the last RT Protocol Assessment were as follows:   History Pulmonary Disease: Chronic pulmonary disease  Respiratory Pattern: Dyspnea on exertion or RR 21-25 bpm  Breath Sounds: Slightly diminished and/or crackles  Cough: Strong, productive  Indication for Bronchodilator Therapy: On home bronchodilators, Decreased or absent breath sounds  Bronchodilator Assessment Score: 7    Aerosolized bronchodilator medication orders have been revised according to the RT Inhaler-Nebulizer Bronchodilator Protocol below.    Respiratory Therapist to perform RT Therapy Protocol Assessment initially then follow the protocol.  Repeat RT Therapy Protocol Assessment PRN for score 0-3 or on second treatment, BID, and PRN for scores above 3.    No Indications - adjust the frequency to every 6 hours PRN wheezing or bronchospasm, if no treatments needed after 48 hours then discontinue using Per Protocol order mode.     If indication present, adjust the RT bronchodilator orders based on the Bronchodilator Assessment Score as indicated below.  Use Inhaler orders unless patient has one or more of the following: on home nebulizer, not able to hold breath for 10 seconds, is not alert and oriented, cannot activate and use MDI correctly, or respiratory rate 25 breaths per minute or more, then use the equivalent nebulizer order(s) with same Frequency and PRN reasons based on the score.  If a patient is on this medication at home then do not decrease Frequency below that used at home.    0-3 - enter or revise RT bronchodilator order(s) to equivalent RT Bronchodilator order with Frequency of every 4 hours PRN for

## 2024-04-19 NOTE — PROGRESS NOTES
Warfarin Dosing - Pharmacy Consult Note  Consulting Provider: Nishant Jimenez MD   Indication:  History of  VTE/PE  Warfarin Dose prior to admission: 2mg M-W-F, 4mg all other days    Concurrent anticoagulants/antiplatelets: none  Significant Drug Interactions:  prednisone  Recent Labs     04/16/24  1343 04/17/24  0504 04/18/24  0518 04/19/24  0512   INR  --  3.0 3.0 3.3   HGB 9.8*  --   --   --      --   --   --      Recent warfarin administrations                     warfarin (COUMADIN) tablet 4 mg (mg) 4 mg Given 04/18/24 1736    warfarin (COUMADIN) tablet 2 mg (mg) 2 mg Given 04/17/24 1750    warfarin (COUMADIN) tablet 2 mg (mg) 2 mg Given 04/16/24 1802                   Date   INR    Dose  4/10       3.4       none  4/11       3.2       2 mg  4/12       2.7       2 mg  4/13       2.6       2 mg  4/14       2.5       4 mg  4/15       2.4       4 mg  4/16       2.9       2 mg   4/17       3.0       2 mg  4/18       3.0       4 mg   4/19       3.3    Assessment/Plan  (Goal INR: 2.5 - 3.5)  INR therapeutic, continue home dose today - 2 mg. Follow INR in AM.     Active problem list reviewed.  INR orders are placed.  Chart reviewed for pertinent labs, drug/diet interactions, and past doses.  Documentation of patient's clinical condition was reviewed.    Pharmacy Dosing:  Pharmacy will continue to follow.

## 2024-04-19 NOTE — CARE COORDINATION
Social work: Patient to dc to [facility] via [facility] at 1:00PM.  # for RN report: [phone].  Informed RN, pt, and facility of dc time, agreeable to plan.   HENS completed.

## 2024-04-19 NOTE — PROGRESS NOTES
Occupational Therapy  Facility/Department: [facility] PROGRESSIVE CARE  Daily Treatment Note  NAME: Shazia Dickson  : 1955  MRN: [id number]    Date of Service: 2024    RN reports patient is medically stable for therapy treatment this date.    Chart reviewed prior to treatment and patient is agreeable for therapy.  All lines intact and patient positioned comfortably at end of treatment.  All patient needs addressed prior to ending therapy session.      Discharge Recommendations:  Patient would benefit from continued therapy after discharge  Pt currently functioning below baseline.  Recommend daily inpatient skilled therapy at time of discharge to maximize long term outcomes and prevent re-admission. Please refer to AM-PAC score for current level of function.  OT Equipment Recommendations  Equipment Needed: Yes  Mobility Devices: ADL Assistive Devices;Walker  Walker: Rolling  ADL Assistive Devices: Reacher;Sock-Aid Soft;Long-handled Shoe Horn;Long-handled Sponge;Emergency Alert System      Patient Diagnosis(es): The primary encounter diagnosis was COPD exacerbation (HCC). Diagnoses of Influenza A, Increased oxygen demand, Pulmonary HTN (HCC), and Supratherapeutic INR were also pertinent to this visit.     Assessment    Assessment: Pt not agreeable to OOB activity this date. Pt agreeable to education over BUE HEP for increasing strength and endurance. Pt needing demo and cues for each exercise. Pt would benefit from continued OT to increase indep with ADLs/IADLs for d/c.  Activity Tolerance: Patient limited by fatigue;Patient limited by endurance  Discharge Recommendations: Patient would benefit from continued therapy after discharge  Equipment Needed: Yes  Mobility Devices: ADL Assistive Devices;Walker      Plan   Occupational Therapy Plan  Times Per Week: 5x/week 1x/day as jenny  Current Treatment Recommendations: Strengthening;ROM;Balance training;Functional mobility training;Endurance training;Safety  [email or web address]  URL: [email or web address]  Date: 04/19/2024  Prepared by: Comfort Brown    Exercises  - Seated Shoulder Horizontal Abduction with Resistance  - 1 x daily - 7 x weekly - 3 sets - 10 reps  - Seated Elbow Flexion with Self-Anchored Resistance  - 1 x daily - 7 x weekly - 3 sets - 10 reps  - Seated Bilateral Shoulder External Rotation with Resistance  - 1 x daily - 7 x weekly - 3 sets - 10 reps  - Seated Single Arm Chest Press with Anchored Resistance  - 1 x daily - 7 x weekly - 3 sets - 10 reps  - Seated Shoulder Flexion with Self-Anchored Resistance  - 1 x daily - 7 x weekly - 3 sets - 10 reps    Patient Education  - Energy Conservation During Daily Tasks     Safety Devices  Type of Devices: Call light within reach;Nurse notified;Left in bed  Restraints  Restraints Initially in Place: No     Patient Education  Education Given To: Patient  Education Provided: Role of Therapy;Plan of Care;Home Exercise Program;Energy Conservation  Education Provided Comments: OT role, POC, safety, pacing, BUE HEP, call light use.  Education Method: Printed Information/Hand-outs;Verbal;Demonstration  Barriers to Learning: None  Education Outcome: Verbalized understanding;Demonstrated understanding;Continued education needed    Goals  Short Term Goals  Time Frame for Short Term Goals: by discharge, pt to demo  Short Term Goal 1: bed mob tasks with use of rail as needed to min assist of 1.  Short Term Goal 2: increase L shld AROM by a 10-15 degrees/increase BUE stength by a 1/2 grade to assist with ADL's.  Short Term Goal 3: UB ADL to set up and LB ADL to max assist of 1 supine in bed/standing with AD/AE as needed.  Short Term Goal 4: ADL transfers and functional mob with AD to max assist of 1.  Additional Goals?: No  Long Term Goals  Long Term Goal 1: Pt to complete toileting tasks with use of BSC/AD and grab bar as needed to mod assist of 1.  Long Term Goal 2: Pt/caregivers to be I with edema mgt, pressure relief, BUE

## 2024-04-19 NOTE — PROGRESS NOTES
[facility]  Office: [phone]  Carlos Caro, DO, Felipe Drew, DO, Jerman Mack DO, Leonel Thomas, DO, Larry Pisano MD, Monserrat Rosado MD, Sharon Perez MD, Beverley Curtis MD,  Jimi Messina MD, Kevin Cowan MD, Theresa Chavira MD,  Luis Coffman DO, Florencia Hernandez MD, Giorgi Salas MD, Ramone Caro DO, Mariel Bradshaw MD,  Bandar Anderson DO, Michelle Helm MD, Mireille Hermosillo MD, Yamileth Chong MD, Zo Montgomery MD,  Jordan Hernandez MD, Pilar Salcedo MD, Kevin Greenwood MD, Kevin Landon MD, Ramon Ellsworth MD, Ken Luna MD, Luis French DO, Porter Moore DO, Shayna Woodall MD,  Alvaro Reid MD, Shirley Waterhouse, CNP,  Octavia Coreas CNP, Chemo Pelayo, CNP,  Юлия Calero, DNP, Kaci Rodgers, CNP, Princess Mandujano, CNP, Madelaine Burns CNP, Ivone Mancia, CNP, Val Hanson, PA-C, Joyce Jackson PA-C, Isabel Bustos, CNP, Johnna Horta, CNP, Karma Yost, CNP, Rosio Resendez, CNP, Jessi Montiel, CNP, Sandie Palmer, CNS, Balbina Hernández, CNP, Dolly Tamez CNP, Tracy Schwab, CNP         [facility]   IN-PATIENT SERVICE   [facility]    Progress Note    4/19/2024    11:52 AM    Name:   Shazia Dickson  MRN:     [id number]     Acct:      [id number]   Room:   1026/1026-01   Day:  15  Admit Date:  4/4/2024  6:36 PM    PCP:   Jyoti Mauricio APRN - CNP  Code Status:  Full Code    Subjective:     C/C:   Chief Complaint   Patient presents with    Shortness of Breath     60% on 4 L @ home     Interval History Status: improved.     Patient is resting in bed and denies any complaints of chest pain, shortness of breath, nausea vomiting, fevers chills or acute complaints    Brief History:     This is a 68-year-old female with history of chronic hypoxia on home oxygen therapy that presents with increasing shortness of breath.  Upon evaluation she is found to have influenza A and was initiated on Tamiflu as well as aerosol protocol.  Over several

## 2024-04-19 NOTE — PROGRESS NOTES
Pulmonary Critical Care Progress Note       Patient seen for the follow up of  Acute on chronic respiratory failure with hypoxia (HCC)     Subjective:  Patient is not ambulating.  She is weaned down to oxygen 4.5 L.   She denies chest pain. Shortness of breath  is  the same.  She still gets dry cough bouts at times    Examination:  Vitals: BP (!) 138/53   Pulse (!) 105   Temp 98.4 °F (36.9 °C) (Oral)   Resp 18   Ht 1.651 m (5' 5\")   Wt (!) 177.9 kg (392 lb 3.2 oz)   LMP 07/16/2014   SpO2 94%   BMI 65.27 kg/m²   General appearance: in no acute distress, alert and cooperative with exam  Neck: No JVD  Lungs: decreased air exchange, no wheeze or crackles   Heart: regular rate and rhythm, S1, S2 normal, no gallop  Abdomen: Soft, non tender, + BS  Extremities: no cyanosis or clubbing. No significant edema    LABs:  PT/INR:   Recent Labs     04/17/24  0504 04/18/24  0518 04/19/24  0512   PROTIME 30.7* 30.7* 32.7*   INR 3.0 3.0 3.3     Radiology:  Chest x-ray 4/18    Previously noted airspace opacity not as evident     Otherwise, no acute cardiopulmonary process     X-ray chest 4/8/2024  The cardiac and mediastinal contours appear unchanged. Similar appearance of  vascular congestion.  No new airspace disease identified in the interval.  No  evidence for pneumothorax.        Impression & Recommendations:    Chronic hypoxic and hypercarbic respiratory failure  Oxygen by nasal cannula  Incentive spirometer q.1h awake  BiPAP support encouraged    Acute exacerbation of Asthma /influenza A  DuoNeb every 4 hours while awake and as needed   Symbicort 160 twice daily  Continue prednisone 20 mg daily taper to stop  Finished Zithromax course  Finished Tamiflu  Continue guaifenesin  Out of Droplet isolation  Flutter device     Pulmonary edema/Pulmonary hypertension, RVSP 55-60 mmHG by echo 2018  Echocardiogram 4/10/24-normal RVSP    Obstructive sleep apnea/ Morbid Obesity  Outpatient Sleep evaluation     History of PE/DVT  on

## 2024-04-19 NOTE — PROGRESS NOTES
Patient discharged to [facility] in good condition. Report given over the phone. All belongings in patient possession at time of discharge.

## 2024-04-19 NOTE — PLAN OF CARE
Problem: Respiratory - Adult  Goal: Able to breathe comfortably  4/19/2024 0739 by Maribel Read RCP  Outcome: Progressing  4/18/2024 1942 by Tyrone Sheriff RCP  Outcome: Progressing  Goal: Patient's breath sounds will be clear and equal  4/19/2024 0739 by Maribel Read RCP  Outcome: Progressing  4/18/2024 1942 by Tyrone Sheriff RCP  Outcome: Progressing  Goal: Adequate oxygenation  Description: Adequate oxygenation  4/19/2024 0739 by Maribel Read RCP  Outcome: Progressing  4/18/2024 1942 by Tyrone Sheriff RCP  Outcome: Progressing  Goal: Achieves optimal ventilation and oxygenation  4/19/2024 0739 by Maribel Read RCP  Outcome: Progressing  4/19/2024 0211 by Mena Camargo RN  Outcome: Progressing  Flowsheets (Taken 4/18/2024 2030)  Achieves optimal ventilation and oxygenation:   Assess for changes in respiratory status   Assess for changes in mentation and behavior   Position to facilitate oxygenation and minimize respiratory effort   Oxygen supplementation based on oxygen saturation or arterial blood gases   Respiratory therapy support as indicated  4/18/2024 1942 by Tyrone Sheriff RCP  Outcome: Progressing  4/18/2024 1940 by Desiree Arthur, RN  Outcome: Progressing

## 2024-04-20 RX ORDER — IPRATROPIUM BROMIDE AND ALBUTEROL SULFATE 2.5; .5 MG/3ML; MG/3ML
SOLUTION RESPIRATORY (INHALATION)
Qty: 360 ML | Refills: 1 | OUTPATIENT
Start: 2024-04-20

## 2024-04-20 NOTE — TELEPHONE ENCOUNTER
Pt. Was supposed to be having care taken over fully by home care visiting provider team? Can we check on this as I should no longer need to refill any of her meds

## 2024-04-22 ENCOUNTER — HOSPITAL ENCOUNTER (OUTPATIENT)
Age: 69
Setting detail: SPECIMEN
Discharge: HOME | End: 2024-04-22

## 2024-04-22 LAB
INR PPP: 2.3
PROTHROMBIN TIME: 25 SEC (ref 11.7–14.9)

## 2024-04-22 PROCEDURE — 36415 COLL VENOUS BLD VENIPUNCTURE: CPT

## 2024-04-22 PROCEDURE — P9603 ONE-WAY ALLOW PRORATED MILES: HCPCS

## 2024-04-22 PROCEDURE — 85610 PROTHROMBIN TIME: CPT

## 2024-04-25 ENCOUNTER — HOSPITAL ENCOUNTER (OUTPATIENT)
Age: 69
Setting detail: SPECIMEN
Discharge: HOME | End: 2024-04-25

## 2024-04-25 LAB
INR PPP: 2.1
PROTHROMBIN TIME: 23 SEC (ref 11.7–14.9)

## 2024-04-25 PROCEDURE — P9603 ONE-WAY ALLOW PRORATED MILES: HCPCS

## 2024-04-25 PROCEDURE — 85610 PROTHROMBIN TIME: CPT

## 2024-04-25 PROCEDURE — 36415 COLL VENOUS BLD VENIPUNCTURE: CPT

## 2024-04-29 ENCOUNTER — HOSPITAL ENCOUNTER (OUTPATIENT)
Age: 69
Setting detail: SPECIMEN
Discharge: HOME | End: 2024-04-29

## 2024-04-29 LAB
INR PPP: 1.7
PROTHROMBIN TIME: 19.6 SEC (ref 11.7–14.9)

## 2024-04-29 PROCEDURE — 85610 PROTHROMBIN TIME: CPT

## 2024-04-29 PROCEDURE — P9603 ONE-WAY ALLOW PRORATED MILES: HCPCS

## 2024-04-29 PROCEDURE — 36415 COLL VENOUS BLD VENIPUNCTURE: CPT

## 2024-05-02 ENCOUNTER — HOSPITAL ENCOUNTER (OUTPATIENT)
Age: 69
Setting detail: SPECIMEN
Discharge: HOME | End: 2024-05-02

## 2024-05-02 LAB
INR PPP: 1.8
PROTHROMBIN TIME: 20.7 SEC (ref 11.7–14.9)

## 2024-05-02 PROCEDURE — P9603 ONE-WAY ALLOW PRORATED MILES: HCPCS

## 2024-05-02 PROCEDURE — 36415 COLL VENOUS BLD VENIPUNCTURE: CPT

## 2024-05-02 PROCEDURE — 85610 PROTHROMBIN TIME: CPT

## 2024-05-05 PROBLEM — J10.1 INFLUENZA A: Status: RESOLVED | Noted: 2024-04-05 | Resolved: 2024-05-05

## 2024-05-06 ENCOUNTER — HOSPITAL ENCOUNTER (OUTPATIENT)
Age: 69
Setting detail: SPECIMEN
Discharge: HOME | End: 2024-05-06

## 2024-05-06 LAB
INR PPP: 2.1
PROTHROMBIN TIME: 22.7 SEC (ref 11.7–14.9)

## 2024-05-06 PROCEDURE — 36415 COLL VENOUS BLD VENIPUNCTURE: CPT

## 2024-05-06 PROCEDURE — P9603 ONE-WAY ALLOW PRORATED MILES: HCPCS

## 2024-05-06 PROCEDURE — 85610 PROTHROMBIN TIME: CPT

## 2024-05-07 ENCOUNTER — TELEPHONE (OUTPATIENT)
Dept: PHARMACY | Age: 69
End: 2024-05-07

## 2024-05-07 DIAGNOSIS — I26.99 PULMONARY EMBOLISM ON RIGHT (HCC): ICD-10-CM

## 2024-05-07 DIAGNOSIS — I82.511 CHRONIC DEEP VEIN THROMBOSIS (DVT) OF FEMORAL VEIN OF RIGHT LOWER EXTREMITY (HCC): Primary | ICD-10-CM

## 2024-05-07 DIAGNOSIS — Z86.718 HISTORY OF DVT (DEEP VEIN THROMBOSIS): ICD-10-CM

## 2024-05-07 NOTE — TELEPHONE ENCOUNTER
Patient LVM stating she is currently at [facility] and is concerned with her warfarin management as her recent INR values have been under goal and they don't seem to be adjusting her dose. I was able to verify INR values per below.      Called to confirm they are aware patients INR goal is 2.5-3.5. Had to leave message with  to ask nurse to return call as she was in the room with a resident. LVM on patient's cell to let her know I was following up on this issue.

## 2024-05-08 ENCOUNTER — HOSPITAL ENCOUNTER (OUTPATIENT)
Age: 69
Setting detail: SPECIMEN
Discharge: HOME | End: 2024-05-08

## 2024-05-08 LAB
INR PPP: 2.2
PROTHROMBIN TIME: 23.7 SEC (ref 11.7–14.9)

## 2024-05-08 PROCEDURE — 85610 PROTHROMBIN TIME: CPT

## 2024-05-08 PROCEDURE — 36415 COLL VENOUS BLD VENIPUNCTURE: CPT

## 2024-05-08 PROCEDURE — P9603 ONE-WAY ALLOW PRORATED MILES: HCPCS

## 2024-05-15 ENCOUNTER — HOSPITAL ENCOUNTER (OUTPATIENT)
Age: 69
Setting detail: SPECIMEN
Discharge: HOME | End: 2024-05-15

## 2024-05-15 LAB
INR PPP: 2.1
PROTHROMBIN TIME: 22.9 SEC (ref 11.7–14.9)

## 2024-05-15 PROCEDURE — P9603 ONE-WAY ALLOW PRORATED MILES: HCPCS

## 2024-05-15 PROCEDURE — 36415 COLL VENOUS BLD VENIPUNCTURE: CPT

## 2024-05-15 PROCEDURE — 85610 PROTHROMBIN TIME: CPT

## 2024-05-22 ENCOUNTER — TELEPHONE (OUTPATIENT)
Dept: PHARMACY | Age: 69
End: 2024-05-22

## 2024-05-22 NOTE — TELEPHONE ENCOUNTER
Patient left message requesting dosing adjustment of warfarin.  She states that blood draw was not done today and [facility] is still dosing her off of INR 2.1 from 5/15.  Explained to patient that I can't make a dosing recommendation based on an old INR.  Suggested that she reaches out to one of the nurses there and explain to them that her INR goal is 2.5-3.5.  Once INR is available, then the clinic can properly dose adjust.

## 2024-05-23 ENCOUNTER — HOSPITAL ENCOUNTER (OUTPATIENT)
Age: 69
Setting detail: SPECIMEN
Discharge: HOME | End: 2024-05-23

## 2024-05-23 LAB
INR PPP: 2.4
PROTHROMBIN TIME: 25.3 SEC (ref 11.7–14.9)

## 2024-05-23 PROCEDURE — 85610 PROTHROMBIN TIME: CPT

## 2024-05-23 PROCEDURE — P9603 ONE-WAY ALLOW PRORATED MILES: HCPCS

## 2024-05-23 PROCEDURE — 36415 COLL VENOUS BLD VENIPUNCTURE: CPT

## 2024-05-25 ENCOUNTER — APPOINTMENT (OUTPATIENT)
Dept: GENERAL RADIOLOGY | Age: 69
DRG: 190 | End: 2024-05-25
Attending: EMERGENCY MEDICINE
Payer: MEDICARE

## 2024-05-25 ENCOUNTER — HOSPITAL ENCOUNTER (INPATIENT)
Age: 69
LOS: 1 days | Discharge: NURSING, REHAB OR LONG-TERM CARE | DRG: 190 | End: 2024-05-29
Attending: EMERGENCY MEDICINE | Admitting: INTERNAL MEDICINE
Payer: MEDICARE

## 2024-05-25 DIAGNOSIS — J44.1 COPD EXACERBATION (HCC): Primary | ICD-10-CM

## 2024-05-25 DIAGNOSIS — L03.311 CELLULITIS OF ABDOMINAL WALL: ICD-10-CM

## 2024-05-25 PROBLEM — R06.02 SHORTNESS OF BREATH: Status: ACTIVE | Noted: 2024-05-25

## 2024-05-25 LAB
ALBUMIN SERPL-MCNC: 2.7 G/DL (ref 3.5–5.2)
ALP SERPL-CCNC: 60 U/L (ref 35–104)
ALT SERPL-CCNC: 9 U/L (ref 5–33)
ANION GAP SERPL CALCULATED.3IONS-SCNC: 4 MMOL/L (ref 9–17)
ARTERIAL PATENCY WRIST A: ABNORMAL
AST SERPL-CCNC: 11 U/L
BASOPHILS # BLD: 0 K/UL (ref 0–0.2)
BASOPHILS NFR BLD: 1 % (ref 0–2)
BDY SITE: ABNORMAL
BILIRUB SERPL-MCNC: 0.3 MG/DL (ref 0.3–1.2)
BODY TEMPERATURE: 37
BUN SERPL-MCNC: 17 MG/DL (ref 8–23)
CALCIUM SERPL-MCNC: 9.8 MG/DL (ref 8.6–10.4)
CHLORIDE SERPL-SCNC: 94 MMOL/L (ref 98–107)
CO2 SERPL-SCNC: 42 MMOL/L (ref 20–31)
COHGB MFR BLD: 4.9 % (ref 0–5)
CREAT SERPL-MCNC: 1.1 MG/DL (ref 0.5–0.9)
EOSINOPHIL # BLD: 0.3 K/UL (ref 0–0.4)
EOSINOPHILS RELATIVE PERCENT: 7 % (ref 0–4)
ERYTHROCYTE [DISTWIDTH] IN BLOOD BY AUTOMATED COUNT: 19.3 % (ref 11.5–14.9)
EST. AVERAGE GLUCOSE BLD GHB EST-MCNC: 146 MG/DL
GFR, ESTIMATED: 55 ML/MIN/1.73M2
GLUCOSE BLD-MCNC: 241 MG/DL (ref 65–105)
GLUCOSE SERPL-MCNC: 154 MG/DL (ref 70–99)
HBA1C MFR BLD: 6.7 % (ref 4–6)
HCO3 VENOUS: 46.6 MMOL/L (ref 24–30)
HCT VFR BLD AUTO: 28.1 % (ref 36–46)
HGB BLD-MCNC: 8.5 G/DL (ref 12–16)
INR PPP: 2.3
LIPASE SERPL-CCNC: 16 U/L (ref 13–60)
LYMPHOCYTES NFR BLD: 0.6 K/UL (ref 1–4.8)
LYMPHOCYTES RELATIVE PERCENT: 14 % (ref 24–44)
MAGNESIUM SERPL-MCNC: 1.7 MG/DL (ref 1.6–2.6)
MCH RBC QN AUTO: 26.3 PG (ref 26–34)
MCHC RBC AUTO-ENTMCNC: 30.2 G/DL (ref 31–37)
MCV RBC AUTO: 87 FL (ref 80–100)
METHEMOGLOBIN: 0.9 % (ref 0–1.9)
MONOCYTES NFR BLD: 0.3 K/UL (ref 0.1–1.3)
MONOCYTES NFR BLD: 8 % (ref 1–7)
NEUTROPHILS NFR BLD: 70 % (ref 36–66)
NEUTS SEG NFR BLD: 3 K/UL (ref 1.3–9.1)
O2 SAT, VEN: 93.4 % (ref 60–85)
PCO2, VEN: 56.3 MM HG (ref 39–55)
PH VENOUS: 7.53 (ref 7.32–7.42)
PLATELET # BLD AUTO: 268 K/UL (ref 150–450)
PMV BLD AUTO: 7.2 FL (ref 6–12)
PO2, VEN: 103 MM HG (ref 30–50)
POSITIVE BASE EXCESS, VEN: 23.8 MMOL/L (ref 0–2)
POTASSIUM SERPL-SCNC: 4.1 MMOL/L (ref 3.7–5.3)
PROCALCITONIN SERPL-MCNC: 0.07 NG/ML
PROT SERPL-MCNC: 7.2 G/DL (ref 6.4–8.3)
PROTHROMBIN TIME: 25.8 SEC (ref 11.8–14.6)
RBC # BLD AUTO: 3.23 M/UL (ref 4–5.2)
SODIUM SERPL-SCNC: 140 MMOL/L (ref 135–144)
TROPONIN I SERPL HS-MCNC: 25 NG/L (ref 0–14)
TROPONIN I SERPL HS-MCNC: 29 NG/L (ref 0–14)
TSH SERPL DL<=0.05 MIU/L-ACNC: 1.18 UIU/ML (ref 0.3–5)
WBC OTHER # BLD: 4.3 K/UL (ref 3.5–11)

## 2024-05-25 PROCEDURE — 6370000000 HC RX 637 (ALT 250 FOR IP): Performed by: INTERNAL MEDICINE

## 2024-05-25 PROCEDURE — 96365 THER/PROPH/DIAG IV INF INIT: CPT

## 2024-05-25 PROCEDURE — 85025 COMPLETE CBC W/AUTO DIFF WBC: CPT

## 2024-05-25 PROCEDURE — G0378 HOSPITAL OBSERVATION PER HR: HCPCS

## 2024-05-25 PROCEDURE — 96367 TX/PROPH/DG ADDL SEQ IV INF: CPT

## 2024-05-25 PROCEDURE — 82800 BLOOD PH: CPT

## 2024-05-25 PROCEDURE — 85610 PROTHROMBIN TIME: CPT

## 2024-05-25 PROCEDURE — 94640 AIRWAY INHALATION TREATMENT: CPT

## 2024-05-25 PROCEDURE — 6370000000 HC RX 637 (ALT 250 FOR IP): Performed by: EMERGENCY MEDICINE

## 2024-05-25 PROCEDURE — 80053 COMPREHEN METABOLIC PANEL: CPT

## 2024-05-25 PROCEDURE — 6360000002 HC RX W HCPCS: Performed by: INTERNAL MEDICINE

## 2024-05-25 PROCEDURE — 84145 PROCALCITONIN (PCT): CPT

## 2024-05-25 PROCEDURE — 71045 X-RAY EXAM CHEST 1 VIEW: CPT

## 2024-05-25 PROCEDURE — 82805 BLOOD GASES W/O2 SATURATION: CPT

## 2024-05-25 PROCEDURE — 6360000002 HC RX W HCPCS: Performed by: EMERGENCY MEDICINE

## 2024-05-25 PROCEDURE — 82947 ASSAY GLUCOSE BLOOD QUANT: CPT

## 2024-05-25 PROCEDURE — 84443 ASSAY THYROID STIM HORMONE: CPT

## 2024-05-25 PROCEDURE — 99285 EMERGENCY DEPT VISIT HI MDM: CPT

## 2024-05-25 PROCEDURE — 2580000003 HC RX 258: Performed by: INTERNAL MEDICINE

## 2024-05-25 PROCEDURE — 2700000000 HC OXYGEN THERAPY PER DAY

## 2024-05-25 PROCEDURE — 94761 N-INVAS EAR/PLS OXIMETRY MLT: CPT

## 2024-05-25 PROCEDURE — 83690 ASSAY OF LIPASE: CPT

## 2024-05-25 PROCEDURE — 84484 ASSAY OF TROPONIN QUANT: CPT

## 2024-05-25 PROCEDURE — 36415 COLL VENOUS BLD VENIPUNCTURE: CPT

## 2024-05-25 PROCEDURE — 2580000003 HC RX 258: Performed by: EMERGENCY MEDICINE

## 2024-05-25 PROCEDURE — 96375 TX/PRO/DX INJ NEW DRUG ADDON: CPT

## 2024-05-25 PROCEDURE — 83735 ASSAY OF MAGNESIUM: CPT

## 2024-05-25 PROCEDURE — 99223 1ST HOSP IP/OBS HIGH 75: CPT | Performed by: INTERNAL MEDICINE

## 2024-05-25 PROCEDURE — 96374 THER/PROPH/DIAG INJ IV PUSH: CPT

## 2024-05-25 PROCEDURE — 83036 HEMOGLOBIN GLYCOSYLATED A1C: CPT

## 2024-05-25 RX ORDER — MAGNESIUM SULFATE HEPTAHYDRATE 40 MG/ML
2000 INJECTION, SOLUTION INTRAVENOUS PRN
Status: DISCONTINUED | OUTPATIENT
Start: 2024-05-25 | End: 2024-05-29 | Stop reason: HOSPADM

## 2024-05-25 RX ORDER — PANTOPRAZOLE SODIUM 40 MG/1
40 TABLET, DELAYED RELEASE ORAL
Status: DISCONTINUED | OUTPATIENT
Start: 2024-05-26 | End: 2024-05-29 | Stop reason: HOSPADM

## 2024-05-25 RX ORDER — PRAVASTATIN SODIUM 40 MG
80 TABLET ORAL EVERY EVENING
Status: DISCONTINUED | OUTPATIENT
Start: 2024-05-25 | End: 2024-05-29 | Stop reason: HOSPADM

## 2024-05-25 RX ORDER — DEXTROSE MONOHYDRATE 100 MG/ML
INJECTION, SOLUTION INTRAVENOUS CONTINUOUS PRN
Status: DISCONTINUED | OUTPATIENT
Start: 2024-05-25 | End: 2024-05-29 | Stop reason: HOSPADM

## 2024-05-25 RX ORDER — INSULIN LISPRO 100 [IU]/ML
0-4 INJECTION, SOLUTION INTRAVENOUS; SUBCUTANEOUS NIGHTLY
Status: DISCONTINUED | OUTPATIENT
Start: 2024-05-25 | End: 2024-05-29 | Stop reason: HOSPADM

## 2024-05-25 RX ORDER — SODIUM CHLORIDE 0.9 % (FLUSH) 0.9 %
5-40 SYRINGE (ML) INJECTION PRN
Status: DISCONTINUED | OUTPATIENT
Start: 2024-05-25 | End: 2024-05-29 | Stop reason: HOSPADM

## 2024-05-25 RX ORDER — CARVEDILOL 12.5 MG/1
12.5 TABLET ORAL 2 TIMES DAILY WITH MEALS
Status: DISCONTINUED | OUTPATIENT
Start: 2024-05-25 | End: 2024-05-29 | Stop reason: HOSPADM

## 2024-05-25 RX ORDER — POTASSIUM CHLORIDE 20 MEQ/1
40 TABLET, EXTENDED RELEASE ORAL PRN
Status: DISCONTINUED | OUTPATIENT
Start: 2024-05-25 | End: 2024-05-29 | Stop reason: HOSPADM

## 2024-05-25 RX ORDER — POTASSIUM CHLORIDE 7.45 MG/ML
10 INJECTION INTRAVENOUS PRN
Status: DISCONTINUED | OUTPATIENT
Start: 2024-05-25 | End: 2024-05-29 | Stop reason: HOSPADM

## 2024-05-25 RX ORDER — POLYETHYLENE GLYCOL 3350 17 G/17G
17 POWDER, FOR SOLUTION ORAL DAILY PRN
Status: DISCONTINUED | OUTPATIENT
Start: 2024-05-25 | End: 2024-05-29 | Stop reason: HOSPADM

## 2024-05-25 RX ORDER — INSULIN LISPRO 100 [IU]/ML
0-8 INJECTION, SOLUTION INTRAVENOUS; SUBCUTANEOUS
Status: DISCONTINUED | OUTPATIENT
Start: 2024-05-25 | End: 2024-05-29 | Stop reason: HOSPADM

## 2024-05-25 RX ORDER — LEVALBUTEROL INHALATION SOLUTION 0.63 MG/3ML
0.63 SOLUTION RESPIRATORY (INHALATION) EVERY 6 HOURS
Status: DISCONTINUED | OUTPATIENT
Start: 2024-05-25 | End: 2024-05-25

## 2024-05-25 RX ORDER — BUDESONIDE 0.5 MG/2ML
0.5 INHALANT ORAL
Status: DISCONTINUED | OUTPATIENT
Start: 2024-05-25 | End: 2024-05-29 | Stop reason: HOSPADM

## 2024-05-25 RX ORDER — DILTIAZEM HYDROCHLORIDE 120 MG/1
120 CAPSULE, COATED, EXTENDED RELEASE ORAL DAILY
Status: DISCONTINUED | OUTPATIENT
Start: 2024-05-25 | End: 2024-05-29 | Stop reason: HOSPADM

## 2024-05-25 RX ORDER — TRAMADOL HYDROCHLORIDE 50 MG/1
50 TABLET ORAL EVERY 6 HOURS PRN
Status: DISCONTINUED | OUTPATIENT
Start: 2024-05-25 | End: 2024-05-29 | Stop reason: HOSPADM

## 2024-05-25 RX ORDER — ACETAMINOPHEN 325 MG/1
650 TABLET ORAL EVERY 6 HOURS PRN
Status: DISCONTINUED | OUTPATIENT
Start: 2024-05-25 | End: 2024-05-29 | Stop reason: HOSPADM

## 2024-05-25 RX ORDER — ONDANSETRON 4 MG/1
4 TABLET, ORALLY DISINTEGRATING ORAL EVERY 8 HOURS PRN
Status: DISCONTINUED | OUTPATIENT
Start: 2024-05-25 | End: 2024-05-29 | Stop reason: HOSPADM

## 2024-05-25 RX ORDER — MONTELUKAST SODIUM 10 MG/1
10 TABLET ORAL NIGHTLY
Status: DISCONTINUED | OUTPATIENT
Start: 2024-05-25 | End: 2024-05-29 | Stop reason: HOSPADM

## 2024-05-25 RX ORDER — LEVALBUTEROL INHALATION SOLUTION 0.63 MG/3ML
0.63 SOLUTION RESPIRATORY (INHALATION)
Status: DISCONTINUED | OUTPATIENT
Start: 2024-05-25 | End: 2024-05-29 | Stop reason: HOSPADM

## 2024-05-25 RX ORDER — SODIUM CHLORIDE 9 MG/ML
INJECTION, SOLUTION INTRAVENOUS PRN
Status: DISCONTINUED | OUTPATIENT
Start: 2024-05-25 | End: 2024-05-29 | Stop reason: HOSPADM

## 2024-05-25 RX ORDER — SODIUM CHLORIDE 0.9 % (FLUSH) 0.9 %
5-40 SYRINGE (ML) INJECTION EVERY 12 HOURS SCHEDULED
Status: DISCONTINUED | OUTPATIENT
Start: 2024-05-25 | End: 2024-05-29 | Stop reason: HOSPADM

## 2024-05-25 RX ORDER — WARFARIN SODIUM 2 MG/1
4 TABLET ORAL
Status: COMPLETED | OUTPATIENT
Start: 2024-05-25 | End: 2024-05-25

## 2024-05-25 RX ORDER — ONDANSETRON 2 MG/ML
4 INJECTION INTRAMUSCULAR; INTRAVENOUS EVERY 6 HOURS PRN
Status: DISCONTINUED | OUTPATIENT
Start: 2024-05-25 | End: 2024-05-29 | Stop reason: HOSPADM

## 2024-05-25 RX ORDER — FUROSEMIDE 10 MG/ML
40 INJECTION INTRAMUSCULAR; INTRAVENOUS ONCE
Status: COMPLETED | OUTPATIENT
Start: 2024-05-25 | End: 2024-05-25

## 2024-05-25 RX ORDER — ACETAMINOPHEN 650 MG/1
650 SUPPOSITORY RECTAL EVERY 6 HOURS PRN
Status: DISCONTINUED | OUTPATIENT
Start: 2024-05-25 | End: 2024-05-29 | Stop reason: HOSPADM

## 2024-05-25 RX ORDER — IPRATROPIUM BROMIDE AND ALBUTEROL SULFATE 2.5; .5 MG/3ML; MG/3ML
1 SOLUTION RESPIRATORY (INHALATION) EVERY 4 HOURS PRN
Status: DISCONTINUED | OUTPATIENT
Start: 2024-05-25 | End: 2024-05-29 | Stop reason: HOSPADM

## 2024-05-25 RX ADMIN — BUDESONIDE INHALATION 500 MCG: 0.5 SUSPENSION RESPIRATORY (INHALATION) at 20:55

## 2024-05-25 RX ADMIN — IPRATROPIUM BROMIDE 0.5 MG: 0.5 SOLUTION RESPIRATORY (INHALATION) at 20:55

## 2024-05-25 RX ADMIN — PRAVASTATIN SODIUM 80 MG: 40 TABLET ORAL at 21:14

## 2024-05-25 RX ADMIN — LEVALBUTEROL HYDROCHLORIDE 0.63 MG: 0.63 SOLUTION RESPIRATORY (INHALATION) at 20:55

## 2024-05-25 RX ADMIN — CARVEDILOL 12.5 MG: 12.5 TABLET, FILM COATED ORAL at 21:14

## 2024-05-25 RX ADMIN — DILTIAZEM HYDROCHLORIDE 120 MG: 120 CAPSULE, COATED, EXTENDED RELEASE ORAL at 21:14

## 2024-05-25 RX ADMIN — AZITHROMYCIN MONOHYDRATE 500 MG: 500 INJECTION, POWDER, LYOPHILIZED, FOR SOLUTION INTRAVENOUS at 17:15

## 2024-05-25 RX ADMIN — IPRATROPIUM BROMIDE AND ALBUTEROL SULFATE 1 DOSE: .5; 2.5 SOLUTION RESPIRATORY (INHALATION) at 13:40

## 2024-05-25 RX ADMIN — FUROSEMIDE 40 MG: 10 INJECTION, SOLUTION INTRAMUSCULAR; INTRAVENOUS at 21:15

## 2024-05-25 RX ADMIN — LEVALBUTEROL HYDROCHLORIDE 0.63 MG: 0.63 SOLUTION RESPIRATORY (INHALATION) at 15:43

## 2024-05-25 RX ADMIN — METHYLPREDNISOLONE SODIUM SUCCINATE 125 MG: 125 INJECTION INTRAMUSCULAR; INTRAVENOUS at 13:37

## 2024-05-25 RX ADMIN — SODIUM CHLORIDE, PRESERVATIVE FREE 10 ML: 5 INJECTION INTRAVENOUS at 21:14

## 2024-05-25 RX ADMIN — CEFTRIAXONE SODIUM 1000 MG: 1 INJECTION, POWDER, FOR SOLUTION INTRAMUSCULAR; INTRAVENOUS at 16:35

## 2024-05-25 RX ADMIN — WARFARIN SODIUM 4 MG: 2 TABLET ORAL at 21:14

## 2024-05-25 RX ADMIN — MONTELUKAST 10 MG: 10 TABLET, FILM COATED ORAL at 21:14

## 2024-05-25 RX ADMIN — IPRATROPIUM BROMIDE 0.5 MG: 0.5 SOLUTION RESPIRATORY (INHALATION) at 15:43

## 2024-05-25 NOTE — H&P
mg-albuterol 2.5 mg (DUONEB) 0.5-2.5 (3) MG/3ML SOLN nebulizer solution inhale contents of 1 vial ( 3 milliliters ) in nebulizer by mouth and INTO THE LUNGS three times a day 3/7/24   Jyoti Mauricio APRN - CNP   albuterol sulfate HFA (PROVENTIL;VENTOLIN;PROAIR) 108 (90 Base) MCG/ACT inhaler Inhale 1 puff into the lungs every 6 hours as needed for Shortness of Breath 2/26/24   Jyoti Mauricio APRN - CNP   dilTIAZem (CARTIA XT) 120 MG extended release capsule take 1 capsule by mouth once daily 2/7/24   Jyoti Mauricio APRN - CNP   budesonide (PULMICORT) 0.5 MG/2ML nebulizer suspension inhale contents of 1 vial ( 2 milliliters ) in nebulizer twice a day 1/15/24   Jyoti Mauricio APRN - CNP   blood glucose test strips (FREESTYLE LITE) strip 1 each by In Vitro route daily 10/20/23   Jyoti Mauricio APRN - CNP   montelukast (SINGULAIR) 10 MG tablet take 1 tablet by mouth at bedtime 10/18/23   Jyoti Mauricio APRN - CNP   pravastatin (PRAVACHOL) 80 MG tablet Take 1 tablet by mouth every evening 9/29/23   Jyoti Mauricio APRN - CNP   warfarin (COUMADIN) 4 MG tablet Take 1-2 tablets by mouth daily As directed by [facility] Medication Management  Patient taking differently: Take 1 tablet by mouth three times a week Sundays, Tuesdays, Thursdays, Fridays 8/29/23   Jyoti Mauricio APRN - CNP   metaxalone (SKELAXIN) 800 MG tablet Take 1 tablet by mouth 3 times daily as needed for Pain 8/18/22   Monserrat Rosado MD   OXYGEN Inhale 4 L into the lungs daily 8/18/22   Monserrat Rosado MD   Cholecalciferol (VITAMIN D) 50 MCG (2000 UT) TABS tablet Take 1 tablet by mouth daily 12/6/19   Yamileth Pack MD   FREESTYLE LANCETS MISC Test daily and if needed 12/6/19   Yamileth Pack MD   Blood Glucose Monitoring Suppl (BLOOD GLUCOSE MONITOR SYSTEM) w/Device KIT Check glucose daily and as needed. 12/6/19   Yamileth Pack MD   Omega-3 Fatty Acids (FISH OIL) 1200 MG CAPS Take 1,200 mg by mouth 2 times daily    Provider,

## 2024-05-25 NOTE — ED PROVIDER NOTES
days    azithromycin (ZITHROMAX) 500 mg in sodium chloride 0.9 % 250 mL IVPB ([id number])     Order Specific Question:   Antimicrobial Indications     Answer:   Pneumonia (CAP)     Order Specific Question:   CAP duration of therapy     Answer:   3 days    ipratropium (ATROVENT) 0.02 % nebulizer solution 0.5 mg     Order Specific Question:   Initiate RT Bronchodilator Protocol     Answer:   Yes - Inpatient Protocol    levalbuterol (XOPENEX) nebulization 0.63 mg    furosemide (LASIX) injection 40 mg    glucose chewable tablet 16 g    OR Linked Order Group     dextrose bolus 10% 125 mL     dextrose bolus 10% 250 mL    glucagon injection 1 mg    dextrose 10 % infusion    insulin lispro (HUMALOG,ADMELOG) injection vial 0-8 Units    insulin lispro (HUMALOG,ADMELOG) injection vial 0-4 Units    budesonide (PULMICORT) nebulizer suspension 500 mcg    carvedilol (COREG) tablet 12.5 mg    dilTIAZem (CARDIZEM CD) extended release capsule 120 mg    montelukast (SINGULAIR) tablet 10 mg    pantoprazole (PROTONIX) tablet 40 mg    pravastatin (PRAVACHOL) tablet 80 mg    traMADol (ULTRAM) tablet 50 mg     DISCHARGE PRESCRIPTIONS:  New Prescriptions    No medications on file     PHYSICIAN CONSULTS ORDERED THIS ENCOUNTER:  IP CONSULT TO INTERNAL MEDICINE  PHARMACY TO DOSE WARFARIN  PHARMACY TO DOSE WARFARIN  FINAL IMPRESSION      1. COPD exacerbation (HCC)          DISPOSITION/PLAN   DISPOSITION Admitted 05/25/2024 03:15:40 PM      OUTPATIENT FOLLOW UP THE PATIENT:  No follow-up provider specified.    DO Ofelia Odom Nathan R, DO  05/25/24 160

## 2024-05-26 LAB
ANION GAP SERPL CALCULATED.3IONS-SCNC: 6 MMOL/L (ref 9–17)
BASOPHILS # BLD: 0 K/UL (ref 0–0.2)
BASOPHILS NFR BLD: 0 % (ref 0–2)
BUN SERPL-MCNC: 20 MG/DL (ref 8–23)
CALCIUM SERPL-MCNC: 9.4 MG/DL (ref 8.6–10.4)
CHLORIDE SERPL-SCNC: 92 MMOL/L (ref 98–107)
CO2 SERPL-SCNC: 42 MMOL/L (ref 20–31)
CREAT SERPL-MCNC: 1.1 MG/DL (ref 0.5–0.9)
EOSINOPHIL # BLD: 0 K/UL (ref 0–0.4)
EOSINOPHILS RELATIVE PERCENT: 0 % (ref 0–4)
ERYTHROCYTE [DISTWIDTH] IN BLOOD BY AUTOMATED COUNT: 19.2 % (ref 11.5–14.9)
FERRITIN SERPL-MCNC: 78 NG/ML (ref 13–150)
FOLATE SERPL-MCNC: 9.4 NG/ML (ref 4.8–24.2)
GFR, ESTIMATED: 55 ML/MIN/1.73M2
GLUCOSE BLD-MCNC: 195 MG/DL (ref 65–105)
GLUCOSE BLD-MCNC: 200 MG/DL (ref 65–105)
GLUCOSE BLD-MCNC: 207 MG/DL (ref 65–105)
GLUCOSE BLD-MCNC: 243 MG/DL (ref 65–105)
GLUCOSE SERPL-MCNC: 223 MG/DL (ref 70–99)
HAPTOGLOB SERPL-MCNC: 226 MG/DL (ref 30–200)
HCT VFR BLD AUTO: 28.8 % (ref 36–46)
HGB BLD-MCNC: 8.7 G/DL (ref 12–16)
INR PPP: 2.1
IRON SATN MFR SERPL: 10 % (ref 20–55)
IRON SERPL-MCNC: 23 UG/DL (ref 37–145)
LDH SERPL-CCNC: 178 U/L (ref 135–214)
LYMPHOCYTES NFR BLD: 0.27 K/UL (ref 1–4.8)
LYMPHOCYTES RELATIVE PERCENT: 6 % (ref 24–44)
MCH RBC QN AUTO: 26.3 PG (ref 26–34)
MCHC RBC AUTO-ENTMCNC: 30.3 G/DL (ref 31–37)
MCV RBC AUTO: 86.9 FL (ref 80–100)
MONOCYTES NFR BLD: 0 % (ref 1–7)
MONOCYTES NFR BLD: 0 K/UL (ref 0.1–1.3)
MORPHOLOGY: ABNORMAL
NEUTROPHILS NFR BLD: 94 % (ref 36–66)
NEUTS SEG NFR BLD: 4.23 K/UL (ref 1.3–9.1)
PLATELET # BLD AUTO: 265 K/UL (ref 150–450)
PMV BLD AUTO: 7.3 FL (ref 6–12)
POTASSIUM SERPL-SCNC: 4.1 MMOL/L (ref 3.7–5.3)
PROTHROMBIN TIME: 24 SEC (ref 11.8–14.6)
RBC # BLD AUTO: 3.32 M/UL (ref 4–5.2)
RETICS # AUTO: 0.09 M/UL (ref 0.02–0.1)
RETICS/RBC NFR AUTO: 2.8 % (ref 0.5–2)
SODIUM SERPL-SCNC: 140 MMOL/L (ref 135–144)
TIBC SERPL-MCNC: 231 UG/DL (ref 250–450)
UNSATURATED IRON BINDING CAPACITY: 208 UG/DL (ref 112–347)
VIT B12 SERPL-MCNC: 540 PG/ML (ref 232–1245)
WBC OTHER # BLD: 4.5 K/UL (ref 3.5–11)

## 2024-05-26 PROCEDURE — 83010 ASSAY OF HAPTOGLOBIN QUANT: CPT

## 2024-05-26 PROCEDURE — 6370000000 HC RX 637 (ALT 250 FOR IP): Performed by: INTERNAL MEDICINE

## 2024-05-26 PROCEDURE — 97530 THERAPEUTIC ACTIVITIES: CPT

## 2024-05-26 PROCEDURE — 82607 VITAMIN B-12: CPT

## 2024-05-26 PROCEDURE — 80048 BASIC METABOLIC PNL TOTAL CA: CPT

## 2024-05-26 PROCEDURE — 83550 IRON BINDING TEST: CPT

## 2024-05-26 PROCEDURE — G0378 HOSPITAL OBSERVATION PER HR: HCPCS

## 2024-05-26 PROCEDURE — 85025 COMPLETE CBC W/AUTO DIFF WBC: CPT

## 2024-05-26 PROCEDURE — 97162 PT EVAL MOD COMPLEX 30 MIN: CPT

## 2024-05-26 PROCEDURE — 85045 AUTOMATED RETICULOCYTE COUNT: CPT

## 2024-05-26 PROCEDURE — 6360000002 HC RX W HCPCS: Performed by: INTERNAL MEDICINE

## 2024-05-26 PROCEDURE — 94640 AIRWAY INHALATION TREATMENT: CPT

## 2024-05-26 PROCEDURE — 94761 N-INVAS EAR/PLS OXIMETRY MLT: CPT

## 2024-05-26 PROCEDURE — 82728 ASSAY OF FERRITIN: CPT

## 2024-05-26 PROCEDURE — 2580000003 HC RX 258: Performed by: INTERNAL MEDICINE

## 2024-05-26 PROCEDURE — 82746 ASSAY OF FOLIC ACID SERUM: CPT

## 2024-05-26 PROCEDURE — 97110 THERAPEUTIC EXERCISES: CPT

## 2024-05-26 PROCEDURE — 36415 COLL VENOUS BLD VENIPUNCTURE: CPT

## 2024-05-26 PROCEDURE — 83615 LACTATE (LD) (LDH) ENZYME: CPT

## 2024-05-26 PROCEDURE — 82947 ASSAY GLUCOSE BLOOD QUANT: CPT

## 2024-05-26 PROCEDURE — 99233 SBSQ HOSP IP/OBS HIGH 50: CPT | Performed by: INTERNAL MEDICINE

## 2024-05-26 PROCEDURE — 2700000000 HC OXYGEN THERAPY PER DAY

## 2024-05-26 PROCEDURE — 85610 PROTHROMBIN TIME: CPT

## 2024-05-26 PROCEDURE — 97166 OT EVAL MOD COMPLEX 45 MIN: CPT

## 2024-05-26 PROCEDURE — 83540 ASSAY OF IRON: CPT

## 2024-05-26 RX ORDER — FUROSEMIDE 40 MG/1
40 TABLET ORAL DAILY
Status: DISCONTINUED | OUTPATIENT
Start: 2024-05-26 | End: 2024-05-29 | Stop reason: HOSPADM

## 2024-05-26 RX ORDER — WARFARIN SODIUM 2 MG/1
4 TABLET ORAL
Status: COMPLETED | OUTPATIENT
Start: 2024-05-26 | End: 2024-05-26

## 2024-05-26 RX ADMIN — PRAVASTATIN SODIUM 80 MG: 40 TABLET ORAL at 18:52

## 2024-05-26 RX ADMIN — BUDESONIDE INHALATION 500 MCG: 0.5 SUSPENSION RESPIRATORY (INHALATION) at 20:16

## 2024-05-26 RX ADMIN — FUROSEMIDE 40 MG: 40 TABLET ORAL at 18:52

## 2024-05-26 RX ADMIN — IPRATROPIUM BROMIDE 0.5 MG: 0.5 SOLUTION RESPIRATORY (INHALATION) at 15:01

## 2024-05-26 RX ADMIN — MUPIROCIN: 20 OINTMENT TOPICAL at 20:38

## 2024-05-26 RX ADMIN — DILTIAZEM HYDROCHLORIDE 120 MG: 120 CAPSULE, COATED, EXTENDED RELEASE ORAL at 08:35

## 2024-05-26 RX ADMIN — LEVALBUTEROL HYDROCHLORIDE 0.63 MG: 0.63 SOLUTION RESPIRATORY (INHALATION) at 11:02

## 2024-05-26 RX ADMIN — INSULIN LISPRO 2 UNITS: 100 INJECTION, SOLUTION INTRAVENOUS; SUBCUTANEOUS at 18:56

## 2024-05-26 RX ADMIN — PREDNISONE 50 MG: 10 TABLET ORAL at 08:35

## 2024-05-26 RX ADMIN — CARVEDILOL 12.5 MG: 12.5 TABLET, FILM COATED ORAL at 08:35

## 2024-05-26 RX ADMIN — LEVALBUTEROL HYDROCHLORIDE 0.63 MG: 0.63 SOLUTION RESPIRATORY (INHALATION) at 06:43

## 2024-05-26 RX ADMIN — INSULIN LISPRO 2 UNITS: 100 INJECTION, SOLUTION INTRAVENOUS; SUBCUTANEOUS at 08:35

## 2024-05-26 RX ADMIN — SODIUM CHLORIDE, PRESERVATIVE FREE 10 ML: 5 INJECTION INTRAVENOUS at 08:30

## 2024-05-26 RX ADMIN — CARVEDILOL 12.5 MG: 12.5 TABLET, FILM COATED ORAL at 18:52

## 2024-05-26 RX ADMIN — LEVALBUTEROL HYDROCHLORIDE 0.63 MG: 0.63 SOLUTION RESPIRATORY (INHALATION) at 20:03

## 2024-05-26 RX ADMIN — MONTELUKAST 10 MG: 10 TABLET, FILM COATED ORAL at 20:37

## 2024-05-26 RX ADMIN — BUDESONIDE INHALATION 500 MCG: 0.5 SUSPENSION RESPIRATORY (INHALATION) at 06:43

## 2024-05-26 RX ADMIN — IPRATROPIUM BROMIDE 0.5 MG: 0.5 SOLUTION RESPIRATORY (INHALATION) at 11:03

## 2024-05-26 RX ADMIN — SODIUM CHLORIDE, PRESERVATIVE FREE 10 ML: 5 INJECTION INTRAVENOUS at 20:39

## 2024-05-26 RX ADMIN — IPRATROPIUM BROMIDE 0.5 MG: 0.5 SOLUTION RESPIRATORY (INHALATION) at 20:02

## 2024-05-26 RX ADMIN — LEVALBUTEROL HYDROCHLORIDE 0.63 MG: 0.63 SOLUTION RESPIRATORY (INHALATION) at 15:01

## 2024-05-26 RX ADMIN — PANTOPRAZOLE SODIUM 40 MG: 40 TABLET, DELAYED RELEASE ORAL at 05:54

## 2024-05-26 RX ADMIN — IPRATROPIUM BROMIDE 0.5 MG: 0.5 SOLUTION RESPIRATORY (INHALATION) at 06:43

## 2024-05-26 RX ADMIN — WARFARIN SODIUM 4 MG: 2 TABLET ORAL at 18:52

## 2024-05-27 LAB
GLUCOSE BLD-MCNC: 174 MG/DL (ref 65–105)
GLUCOSE BLD-MCNC: 207 MG/DL (ref 65–105)
INR PPP: 2.2
PROTHROMBIN TIME: 24.8 SEC (ref 11.8–14.6)

## 2024-05-27 PROCEDURE — 36415 COLL VENOUS BLD VENIPUNCTURE: CPT

## 2024-05-27 PROCEDURE — G0378 HOSPITAL OBSERVATION PER HR: HCPCS

## 2024-05-27 PROCEDURE — 6370000000 HC RX 637 (ALT 250 FOR IP): Performed by: INTERNAL MEDICINE

## 2024-05-27 PROCEDURE — 6370000000 HC RX 637 (ALT 250 FOR IP): Performed by: EMERGENCY MEDICINE

## 2024-05-27 PROCEDURE — 94640 AIRWAY INHALATION TREATMENT: CPT

## 2024-05-27 PROCEDURE — 94761 N-INVAS EAR/PLS OXIMETRY MLT: CPT

## 2024-05-27 PROCEDURE — 2580000003 HC RX 258: Performed by: INTERNAL MEDICINE

## 2024-05-27 PROCEDURE — 82947 ASSAY GLUCOSE BLOOD QUANT: CPT

## 2024-05-27 PROCEDURE — 85610 PROTHROMBIN TIME: CPT

## 2024-05-27 PROCEDURE — 2700000000 HC OXYGEN THERAPY PER DAY

## 2024-05-27 PROCEDURE — 6360000002 HC RX W HCPCS: Performed by: INTERNAL MEDICINE

## 2024-05-27 PROCEDURE — 99232 SBSQ HOSP IP/OBS MODERATE 35: CPT | Performed by: INTERNAL MEDICINE

## 2024-05-27 RX ORDER — WARFARIN SODIUM 2 MG/1
2 TABLET ORAL
Status: COMPLETED | OUTPATIENT
Start: 2024-05-27 | End: 2024-05-27

## 2024-05-27 RX ADMIN — DILTIAZEM HYDROCHLORIDE 120 MG: 120 CAPSULE, COATED, EXTENDED RELEASE ORAL at 09:28

## 2024-05-27 RX ADMIN — MUPIROCIN: 20 OINTMENT TOPICAL at 20:54

## 2024-05-27 RX ADMIN — PANTOPRAZOLE SODIUM 40 MG: 40 TABLET, DELAYED RELEASE ORAL at 05:27

## 2024-05-27 RX ADMIN — IPRATROPIUM BROMIDE 0.5 MG: 0.5 SOLUTION RESPIRATORY (INHALATION) at 11:19

## 2024-05-27 RX ADMIN — WARFARIN SODIUM 2 MG: 2 TABLET ORAL at 17:53

## 2024-05-27 RX ADMIN — LEVALBUTEROL HYDROCHLORIDE 0.63 MG: 0.63 SOLUTION RESPIRATORY (INHALATION) at 15:01

## 2024-05-27 RX ADMIN — CARVEDILOL 12.5 MG: 12.5 TABLET, FILM COATED ORAL at 09:31

## 2024-05-27 RX ADMIN — BUDESONIDE INHALATION 500 MCG: 0.5 SUSPENSION RESPIRATORY (INHALATION) at 07:09

## 2024-05-27 RX ADMIN — INSULIN LISPRO 2 UNITS: 100 INJECTION, SOLUTION INTRAVENOUS; SUBCUTANEOUS at 09:28

## 2024-05-27 RX ADMIN — IPRATROPIUM BROMIDE AND ALBUTEROL SULFATE 1 DOSE: .5; 2.5 SOLUTION RESPIRATORY (INHALATION) at 01:27

## 2024-05-27 RX ADMIN — IPRATROPIUM BROMIDE 0.5 MG: 0.5 SOLUTION RESPIRATORY (INHALATION) at 15:02

## 2024-05-27 RX ADMIN — LEVALBUTEROL HYDROCHLORIDE 0.63 MG: 0.63 SOLUTION RESPIRATORY (INHALATION) at 11:17

## 2024-05-27 RX ADMIN — BUDESONIDE INHALATION 500 MCG: 0.5 SUSPENSION RESPIRATORY (INHALATION) at 19:40

## 2024-05-27 RX ADMIN — MUPIROCIN: 20 OINTMENT TOPICAL at 09:56

## 2024-05-27 RX ADMIN — PRAVASTATIN SODIUM 80 MG: 40 TABLET ORAL at 17:54

## 2024-05-27 RX ADMIN — IPRATROPIUM BROMIDE AND ALBUTEROL SULFATE 1 DOSE: .5; 2.5 SOLUTION RESPIRATORY (INHALATION) at 11:17

## 2024-05-27 RX ADMIN — FUROSEMIDE 40 MG: 40 TABLET ORAL at 09:28

## 2024-05-27 RX ADMIN — SODIUM CHLORIDE, PRESERVATIVE FREE 10 ML: 5 INJECTION INTRAVENOUS at 10:00

## 2024-05-27 RX ADMIN — IPRATROPIUM BROMIDE 0.5 MG: 0.5 SOLUTION RESPIRATORY (INHALATION) at 19:40

## 2024-05-27 RX ADMIN — MONTELUKAST 10 MG: 10 TABLET, FILM COATED ORAL at 20:53

## 2024-05-27 RX ADMIN — LEVALBUTEROL HYDROCHLORIDE 0.63 MG: 0.63 SOLUTION RESPIRATORY (INHALATION) at 19:40

## 2024-05-27 RX ADMIN — LEVALBUTEROL HYDROCHLORIDE 0.63 MG: 0.63 SOLUTION RESPIRATORY (INHALATION) at 07:09

## 2024-05-27 RX ADMIN — IPRATROPIUM BROMIDE 0.5 MG: 0.5 SOLUTION RESPIRATORY (INHALATION) at 07:09

## 2024-05-27 RX ADMIN — CARVEDILOL 12.5 MG: 12.5 TABLET, FILM COATED ORAL at 17:54

## 2024-05-27 RX ADMIN — PREDNISONE 50 MG: 10 TABLET ORAL at 09:28

## 2024-05-27 NOTE — PLAN OF CARE
Problem: Discharge Planning  Goal: Discharge to home or other facility with appropriate resources  5/27/2024 0238 by Primo Connors, RN  Outcome: Progressing  Flowsheets (Taken 5/27/2024 0238)  Discharge to home or other facility with appropriate resources:   Identify barriers to discharge with patient and caregiver   Arrange for needed discharge resources and transportation as appropriate   Identify discharge learning needs (meds, wound care, etc)   Refer to discharge planning if patient needs post-hospital services based on physician order or complex needs related to functional status, cognitive ability or social support system     Problem: Skin/Tissue Integrity  Goal: Absence of new skin breakdown  Description: 1.  Monitor for areas of redness and/or skin breakdown  2.  Assess vascular access sites hourly  3.  Every 4-6 hours minimum:  Change oxygen saturation probe site  4.  Every 4-6 hours:  If on nasal continuous positive airway pressure, respiratory therapy assess nares and determine need for appliance change or resting period.  5/27/2024 0238 by Primo Connors, RN  Outcome: Progressing     Problem: ABCDS Injury Assessment  Goal: Absence of physical injury  5/27/2024 0238 by Primo Connors RN  Outcome: Progressing  Flowsheets (Taken 5/27/2024 0238)  Absence of Physical Injury: Implement safety measures based on patient assessment     Problem: Safety - Adult  Goal: Free from fall injury  5/27/2024 0238 by Primo Connors RN  Outcome: Progressing     Problem: Chronic Conditions and Co-morbidities  Goal: Patient's chronic conditions and co-morbidity symptoms are monitored and maintained or improved  5/27/2024 0238 by Primo Connors, RN  Outcome: Progressing     Problem: Respiratory - Adult  Goal: Achieves optimal ventilation and oxygenation  5/27/2024 0238 by Primo Connors, RN  Outcome: Progressing     Problem: Metabolic/Fluid and Electrolytes - Adult  Goal: Electrolytes maintained within

## 2024-05-27 NOTE — DISCHARGE INSTR - COC
Continuity of Care Form    Patient Name: Shazia Dickson   :  1955  MRN:  [id number]    Admit date:  2024  Discharge date:  24    Code Status Order: Full Code   Advance Directives:     Admitting Physician:  Ziyad Baron MD  PCP: Jyoti Mauricio, APRN - CNP    Discharging Nurse: Shaista MOSES   Discharging Hospital Unit/Room#: 2119/2119-01  Discharging Unit Phone Number: [phone]    Emergency Contact:   Extended Emergency Contact Information  Primary Emergency Contact: Lea Bryan  Address: [address]  Home Phone: [phone]  Mobile Phone: [phone]  Relation: Other  Secondary Emergency Contact: Edita Cruz  Mobile Phone: [phone]  Relation: Other  Preferred language: English   needed? No    Past Surgical History:  Past Surgical History:   Procedure Laterality Date    BREAST REDUCTION SURGERY  1997    BREAST REDUCTION SURGERY  1997    DILATION AND CURETTAGE OF UTERUS  10/08 and     HYSTERECTOMY (CERVIX STATUS UNKNOWN)  7/17/15    Dr Kaitlynn Jaime, endometial cancer, FIGO grade 1    TONSILLECTOMY AND ADENOIDECTOMY      TYMPANOSTOMY TUBE PLACEMENT  1967       Immunization History:   Immunization History   Administered Date(s) Administered    COVID-19, MODERNA Bivalent, (age 12y+), IM, 50 mcg/0.5 mL 2023    COVID-19, MODERNA Booster BLUE border, (age 18y+), IM, 50mcg/0.25mL 2022, 10/07/2022    FLUARIX 3 YEARS AND OVER 10/26/2015    Hepatitis B 1991    Influenza A ([id number]) Vaccine IM 2009    Influenza Virus Vaccine 2014, 2014, 10/08/2018, 10/17/2019    Influenza Whole 2009    Influenza, AFLURIA (age 3 yrs+), FLUZONE, (age 6 mo+), MDV, 0.5mL 2016    Influenza, FLUAD, (age 65 y+), Adjuvanted, 0.5mL 10/07/2022, 2023    Influenza, FLUARIX, FLULAVAL, FLUZONE (age 6 mo+) AND AFLURIA, (age 3 y+), PF, 0.5mL 10/26/2015, 10/17/2019, 2020    Pneumococcal,

## 2024-05-28 PROBLEM — R06.02 SOB (SHORTNESS OF BREATH): Status: ACTIVE | Noted: 2024-05-28

## 2024-05-28 LAB
EKG Q-T INTERVAL: 366 MS
EKG QRS DURATION: 84 MS
EKG QTC CALCULATION (BAZETT): 417 MS
EKG R AXIS: 15 DEGREES
EKG T AXIS: 64 DEGREES
EKG VENTRICULAR RATE: 78 BPM
GLUCOSE BLD-MCNC: 198 MG/DL (ref 65–105)
GLUCOSE BLD-MCNC: 225 MG/DL (ref 65–105)
GLUCOSE BLD-MCNC: 277 MG/DL (ref 65–105)
INR PPP: 2.1
PROTHROMBIN TIME: 24.1 SEC (ref 11.8–14.6)

## 2024-05-28 PROCEDURE — 36415 COLL VENOUS BLD VENIPUNCTURE: CPT

## 2024-05-28 PROCEDURE — 82947 ASSAY GLUCOSE BLOOD QUANT: CPT

## 2024-05-28 PROCEDURE — 97530 THERAPEUTIC ACTIVITIES: CPT

## 2024-05-28 PROCEDURE — 6370000000 HC RX 637 (ALT 250 FOR IP): Performed by: INTERNAL MEDICINE

## 2024-05-28 PROCEDURE — G0378 HOSPITAL OBSERVATION PER HR: HCPCS

## 2024-05-28 PROCEDURE — 94761 N-INVAS EAR/PLS OXIMETRY MLT: CPT

## 2024-05-28 PROCEDURE — 85610 PROTHROMBIN TIME: CPT

## 2024-05-28 PROCEDURE — 99213 OFFICE O/P EST LOW 20 MIN: CPT

## 2024-05-28 PROCEDURE — 94640 AIRWAY INHALATION TREATMENT: CPT

## 2024-05-28 PROCEDURE — 94664 DEMO&/EVAL PT USE INHALER: CPT

## 2024-05-28 PROCEDURE — 6360000002 HC RX W HCPCS: Performed by: INTERNAL MEDICINE

## 2024-05-28 PROCEDURE — 2580000003 HC RX 258: Performed by: INTERNAL MEDICINE

## 2024-05-28 PROCEDURE — 99232 SBSQ HOSP IP/OBS MODERATE 35: CPT | Performed by: INTERNAL MEDICINE

## 2024-05-28 PROCEDURE — 2700000000 HC OXYGEN THERAPY PER DAY

## 2024-05-28 RX ORDER — PREDNISONE 20 MG/1
40 TABLET ORAL DAILY
Qty: 10 TABLET | Refills: 0 | Status: SHIPPED | OUTPATIENT
Start: 2024-05-28 | End: 2024-06-02

## 2024-05-28 RX ORDER — WARFARIN SODIUM 3 MG/1
6 TABLET ORAL
Status: COMPLETED | OUTPATIENT
Start: 2024-05-28 | End: 2024-05-28

## 2024-05-28 RX ORDER — TRAMADOL HYDROCHLORIDE 50 MG/1
50 TABLET ORAL EVERY 6 HOURS PRN
Qty: 12 TABLET | Refills: 0 | Status: SHIPPED | OUTPATIENT
Start: 2024-05-28 | End: 2024-05-31

## 2024-05-28 RX ORDER — FUROSEMIDE 40 MG/1
40 TABLET ORAL DAILY
Qty: 60 TABLET | Refills: 3 | Status: SHIPPED | OUTPATIENT
Start: 2024-05-29

## 2024-05-28 RX ADMIN — SODIUM CHLORIDE, PRESERVATIVE FREE 10 ML: 5 INJECTION INTRAVENOUS at 09:12

## 2024-05-28 RX ADMIN — INSULIN LISPRO 2 UNITS: 100 INJECTION, SOLUTION INTRAVENOUS; SUBCUTANEOUS at 17:53

## 2024-05-28 RX ADMIN — PREDNISONE 50 MG: 10 TABLET ORAL at 09:10

## 2024-05-28 RX ADMIN — CARVEDILOL 12.5 MG: 12.5 TABLET, FILM COATED ORAL at 17:43

## 2024-05-28 RX ADMIN — DILTIAZEM HYDROCHLORIDE 120 MG: 120 CAPSULE, COATED, EXTENDED RELEASE ORAL at 09:10

## 2024-05-28 RX ADMIN — IPRATROPIUM BROMIDE 0.5 MG: 0.5 SOLUTION RESPIRATORY (INHALATION) at 19:22

## 2024-05-28 RX ADMIN — FUROSEMIDE 40 MG: 40 TABLET ORAL at 09:10

## 2024-05-28 RX ADMIN — LEVALBUTEROL HYDROCHLORIDE 0.63 MG: 0.63 SOLUTION RESPIRATORY (INHALATION) at 19:22

## 2024-05-28 RX ADMIN — INSULIN LISPRO 2 UNITS: 100 INJECTION, SOLUTION INTRAVENOUS; SUBCUTANEOUS at 09:09

## 2024-05-28 RX ADMIN — IPRATROPIUM BROMIDE 0.5 MG: 0.5 SOLUTION RESPIRATORY (INHALATION) at 14:37

## 2024-05-28 RX ADMIN — SODIUM CHLORIDE, PRESERVATIVE FREE 10 ML: 5 INJECTION INTRAVENOUS at 21:33

## 2024-05-28 RX ADMIN — WARFARIN SODIUM 6 MG: 3 TABLET ORAL at 17:43

## 2024-05-28 RX ADMIN — IPRATROPIUM BROMIDE 0.5 MG: 0.5 SOLUTION RESPIRATORY (INHALATION) at 10:41

## 2024-05-28 RX ADMIN — CARVEDILOL 12.5 MG: 12.5 TABLET, FILM COATED ORAL at 09:09

## 2024-05-28 RX ADMIN — BUDESONIDE INHALATION 500 MCG: 0.5 SUSPENSION RESPIRATORY (INHALATION) at 19:33

## 2024-05-28 RX ADMIN — MONTELUKAST 10 MG: 10 TABLET, FILM COATED ORAL at 21:33

## 2024-05-28 RX ADMIN — LEVALBUTEROL HYDROCHLORIDE 0.63 MG: 0.63 SOLUTION RESPIRATORY (INHALATION) at 07:15

## 2024-05-28 RX ADMIN — LEVALBUTEROL HYDROCHLORIDE 0.63 MG: 0.63 SOLUTION RESPIRATORY (INHALATION) at 14:37

## 2024-05-28 RX ADMIN — LEVALBUTEROL HYDROCHLORIDE 0.63 MG: 0.63 SOLUTION RESPIRATORY (INHALATION) at 10:41

## 2024-05-28 RX ADMIN — BUDESONIDE INHALATION 500 MCG: 0.5 SUSPENSION RESPIRATORY (INHALATION) at 07:15

## 2024-05-28 RX ADMIN — IPRATROPIUM BROMIDE 0.5 MG: 0.5 SOLUTION RESPIRATORY (INHALATION) at 07:15

## 2024-05-28 RX ADMIN — PRAVASTATIN SODIUM 80 MG: 40 TABLET ORAL at 17:43

## 2024-05-28 RX ADMIN — PANTOPRAZOLE SODIUM 40 MG: 40 TABLET, DELAYED RELEASE ORAL at 05:55

## 2024-05-28 NOTE — CONSULTS
[facility]  Consult Note       NAME:  Shazia Dickson  MEDICAL RECORD NUMBER:  [id number]  AGE: 68 y.o.   GENDER: female  : 1955  TODAY'S DATE:  2024    Subjective:      Shazia Dickson is a 68 y.o. female with inpatient referral to [facility] for:  Abdominal wound      Wound Identification:  Wound Type:  other  Contributing Factors: diabetes, decreased mobility, and obesity    Wound History: Patient previously had a hysterectomy due to endometrial cancer.  She has a large ventral hernia and occasionally gets open areas at the site of the hysterectomy scar  Current Wound Care Treatment: Foam    Patient Goal of Care:  [x] Wound Healing  [] Odor Control  [] Palliative Care  [] Pain Control   [] Other:         PAST MEDICAL HISTORY        Diagnosis Date    Anxiety     Asthma     Atrial fibrillation (HCC)     A-fib noted on 2024 visit to ER    Bronchitis     DDD (degenerative disc disease), lumbar     Depression     Diabetes mellitus (HCC)     Elevated glucose     Headache(784.0)     Hernia of abdominal cavity     HH (hiatus hernia)     Hyperlipemia, mixed     Hypertension     Osteoarthritis     Right femoral vein DVT (HCC) 2009    Dr. Elizabeth    Uterine hyperplasia        PAST SURGICAL HISTORY    Past Surgical History:   Procedure Laterality Date    BREAST REDUCTION SURGERY  1997    BREAST REDUCTION SURGERY  1997    DILATION AND CURETTAGE OF UTERUS  10/08 and     HYSTERECTOMY (CERVIX STATUS UNKNOWN)  7/17/15    Dr Kaitlynn Jaime, endometial cancer, FIGO grade 1    TONSILLECTOMY AND ADENOIDECTOMY      TYMPANOSTOMY TUBE PLACEMENT  1967       FAMILY HISTORY    Family History   Problem Relation Age of Onset    Asthma Mother     Mental Illness Mother     COPD Mother     Cancer Father 86        hairy cell leukemia, and leimyoma sarcoma     Stroke Father 86        minor    Parkinsonism Maternal Grandfather     Cancer Paternal Grandmother

## 2024-05-29 VITALS
RESPIRATION RATE: 18 BRPM | HEART RATE: 83 BPM | WEIGHT: 293 LBS | HEIGHT: 65 IN | TEMPERATURE: 98.1 F | BODY MASS INDEX: 48.82 KG/M2 | OXYGEN SATURATION: 93 % | DIASTOLIC BLOOD PRESSURE: 55 MMHG | SYSTOLIC BLOOD PRESSURE: 125 MMHG

## 2024-05-29 LAB
GLUCOSE BLD-MCNC: 170 MG/DL (ref 65–105)
GLUCOSE BLD-MCNC: 189 MG/DL (ref 65–105)
INR PPP: 2.3
PROTHROMBIN TIME: 25.2 SEC (ref 11.8–14.6)

## 2024-05-29 PROCEDURE — 85610 PROTHROMBIN TIME: CPT

## 2024-05-29 PROCEDURE — 6370000000 HC RX 637 (ALT 250 FOR IP): Performed by: INTERNAL MEDICINE

## 2024-05-29 PROCEDURE — 97110 THERAPEUTIC EXERCISES: CPT

## 2024-05-29 PROCEDURE — 99239 HOSP IP/OBS DSCHRG MGMT >30: CPT | Performed by: INTERNAL MEDICINE

## 2024-05-29 PROCEDURE — 94640 AIRWAY INHALATION TREATMENT: CPT

## 2024-05-29 PROCEDURE — 2580000003 HC RX 258: Performed by: INTERNAL MEDICINE

## 2024-05-29 PROCEDURE — 94761 N-INVAS EAR/PLS OXIMETRY MLT: CPT

## 2024-05-29 PROCEDURE — 6360000002 HC RX W HCPCS: Performed by: INTERNAL MEDICINE

## 2024-05-29 PROCEDURE — 97530 THERAPEUTIC ACTIVITIES: CPT

## 2024-05-29 PROCEDURE — 36415 COLL VENOUS BLD VENIPUNCTURE: CPT

## 2024-05-29 PROCEDURE — 6370000000 HC RX 637 (ALT 250 FOR IP): Performed by: EMERGENCY MEDICINE

## 2024-05-29 PROCEDURE — 2700000000 HC OXYGEN THERAPY PER DAY

## 2024-05-29 PROCEDURE — 82947 ASSAY GLUCOSE BLOOD QUANT: CPT

## 2024-05-29 RX ORDER — WARFARIN SODIUM 2 MG/1
2 TABLET ORAL
Status: DISCONTINUED | OUTPATIENT
Start: 2024-05-29 | End: 2024-05-29 | Stop reason: HOSPADM

## 2024-05-29 RX ADMIN — FUROSEMIDE 40 MG: 40 TABLET ORAL at 09:12

## 2024-05-29 RX ADMIN — BUDESONIDE INHALATION 500 MCG: 0.5 SUSPENSION RESPIRATORY (INHALATION) at 07:41

## 2024-05-29 RX ADMIN — SODIUM CHLORIDE, PRESERVATIVE FREE 10 ML: 5 INJECTION INTRAVENOUS at 09:15

## 2024-05-29 RX ADMIN — IPRATROPIUM BROMIDE 0.5 MG: 0.5 SOLUTION RESPIRATORY (INHALATION) at 11:20

## 2024-05-29 RX ADMIN — IPRATROPIUM BROMIDE 0.5 MG: 0.5 SOLUTION RESPIRATORY (INHALATION) at 07:41

## 2024-05-29 RX ADMIN — LEVALBUTEROL HYDROCHLORIDE 0.63 MG: 0.63 SOLUTION RESPIRATORY (INHALATION) at 11:20

## 2024-05-29 RX ADMIN — LEVALBUTEROL HYDROCHLORIDE 0.63 MG: 0.63 SOLUTION RESPIRATORY (INHALATION) at 15:14

## 2024-05-29 RX ADMIN — LEVALBUTEROL HYDROCHLORIDE 0.63 MG: 0.63 SOLUTION RESPIRATORY (INHALATION) at 07:41

## 2024-05-29 RX ADMIN — PANTOPRAZOLE SODIUM 40 MG: 40 TABLET, DELAYED RELEASE ORAL at 05:42

## 2024-05-29 RX ADMIN — PREDNISONE 50 MG: 10 TABLET ORAL at 09:11

## 2024-05-29 RX ADMIN — IPRATROPIUM BROMIDE 0.5 MG: 0.5 SOLUTION RESPIRATORY (INHALATION) at 15:14

## 2024-05-29 RX ADMIN — CARVEDILOL 12.5 MG: 12.5 TABLET, FILM COATED ORAL at 09:11

## 2024-05-29 RX ADMIN — IPRATROPIUM BROMIDE AND ALBUTEROL SULFATE 1 DOSE: .5; 2.5 SOLUTION RESPIRATORY (INHALATION) at 02:51

## 2024-05-29 RX ADMIN — DILTIAZEM HYDROCHLORIDE 120 MG: 120 CAPSULE, COATED, EXTENDED RELEASE ORAL at 09:11

## 2024-05-29 ASSESSMENT — PAIN SCALES - GENERAL: PAINLEVEL_OUTOF10: 0

## 2024-05-29 NOTE — PROGRESS NOTES
[facility]   IN-PATIENT SERVICE   [facility]    HISTORY AND PHYSICAL EXAMINATION            Date:   5/26/2024  Patient name:  Shazia Dickson  Date of admission:  5/25/2024 12:41 PM  MRN:   [id number]  Account:  [id number]  YOB: 1955  PCP:    Jyoti Mauricio APRN - CNP  Room:   [facility]  Code Status:    Full Code    Chief Complaint:     Chief Complaint   Patient presents with    Shortness of Breath       History Obtained From:     patient    History of Present Illness:     The patient is a 68 y.o.  Declined female who presents with Shortness of Breath   and she is admitted to the hospital for the management of shortness of breath.    68-year-old female with history of diabetes mellitus type 2, asthma, anxiety, hypertension, hyperlipidemia, hiatal hernia, right femoral vein DVT, on Coumadin presents to the hospital with chief concerns of shortness of breath.  Patient is chronically oxygen dependent, on 4 L nasal cannula at home.  She was recently admitted to outside hospital on 4/19/2024, was treated for influenza with Tamiflu, on steroids, aerosol treatment, supplemental oxygen.  She made improvement and was discharged to skilled nursing care facility.    Patient reports that over the past 2 days, reports that it might be because the concentrator at the nursing home was not functioning correctly.  She reports that she has been dropping saturations in the 80s, despite the nebulizer treatments these have not improved.  She denies having any chest pain, denies having a cough, no fevers no chills.  Reports chronic lower extremity swelling, wears compression stockings.  Although the patient reports that she has a history of congestive heart failure, she had a recent echocardiogram performed.  Reports history of DVT and PE in 2018, then again in 2022, since then has been on Coumadin with an increased goal INR of 2.5-3.5.    4/8/2024, EF 55-60%, normal diastolic function, 
    [facility]   IN-PATIENT SERVICE   [facility]    HISTORY AND PHYSICAL EXAMINATION            Date:   5/29/2024  Patient name:  Shazia Dickson  Date of admission:  5/25/2024 12:41 PM  MRN:   [id number]  Account:  [id number]  YOB: 1955  PCP:    Jyoti Mauricio APRN - CNP  Room:   [facility]21[address]  Code Status:    Full Code    Chief Complaint:     Chief Complaint   Patient presents with    Shortness of Breath       History Obtained From:     patient    History of Present Illness:     The patient is a 68 y.o.  Declined female who presents with Shortness of Breath   and she is admitted to the hospital for the management of shortness of breath.    68-year-old female with history of diabetes mellitus type 2, asthma, anxiety, hypertension, hyperlipidemia, hiatal hernia, right femoral vein DVT, on Coumadin presents to the hospital with chief concerns of shortness of breath.  Patient is chronically oxygen dependent, on 4 L nasal cannula at home.  She was recently admitted to outside hospital on 4/19/2024, was treated for influenza with Tamiflu, on steroids, aerosol treatment, supplemental oxygen.  She made improvement and was discharged to skilled nursing care facility.    Patient reports that over the past 2 days, reports that it might be because the concentrator at the nursing home was not functioning correctly.  She reports that she has been dropping saturations in the 80s, despite the nebulizer treatments these have not improved.  She denies having any chest pain, denies having a cough, no fevers no chills.  Reports chronic lower extremity swelling, wears compression stockings.  Although the patient reports that she has a history of congestive heart failure, she had a recent echocardiogram performed.  Reports history of DVT and PE in 2018, then again in 2022, since then has been on Coumadin with an increased goal INR of 2.5-3.5.    4/8/2024, EF 55-60%, normal diastolic function, 
   05/29/24 1114   Encounter Summary   Encounter Overview/Reason Spiritual/Emotional Needs   Service Provided For Patient   Referral/Consult From Rounding   Support System Family members;Friends/neighbors;Home care staff   Last Encounter  05/29/24   Complexity of Encounter Moderate   Begin Time 0915   End Time  0940   Total Time Calculated 25 min   Spiritual/Emotional needs   Type Spiritual Support   Assessment/Intervention/Outcome   Assessment Calm;Complicated grieving;Coping;Hopeful;Stress overload   Intervention Active listening;Discussed belief system/Jehovah's witness practices/bill;Discussed illness injury and it’s impact;Discussed relationship with God;Explored/Affirmed feelings, thoughts, concerns;Explored Coping Skills/Resources;Nurtured Hope;Prayer (assurance of)/[place];Sustaining Presence/Ministry of presence   Outcome Coping;Receptive       
  Physician Progress Note      PATIENT:               SHOBHA GEORGE  [facility] #:                  [id number]  :                       1955  ADMIT DATE:       2024 12:41 PM  DISCH DATE:  RESPONDING  PROVIDER #:        Moises Henson MD          QUERY TEXT:    Patient admitted with SOB, noted to have atrial fibrillation and is maintained   on coumadin medication . If possible, please document in progress notes and   discharge summary if you are evaluating and/or treating any of the following:    The medical record reflects the following:  Risk Factors: A fib, coumadin  Clinical Indicators: H&P: atrial fibrillation, [id number] score of greater   than 2.  Treatment: continue Coumadin      Thank you, Fadia CDS  Options provided:  -- Secondary hypercoagulable state in a patient with atrial fibrillation  -- Other - I will add my own diagnosis  -- Disagree - Not applicable / Not valid  -- Disagree - Clinically unable to determine / Unknown  -- Refer to Clinical Documentation Reviewer    PROVIDER RESPONSE TEXT:    This patient has secondary hypercoagulable state in a patient with atrial   fibrillation.    Query created by: Bebe Li on 2024 2:30 PM      QUERY TEXT:    Pt admitted with SOB and has CHF documented. If possible, please document in   progress notes and discharge summary further specificity regarding the type   and acuity of CHF:    The medical record reflects the following:  Risk Factors: HTN, morbid obesity, DM2  Clinical Indicators: H&P: SOB  Suspect secondary to volume overload plus   asthma/COPD exacerbation.  Reviewed recent echocardiogram, noted to be volume   overloaded on physical exam.  Will administer 1 dose of Lasix now. suspect she   has heart failure preserved ejection fraction. 2024 ECHO, EF 55-60%,   normal diastolic function, right ventricle is dilated, no obvious tricuspid   regurgitation reported.  Treatment: Coreg 12.5 bid, Cardizem, IV Lasix 40 mg x1, PO Lasix 40 mg 
BRONCHOSPASM/BRONCHOCONSTRICTION     [x]         IMPROVE AERATION/BREATH SOUNDS  [x]   ADMINISTER BRONCHODILATOR THERAPY AS APPROPRIATE  [x]   ASSESS BREATH SOUNDS  []   IMPLEMENT AEROSOL/MDI PROTOCOL  [x]   PATIENT EDUCATION AS NEEDED    
[facility]   INPATIENT OCCUPATIONAL THERAPY  PROGRESS NOTE  Date: 2024  Patient Name: Shazia Dickson       Room:   MRN: [id number]    : 1955  (68 y.o.)  Gender: female   Referring Practitioner: Mervin Gill MD  Diagnosis: Shortness of breath      Discharge Recommendations:  Further Occupational Therapy is recommended upon facility discharge.    OT Equipment Recommendations  Other: TBD    Restrictions/Precautions  Restrictions/Precautions  Restrictions/Precautions: Fall Risk;General Precautions  Required Braces or Orthoses?: No    O2 Device: Nasal cannula  Comment: SpO2 ranged low-mid 90s throughout session. Noted reduced to 84% while laying flat in bed improved to 90s once returned to seated position    Subjective  Subjective  Subjective: Pt agreeable to co-tx with LUCA Suárez  Subjective  Pain: Discomfort in vaginal area \"from purewick\"  Comments: RN Ok'd co-tx    Objective  Orientation  Overall Orientation Status: Within Normal Limits  Cognition  Overall Cognitive Status: WFL    Activities of Daily Living  ADL  Feeding: Setup, Based on clinical judgement  Grooming: Setup, Based on clinical judgement  UE Bathing: Moderate assistance, Based on clinical judgement  LE Bathing: Maximum assistance, Based on clinical judgement  UE Dressing: Moderate assistance, Based on clinical judgement  LE Dressing: Stand by assistance, Moderate assistance  LE Dressing Skilled Clinical Factors: with encouragement-pt able to don footies using sock aid and occasional cues for proper usage- pt req A to doff willie footies upon session end  Toileting: Dependent/Total, Based on clinical judgement  Additional Comments: Patient continues to be limited due to due to generalized weakness, body habitus, and Poor standing balance/tolerance affecting her ability to complete self care tasks        Balance  Balance  Sitting Balance: (P) Minimal assistance (Seated reaching slightly out of base of 
[facility]   Occupational Therapy Evaluation  Date: 24  Patient Name: Shazia Dickson       Room: 9/9-01  MRN: [id number]  Account: [id number]   : 1955  (68 y.o.) Gender: female     Discharge Recommendations:  Further Occupational Therapy is recommended upon facility discharge.    OT Equipment Recommendations  Other: TBD    Referring Practitioner: Mervin Gill MD  Diagnosis: Shortness of breath   Additional Pertinent Hx: Per H&P Note: 68-year-old female with history of diabetes mellitus type 2, asthma, anxiety, hypertension, hyperlipidemia, hiatal hernia, right femoral vein DVT, on Coumadin presents to the hospital with chief concerns of shortness of breath.  Patient is chronically oxygen dependent, on 4 L nasal cannula at home.  She was recently admitted to outside hospital on 2024, was treated for influenza with Tamiflu, on steroids, aerosol treatment, supplemental oxygen.  She made improvement and was discharged to skilled nursing care facility.     Patient reports that over the past 2 days, reports that it might be because the concentrator at the nursing home was not functioning correctly.  She reports that she has been dropping saturations in the 80s, despite the nebulizer treatments these have not improved.  She denies having any chest pain, denies having a cough, no fevers no chills.  Reports chronic lower extremity swelling, wears compression stockings.    Treatment Diagnosis: Impaired self-care status    Past Medical History:  has a past medical history of Anxiety, Asthma, Atrial fibrillation (HCC), Bronchitis, DDD (degenerative disc disease), lumbar, Depression, Diabetes mellitus (HCC), Elevated glucose, Headache(784.0), Hernia of abdominal cavity, HH (hiatus hernia), Hyperlipemia, mixed, Hypertension, Osteoarthritis, Right femoral vein DVT (HCC), and Uterine hyperplasia.    Past Surgical History:   has a past surgical history that includes Dilation and 
Pharmacy Note  Warfarin Consult    Shazia Dickson is a 68 y.o. female for whom pharmacy has been consulted to manage warfarin therapy.     Consulting Physician: Dr. Gill  Reason for Admission: SOB    Warfarin dose prior to admission: 2 mg Mon/Wed/Sat, 4mg all other days  Warfarin indication: Hx DVT/PE  Target INR range: 2.5-3.5     Past Medical History:   Diagnosis Date    Anxiety     Asthma     Bronchitis     DDD (degenerative disc disease), lumbar     Depression     Diabetes mellitus (HCC)     Elevated glucose     Headache(784.0)     Hernia of abdominal cavity     HH (hiatus hernia)     Hyperlipemia, mixed     Hypertension     Osteoarthritis     Right femoral vein DVT (HCC) May, 2009    Dr. Elizabeth    Uterine hyperplasia                 Recent Labs     05/25/24  1255   INR 2.3     Recent Labs     05/25/24  1255   HGB 8.5*   HCT 28.1*          Current warfarin drug-drug interactions: Prednisone      Date             INR        Dose   5/25/2024            2.3       4 mg     Daily PT/INR while inpatient.     Thank you for the consult.  Will continue to follow.     Miriam Olivares, PharmD  PGY-1 Pharmacy Resident    5/25/2024   4:56 PM    
Pharmacy Note  Warfarin Consult follow-up      Recent Labs     05/25/24  1255 05/26/24  1144   INR 2.3 2.1     Recent Labs     05/25/24  1255 05/26/24  0531   HGB 8.5* 8.7*   HCT 28.1* 28.8*    265       Significant Drug-Drug Interactions:  New warfarin drug-drug interactions: Azithromycin  Discontinued drug-drug interactions: None  Current warfarin drug-drug interactions: Prednisone      Date             INR        Dose given previous day  Dose scheduled for today  5/26/2024            2.1       4 mg            4 mg        Notes:        INR subtherapeutic (goal 2.5-3.5). Give 4 mg today              Daily PT/INR while inpatient.      Miriam Olivares, PharmD  PGY-1 Pharmacy Resident    5/26/2024   2:14 PM    
Pharmacy Note  Warfarin Consult follow-up      Recent Labs     05/25/24  1255 05/26/24  1144 05/27/24  0528   INR 2.3 2.1 2.2     Recent Labs     05/25/24  1255 05/26/24  0531   HGB 8.5* 8.7*   HCT 28.1* 28.8*    265       Significant Drug-Drug Interactions:  New warfarin drug-drug interactions: none  Discontinued drug-drug interactions: none  Current warfarin drug-drug interactions: azithromycin, prednisone      Date             INR        Dose given previous day  Dose scheduled for today  5/27/2024            2.2       4mg           2mg        Notes:                     Daily PT/INR while inpatient.       Michael Freitas, Pharm.D.   [facility] in-patient pharmacy    
Pharmacy Note  Warfarin Consult follow-up      Recent Labs     05/26/24  1144 05/27/24  0528 05/28/24  0526   INR 2.1 2.2 2.1     Recent Labs     05/25/24  1255 05/26/24  0531   HGB 8.5* 8.7*   HCT 28.1* 28.8*    265       Significant Drug-Drug Interactions:  New warfarin drug-drug interactions: none  Discontinued drug-drug interactions: azithromycin  Current warfarin drug-drug interactions: prednisone      Date             INR        Dose given previous day  Dose scheduled for today  5/28/2024         2.1                     2 mg                      6 mg        Notes:    INR decreased from 2.2 to 2.1 - still subtherapeutic.  Will give a one time boost dose of 6 mg today (normally scheduled dose would be 4 mg)                 Daily PT/INR while inpatient.     Keanu Dee, PharmD, BCPS  5/28/2024 8:23 AM        
Pharmacy Note  Warfarin Consult follow-up      Recent Labs     05/27/24  0528 05/28/24  0526 05/29/24  0501   INR 2.2 2.1 2.3     No results for input(s): \"HGB\", \"HCT\", \"PLT\" in the last 72 hours.    Significant Drug-Drug Interactions:  New warfarin drug-drug interactions: none  Discontinued drug-drug interactions: azithromycin, prednisone  Current warfarin drug-drug interactions: none      Date             INR        Dose given previous day  Dose scheduled  for today  5/29/2024            2.3         6 mg     2 mg        Notes:     Still slightly subtherapeutic. (Goal 2.5-3.5) Patient already received a booster dose yesterday. Will give her normal scheduled dose of 2 mg on Wednesdays.                Daily PT/INR while inpatient.     Thank you,    JANINE MONROE, [facility]        
Physical Therapy  Facility/Department: [facility] PROGRESSIVE CARE  Physical Therapy Initial Assessment    Name: Shazia Dickson  : 1955  MRN: [id number]  Date of Service: 2024    Discharge Recommendations:  Patient would benefit from continued therapy after discharge, Therapy recommended at discharge          Patient Diagnosis(es): The encounter diagnosis was COPD exacerbation (HCC).  Past Medical History:  has a past medical history of Anxiety, Asthma, Atrial fibrillation (HCC), Bronchitis, DDD (degenerative disc disease), lumbar, Depression, Diabetes mellitus (HCC), Elevated glucose, Headache(784.0), Hernia of abdominal cavity, HH (hiatus hernia), Hyperlipemia, mixed, Hypertension, Osteoarthritis, Right femoral vein DVT (HCC), and Uterine hyperplasia.  Past Surgical History:  has a past surgical history that includes Dilation and curettage of uterus (10/08 and ); Breast reduction surgery (1997); Breast reduction surgery (1997); Tympanostomy tube placement (1967); Tonsillectomy and adenoidectomy (); and Hysterectomy (7/17/15).    Assessment   Assessment: Presents with weakness and decreased tolerance to activity, uses 4 lt o2 at this time, dangles at EOB for > 10 minutes, therapist unable to assist pt to stand from EOB safely. Will conitnue POC for strengthening and progressing to sit<>stand from EOB.  Treatment Diagnosis: Impaired mobility  Specific Instructions for Next Treatment: co-tx  Therapy Prognosis: Fair  Decision Making: Medium Complexity  Activity Tolerance  Activity Tolerance: Patient limited by fatigue;Patient limited by endurance     Plan   Physical Therapy Plan  General Plan: 5-7 times per week  Specific Instructions for Next Treatment: co-tx  Current Treatment Recommendations: Strengthening, Functional mobility training, Balance training, Transfer training, ADL/Self-care training, Endurance training, Wheelchair mobility training, Gait training, Stair training, Safety 
Physical Therapy  Facility/Department: [facility]  Physical Therapy     Name: Shazia Dickson  : 1955  MRN: [id number]  Date of Service: 2024    Discharge Recommendations:  Patient would benefit from continued therapy after discharge, Therapy recommended at discharge   PT Equipment Recommendations  Equipment Needed: No      Patient Diagnosis(es): The primary encounter diagnosis was COPD exacerbation (HCC). A diagnosis of Cellulitis of abdominal wall was also pertinent to this visit.  Past Medical History:  has a past medical history of Anxiety, Asthma, Atrial fibrillation (HCC), Bronchitis, DDD (degenerative disc disease), lumbar, Depression, Diabetes mellitus (HCC), Elevated glucose, Headache(784.0), Hernia of abdominal cavity, HH (hiatus hernia), Hyperlipemia, mixed, Hypertension, Osteoarthritis, Right femoral vein DVT (HCC), and Uterine hyperplasia.  Past Surgical History:  has a past surgical history that includes Dilation and curettage of uterus (10/08 and ); Breast reduction surgery (1997); Breast reduction surgery (1997); Tympanostomy tube placement (1967); Tonsillectomy and adenoidectomy (); and Hysterectomy (7/17/15).    Assessment   Body Structures, Functions, Activity Limitations Requiring Skilled Therapeutic Intervention: Decreased functional mobility ;Decreased endurance;Decreased strength  Assessment: pt again unsuccessful with standing bedside with RW and maxA of 2  Specific Instructions for Next Treatment: co-tx  Requires PT Follow-Up: Yes  Activity Tolerance  Activity Tolerance: Patient tolerated treatment well     Plan   Physical Therapy Plan  General Plan: 3-5 times per week  Specific Instructions for Next Treatment: co-tx  Current Treatment Recommendations: Strengthening, Functional mobility training, Balance training, Transfer training, ADL/Self-care training, Endurance training, Wheelchair mobility training, Gait training, Stair training, Safety 
RN called report to RN at [facility]. All questions answered, RN let her know that patient wanted to make sure that they order of INR rechecks so that it does not get missed. RN let her know of patient proposed  time of 1630  
Rehabilitation Physical Therapy    Date: 2024  Patient Name: Sahzia Dickson      Room:   MRN: [id number]    : 1955  (68 y.o.)  Gender: female      Diagnosis: Shortness of breath  Additional Pertinent Hx: The patient is a 68 y.o.  Declined female who presents with Shortness of Breath   and she is admitted to the hospital for the management of shortness of breath.     68-year-old female with history of diabetes mellitus type 2, asthma, anxiety, hypertension, hyperlipidemia, hiatal hernia, right femoral vein DVT, on Coumadin presents to the hospital with chief concerns of shortness of breath.  Patient is chronically oxygen dependent, on 4 L nasal cannula at home.  She was recently admitted to outside hospital on 2024, was treated for influenza with Tamiflu, on steroids, aerosol treatment, supplemental oxygen.  She made improvement and was discharged to skilled nursing care facility.     Patient reports that over the past 2 days, reports that it might be because the concentrator at the nursing home was not functioning correctly.  She reports that she has been dropping saturations in the 80s, despite the nebulizer treatments these have not improved.  She denies having any chest pain, denies having a cough, no fevers no chills.  Reports chronic lower extremity swelling, wears compression stockings.  Although the patient reports that she has a history of congestive heart failure, she had a recent echocardiogram performed.  Reports history of DVT and PE in , then again in , since then has been on Coumadin with an increased goal INR of 2.5-3.5.     2024, EF 55-60%, normal diastolic function, right ventricle is dilated, no obvious tricuspid regurgitation reported.  Technically difficult study due to body habitus.     The patient denies any gastrointestinal or genitourinary concerns.    Discharge Recommendations:  Patient would benefit from continued therapy after discharge, Therapy 
Rn agrees with [facility] charting.  
Strengthening, Balance training, Functional mobility training, Endurance training, Safety education & training, Patient/Caregiver education & training, Equipment evaluation, education, & procurement, Self-Care / ADL, Positioning    Assessment  Activity Tolerance  Activity Tolerance: Patient Tolerated treatment well  Assessment  Performance deficits / Impairments: Decreased functional mobility , Decreased ADL status, Decreased ROM, Decreased strength, Decreased endurance, Decreased balance  Treatment Diagnosis: Impaired self-care status  Prognosis: Good  Decision Making: Medium Complexity  Discharge Recommendations: Patient would benefit from continued therapy after discharge  OT Equipment Recommendations  Other: TBD  Safety Devices  Type of Devices: Patient at risk for falls, Left in bed, Call light within reach, Nurse notified, Gait belt, Heels elevated for pressure relief    AM-PAC Daily Activities Inpatient  AM-PAC Daily Activity - Inpatient   How much help is needed for putting on and taking off regular lower body clothing?: Total  How much help is needed for bathing (which includes washing, rinsing, drying)?: Total  How much help is needed for toileting (which includes using toilet, bedpan, or urinal)?: Total  How much help is needed for putting on and taking off regular upper body clothing?: A Lot  How much help is needed for taking care of personal grooming?: A Little  How much help for eating meals?: A Little  AM-PAC Inpatient Daily Activity Raw Score: 11  AM-PAC Inpatient ADL T-Scale Score : 29.04  ADL Inpatient CMS 0-100% Score: 70.42  ADL Inpatient CMS G-Code Modifier : CL    OT Minutes  OT Individual Minutes  Time In: 1352  Time Out: 1421  Minutes: 29  Time Code Minutes   Timed Code Treatment Minutes: 29 Minutes      Electronically signed by JAVIER Wiggins on 5/28/24 at 4:26 PM EDT    
splenomegaly  Neurologic: There are no new focal motor or sensory deficits, normal muscle tone and bulk, no abnormal sensation, normal speech, cranial nerves II through XII grossly intact  Skin: No gross lesions, rashes, bruising or bleeding on exposed skin area  Extremities: Has compression stockings on, still has significant lower extremity edema.  Noted above the compression stockings.  Peripheral pulses felt    Investigations:      Laboratory Testing:  Recent Results (from the past 24 hour(s))   POC Glucose Fingerstick    Collection Time: 05/26/24  4:22 PM   Result Value Ref Range    POC Glucose 200 (H) 65 - 105 mg/dL   POC Glucose Fingerstick    Collection Time: 05/26/24  8:33 PM   Result Value Ref Range    POC Glucose 243 (H) 65 - 105 mg/dL   Protime-INR    Collection Time: 05/27/24  5:28 AM   Result Value Ref Range    Protime 24.8 (H) 11.8 - 14.6 sec    INR 2.2    POC Glucose Fingerstick    Collection Time: 05/27/24  6:49 AM   Result Value Ref Range    POC Glucose 207 (H) 65 - 105 mg/dL   POC Glucose Fingerstick    Collection Time: 05/27/24 11:16 AM   Result Value Ref Range    POC Glucose 174 (H) 65 - 105 mg/dL       Imaging/Diagnostics:    .    Assessment :      Primary Problem  Shortness of breath    Active Hospital Problems    Diagnosis Date Noted    Shortness of breath [R06.02] 05/25/2024       Plan:     Patient status Admit as inpatient in the  Progressive Unit/Step down    Acute shortness of breath on chronic hypoxemic respiratory failure 4 L.  Suspect secondary to volume overload plus asthma/COPD exacerbation. + SHWETA/OHS/pulmonary hypertension from history of PE.  Place on prednisone p.o. starting tomorrow, continue bronchodilators, concern for possible pneumonia.  Procalcitonin added.  Placed on ceftriaxone and azithromycin.  Reviewed recent echocardiogram, noted to be volume overloaded on physical exam.  Will administer 1 dose of Lasix now.  Patient will benefit from outpatient SHWETA testing, reports 
transcription, some errors in transcription may have occurred.

## 2024-05-29 NOTE — PLAN OF CARE
Problem: Safety - Adult  Goal: Free from fall injury  5/29/2024 0346 by Renetta Borjas, RN  Outcome: Progressing  Note: Pt assessed as a fall risk this shift. Remains free from falls and accidental injury at this time. Fall precautions in place. Floor free from obstacles, and bed is locked and in lowest position. Adequate lighting provided.  Pt encouraged to call before getting OOB for any need.  Bed alarm activated. Will continue to monitor needs during hourly rounding, and reinforce education on use of call light.      Problem: Chronic Conditions and Co-morbidities  Goal: Patient's chronic conditions and co-morbidity symptoms are monitored and maintained or improved  5/29/2024 0346 by Renetta Borjas, RN  Outcome: Progressing     Problem: Respiratory - Adult  Goal: Achieves optimal ventilation and oxygenation  Outcome: Progressing     Problem: Skin/Tissue Integrity  Goal: Absence of new skin breakdown  Description: 1.  Monitor for areas of redness and/or skin breakdown  2.  Assess vascular access sites hourly  3.  Every 4-6 hours minimum:  Change oxygen saturation probe site  4.  Every 4-6 hours:  If on nasal continuous positive airway pressure, respiratory therapy assess nares and determine need for appliance change or resting period.  5/29/2024 0346 by Renetta Borjas, RN  Outcome: Progressing     Problem: Discharge Planning  Goal: Discharge to home or other facility with appropriate resources  5/29/2024 0346 by Renetta Borjas, RN  Outcome: Progressing

## 2024-05-29 NOTE — CARE COORDINATION
DISCHARGE PLANNING NOTE:    Spoke with Annika from [facility] who states pt is able to return upon discharge, however she will need a new insurance authorization.    Electronically signed by Stephanie Arboleda RN on 5/26/2024 at 3:22 PM    
IMM letter provided to patient.  Patient offered four hours to make informed decision regarding appeal process; patient agreeable to discharge.   Electronically signed by SERA Mcnamara on 5/29/2024 at 12:41 PM    
ONGOING DISCHARGE PLANNING NOTE:    Writer reviewed notes, and discharge plan is to return to [facility] when medically ready. Will need insurance authorization. PT/OT worked with patient today. Authorization can be submitted 5/28.    Will continue to follow for additional discharge needs.    Electronically signed by Janet Shook RN on 5/27/2024 at 4:33 PM        
Transportation arranged for patient to go to [facility] at 1630 via [facility] by stretcher. Facility informed. Bedside nurse informed.     Number for Report: [phone]  Electronically signed by SERA Mcnamara on 5/29/2024 at 12:19 PM      
Writer is following for potential discharge to [facility].  Writer received message from Annika with [facility]. Authorization approved. Writer placed call to bedside JOSUÉ Noonan to verify pt discharge.   Electronically signed by SERA Mcnamara on 5/29/2024 at 11:19 AM    
Writer is following for potential discharge to [facility] [place].  Writer placed message to Annika with [facility] to confirm PT/OT is available to start authorization. Writer met with pt for update. No further questions at this time.   Electronically signed by SERA Mcnamara on 5/28/2024 at 10:20 AM    
to discuss the discharge plan with any other family members/significant others, and if so, who? No  Plans to Return to Present Housing: Unknown at present (Message left for Annika at [facility] to make sure patient is able to return.)  Other Identified Issues/Barriers to RETURNING to current housing: no  Potential Assistance needed at discharge: Skilled Nursing Facility            Potential DME:  n/a  Patient expects to discharge to: Skilled nursing facility  Plan for transportation at discharge: Wheelchair Van    Financial    Payor: [employer] MEDICARE / Plan: [employer] MEDICARE-ADVANTAGE PPO / Product Type: Medicare /     Does insurance require precert for SNF: Yes    Potential assistance Purchasing Medications: No  Meds-to-Beds request:        [id number] - [place], [place] - [address] [facility] - P [phone] - F [phone]  [address] [address]  Phone: [phone] Fax: [phone]    [facility] [address] [facility] [facility] [facility] - P [phone] - F [phone]  [address] [facility] [address] [facility] [address]  Phone: [phone] Fax: [phone]    [id number] - [place], [place] - [address] -  [phone] - F [phone]  [facility] [facility] [address]  Phone: [phone] Fax: [phone]      Notes:    Factors facilitating achievement of predicted outcomes: Motivated, Cooperative, and Pleasant    Barriers to discharge: n/a    Additional Case Management Notes: From [facility] and plans to return. Will need new insurance authorization per Annika at [facility].    The Plan for Transition of Care is related to the following treatment goals of Shortness of breath [R06.02]  COPD exacerbation (HCC) [J44.1]    IF APPLICABLE: The Patient and/or patient representative Shazia and her family were provided with a choice of provider and agrees with the discharge plan. Freedom of choice list with basic dialogue that supports the patient's individualized plan 
warfarin 2 MG tablet  Commonly known as: COUMADIN  Take as directed. If you are unsure how to take this medication, talk to your nurse or doctor.  Original instructions: Take 1 tablet by mouth three times a week Mondays, Wednesdays, Saturdays   * warfarin 4 MG tablet  Commonly known as: COUMADIN  Take as directed. If you are unsure how to take this medication, talk to your nurse or doctor.  Original instructions: Take 1-2 tablets by mouth daily As directed by [facility]    very important  * This list has 2 medication(s) that are the same as other medications prescribed for you. Read the directions carefully, and ask your doctor or other care provider to review them with you.     Where to Get Your Medications      These medications were sent to [id number] - [place], [place] [id number] [facility] - P [phone] - F [phone]  [address] [place] [address]  Phone: [phone]       furosemide 40 MG tablet        mupirocin 2 % ointment        predniSONE 20 MG tablet      You can get these medications from any pharmacy    Bring a paper prescription for each of these medications      traMADol 50 MG tablet

## 2024-05-30 LAB
GLUCOSE BLD-MCNC: 182 MG/DL (ref 65–105)
GLUCOSE BLD-MCNC: 207 MG/DL (ref 65–105)
GLUCOSE BLD-MCNC: 259 MG/DL (ref 65–105)

## 2024-05-31 ENCOUNTER — TELEPHONE (OUTPATIENT)
Dept: FAMILY MEDICINE CLINIC | Age: 69
End: 2024-05-31

## 2024-05-31 NOTE — DISCHARGE SUMMARY
warfarin 2 MG tablet  Commonly known as: COUMADIN  Take as directed. If you are unsure how to take this medication, talk to your nurse or doctor.     * warfarin 4 MG tablet  Commonly known as: COUMADIN  Take as directed. If you are unsure how to take this medication, talk to your nurse or doctor.  Original instructions: Take 1-2 tablets by mouth daily As directed by [facility]           * This list has 2 medication(s) that are the same as other medications prescribed for you. Read the directions carefully, and ask your doctor or other care provider to review them with you.                   Where to Get Your Medications        These medications were sent to [id number] - [place] [id number] [facility] -  [phone] - F [phone]  [address] [place] [address]      Phone: [phone]   furosemide 40 MG tablet  mupirocin 2 % ointment  predniSONE 20 MG tablet       You can get these medications from any pharmacy    Bring a paper prescription for each of these medications  traMADol 50 MG tablet         Time Spent on discharge is  35 mins in patient examination, evaluation, counseling as well as medication reconciliation, prescriptions for required medications, discharge plan and follow up.    Electronically signed by   Moises Henson MD  5/31/2024  1:13 PM      Thank you Jyoti Dennis, APRN - CNP for the opportunity to be involved in this patient's care.

## 2024-06-03 ENCOUNTER — HOSPITAL ENCOUNTER (OUTPATIENT)
Age: 69
Setting detail: SPECIMEN
Discharge: HOME | End: 2024-06-03

## 2024-06-03 LAB
INR PPP: 2.3
PROTHROMBIN TIME: 24.7 SEC (ref 11.7–14.9)

## 2024-06-03 PROCEDURE — 85610 PROTHROMBIN TIME: CPT

## 2024-06-03 PROCEDURE — 36415 COLL VENOUS BLD VENIPUNCTURE: CPT

## 2024-06-03 PROCEDURE — P9603 ONE-WAY ALLOW PRORATED MILES: HCPCS

## 2024-06-06 LAB
EKG Q-T INTERVAL: 366 MS
EKG QRS DURATION: 84 MS
EKG QTC CALCULATION (BAZETT): 417 MS
EKG R AXIS: 15 DEGREES
EKG T AXIS: 64 DEGREES
EKG VENTRICULAR RATE: 78 BPM

## 2024-06-14 ENCOUNTER — HOSPITAL ENCOUNTER (OUTPATIENT)
Age: 69
Setting detail: SPECIMEN
Discharge: HOME | End: 2024-06-14

## 2024-06-14 LAB
INR PPP: 1.8
PROTHROMBIN TIME: 20.8 SEC (ref 11.7–14.9)

## 2024-06-14 PROCEDURE — 36415 COLL VENOUS BLD VENIPUNCTURE: CPT

## 2024-06-14 PROCEDURE — 85610 PROTHROMBIN TIME: CPT

## 2024-06-14 PROCEDURE — P9603 ONE-WAY ALLOW PRORATED MILES: HCPCS

## 2024-06-17 ENCOUNTER — TELEPHONE (OUTPATIENT)
Dept: PHARMACY | Age: 69
End: 2024-06-17

## 2024-06-17 NOTE — TELEPHONE ENCOUNTER
Patient LVM stating that her INR was 1.8 on Fri, 6/14/24, and she would like to know what her dosing regimen would be.  Called patient and was informed that she was given 4mg on Sat and Sunday.   She states her regimen has been 2mg Mon, Wed, Sat and 4mg all other days.  Let patient know that her INR has been low since May so I would increase her dose to 2mg one day of the week and 4mg the rest of the week.  I told her I will reach out to the facility to confirm if there is any changes in her regimen.      Spoke with Patricia (nurse from [facility]) and was informed that regimen was changed as of 6/15/24 to 2mg qHS on Tues and 4mg qHS Mon, Wed, Thur, Fri, Sat, Sun.  She doesn't have record of pt's goal INR, so I told her patient's target is 2.5-3.5.      Called patient and LVM with above new dosing regimen.

## 2024-06-21 ENCOUNTER — TELEPHONE (OUTPATIENT)
Dept: PHARMACY | Age: 69
End: 2024-06-21

## 2024-06-21 NOTE — TELEPHONE ENCOUNTER
Patient called to find out whether someone from  [facility] would be sent out to draw INR and manage patient's warfarin while she is admitted at [facility]. She noted INR was low this past Friday but she was not informed of next draw date or dosing regimen changes. Spoke with Dr. Danial Serrano who states all anticoagulation management will be handled through their internal providers. He will be ordering labs and adjusting regimen to target INR 2.5 - 3.5.     Left vmail for patient confirming this for her.

## 2024-07-01 ENCOUNTER — HOSPITAL ENCOUNTER (OUTPATIENT)
Age: 69
Setting detail: SPECIMEN
Discharge: HOME | End: 2024-07-01

## 2024-07-01 LAB
INR PPP: 3.2
PROTHROMBIN TIME: 31.6 SEC (ref 11.7–14.9)

## 2024-07-01 PROCEDURE — 85610 PROTHROMBIN TIME: CPT

## 2024-07-01 PROCEDURE — P9603 ONE-WAY ALLOW PRORATED MILES: HCPCS

## 2024-07-01 PROCEDURE — 36415 COLL VENOUS BLD VENIPUNCTURE: CPT

## 2024-07-04 ENCOUNTER — HOSPITAL ENCOUNTER (INPATIENT)
Age: 69
LOS: 9 days | Discharge: NURSING, REHAB OR LONG-TERM CARE | DRG: 189 | End: 2024-07-13
Attending: EMERGENCY MEDICINE | Admitting: INTERNAL MEDICINE
Payer: MEDICARE

## 2024-07-04 ENCOUNTER — APPOINTMENT (OUTPATIENT)
Dept: GENERAL RADIOLOGY | Age: 69
DRG: 189 | End: 2024-07-04
Attending: EMERGENCY MEDICINE
Payer: MEDICARE

## 2024-07-04 DIAGNOSIS — J96.22 ACUTE ON CHRONIC RESPIRATORY FAILURE WITH HYPOXIA AND HYPERCAPNIA (HCC): Primary | ICD-10-CM

## 2024-07-04 DIAGNOSIS — I48.91 ATRIAL FIBRILLATION, UNSPECIFIED TYPE (HCC): ICD-10-CM

## 2024-07-04 DIAGNOSIS — J96.21 ACUTE ON CHRONIC RESPIRATORY FAILURE WITH HYPOXIA AND HYPERCAPNIA (HCC): Primary | ICD-10-CM

## 2024-07-04 PROBLEM — I50.33 ACUTE ON CHRONIC DIASTOLIC HEART FAILURE (HCC): Status: ACTIVE | Noted: 2024-07-04

## 2024-07-04 PROBLEM — J44.1 COPD EXACERBATION (HCC): Status: ACTIVE | Noted: 2024-07-04

## 2024-07-04 PROBLEM — N30.00 ACUTE CYSTITIS WITHOUT HEMATURIA: Status: ACTIVE | Noted: 2024-07-04

## 2024-07-04 LAB
ALBUMIN SERPL-MCNC: 2.9 G/DL (ref 3.5–5.2)
ALP SERPL-CCNC: 89 U/L (ref 35–104)
ALT SERPL-CCNC: 14 U/L (ref 5–33)
ANION GAP SERPL CALCULATED.3IONS-SCNC: ABNORMAL MMOL/L (ref 9–17)
ARTERIAL PATENCY WRIST A: ABNORMAL
ARTERIAL PATENCY WRIST A: ABNORMAL
AST SERPL-CCNC: 15 U/L
B PARAP IS1001 DNA NPH QL NAA+NON-PROBE: NOT DETECTED
B PERT DNA SPEC QL NAA+PROBE: NOT DETECTED
BACTERIA URNS QL MICRO: ABNORMAL
BASOPHILS # BLD: 0.06 K/UL (ref 0–0.2)
BASOPHILS NFR BLD: 1 % (ref 0–2)
BDY SITE: ABNORMAL
BDY SITE: ABNORMAL
BILIRUB DIRECT SERPL-MCNC: 0.1 MG/DL
BILIRUB INDIRECT SERPL-MCNC: 0.3 MG/DL (ref 0–1)
BILIRUB SERPL-MCNC: 0.4 MG/DL (ref 0.3–1.2)
BILIRUB UR QL STRIP: NEGATIVE
BNP SERPL-MCNC: 755 PG/ML
BODY TEMPERATURE: 37
BUN SERPL-MCNC: 21 MG/DL (ref 8–23)
C PNEUM DNA NPH QL NAA+NON-PROBE: NOT DETECTED
CALCIUM SERPL-MCNC: 9.9 MG/DL (ref 8.6–10.4)
CASTS #/AREA URNS LPF: ABNORMAL /LPF
CHLORIDE SERPL-SCNC: 84 MMOL/L (ref 98–107)
CLARITY UR: ABNORMAL
CO2 SERPL-SCNC: >45 MMOL/L (ref 20–31)
COHGB MFR BLD: 1.8 % (ref 0–5)
COHGB MFR BLD: 1.9 % (ref 0–5)
COLOR UR: YELLOW
CREAT SERPL-MCNC: 1.3 MG/DL (ref 0.5–0.9)
EOSINOPHIL # BLD: 0.06 K/UL (ref 0–0.4)
EOSINOPHILS RELATIVE PERCENT: 1 % (ref 0–4)
ERYTHROCYTE [DISTWIDTH] IN BLOOD BY AUTOMATED COUNT: 18.7 % (ref 11.5–14.9)
FLUAV RNA NPH QL NAA+NON-PROBE: NOT DETECTED
FLUBV RNA NPH QL NAA+NON-PROBE: NOT DETECTED
GAS FLOW.O2 O2 DELIVERY SYS: ABNORMAL L/MIN
GAS FLOW.O2 O2 DELIVERY SYS: ABNORMAL L/MIN
GAS FLOW.O2 SETTING OXYMISER: ABNORMAL L/MIN
GFR, ESTIMATED: 45 ML/MIN/1.73M2
GLUCOSE BLD-MCNC: 206 MG/DL (ref 65–105)
GLUCOSE BLD-MCNC: 221 MG/DL (ref 65–105)
GLUCOSE SERPL-MCNC: 241 MG/DL (ref 70–99)
GLUCOSE UR STRIP-MCNC: NEGATIVE MG/DL
HADV DNA NPH QL NAA+NON-PROBE: NOT DETECTED
HCO3 ARTERIAL: 56.8 MMOL/L (ref 22–26)
HCO3 ARTERIAL: 63.3 MMOL/L (ref 22–26)
HCOV 229E RNA NPH QL NAA+NON-PROBE: NOT DETECTED
HCOV HKU1 RNA NPH QL NAA+NON-PROBE: NOT DETECTED
HCOV NL63 RNA NPH QL NAA+NON-PROBE: NOT DETECTED
HCOV OC43 RNA NPH QL NAA+NON-PROBE: NOT DETECTED
HCT VFR BLD AUTO: 34 % (ref 36–46)
HGB BLD-MCNC: 9.8 G/DL (ref 12–16)
HGB UR QL STRIP.AUTO: ABNORMAL
HMPV RNA NPH QL NAA+NON-PROBE: NOT DETECTED
HPIV1 RNA NPH QL NAA+NON-PROBE: NOT DETECTED
HPIV2 RNA NPH QL NAA+NON-PROBE: NOT DETECTED
HPIV3 RNA NPH QL NAA+NON-PROBE: NOT DETECTED
HPIV4 RNA NPH QL NAA+NON-PROBE: NOT DETECTED
INR PPP: 2.5
KETONES UR STRIP-MCNC: NEGATIVE MG/DL
LACTATE BLDV-SCNC: 1.9 MMOL/L (ref 0.5–2.2)
LEUKOCYTE ESTERASE UR QL STRIP: ABNORMAL
LYMPHOCYTES NFR BLD: 0.63 K/UL (ref 1–4.8)
LYMPHOCYTES RELATIVE PERCENT: 11 % (ref 24–44)
M PNEUMO DNA NPH QL NAA+NON-PROBE: NOT DETECTED
MCH RBC QN AUTO: 25.4 PG (ref 26–34)
MCHC RBC AUTO-ENTMCNC: 28.8 G/DL (ref 31–37)
MCV RBC AUTO: 88 FL (ref 80–100)
METHEMOGLOBIN: 0.1 % (ref 0–1.9)
METHEMOGLOBIN: 0.8 % (ref 0–1.9)
MONOCYTES NFR BLD: 0.46 K/UL (ref 0.1–1.3)
MONOCYTES NFR BLD: 8 % (ref 1–7)
MORPHOLOGY: ABNORMAL
NEUTROPHILS NFR BLD: 79 % (ref 36–66)
NEUTS SEG NFR BLD: 4.49 K/UL (ref 1.3–9.1)
NITRITE UR QL STRIP: NEGATIVE
O2 SAT, ARTERIAL: 92.7 % (ref 95–98)
O2 SAT, ARTERIAL: 94.6 % (ref 95–98)
PCO2 ARTERIAL: 135.2 MMHG (ref 35–45)
PCO2 ARTERIAL: 94.3 MMHG (ref 35–45)
PH ARTERIAL: 7.29 (ref 7.35–7.45)
PH ARTERIAL: 7.39 (ref 7.35–7.45)
PH UR STRIP: 7 [PH] (ref 5–8)
PLATELET # BLD AUTO: 301 K/UL (ref 150–450)
PMV BLD AUTO: 7.3 FL (ref 6–12)
PO2 ARTERIAL: 68.1 MMHG (ref 80–100)
PO2 ARTERIAL: 85.6 MMHG (ref 80–100)
POSITIVE BASE EXCESS, ART: 30.2 MMOL/L (ref 0–2)
POTASSIUM SERPL-SCNC: 4.2 MMOL/L (ref 3.7–5.3)
PROT SERPL-MCNC: 7.4 G/DL (ref 6.4–8.3)
PROT UR STRIP-MCNC: ABNORMAL MG/DL
PROTHROMBIN TIME: 27.4 SEC (ref 11.8–14.6)
PT. POSITION: ABNORMAL
PT. POSITION: ABNORMAL
RBC # BLD AUTO: 3.86 M/UL (ref 4–5.2)
RBC #/AREA URNS HPF: ABNORMAL /HPF
RESPIRATORY RATE: 20
RESPIRATORY RATE: 25
RSV RNA NPH QL NAA+NON-PROBE: NOT DETECTED
RV+EV RNA NPH QL NAA+NON-PROBE: NOT DETECTED
SARS-COV-2 RNA NPH QL NAA+NON-PROBE: NOT DETECTED
SODIUM SERPL-SCNC: 137 MMOL/L (ref 135–144)
SP GR UR STRIP: 1.02 (ref 1–1.03)
SPECIMEN DESCRIPTION: NORMAL
TEXT FOR RESPIRATORY: ABNORMAL
TEXT FOR RESPIRATORY: ABNORMAL
TROPONIN I SERPL HS-MCNC: 25 NG/L (ref 0–14)
TROPONIN I SERPL HS-MCNC: 28 NG/L (ref 0–14)
TSH SERPL DL<=0.05 MIU/L-ACNC: 1.44 UIU/ML (ref 0.3–5)
UROBILINOGEN UR STRIP-ACNC: NORMAL EU/DL (ref 0–1)
VENTILATION MODE VENT: ABNORMAL
WBC #/AREA URNS HPF: ABNORMAL /HPF
WBC OTHER # BLD: 5.7 K/UL (ref 3.5–11)

## 2024-07-04 PROCEDURE — 87040 BLOOD CULTURE FOR BACTERIA: CPT

## 2024-07-04 PROCEDURE — 82805 BLOOD GASES W/O2 SATURATION: CPT

## 2024-07-04 PROCEDURE — 2580000003 HC RX 258: Performed by: INTERNAL MEDICINE

## 2024-07-04 PROCEDURE — 36415 COLL VENOUS BLD VENIPUNCTURE: CPT

## 2024-07-04 PROCEDURE — 99285 EMERGENCY DEPT VISIT HI MDM: CPT

## 2024-07-04 PROCEDURE — 94761 N-INVAS EAR/PLS OXIMETRY MLT: CPT

## 2024-07-04 PROCEDURE — 6360000002 HC RX W HCPCS: Performed by: INTERNAL MEDICINE

## 2024-07-04 PROCEDURE — 86403 PARTICLE AGGLUT ANTBDY SCRN: CPT

## 2024-07-04 PROCEDURE — 85610 PROTHROMBIN TIME: CPT

## 2024-07-04 PROCEDURE — 94640 AIRWAY INHALATION TREATMENT: CPT

## 2024-07-04 PROCEDURE — 2700000000 HC OXYGEN THERAPY PER DAY

## 2024-07-04 PROCEDURE — 87154 CUL TYP ID BLD PTHGN 6+ TRGT: CPT

## 2024-07-04 PROCEDURE — 80048 BASIC METABOLIC PNL TOTAL CA: CPT

## 2024-07-04 PROCEDURE — 5A0935A ASSISTANCE WITH RESPIRATORY VENTILATION, LESS THAN 24 CONSECUTIVE HOURS, HIGH NASAL FLOW/VELOCITY: ICD-10-PCS | Performed by: INTERNAL MEDICINE

## 2024-07-04 PROCEDURE — 99291 CRITICAL CARE FIRST HOUR: CPT | Performed by: INTERNAL MEDICINE

## 2024-07-04 PROCEDURE — 5A09457 ASSISTANCE WITH RESPIRATORY VENTILATION, 24-96 CONSECUTIVE HOURS, CONTINUOUS POSITIVE AIRWAY PRESSURE: ICD-10-PCS | Performed by: INTERNAL MEDICINE

## 2024-07-04 PROCEDURE — 0202U NFCT DS 22 TRGT SARS-COV-2: CPT

## 2024-07-04 PROCEDURE — 94660 CPAP INITIATION&MGMT: CPT

## 2024-07-04 PROCEDURE — 87186 SC STD MICRODIL/AGAR DIL: CPT

## 2024-07-04 PROCEDURE — 81001 URINALYSIS AUTO W/SCOPE: CPT

## 2024-07-04 PROCEDURE — 87086 URINE CULTURE/COLONY COUNT: CPT

## 2024-07-04 PROCEDURE — 87077 CULTURE AEROBIC IDENTIFY: CPT

## 2024-07-04 PROCEDURE — 6360000002 HC RX W HCPCS: Performed by: EMERGENCY MEDICINE

## 2024-07-04 PROCEDURE — 71045 X-RAY EXAM CHEST 1 VIEW: CPT

## 2024-07-04 PROCEDURE — 85025 COMPLETE CBC W/AUTO DIFF WBC: CPT

## 2024-07-04 PROCEDURE — 93005 ELECTROCARDIOGRAM TRACING: CPT | Performed by: EMERGENCY MEDICINE

## 2024-07-04 PROCEDURE — 80076 HEPATIC FUNCTION PANEL: CPT

## 2024-07-04 PROCEDURE — 2580000003 HC RX 258

## 2024-07-04 PROCEDURE — 96374 THER/PROPH/DIAG INJ IV PUSH: CPT

## 2024-07-04 PROCEDURE — 6370000000 HC RX 637 (ALT 250 FOR IP)

## 2024-07-04 PROCEDURE — 36600 WITHDRAWAL OF ARTERIAL BLOOD: CPT

## 2024-07-04 PROCEDURE — 2060000000 HC ICU INTERMEDIATE R&B

## 2024-07-04 PROCEDURE — 87147 CULTURE TYPE IMMUNOLOGIC: CPT

## 2024-07-04 PROCEDURE — 2580000003 HC RX 258: Performed by: EMERGENCY MEDICINE

## 2024-07-04 PROCEDURE — 83605 ASSAY OF LACTIC ACID: CPT

## 2024-07-04 PROCEDURE — 84443 ASSAY THYROID STIM HORMONE: CPT

## 2024-07-04 PROCEDURE — 83880 ASSAY OF NATRIURETIC PEPTIDE: CPT

## 2024-07-04 PROCEDURE — 82947 ASSAY GLUCOSE BLOOD QUANT: CPT

## 2024-07-04 PROCEDURE — 6360000002 HC RX W HCPCS

## 2024-07-04 PROCEDURE — 84484 ASSAY OF TROPONIN QUANT: CPT

## 2024-07-04 PROCEDURE — 87205 SMEAR GRAM STAIN: CPT

## 2024-07-04 RX ORDER — DEXMEDETOMIDINE HYDROCHLORIDE 4 UG/ML
.1-1.5 INJECTION, SOLUTION INTRAVENOUS CONTINUOUS
Status: DISCONTINUED | OUTPATIENT
Start: 2024-07-04 | End: 2024-07-08

## 2024-07-04 RX ORDER — CARVEDILOL 12.5 MG/1
12.5 TABLET ORAL 2 TIMES DAILY WITH MEALS
Status: DISCONTINUED | OUTPATIENT
Start: 2024-07-04 | End: 2024-07-04

## 2024-07-04 RX ORDER — DEXTROSE MONOHYDRATE 100 MG/ML
INJECTION, SOLUTION INTRAVENOUS CONTINUOUS PRN
Status: DISCONTINUED | OUTPATIENT
Start: 2024-07-04 | End: 2024-07-14 | Stop reason: HOSPADM

## 2024-07-04 RX ORDER — PANTOPRAZOLE SODIUM 40 MG/1
40 TABLET, DELAYED RELEASE ORAL
Status: DISCONTINUED | OUTPATIENT
Start: 2024-07-05 | End: 2024-07-14 | Stop reason: HOSPADM

## 2024-07-04 RX ORDER — POTASSIUM CHLORIDE 20 MEQ/1
40 TABLET, EXTENDED RELEASE ORAL PRN
Status: DISCONTINUED | OUTPATIENT
Start: 2024-07-04 | End: 2024-07-06

## 2024-07-04 RX ORDER — ALBUTEROL SULFATE 2.5 MG/3ML
2.5 SOLUTION RESPIRATORY (INHALATION) EVERY 4 HOURS PRN
Status: DISCONTINUED | OUTPATIENT
Start: 2024-07-04 | End: 2024-07-14 | Stop reason: HOSPADM

## 2024-07-04 RX ORDER — MIDODRINE HYDROCHLORIDE 5 MG/1
5 TABLET ORAL 3 TIMES DAILY PRN
Status: DISCONTINUED | OUTPATIENT
Start: 2024-07-04 | End: 2024-07-14 | Stop reason: HOSPADM

## 2024-07-04 RX ORDER — POLYETHYLENE GLYCOL 3350 17 G/17G
17 POWDER, FOR SOLUTION ORAL DAILY PRN
Status: DISCONTINUED | OUTPATIENT
Start: 2024-07-04 | End: 2024-07-14 | Stop reason: HOSPADM

## 2024-07-04 RX ORDER — MONTELUKAST SODIUM 10 MG/1
10 TABLET ORAL NIGHTLY
Status: DISCONTINUED | OUTPATIENT
Start: 2024-07-04 | End: 2024-07-14 | Stop reason: HOSPADM

## 2024-07-04 RX ORDER — BUDESONIDE 0.5 MG/2ML
0.5 INHALANT ORAL
Status: DISCONTINUED | OUTPATIENT
Start: 2024-07-04 | End: 2024-07-14 | Stop reason: HOSPADM

## 2024-07-04 RX ORDER — 0.9 % SODIUM CHLORIDE 0.9 %
1000 INTRAVENOUS SOLUTION INTRAVENOUS ONCE
Status: COMPLETED | OUTPATIENT
Start: 2024-07-04 | End: 2024-07-04

## 2024-07-04 RX ORDER — SODIUM CHLORIDE 0.9 % (FLUSH) 0.9 %
5-40 SYRINGE (ML) INJECTION EVERY 12 HOURS SCHEDULED
Status: DISCONTINUED | OUTPATIENT
Start: 2024-07-04 | End: 2024-07-14 | Stop reason: HOSPADM

## 2024-07-04 RX ORDER — ONDANSETRON 4 MG/1
4 TABLET, ORALLY DISINTEGRATING ORAL EVERY 8 HOURS PRN
Status: DISCONTINUED | OUTPATIENT
Start: 2024-07-04 | End: 2024-07-14 | Stop reason: HOSPADM

## 2024-07-04 RX ORDER — ACETAMINOPHEN 325 MG/1
650 TABLET ORAL EVERY 6 HOURS PRN
Status: DISCONTINUED | OUTPATIENT
Start: 2024-07-04 | End: 2024-07-14 | Stop reason: HOSPADM

## 2024-07-04 RX ORDER — DILTIAZEM HYDROCHLORIDE 120 MG/1
120 CAPSULE, COATED, EXTENDED RELEASE ORAL DAILY
Status: DISCONTINUED | OUTPATIENT
Start: 2024-07-04 | End: 2024-07-14 | Stop reason: HOSPADM

## 2024-07-04 RX ORDER — PRAVASTATIN SODIUM 40 MG
80 TABLET ORAL EVERY EVENING
Status: DISCONTINUED | OUTPATIENT
Start: 2024-07-04 | End: 2024-07-14 | Stop reason: HOSPADM

## 2024-07-04 RX ORDER — INSULIN LISPRO 100 [IU]/ML
0-4 INJECTION, SOLUTION INTRAVENOUS; SUBCUTANEOUS
Status: DISCONTINUED | OUTPATIENT
Start: 2024-07-04 | End: 2024-07-14 | Stop reason: HOSPADM

## 2024-07-04 RX ORDER — IPRATROPIUM BROMIDE AND ALBUTEROL SULFATE 2.5; .5 MG/3ML; MG/3ML
1 SOLUTION RESPIRATORY (INHALATION)
Status: DISCONTINUED | OUTPATIENT
Start: 2024-07-04 | End: 2024-07-14 | Stop reason: HOSPADM

## 2024-07-04 RX ORDER — SODIUM CHLORIDE 0.9 % (FLUSH) 0.9 %
5-40 SYRINGE (ML) INJECTION PRN
Status: DISCONTINUED | OUTPATIENT
Start: 2024-07-04 | End: 2024-07-14 | Stop reason: HOSPADM

## 2024-07-04 RX ORDER — SODIUM CHLORIDE 9 MG/ML
INJECTION, SOLUTION INTRAVENOUS PRN
Status: DISCONTINUED | OUTPATIENT
Start: 2024-07-04 | End: 2024-07-14 | Stop reason: HOSPADM

## 2024-07-04 RX ORDER — ACETAMINOPHEN 650 MG/1
650 SUPPOSITORY RECTAL EVERY 6 HOURS PRN
Status: DISCONTINUED | OUTPATIENT
Start: 2024-07-04 | End: 2024-07-14 | Stop reason: HOSPADM

## 2024-07-04 RX ORDER — POTASSIUM CHLORIDE 7.45 MG/ML
10 INJECTION INTRAVENOUS PRN
Status: DISCONTINUED | OUTPATIENT
Start: 2024-07-04 | End: 2024-07-06

## 2024-07-04 RX ORDER — FUROSEMIDE 40 MG/1
40 TABLET ORAL DAILY
Status: DISCONTINUED | OUTPATIENT
Start: 2024-07-04 | End: 2024-07-14 | Stop reason: HOSPADM

## 2024-07-04 RX ORDER — CARVEDILOL 6.25 MG/1
6.25 TABLET ORAL 2 TIMES DAILY WITH MEALS
Status: DISCONTINUED | OUTPATIENT
Start: 2024-07-04 | End: 2024-07-14 | Stop reason: HOSPADM

## 2024-07-04 RX ORDER — ONDANSETRON 2 MG/ML
4 INJECTION INTRAMUSCULAR; INTRAVENOUS EVERY 6 HOURS PRN
Status: DISCONTINUED | OUTPATIENT
Start: 2024-07-04 | End: 2024-07-14 | Stop reason: HOSPADM

## 2024-07-04 RX ORDER — INSULIN LISPRO 100 [IU]/ML
0-4 INJECTION, SOLUTION INTRAVENOUS; SUBCUTANEOUS NIGHTLY
Status: DISCONTINUED | OUTPATIENT
Start: 2024-07-04 | End: 2024-07-14 | Stop reason: HOSPADM

## 2024-07-04 RX ORDER — MAGNESIUM SULFATE HEPTAHYDRATE 40 MG/ML
2000 INJECTION, SOLUTION INTRAVENOUS PRN
Status: DISCONTINUED | OUTPATIENT
Start: 2024-07-04 | End: 2024-07-14 | Stop reason: HOSPADM

## 2024-07-04 RX ADMIN — CEFTRIAXONE SODIUM 1000 MG: 1 INJECTION, POWDER, FOR SOLUTION INTRAMUSCULAR; INTRAVENOUS at 16:53

## 2024-07-04 RX ADMIN — SODIUM CHLORIDE 1000 ML: 9 INJECTION, SOLUTION INTRAVENOUS at 09:56

## 2024-07-04 RX ADMIN — MICONAZOLE NITRATE: 2 CREAM TOPICAL at 22:43

## 2024-07-04 RX ADMIN — ENOXAPARIN SODIUM 180 MG: 100 INJECTION SUBCUTANEOUS at 21:27

## 2024-07-04 RX ADMIN — IPRATROPIUM BROMIDE AND ALBUTEROL SULFATE 1 DOSE: 2.5; .5 SOLUTION RESPIRATORY (INHALATION) at 19:17

## 2024-07-04 RX ADMIN — IPRATROPIUM BROMIDE AND ALBUTEROL SULFATE 1 DOSE: 2.5; .5 SOLUTION RESPIRATORY (INHALATION) at 16:04

## 2024-07-04 RX ADMIN — ACETAZOLAMIDE SODIUM 500 MG: 500 INJECTION, POWDER, LYOPHILIZED, FOR SOLUTION INTRAVENOUS at 12:39

## 2024-07-04 RX ADMIN — WATER 125 MG: 1 INJECTION INTRAMUSCULAR; INTRAVENOUS; SUBCUTANEOUS at 09:44

## 2024-07-04 RX ADMIN — SODIUM CHLORIDE, PRESERVATIVE FREE 10 ML: 5 INJECTION INTRAVENOUS at 20:05

## 2024-07-04 NOTE — ED NOTES
Adult Non-Invasive Positive Pressure Ventilation for Acute Respiratory Distress  Patient & Family Education Note     Patient: Shazia ROGERS  Age: 68 y.o.     The patient and/or family has been educated on the following items and have verbalized understanding and agreement:    [x]Patient and/or family have been educated on the purpose and advantages of NIV and have verbalized understanding and agreement.  [x]Patient and/or family is in agreement that the patient will be NPO (Nothing by Mouth) for the duration of NIV use.  [x]Patient and/or  family is in agreement that NIV will not be routinely disrupted except to complete oral care.  [x]Patient and/or family have been educated on the level of care, vital signs frequency and monitoring requirements for NIV and are in agreement.  [x]Patient and/or family have been educated on reporting any nausea, chest discomfort, sudden increase in shortness of breath, or a severe headache to the staff immediately.

## 2024-07-04 NOTE — ED NOTES
Report given to JOSUÉ Draper from ICU.   Report method in person   The following was reviewed with receiving RN:   Current vital signs:  BP (!) 116/51   Pulse 95   Temp 98.8 °F (37.1 °C)   Resp 15   Ht 1.651 m (5' 5\")   Wt (!) 183.7 kg (405 lb)   LMP 07/16/2014   SpO2 94%   BMI 67.40 kg/m²                MEWS Score: 2     Any medication or safety alerts were reviewed. Any pending diagnostics and notifications were also reviewed, as well as any safety concerns or issues, abnormal labs, abnormal imaging, and abnormal assessment findings. Questions were answered.

## 2024-07-04 NOTE — ED TRIAGE NOTES
Mode of arrival (squad #, walk in, police, etc) : EMS        Chief complaint(s): Shortness of breath, altered mental status        Arrival Note (brief scenario, treatment PTA, etc).: Pt. Arrived via EMS from [facility]. Report stated that pt. Desats at 65% on 4L per NC. (Pt. Wears 4L of NC at baseline). O2 sats increases to 70% after breathing tx, and was placed on nonrebreather. Pt. Baseline is A/Ox4, and pt. Now is experiencing altered mental status. Able to state name/. Pt. Usually wears CPAP at night, report stated that pt. Has not been doing this. Pt. Uses wheelchair and fartun lift at baseline. Pt. Is full code.         C= \"Have you ever felt that you should Cut down on your drinking?\"  No  A= \"Have people Annoyed you by criticizing your drinking?\"  No  G= \"Have you ever felt bad or Guilty about your drinking?\"  No  E= \"Have you ever had a drink as an Eye-opener first thing in the morning to steady your nerves or to help a hangover?\"  No      Deferred []      Reason for deferring: N/A    *If yes to two or more: probable alcohol abuse.*

## 2024-07-04 NOTE — ED NOTES
Spoke to Edita Cruz [phone], 2 PHI verified, updated on patient's condition, lab results and plan of care. Questions and concern addressed.

## 2024-07-04 NOTE — H&P
[facility]  IN-PATIENT SERVICE  [facility]  IN-PATIENT SERVICE   [facility]     HISTORY AND PHYSICAL EXAMINATION            Date:   7/4/2024  Patient name:  Shazia ROGERS  Date of admission:  7/4/2024  9:37 AM  MRN:   [id number]  Account:  [id number]  YOB: 1955  PCP:    Jyoti Mauricio, APRN - CNP  Room:   CATHERINE/CATHERINE  Code Status:    Prior    Chief Complaint:     Chief Complaint   Patient presents with    Altered Mental Status    Shortness of Breath       History Obtained From:     patient    History of Present Illness:     The patient is a 68 y.o.  female who presents with Altered Mental Status and Shortness of Breath  from the nursing home. yesterday she became confused and had low oxygen saturations of 65% at her baseline of 4 L.  Patient has a past history of COPD and SHWETA and is noncompliant with her CPAP for.  Patient is currently on BiPAP.  Patient responded to questions but falls back asleep.  Could not obtain a pertinent history.  Will admit her in intermediate ICU for management of COPD and acute on chronic respiratory failure.        Past Medical History:     Past Medical History:   Diagnosis Date    Anxiety     Asthma     Atrial fibrillation (HCC)     A-fib noted on 05/25/2024 visit to ER    Bronchitis     DDD (degenerative disc disease), lumbar     Depression     Diabetes mellitus (HCC)     Elevated glucose     Headache(784.0)     Hernia of abdominal cavity     HH (hiatus hernia)     Hyperlipemia, mixed     Hypertension     Osteoarthritis     Right femoral vein DVT (HCC) 05/2009    Dr. Elizabeth    Uterine hyperplasia         Past SurgicalHistory:     Past Surgical History:   Procedure Laterality Date    BREAST REDUCTION SURGERY  12/1997    BREAST REDUCTION SURGERY  12/1997    DILATION AND CURETTAGE OF UTERUS  10/08 and 09/07    HYSTERECTOMY (CERVIX STATUS UNKNOWN)  7/17/15    Clive Miles

## 2024-07-05 LAB
ABSOLUTE BANDS: 0.45 K/UL (ref 0–1)
ANION GAP SERPL CALCULATED.3IONS-SCNC: 9 MMOL/L (ref 9–17)
ARTERIAL PATENCY WRIST A: ABNORMAL
BANDS: 9 % (ref 0–10)
BASOPHILS # BLD: 0 K/UL (ref 0–0.2)
BASOPHILS NFR BLD: 0 % (ref 0–2)
BDY SITE: ABNORMAL
BODY TEMPERATURE: 37
BUN SERPL-MCNC: 23 MG/DL (ref 8–23)
CALCIUM SERPL-MCNC: 9.9 MG/DL (ref 8.6–10.4)
CHLORIDE SERPL-SCNC: 87 MMOL/L (ref 98–107)
CO2 SERPL-SCNC: 43 MMOL/L (ref 20–31)
COHGB MFR BLD: 1.8 % (ref 0–5)
CREAT SERPL-MCNC: 1.1 MG/DL (ref 0.5–0.9)
EKG Q-T INTERVAL: 340 MS
EKG QRS DURATION: 84 MS
EKG QTC CALCULATION (BAZETT): 438 MS
EKG R AXIS: 12 DEGREES
EKG T AXIS: 85 DEGREES
EKG VENTRICULAR RATE: 100 BPM
EOSINOPHIL # BLD: 0 K/UL (ref 0–0.4)
EOSINOPHILS RELATIVE PERCENT: 0 % (ref 0–4)
ERYTHROCYTE [DISTWIDTH] IN BLOOD BY AUTOMATED COUNT: 19 % (ref 11.5–14.9)
GAS FLOW.O2 O2 DELIVERY SYS: ABNORMAL L/MIN
GFR, ESTIMATED: 55 ML/MIN/1.73M2
GLUCOSE BLD-MCNC: 147 MG/DL (ref 65–105)
GLUCOSE BLD-MCNC: 153 MG/DL (ref 65–105)
GLUCOSE BLD-MCNC: 154 MG/DL (ref 65–105)
GLUCOSE BLD-MCNC: 175 MG/DL (ref 65–105)
GLUCOSE SERPL-MCNC: 198 MG/DL (ref 70–99)
HCO3 ARTERIAL: 51.7 MMOL/L (ref 22–26)
HCT VFR BLD AUTO: 31.4 % (ref 36–46)
HGB BLD-MCNC: 9.3 G/DL (ref 12–16)
INR PPP: 3
LYMPHOCYTES NFR BLD: 0.2 K/UL (ref 1–4.8)
LYMPHOCYTES RELATIVE PERCENT: 4 % (ref 24–44)
MCH RBC QN AUTO: 26 PG (ref 26–34)
MCHC RBC AUTO-ENTMCNC: 29.7 G/DL (ref 31–37)
MCV RBC AUTO: 87.4 FL (ref 80–100)
METHEMOGLOBIN: 1 % (ref 0–1.9)
MONOCYTES NFR BLD: 0.15 K/UL (ref 0.1–1.3)
MONOCYTES NFR BLD: 3 % (ref 1–7)
MORPHOLOGY: ABNORMAL
NEUTROPHILS NFR BLD: 84 % (ref 36–66)
NEUTS SEG NFR BLD: 4.15 K/UL (ref 1.3–9.1)
NUCLEATED RED BLOOD CELLS: 1 PER 100 WBC
O2 SAT, ARTERIAL: 96 % (ref 95–98)
PCO2 ARTERIAL: 71.3 MMHG (ref 35–45)
PH ARTERIAL: 7.47 (ref 7.35–7.45)
PLATELET # BLD AUTO: 254 K/UL (ref 150–450)
PMV BLD AUTO: 7.5 FL (ref 6–12)
PO2 ARTERIAL: 99.4 MMHG (ref 80–100)
POSITIVE BASE EXCESS, ART: 28 MMOL/L (ref 0–2)
POTASSIUM SERPL-SCNC: 3.8 MMOL/L (ref 3.7–5.3)
PROTHROMBIN TIME: 31.1 SEC (ref 11.8–14.6)
PT. POSITION: ABNORMAL
RBC # BLD AUTO: 3.59 M/UL (ref 4–5.2)
RESPIRATORY RATE: 24
SODIUM SERPL-SCNC: 139 MMOL/L (ref 135–144)
VENTILATION MODE VENT: ABNORMAL
WBC OTHER # BLD: 5 K/UL (ref 3.5–11)

## 2024-07-05 PROCEDURE — 2700000000 HC OXYGEN THERAPY PER DAY

## 2024-07-05 PROCEDURE — 82805 BLOOD GASES W/O2 SATURATION: CPT

## 2024-07-05 PROCEDURE — 94660 CPAP INITIATION&MGMT: CPT

## 2024-07-05 PROCEDURE — 6370000000 HC RX 637 (ALT 250 FOR IP)

## 2024-07-05 PROCEDURE — 36415 COLL VENOUS BLD VENIPUNCTURE: CPT

## 2024-07-05 PROCEDURE — 94761 N-INVAS EAR/PLS OXIMETRY MLT: CPT

## 2024-07-05 PROCEDURE — 2060000000 HC ICU INTERMEDIATE R&B

## 2024-07-05 PROCEDURE — 99291 CRITICAL CARE FIRST HOUR: CPT | Performed by: INTERNAL MEDICINE

## 2024-07-05 PROCEDURE — 82947 ASSAY GLUCOSE BLOOD QUANT: CPT

## 2024-07-05 PROCEDURE — 36600 WITHDRAWAL OF ARTERIAL BLOOD: CPT

## 2024-07-05 PROCEDURE — 85610 PROTHROMBIN TIME: CPT

## 2024-07-05 PROCEDURE — 85025 COMPLETE CBC W/AUTO DIFF WBC: CPT

## 2024-07-05 PROCEDURE — 6360000002 HC RX W HCPCS

## 2024-07-05 PROCEDURE — 80048 BASIC METABOLIC PNL TOTAL CA: CPT

## 2024-07-05 PROCEDURE — 2580000003 HC RX 258

## 2024-07-05 PROCEDURE — 94640 AIRWAY INHALATION TREATMENT: CPT

## 2024-07-05 RX ORDER — METOPROLOL TARTRATE 1 MG/ML
5 INJECTION, SOLUTION INTRAVENOUS EVERY 6 HOURS PRN
Status: DISCONTINUED | OUTPATIENT
Start: 2024-07-05 | End: 2024-07-14 | Stop reason: HOSPADM

## 2024-07-05 RX ADMIN — CEFTRIAXONE SODIUM 1000 MG: 1 INJECTION, POWDER, FOR SOLUTION INTRAMUSCULAR; INTRAVENOUS at 15:40

## 2024-07-05 RX ADMIN — IPRATROPIUM BROMIDE AND ALBUTEROL SULFATE 1 DOSE: 2.5; .5 SOLUTION RESPIRATORY (INHALATION) at 19:18

## 2024-07-05 RX ADMIN — SODIUM CHLORIDE, PRESERVATIVE FREE 10 ML: 5 INJECTION INTRAVENOUS at 20:30

## 2024-07-05 RX ADMIN — IPRATROPIUM BROMIDE AND ALBUTEROL SULFATE 1 DOSE: 2.5; .5 SOLUTION RESPIRATORY (INHALATION) at 07:54

## 2024-07-05 RX ADMIN — MICONAZOLE NITRATE: 2 CREAM TOPICAL at 20:31

## 2024-07-05 RX ADMIN — ACETAZOLAMIDE SODIUM 250 MG: 500 INJECTION, POWDER, LYOPHILIZED, FOR SOLUTION INTRAVENOUS at 11:16

## 2024-07-05 RX ADMIN — IPRATROPIUM BROMIDE AND ALBUTEROL SULFATE 1 DOSE: 2.5; .5 SOLUTION RESPIRATORY (INHALATION) at 11:18

## 2024-07-05 RX ADMIN — MICONAZOLE NITRATE: 2 CREAM TOPICAL at 07:52

## 2024-07-05 RX ADMIN — IPRATROPIUM BROMIDE AND ALBUTEROL SULFATE 1 DOSE: 2.5; .5 SOLUTION RESPIRATORY (INHALATION) at 15:14

## 2024-07-05 RX ADMIN — SODIUM CHLORIDE, PRESERVATIVE FREE 10 ML: 5 INJECTION INTRAVENOUS at 07:51

## 2024-07-05 ASSESSMENT — PAIN SCALES - GENERAL
PAINLEVEL_OUTOF10: 0

## 2024-07-05 NOTE — ACP (ADVANCE CARE PLANNING)
Advance Care Planning     Advance Care Planning Activator (Inpatient)  Conversation Note      Date of ACP Conversation: 7/5/2024     Conversation Conducted with: Legal next of kin    ACP Activator: Kamini Horan RN      Health Care Decision Maker:     Current Designated Health Care Decision Maker:     Primary Decision Maker: Álvaro Nuñez - Brother/Sister - [phone]    Writer spoke with dEita(friend and financial POA). She states brother Álvaro lives in [place] and is patients POA. No other siblings.     Today we documented Decision Maker(s) consistent with Legal Next of Kin hierarchy.    Signed paperwork in chart from [facility] stating full resuscitation.    Care Preferences    Ventilation:  \"If you were in your present state of health and suddenly became very ill and were unable to breathe on your own, what would your preference be about the use of a ventilator (breathing machine) if it were available to you?\"      Would the patient desire the use of ventilator (breathing machine)?: yes    \"If your health worsens and it becomes clear that your chance of recovery is unlikely, what would your preference be about the use of a ventilator (breathing machine) if it were available to you?\"     Would the patient desire the use of ventilator (breathing machine)?: Yes      Resuscitation  \"CPR works best to restart the heart when there is a sudden event, like a heart attack, in someone who is otherwise healthy. Unfortunately, CPR does not typically restart the heart for people who have serious health conditions or who are very sick.\"    \"In the event your heart stopped as a result of an underlying serious health condition, would you want attempts to be made to restart your heart (answer \"yes\" for attempt to resuscitate) or would you prefer a natural death (answer \"no\" for do not attempt to resuscitate)?\" yes       [] Yes   [] No   Educated Patient / Decision Maker regarding differences between Advance

## 2024-07-06 LAB
ANION GAP SERPL CALCULATED.3IONS-SCNC: 5 MMOL/L (ref 9–17)
ARTERIAL PATENCY WRIST A: ABNORMAL
BASOPHILS # BLD: 0.06 K/UL (ref 0–0.2)
BASOPHILS NFR BLD: 1 % (ref 0–2)
BDY SITE: ABNORMAL
BODY TEMPERATURE: 37
BUN SERPL-MCNC: 25 MG/DL (ref 8–23)
CALCIUM SERPL-MCNC: 9.8 MG/DL (ref 8.6–10.4)
CHLORIDE SERPL-SCNC: 92 MMOL/L (ref 98–107)
CO2 SERPL-SCNC: 43 MMOL/L (ref 20–31)
COHGB MFR BLD: 1.4 % (ref 0–5)
CREAT SERPL-MCNC: 1 MG/DL (ref 0.5–0.9)
EOSINOPHIL # BLD: 0.06 K/UL (ref 0–0.4)
EOSINOPHILS RELATIVE PERCENT: 1 % (ref 0–4)
ERYTHROCYTE [DISTWIDTH] IN BLOOD BY AUTOMATED COUNT: 19.3 % (ref 11.5–14.9)
GAS FLOW.O2 O2 DELIVERY SYS: ABNORMAL L/MIN
GFR, ESTIMATED: 62 ML/MIN/1.73M2
GLUCOSE BLD-MCNC: 102 MG/DL (ref 65–105)
GLUCOSE BLD-MCNC: 102 MG/DL (ref 65–105)
GLUCOSE BLD-MCNC: 111 MG/DL (ref 65–105)
GLUCOSE BLD-MCNC: 133 MG/DL (ref 65–105)
GLUCOSE SERPL-MCNC: 118 MG/DL (ref 70–99)
HCO3 ARTERIAL: 51.7 MMOL/L (ref 22–26)
HCT VFR BLD AUTO: 30.2 % (ref 36–46)
HGB BLD-MCNC: 8.8 G/DL (ref 12–16)
INR PPP: 2.8
LYMPHOCYTES NFR BLD: 0.88 K/UL (ref 1–4.8)
LYMPHOCYTES RELATIVE PERCENT: 16 % (ref 24–44)
MAGNESIUM SERPL-MCNC: 2 MG/DL (ref 1.6–2.6)
MCH RBC QN AUTO: 25.5 PG (ref 26–34)
MCHC RBC AUTO-ENTMCNC: 29.2 G/DL (ref 31–37)
MCV RBC AUTO: 87.3 FL (ref 80–100)
METHEMOGLOBIN: 0 % (ref 0–1.9)
MONOCYTES NFR BLD: 0.5 K/UL (ref 0.1–1.3)
MONOCYTES NFR BLD: 9 % (ref 1–7)
MORPHOLOGY: ABNORMAL
NEUTROPHILS NFR BLD: 73 % (ref 36–66)
NEUTS SEG NFR BLD: 4 K/UL (ref 1.3–9.1)
O2 SAT, ARTERIAL: 95.1 % (ref 95–98)
PCO2 ARTERIAL: 78.9 MMHG (ref 35–45)
PH ARTERIAL: 7.43 (ref 7.35–7.45)
PLATELET # BLD AUTO: 248 K/UL (ref 150–450)
PMV BLD AUTO: 7.2 FL (ref 6–12)
PO2 ARTERIAL: 75.4 MMHG (ref 80–100)
POSITIVE BASE EXCESS, ART: 23.8 MMOL/L (ref 0–2)
POTASSIUM SERPL-SCNC: 3.3 MMOL/L (ref 3.7–5.3)
PROTHROMBIN TIME: 29.5 SEC (ref 11.8–14.6)
PT. POSITION: ABNORMAL
RBC # BLD AUTO: 3.47 M/UL (ref 4–5.2)
RESPIRATORY RATE: 24
SODIUM SERPL-SCNC: 140 MMOL/L (ref 135–144)
VENTILATION MODE VENT: ABNORMAL
WBC OTHER # BLD: 5.5 K/UL (ref 3.5–11)

## 2024-07-06 PROCEDURE — 80048 BASIC METABOLIC PNL TOTAL CA: CPT

## 2024-07-06 PROCEDURE — 6360000002 HC RX W HCPCS

## 2024-07-06 PROCEDURE — 82947 ASSAY GLUCOSE BLOOD QUANT: CPT

## 2024-07-06 PROCEDURE — 85610 PROTHROMBIN TIME: CPT

## 2024-07-06 PROCEDURE — 94640 AIRWAY INHALATION TREATMENT: CPT

## 2024-07-06 PROCEDURE — 36415 COLL VENOUS BLD VENIPUNCTURE: CPT

## 2024-07-06 PROCEDURE — 6370000000 HC RX 637 (ALT 250 FOR IP)

## 2024-07-06 PROCEDURE — 36600 WITHDRAWAL OF ARTERIAL BLOOD: CPT

## 2024-07-06 PROCEDURE — 99233 SBSQ HOSP IP/OBS HIGH 50: CPT | Performed by: INTERNAL MEDICINE

## 2024-07-06 PROCEDURE — 2060000000 HC ICU INTERMEDIATE R&B

## 2024-07-06 PROCEDURE — 2580000003 HC RX 258

## 2024-07-06 PROCEDURE — 87205 SMEAR GRAM STAIN: CPT

## 2024-07-06 PROCEDURE — 2700000000 HC OXYGEN THERAPY PER DAY

## 2024-07-06 PROCEDURE — 82805 BLOOD GASES W/O2 SATURATION: CPT

## 2024-07-06 PROCEDURE — 87040 BLOOD CULTURE FOR BACTERIA: CPT

## 2024-07-06 PROCEDURE — 94660 CPAP INITIATION&MGMT: CPT

## 2024-07-06 PROCEDURE — 87154 CUL TYP ID BLD PTHGN 6+ TRGT: CPT

## 2024-07-06 PROCEDURE — 6360000002 HC RX W HCPCS: Performed by: NURSE PRACTITIONER

## 2024-07-06 PROCEDURE — 83735 ASSAY OF MAGNESIUM: CPT

## 2024-07-06 PROCEDURE — 94761 N-INVAS EAR/PLS OXIMETRY MLT: CPT

## 2024-07-06 PROCEDURE — 85025 COMPLETE CBC W/AUTO DIFF WBC: CPT

## 2024-07-06 RX ORDER — POTASSIUM CHLORIDE 7.45 MG/ML
10 INJECTION INTRAVENOUS PRN
Status: DISCONTINUED | OUTPATIENT
Start: 2024-07-06 | End: 2024-07-14 | Stop reason: HOSPADM

## 2024-07-06 RX ORDER — POTASSIUM CHLORIDE 7.45 MG/ML
10 INJECTION INTRAVENOUS
Status: DISCONTINUED | OUTPATIENT
Start: 2024-07-06 | End: 2024-07-06

## 2024-07-06 RX ADMIN — MONTELUKAST 10 MG: 10 TABLET, FILM COATED ORAL at 20:44

## 2024-07-06 RX ADMIN — IPRATROPIUM BROMIDE AND ALBUTEROL SULFATE 1 DOSE: 2.5; .5 SOLUTION RESPIRATORY (INHALATION) at 11:07

## 2024-07-06 RX ADMIN — IPRATROPIUM BROMIDE AND ALBUTEROL SULFATE 1 DOSE: 2.5; .5 SOLUTION RESPIRATORY (INHALATION) at 15:04

## 2024-07-06 RX ADMIN — CEFTRIAXONE SODIUM 1000 MG: 1 INJECTION, POWDER, FOR SOLUTION INTRAMUSCULAR; INTRAVENOUS at 17:14

## 2024-07-06 RX ADMIN — POTASSIUM CHLORIDE 10 MEQ: 10 INJECTION, SOLUTION INTRAVENOUS at 05:59

## 2024-07-06 RX ADMIN — POTASSIUM CHLORIDE 10 MEQ: 10 INJECTION, SOLUTION INTRAVENOUS at 08:09

## 2024-07-06 RX ADMIN — MICONAZOLE NITRATE: 2 CREAM TOPICAL at 20:44

## 2024-07-06 RX ADMIN — CARVEDILOL 6.25 MG: 6.25 TABLET, FILM COATED ORAL at 18:00

## 2024-07-06 RX ADMIN — POTASSIUM CHLORIDE 10 MEQ: 10 INJECTION, SOLUTION INTRAVENOUS at 11:30

## 2024-07-06 RX ADMIN — IPRATROPIUM BROMIDE AND ALBUTEROL SULFATE 1 DOSE: 2.5; .5 SOLUTION RESPIRATORY (INHALATION) at 19:25

## 2024-07-06 RX ADMIN — SODIUM CHLORIDE, PRESERVATIVE FREE 10 ML: 5 INJECTION INTRAVENOUS at 20:44

## 2024-07-06 RX ADMIN — ACETAZOLAMIDE SODIUM 250 MG: 500 INJECTION, POWDER, LYOPHILIZED, FOR SOLUTION INTRAVENOUS at 09:12

## 2024-07-06 RX ADMIN — POTASSIUM CHLORIDE 10 MEQ: 10 INJECTION, SOLUTION INTRAVENOUS at 09:53

## 2024-07-06 RX ADMIN — MICONAZOLE NITRATE: 2 CREAM TOPICAL at 08:10

## 2024-07-06 RX ADMIN — PRAVASTATIN SODIUM 80 MG: 40 TABLET ORAL at 18:00

## 2024-07-06 RX ADMIN — IPRATROPIUM BROMIDE AND ALBUTEROL SULFATE 1 DOSE: 2.5; .5 SOLUTION RESPIRATORY (INHALATION) at 07:15

## 2024-07-06 ASSESSMENT — PAIN SCALES - GENERAL: PAINLEVEL_OUTOF10: 0

## 2024-07-07 ENCOUNTER — APPOINTMENT (OUTPATIENT)
Dept: GENERAL RADIOLOGY | Age: 69
DRG: 189 | End: 2024-07-07
Payer: MEDICARE

## 2024-07-07 LAB
ANION GAP SERPL CALCULATED.3IONS-SCNC: 7 MMOL/L (ref 9–17)
BASOPHILS # BLD: 0.04 K/UL (ref 0–0.2)
BASOPHILS NFR BLD: 1 % (ref 0–2)
BUN SERPL-MCNC: 27 MG/DL (ref 8–23)
CALCIUM SERPL-MCNC: 9.5 MG/DL (ref 8.6–10.4)
CHLORIDE SERPL-SCNC: 95 MMOL/L (ref 98–107)
CO2 SERPL-SCNC: 39 MMOL/L (ref 20–31)
CREAT SERPL-MCNC: 1 MG/DL (ref 0.5–0.9)
EOSINOPHIL # BLD: 0.04 K/UL (ref 0–0.4)
EOSINOPHILS RELATIVE PERCENT: 1 % (ref 0–4)
ERYTHROCYTE [DISTWIDTH] IN BLOOD BY AUTOMATED COUNT: 19.1 % (ref 11.5–14.9)
GFR, ESTIMATED: 62 ML/MIN/1.73M2
GLUCOSE BLD-MCNC: 111 MG/DL (ref 65–105)
GLUCOSE BLD-MCNC: 116 MG/DL (ref 65–105)
GLUCOSE BLD-MCNC: 153 MG/DL (ref 65–105)
GLUCOSE BLD-MCNC: 155 MG/DL (ref 65–105)
GLUCOSE SERPL-MCNC: 123 MG/DL (ref 70–99)
HCT VFR BLD AUTO: 29.7 % (ref 36–46)
HGB BLD-MCNC: 8.7 G/DL (ref 12–16)
INR PPP: 2.4
LYMPHOCYTES NFR BLD: 0.78 K/UL (ref 1–4.8)
LYMPHOCYTES RELATIVE PERCENT: 19 % (ref 24–44)
MAGNESIUM SERPL-MCNC: 1.9 MG/DL (ref 1.6–2.6)
MCH RBC QN AUTO: 25.2 PG (ref 26–34)
MCHC RBC AUTO-ENTMCNC: 29.3 G/DL (ref 31–37)
MCV RBC AUTO: 85.8 FL (ref 80–100)
MONOCYTES NFR BLD: 0.41 K/UL (ref 0.1–1.3)
MONOCYTES NFR BLD: 10 % (ref 1–7)
MORPHOLOGY: ABNORMAL
NEUTROPHILS NFR BLD: 69 % (ref 36–66)
NEUTS SEG NFR BLD: 2.83 K/UL (ref 1.3–9.1)
PLATELET # BLD AUTO: 226 K/UL (ref 150–450)
PMV BLD AUTO: 7.1 FL (ref 6–12)
POTASSIUM SERPL-SCNC: 3.5 MMOL/L (ref 3.7–5.3)
PROTHROMBIN TIME: 26.3 SEC (ref 11.8–14.6)
RBC # BLD AUTO: 3.46 M/UL (ref 4–5.2)
SODIUM SERPL-SCNC: 141 MMOL/L (ref 135–144)
WBC OTHER # BLD: 4.1 K/UL (ref 3.5–11)

## 2024-07-07 PROCEDURE — 6370000000 HC RX 637 (ALT 250 FOR IP)

## 2024-07-07 PROCEDURE — 36415 COLL VENOUS BLD VENIPUNCTURE: CPT

## 2024-07-07 PROCEDURE — 83735 ASSAY OF MAGNESIUM: CPT

## 2024-07-07 PROCEDURE — 80048 BASIC METABOLIC PNL TOTAL CA: CPT

## 2024-07-07 PROCEDURE — 2700000000 HC OXYGEN THERAPY PER DAY

## 2024-07-07 PROCEDURE — 2060000000 HC ICU INTERMEDIATE R&B

## 2024-07-07 PROCEDURE — 6360000002 HC RX W HCPCS

## 2024-07-07 PROCEDURE — 85610 PROTHROMBIN TIME: CPT

## 2024-07-07 PROCEDURE — 85025 COMPLETE CBC W/AUTO DIFF WBC: CPT

## 2024-07-07 PROCEDURE — 2580000003 HC RX 258

## 2024-07-07 PROCEDURE — 99233 SBSQ HOSP IP/OBS HIGH 50: CPT | Performed by: INTERNAL MEDICINE

## 2024-07-07 PROCEDURE — 2500000003 HC RX 250 WO HCPCS: Performed by: INTERNAL MEDICINE

## 2024-07-07 PROCEDURE — 94660 CPAP INITIATION&MGMT: CPT

## 2024-07-07 PROCEDURE — 6370000000 HC RX 637 (ALT 250 FOR IP): Performed by: INTERNAL MEDICINE

## 2024-07-07 PROCEDURE — 94761 N-INVAS EAR/PLS OXIMETRY MLT: CPT

## 2024-07-07 PROCEDURE — 94640 AIRWAY INHALATION TREATMENT: CPT

## 2024-07-07 PROCEDURE — 82947 ASSAY GLUCOSE BLOOD QUANT: CPT

## 2024-07-07 PROCEDURE — 71045 X-RAY EXAM CHEST 1 VIEW: CPT

## 2024-07-07 RX ORDER — POTASSIUM CHLORIDE 20 MEQ/1
40 TABLET, EXTENDED RELEASE ORAL ONCE
Status: COMPLETED | OUTPATIENT
Start: 2024-07-07 | End: 2024-07-07

## 2024-07-07 RX ORDER — WARFARIN SODIUM 2 MG/1
4 TABLET ORAL
Status: COMPLETED | OUTPATIENT
Start: 2024-07-07 | End: 2024-07-07

## 2024-07-07 RX ADMIN — ACETAZOLAMIDE SODIUM 250 MG: 500 INJECTION, POWDER, LYOPHILIZED, FOR SOLUTION INTRAVENOUS at 08:27

## 2024-07-07 RX ADMIN — IPRATROPIUM BROMIDE AND ALBUTEROL SULFATE 1 DOSE: 2.5; .5 SOLUTION RESPIRATORY (INHALATION) at 11:39

## 2024-07-07 RX ADMIN — CARVEDILOL 6.25 MG: 6.25 TABLET, FILM COATED ORAL at 16:47

## 2024-07-07 RX ADMIN — PRAVASTATIN SODIUM 80 MG: 40 TABLET ORAL at 17:54

## 2024-07-07 RX ADMIN — DILTIAZEM HYDROCHLORIDE 120 MG: 120 CAPSULE, COATED, EXTENDED RELEASE ORAL at 09:42

## 2024-07-07 RX ADMIN — SODIUM CHLORIDE, PRESERVATIVE FREE 10 ML: 5 INJECTION INTRAVENOUS at 08:25

## 2024-07-07 RX ADMIN — PANTOPRAZOLE SODIUM 40 MG: 40 TABLET, DELAYED RELEASE ORAL at 06:17

## 2024-07-07 RX ADMIN — MICONAZOLE NITRATE: 2 CREAM TOPICAL at 21:11

## 2024-07-07 RX ADMIN — SODIUM CHLORIDE, PRESERVATIVE FREE 10 ML: 5 INJECTION INTRAVENOUS at 21:11

## 2024-07-07 RX ADMIN — CEFTRIAXONE SODIUM 1000 MG: 1 INJECTION, POWDER, FOR SOLUTION INTRAMUSCULAR; INTRAVENOUS at 16:45

## 2024-07-07 RX ADMIN — IPRATROPIUM BROMIDE AND ALBUTEROL SULFATE 1 DOSE: 2.5; .5 SOLUTION RESPIRATORY (INHALATION) at 14:34

## 2024-07-07 RX ADMIN — IPRATROPIUM BROMIDE AND ALBUTEROL SULFATE 1 DOSE: 2.5; .5 SOLUTION RESPIRATORY (INHALATION) at 19:32

## 2024-07-07 RX ADMIN — CARVEDILOL 6.25 MG: 6.25 TABLET, FILM COATED ORAL at 09:42

## 2024-07-07 RX ADMIN — ANTI-FUNGAL POWDER MICONAZOLE NITRATE TALC FREE: 1.42 POWDER TOPICAL at 21:11

## 2024-07-07 RX ADMIN — POTASSIUM CHLORIDE 40 MEQ: 1500 TABLET, EXTENDED RELEASE ORAL at 08:25

## 2024-07-07 RX ADMIN — MICONAZOLE NITRATE: 2 CREAM TOPICAL at 08:25

## 2024-07-07 RX ADMIN — ANTI-FUNGAL POWDER MICONAZOLE NITRATE TALC FREE: 1.42 POWDER TOPICAL at 14:54

## 2024-07-07 RX ADMIN — IPRATROPIUM BROMIDE AND ALBUTEROL SULFATE 1 DOSE: 2.5; .5 SOLUTION RESPIRATORY (INHALATION) at 07:36

## 2024-07-07 RX ADMIN — WARFARIN SODIUM 4 MG: 2 TABLET ORAL at 17:54

## 2024-07-07 RX ADMIN — MONTELUKAST 10 MG: 10 TABLET, FILM COATED ORAL at 21:11

## 2024-07-07 ASSESSMENT — PAIN SCALES - GENERAL
PAINLEVEL_OUTOF10: 0

## 2024-07-07 NOTE — DISCHARGE INSTR - COC
Treatments: Skilled Nursing assessment and monitoring. Medication education and monitoring per protocol.     Patient's personal belongings (please select all that are sent with patient):  Glasses    RN SIGNATURE:  Electronically signed by Kya Barahona RN on 7/12/24 at 2:55 PM EDT    CASE MANAGEMENT/SOCIAL WORK SECTION    Inpatient Status Date: 7/4/24    Readmission Risk Assessment Score:  Readmission Risk              Risk of Unplanned Readmission:  37           Discharging to Facility/ Agency   [facility]   Phone [phone]  Fax [phone]     Dialysis Facility (if applicable)   Name:  Address:  Dialysis Schedule:  Phone:  Fax:    / signature: Electronically signed by Janet Shook RN on 7/7/24 at 5:52 PM EDT    PHYSICIAN SECTION    Prognosis: Good    Condition at Discharge: Stable    Rehab Potential (if transferring to Rehab): Good    Recommended Labs or Other Treatments After Discharge: inr in 2- 3 days , target INR 3-3.5    Physician Certification: I certify the above information and transfer of Shazia ROGERS  is necessary for the continuing treatment of the diagnosis listed and that she requires Skilled Nursing Facility for greater 30 days.     Update Admission H&P: No change in H&P    PHYSICIAN SIGNATURE:  Electronically signed by Moises Henson MD on 7/10/24 at 3:25 PM EDT

## 2024-07-08 PROBLEM — J96.22 ACUTE ON CHRONIC RESPIRATORY FAILURE WITH HYPOXIA AND HYPERCAPNIA (HCC): Status: ACTIVE | Noted: 2018-05-04

## 2024-07-08 LAB
ANION GAP SERPL CALCULATED.3IONS-SCNC: 6 MMOL/L (ref 9–17)
BASOPHILS # BLD: 0.04 K/UL (ref 0–0.2)
BASOPHILS NFR BLD: 1 % (ref 0–2)
BUN SERPL-MCNC: 26 MG/DL (ref 8–23)
CALCIUM SERPL-MCNC: 9.4 MG/DL (ref 8.6–10.4)
CHLORIDE SERPL-SCNC: 94 MMOL/L (ref 98–107)
CO2 SERPL-SCNC: 39 MMOL/L (ref 20–31)
CREAT SERPL-MCNC: 1 MG/DL (ref 0.5–0.9)
EOSINOPHIL # BLD: 0.04 K/UL (ref 0–0.4)
EOSINOPHILS RELATIVE PERCENT: 1 % (ref 0–4)
ERYTHROCYTE [DISTWIDTH] IN BLOOD BY AUTOMATED COUNT: 18.5 % (ref 11.5–14.9)
GFR, ESTIMATED: 62 ML/MIN/1.73M2
GLUCOSE BLD-MCNC: 133 MG/DL (ref 65–105)
GLUCOSE BLD-MCNC: 144 MG/DL (ref 65–105)
GLUCOSE BLD-MCNC: 162 MG/DL (ref 65–105)
GLUCOSE BLD-MCNC: 212 MG/DL (ref 65–105)
GLUCOSE SERPL-MCNC: 137 MG/DL (ref 70–99)
HCT VFR BLD AUTO: 28.6 % (ref 36–46)
HGB BLD-MCNC: 8.6 G/DL (ref 12–16)
INR PPP: 1.7
LYMPHOCYTES NFR BLD: 0.8 K/UL (ref 1–4.8)
LYMPHOCYTES RELATIVE PERCENT: 20 % (ref 24–44)
MCH RBC QN AUTO: 25.8 PG (ref 26–34)
MCHC RBC AUTO-ENTMCNC: 30.3 G/DL (ref 31–37)
MCV RBC AUTO: 85.2 FL (ref 80–100)
MICROORGANISM SPEC CULT: ABNORMAL
MONOCYTES NFR BLD: 0.4 K/UL (ref 0.1–1.3)
MONOCYTES NFR BLD: 10 % (ref 1–7)
MORPHOLOGY: ABNORMAL
NEUTROPHILS NFR BLD: 68 % (ref 36–66)
NEUTS SEG NFR BLD: 2.72 K/UL (ref 1.3–9.1)
PLATELET # BLD AUTO: 200 K/UL (ref 150–450)
PMV BLD AUTO: 7.3 FL (ref 6–12)
POTASSIUM SERPL-SCNC: 3.7 MMOL/L (ref 3.7–5.3)
PROTHROMBIN TIME: 20.2 SEC (ref 11.8–14.6)
RBC # BLD AUTO: 3.35 M/UL (ref 4–5.2)
SERVICE CMNT-IMP: ABNORMAL
SODIUM SERPL-SCNC: 139 MMOL/L (ref 135–144)
SPECIMEN DESCRIPTION: ABNORMAL
WBC OTHER # BLD: 4 K/UL (ref 3.5–11)

## 2024-07-08 PROCEDURE — 82947 ASSAY GLUCOSE BLOOD QUANT: CPT

## 2024-07-08 PROCEDURE — 99222 1ST HOSP IP/OBS MODERATE 55: CPT | Performed by: INTERNAL MEDICINE

## 2024-07-08 PROCEDURE — 85025 COMPLETE CBC W/AUTO DIFF WBC: CPT

## 2024-07-08 PROCEDURE — 6360000002 HC RX W HCPCS

## 2024-07-08 PROCEDURE — 85610 PROTHROMBIN TIME: CPT

## 2024-07-08 PROCEDURE — 97530 THERAPEUTIC ACTIVITIES: CPT

## 2024-07-08 PROCEDURE — 97166 OT EVAL MOD COMPLEX 45 MIN: CPT

## 2024-07-08 PROCEDURE — 99233 SBSQ HOSP IP/OBS HIGH 50: CPT | Performed by: INTERNAL MEDICINE

## 2024-07-08 PROCEDURE — 97535 SELF CARE MNGMENT TRAINING: CPT

## 2024-07-08 PROCEDURE — 94761 N-INVAS EAR/PLS OXIMETRY MLT: CPT

## 2024-07-08 PROCEDURE — 2580000003 HC RX 258

## 2024-07-08 PROCEDURE — 6370000000 HC RX 637 (ALT 250 FOR IP)

## 2024-07-08 PROCEDURE — 2700000000 HC OXYGEN THERAPY PER DAY

## 2024-07-08 PROCEDURE — 2060000000 HC ICU INTERMEDIATE R&B

## 2024-07-08 PROCEDURE — 97162 PT EVAL MOD COMPLEX 30 MIN: CPT

## 2024-07-08 PROCEDURE — 6370000000 HC RX 637 (ALT 250 FOR IP): Performed by: INTERNAL MEDICINE

## 2024-07-08 PROCEDURE — 94660 CPAP INITIATION&MGMT: CPT

## 2024-07-08 PROCEDURE — 80048 BASIC METABOLIC PNL TOTAL CA: CPT

## 2024-07-08 PROCEDURE — 94664 DEMO&/EVAL PT USE INHALER: CPT

## 2024-07-08 PROCEDURE — 36415 COLL VENOUS BLD VENIPUNCTURE: CPT

## 2024-07-08 PROCEDURE — 94640 AIRWAY INHALATION TREATMENT: CPT

## 2024-07-08 RX ORDER — CEPHALEXIN 500 MG/1
500 CAPSULE ORAL EVERY 8 HOURS SCHEDULED
Status: COMPLETED | OUTPATIENT
Start: 2024-07-08 | End: 2024-07-13

## 2024-07-08 RX ORDER — ENOXAPARIN SODIUM 100 MG/ML
1 INJECTION SUBCUTANEOUS 2 TIMES DAILY
Status: DISCONTINUED | OUTPATIENT
Start: 2024-07-08 | End: 2024-07-14 | Stop reason: HOSPADM

## 2024-07-08 RX ORDER — WARFARIN SODIUM 5 MG/1
5 TABLET ORAL
Status: COMPLETED | OUTPATIENT
Start: 2024-07-08 | End: 2024-07-08

## 2024-07-08 RX ADMIN — CEFTRIAXONE SODIUM 1000 MG: 1 INJECTION, POWDER, FOR SOLUTION INTRAMUSCULAR; INTRAVENOUS at 16:41

## 2024-07-08 RX ADMIN — MONTELUKAST 10 MG: 10 TABLET, FILM COATED ORAL at 22:12

## 2024-07-08 RX ADMIN — MICONAZOLE NITRATE: 2 CREAM TOPICAL at 22:12

## 2024-07-08 RX ADMIN — ANTI-FUNGAL POWDER MICONAZOLE NITRATE TALC FREE: 1.42 POWDER TOPICAL at 10:18

## 2024-07-08 RX ADMIN — CARVEDILOL 6.25 MG: 6.25 TABLET, FILM COATED ORAL at 12:17

## 2024-07-08 RX ADMIN — ALBUTEROL SULFATE 2.5 MG: 2.5 SOLUTION RESPIRATORY (INHALATION) at 23:04

## 2024-07-08 RX ADMIN — DILTIAZEM HYDROCHLORIDE 120 MG: 120 CAPSULE, COATED, EXTENDED RELEASE ORAL at 10:17

## 2024-07-08 RX ADMIN — WARFARIN SODIUM 5 MG: 5 TABLET ORAL at 17:20

## 2024-07-08 RX ADMIN — FUROSEMIDE 40 MG: 40 TABLET ORAL at 12:16

## 2024-07-08 RX ADMIN — SODIUM CHLORIDE: 9 INJECTION, SOLUTION INTRAVENOUS at 16:39

## 2024-07-08 RX ADMIN — CEPHALEXIN 500 MG: 500 CAPSULE ORAL at 22:12

## 2024-07-08 RX ADMIN — IPRATROPIUM BROMIDE AND ALBUTEROL SULFATE 1 DOSE: 2.5; .5 SOLUTION RESPIRATORY (INHALATION) at 11:34

## 2024-07-08 RX ADMIN — PRAVASTATIN SODIUM 80 MG: 40 TABLET ORAL at 17:20

## 2024-07-08 RX ADMIN — SODIUM CHLORIDE, PRESERVATIVE FREE 10 ML: 5 INJECTION INTRAVENOUS at 10:19

## 2024-07-08 RX ADMIN — CARVEDILOL 6.25 MG: 6.25 TABLET, FILM COATED ORAL at 17:20

## 2024-07-08 RX ADMIN — MICONAZOLE NITRATE: 2 CREAM TOPICAL at 10:18

## 2024-07-08 RX ADMIN — ENOXAPARIN SODIUM 180 MG: 100 INJECTION SUBCUTANEOUS at 22:13

## 2024-07-08 RX ADMIN — PANTOPRAZOLE SODIUM 40 MG: 40 TABLET, DELAYED RELEASE ORAL at 06:01

## 2024-07-08 RX ADMIN — IPRATROPIUM BROMIDE AND ALBUTEROL SULFATE 1 DOSE: 2.5; .5 SOLUTION RESPIRATORY (INHALATION) at 19:02

## 2024-07-08 RX ADMIN — ENOXAPARIN SODIUM 180 MG: 100 INJECTION SUBCUTANEOUS at 11:00

## 2024-07-08 RX ADMIN — IPRATROPIUM BROMIDE AND ALBUTEROL SULFATE 1 DOSE: 2.5; .5 SOLUTION RESPIRATORY (INHALATION) at 08:03

## 2024-07-08 RX ADMIN — ANTI-FUNGAL POWDER MICONAZOLE NITRATE TALC FREE: 1.42 POWDER TOPICAL at 22:13

## 2024-07-08 RX ADMIN — IPRATROPIUM BROMIDE AND ALBUTEROL SULFATE 1 DOSE: 2.5; .5 SOLUTION RESPIRATORY (INHALATION) at 15:47

## 2024-07-08 ASSESSMENT — ENCOUNTER SYMPTOMS
COUGH: 0
SHORTNESS OF BREATH: 1
CHEST TIGHTNESS: 0
ABDOMINAL DISTENTION: 0
ABDOMINAL PAIN: 0

## 2024-07-08 NOTE — CONSULTS
[facility]   CONSULT NOTE:      DATE OF CONSULT 7/4/2024    REASON FOR CONSULTATION:  Pulmonary and critical care management      PCP Jyoti Mauricio, APRN - CNP     CHIEF COMPLAINT: Dyspnea, altered mental    HISTORY OF PRESENT ILLNESS:     Tara is a morbidly obese 68-year-old female with a history of asthma (no PFTs), SHWETA/OHS (refused to go for sleep study and has been noncompliant), history of recurrent pulmonary embolism, and chronic hypoxic respiratory failure on 4 L nasal can who was admitted by the ER.  She was transferred from her ECF after being found with oxygen saturation of 65% on her baseline 4 L and some increased confusion.  Reportedly she was placed on breathing treatment and given a DuoNeb treatment and route.  She was placed on BiPAP initially and then switched over to AVAPS to maintain tidal volume.  She is arousable for me and does follow commands although she goes back to sleep.  An ABG showed a pH of 738 with a pCO2 of 94 pO2 of 86.  Her labs were notable for a serum bicarb of greater than 45.  On her ABG, her serum bicarb was 57 (calculated).    She has had multiple recent admissions at [facility] in April 2024 where she presented with acute on chronic hypoxic respiratory failure felt in part due to influenza A.  She was also admitted at the end of May from May 25 to May 31 with complaints of shortness of breath \"asthma exacerbation\".  We were not involved in her care at that time      She has a history of pulmonary embolism and DVT.  In May 2018 she had a high probability VQ scan and this was repeated in August 2022 which was also high probability.  Lovenox was recommended but the patient refused and said she could not afford it and so she has been obtained on the Coumadin with INR target of 3-3.5.  INR on July 1 was 3.2.    She carries a diagnosis of asthma, but I only see spirometry from 2017 that showed a pseudo restrictive pattern (FVC 77% 
Insecurity: No Food Insecurity (7/8/2024)    Hunger Vital Sign     Worried About Running Out of Food in the Last Year: Never true     Ran Out of Food in the Last Year: Never true   Transportation Needs: No Transportation Needs (7/8/2024)    PRAPARE - Transportation     Lack of Transportation (Medical): No     Lack of Transportation (Non-Medical): No   Physical Activity: Inactive (10/20/2023)    Exercise Vital Sign     Days of Exercise per Week: 0 days     Minutes of Exercise per Session: 0 min   Stress: Not on file   Social Connections: Patient Unable To Answer (7/8/2024)    Social Connections ([facility] HRSN)     If for any reason you need help with day-to-day activities such as bathing, preparing meals, shopping, managing finances, etc., do you get the help you need?: Not on file   Intimate Partner Violence: Not on file   Housing Stability: Low Risk  (7/8/2024)    Housing Stability Vital Sign     Unable to Pay for Housing in the Last Year: No     Number of Places Lived in the Last Year: 2     Unstable Housing in the Last Year: No       Family History:     Family History   Problem Relation Age of Onset    Asthma Mother     Mental Illness Mother     COPD Mother     Cancer Father 86        hairy cell leukemia, and leimyoma sarcoma     Stroke Father 86        minor    Parkinsonism Maternal Grandfather     Cancer Paternal Grandmother     Stroke Paternal Grandmother       Medical Decision Making:   I have independently reviewed/ordered the following labs:    CBC with Differential:   Recent Labs     07/07/24  0419 07/08/24  0518   WBC 4.1 4.0   HGB 8.7* 8.6*   HCT 29.7* 28.6*    200   LYMPHOPCT 19* 20*   MONOPCT 10* 10*   EOSPCT 1 1     BMP:  Recent Labs     07/06/24  0402 07/07/24  0419 07/08/24  0518    141 139   K 3.3* 3.5* 3.7   CL 92* 95* 94*   CO2 43* 39* 39*   BUN 25* 27* 26*   CREATININE 1.0* 1.0* 1.0*   MG 2.0 1.9  --      Hepatic Function Panel: No results for input(s): \"LABALBU\", \"BILIDIR\", \"IBILI\",

## 2024-07-09 LAB
ANION GAP SERPL CALCULATED.3IONS-SCNC: 6 MMOL/L (ref 9–17)
BASOPHILS # BLD: 0.04 K/UL (ref 0–0.2)
BASOPHILS NFR BLD: 1 % (ref 0–2)
BUN SERPL-MCNC: 22 MG/DL (ref 8–23)
CALCIUM SERPL-MCNC: 9.3 MG/DL (ref 8.6–10.4)
CHLORIDE SERPL-SCNC: 97 MMOL/L (ref 98–107)
CO2 SERPL-SCNC: 39 MMOL/L (ref 20–31)
CREAT SERPL-MCNC: 0.9 MG/DL (ref 0.5–0.9)
EOSINOPHIL # BLD: 0.08 K/UL (ref 0–0.4)
EOSINOPHILS RELATIVE PERCENT: 2 % (ref 0–4)
ERYTHROCYTE [DISTWIDTH] IN BLOOD BY AUTOMATED COUNT: 18.5 % (ref 11.5–14.9)
GFR, ESTIMATED: 70 ML/MIN/1.73M2
GLUCOSE BLD-MCNC: 139 MG/DL (ref 65–105)
GLUCOSE BLD-MCNC: 167 MG/DL (ref 65–105)
GLUCOSE BLD-MCNC: 188 MG/DL (ref 65–105)
GLUCOSE SERPL-MCNC: 131 MG/DL (ref 70–99)
HCT VFR BLD AUTO: 29.3 % (ref 36–46)
HGB BLD-MCNC: 8.8 G/DL (ref 12–16)
INR PPP: 1.6
LYMPHOCYTES NFR BLD: 0.94 K/UL (ref 1–4.8)
LYMPHOCYTES RELATIVE PERCENT: 23 % (ref 24–44)
MCH RBC QN AUTO: 25.7 PG (ref 26–34)
MCHC RBC AUTO-ENTMCNC: 30.2 G/DL (ref 31–37)
MCV RBC AUTO: 85.3 FL (ref 80–100)
MICROORGANISM SPEC CULT: ABNORMAL
MICROORGANISM SPEC CULT: NORMAL
MONOCYTES NFR BLD: 0.37 K/UL (ref 0.1–1.3)
MONOCYTES NFR BLD: 9 % (ref 1–7)
MORPHOLOGY: ABNORMAL
NEUTROPHILS NFR BLD: 65 % (ref 36–66)
NEUTS SEG NFR BLD: 2.67 K/UL (ref 1.3–9.1)
PLATELET # BLD AUTO: 152 K/UL (ref 150–450)
PMV BLD AUTO: 6.9 FL (ref 6–12)
POTASSIUM SERPL-SCNC: 3.6 MMOL/L (ref 3.7–5.3)
PROTHROMBIN TIME: 19.4 SEC (ref 11.8–14.6)
RBC # BLD AUTO: 3.44 M/UL (ref 4–5.2)
SERVICE CMNT-IMP: ABNORMAL
SERVICE CMNT-IMP: NORMAL
SODIUM SERPL-SCNC: 142 MMOL/L (ref 135–144)
SPECIMEN DESCRIPTION: ABNORMAL
SPECIMEN DESCRIPTION: NORMAL
WBC OTHER # BLD: 4.1 K/UL (ref 3.5–11)

## 2024-07-09 PROCEDURE — 99232 SBSQ HOSP IP/OBS MODERATE 35: CPT | Performed by: INTERNAL MEDICINE

## 2024-07-09 PROCEDURE — 2700000000 HC OXYGEN THERAPY PER DAY

## 2024-07-09 PROCEDURE — 97110 THERAPEUTIC EXERCISES: CPT

## 2024-07-09 PROCEDURE — 6370000000 HC RX 637 (ALT 250 FOR IP)

## 2024-07-09 PROCEDURE — 2580000003 HC RX 258

## 2024-07-09 PROCEDURE — 94660 CPAP INITIATION&MGMT: CPT

## 2024-07-09 PROCEDURE — 85610 PROTHROMBIN TIME: CPT

## 2024-07-09 PROCEDURE — 80048 BASIC METABOLIC PNL TOTAL CA: CPT

## 2024-07-09 PROCEDURE — 99233 SBSQ HOSP IP/OBS HIGH 50: CPT | Performed by: INTERNAL MEDICINE

## 2024-07-09 PROCEDURE — 82947 ASSAY GLUCOSE BLOOD QUANT: CPT

## 2024-07-09 PROCEDURE — 97535 SELF CARE MNGMENT TRAINING: CPT

## 2024-07-09 PROCEDURE — 85025 COMPLETE CBC W/AUTO DIFF WBC: CPT

## 2024-07-09 PROCEDURE — 6370000000 HC RX 637 (ALT 250 FOR IP): Performed by: INTERNAL MEDICINE

## 2024-07-09 PROCEDURE — 94761 N-INVAS EAR/PLS OXIMETRY MLT: CPT

## 2024-07-09 PROCEDURE — 97530 THERAPEUTIC ACTIVITIES: CPT

## 2024-07-09 PROCEDURE — 36415 COLL VENOUS BLD VENIPUNCTURE: CPT

## 2024-07-09 PROCEDURE — 6360000002 HC RX W HCPCS

## 2024-07-09 PROCEDURE — 94664 DEMO&/EVAL PT USE INHALER: CPT

## 2024-07-09 PROCEDURE — 94640 AIRWAY INHALATION TREATMENT: CPT

## 2024-07-09 PROCEDURE — 2060000000 HC ICU INTERMEDIATE R&B

## 2024-07-09 RX ORDER — WARFARIN SODIUM 3 MG/1
6 TABLET ORAL
Status: COMPLETED | OUTPATIENT
Start: 2024-07-09 | End: 2024-07-09

## 2024-07-09 RX ADMIN — PANTOPRAZOLE SODIUM 40 MG: 40 TABLET, DELAYED RELEASE ORAL at 05:26

## 2024-07-09 RX ADMIN — IPRATROPIUM BROMIDE AND ALBUTEROL SULFATE 1 DOSE: 2.5; .5 SOLUTION RESPIRATORY (INHALATION) at 11:08

## 2024-07-09 RX ADMIN — FUROSEMIDE 40 MG: 40 TABLET ORAL at 09:37

## 2024-07-09 RX ADMIN — IPRATROPIUM BROMIDE AND ALBUTEROL SULFATE 1 DOSE: 2.5; .5 SOLUTION RESPIRATORY (INHALATION) at 07:02

## 2024-07-09 RX ADMIN — ANTI-FUNGAL POWDER MICONAZOLE NITRATE TALC FREE: 1.42 POWDER TOPICAL at 09:38

## 2024-07-09 RX ADMIN — CARVEDILOL 6.25 MG: 6.25 TABLET, FILM COATED ORAL at 09:37

## 2024-07-09 RX ADMIN — IPRATROPIUM BROMIDE AND ALBUTEROL SULFATE 1 DOSE: 2.5; .5 SOLUTION RESPIRATORY (INHALATION) at 14:51

## 2024-07-09 RX ADMIN — ENOXAPARIN SODIUM 180 MG: 100 INJECTION SUBCUTANEOUS at 22:03

## 2024-07-09 RX ADMIN — ENOXAPARIN SODIUM 180 MG: 100 INJECTION SUBCUTANEOUS at 09:37

## 2024-07-09 RX ADMIN — ANTI-FUNGAL POWDER MICONAZOLE NITRATE TALC FREE: 1.42 POWDER TOPICAL at 22:03

## 2024-07-09 RX ADMIN — DILTIAZEM HYDROCHLORIDE 120 MG: 120 CAPSULE, COATED, EXTENDED RELEASE ORAL at 09:37

## 2024-07-09 RX ADMIN — CEPHALEXIN 500 MG: 500 CAPSULE ORAL at 14:30

## 2024-07-09 RX ADMIN — CEPHALEXIN 500 MG: 500 CAPSULE ORAL at 05:27

## 2024-07-09 RX ADMIN — CEPHALEXIN 500 MG: 500 CAPSULE ORAL at 22:03

## 2024-07-09 RX ADMIN — MONTELUKAST 10 MG: 10 TABLET, FILM COATED ORAL at 22:03

## 2024-07-09 RX ADMIN — WARFARIN SODIUM 6 MG: 3 TABLET ORAL at 17:30

## 2024-07-09 RX ADMIN — ALBUTEROL SULFATE 2.5 MG: 2.5 SOLUTION RESPIRATORY (INHALATION) at 16:58

## 2024-07-09 RX ADMIN — PRAVASTATIN SODIUM 80 MG: 40 TABLET ORAL at 17:30

## 2024-07-09 RX ADMIN — SODIUM CHLORIDE, PRESERVATIVE FREE 10 ML: 5 INJECTION INTRAVENOUS at 22:05

## 2024-07-09 RX ADMIN — MICONAZOLE NITRATE: 2 CREAM TOPICAL at 22:03

## 2024-07-09 RX ADMIN — SODIUM CHLORIDE, PRESERVATIVE FREE 10 ML: 5 INJECTION INTRAVENOUS at 09:38

## 2024-07-09 RX ADMIN — MICONAZOLE NITRATE: 2 CREAM TOPICAL at 09:38

## 2024-07-09 RX ADMIN — CARVEDILOL 6.25 MG: 6.25 TABLET, FILM COATED ORAL at 16:51

## 2024-07-09 RX ADMIN — IPRATROPIUM BROMIDE AND ALBUTEROL SULFATE 1 DOSE: 2.5; .5 SOLUTION RESPIRATORY (INHALATION) at 19:05

## 2024-07-09 ASSESSMENT — ENCOUNTER SYMPTOMS
CHOKING: 0
ABDOMINAL DISTENTION: 0
APNEA: 0
ABDOMINAL PAIN: 0

## 2024-07-09 ASSESSMENT — PAIN DESCRIPTION - LOCATION: LOCATION: LEG

## 2024-07-09 ASSESSMENT — PAIN SCALES - GENERAL: PAINLEVEL_OUTOF10: 2

## 2024-07-09 ASSESSMENT — PAIN DESCRIPTION - ORIENTATION: ORIENTATION: LEFT

## 2024-07-10 PROBLEM — I50.30 (HFPEF) HEART FAILURE WITH PRESERVED EJECTION FRACTION (HCC): Status: ACTIVE | Noted: 2024-07-10

## 2024-07-10 PROBLEM — G93.41 METABOLIC ENCEPHALOPATHY: Status: ACTIVE | Noted: 2024-07-10

## 2024-07-10 PROBLEM — I48.91 A-FIB (HCC): Status: ACTIVE | Noted: 2024-07-10

## 2024-07-10 LAB
ANION GAP SERPL CALCULATED.3IONS-SCNC: 2 MMOL/L (ref 9–17)
BASOPHILS # BLD: 0.04 K/UL (ref 0–0.2)
BASOPHILS NFR BLD: 1 % (ref 0–2)
BUN SERPL-MCNC: 18 MG/DL (ref 8–23)
CALCIUM SERPL-MCNC: 9.3 MG/DL (ref 8.6–10.4)
CHLORIDE SERPL-SCNC: 97 MMOL/L (ref 98–107)
CO2 SERPL-SCNC: 43 MMOL/L (ref 20–31)
CREAT SERPL-MCNC: 0.9 MG/DL (ref 0.5–0.9)
EOSINOPHIL # BLD: 0.09 K/UL (ref 0–0.4)
EOSINOPHILS RELATIVE PERCENT: 2 % (ref 0–4)
ERYTHROCYTE [DISTWIDTH] IN BLOOD BY AUTOMATED COUNT: 18.7 % (ref 11.5–14.9)
GFR, ESTIMATED: 70 ML/MIN/1.73M2
GLUCOSE BLD-MCNC: 110 MG/DL (ref 65–105)
GLUCOSE BLD-MCNC: 154 MG/DL (ref 65–105)
GLUCOSE BLD-MCNC: 199 MG/DL (ref 65–105)
GLUCOSE SERPL-MCNC: 123 MG/DL (ref 70–99)
HCT VFR BLD AUTO: 30.2 % (ref 36–46)
HGB BLD-MCNC: 9 G/DL (ref 12–16)
INR PPP: 1.6
LYMPHOCYTES NFR BLD: 0.95 K/UL (ref 1–4.8)
LYMPHOCYTES RELATIVE PERCENT: 22 % (ref 24–44)
MCH RBC QN AUTO: 25.6 PG (ref 26–34)
MCHC RBC AUTO-ENTMCNC: 29.7 G/DL (ref 31–37)
MCV RBC AUTO: 86.2 FL (ref 80–100)
MICROORGANISM SPEC CULT: ABNORMAL
MONOCYTES NFR BLD: 0.43 K/UL (ref 0.1–1.3)
MONOCYTES NFR BLD: 10 % (ref 1–7)
MORPHOLOGY: ABNORMAL
NEUTROPHILS NFR BLD: 65 % (ref 36–66)
NEUTS SEG NFR BLD: 2.79 K/UL (ref 1.3–9.1)
PLATELET # BLD AUTO: 180 K/UL (ref 150–450)
PMV BLD AUTO: 7.5 FL (ref 6–12)
POTASSIUM SERPL-SCNC: 3.7 MMOL/L (ref 3.7–5.3)
PROTHROMBIN TIME: 19.6 SEC (ref 11.8–14.6)
RBC # BLD AUTO: 3.5 M/UL (ref 4–5.2)
SODIUM SERPL-SCNC: 142 MMOL/L (ref 135–144)
SPECIMEN DESCRIPTION: ABNORMAL
WBC OTHER # BLD: 4.3 K/UL (ref 3.5–11)

## 2024-07-10 PROCEDURE — 99232 SBSQ HOSP IP/OBS MODERATE 35: CPT | Performed by: INTERNAL MEDICINE

## 2024-07-10 PROCEDURE — 97535 SELF CARE MNGMENT TRAINING: CPT

## 2024-07-10 PROCEDURE — 2060000000 HC ICU INTERMEDIATE R&B

## 2024-07-10 PROCEDURE — 82947 ASSAY GLUCOSE BLOOD QUANT: CPT

## 2024-07-10 PROCEDURE — 6370000000 HC RX 637 (ALT 250 FOR IP)

## 2024-07-10 PROCEDURE — 2700000000 HC OXYGEN THERAPY PER DAY

## 2024-07-10 PROCEDURE — 99233 SBSQ HOSP IP/OBS HIGH 50: CPT | Performed by: INTERNAL MEDICINE

## 2024-07-10 PROCEDURE — 94761 N-INVAS EAR/PLS OXIMETRY MLT: CPT

## 2024-07-10 PROCEDURE — 6370000000 HC RX 637 (ALT 250 FOR IP): Performed by: INTERNAL MEDICINE

## 2024-07-10 PROCEDURE — 80048 BASIC METABOLIC PNL TOTAL CA: CPT

## 2024-07-10 PROCEDURE — 36415 COLL VENOUS BLD VENIPUNCTURE: CPT

## 2024-07-10 PROCEDURE — 97110 THERAPEUTIC EXERCISES: CPT

## 2024-07-10 PROCEDURE — 97530 THERAPEUTIC ACTIVITIES: CPT

## 2024-07-10 PROCEDURE — 2580000003 HC RX 258

## 2024-07-10 PROCEDURE — 6360000002 HC RX W HCPCS

## 2024-07-10 PROCEDURE — 85025 COMPLETE CBC W/AUTO DIFF WBC: CPT

## 2024-07-10 PROCEDURE — 85610 PROTHROMBIN TIME: CPT

## 2024-07-10 PROCEDURE — 94640 AIRWAY INHALATION TREATMENT: CPT

## 2024-07-10 RX ORDER — WARFARIN SODIUM 7.5 MG/1
7.5 TABLET ORAL
Status: COMPLETED | OUTPATIENT
Start: 2024-07-10 | End: 2024-07-10

## 2024-07-10 RX ADMIN — MICONAZOLE NITRATE: 2 CREAM TOPICAL at 07:57

## 2024-07-10 RX ADMIN — ANTI-FUNGAL POWDER MICONAZOLE NITRATE TALC FREE: 1.42 POWDER TOPICAL at 20:28

## 2024-07-10 RX ADMIN — CARVEDILOL 6.25 MG: 6.25 TABLET, FILM COATED ORAL at 17:26

## 2024-07-10 RX ADMIN — ENOXAPARIN SODIUM 180 MG: 100 INJECTION SUBCUTANEOUS at 20:28

## 2024-07-10 RX ADMIN — PANTOPRAZOLE SODIUM 40 MG: 40 TABLET, DELAYED RELEASE ORAL at 06:15

## 2024-07-10 RX ADMIN — DILTIAZEM HYDROCHLORIDE 120 MG: 120 CAPSULE, COATED, EXTENDED RELEASE ORAL at 07:56

## 2024-07-10 RX ADMIN — CEPHALEXIN 500 MG: 500 CAPSULE ORAL at 14:02

## 2024-07-10 RX ADMIN — IPRATROPIUM BROMIDE AND ALBUTEROL SULFATE 1 DOSE: 2.5; .5 SOLUTION RESPIRATORY (INHALATION) at 14:44

## 2024-07-10 RX ADMIN — ANTI-FUNGAL POWDER MICONAZOLE NITRATE TALC FREE: 1.42 POWDER TOPICAL at 07:57

## 2024-07-10 RX ADMIN — ENOXAPARIN SODIUM 180 MG: 100 INJECTION SUBCUTANEOUS at 07:56

## 2024-07-10 RX ADMIN — CEPHALEXIN 500 MG: 500 CAPSULE ORAL at 06:15

## 2024-07-10 RX ADMIN — CARVEDILOL 6.25 MG: 6.25 TABLET, FILM COATED ORAL at 07:56

## 2024-07-10 RX ADMIN — FUROSEMIDE 40 MG: 40 TABLET ORAL at 07:56

## 2024-07-10 RX ADMIN — MICONAZOLE NITRATE: 2 CREAM TOPICAL at 20:29

## 2024-07-10 RX ADMIN — PRAVASTATIN SODIUM 80 MG: 40 TABLET ORAL at 17:26

## 2024-07-10 RX ADMIN — MONTELUKAST 10 MG: 10 TABLET, FILM COATED ORAL at 20:28

## 2024-07-10 RX ADMIN — WARFARIN SODIUM 7.5 MG: 7.5 TABLET ORAL at 17:26

## 2024-07-10 RX ADMIN — CEPHALEXIN 500 MG: 500 CAPSULE ORAL at 20:28

## 2024-07-10 RX ADMIN — IPRATROPIUM BROMIDE AND ALBUTEROL SULFATE 1 DOSE: 2.5; .5 SOLUTION RESPIRATORY (INHALATION) at 08:04

## 2024-07-10 RX ADMIN — IPRATROPIUM BROMIDE AND ALBUTEROL SULFATE 1 DOSE: 2.5; .5 SOLUTION RESPIRATORY (INHALATION) at 20:14

## 2024-07-10 RX ADMIN — SODIUM CHLORIDE, PRESERVATIVE FREE 10 ML: 5 INJECTION INTRAVENOUS at 07:59

## 2024-07-10 RX ADMIN — IPRATROPIUM BROMIDE AND ALBUTEROL SULFATE 1 DOSE: 2.5; .5 SOLUTION RESPIRATORY (INHALATION) at 11:25

## 2024-07-10 ASSESSMENT — ENCOUNTER SYMPTOMS
SHORTNESS OF BREATH: 1
APNEA: 0
ABDOMINAL DISTENTION: 0
CHEST TIGHTNESS: 0
ABDOMINAL PAIN: 0
COUGH: 0

## 2024-07-11 LAB
GLUCOSE BLD-MCNC: 117 MG/DL (ref 65–105)
GLUCOSE BLD-MCNC: 119 MG/DL (ref 65–105)
GLUCOSE BLD-MCNC: 179 MG/DL (ref 65–105)
GLUCOSE BLD-MCNC: 193 MG/DL (ref 65–105)
INR PPP: 1.9
PROTHROMBIN TIME: 22.2 SEC (ref 11.8–14.6)

## 2024-07-11 PROCEDURE — 2580000003 HC RX 258

## 2024-07-11 PROCEDURE — 36415 COLL VENOUS BLD VENIPUNCTURE: CPT

## 2024-07-11 PROCEDURE — 2060000000 HC ICU INTERMEDIATE R&B

## 2024-07-11 PROCEDURE — 6370000000 HC RX 637 (ALT 250 FOR IP)

## 2024-07-11 PROCEDURE — 6360000002 HC RX W HCPCS

## 2024-07-11 PROCEDURE — 94660 CPAP INITIATION&MGMT: CPT

## 2024-07-11 PROCEDURE — 94761 N-INVAS EAR/PLS OXIMETRY MLT: CPT

## 2024-07-11 PROCEDURE — 2700000000 HC OXYGEN THERAPY PER DAY

## 2024-07-11 PROCEDURE — 82947 ASSAY GLUCOSE BLOOD QUANT: CPT

## 2024-07-11 PROCEDURE — 99231 SBSQ HOSP IP/OBS SF/LOW 25: CPT | Performed by: INTERNAL MEDICINE

## 2024-07-11 PROCEDURE — 6370000000 HC RX 637 (ALT 250 FOR IP): Performed by: INTERNAL MEDICINE

## 2024-07-11 PROCEDURE — 94640 AIRWAY INHALATION TREATMENT: CPT

## 2024-07-11 PROCEDURE — 85610 PROTHROMBIN TIME: CPT

## 2024-07-11 RX ORDER — WARFARIN SODIUM 3 MG/1
6 TABLET ORAL
Status: COMPLETED | OUTPATIENT
Start: 2024-07-11 | End: 2024-07-11

## 2024-07-11 RX ADMIN — CARVEDILOL 6.25 MG: 6.25 TABLET, FILM COATED ORAL at 18:00

## 2024-07-11 RX ADMIN — MICONAZOLE NITRATE: 2 CREAM TOPICAL at 08:28

## 2024-07-11 RX ADMIN — MONTELUKAST 10 MG: 10 TABLET, FILM COATED ORAL at 21:08

## 2024-07-11 RX ADMIN — ANTI-FUNGAL POWDER MICONAZOLE NITRATE TALC FREE: 1.42 POWDER TOPICAL at 21:17

## 2024-07-11 RX ADMIN — WARFARIN SODIUM 6 MG: 3 TABLET ORAL at 18:00

## 2024-07-11 RX ADMIN — FUROSEMIDE 40 MG: 40 TABLET ORAL at 08:29

## 2024-07-11 RX ADMIN — CEPHALEXIN 500 MG: 500 CAPSULE ORAL at 14:29

## 2024-07-11 RX ADMIN — CEPHALEXIN 500 MG: 500 CAPSULE ORAL at 21:08

## 2024-07-11 RX ADMIN — DILTIAZEM HYDROCHLORIDE 120 MG: 120 CAPSULE, COATED, EXTENDED RELEASE ORAL at 08:29

## 2024-07-11 RX ADMIN — IPRATROPIUM BROMIDE AND ALBUTEROL SULFATE 1 DOSE: 2.5; .5 SOLUTION RESPIRATORY (INHALATION) at 20:23

## 2024-07-11 RX ADMIN — PRAVASTATIN SODIUM 80 MG: 40 TABLET ORAL at 18:00

## 2024-07-11 RX ADMIN — IPRATROPIUM BROMIDE AND ALBUTEROL SULFATE 1 DOSE: 2.5; .5 SOLUTION RESPIRATORY (INHALATION) at 10:05

## 2024-07-11 RX ADMIN — ENOXAPARIN SODIUM 180 MG: 100 INJECTION SUBCUTANEOUS at 08:28

## 2024-07-11 RX ADMIN — SODIUM CHLORIDE, PRESERVATIVE FREE 10 ML: 5 INJECTION INTRAVENOUS at 21:10

## 2024-07-11 RX ADMIN — ALBUTEROL SULFATE 2.5 MG: 2.5 SOLUTION RESPIRATORY (INHALATION) at 05:20

## 2024-07-11 RX ADMIN — ENOXAPARIN SODIUM 180 MG: 100 INJECTION SUBCUTANEOUS at 21:09

## 2024-07-11 RX ADMIN — IPRATROPIUM BROMIDE AND ALBUTEROL SULFATE 1 DOSE: 2.5; .5 SOLUTION RESPIRATORY (INHALATION) at 07:23

## 2024-07-11 RX ADMIN — ANTI-FUNGAL POWDER MICONAZOLE NITRATE TALC FREE: 1.42 POWDER TOPICAL at 08:29

## 2024-07-11 RX ADMIN — PANTOPRAZOLE SODIUM 40 MG: 40 TABLET, DELAYED RELEASE ORAL at 05:12

## 2024-07-11 RX ADMIN — CEPHALEXIN 500 MG: 500 CAPSULE ORAL at 05:12

## 2024-07-11 RX ADMIN — IPRATROPIUM BROMIDE AND ALBUTEROL SULFATE 1 DOSE: 2.5; .5 SOLUTION RESPIRATORY (INHALATION) at 14:54

## 2024-07-11 RX ADMIN — MICONAZOLE NITRATE: 2 CREAM TOPICAL at 21:17

## 2024-07-11 RX ADMIN — SODIUM CHLORIDE, PRESERVATIVE FREE 10 ML: 5 INJECTION INTRAVENOUS at 08:29

## 2024-07-11 RX ADMIN — CARVEDILOL 6.25 MG: 6.25 TABLET, FILM COATED ORAL at 08:29

## 2024-07-11 ASSESSMENT — ENCOUNTER SYMPTOMS
ABDOMINAL PAIN: 0
SHORTNESS OF BREATH: 1
COUGH: 0
APNEA: 0
ABDOMINAL DISTENTION: 0
CHEST TIGHTNESS: 0

## 2024-07-12 LAB
GLUCOSE BLD-MCNC: 114 MG/DL (ref 65–105)
GLUCOSE BLD-MCNC: 134 MG/DL (ref 65–105)
GLUCOSE BLD-MCNC: 177 MG/DL (ref 65–105)
GLUCOSE BLD-MCNC: 206 MG/DL (ref 65–105)
INR PPP: 2.2
MICROORGANISM SPEC CULT: NORMAL
PROTHROMBIN TIME: 24.2 SEC (ref 11.8–14.6)
SERVICE CMNT-IMP: NORMAL
SPECIMEN DESCRIPTION: NORMAL

## 2024-07-12 PROCEDURE — 6370000000 HC RX 637 (ALT 250 FOR IP): Performed by: INTERNAL MEDICINE

## 2024-07-12 PROCEDURE — 2060000000 HC ICU INTERMEDIATE R&B

## 2024-07-12 PROCEDURE — 94660 CPAP INITIATION&MGMT: CPT

## 2024-07-12 PROCEDURE — 6370000000 HC RX 637 (ALT 250 FOR IP)

## 2024-07-12 PROCEDURE — 2580000003 HC RX 258

## 2024-07-12 PROCEDURE — 85610 PROTHROMBIN TIME: CPT

## 2024-07-12 PROCEDURE — 94640 AIRWAY INHALATION TREATMENT: CPT

## 2024-07-12 PROCEDURE — 99233 SBSQ HOSP IP/OBS HIGH 50: CPT | Performed by: INTERNAL MEDICINE

## 2024-07-12 PROCEDURE — 2700000000 HC OXYGEN THERAPY PER DAY

## 2024-07-12 PROCEDURE — 82947 ASSAY GLUCOSE BLOOD QUANT: CPT

## 2024-07-12 PROCEDURE — 94761 N-INVAS EAR/PLS OXIMETRY MLT: CPT

## 2024-07-12 PROCEDURE — 6360000002 HC RX W HCPCS

## 2024-07-12 PROCEDURE — 36415 COLL VENOUS BLD VENIPUNCTURE: CPT

## 2024-07-12 RX ORDER — WARFARIN SODIUM 3 MG/1
6 TABLET ORAL
Status: COMPLETED | OUTPATIENT
Start: 2024-07-12 | End: 2024-07-12

## 2024-07-12 RX ORDER — CARVEDILOL 6.25 MG/1
6.25 TABLET ORAL 2 TIMES DAILY WITH MEALS
Qty: 60 TABLET | Refills: 0 | Status: SHIPPED | OUTPATIENT
Start: 2024-07-12 | End: 2024-08-11

## 2024-07-12 RX ORDER — ENOXAPARIN SODIUM 100 MG/ML
1 INJECTION SUBCUTANEOUS 2 TIMES DAILY
Qty: 20 ML | Refills: 0 | Status: SHIPPED | OUTPATIENT
Start: 2024-07-12 | End: 2024-07-17

## 2024-07-12 RX ORDER — CEPHALEXIN 500 MG/1
500 CAPSULE ORAL EVERY 8 HOURS SCHEDULED
Qty: 4 CAPSULE | Refills: 0 | Status: SHIPPED | OUTPATIENT
Start: 2024-07-12 | End: 2024-07-13 | Stop reason: HOSPADM

## 2024-07-12 RX ADMIN — SODIUM CHLORIDE, PRESERVATIVE FREE 10 ML: 5 INJECTION INTRAVENOUS at 21:27

## 2024-07-12 RX ADMIN — IPRATROPIUM BROMIDE AND ALBUTEROL SULFATE 1 DOSE: 2.5; .5 SOLUTION RESPIRATORY (INHALATION) at 10:56

## 2024-07-12 RX ADMIN — ENOXAPARIN SODIUM 180 MG: 100 INJECTION SUBCUTANEOUS at 21:27

## 2024-07-12 RX ADMIN — PRAVASTATIN SODIUM 80 MG: 40 TABLET ORAL at 18:09

## 2024-07-12 RX ADMIN — MICONAZOLE NITRATE: 2 CREAM TOPICAL at 14:27

## 2024-07-12 RX ADMIN — ANTI-FUNGAL POWDER MICONAZOLE NITRATE TALC FREE: 1.42 POWDER TOPICAL at 21:27

## 2024-07-12 RX ADMIN — ANTI-FUNGAL POWDER MICONAZOLE NITRATE TALC FREE: 1.42 POWDER TOPICAL at 14:27

## 2024-07-12 RX ADMIN — CEPHALEXIN 500 MG: 500 CAPSULE ORAL at 14:26

## 2024-07-12 RX ADMIN — FUROSEMIDE 40 MG: 40 TABLET ORAL at 09:19

## 2024-07-12 RX ADMIN — PANTOPRAZOLE SODIUM 40 MG: 40 TABLET, DELAYED RELEASE ORAL at 05:13

## 2024-07-12 RX ADMIN — MONTELUKAST 10 MG: 10 TABLET, FILM COATED ORAL at 21:26

## 2024-07-12 RX ADMIN — IPRATROPIUM BROMIDE AND ALBUTEROL SULFATE 1 DOSE: 2.5; .5 SOLUTION RESPIRATORY (INHALATION) at 07:22

## 2024-07-12 RX ADMIN — CEPHALEXIN 500 MG: 500 CAPSULE ORAL at 21:26

## 2024-07-12 RX ADMIN — WARFARIN SODIUM 6 MG: 3 TABLET ORAL at 18:10

## 2024-07-12 RX ADMIN — CARVEDILOL 6.25 MG: 6.25 TABLET, FILM COATED ORAL at 18:09

## 2024-07-12 RX ADMIN — CARVEDILOL 6.25 MG: 6.25 TABLET, FILM COATED ORAL at 09:19

## 2024-07-12 RX ADMIN — DILTIAZEM HYDROCHLORIDE 120 MG: 120 CAPSULE, COATED, EXTENDED RELEASE ORAL at 09:18

## 2024-07-12 RX ADMIN — INSULIN LISPRO 1 UNITS: 100 INJECTION, SOLUTION INTRAVENOUS; SUBCUTANEOUS at 18:09

## 2024-07-12 RX ADMIN — MICONAZOLE NITRATE: 2 CREAM TOPICAL at 21:26

## 2024-07-12 RX ADMIN — ENOXAPARIN SODIUM 180 MG: 100 INJECTION SUBCUTANEOUS at 09:19

## 2024-07-12 RX ADMIN — IPRATROPIUM BROMIDE AND ALBUTEROL SULFATE 1 DOSE: 2.5; .5 SOLUTION RESPIRATORY (INHALATION) at 14:53

## 2024-07-12 RX ADMIN — IPRATROPIUM BROMIDE AND ALBUTEROL SULFATE 1 DOSE: 2.5; .5 SOLUTION RESPIRATORY (INHALATION) at 19:55

## 2024-07-12 RX ADMIN — SODIUM CHLORIDE, PRESERVATIVE FREE 10 ML: 5 INJECTION INTRAVENOUS at 14:28

## 2024-07-12 RX ADMIN — CEPHALEXIN 500 MG: 500 CAPSULE ORAL at 05:13

## 2024-07-12 NOTE — DISCHARGE SUMMARY
IN-PATIENT SERVICE   [facility]    Discharge Summary     Patient ID: Shazia ROGERS  :  1955   MRN: [id number]     ACCOUNT:  [id number]   Patient's PCP: Jyoti Mauricio, APRN - CNP  Admit Date: 2024   Discharge Date: 2024    Length of Stay: 8  Code Status:  Full Code  Admitting Physician: Joanne Liz MD  Discharge Physician: Joanne Liz MD     Active Discharge Diagnoses:     Primary Problem  COPD exacerbation (HCC)      Hospital Problems  Active Hospital Problems    Diagnosis Date Noted    Metabolic encephalopathy [G93.41] 07/10/2024    (HFpEF) heart failure with preserved ejection fraction (HCC) [I50.30] 07/10/2024    A-fib (HCC) [I48.91] 07/10/2024    COPD exacerbation (HCC) [J44.1] 2024    Acute on chronic diastolic heart failure (HCC) [I50.33] 2024    Acute cystitis without hematuria [N30.00] 2024    Acute on chronic respiratory failure with hypoxia and hypercapnia (HCC) [J96.21, J96.22] 2018    BMI 60.0-69.9, adult (HCC) [Z68.44] 10/04/2017    Anticoagulated on Coumadin [Z79.01] 2014       Admission Condition:  fair     Discharged Condition: fair    Hospital Stay:     Hospital Course:  Shazia ROGERS is a 68 y.o. female who was admitted for the management of COPD exacerbation (HCC) , presented to ER with Altered Mental Status and Shortness of Breath    This patient 68 years old female with a past medical history significant for COPD on baseline 4 L of oxygen and saturation 65%, diastolic heart failure and suspected SHWETA. Patient currently on BiPAP. Patient responds to questions but falls asleep. Could not obtain a detailed history admitted in the intermediate ICU for management of COPD and acute on chronic respiratory failure.   Patient noncompliant with her BiPAP, found to have acute on chronic hypoxic hypercarbic respiratory failure, placed on BiPAP, also had acute on chronic diastolic heart failure, was

## 2024-07-12 NOTE — CARE COORDINATION
DISCHARGE PLANNING NOTE:    Patient is a resident at [facility]. Will need authorization to return. Lilly from admissions notified and will start auth today. Patient is now on bipap and will need settings prior to return to facility.   
IMM letter provided to patient.  Patient offered four hours to make informed decision regarding appeal process; patient agreeable to discharge.   Electronically signed by SERA Mcnamara on 7/12/2024 at 5:27 PM    
Transportation arranged for patient to go to [facility] at [phone] on 7/13 via [employer]. Facility informed. Bedside nurse informed.     Number for Report: [phone]  Electronically signed by SERA Mcnamara on 7/12/2024 at 3:43 PM    
Writer is following for potential discharge to [facility].  Writer received message from Annika with [facility]. Authorization is denied, peer to peer is being offered. [phone] option #4. Due by 7/11 @ 1200    Writer met with pt for update. Pt states she has been residing at [facility] and is unable to walk and has respiratory needs that are a barrier to her living at home.     Writer placed call to Dr. Viera requesting to complete this peer to peer option. Dr. Viera agreeable, states she is available anytime today 7/11 and tomorrow 7/12 for discussion.     Writer place call to scheduling line. Voicemail left with all necessary identifiers.    Writer received call from about[employer]me scheduling line. Writer provided phone number for Dr. Viera.    Dr. Freed will place call to Dr. Viera [phone] EST today. Writer placed call to Dr. Viera to confirm this.  Electronically signed by SERA Mcnamara on 7/11/2024 at 1:01 PM    Writer received call back from Dr. Viera. Dr. Viera spoke with insurance. Insurance is not denying this pt admission, does not have a skillable need. Writer placed call to Annika at [facility]. Agreeable to pt return.    Writer placed call to bedside JOSUÉ Burris. No answer. [employer] message placed for update.  Electronically signed by SERA Mcnamara on 7/11/2024 at 4:33 PM    
Writer is following for potential discharge to [facility].   Writer placed message to Annika with [facility]. Authorization is pending at this time.Electronically signed by SERA Mcnamara on 7/9/2024 at 1:19 PM    
Writer is following for potential discharge to [facility].   Writer placed message to Annika with [facility]. Authorization is pending at this time.    Writer met with pt for update, no further questions at this time.  Electronically signed by SERA Mcnamara on 7/10/2024 at 1:03 PM    
Writer is following for potential discharge to [facility].  Per Annika, no authorization needed for pt to return to facility.  Electronically signed by SERA Mcnamara on 7/12/2024 at 10:30 AM    Writer received message from Annika that business office did not have a payor.    Writer placed call to Annika confirming pt can return. Facility is currently working on Medicaid application with this patient, agreeable to return.    Writer met with pt for update. Writer provided information for pt to place call to insurance company with concerns about skilled therapy denial.    Writer received call from bedside RN Kya that pt will need to discharge with bipap. Per Annika, pt does not have one at facility. Writer faxed bipap settings to [phone]  Electronically signed by SERA Mcnamara on 7/12/2024 at 2:20 PM    Writer placed call to [employer] to schedule transportation for this pt. Writer faxed forms for scheduling.  Electronically signed by SERA Mcnamara on 7/12/2024 at 3:17 PM    Transportation not available until [id number] due to need for bariatric crew.  Electronically signed by SERA Mcnamara on 7/12/2024 at 3:48 PM    
discuss the discharge plan with any other family members/significant others, and if so, who?    Plans to Return to Present Housing: Yes  Other Identified Issues/Barriers to RETURNING to current housing: Can return to [facility]  Potential Assistance needed at discharge: Extended Care Facility            Potential DME:  none  Patient expects to discharge to: Long-term care  Plan for transportation at discharge: Other (see comment) (BLS)    Financial    Payor: [employer] MEDICARE / Plan: [employer] MEDICARE-ADVANTAGE PPO / Product Type: Medicare /     Does insurance require precert for SNF: No    Potential assistance Purchasing Medications: No  Meds-to-Beds request:  no      [id number] - [place], [place] - [address] -  [phone] - F [phone]  [address] [address]  Phone: [phone] Fax: [phone]    [facility] [address] - P [phone] - F [phone]  [address] [address]  Phone: [phone] Fax: [phone]    [id number] - [place], [place] - [address] -  [phone] - F [phone]  [address] [place] [address]  Phone: [phone] Fax: [phone]      Notes:    Factors facilitating achievement of predicted outcomes: Family support, Friend support, and from long term care facility    Barriers to discharge: Long standing deficits    Additional Case Management Notes: 7/5/2024 [employer] MEDICARE; pt from [facility] long term (LVM for admissions to see if auth needed), plan is to return; IV rocephin-UTI, IV diamox, INR 3.0, recent echo EF 55-60%; PT/OT, KJ needs signed/completed//KR    The Plan for Transition of Care is related to the following treatment goals of COPD exacerbation (HCC) [J44.1]  Acute on chronic respiratory failure with hypoxia and hypercapnia (HCC) [J96.21, J96.22]    IF APPLICABLE: The Patient and/or patient representative Shazia and her family were

## 2024-07-13 VITALS
TEMPERATURE: 98.1 F | SYSTOLIC BLOOD PRESSURE: 118 MMHG | BODY MASS INDEX: 48.82 KG/M2 | HEART RATE: 70 BPM | OXYGEN SATURATION: 91 % | WEIGHT: 293 LBS | HEIGHT: 65 IN | DIASTOLIC BLOOD PRESSURE: 54 MMHG | RESPIRATION RATE: 20 BRPM

## 2024-07-13 LAB
GLUCOSE BLD-MCNC: 132 MG/DL (ref 65–105)
GLUCOSE BLD-MCNC: 146 MG/DL (ref 65–105)
GLUCOSE BLD-MCNC: 184 MG/DL (ref 65–105)
INR PPP: 2.3
PROTHROMBIN TIME: 25.6 SEC (ref 11.8–14.6)

## 2024-07-13 PROCEDURE — 94640 AIRWAY INHALATION TREATMENT: CPT

## 2024-07-13 PROCEDURE — 6370000000 HC RX 637 (ALT 250 FOR IP): Performed by: INTERNAL MEDICINE

## 2024-07-13 PROCEDURE — 85610 PROTHROMBIN TIME: CPT

## 2024-07-13 PROCEDURE — 36415 COLL VENOUS BLD VENIPUNCTURE: CPT

## 2024-07-13 PROCEDURE — 6370000000 HC RX 637 (ALT 250 FOR IP)

## 2024-07-13 PROCEDURE — 82947 ASSAY GLUCOSE BLOOD QUANT: CPT

## 2024-07-13 PROCEDURE — 6360000002 HC RX W HCPCS

## 2024-07-13 PROCEDURE — 94761 N-INVAS EAR/PLS OXIMETRY MLT: CPT

## 2024-07-13 PROCEDURE — 99239 HOSP IP/OBS DSCHRG MGMT >30: CPT | Performed by: INTERNAL MEDICINE

## 2024-07-13 PROCEDURE — 2580000003 HC RX 258

## 2024-07-13 PROCEDURE — 2700000000 HC OXYGEN THERAPY PER DAY

## 2024-07-13 PROCEDURE — 94660 CPAP INITIATION&MGMT: CPT

## 2024-07-13 PROCEDURE — 2060000000 HC ICU INTERMEDIATE R&B

## 2024-07-13 RX ADMIN — IPRATROPIUM BROMIDE AND ALBUTEROL SULFATE 1 DOSE: 2.5; .5 SOLUTION RESPIRATORY (INHALATION) at 06:53

## 2024-07-13 RX ADMIN — IPRATROPIUM BROMIDE AND ALBUTEROL SULFATE 1 DOSE: 2.5; .5 SOLUTION RESPIRATORY (INHALATION) at 20:10

## 2024-07-13 RX ADMIN — PANTOPRAZOLE SODIUM 40 MG: 40 TABLET, DELAYED RELEASE ORAL at 05:55

## 2024-07-13 RX ADMIN — SODIUM CHLORIDE, PRESERVATIVE FREE 10 ML: 5 INJECTION INTRAVENOUS at 08:43

## 2024-07-13 RX ADMIN — ALBUTEROL SULFATE 2.5 MG: 2.5 SOLUTION RESPIRATORY (INHALATION) at 01:09

## 2024-07-13 RX ADMIN — CARVEDILOL 6.25 MG: 6.25 TABLET, FILM COATED ORAL at 08:45

## 2024-07-13 RX ADMIN — CARVEDILOL 6.25 MG: 6.25 TABLET, FILM COATED ORAL at 17:32

## 2024-07-13 RX ADMIN — CEPHALEXIN 500 MG: 500 CAPSULE ORAL at 05:55

## 2024-07-13 RX ADMIN — MICONAZOLE NITRATE: 2 CREAM TOPICAL at 08:43

## 2024-07-13 RX ADMIN — ENOXAPARIN SODIUM 180 MG: 100 INJECTION SUBCUTANEOUS at 08:44

## 2024-07-13 RX ADMIN — CEPHALEXIN 500 MG: 500 CAPSULE ORAL at 13:42

## 2024-07-13 RX ADMIN — WARFARIN SODIUM 8 MG: 5 TABLET ORAL at 17:35

## 2024-07-13 RX ADMIN — ANTI-FUNGAL POWDER MICONAZOLE NITRATE TALC FREE: 1.42 POWDER TOPICAL at 08:46

## 2024-07-13 RX ADMIN — IPRATROPIUM BROMIDE AND ALBUTEROL SULFATE 1 DOSE: 2.5; .5 SOLUTION RESPIRATORY (INHALATION) at 10:45

## 2024-07-13 RX ADMIN — PRAVASTATIN SODIUM 80 MG: 40 TABLET ORAL at 17:35

## 2024-07-13 RX ADMIN — IPRATROPIUM BROMIDE AND ALBUTEROL SULFATE 1 DOSE: 2.5; .5 SOLUTION RESPIRATORY (INHALATION) at 15:06

## 2024-07-13 RX ADMIN — FUROSEMIDE 40 MG: 40 TABLET ORAL at 08:45

## 2024-07-13 RX ADMIN — DILTIAZEM HYDROCHLORIDE 120 MG: 120 CAPSULE, COATED, EXTENDED RELEASE ORAL at 08:45

## 2024-07-13 ASSESSMENT — ENCOUNTER SYMPTOMS
ABDOMINAL DISTENTION: 0
COUGH: 0
APNEA: 0
ABDOMINAL PAIN: 0
CHEST TIGHTNESS: 0
SHORTNESS OF BREATH: 1

## 2024-07-13 NOTE — PLAN OF CARE
Problem: Discharge Planning  Goal: Discharge to home or other facility with appropriate resources  7/13/2024 0348 by Judit Evangelista RN  Outcome: Progressing     Problem: Safety - Adult  Goal: Free from fall injury  Description: Patient remained free from falls. Call light within reach and bed in lowest position. Patient oriented to room and surroundings.      7/13/2024 0348 by Judit Evangelista, RN  Outcome: Progressing     Problem: Pain  Goal: Verbalizes/displays adequate comfort level or baseline comfort level  Outcome: Progressing     Problem: Genitourinary - Adult  Goal: Absence of urinary retention  Outcome: Progressing     Problem: Skin/Tissue Integrity - Adult  Goal: Skin integrity remains intact  Outcome: Progressing     Problem: Cardiovascular - Adult  Goal: Maintains optimal cardiac output and hemodynamic stability  Description: Ability to maintain vital signs WNL. Vital signs monitored closely.   Outcome: Progressing     Problem: Respiratory - Adult  Goal: Achieves optimal ventilation and oxygenation  7/13/2024 0348 by Judit Evangelista, RN  Outcome: Progressing     
  Problem: Discharge Planning  Goal: Discharge to home or other facility with appropriate resources  Outcome: Progressing  Note: Will return to [facility] when medically discharged.      Problem: Skin/Tissue Integrity  Goal: Absence of new skin breakdown  Description: 1.  Monitor for areas of redness and/or skin breakdown  2.  Assess vascular access sites hourly  3.  Every 4-6 hours minimum:  Change oxygen saturation probe site  4.  Every 4-6 hours:  If on nasal continuous positive airway pressure, respiratory therapy assess nares and determine need for appliance change or resting period.  Outcome: Progressing  Note: Skin assessment complete. Pressure relief mattress in place Coccyx reddened. Barrier cream applied PRN. Turned and repositioned every two hours when patient does not refuse.  Area kept free from moisture. Proper nourishment and fluids encouraged, as appropriate. Dressing changed daily.       Problem: Neurosensory - Adult  Goal: Achieves stable or improved neurological status  Outcome: Progressing  Note: Patient remains alert and oriented x4 this shift     Problem: Respiratory - Adult  Goal: Achieves optimal ventilation and oxygenation  Outcome: Progressing  Note: Patient maintaining O2 saturation at baseline 4L NC, patient can recover when desaturation occurs. Patient denies any additional SOB then what is her baseline.      
  Problem: Safety - Adult  Goal: Free from fall injury  7/13/2024 1539 by Lynne Mcintosh RN  Outcome: Adequate for Discharge     Problem: Discharge Planning  Goal: Discharge to home or other facility with appropriate resources  7/13/2024 1539 by Lynne Mcintosh RN  Outcome: Adequate for Discharge     Problem: ABCDS Injury Assessment  Goal: Absence of physical injury  7/13/2024 1539 by Lynne Mcintosh RN  Outcome: Adequate for Discharge     Problem: Skin/Tissue Integrity  Goal: Absence of new skin breakdown  7/13/2024 1539 by Lynne Mcintosh RN  Outcome: Adequate for Discharge     Problem: Neurosensory - Adult  Goal: Achieves stable or improved neurological status  7/13/2024 1539 by Lynne Mcintosh RN  Outcome: Adequate for Discharge     Problem: Respiratory - Adult  Goal: Achieves optimal ventilation and oxygenation  7/13/2024 1539 by Lynne Mcintosh RN  Outcome: Adequate for Discharge     Problem: Cardiovascular - Adult  Goal: Maintains optimal cardiac output and hemodynamic stability  7/13/2024 1539 by Lynne Mcintosh RN  Outcome: Adequate for Discharge     Problem: Skin/Tissue Integrity - Adult  Goal: Skin integrity remains intact  7/13/2024 1539 by Lynne Mcintosh RN  Outcome: Adequate for Discharge     Problem: Genitourinary - Adult  Goal: Absence of urinary retention  7/13/2024 1539 by Lynne Mcintosh RN  Outcome: Adequate for Discharge     Problem: Confusion  Goal: Confusion, delirium, dementia, or psychosis is improved or at baseline  7/13/2024 1539 by Lynne Mcintosh RN  Outcome: Adequate for Discharge     Problem: Chronic Conditions and Co-morbidities  Goal: Patient's chronic conditions and co-morbidity symptoms are monitored and maintained or improved  7/13/2024 1539 by Lynne Mcintosh RN  Outcome: Adequate for Discharge     Problem: Nutrition Deficit:  Goal: Optimize nutritional status  7/13/2024 1539 by Lynen Mcintosh RN  Outcome: Adequate for Discharge     Problem: Pain  Goal: 
  Problem: Safety - Adult  Goal: Free from fall injury  7/7/2024 0549 by Yesenia Reis RN  Outcome: Progressing  7/6/2024 1910 by Sunni Soliz RN  Outcome: Progressing     Problem: Discharge Planning  Goal: Discharge to home or other facility with appropriate resources  7/7/2024 0549 by Yesenia Reis RN  Outcome: Progressing  7/6/2024 1910 by Sunni Soliz RN  Outcome: Progressing     Problem: ABCDS Injury Assessment  Goal: Absence of physical injury  7/7/2024 0549 by Yesenia Reis RN  Outcome: Progressing  7/6/2024 1910 by Sunni Soliz RN  Outcome: Progressing     Problem: Skin/Tissue Integrity  Goal: Absence of new skin breakdown  Description: 1.  Monitor for areas of redness and/or skin breakdown  2.  Assess vascular access sites hourly  3.  Every 4-6 hours minimum:  Change oxygen saturation probe site  4.  Every 4-6 hours:  If on nasal continuous positive airway pressure, respiratory therapy assess nares and determine need for appliance change or resting period.  7/7/2024 0549 by Yesenia Reis RN  Outcome: Progressing  7/6/2024 1910 by Sunni Soliz RN  Outcome: Progressing     Problem: Neurosensory - Adult  Goal: Achieves stable or improved neurological status  7/7/2024 0549 by Yesenia Reis RN  Outcome: Progressing  7/6/2024 1910 by Sunni Soliz RN  Outcome: Progressing     Problem: Respiratory - Adult  Goal: Achieves optimal ventilation and oxygenation  7/7/2024 0549 by Yesenia Reis RN  Outcome: Progressing  7/6/2024 1910 by Sunni Soliz RN  Outcome: Progressing     Problem: Cardiovascular - Adult  Goal: Maintains optimal cardiac output and hemodynamic stability  7/7/2024 0549 by Yesenia Reis RN  Outcome: Progressing  7/6/2024 1910 by Sunni Soliz RN  Outcome: Progressing     Problem: Skin/Tissue Integrity - Adult  Goal: Skin integrity remains intact  7/7/2024 0549 by Yesenia Reis RN  Outcome: 
  Problem: Safety - Adult  Goal: Free from fall injury  Description: Patient remained free from falls. Call light within reach and bed in lowest position. Patient oriented to room and surroundings.      7/10/2024 1427 by Fatuma Pollack RN  Outcome: Progressing  Note: Patient free from shift this admission.      Problem: Discharge Planning  Goal: Discharge to home or other facility with appropriate resources  7/10/2024 1427 by Fatuma Pollack RN  Outcome: Progressing  Note: Discharge pending.      Problem: Neurosensory - Adult  Goal: Achieves stable or improved neurological status  7/10/2024 1427 by Fatuma Pollack RN  Outcome: Progressing  Note: Patient alert and oriented x 4 this shift.      Problem: Respiratory - Adult  Goal: Achieves optimal ventilation and oxygenation  7/10/2024 1427 by Fatuma Pollack RN  Outcome: Progressing  Note: Patient maintain O2 saturation >90% this shift     
  Problem: Safety - Adult  Goal: Free from fall injury  Description: Patient remained free from falls. Call light within reach and bed in lowest position. Patient oriented to room and surroundings.      7/9/2024 0547 by Jose Ramon Osborn RN  Outcome: Progressing  7/8/2024 1746 by Kylie Randle RN  Outcome: Progressing  Flowsheets (Taken 7/8/2024 1746)  Free From Fall Injury: Instruct family/caregiver on patient safety     Problem: Discharge Planning  Goal: Discharge to home or other facility with appropriate resources  7/9/2024 0547 by Jose Ramon Osborn RN  Outcome: Progressing  7/8/2024 1746 by Kylie Randle RN  Outcome: Progressing  Flowsheets (Taken 7/8/2024 1746)  Discharge to home or other facility with appropriate resources:   Identify barriers to discharge with patient and caregiver   Identify discharge learning needs (meds, wound care, etc)   Refer to discharge planning if patient needs post-hospital services based on physician order or complex needs related to functional status, cognitive ability or social support system   Arrange for needed discharge resources and transportation as appropriate     Problem: ABCDS Injury Assessment  Goal: Absence of physical injury  7/9/2024 0547 by Jose Ramon Osborn RN  Outcome: Progressing  7/8/2024 1746 by Kylie Randle RN  Outcome: Progressing  Flowsheets (Taken 7/8/2024 1746)  Absence of Physical Injury: Implement safety measures based on patient assessment  Note: The patient utilized fall precautions including the use of the bed alarm, 2 side rails raised, call light within reach, bed side table within reach, utilizing call light for needs, gripper socks, and bed in the lowest position.      Problem: Skin/Tissue Integrity  Goal: Absence of new skin breakdown  Description: 1.  Monitor for areas of redness and/or skin breakdown  2.  Assess vascular access sites hourly  3.  Every 4-6 hours minimum:  Change oxygen saturation probe site  4.  Every 4-6 hours:  If on 
  Problem: Safety - Adult  Goal: Free from fall injury  Description: Patient remained free from falls. Call light within reach and bed in lowest position. Patient oriented to room and surroundings.      7/9/2024 1755 by Fatuma Pollack RN  Outcome: Progressing  Note: Free from injury this shift.      Problem: Discharge Planning  Goal: Discharge to home or other facility with appropriate resources  7/9/2024 1755 by Fatuma Pollack RN  Outcome: Progressing  Note: Return to [facility] when medically cleared.      Problem: Respiratory - Adult  Goal: Achieves optimal ventilation and oxygenation  7/9/2024 1755 by Fatuma Pollack, RN  Outcome: Progressing  Note: Patient wearing nasal cannula when awake and BiPAP when napping.      
  Problem: Safety - Adult  Goal: Free from fall injury  Description: Patient remained free from falls. Call light within reach and bed in lowest position. Patient oriented to room and surroundings.      Outcome: Progressing     Problem: Discharge Planning  Goal: Discharge to home or other facility with appropriate resources  Outcome: Progressing     Problem: ABCDS Injury Assessment  Goal: Absence of physical injury  Outcome: Progressing     Problem: Skin/Tissue Integrity  Goal: Absence of new skin breakdown  Description: 1.  Monitor for areas of redness and/or skin breakdown  2.  Assess vascular access sites hourly  3.  Every 4-6 hours minimum:  Change oxygen saturation probe site  4.  Every 4-6 hours:  If on nasal continuous positive airway pressure, respiratory therapy assess nares and determine need for appliance change or resting period.  Outcome: Progressing     Problem: Neurosensory - Adult  Goal: Achieves stable or improved neurological status  Outcome: Progressing     Problem: Respiratory - Adult  Goal: Achieves optimal ventilation and oxygenation  Outcome: Progressing     
  Problem: Safety - Adult  Goal: Free from fall injury  Description: Patient remained free from falls. Call light within reach and bed in lowest position. Patient oriented to room and surroundings.      Outcome: Progressing  Flowsheets (Taken 7/8/2024 1746)  Free From Fall Injury: Instruct family/caregiver on patient safety     Problem: Discharge Planning  Goal: Discharge to home or other facility with appropriate resources  Outcome: Progressing  Flowsheets (Taken 7/8/2024 1746)  Discharge to home or other facility with appropriate resources:   Identify barriers to discharge with patient and caregiver   Identify discharge learning needs (meds, wound care, etc)   Refer to discharge planning if patient needs post-hospital services based on physician order or complex needs related to functional status, cognitive ability or social support system   Arrange for needed discharge resources and transportation as appropriate     Problem: ABCDS Injury Assessment  Goal: Absence of physical injury  Outcome: Progressing  Flowsheets (Taken 7/8/2024 1746)  Absence of Physical Injury: Implement safety measures based on patient assessment  Note: The patient utilized fall precautions including the use of the bed alarm, 2 side rails raised, call light within reach, bed side table within reach, utilizing call light for needs, gripper socks, and bed in the lowest position.      Problem: Skin/Tissue Integrity  Goal: Absence of new skin breakdown  Description: 1.  Monitor for areas of redness and/or skin breakdown  2.  Assess vascular access sites hourly  3.  Every 4-6 hours minimum:  Change oxygen saturation probe site  4.  Every 4-6 hours:  If on nasal continuous positive airway pressure, respiratory therapy assess nares and determine need for appliance change or resting period.  Outcome: Progressing  Note: The patients skin was assessed during the shift. The patient is on a specialized bed to turn them to help prevent skin break down, 
  Problem: Safety - Adult  Goal: Free from fall injury  Outcome: Progressing     Problem: Discharge Planning  Goal: Discharge to home or other facility with appropriate resources  Outcome: Progressing     Problem: ABCDS Injury Assessment  Goal: Absence of physical injury  Outcome: Progressing     Problem: Skin/Tissue Integrity  Goal: Absence of new skin breakdown  Description: 1.  Monitor for areas of redness and/or skin breakdown  2.  Assess vascular access sites hourly  3.  Every 4-6 hours minimum:  Change oxygen saturation probe site  4.  Every 4-6 hours:  If on nasal continuous positive airway pressure, respiratory therapy assess nares and determine need for appliance change or resting period.  Outcome: Progressing     Problem: Neurosensory - Adult  Goal: Achieves stable or improved neurological status  Outcome: Progressing  Flowsheets (Taken 7/4/2024 1706)  Achieves stable or improved neurological status: Assess for and report changes in neurological status     Problem: Respiratory - Adult  Goal: Achieves optimal ventilation and oxygenation  Outcome: Progressing  Flowsheets (Taken 7/4/2024 1706)  Achieves optimal ventilation and oxygenation:   Assess for changes in respiratory status   Position to facilitate oxygenation and minimize respiratory effort   Encourage broncho-pulmonary hygiene including cough, deep breathe, incentive spirometry   Assess the need for suctioning and aspirate as needed   Respiratory therapy support as indicated     Problem: Cardiovascular - Adult  Goal: Maintains optimal cardiac output and hemodynamic stability  Outcome: Progressing  Flowsheets (Taken 7/4/2024 1706)  Maintains optimal cardiac output and hemodynamic stability:   Monitor blood pressure and heart rate   Assess for signs of decreased cardiac output     Problem: Skin/Tissue Integrity - Adult  Goal: Skin integrity remains intact  Outcome: Progressing  Flowsheets (Taken 7/4/2024 1706)  Skin Integrity Remains Intact:   Monitor 
  Problem: Safety - Adult  Goal: Free from fall injury  Outcome: Progressing     Problem: Discharge Planning  Goal: Discharge to home or other facility with appropriate resources  Outcome: Progressing  Flowsheets (Taken 7/5/2024 1600 by Brook Justin RN)  Discharge to home or other facility with appropriate resources: Identify barriers to discharge with patient and caregiver     Problem: ABCDS Injury Assessment  Goal: Absence of physical injury  Outcome: Progressing     Problem: Skin/Tissue Integrity  Goal: Absence of new skin breakdown  Description: 1.  Monitor for areas of redness and/or skin breakdown  2.  Assess vascular access sites hourly  3.  Every 4-6 hours minimum:  Change oxygen saturation probe site  4.  Every 4-6 hours:  If on nasal continuous positive airway pressure, respiratory therapy assess nares and determine need for appliance change or resting period.  Outcome: Progressing     Problem: Neurosensory - Adult  Goal: Achieves stable or improved neurological status  Outcome: Progressing  Flowsheets (Taken 7/5/2024 1600 by Brook Justin RN)  Achieves stable or improved neurological status:   Assess for and report changes in neurological status   Initiate measures to prevent increased intracranial pressure   Maintain blood pressure and fluid volume within ordered parameters to optimize cerebral perfusion and minimize risk of hemorrhage     Problem: Respiratory - Adult  Goal: Achieves optimal ventilation and oxygenation  Outcome: Progressing  Flowsheets (Taken 7/5/2024 1600 by Brook Justin, RN)  Achieves optimal ventilation and oxygenation:   Assess for changes in respiratory status   Assess for changes in mentation and behavior   Oxygen supplementation based on oxygen saturation or arterial blood gases     Problem: Cardiovascular - Adult  Goal: Maintains optimal cardiac output and hemodynamic stability  Outcome: Progressing  Flowsheets (Taken 7/5/2024 1600 by Brook Justin, RN)  Maintains optimal cardiac 
Patient had a repeat positive blood Cx for gram-positive cocci in clusters, patient had Staph aureus positive in the urine culture but sensitivities were not documented.    Called Microbiology at [place] to confirm if urine Staph aureus is methicillin resistant.    Received a call from micrology back, Staph aureus is MSSA.     ID consulted for concerns of bacteremia.  Patient remains on Rocephin, today's last dose for UTI.  Remains hemodynamically stable, afebrile.  Will continue to monitor and waiting on ID recommendations.      Marion Tony MD  Family medicine resident, PGY3  7/8/2024 at 11:48 AM         
Problem: Safety - Adult  Goal: Free from fall injury  Description: Patient remained free from falls. Call light within reach and bed in lowest position. Patient oriented to room and surroundings.    No falls/injuries this shift, bed in lowest position, brakes on, bed alarm on, call light in reach, side rails up x2  Problem: Skin/Tissue Integrity  Goal: Absence of new skin breakdown  Description: 1.  Monitor for areas of redness and/or skin breakdown  2.  Assess vascular access sites hourly  3.  Every 4-6 hours minimum:  Change oxygen saturation probe site  4.  Every 4-6 hours:  If on nasal continuous positive airway pressure, respiratory therapy assess nares and determine need for appliance change or resting period.  Outcome: Progressing   No new skin breakdown noted, no new signs/symptoms of infection, continue to monitor labwork including WBC, medications administered per physician orders  Problem: Cardiovascular - Adult  Goal: Maintains optimal cardiac output and hemodynamic stability  Description: Ability to maintain vital signs WNL. Vital signs monitored closely.   Outcome: Progressing   Patient remains in stable a-fib    
Problem: Skin/Tissue Integrity  Goal: Absence of new skin breakdown  Description: 1.  Monitor for areas of redness and/or skin breakdown  2.  Assess vascular access sites hourly  3.  Every 4-6 hours minimum:  Change oxygen saturation probe site  4.  Every 4-6 hours:  If on nasal continuous positive airway pressure, respiratory therapy assess nares and determine need for appliance change or resting period.  7/7/2024 0549 by Yesenia Reis RN  Outcome: Progressing     Problem: Respiratory - Adult  Goal: Achieves optimal ventilation and oxygenation  7/7/2024 0549 by Yesenia Reis RN  Outcome: Progressing     Problem: Cardiovascular - Adult  Goal: Maintains optimal cardiac output and hemodynamic stability  Description: Ability to maintain vital signs WNL. Vital signs monitored closely.   7/7/2024 0549 by Yesenia Reis RN  Outcome: Progressing     Problem: Skin/Tissue Integrity - Adult  Goal: Skin integrity remains intact  Recent Flowsheet Documentation  Taken 7/7/2024 0800 by Sunni Soliz RN  Skin Integrity Remains Intact:   Monitor for areas of redness and/or skin breakdown   Assess vascular access sites hourly   Every 4-6 hours minimum: Change oxygen saturation probe site   Every 4-6 hours: If on nasal continuous positive airway pressure, respiratory therapy assesses nares and determine need for appliance change or resting period  7/7/2024 0549 by Yesenia Reis RN  Outcome: Progressing  Goal: Incisions, wounds, or drain sites healing without S/S of infection  Recent Flowsheet Documentation  Taken 7/7/2024 0800 by Sunni Soliz RN  Incisions, Wounds, or Drain Sites Healing Without Sign and Symptoms of Infection: ADMISSION and DAILY: Assess and document risk factors for pressure ulcer development  7/7/2024 0549 by Yesenia Reis RN  Outcome: Progressing     Problem: Genitourinary - Adult  Goal: Absence of urinary retention  7/7/2024 0549 by Yesenia Reis RN  Outcome: Progressing   
Adult  Goal: Achieves optimal ventilation and oxygenation  7/12/2024 0029 by Helene Nair  Outcome: Progressing  7/11/2024 1827 by Fatuma Pollack RN  Outcome: Progressing  Note: Patient maintaining O2 saturation at baseline 4L NC, patient can recover when desaturation occurs. Patient denies any additional SOB then what is her baseline.      Problem: Cardiovascular - Adult  Goal: Maintains optimal cardiac output and hemodynamic stability  Description: Ability to maintain vital signs WNL. Vital signs monitored closely.   Outcome: Progressing     Problem: Skin/Tissue Integrity - Adult  Goal: Skin integrity remains intact  Outcome: Progressing  Goal: Incisions, wounds, or drain sites healing without S/S of infection  Outcome: Progressing  Goal: Oral mucous membranes remain intact  Outcome: Progressing     Problem: Genitourinary - Adult  Goal: Absence of urinary retention  Outcome: Progressing     Problem: Confusion  Goal: Confusion, delirium, dementia, or psychosis is improved or at baseline  Description: INTERVENTIONS:  1. Assess for possible contributors to thought disturbance, including medications, impaired vision or hearing, underlying metabolic abnormalities, dehydration, psychiatric diagnoses, and notify attending LIP  2. [place] high risk fall precautions, as indicated  3. Provide frequent short contacts to provide reality reorientation, refocusing and direction  4. Decrease environmental stimuli, including noise as appropriate  5. Monitor and intervene to maintain adequate nutrition, hydration, elimination, sleep and activity  6. If unable to ensure safety without constant attention obtain sitter and review sitter guidelines with assigned personnel  7. Initiate Psychosocial CNS and Spiritual Care consult, as indicated  Outcome: Progressing     Problem: Chronic Conditions and Co-morbidities  Goal: Patient's chronic conditions and co-morbidity symptoms are monitored and maintained or improved  Outcome: 
psychiatric diagnoses, and notify attending LIP  2. [place] high risk fall precautions, as indicated  3. Provide frequent short contacts to provide reality reorientation, refocusing and direction  4. Decrease environmental stimuli, including noise as appropriate  5. Monitor and intervene to maintain adequate nutrition, hydration, elimination, sleep and activity  6. If unable to ensure safety without constant attention obtain sitter and review sitter guidelines with assigned personnel  7. Initiate Psychosocial CNS and Spiritual Care consult, as indicated  Outcome: Progressing     Problem: Chronic Conditions and Co-morbidities  Goal: Patient's chronic conditions and co-morbidity symptoms are monitored and maintained or improved  Outcome: Progressing     Problem: Nutrition Deficit:  Goal: Optimize nutritional status  Outcome: Progressing     
and surrounding tissue  Goal: Oral mucous membranes remain intact  7/6/2024 1910 by Sunni Soliz RN  Outcome: Progressing  7/6/2024 0545 by Yesenia Reis RN  Outcome: Progressing  Flowsheets  Taken 7/5/2024 2000 by Yesenia Reis RN  Oral Mucous Membranes Remain Intact:   Assess oral mucosa and hygiene practices   Implement preventative oral hygiene regimen   Implement oral medicated treatments as ordered  Taken 7/5/2024 1600 by Brook Justin RN  Oral Mucous Membranes Remain Intact: Assess oral mucosa and hygiene practices     Problem: Genitourinary - Adult  Goal: Absence of urinary retention  7/6/2024 1910 by Sunni Soliz RN  Outcome: Progressing  7/6/2024 0545 by Yesenia Reis RN  Outcome: Progressing  Flowsheets (Taken 7/5/2024 1600 by Brook Justin RN)  Absence of urinary retention:   Assess patient’s ability to void and empty bladder   Monitor intake/output and perform bladder scan as needed     Problem: Confusion  Goal: Confusion, delirium, dementia, or psychosis is improved or at baseline  Description: INTERVENTIONS:  1. Assess for possible contributors to thought disturbance, including medications, impaired vision or hearing, underlying metabolic abnormalities, dehydration, psychiatric diagnoses, and notify attending LIP  2. [place] high risk fall precautions, as indicated  3. Provide frequent short contacts to provide reality reorientation, refocusing and direction  4. Decrease environmental stimuli, including noise as appropriate  5. Monitor and intervene to maintain adequate nutrition, hydration, elimination, sleep and activity  6. If unable to ensure safety without constant attention obtain sitter and review sitter guidelines with assigned personnel  7. Initiate Psychosocial CNS and Spiritual Care consult, as indicated  7/6/2024 1910 by Sunni Soliz RN  Outcome: Progressing  7/6/2024 0545 by Yesenia Reis RN  Outcome: Progressing  Flowsheets (Taken 7/5/2024 1600 
by Reji, Any, RN  Outcome: Progressing  Flowsheets  Taken 7/5/2024 0200  Skin Integrity Remains Intact:   Monitor for areas of redness and/or skin breakdown   Assess vascular access sites hourly   Every 4-6 hours minimum: Change oxygen saturation probe site   Every 4-6 hours: If on nasal continuous positive airway pressure, respiratory therapy assesses nares and determine need for appliance change or resting period  Taken 7/4/2024 2000  Skin Integrity Remains Intact:   Monitor for areas of redness and/or skin breakdown   Every 4-6 hours minimum: Change oxygen saturation probe site   Assess vascular access sites hourly   Every 4-6 hours: If on nasal continuous positive airway pressure, respiratory therapy assesses nares and determine need for appliance change or resting period  7/4/2024 1706 by Meghan Weston RN  Outcome: Progressing  Flowsheets (Taken 7/4/2024 1706)  Skin Integrity Remains Intact:   Monitor for areas of redness and/or skin breakdown   Assess vascular access sites hourly  Goal: Incisions, wounds, or drain sites healing without S/S of infection  7/5/2024 0330 by Any Mendoza RN  Outcome: Progressing  Flowsheets  Taken 7/5/2024 0200  Incisions, Wounds, or Drain Sites Healing Without Sign and Symptoms of Infection: ADMISSION and DAILY: Assess and document risk factors for pressure ulcer development  Taken 7/4/2024 2000  Incisions, Wounds, or Drain Sites Healing Without Sign and Symptoms of Infection: ADMISSION and DAILY: Assess and document risk factors for pressure ulcer development  7/4/2024 1706 by Meghan Weston RN  Outcome: Progressing  Flowsheets (Taken 7/4/2024 1706)  Incisions, Wounds, or Drain Sites Healing Without Sign and Symptoms of Infection: ADMISSION and DAILY: Assess and document risk factors for pressure ulcer development  Goal: Oral mucous membranes remain intact  7/5/2024 0330 by Any Mendoza RN  Outcome: Progressing  Flowsheets  Taken 7/5/2024 0200  Oral Mucous Membranes 
Initiate Psychosocial CNS and Spiritual Care consult, as indicated  7/5/2024 1211 by Brook Justin, RN  Outcome: Progressing  Flowsheets  Taken 7/5/2024 1200  Effect of thought disturbance (confusion, delirium, dementia, or psychosis) are managed with adequate functional status:   Assess for contributors to thought disturbance, including medications, impaired vision or hearing, underlying metabolic abnormalities, dehydration, psychiatric diagnoses, notify LIP   [place] high risk fall precautions, as indicated   Provide frequent short contacts to provide reality reorientation, refocusing and direction  Taken 7/5/2024 0800  Effect of thought disturbance (confusion, delirium, dementia, or psychosis) are managed with adequate functional status:   [place] high risk fall precautions, as indicated   Assess for contributors to thought disturbance, including medications, impaired vision or hearing, underlying metabolic abnormalities, dehydration, psychiatric diagnoses, notify LIP

## 2024-07-13 NOTE — DISCHARGE INSTR - DIET

## 2024-07-13 NOTE — PROGRESS NOTES
[facility] -   Infectious diseases evaluation  admission date 7/4/2024    reason for consultation:   Bacteremia    Impression :   Current:  Coagulase-negative staph positive blood culture likely contamination  UTI/Klebsiella pneumonia and MSSA growth on urine Culture  Acute on chronic hypoxic respiratory failure on oxygen per nasal cannul chronic diastolic heart failure  Chronic abdominal wound  Diabetes mellitus  A-fib on Coumadin  History of DVT    HENCE:   She received 5 days course of IV ceftriaxone through 7/8/2024  P.o. Keflex through 7/12/2024   INR subtherapeutic, was starting on Lovenox bridging with Coumadin  Monitor INR close  Wound care  Supportive care    Infection Control Recommendations   Universal precaution    Antimicrobial Stewardship Recommendations   Simplification of therapy  Targeted therapy    History of Present Illness:   Initial history:  Shazia ROGERS is a 68 y.o.-year-old female presented to hospital with shortness of breath associated with mental status changes for 1 day.  Urinalysis on 7/4/2024 was cloudy, small leukocyte esterase,  WBC.  MSSA and Klebsiella pneumonia grew on urine culture  Blood cultures on 7/4/2024 1 of 2 grew coagulase-negative staph not staph epi   Repeat blood cultures on 7/6/2024 1 of 2 grew staph epi  She has been afebrile since admission.    Interval changes  7/9/2024   She is afebrile, on oxygen per nasal cannula, tolerating oral, no new complaints  Patient Vitals for the past 8 hrs:   BP Temp Temp src Pulse Resp SpO2   07/09/24 1108 -- -- -- 80 16 96 %   07/09/24 1045 115/62 98.4 °F (36.9 °C) Oral 84 22 96 %   07/09/24 0937 (!) 111/48 -- -- 76 -- --   07/09/24 0721 (!) 111/48 98.4 °F (36.9 °C) Oral 76 20 92 %   07/09/24 0706 -- -- -- 75 16 97 %               I have personally reviewed the past medical history, past surgical history, medications, social history, and family history, and I haveupdated the 
          [facility] -   Infectious diseases evaluation  admission date 7/4/2024    reason for consultation:   Bacteremia    Impression :   Current:  Coagulase-negative staph positive blood culture likely contamination  UTI/Klebsiella pneumonia and MSSA growth on urine Culture  Acute on chronic hypoxic respiratory failure on oxygen per nasal cannul chronic diastolic heart failure  Chronic abdominal wound  Diabetes mellitus  A-fib on Coumadin  History of DVT    HENCE:   She received 5 days course of IV ceftriaxone through 7/8/2024  P.o. Keflex through 7/12/2024  Monitor INR  Wound care  Supportive care    Infection Control Recommendations   Universal precaution    Antimicrobial Stewardship Recommendations   Simplification of therapy  Targeted therapy    History of Present Illness:   Initial history:  Shazia ROGERS is a 68 y.o.-year-old female presented to hospital with shortness of breath associated with mental status changes for 1 day.  Urinalysis on 7/4/2024 was cloudy, small leukocyte esterase,  WBC.  MSSA and Klebsiella pneumonia grew on urine culture  Blood cultures on 7/4/2024 1 of 2 grew coagulase-negative staph not staph epi   Repeat blood cultures on 7/6/2024 1 of 2 grew staph epi  She has been afebrile since admission.    Interval changes  7/11/2024   She is afebrile, was seen and examined, still complaining of mild burning with urination, denied abdominal pain, no other complaints  Patient Vitals for the past 8 hrs:   BP Temp Temp src Pulse Resp SpO2   07/11/24 1600 (!) 113/51 98.3 °F (36.8 °C) Oral 68 18 91 %   07/11/24 1454 -- -- -- 68 18 93 %   07/11/24 1215 (!) 119/44 97.8 °F (36.6 °C) Axillary 90 20 93 %               I have personally reviewed the past medical history, past surgical history, medications, social history, and family history, and I haveupdated the database accordingly.      Allergies:   Amoxil [amoxicillin trihydrate], Neomycin, 
          [facility] -   Infectious diseases evaluation  admission date 7/4/2024    reason for consultation:   Bacteremia    Impression :   Current:  Coagulase-negative staph positive blood culture likely contamination  UTI/Klebsiella pneumonia and MSSA growth on urine Culture  Acute on chronic hypoxic respiratory failure on oxygen per nasal cannul chronic diastolic heart failure  Chronic abdominal wound  Diabetes mellitus  A-fib on Coumadin  History of DVT    HENCE:   She received 5 days course of IV ceftriaxone through 7/8/2024  P.o. Keflex through 7/12/2024   INR subtherapeutic, was starting on Lovenox bridging with Coumadin  Monitor INR  Wound care  Supportive care    Infection Control Recommendations   Universal precaution    Antimicrobial Stewardship Recommendations   Simplification of therapy  Targeted therapy    History of Present Illness:   Initial history:  Shazia ROGERS is a 68 y.o.-year-old female presented to hospital with shortness of breath associated with mental status changes for 1 day.  Urinalysis on 7/4/2024 was cloudy, small leukocyte esterase,  WBC.  MSSA and Klebsiella pneumonia grew on urine culture  Blood cultures on 7/4/2024 1 of 2 grew coagulase-negative staph not staph epi   Repeat blood cultures on 7/6/2024 1 of 2 grew staph epi  She has been afebrile since admission.    Interval changes  7/10/2024   She is afebrile, on oxygen per nasal cannula, no new complaints  Patient Vitals for the past 8 hrs:   BP Temp Temp src Pulse Resp SpO2   07/10/24 1130 117/62 98 °F (36.7 °C) Oral 91 18 96 %   07/10/24 1127 -- -- -- 83 18 93 %   07/10/24 0804 -- -- -- 83 18 95 %   07/10/24 0730 (!) 124/47 98.1 °F (36.7 °C) Oral 70 16 95 %               I have personally reviewed the past medical history, past surgical history, medications, social history, and family history, and I haveupdated the database accordingly.      Allergies:   Amoxil [amoxicillin 
    [facility] PULMONARY,CRITICAL CARE & SLEEP   Karel Solorzano MD/Jag Kasper MD/Chilango HSU AGACNP-BC, NP-C      Manju HSU NP-C    Jeffry HSU NP-C                                         Pulmonary Progress Note    Patient - Shazia ROGERS   Age - 68 y.o.   - 1955  MRN - [id number]  [facility] # - [id number]  Date of Admission - 2024  9:37 AM    Consulting Service/Physician:       Primary Care Physician: Jyoti Mauricio, APRN - CNP    SUBJECTIVE:     Chief Complaint:   Chief Complaint   Patient presents with    Altered Mental Status    Shortness of Breath     Subjective:    She is doing well today, alert and oriented x 4  Understands the importance of BiPAP therapy  She is aware that she has a follow-up with us as she will likely need noninvasive ventilator as a lifelong therapy for hypercapnia, we discussed that she does need to pursue lifestyle changes this is all likely related to obesity  VITALS  BP (!) 130/53   Pulse 85   Temp 98.2 °F (36.8 °C) (Axillary)   Resp 24   Ht 1.651 m (5' 5\")   Wt (!) 183.3 kg (404 lb 1.7 oz)   LMP 2014   SpO2 94%   BMI 67.25 kg/m²   Wt Readings from Last 3 Encounters:   24 (!) 183.3 kg (404 lb 1.7 oz)   24 (!) 183.9 kg (405 lb 6.8 oz)   24 (!) 177.9 kg (392 lb 3.2 oz)     I/O (24 Hours)    Intake/Output Summary (Last 24 hours) at 2024 1628  Last data filed at 2024 1257  Gross per 24 hour   Intake 508.23 ml   Output 1050 ml   Net -541.77 ml     Ventilator:   Settings  FiO2 : 40 %  Insp Rise Time (%): 2 %  Exam:   Physical Exam   Constitutional: morbid obese  HENT: Unremarkable  Head: Normocephalic and atraumatic.   Eyes: EOM are normal. Pupils are equal, round, and reactive to light.   Neck: Neck supple.   Cardiovascular:  Regular rate and rhythm.  Normal heart tones.  No JVD.    Pulmonary/Chest: diminished air exchanged, on 4L NC  Abdominal: Soft. Bowel sounds are normal. 
    [facility] PULMONARY,CRITICAL CARE & SLEEP   Kareltommie Solorzano MD/Jag HSU AGACHRISTYP-BC, NP-C      Manju HSU NP-C     Jeffry HSU NP-C                                          Pulmonary Progress Note    Patient - Shazia ROGERS   Age - 68 y.o.   - 1955  MRN - [id number]  Acct # - [id number]  Date of Admission - 2024  9:37 AM    Consulting Service/Physician:       Primary Care Physician: Jyoti Mauricio, APRN - CNP    SUBJECTIVE:     Chief Complaint:   Chief Complaint   Patient presents with    Altered Mental Status    Shortness of Breath     Subjective:    Tara is seen lying in bed on AVAPS ventilation.  Blood gas today with pH 7.  434, CO2 78.9, O2 75 and bicarb 51.  She is on Diamox 250 daily.  She has been afebrile.  Blood cultures with no growth to date.  Urine culture did show Klebsiella and she is being treated with Rocephin.  She tells me her breathing is feeling much better.  Creatinine today 1.0.    VITALS  BP (!) 118/57   Pulse 78   Temp 97.3 °F (36.3 °C) (Axillary)   Resp 24   Ht 1.651 m (5' 5\")   Wt (!) 189.5 kg (417 lb 12.4 oz)   LMP 2014   SpO2 97%   BMI 69.52 kg/m²   Wt Readings from Last 3 Encounters:   24 (!) 189.5 kg (417 lb 12.4 oz)   24 (!) 183.9 kg (405 lb 6.8 oz)   24 (!) 177.9 kg (392 lb 3.2 oz)     I/O (24 Hours)    Intake/Output Summary (Last 24 hours) at 2024 1413  Last data filed at 2024 1252  Gross per 24 hour   Intake 145.3 ml   Output 2850 ml   Net -2704.7 ml     Ventilator:   Settings  FiO2 : 40 %  Insp Rise Time (%): 2 %  Exam:   Physical Exam   Constitutional: Morbidly obese female lying in bed on BiPAP in no acute distress  HENT: Unremarkable  Head: Normocephalic and atraumatic.   Eyes: EOM are normal. Pupils are equal, round, and reactive to light.   Neck: Neck supple.   Cardiovascular:  Regular rate and rhythm.  Normal heart tones.  No JVD.    Pulmonary/Chest: 
    [facility] PULMONARY,CRITICAL CARE & SLEEP   Karel Solorzano MD/Jag Kasper MD/Chilango HSU AGACHRISTYP-BC, NP-C      Manju HSU NP-C    Jeffry HSU NP-C                                         Pulmonary Progress Note    Patient - Shazia ROGERS   Age - 68 y.o.   - 1955  MRN - [id number]  [facility] # - [id number]  Date of Admission - 2024  9:37 AM    Consulting Service/Physician:       Primary Care Physician: Jyoti Mauricio, APRN - CNP    SUBJECTIVE:     Chief Complaint:   Chief Complaint   Patient presents with    Altered Mental Status    Shortness of Breath     Subjective:    She is doing well today, alert and oriented x 4  Discussed that she will need to follow-up with us for sleep study for noninvasive ventilator      VITALS  BP (!) 102/41   Pulse 69   Temp 98.5 °F (36.9 °C) (Oral)   Resp 18   Ht 1.651 m (5' 5\")   Wt (!) 187 kg (412 lb 4.2 oz)   LMP 2014   SpO2 100%   BMI 68.60 kg/m²   Wt Readings from Last 3 Encounters:   24 (!) 187 kg (412 lb 4.2 oz)   24 (!) 183.9 kg (405 lb 6.8 oz)   24 (!) 177.9 kg (392 lb 3.2 oz)     I/O (24 Hours)    Intake/Output Summary (Last 24 hours) at 2024 1701  Last data filed at 2024 1600  Gross per 24 hour   Intake 240 ml   Output 1750 ml   Net -1510 ml       Ventilator:   Settings  FiO2 : 40 %  Insp Rise Time (%): 2 %  Exam:   Physical Exam   Constitutional: morbid obese  HENT: Unremarkable  Head: Normocephalic and atraumatic.   Eyes: EOM are normal. Pupils are equal, round, and reactive to light.   Neck: Neck supple.   Cardiovascular:  Regular rate and rhythm.  Normal heart tones.  No JVD.    Pulmonary/Chest: diminished air exchanged, on 4L NC  Abdominal: Soft. Bowel sounds are normal. There is no tenderness.   Musculoskeletal: Normal range of motion.  Neurological:patient is alert and oriented to person, place, and time.   Skin: Skin is warm and dry. No rash noted. 
   07/12/24 1602   Encounter Summary   Encounter Overview/Reason Spiritual/Emotional Needs   Service Provided For Patient   Referral/Consult From Rounding   Complexity of Encounter Low   Spiritual/Emotional needs   Type Spiritual Support   Assessment/Intervention/Outcome   Assessment Unable to assess  (patient sleeping)   Intervention Prayer (assurance of)/[place]       
  [facility]  IN-PATIENT SERVICE  [facility]    PROGRESS NOTE             7/11/2024    12:06 PM    Name:   Shazia ROGERS  MRN:     [id number]     Acct:      [id number]   Room:   2122/2122-01   Day:  7  Admit Date:  7/4/2024  9:37 AM    PCP:  Jyoti Mauricio APRN - CNP  Code Status:  Full Code    Subjective:     C/C:   Chief Complaint   Patient presents with    Altered Mental Status    Shortness of Breath     Interval History Status: improved.    Patient seen and examined at bedside. She was awake, alert, and oriented. She said she had shortness of breath a few times since I last saw her yesterday which was relieved with nebulization with albuterol  and woke up once at night. But overall patient feels better on her Bipap and maintaining sats on 4l of oxygen which is her baseline. She is okay to proceed to the nursing facility, waiting on authorization.    7/11/2024  Patient on baseline oxygen.  4 L  Has chronic lymphedema and hence unable to tell if the patient's volume status is improving.  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  Patient currently on BiPAP.  Patient responds to questions but falls asleep.  Could not obtain a detailed history admitted in the intermediate ICU for management of COPD and acute on chronic respiratory failure.      During hospital admission here her urinalysis came back positive for UTI (Klebsiella).  She was started on Rocephin.  Chest x-ray showed cardiomegaly with a pulm vascular congestion.  Pro BNP levels were harvested to 700s.  She received a dose of Diamox in the ED and will continue it.    Review of Systems:     Review of Systems   Constitutional:  Positive for fatigue. Negative for activity change, chills and fever. 
  [facility]  IN-PATIENT SERVICE  [facility]    PROGRESS NOTE             7/6/2024    7:33 AM    Name:   Shazia ROGERS  MRN:     [id number]     Acct:      [id number]   Room:   2010/2010-01   Day:  2  Admit Date:  7/4/2024  9:37 AM    PCP:  Jyoti Mauricio APRN - CNP  Code Status:  Full Code    Subjective:     C/C:   Chief Complaint   Patient presents with    Altered Mental Status    Shortness of Breath     Interval History Status: improved.    Patient seen at bedside today.  Patient was alert, awake, and somewhat oriented to place and time.  Patient did not complain of any pain, breathing for her has not improved.  She is currently on BiPAP, ABGs from yesterday show improvement and respiratory panel was negative.  This bilaterally decreased breath sounds.  No edema.  UTI grew Klebsiella and sensitive to Rocephin which she currently is on.  Will continue till 7/8/2024.          Brief History:     The patient is a 68 y.o.  female who presents with Altered Mental Status and Shortness of Breath  from the nursing home. yesterday she became confused and had low oxygen saturations of 65% at her baseline of 4 L.  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  Patient is currently on BiPAP.  Patient responded to questions but falls back asleep.  Could not obtain a detailed history.  Will admit her in intermediate ICU for management of COPD and acute on chronic respiratory failure.   Urinalysis came out significant for UTI. She was started on Rocephin.   Chest xray showed cardiomegaly with pulm vascular congestion. Pro BNP levels were raisde to 700s. She received a dose of diamox in the ED and will continue it.        Review of Systems:     Review of Systems   Constitutional:  Negative for activity change and fatigue.   Respiratory:  Positive for shortness of breath. Negative 
  [facility] PULMONARY,CRITICAL CARE & SLEEP   Kareltommie Solorzano MD/Jag HSU AGACNP-BC, NP-C       Manju HSU NP-C      Jeffry HSU NP-C                                  Pulmonary Critical Care Progress Note    Patient - Shazia ROGERS   Age - 68 y.o.   - 1955  MRN - [id number]  [id number]  Date of Admission - 2024  9:37 AM    Consulting Service/Physician:       Primary Care Physician: Jyoti Mauricio, APRN - CNP    SUBJECTIVE:     Chief Complaint:   Chief Complaint   Patient presents with    Altered Mental Status    Shortness of Breath   Acute on chronic hypercapnic respiratory failure  Subjective:    I came to the patient.  She is arousable answering questions but incoherent.  She told me she has absolutely no family which is not accurate.  She has a brother in [place] and another cousin in [place].  Her brother was aware that she was in the hospital and was very pleasant on the phone.    He is aware that her weight is an issue.  He thought her weight was around 350 pounds but I told him that she is over 400 pounds and that her weight is causing downstream respiratory and cardiac failure.  I told him that she has been refusing BiPAP therapy which she was not surprised.  I told him that her overall prognosis is extremely poor if she does not become compliant with BiPAP therapy, undergo tracheostomy or undergo dramatic weight loss.  He intends to come and visit to discuss things with her when she is a bit more lucid.    VITALS  BP (!) 100/38   Pulse 92   Temp 99.4 °F (37.4 °C) (Axillary)   Resp 17   Ht 1.651 m (5' 5\")   Wt (!) 189.5 kg (417 lb 12.4 oz)   LMP 2014   SpO2 93%   BMI 69.52 kg/m²   Wt Readings from Last 3 Encounters:   24 (!) 189.5 kg (417 lb 12.4 oz)   24 (!) 183.9 kg (405 lb 6.8 oz)   24 (!) 177.9 kg (392 lb 3.2 oz)     I/O (24 Hours)    Intake/Output Summary (Last 24 hours) at 2024 
  Physician Progress Note      PATIENT:               SHOBHA ROGERS  CSN #:                  [id number]  :                       1955  ADMIT DATE:       2024 9:37 AM  DISCH DATE:  RESPONDING  PROVIDER #:        Moises Pham MD          QUERY TEXT:    Pt admitted with respiratory failure and has CHF documented. If possible,   please document in progress notes and discharge summary further specificity   regarding the type and acuity of CHF:    The medical record reflects the following:  Risk Factors: COPD, Respiratory failure, SHWETA/OHS  Clinical Indicators: Fluid overload, proBNP 755. x-ray : Cardiomegaly with   pulmonary vascular congestion  Treatment: Diamox, furosemide (LASIX) 40 MG tablet  Options provided:  -- Acute on Chronic Diastolic CHF/HFpEF  -- Chronic Diastolic CHF/HFpEF  -- Other - I will add my own diagnosis  -- Disagree - Not applicable / Not valid  -- Disagree - Clinically unable to determine / Unknown  -- Refer to Clinical Documentation Reviewer    PROVIDER RESPONSE TEXT:    This patient has chronic diastolic CHF/HFpEF.    Query created by: Suni Diaz on 2024 10:52 AM      Electronically signed by:  Moises Pham MD 2024 8:44 AM          
  Physician Progress Note      PATIENT:               SHOBHA ROGERS  [facility] #:                  [id number]  :                       1955  ADMIT DATE:       2024 9:37 AM  DISCH DATE:  RESPONDING  PROVIDER #:        Moises Pham MD          QUERY TEXT:    Pt admitted with respiratory failure and has encephalopathy documented. If   possible, please document in progress notes and discharge summary further   specificity regarding the type of encephalopathy:    The medical record reflects the following:  Risk Factors: SHWETA/OHS, noncompliant with BiPAP, morbid obesity BMI of 67,   history of recurrent PE, chronic hypoxic respiratory failure on 4 L, chronic   diastolic heart failure  Clinical Indicators: \"presented to the ER with hypoxia and encephalopathy\"  \"In the ER patient found to be in acute on chronic hypercapnic hypoxic   respiratory failure, pH was 7.38, pCO2 94, on my encounter patient Her eyes   closed but was following commands, could not answer orientation questions,   placed on BiPAP\"  Treatment: BiPAP, Diamox, Rocephin  Options provided:  -- Metabolic encephalopathy  -- Toxic metabolic encephalopathy  -- Other - I will add my own diagnosis  -- Disagree - Not applicable / Not valid  -- Disagree - Clinically unable to determine / Unknown  -- Refer to Clinical Documentation Reviewer    PROVIDER RESPONSE TEXT:    This patient has metabolic encephalopathy.    Query created by: Suni Diaz on 2024 9:51 AM      Electronically signed by:  Moises Pham MD 2024 1:40 PM          
  [facility]  IN-PATIENT SERVICE  [facility]    PROGRESS NOTE             7/13/2024    11:19 AM    Name:   Shazia ROGERS  MRN:     [id number]     Acct:      [id number]   Room:   2122/2122-01   Day:  9  Admit Date:  7/4/2024  9:37 AM    PCP:  Jyoti Mauricio APRN - CNP  Code Status:  Full Code    Subjective:     C/C:   Chief Complaint   Patient presents with    Altered Mental Status    Shortness of Breath     Interval History Status: improved.    Patient seen and examined at bedside. She was awake, alert, and oriented. She said she had shortness of breath a few times since I last saw her yesterday which was relieved with nebulization with albuterol  and woke up once at night. But overall patient feels better on her Bipap and maintaining sats on 4l of oxygen which is her baseline. She is okay to proceed to the nursing facility, waiting on authorization.    7/11/2024  Patient on baseline oxygen.  4 L  Has chronic lymphedema and hence unable to tell if the patient's volume status is improving.  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  Patient currently on BiPAP.  Patient responds to questions but falls asleep.  Could not obtain a detailed history admitted in the intermediate ICU for management of COPD and acute on chronic respiratory failure.      During hospital admission here her urinalysis came back positive for UTI (Klebsiella).  She was started on Rocephin.  Chest x-ray showed cardiomegaly with a pulm vascular congestion.  Pro BNP levels were harvested to 700s.  She received a dose of Diamox in the ED and will continue it.    Review of Systems:     Review of Systems   Constitutional:  Positive for fatigue. Negative for activity change, chills and fever. 
  [facility]  IN-PATIENT SERVICE  [facility]    PROGRESS NOTE             7/9/2024    8:11 AM    Name:   Shazia ROGERS  MRN:     [id number]     Acct:      [id number]   Room:   2122/2122-01   Day:  5  Admit Date:  7/4/2024  9:37 AM    PCP:  Jyoti Mauricio APRN - CNP  Code Status:  Full Code    Subjective:     C/C:   Chief Complaint   Patient presents with    Altered Mental Status    Shortness of Breath     Interval History Status: improved.    Patient seen and examined at bedside. She was awake, alert, and oriented.  She complained of fatigue and left leg pain that was normal for her and was not new. She did not complain of any SOB, cough , chest pain and was saturating on 4l oxygen which is her baseline at home. She is using positive pressure ventilation at night time. Pedal edema is the same. No wheeze or rhonchi heard on examination.  Explained to the patient her blood culture grew some bacteria and we are starting new antibiotic and that her INR continues to be subtherapeutic and will keep monitoring.        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  Patient currently on BiPAP.  Patient responds to questions but falls asleep.  Could not obtain a detailed history admitted in the intermediate ICU for management of COPD and acute on chronic respiratory failure.      During hospital admission here her urinalysis came back positive for UTI (Klebsiella).  She was started on Rocephin.  Chest x-ray showed cardiomegaly with a pulm vascular congestion.  Pro BNP levels were harvested to 700s.  She received a dose of Diamox in the ED and will continue it.    Review of Systems:     Review of Systems   Constitutional:  Positive for fatigue. Negative for activity change, appetite change, chills and fever.   Respiratory:  
ABG results given to Dr. Soliz. ABG's results have worsened. Pt to stay on AVASP and increase rate from 20 to 24. ABG has been ordered for the morning. Ok to use precedex. RT at bedside and notified.   
[facility]   Occupational Therapy Evaluation  Date: 24  Patient Name: Shazia ROGERS       Room: 2-01  MRN: [id number]  Account: [id number]   : 1955  (68 y.o.) Gender: female     Discharge Recommendations:  Further Occupational Therapy is recommended upon facility discharge.    OT Equipment Recommendations  Other: TBD    Referring Practitioner: Kieran Demarco MD  Diagnosis: COPD exacerbation   Additional Pertinent Hx: 68 y.o.  female who presents with Altered Mental Status and Shortness of Breath  from the nursing home. yesterday she became confused and had low oxygen saturations of 65% at her baseline of 4 L.  Patient has a past history of COPD and SHWETA and is noncompliant with her CPAP for.  Patient is currently on BiPAP.  Patient responded to questions but falls back asleep.  Could not obtain a pertinent history.  Will admit her in intermediate ICU for management of COPD and acute on chronic respiratory failure.    Treatment Diagnosis: Impaired self-care status    Past Medical History:  has a past medical history of Anxiety, Asthma, Atrial fibrillation (HCC), Bronchitis, DDD (degenerative disc disease), lumbar, Depression, Diabetes mellitus (HCC), Elevated glucose, Headache(784.0), Hernia of abdominal cavity, HH (hiatus hernia), Hyperlipemia, mixed, Hypertension, Osteoarthritis, Right femoral vein DVT (HCC), and Uterine hyperplasia.    Past Surgical History:   has a past surgical history that includes Dilation and curettage of uterus (10/08 and ); Breast reduction surgery (1997); Breast reduction surgery (1997); Tympanostomy tube placement (1967); Tonsillectomy and adenoidectomy (); and Hysterectomy (7/17/15).    Restrictions  Restrictions/Precautions  Restrictions/Precautions: Fall Risk, Modified Diet, Up as Tolerated  Required Braces or Orthoses?: No  Implants present? :  (denies)  Position Activity Restriction  Other position/activity restrictions: 
Attending Physician Statement  I have discussed the care of Shazia ROGERS and I have examined the patient myself and taken ROS and HPI, including pertinent history and exam findings, with the resident. I have reviewed the key elements of all parts of the encounter with the resident.  I agree with the assessment, plan and orders as documented by the resident.  Patient is 68-year-old female with past medical history of SHWETA/OHS, noncompliant with BiPAP, morbid obesity BMI of 67, history of recurrent PE, chronic hypoxic respiratory failure on 4 L, chronic diastolic heart failure, currently lives at nursing home presented to the ER with hypoxia and encephalopathy.  In the ER patient found to be in acute on chronic hypercapnic hypoxic respiratory failure,  Acute metabolic encephalopathy secondary to above  Acute on chronic diastolic heart failure  pH was 7.38, pCO2 94, on my encounter patient Her eyes closed but was following commands, could not answer orientation questions, placed on BiPAP  Already seen by Dr. Marquez in the ER  Also found to have UTI will start Rocephin  Chest x-ray showed cardiomegaly with pulmonary vascular congestion, proBNP elevated already received a dose of Diamox in the ER, will continue Diamox  Troponin elevation chronic likely secondary to type II myocardial infarction    Had echocardiogram done last year showed preserved ejection fraction  Patient likely has severe pulmonary hypertension secondary to her SHWETA/SHWETA  History of recurrent DVT and PE, and history of A-fib  Her goal INR 2.5-3.5,  As per Dr. Marquez to keep INR from 3-3.5, keep Lovenox until INR is therapeutic  Her overall prognosis is poor  Electronically signed by Joanne Liz MD    
BRONCHOSPASM/BRONCHOCONSTRICTION     [x]         IMPROVE AERATION/BREATH SOUNDS  [x]   ADMINISTER BRONCHODILATOR THERAPY AS APPROPRIATE  [x]   ASSESS BREATH SOUNDS  []   IMPLEMENT AEROSOL/MDI PROTOCOL  [x]   PATIENT EDUCATION AS NEEDED  NONINVASIVE VENTILATION    PROVIDE OPTIMAL VENTILATION/ACCEPTABLE SPO2   IMPLEMENT NONINVASIVE VENTILATION PROTOCOL   MAINTAIN ACCEPTABLE SPO2   ASSESS SKIN INTEGRITY/BREAKDOWN SCORE   PATIENT EDUCATION AS NEEDED   BIPAP AS NEEDED        
Bipap on standby at this time.  Pulse Ox-- 97%   BS--   diminished   Skin score--      Nasal gel pad available at bedside.  Pt awake/alert/no distress noted.       BRONCHOSPASM/BRONCHOCONSTRICTION     [x]         IMPROVE AERATION/BREATH SOUNDS  [x]   ADMINISTER BRONCHODILATOR THERAPY AS APPROPRIATE  [x]   ASSESS BREATH SOUNDS  []   IMPLEMENT AEROSOL/MDI PROTOCOL  [x]   PATIENT EDUCATION AS NEEDED    
Comprehensive Nutrition Assessment    Type and Reason for Visit:  Initial, Wound    Nutrition Recommendations/Plan:   NPO     Malnutrition Assessment:  Malnutrition Status:  At risk for malnutrition (Comment) (07/05/24 1840)    Context:  Acute Illness     Findings of the 6 clinical characteristics of malnutrition:  Energy Intake:  Mild decrease in energy intake (Comment)  Weight Loss:  No significant weight loss     Body Fat Loss:  No significant body fat loss     Muscle Mass Loss:  No significant muscle mass loss    Fluid Accumulation:  Mild Extremities   Strength:  Not Performed    Nutrition Assessment:    Pt admitted due to COPD exacerbation. Pt remains on BiPAP and is NPO.    Nutrition Related Findings:    Edema: +1 RUE, LUE, RLE, LLE; +2 sacral. Hx: COPD, DM, CHF. Resides at [facility]. Wound Type: Pressure Injury, Multiple, Stage II (Refer to nursing flowsheets)       Current Nutrition Intake & Therapies:    Average Meal Intake: NPO     Diet NPO    Anthropometric Measures:  Height: 165.1 cm (5' 5\")  Ideal Body Weight (IBW): 125 lbs (57 kg)    Admission Body Weight: 189.5 kg (417 lb 12.4 oz)  Current Body Weight: 189.5 kg (417 lb 12.4 oz), 334.2 % IBW. Weight Source: Bed Scale  Current BMI (kg/m2): 69.5  Usual Body Weight: 176.9 kg (390 lb) (390-412lb)  % Weight Change (Calculated): 7.1                    BMI Categories: Obese Class 3 (BMI 40.0 or greater)    Estimated Daily Nutrient Needs:  Energy Requirements Based On: Kcal/kg  Weight Used for Energy Requirements: Admission  Energy (kcal/day): 8209-4543 based on 8-9 kcal per kg  Weight Used for Protein Requirements: Ideal  Protein (g/day): 143 based on 2.5 gm per kg  Method Used for Fluid Requirements: Other (Comment)  Fluid (ml/day): per physician    Nutrition Diagnosis:   Inadequate oral intake related to impaired respiratory function as evidenced by NPO or clear liquid status due to medical condition    Nutrition Interventions:   Food and/or Nutrient 
Comprehensive Nutrition Assessment    Type and Reason for Visit:  Reassess    Nutrition Recommendations/Plan:   Continue current diet.  Provide Low Calorie, High Protein supplement x 1 daily.      Malnutrition Assessment:  Malnutrition Status:  At risk for malnutrition (Comment) (07/05/24 1840)    Context:  Acute Illness     Findings of the 6 clinical characteristics of malnutrition:  Energy Intake:  Mild decrease in energy intake (Comment)  Weight Loss:  No significant weight loss     Body Fat Loss:  No significant body fat loss     Muscle Mass Loss:  No significant muscle mass loss    Fluid Accumulation:  Mild Extremities   Strength:  Not Performed    Nutrition Assessment:    Pt reports % intake of meals.    Nutrition Related Findings:    Gluc 123. BM 7/10. Edema: +1 non-pitting RUE/LUE; +2 generalized, sacral; +3 pitting RLE/LLE. Meds reviewed. Wound Type: Pressure Injury, Multiple, Stage II (Refer to nursing flowsheets)       Current Nutrition Intake & Therapies:    Average Meal Intake: %  Average Supplements Intake: None Ordered  ADULT DIET; Regular; 4 carb choices (60 gm/meal); 1800 ml    Anthropometric Measures:  Height: 165.1 cm (5' 5\")  Ideal Body Weight (IBW): 125 lbs (57 kg)    Admission Body Weight: 189.5 kg (417 lb 12.4 oz)  Current Body Weight: 183.3 kg (404 lb 1.7 oz), 334.2 % IBW. Weight Source: Bed Scale  Current BMI (kg/m2): 67.2  Usual Body Weight: 176.9 kg (390 lb) (390-412lb)  % Weight Change (Calculated): 7.1  Weight Adjustment For: No Adjustment                 BMI Categories: Obese Class 3 (BMI 40.0 or greater)    Estimated Daily Nutrient Needs:  Energy Requirements Based On: Kcal/kg  Weight Used for Energy Requirements: Admission  Energy (kcal/day): 5027-6510 based on 8-9 kcal per kg  Weight Used for Protein Requirements: Ideal  Protein (g/day): 143 based on 2.5 gm per kg  Method Used for Fluid Requirements: Other (Comment)  Fluid (ml/day): 1800 mL/day per 
Dr rocio tamayo serve sent for new consult.   
Dr. Soliz updated that patient was admitted from ER. Pt getting restless on bipap asking when she can have it off. Per Dr. Soliz, it is ok for patient to have sips of water with meds. Pt to wear her bipap at night and for respiratory distress.   
[facility]   OCCUPATIONAL THERAPY MISSED TREATMENT NOTE   INPATIENT   Date: 24  Patient Name: Shazia ROGERS       Room:   MRN: [id number]   Account #: [id number]    : 1955  (68 y.o.)  Gender: female   Referring Practitioner: Kieran Demarco MD  Diagnosis: COPD exacerbation             REASON FOR MISSED TREATMENT:         Pt sleeping soundly with CPAP donned upon arrival. Pt easy to wake. Writer introduces self and OT role. Pt shakes head no and states having a poor nights sleep d/t CPAP not working properly and wanting to sleep. Max encouragement provided with continued refusal        Electronically signed by JIMMY Everett on 24 at 2:39 PM EDT   
Justyna power agrees with angela montero's charting  
[facility]   OCCUPATIONAL THERAPY MISSED TREATMENT NOTE   INPATIENT   Date: 24  Patient Name: Shazia ROGERS       Room:   MRN: [id number]   Account #: [id number]    : 1955  (68 y.o.)  Gender: female   Referring Practitioner: Kieran Demarco MD  Diagnosis: COPD exacerbation             REASON FOR MISSED TREATMENT:  Patient declined   -    Writer introduced self and role of OT. Upon arrival, patient sleeping with Bipap on, easily roused with min verbal cues. Patient declines OT stating, \"not today. I'm too tired.\" OT will continue to follow and check back as time permits. 1512      Electronically signed by JAVIER Jara on 24 at 3:59 PM EDT   
[facility]   INPATIENT OCCUPATIONAL THERAPY  PROGRESS NOTE  Date: 2024  Patient Name: Shazia ROGERS       Room:   MRN: [id number]    : 1955  (68 y.o.)  Gender: female   Referring Practitioner: Kieran Demarco MD  Diagnosis: COPD exacerbation    Discharge Recommendations:  Further Occupational Therapy is recommended upon facility discharge.    OT Equipment Recommendations  Other: TBD    Restrictions/Precautions  Restrictions/Precautions  Restrictions/Precautions: Fall Risk;Modified Diet;Up as Tolerated  Required Braces or Orthoses?: No  Implants present? :  (denies)  Position Activity Restriction  Other position/activity restrictions: up w/ assist/ up as tolerated/ use lift equipment    O2 Device: Nasal cannula    Subjective  Subjective  Subjective: \"Can you get someone who does this every day come make sure the sheet is in the right place\" pt agreeable to OT/PT tx  Subjective  Pain: L Leg pain 2/10  Comments: Okay for co-tx with LUCA Burris    Objective  Orientation  Overall Orientation Status: Within Functional Limits  Orientation Level: Oriented X4    Activities of Daily Living  ADL  Grooming: Dependent/Total  Grooming Skilled Clinical Factors: Pt requests this writer complete brushing of hair for pt    Balance  Balance  Sitting Balance: Stand by assistance (SBA at EOB ~30 mins)  Standing Balance: Unable to assess(comment) (Safety concerns)  Standing Balance  Comment: ARIES - safety concerns    Transfers/Mobility  Bed mobility  Supine to Sit: 2 Person assistance;Maximum assistance  Sit to Supine: 2 Person assistance;Maximum assistance  Scooting: Dependent/Total (Dependent x3 for boost)  Bed Mobility Comments: HOB elevated, use of rails  Transfers  Transfer Comments: ARIES - safety concerns    Functional Mobility  Functional Mobility Comments: ARIES - safety concerns    OT Exercises  Resistive Exercises: OTR facilitated pt's engagement in BUE resistive exercises 
NONINVASIVE VENTILATION    PROVIDE OPTIMAL VENTILATION/ACCEPTABLE SPO2   IMPLEMENT NONINVASIVE VENTILATION PROTOCOL   MAINTAIN ACCEPTABLE SPO2   ASSESS SKIN INTEGRITY/BREAKDOWN SCORE   PATIENT EDUCATION AS NEEDED   BIPAP AS NEEDED      BRONCHOSPASM/BRONCHOCONSTRICTION     [x]         IMPROVE AERATION/BREATH SOUNDS  [x]   ADMINISTER BRONCHODILATOR THERAPY AS APPROPRIATE  [x]   ASSESS BREATH SOUNDS  []   IMPLEMENT AEROSOL/MDI PROTOCOL  [x]   PATIENT EDUCATION AS NEEDED    
NP notified of patients potassium of 3.3, patient is NPO and orders for replacement are for K levels 2.7-3.0. Orders received for IV replacement 40 meq.   
Occupational Therapy  Facility/Department: [email or web address] PROGRESSIVE CARE  Rehabilitation Occupational Therapy Daily Treatment Note    Date: 7/10/24  Patient Name: Shazia ROGERS       Room: -  MRN: [id number]  Account: [id number]   : 1955  (68 y.o.) Gender: female      JOSUÉ Burris reports patient is medically stable for therapy treatment this date. Chart reviewed prior to treatment and patient is agreeable for therapy.  All lines intact and patient positioned comfortably at end of treatment.  All patient needs addressed prior to ending therapy session.      Pt currently functioning below baseline.  Recommend daily inpatient skilled therapy at time of discharge to maximize long term outcomes and prevent re-admission. Please refer to AM-PAC score for current level of function.               Past Medical History:  has a past medical history of Anxiety, Asthma, Atrial fibrillation (HCC), Bronchitis, DDD (degenerative disc disease), lumbar, Depression, Diabetes mellitus (HCC), Elevated glucose, Headache(784.0), Hernia of abdominal cavity, HH (hiatus hernia), Hyperlipemia, mixed, Hypertension, Osteoarthritis, Right femoral vein DVT (HCC), and Uterine hyperplasia.  Past Surgical History:   has a past surgical history that includes Dilation and curettage of uterus (10/08 and ); Breast reduction surgery (1997); Breast reduction surgery (1997); Tympanostomy tube placement (1967); Tonsillectomy and adenoidectomy (); and Hysterectomy (7/17/15).    Restrictions  Restrictions/Precautions: Fall Risk, Modified Diet, Up as Tolerated  Implants present? :  (denies)  Other position/activity restrictions: up w/ assist/ up as tolerated/ use lift equipment  Required Braces or Orthoses?: No    Subjective  Subjective: Pt resting in bed upon arrival needing Max encouragement for participation. No c/o pain.  Restrictions/Precautions: Fall Risk;Modified Diet;Up as Tolerated             Objective   
Patient admitted from ER 7 to ICU 2010 as intermediate. Transferred to bed per staff, bedside monitor applied and RN at bedside to assess.  
Patient becoming more alert and able to hold logical conversation. Patient inquiring about the reason she is here and why she needs the bipap. Writer notified patient the reason for admission and need for bipap as her CO2 is very elevated. Patient verbalizes understanding and denies further questions.   
Patient refusing INR lab draw. Dr. Henson notified.   
Patient seen and examined family medicine resident team  Clinically doing better  Using her BiPAP  On Keflex till 7/12  INR still subtherapeutic, getting bridged with Lovenox  Awaiting placement, full dictation to follow  
Patient transferred to PCU room 2122. Pt oriented to room. No distress noted.   
Patient's brother Álvaro calls unit for update on patient. Update given, plan of care reviewed as well as medications. Álvaro asking if he should fly here since he lives in [place]. Álvaro requesting for a physician to contact him tomorrow morning and with any changes.   
Patient, seen and examined in ICU, discussed with family medicine resident team  Has advanced sleep apnea, on almost continuous BiPAP  Hypokalemia, will replace electrolyte  Will repeat ABG  On diuretics  If ABG is improved, will discuss with pulm, may change to intermittent BiPAP and can start patient on diet  Lovenox, with history of recurrent DVT goal INR, discussed with pharmacist yesterday is between 2.5-3.5  Urine culture growing Klebsiella sensitive to Rocephin  Overall prognosis guarded, may need tracheostomy down the road  
Patient, seen and examined with family medicine resident team  Has multiple medical problem which include morbid obesity, obstructive sleep apnea/OHS  Admitted with hypoxia, hypercapnia  Currently on continuous BiPAP  Patient has history of recurrent DVT/PE, on Coumadin, goal INR is 3-3.5  Patient currently kept n.p.o., on Lovenox  Overall prognosis very guarded  High chance of clinical deterioration, concern for UTI, on Rocephin  May need tracheostomy down the road with recurrent hospitalization with hypercapnic respiratory failure  Full dictation to follow  
Pharmacist Jaydon called and stated that he will hold Lovenox once we see what INR is. Orders placed to check INR.   
Pharmacist Jaydon called and stated that we have a very serious concern for this patient. MD stated that we need to clarify if this dose of Lovenox needs to be given as INR is 3.0. Writer notified pharmacist that writer did speak with MD regarding this earlier and he stated ok to give dose of Lovenox as INR is meant to be between 3.0-3.5. Pharmacist stated that there needs to be a clinical reason as to why INR goal is between 3.0-3.5 as coumadin clinic recommends INR to be between 2.5-3.5. Writer did notify pharmacist that it is something that I need to reach out to MD about.     Dr. Henson messaged regarding all of this. MD stated to hold Lovenox as recommended by pharmacist.   
Pharmacy Note  Warfarin Consult    Shazia ROGERS is a 68 y.o. female for whom pharmacy has been consulted to manage warfarin therapy.     Consulting Physician: Kieran Demarco   Reason for Admission: COPD exacerbation     Warfarin dose prior to admission: 2 mg on Tues, 4 mg rest of week  Warfarin indication: DVT history  Target INR range: 2.5-3.5     Past Medical History:   Diagnosis Date    Anxiety     Asthma     Atrial fibrillation (HCC)     A-fib noted on 05/25/2024 visit to ER    Bronchitis     DDD (degenerative disc disease), lumbar     Depression     Diabetes mellitus (HCC)     Elevated glucose     Headache(784.0)     Hernia of abdominal cavity     HH (hiatus hernia)     Hyperlipemia, mixed     Hypertension     Osteoarthritis     Right femoral vein DVT (HCC) 05/2009    Dr. Eliazbeth    Uterine hyperplasia                 Recent Labs     07/04/24  0938   INR 2.5     Recent Labs     07/04/24  0938   HGB 9.8*   HCT 34.0*          Current warfarin drug-drug interactions: Therapeutic bridging dose of lovenox, tylenol, rocephin.      Date             INR        Dose   7/4/2024           2.5       4 mg but can't take do to being on continuous bipap.    Daily PT/INR while inpatient.     Thank you for the consult.  Will continue to follow.     Nicola Mason,  BRIDGET.Ph.  7/4/2024  4:33 PM    
Pharmacy Note  Warfarin Consult follow-up      Recent Labs     07/04/24  0938 07/05/24  0442   INR 2.5 3.0     Recent Labs     07/04/24  0938 07/05/24  0442   HGB 9.8* 9.3*   HCT 34.0* 31.4*    254       Significant Drug-Drug Interactions:  New warfarin drug-drug interactions: none  Discontinued drug-drug interactions: none  Current warfarin drug-drug interactions: none      Date             INR        Dose given previous day  Dose scheduled for today  7/5/2024            3       none           none        Notes:                   INR - 3, Patient currently NPO. Lovenox is ordered (1 mg/kg BID). Morning dose not given as INR is at 3. No warfarin today. Reevaluate tomorrow if still NPO to discern if warfarin can be resumed and Lovenox d/c'd.  Tone Berman, Pharm.D  [facility]    Daily PT/INR while inpatient.      
Pharmacy Note  Warfarin Consult follow-up      Recent Labs     07/04/24  0938 07/05/24  0442 07/06/24  0402   INR 2.5 3.0 2.8     Recent Labs     07/04/24  0938 07/05/24  0442 07/06/24  0402   HGB 9.8* 9.3* 8.8*   HCT 34.0* 31.4* 30.2*    254 248       Significant Drug-Drug Interactions:  New warfarin drug-drug interactions: none  Discontinued drug-drug interactions: none  Current warfarin drug-drug interactions: Rocephin      Date             INR        Dose given previous day  Dose scheduled for today  7/6/2024            2.8       held           NPO today        Notes:                     Daily PT/INR while inpatient.    Nicola Samuels, RP,RPH, MS   7/6/2024  8:27 AM   
Pharmacy Note  Warfarin Consult follow-up      Recent Labs     07/05/24  0442 07/06/24  0402 07/07/24  0419   INR 3.0 2.8 2.4     Recent Labs     07/05/24  0442 07/06/24  0402 07/07/24  0419   HGB 9.3* 8.8* 8.7*   HCT 31.4* 30.2* 29.7*    248 226       Significant Drug-Drug Interactions:  New warfarin drug-drug interactions: none  Discontinued drug-drug interactions: none  Current warfarin drug-drug interactions: Rocephin, Pravachol      Date             INR        Dose given previous day  Dose scheduled for today  7/7/2024            2.4       held           4 mg        Notes:                     Daily PT/INR while inpatient.   Nicola Samuels RP,RP, MS   7/7/2024  8:38 AM    
Pharmacy Note  Warfarin Consult follow-up      Recent Labs     07/06/24  0402 07/07/24  0419 07/08/24  0518   INR 2.8 2.4 1.7     Recent Labs     07/06/24  0402 07/07/24  0419 07/08/24  0518   HGB 8.8* 8.7* 8.6*   HCT 30.2* 29.7* 28.6*    226 200       Significant Drug-Drug Interactions:  New warfarin drug-drug interactions: none  Discontinued drug-drug interactions: none  Current warfarin drug-drug interactions: rocephin, pravastatin,tylenol      Date             INR        Dose given previous day  Dose scheduled for today  7/8/2024          1.7       4 mg      5 mg        Notes:  INR drop from 2.4 to 1.7, will give 5 mg dose today.                   Daily PT/INR while inpatient.    BRIDGET Mcfarland.Ph.  7/8/2024  9:55 AM    
Pharmacy Note  Warfarin Consult follow-up      Recent Labs     07/07/24  0419 07/08/24  0518 07/09/24  0504   INR 2.4 1.7 1.6     Recent Labs     07/07/24  0419 07/08/24  0518 07/09/24  0504   HGB 8.7* 8.6* 8.8*   HCT 29.7* 28.6* 29.3*    200 152       Significant Drug-Drug Interactions:  New warfarin drug-drug interactions: lovenox 1 mg/kg BID  Discontinued drug-drug interactions: none  Current warfarin drug-drug interactions: keflex, pravastatin, tylenol      Date             INR        Dose given previous day  Dose scheduled for today  7/9/2024            1.6       5 mg           6 mg        Notes: INR subtherapeutic today at 1.6 (goal 2.5-3.5) despite restarting bridging yesterday 07/08 with lovenox (1 mg/kg BID). Will schedule booster dose for 6 mg tonight.     Daily PT/INR while inpatient.      June Noriega, PharmD, [facility]    
Pharmacy Note  Warfarin Consult follow-up      Recent Labs     07/08/24  0518 07/09/24  0504 07/10/24  1210   INR 1.7 1.6 1.6     Recent Labs     07/08/24  0518 07/09/24  0504 07/10/24  0459   HGB 8.6* 8.8* 9.0*   HCT 28.6* 29.3* 30.2*    152 180       Significant Drug-Drug Interactions:  New warfarin drug-drug interactions: keflex  Discontinued drug-drug interactions rocephin  Current warfarin drug-drug interactions:  pravastatin, tylenol, lovenox full dose      Date             INR        Dose given previous day  Dose scheduled for today  7/10/2024        1.6       6 mg        7.5 mg        Notes:     Slow rise in INR, will increase to 7.5 mg.                Daily PT/INR while inpatient.      
Pharmacy Note  Warfarin Consult follow-up      Recent Labs     07/09/24  0504 07/10/24  1210 07/11/24  0545   INR 1.6 1.6 1.9     Recent Labs     07/09/24  0504 07/10/24  0459   HGB 8.8* 9.0*   HCT 29.3* 30.2*    180       Significant Drug-Drug Interactions:  New warfarin drug-drug interactions: none  Discontinued drug-drug interactions: none  Current warfarin drug-drug interactions: pravastatin, tylenol, lovenox full dose, keflex      Date             INR        Dose given previous day  Dose scheduled for today  7/11/2024            1.9       7.5 mg           6 mg        Notes:    Large jump overnight, decreasing back to 6 mg today                Daily PT/INR while inpatient.     Brittny Lutz, PharmD, 7/11/2024 8:13 AM     
Pharmacy Note  Warfarin Consult follow-up      Recent Labs     07/10/24  1210 07/11/24  0545 07/12/24  0546   INR 1.6 1.9 2.2     Recent Labs     07/10/24  0459   HGB 9.0*   HCT 30.2*          Significant Drug-Drug Interactions:  New warfarin drug-drug interactions: none  Discontinued drug-drug interactions: none  Current warfarin drug-drug interactions: pravastatin, tylenol, lovenox 1 mg/kg BID, keflex      Date             INR        Dose given previous day  Dose scheduled for today  7/12/2024            2.2        6 mg           6 mg    Notes: INR subtherapeutic today at 2.2 (goal 2.5-3.5).  Per internal medicine notes, continue with lovenox 1 mg/kg BID until INR reaches therapeutic levels of 2.5-3.5.  Will continue with dose of 6 mg tonight.             Daily PT/INR while inpatient.      
Pharmacy Note  Warfarin Consult follow-up      Recent Labs     07/11/24  0545 07/12/24  0546 07/13/24  1438   INR 1.9 2.2 2.3     No results for input(s): \"HGB\", \"HCT\", \"PLT\" in the last 72 hours.    Significant Drug-Drug Interactions:  New warfarin drug-drug interactions: none  Discontinued drug-drug interactions: keflex  Current warfarin drug-drug interactions: lovenox, pravastatin, tylenol. lovenox      Date             INR        Dose given previous day  Dose scheduled for today  7/13/2024            2.3       6 mg           8 mg        Notes:                   Ordered higher dose due to subtherapeutic levels currently.  Daily PT/INR while inpatient.   Nettie RuizD. [facility]  7/13/2024  3:17 PM      
Physical Therapy  [facility]    Date: 24  Patient Name: Shazia ROGERS       Room: -01  MRN: [id number]   Account: [id number]   : 1955  (68 y.o.) Gender: female     Referring Practitioner: Kieran Demarco MD  Diagnosis: COPD exacerbation  Past Medical History:  has a past medical history of Anxiety, Asthma, Atrial fibrillation (HCC), Bronchitis, DDD (degenerative disc disease), lumbar, Depression, Diabetes mellitus (HCC), Elevated glucose, Headache(784.0), Hernia of abdominal cavity, HH (hiatus hernia), Hyperlipemia, mixed, Hypertension, Osteoarthritis, Right femoral vein DVT (HCC), and Uterine hyperplasia.   Past Surgical History:   has a past surgical history that includes Dilation and curettage of uterus (10/08 and ); Breast reduction surgery (1997); Breast reduction surgery (1997); Tympanostomy tube placement (1967); Tonsillectomy and adenoidectomy (); and Hysterectomy (7/17/15).  Additional Pertinent Hx: Per ER note: Shazia ROGERS is a 68 y.o. female who presents complaining of difficulty breathing.  Patient was sent in from the nursing home with some difficulty breathing low oxygen saturations and confusion that started yesterday.  Patient has a history of COPD and sleep apnea and is noncompliant with her CPAP at night.  Patient is on 4 L of oxygen normally and she usually is alert and oriented but has been confused.  When I tried asked the patient questions she does not answer them appropriately.    Overall Orientation Status: Within Functional Limits  Restrictions/Precautions  Restrictions/Precautions: Fall Risk;Modified Diet;Up as Tolerated  Required Braces or Orthoses?: No  Implants present? :  (denies)  Position Activity Restriction  Other position/activity restrictions: up w/ assist/ up as tolerated/ use lift equipment    Subjective: Pt lying in bed upon arrival, agreeable to therapy  Comments: co-treat with MARYAN Burris       Pain 
Physical Therapy  Facility/Department: [facility] PROGRESSIVE CARE  Physical Therapy Initial Assessment    Name: Shazia ROGERS  : 1955  MRN: [id number]  Date of Service: 2024    Discharge Recommendations:  Patient would benefit from continued therapy after discharge, Therapy recommended at discharge   PT Equipment Recommendations  Equipment Needed:  (TBD)      Patient Diagnosis(es): The encounter diagnosis was Acute on chronic respiratory failure with hypoxia and hypercapnia (HCC).  Past Medical History:  has a past medical history of Anxiety, Asthma, Atrial fibrillation (HCC), Bronchitis, DDD (degenerative disc disease), lumbar, Depression, Diabetes mellitus (HCC), Elevated glucose, Headache(784.0), Hernia of abdominal cavity, HH (hiatus hernia), Hyperlipemia, mixed, Hypertension, Osteoarthritis, Right femoral vein DVT (HCC), and Uterine hyperplasia.  Past Surgical History:  has a past surgical history that includes Dilation and curettage of uterus (10/08 and ); Breast reduction surgery (1997); Breast reduction surgery (1997); Tympanostomy tube placement (1967); Tonsillectomy and adenoidectomy (); and Hysterectomy (7/17/15).    Assessment   Body Structures, Functions, Activity Limitations Requiring Skilled Therapeutic Intervention: Decreased functional mobility ;Decreased sensation;Decreased balance;Decreased endurance;Decreased safe awareness;Decreased strength  Assessment: pt will need continued skilled care post D/C.  Pt requiring significant assist for bed mobility and dangling at the EOB.  Treatment Diagnosis: impaired mobility due to weakness and morbid obesity  Specific Instructions for Next Treatment: co-treat w/ OT, instruct in exercise program, dangle at the EOB  Therapy Prognosis: Fair  Decision Making: Medium Complexity  History: pt admitted due to COPD exacerbation  Exam: ROM, MMT, sitting balance and mobility assessments  Clinical Presentation: max x 2 for rolling, dep x 3 for 
Rn updated Dr. Liz on transportation arranged for pt to go back to [facility]- tomorrow [phone].  
Writer asked patient is she would like anyone notified of her admission. ER originally called Edita who patient states is an old friend. Pt requesting that her brother Álvaro be contacted. Writer called Álvaro letting him know of her admission.   
Writer messaged Dr. Henson as pharmacy was concerned about INR being 3.0 and giving lovenox. Dr. Henson would like lovenox held this morning.  
Writer responded to consult for \"emotional distress\"  Pt calm at time of rounding  Writer got update from pt's rn, at this time, pt not communicating or interacting   Writer prayed with pt, pt did not respond       07/05/24 4040   Encounter Summary   Encounter Overview/Reason Initial Encounter;Spiritual/Emotional Needs   Service Provided For Patient   Referral/Consult From Nurse   Last Encounter  07/05/24   Complexity of Encounter Low   Spiritual/Emotional needs   Type Spiritual Support   Assessment/Intervention/Outcome   Assessment Unable to assess   Intervention Prayer (assurance of)/[place]               
further exercise stating she felt she was too far on EOB to perform LE exercise safely, however patient was not too far forward. Pt self limiting.             Goals  Short Term Goals  Time Frame for Short Term Goals: 5-7 treatments/ week  Short Term Goal 1: pt to tolerate 1/2 hour of therapuetic exercise keeping O2 sats above 90%  Short Term Goal 2: pt to demonstrate good technique for exercise program for general strengthening, sitting balance and energy conservation.  Short Term Goal 3: pt to demonstrate increased LE strength by 1/2 MMG  Short Term Goal 4: pt to demonstrate rolling in bed using rail w/ mod x 2 using O2 as ordered  Short Term Goal 5: pt to demonstrate transfers supine <> sit w/ max x 2 using O2 as ordered  Short Term Goal 6: pt to demonstrate fair + or better sitting balance at the EOB w/ min x 1 and increase sitting tolerance to 20 minutes to engage the core muscles using O2 as ordered  Short Term Goal 7: pt to advance and demonstrate sit<>stand with RW and max a x 2 w/ standing tolerance for 30 seconds to 1 minute using O2 as ordered  Short Term Goal 8: pt to advance to and  demonstrate ability  to transfer and tolerate up in recliner chair for 2 hours, for meals using transfer lift system(maxi move or marsha lift) while using O2 as ordered  Patient Goals   Patient Goals : return to nursing home at discharge    Education  Patient Education  Education Given To: Patient  Education Provided: Role of Therapy;Plan of Care  Education Method: Demonstration;Verbal  Education Outcome: Continued education needed    AM-PAC - Mobility  AM-PAC Basic Mobility - Inpatient   How much help is needed turning from your back to your side while in a flat bed without using bedrails?: Total  How much help is needed moving from lying on your back to sitting on the side of a flat bed without using bedrails?: Total  How much help is needed moving to and from a bed to a chair?: Total  How much help is needed standing up from 
physician    Nutrition Diagnosis:   Inadequate oral intake related to impaired respiratory function as evidenced by NPO or clear liquid status due to medical condition    Nutrition Interventions:   Food and/or Nutrient Delivery: Continue Current Diet  Nutrition Education/Counseling: No recommendation at this time  Coordination of Nutrition Care: Continue to monitor while inpatient       Goals:  Previous Goal Met: Progressing toward Goal(s)  Goals: Meet at least 75% of estimated needs       Nutrition Monitoring and Evaluation:   Behavioral-Environmental Outcomes: None Identified  Food/Nutrient Intake Outcomes: Food and Nutrient Intake  Physical Signs/Symptoms Outcomes: Biochemical Data, GI Status, Fluid Status or Edema, Hemodynamic Status, Nutrition Focused Physical Findings, Skin, Weight    Discharge Planning:    Too soon to determine     Miri Elise MS, LD  DEREC[address] RD, LD  Contact: [phone]      
07/04/2024, 951 hours HISTORY: ORDERING SYSTEM PROVIDED HISTORY: follow up chf TECHNOLOGIST PROVIDED HISTORY: follow up chf Reason for Exam: follow up chf 68-year-old female; follow-up CHF FINDINGS: Portable view of the chest Cardiac monitor leads overlie the chest Atherosclerotic calcification of the aortic knob. Stable cardiomegaly. Left basilar airspace disease and small left-sided effusion.  Mild pulmonary vascular congestion.  Findings the lungs are unchanged from the prior study. No pneumothorax.  No free air.  Visualized osseous structures remain unchanged.     Overall, stable chest compared with 07/04/2024, 951 hours.     XR CHEST PORTABLE    Result Date: 7/4/2024  EXAMINATION: ONE XRAY VIEW OF THE CHEST 7/4/2024 9:48 am COMPARISON: Chest x-ray dated 05/25/2024. HISTORY: ORDERING SYSTEM PROVIDED HISTORY: shortness of breath TECHNOLOGIST PROVIDED HISTORY: shortness of breath FINDINGS: HEART/MEDIASTINUM: The cardiac silhouette is enlarged, but stable. PLEURA/LUNGS: There are increased interstitial markings reflecting pulmonary vascular congestion/interstitial edema.  There is no appreciable pneumothorax. BONES/SOFT TISSUE: No acute abnormality.     Cardiomegaly with pulmonary vascular congestion/interstitial edema.         Physical Examination:        Physical Exam  Constitutional:       Appearance: Normal appearance. She is obese.   Cardiovascular:      Rate and Rhythm: Normal rate. Rhythm irregular.      Pulses: Normal pulses.      Heart sounds: Normal heart sounds.   Pulmonary:      Effort: Pulmonary effort is normal.      Breath sounds: Normal breath sounds.   Abdominal:      General: Abdomen is flat.      Palpations: Abdomen is soft.   Musculoskeletal:      Right lower leg: Edema present.      Left lower leg: Edema present.   Neurological:      General: No focal deficit present.      Mental Status: She is alert and oriented to person, place, and time. Mental status is at baseline.           Assessment:    
Activity: Inactive (10/20/2023)    Exercise Vital Sign     Days of Exercise per Week: 0 days     Minutes of Exercise per Session: 0 min   Stress: Not on file   Social Connections: Patient Unable To Answer (7/12/2024)    Social Connections ([facility] HRSN)     If for any reason you need help with day-to-day activities such as bathing, preparing meals, shopping, managing finances, etc., do you get the help you need?: Not on file   Intimate Partner Violence: Not on file   Housing Stability: Low Risk  (7/8/2024)    Housing Stability Vital Sign     Unable to Pay for Housing in the Last Year: No     Number of Places Lived in the Last Year: 2     Unstable Housing in the Last Year: No       Family History:     Family History   Problem Relation Age of Onset    Asthma Mother     Mental Illness Mother     COPD Mother     Cancer Father 86        hairy cell leukemia, and leimyoma sarcoma     Stroke Father 86        minor    Parkinsonism Maternal Grandfather     Cancer Paternal Grandmother     Stroke Paternal Grandmother       Medical Decision Making:   I have independently reviewed/ordered the following labs:    CBC with Differential:   Recent Labs     07/10/24  0459   WBC 4.3   HGB 9.0*   HCT 30.2*      LYMPHOPCT 22*   MONOPCT 10*   EOSPCT 2       BMP:  Recent Labs     07/10/24  0459      K 3.7   CL 97*   CO2 43*   BUN 18   CREATININE 0.9       Hepatic Function Panel: No results for input(s): \"LABALBU\", \"BILIDIR\", \"IBILI\", \"BILITOT\", \"ALKPHOS\", \"ALT\", \"AST\" in the last 72 hours.    Invalid input(s): \"PROT\"  No results for input(s): \"RPR\" in the last 72 hours.  No results for input(s): \"HIV\" in the last 72 hours.  No results for input(s): \"BC\" in the last 72 hours.  Lab Results   Component Value Date/Time    CREATININE 0.9 07/10/2024 04:59 AM    GLUCOSE 123 07/10/2024 04:59 AM    GLUCOSE 119 02/25/2012 09:38 AM       Detailed results:        Thank you for allowing us to participate in the care of this patient.Please call 
Lactobacillus species should be interpreted with caution and viewed as a likely skin contaminant.      CULTURE  07/06/2024 09:23 PM     (NOTE) Direct Gram Stain from bottle and Polymerase Chain Reaction (PCR) results called to and read back by: YARELY FRENCH AT 1000 ON 7/8/24.    CULTURE NO GROWTH 3 DAYS 07/06/2024 09:23 PM       [unfilled]    Radiology:    XR CHEST PORTABLE    Result Date: 7/7/2024  EXAMINATION: ONE XRAY VIEW OF THE CHEST 7/7/2024 6:40 am COMPARISON: 07/04/2024, 951 hours HISTORY: ORDERING SYSTEM PROVIDED HISTORY: follow up chf TECHNOLOGIST PROVIDED HISTORY: follow up chf Reason for Exam: follow up chf 68-year-old female; follow-up CHF FINDINGS: Portable view of the chest Cardiac monitor leads overlie the chest Atherosclerotic calcification of the aortic knob. Stable cardiomegaly. Left basilar airspace disease and small left-sided effusion.  Mild pulmonary vascular congestion.  Findings the lungs are unchanged from the prior study. No pneumothorax.  No free air.  Visualized osseous structures remain unchanged.     Overall, stable chest compared with 07/04/2024, 951 hours.     XR CHEST PORTABLE    Result Date: 7/4/2024  EXAMINATION: ONE XRAY VIEW OF THE CHEST 7/4/2024 9:48 am COMPARISON: Chest x-ray dated 05/25/2024. HISTORY: ORDERING SYSTEM PROVIDED HISTORY: shortness of breath TECHNOLOGIST PROVIDED HISTORY: shortness of breath FINDINGS: HEART/MEDIASTINUM: The cardiac silhouette is enlarged, but stable. PLEURA/LUNGS: There are increased interstitial markings reflecting pulmonary vascular congestion/interstitial edema.  There is no appreciable pneumothorax. BONES/SOFT TISSUE: No acute abnormality.     Cardiomegaly with pulmonary vascular congestion/interstitial edema.         Physical Examination:        Physical Exam  Constitutional:       Appearance: Normal appearance. She is obese.   Cardiovascular:      Rate and Rhythm: Normal rate. Rhythm irregular.      Pulses: Normal pulses.      Heart sounds: 
dosing by pharmacy, , Other, RX Placeholder, Kieran Demarco MD    Lab Results:     Lab Results   Component Value Date    WBC 4.1 07/07/2024    HGB 8.7 (L) 07/07/2024    HCT 29.7 (L) 07/07/2024    MCV 85.8 07/07/2024     07/07/2024     Lab Results   Component Value Date    CALCIUM 9.5 07/07/2024     07/07/2024    K 3.5 (L) 07/07/2024    CO2 39 (H) 07/07/2024    CL 95 (L) 07/07/2024    BUN 27 (H) 07/07/2024    CREATININE 1.0 (H) 07/07/2024     No components found for: \"ABGSAMPLETYP\", \"ABGBODYTEMP\", \"ABGPHCORRFOR\", \"QCKJDU3GLDHGL\", \"ABGPHCORRFOOR\", \"ABGPH\", \"ABGPCO2\", \"ABGPO2\", \"ABGBASEEXCES\", \"ABGBASEDEFIC\", \"ABGHCO3\", \"WPSP7XGB\", \"ABGENDTIDALC\", \"ABGALLENSTES\", \"ABGSPO2\", \"ABGSAMEPLESIT\", \"VRQCKUG24QOA\", \"ABGOXYGENSOU\"  Lab Results   Component Value Date    INR 2.4 07/07/2024    PROTIME 26.3 (H) 07/07/2024       Radiology:   [unfilled]  My reading of film: Fluid overload.    ASSESSMENT:     Acute hypoxic respiratory failure-patient normally on 4 L nasal cannula initially placed on nonrebreather and then 50% FiO2 on BiPAP  Acute on chronic hypercapnic respiratory failure-pCO2 probably ranges between high 80s to mid 90s, predominantly on the basis of obesity hypoventilation syndrome, BMI nearly 70!,  pCO2 as high as 135 now down to 71  SHWETA/OHS-clinically severe patient has refused to go for sleep studies noncompliant with her CPAP (unclear if at [facility] she is on CPAP or BiPAP but definitely would need some sort of noninvasive ventilation with a backup rate)  Asthma-spirometry showed pseudo restriction in 2017; patient currently not bronchospastic  Pulmonary embolism/chronic DVT-2022 in 2018 based on high probability VQ scan (patient too large for CT Scanner); has been maintained on Coumadin aiming for INR 3-3.5  Patient non-smoker>> she does not have COPD  Atrial fibrillation  Hypertension  Morbid obesity BMI 67  Hyperlipidemia  Depression  Recent admission with influenza A April 2024  Medical 
shortness of breath TECHNOLOGIST PROVIDED HISTORY: shortness of breath FINDINGS: HEART/MEDIASTINUM: The cardiac silhouette is enlarged, but stable. PLEURA/LUNGS: There are increased interstitial markings reflecting pulmonary vascular congestion/interstitial edema.  There is no appreciable pneumothorax. BONES/SOFT TISSUE: No acute abnormality.     Cardiomegaly with pulmonary vascular congestion/interstitial edema.         Physical Examination:        Physical Exam  Constitutional:       Appearance: She is obese.   Cardiovascular:      Rate and Rhythm: Normal rate and regular rhythm.      Pulses: Normal pulses.      Heart sounds: Normal heart sounds.   Pulmonary:      Effort: Pulmonary effort is normal.      Breath sounds: No rhonchi.      Comments: Low breath sounds bilateral  Abdominal:      General: Abdomen is flat.      Palpations: Abdomen is soft.   Musculoskeletal:      Right lower leg: Edema present.      Left lower leg: Edema present.   Neurological:      General: No focal deficit present.      Mental Status: She is alert.      Comments: Oriented to place and time     Assessment:        Primary Problem  COPD exacerbation ([facility])    Active Hospital Problems    Diagnosis Date Noted    COPD exacerbation ([facility]) [J44.1] 07/04/2024    Acute on chronic diastolic heart failure ([facility]) [I50.33] 07/04/2024    Acute cystitis without hematuria [N30.00] 07/04/2024    BMI 60.0-69.9, adult ([facility]) [Z68.44] 10/04/2017    Anticoagulated on Coumadin [Z79.01] 01/14/2014       Plan:          Acute hypoxic hypercapnic on chronic respiratory heart failure  - ABG 7/4: pH 7.38 pCO2 94 pO2 86 bicarb 56.8  - ABG 7/5: Ph 7.46 pCO2 71 pO2 99  - ABG 7/6: Pco2 78.9  -Patient is stable today on nasal cannula with oxygen concentration more than 90%.   - DuoNeb inhaler continue  - Albuterol 2.5 mg Q4 continue  -Montelukast tablet 10 mg     2.  Fluid overload 2/2 chronic diastolic heart failure  - proBNP 755  - x-ray 7/4: Cardiomegaly with pulmonary 
episodes    2.  Fluid overload 2/2 chronic diastolic heart failure  - proBNP 755  - x-ray 7/4: Cardiomegaly with pulmonary vascular congestion   - Received 1 dose of Diamox in the ER  - Continue Diamox at 250 IV daily  - Echo 4/24: LVEF 55 to 60%, right ventricle dilated     3. Urinary tract infection  - wbc 5.0  -UA microscopy: Cloudy, 3+ protein,  WBC, ketone negative  -Started Rocephin 1 g daily IV  -Urine culture sent  -Blood culture no growth uptil now     5.  Morbid obesity  -BMI 67 kg/m²   -Patient did not get sleep study     6. Past History of DVT  -Target INR 3-3.5  -Pharmacy to dose Coumadin once she is able to intake orally  -Continue Lovenox 180mg IV     Diet: N.p.o. till alert  DVT prophylaxis: On Lovenox     Plan will be discussed with the attending, Dr Sixto Demarco MD  PGY I Family Medicine Resident  7/5/2024 7:31 AM          
generate adocument that actually reflects the content of the visit, the document can still have some errors including those of syntax and sound a like substitutions which may escape proof reading.  It such instances, actual meaningcan be extrapolated by contextual diversion.    Davion Cha MD  Office: [phone]  Perfect serve / office [phone]

## 2024-07-15 ENCOUNTER — HOSPITAL ENCOUNTER (OUTPATIENT)
Age: 69
Setting detail: SPECIMEN
Discharge: HOME | End: 2024-07-15

## 2024-07-15 LAB
INR PPP: 3.4
PROTHROMBIN TIME: 33 SEC (ref 11.7–14.9)

## 2024-07-15 PROCEDURE — 85610 PROTHROMBIN TIME: CPT

## 2024-07-15 PROCEDURE — P9603 ONE-WAY ALLOW PRORATED MILES: HCPCS

## 2024-07-15 PROCEDURE — 36415 COLL VENOUS BLD VENIPUNCTURE: CPT

## 2024-07-18 ENCOUNTER — HOSPITAL ENCOUNTER (OUTPATIENT)
Age: 69
Setting detail: SPECIMEN
Discharge: HOME | End: 2024-07-18

## 2024-07-18 LAB
INR PPP: 3.3
PROTHROMBIN TIME: 32.4 SEC (ref 11.7–14.9)

## 2024-07-18 PROCEDURE — P9603 ONE-WAY ALLOW PRORATED MILES: HCPCS

## 2024-07-18 PROCEDURE — 85610 PROTHROMBIN TIME: CPT

## 2024-07-18 PROCEDURE — 36415 COLL VENOUS BLD VENIPUNCTURE: CPT

## 2024-07-19 ENCOUNTER — HOSPITAL ENCOUNTER (OUTPATIENT)
Age: 69
Setting detail: SPECIMEN
End: 2024-07-19
Payer: MEDICARE

## 2024-07-19 ENCOUNTER — HOSPITAL ENCOUNTER (OUTPATIENT)
Age: 69
Setting detail: SPECIMEN
Discharge: HOME | End: 2024-07-19

## 2024-07-19 LAB
ANION GAP SERPL CALCULATED.3IONS-SCNC: 7 MMOL/L (ref 9–17)
BNP SERPL-MCNC: 560 PG/ML
BUN SERPL-MCNC: 19 MG/DL (ref 8–23)
BUN/CREAT SERPL: 21 (ref 9–20)
CALCIUM SERPL-MCNC: 9.6 MG/DL (ref 8.6–10.4)
CHLORIDE SERPL-SCNC: 92 MMOL/L (ref 98–107)
CO2 SERPL-SCNC: 40 MMOL/L (ref 20–31)
CREAT SERPL-MCNC: 0.9 MG/DL (ref 0.5–0.9)
ERYTHROCYTE [DISTWIDTH] IN BLOOD BY AUTOMATED COUNT: 18.1 % (ref 11.8–14.4)
GFR, ESTIMATED: 70 ML/MIN/1.73M2
GLUCOSE SERPL-MCNC: 209 MG/DL (ref 70–99)
HCT VFR BLD AUTO: 32.2 % (ref 36.3–47.1)
HGB BLD-MCNC: 8.5 G/DL (ref 11.9–15.1)
MCH RBC QN AUTO: 25.5 PG (ref 25.2–33.5)
MCHC RBC AUTO-ENTMCNC: 26.4 G/DL (ref 28.4–34.8)
MCV RBC AUTO: 96.7 FL (ref 82.6–102.9)
PLATELET # BLD AUTO: 203 K/UL (ref 138–453)
PMV BLD AUTO: 9.7 FL (ref 8.1–13.5)
POTASSIUM SERPL-SCNC: 4.3 MMOL/L (ref 3.7–5.3)
RBC # BLD AUTO: 3.33 M/UL (ref 3.95–5.11)
SODIUM SERPL-SCNC: 139 MMOL/L (ref 135–144)
WBC OTHER # BLD: 5.6 K/UL (ref 3.5–11.3)

## 2024-07-19 PROCEDURE — 80048 BASIC METABOLIC PNL TOTAL CA: CPT

## 2024-07-19 PROCEDURE — P9603 ONE-WAY ALLOW PRORATED MILES: HCPCS

## 2024-07-19 PROCEDURE — 36415 COLL VENOUS BLD VENIPUNCTURE: CPT

## 2024-07-19 PROCEDURE — 85027 COMPLETE CBC AUTOMATED: CPT

## 2024-07-19 PROCEDURE — 83880 ASSAY OF NATRIURETIC PEPTIDE: CPT

## 2024-07-22 ENCOUNTER — APPOINTMENT (OUTPATIENT)
Dept: GENERAL RADIOLOGY | Age: 69
DRG: 004 | End: 2024-07-22
Payer: MEDICARE

## 2024-07-22 ENCOUNTER — HOSPITAL ENCOUNTER (OUTPATIENT)
Age: 69
Setting detail: SPECIMEN
Discharge: HOME | End: 2024-07-22
Payer: MEDICARE

## 2024-07-22 ENCOUNTER — HOSPITAL ENCOUNTER (INPATIENT)
Age: 69
LOS: 14 days | Discharge: HOME | DRG: 004 | End: 2024-08-05
Attending: EMERGENCY MEDICINE | Admitting: INTERNAL MEDICINE
Payer: MEDICARE

## 2024-07-22 DIAGNOSIS — J96.22 ACUTE ON CHRONIC RESPIRATORY FAILURE WITH HYPOXIA AND HYPERCAPNIA (HCC): Primary | ICD-10-CM

## 2024-07-22 DIAGNOSIS — J96.21 ACUTE ON CHRONIC RESPIRATORY FAILURE WITH HYPOXIA AND HYPERCAPNIA (HCC): Primary | ICD-10-CM

## 2024-07-22 PROBLEM — J96.02 ACUTE RESPIRATORY FAILURE WITH HYPOXIA AND HYPERCARBIA (HCC): Status: ACTIVE | Noted: 2024-07-22

## 2024-07-22 PROBLEM — J96.92 TYPE 2 RESPIRATORY FAILURE (HCC): Status: ACTIVE | Noted: 2024-07-22

## 2024-07-22 PROBLEM — J96.01 ACUTE RESPIRATORY FAILURE WITH HYPOXIA AND HYPERCARBIA (HCC): Status: ACTIVE | Noted: 2024-07-22

## 2024-07-22 LAB
ALP SERPL-CCNC: 75 U/L (ref 35–104)
ALT SERPL-CCNC: 12 U/L (ref 5–33)
ANION GAP SERPL CALCULATED.3IONS-SCNC: ABNORMAL MMOL/L (ref 9–17)
ARTERIAL PATENCY WRIST A: ABNORMAL
AST SERPL-CCNC: 13 U/L
BACTERIA URNS QL MICRO: ABNORMAL
BASOPHILS # BLD: 0 K/UL (ref 0–0.2)
BASOPHILS NFR BLD: 1 % (ref 0–2)
BDY SITE: ABNORMAL
BILIRUB SERPL-MCNC: 0.5 MG/DL (ref 0.3–1.2)
BILIRUB UR QL STRIP: NEGATIVE
BNP SERPL-MCNC: 728 PG/ML
BODY TEMPERATURE: 37
BODY TEMPERATURE: 37
BUN SERPL-MCNC: 19 MG/DL (ref 8–23)
CALCIUM SERPL-MCNC: 9.9 MG/DL (ref 8.6–10.4)
CASTS #/AREA URNS LPF: ABNORMAL /LPF
CHLORIDE SERPL-SCNC: 90 MMOL/L (ref 98–107)
CLARITY UR: CLEAR
CO2 SERPL-SCNC: >45 MMOL/L (ref 20–31)
COHGB MFR BLD: 0.6 % (ref 0–5)
COHGB MFR BLD: 1.6 % (ref 0–5)
COLOR UR: YELLOW
CREAT SERPL-MCNC: 0.9 MG/DL (ref 0.5–0.9)
EKG Q-T INTERVAL: 340 MS
EKG QRS DURATION: 84 MS
EKG QTC CALCULATION (BAZETT): 449 MS
EKG R AXIS: 44 DEGREES
EKG T AXIS: 33 DEGREES
EKG VENTRICULAR RATE: 105 BPM
EOSINOPHIL # BLD: 0.1 K/UL (ref 0–0.4)
EOSINOPHILS RELATIVE PERCENT: 2 % (ref 0–4)
EPI CELLS #/AREA URNS HPF: ABNORMAL /HPF
ERYTHROCYTE [DISTWIDTH] IN BLOOD BY AUTOMATED COUNT: 19.8 % (ref 11.5–14.9)
EST. AVERAGE GLUCOSE BLD GHB EST-MCNC: 148 MG/DL
FIO2 ON VENT: 60 %
GAS FLOW.O2 O2 DELIVERY SYS: ABNORMAL L/MIN
GAS FLOW.O2 O2 DELIVERY SYS: ABNORMAL L/MIN
GAS FLOW.O2 SETTING OXYMISER: 22 L/MIN
GFR, ESTIMATED: 70 ML/MIN/1.73M2
GLUCOSE BLD-MCNC: 155 MG/DL (ref 65–105)
GLUCOSE BLD-MCNC: 191 MG/DL (ref 65–105)
GLUCOSE SERPL-MCNC: 172 MG/DL (ref 70–99)
GLUCOSE UR STRIP-MCNC: NEGATIVE MG/DL
HBA1C MFR BLD: 6.8 % (ref 4–6)
HCO3 ARTERIAL: 52.9 MMOL/L (ref 22–26)
HCO3 VENOUS: 56.2 MMOL/L (ref 24–30)
HCT VFR BLD AUTO: 31.5 % (ref 36–46)
HGB BLD-MCNC: 9.5 G/DL (ref 12–16)
HGB UR QL STRIP.AUTO: NEGATIVE
INR PPP: 2.4
INR PPP: 2.9
KETONES UR STRIP-MCNC: NEGATIVE MG/DL
LACTATE BLDV-SCNC: 1.1 MMOL/L (ref 0.5–2.2)
LEUKOCYTE ESTERASE UR QL STRIP: ABNORMAL
LYMPHOCYTES NFR BLD: 0.5 K/UL (ref 1–4.8)
LYMPHOCYTES RELATIVE PERCENT: 8 % (ref 24–44)
MCH RBC QN AUTO: 26.4 PG (ref 26–34)
MCHC RBC AUTO-ENTMCNC: 30.2 G/DL (ref 31–37)
MCV RBC AUTO: 87.6 FL (ref 80–100)
METHEMOGLOBIN: 0.3 % (ref 0–1.9)
METHEMOGLOBIN: 0.9 % (ref 0–1.9)
MONOCYTES NFR BLD: 0.5 K/UL (ref 0.1–1.3)
MONOCYTES NFR BLD: 7 % (ref 1–7)
NEUTROPHILS NFR BLD: 82 % (ref 36–66)
NEUTS SEG NFR BLD: 5.7 K/UL (ref 1.3–9.1)
NITRITE UR QL STRIP: POSITIVE
O2 SAT, ARTERIAL: 91.3 % (ref 95–98)
O2 SAT, VEN: 26.9 % (ref 60–85)
PCO2 ARTERIAL: 99.4 MMHG (ref 35–45)
PCO2 VENOUS: 116 MM HG (ref 39–55)
PEEP RESPIRATORY: 10 CM[H2O]
PH ARTERIAL: 7.34 (ref 7.35–7.45)
PH UR STRIP: 5.5 [PH] (ref 5–8)
PH VENOUS: 7.29 (ref 7.32–7.42)
PLATELET # BLD AUTO: 272 K/UL (ref 150–450)
PMV BLD AUTO: 7.3 FL (ref 6–12)
PO2 ARTERIAL: 64.4 MMHG (ref 80–100)
POSITIVE BASE EXCESS, ART: 22.9 MMOL/L (ref 0–2)
POSITIVE BASE EXCESS, VEN: 29.7 MMOL/L (ref 0–2)
POTASSIUM SERPL-SCNC: 4.2 MMOL/L (ref 3.7–5.3)
PROCALCITONIN SERPL-MCNC: 0.09 NG/ML
PROT SERPL-MCNC: 7.5 G/DL (ref 6.4–8.3)
PROT UR STRIP-MCNC: NEGATIVE MG/DL
PROTHROMBIN TIME: 26.4 SEC (ref 11.8–14.6)
PROTHROMBIN TIME: 29.2 SEC (ref 11.7–14.9)
PT. POSITION: ABNORMAL
RBC # BLD AUTO: 3.6 M/UL (ref 4–5.2)
RBC #/AREA URNS HPF: ABNORMAL /HPF
RESPIRATORY RATE: 23
SODIUM SERPL-SCNC: 141 MMOL/L (ref 135–144)
SP GR UR STRIP: 1.01 (ref 1–1.03)
TEXT FOR RESPIRATORY: ABNORMAL
TOTAL RATE: 23
TROPONIN I SERPL HS-MCNC: 24 NG/L (ref 0–14)
TROPONIN I SERPL HS-MCNC: 26 NG/L (ref 0–14)
UROBILINOGEN UR STRIP-ACNC: NORMAL EU/DL (ref 0–1)
WBC #/AREA URNS HPF: ABNORMAL /HPF
WBC OTHER # BLD: 6.8 K/UL (ref 3.5–11)

## 2024-07-22 PROCEDURE — 80053 COMPREHEN METABOLIC PANEL: CPT

## 2024-07-22 PROCEDURE — 87077 CULTURE AEROBIC IDENTIFY: CPT

## 2024-07-22 PROCEDURE — 85610 PROTHROMBIN TIME: CPT

## 2024-07-22 PROCEDURE — 2060000000 HC ICU INTERMEDIATE R&B

## 2024-07-22 PROCEDURE — 71045 X-RAY EXAM CHEST 1 VIEW: CPT

## 2024-07-22 PROCEDURE — 99291 CRITICAL CARE FIRST HOUR: CPT | Performed by: INTERNAL MEDICINE

## 2024-07-22 PROCEDURE — 82805 BLOOD GASES W/O2 SATURATION: CPT

## 2024-07-22 PROCEDURE — 82947 ASSAY GLUCOSE BLOOD QUANT: CPT

## 2024-07-22 PROCEDURE — 84145 PROCALCITONIN (PCT): CPT

## 2024-07-22 PROCEDURE — P9603 ONE-WAY ALLOW PRORATED MILES: HCPCS

## 2024-07-22 PROCEDURE — 83605 ASSAY OF LACTIC ACID: CPT

## 2024-07-22 PROCEDURE — 2580000003 HC RX 258: Performed by: INTERNAL MEDICINE

## 2024-07-22 PROCEDURE — 6360000002 HC RX W HCPCS: Performed by: INTERNAL MEDICINE

## 2024-07-22 PROCEDURE — 2700000000 HC OXYGEN THERAPY PER DAY

## 2024-07-22 PROCEDURE — 36600 WITHDRAWAL OF ARTERIAL BLOOD: CPT

## 2024-07-22 PROCEDURE — 85025 COMPLETE CBC W/AUTO DIFF WBC: CPT

## 2024-07-22 PROCEDURE — 87186 SC STD MICRODIL/AGAR DIL: CPT

## 2024-07-22 PROCEDURE — 81001 URINALYSIS AUTO W/SCOPE: CPT

## 2024-07-22 PROCEDURE — 36415 COLL VENOUS BLD VENIPUNCTURE: CPT

## 2024-07-22 PROCEDURE — 84484 ASSAY OF TROPONIN QUANT: CPT

## 2024-07-22 PROCEDURE — 87641 MR-STAPH DNA AMP PROBE: CPT

## 2024-07-22 PROCEDURE — 99285 EMERGENCY DEPT VISIT HI MDM: CPT

## 2024-07-22 PROCEDURE — 82800 BLOOD PH: CPT

## 2024-07-22 PROCEDURE — 83036 HEMOGLOBIN GLYCOSYLATED A1C: CPT

## 2024-07-22 PROCEDURE — 87184 SC STD DISK METHOD PER PLATE: CPT

## 2024-07-22 PROCEDURE — 94660 CPAP INITIATION&MGMT: CPT

## 2024-07-22 PROCEDURE — 87086 URINE CULTURE/COLONY COUNT: CPT

## 2024-07-22 PROCEDURE — 5A09457 ASSISTANCE WITH RESPIRATORY VENTILATION, 24-96 CONSECUTIVE HOURS, CONTINUOUS POSITIVE AIRWAY PRESSURE: ICD-10-PCS | Performed by: SURGERY

## 2024-07-22 PROCEDURE — 83880 ASSAY OF NATRIURETIC PEPTIDE: CPT

## 2024-07-22 PROCEDURE — 93010 ELECTROCARDIOGRAM REPORT: CPT | Performed by: INTERNAL MEDICINE

## 2024-07-22 PROCEDURE — 87040 BLOOD CULTURE FOR BACTERIA: CPT

## 2024-07-22 PROCEDURE — 93005 ELECTROCARDIOGRAM TRACING: CPT | Performed by: EMERGENCY MEDICINE

## 2024-07-22 PROCEDURE — 6360000002 HC RX W HCPCS: Performed by: EMERGENCY MEDICINE

## 2024-07-22 PROCEDURE — 94761 N-INVAS EAR/PLS OXIMETRY MLT: CPT

## 2024-07-22 PROCEDURE — 94640 AIRWAY INHALATION TREATMENT: CPT

## 2024-07-22 PROCEDURE — 2000000000 HC ICU R&B

## 2024-07-22 RX ORDER — ACETAMINOPHEN 650 MG/1
650 SUPPOSITORY RECTAL EVERY 6 HOURS PRN
Status: DISCONTINUED | OUTPATIENT
Start: 2024-07-22 | End: 2024-08-05 | Stop reason: HOSPADM

## 2024-07-22 RX ORDER — OMEPRAZOLE 20 MG/1
20 CAPSULE, DELAYED RELEASE ORAL DAILY
COMMUNITY

## 2024-07-22 RX ORDER — PRAVASTATIN SODIUM 40 MG
80 TABLET ORAL EVERY EVENING
Status: DISCONTINUED | OUTPATIENT
Start: 2024-07-22 | End: 2024-07-27

## 2024-07-22 RX ORDER — WARFARIN SODIUM 2 MG/1
4 TABLET ORAL
Status: DISPENSED | OUTPATIENT
Start: 2024-07-22 | End: 2024-07-23

## 2024-07-22 RX ORDER — ONDANSETRON 2 MG/ML
4 INJECTION INTRAMUSCULAR; INTRAVENOUS EVERY 6 HOURS PRN
Status: DISCONTINUED | OUTPATIENT
Start: 2024-07-22 | End: 2024-08-05 | Stop reason: HOSPADM

## 2024-07-22 RX ORDER — POTASSIUM CHLORIDE 7.45 MG/ML
10 INJECTION INTRAVENOUS PRN
Status: DISCONTINUED | OUTPATIENT
Start: 2024-07-22 | End: 2024-08-05 | Stop reason: HOSPADM

## 2024-07-22 RX ORDER — AMOXICILLIN 500 MG
1200 CAPSULE ORAL 2 TIMES DAILY
Status: DISCONTINUED | OUTPATIENT
Start: 2024-07-22 | End: 2024-07-22 | Stop reason: CLARIF

## 2024-07-22 RX ORDER — POTASSIUM CHLORIDE 20 MEQ/1
40 TABLET, EXTENDED RELEASE ORAL PRN
Status: DISCONTINUED | OUTPATIENT
Start: 2024-07-22 | End: 2024-08-05 | Stop reason: HOSPADM

## 2024-07-22 RX ORDER — MONTELUKAST SODIUM 10 MG/1
10 TABLET ORAL NIGHTLY
Status: DISCONTINUED | OUTPATIENT
Start: 2024-07-22 | End: 2024-07-27

## 2024-07-22 RX ORDER — SODIUM CHLORIDE 9 MG/ML
INJECTION, SOLUTION INTRAVENOUS PRN
Status: DISCONTINUED | OUTPATIENT
Start: 2024-07-22 | End: 2024-08-05 | Stop reason: HOSPADM

## 2024-07-22 RX ORDER — ONDANSETRON 4 MG/1
4 TABLET, ORALLY DISINTEGRATING ORAL EVERY 8 HOURS PRN
Status: DISCONTINUED | OUTPATIENT
Start: 2024-07-22 | End: 2024-08-05 | Stop reason: HOSPADM

## 2024-07-22 RX ORDER — FUROSEMIDE 10 MG/ML
20 INJECTION INTRAMUSCULAR; INTRAVENOUS ONCE
Status: COMPLETED | OUTPATIENT
Start: 2024-07-22 | End: 2024-07-22

## 2024-07-22 RX ORDER — POLYETHYLENE GLYCOL 3350 17 G/17G
17 POWDER, FOR SOLUTION ORAL DAILY PRN
Status: DISCONTINUED | OUTPATIENT
Start: 2024-07-22 | End: 2024-08-05 | Stop reason: HOSPADM

## 2024-07-22 RX ORDER — BUDESONIDE 0.5 MG/2ML
1 INHALANT ORAL
Status: DISCONTINUED | OUTPATIENT
Start: 2024-07-22 | End: 2024-07-23

## 2024-07-22 RX ORDER — PANTOPRAZOLE SODIUM 40 MG/1
40 TABLET, DELAYED RELEASE ORAL
Status: DISCONTINUED | OUTPATIENT
Start: 2024-07-23 | End: 2024-07-27

## 2024-07-22 RX ORDER — M-VIT,TX,IRON,MINS/CALC/FOLIC 27MG-0.4MG
1 TABLET ORAL DAILY
Status: DISCONTINUED | OUTPATIENT
Start: 2024-07-22 | End: 2024-07-27

## 2024-07-22 RX ORDER — FUROSEMIDE 40 MG/1
40 TABLET ORAL 2 TIMES DAILY
Status: ON HOLD | COMMUNITY
Start: 2024-07-19 | End: 2024-08-05 | Stop reason: HOSPADM

## 2024-07-22 RX ORDER — DILTIAZEM HYDROCHLORIDE 120 MG/1
120 CAPSULE, COATED, EXTENDED RELEASE ORAL DAILY
Status: DISCONTINUED | OUTPATIENT
Start: 2024-07-22 | End: 2024-07-27

## 2024-07-22 RX ORDER — WARFARIN SODIUM 4 MG/1
4 TABLET ORAL
Status: ON HOLD | COMMUNITY
End: 2024-08-05 | Stop reason: HOSPADM

## 2024-07-22 RX ORDER — FUROSEMIDE 40 MG/1
40 TABLET ORAL DAILY
COMMUNITY
Start: 2024-07-25

## 2024-07-22 RX ORDER — DEXTROSE MONOHYDRATE 100 MG/ML
INJECTION, SOLUTION INTRAVENOUS CONTINUOUS PRN
Status: DISCONTINUED | OUTPATIENT
Start: 2024-07-22 | End: 2024-07-30 | Stop reason: SDUPTHER

## 2024-07-22 RX ORDER — INSULIN LISPRO 100 [IU]/ML
0-8 INJECTION, SOLUTION INTRAVENOUS; SUBCUTANEOUS EVERY 4 HOURS
Status: DISCONTINUED | OUTPATIENT
Start: 2024-07-22 | End: 2024-08-05 | Stop reason: HOSPADM

## 2024-07-22 RX ORDER — FUROSEMIDE 10 MG/ML
40 INJECTION INTRAMUSCULAR; INTRAVENOUS 2 TIMES DAILY
Status: DISCONTINUED | OUTPATIENT
Start: 2024-07-22 | End: 2024-08-05 | Stop reason: HOSPADM

## 2024-07-22 RX ORDER — LEVALBUTEROL INHALATION SOLUTION 1.25 MG/3ML
1.25 SOLUTION RESPIRATORY (INHALATION) EVERY 6 HOURS
Status: DISCONTINUED | OUTPATIENT
Start: 2024-07-22 | End: 2024-08-05 | Stop reason: HOSPADM

## 2024-07-22 RX ORDER — MAGNESIUM SULFATE HEPTAHYDRATE 40 MG/ML
2000 INJECTION, SOLUTION INTRAVENOUS PRN
Status: DISCONTINUED | OUTPATIENT
Start: 2024-07-22 | End: 2024-08-05 | Stop reason: HOSPADM

## 2024-07-22 RX ORDER — MIDODRINE HYDROCHLORIDE 5 MG/1
5 TABLET ORAL 3 TIMES DAILY PRN
COMMUNITY

## 2024-07-22 RX ORDER — ACETAMINOPHEN 325 MG/1
650 TABLET ORAL EVERY 6 HOURS PRN
Status: DISCONTINUED | OUTPATIENT
Start: 2024-07-22 | End: 2024-08-05 | Stop reason: HOSPADM

## 2024-07-22 RX ORDER — SODIUM CHLORIDE 0.9 % (FLUSH) 0.9 %
5-40 SYRINGE (ML) INJECTION PRN
Status: DISCONTINUED | OUTPATIENT
Start: 2024-07-22 | End: 2024-08-05 | Stop reason: HOSPADM

## 2024-07-22 RX ORDER — CARVEDILOL 6.25 MG/1
6.25 TABLET ORAL 2 TIMES DAILY WITH MEALS
Status: DISCONTINUED | OUTPATIENT
Start: 2024-07-22 | End: 2024-07-27

## 2024-07-22 RX ORDER — SODIUM CHLORIDE 0.9 % (FLUSH) 0.9 %
5-40 SYRINGE (ML) INJECTION EVERY 12 HOURS SCHEDULED
Status: DISCONTINUED | OUTPATIENT
Start: 2024-07-22 | End: 2024-08-05 | Stop reason: HOSPADM

## 2024-07-22 RX ADMIN — SODIUM CHLORIDE, PRESERVATIVE FREE 10 ML: 5 INJECTION INTRAVENOUS at 22:55

## 2024-07-22 RX ADMIN — IPRATROPIUM BROMIDE 0.5 MG: 0.5 SOLUTION RESPIRATORY (INHALATION) at 19:17

## 2024-07-22 RX ADMIN — CEFEPIME 2000 MG: 2 INJECTION, POWDER, FOR SOLUTION INTRAVENOUS at 22:49

## 2024-07-22 RX ADMIN — BUDESONIDE 500 MCG: 0.5 SUSPENSION RESPIRATORY (INHALATION) at 19:17

## 2024-07-22 RX ADMIN — WATER 40 MG: 1 INJECTION INTRAMUSCULAR; INTRAVENOUS; SUBCUTANEOUS at 14:43

## 2024-07-22 RX ADMIN — CEFEPIME 2000 MG: 2 INJECTION, POWDER, FOR SOLUTION INTRAVENOUS at 14:45

## 2024-07-22 RX ADMIN — FUROSEMIDE 40 MG: 10 INJECTION, SOLUTION INTRAMUSCULAR; INTRAVENOUS at 18:41

## 2024-07-22 RX ADMIN — FUROSEMIDE 20 MG: 10 INJECTION, SOLUTION INTRAMUSCULAR; INTRAVENOUS at 13:39

## 2024-07-22 RX ADMIN — WATER 40 MG: 1 INJECTION INTRAMUSCULAR; INTRAVENOUS; SUBCUTANEOUS at 22:53

## 2024-07-22 RX ADMIN — FUROSEMIDE 20 MG: 10 INJECTION, SOLUTION INTRAMUSCULAR; INTRAVENOUS at 14:41

## 2024-07-22 RX ADMIN — VANCOMYCIN HYDROCHLORIDE 2000 MG: 1 INJECTION, POWDER, LYOPHILIZED, FOR SOLUTION INTRAVENOUS at 18:38

## 2024-07-22 RX ADMIN — LEVALBUTEROL HYDROCHLORIDE 1.25 MG: 1.25 SOLUTION RESPIRATORY (INHALATION) at 19:17

## 2024-07-22 RX ADMIN — ENOXAPARIN SODIUM 180 MG: 100 INJECTION SUBCUTANEOUS at 22:49

## 2024-07-22 ASSESSMENT — PAIN SCALES - GENERAL
PAINLEVEL_OUTOF10: 0
PAINLEVEL_OUTOF10: 0

## 2024-07-22 ASSESSMENT — PAIN - FUNCTIONAL ASSESSMENT: PAIN_FUNCTIONAL_ASSESSMENT: NONE - DENIES PAIN

## 2024-07-22 NOTE — ED PROVIDER NOTES
[facility] ICU  EMERGENCY DEPARTMENT ENCOUNTER      Pt Name: Shazia ROGERS  MRN: [id number]  Birthdate 1955  Date of evaluation: 7/22/24      CHIEF COMPLAINT       Chief Complaint   Patient presents with    Shortness of Breath    Altered Mental Status     HISTORY OF PRESENT ILLNESS   HPI 68 y.o. female presents with c/o ams, sob. Pt sent from nursing home for hypoxia / ams.  Chronic respiratory failure.  According to EMS report she wouldn't wear her O2 or her bipap mask.  .     REVIEW OF SYSTEMS       Review of Systems  Unable to obtain secondary to AMS    PAST MEDICAL HISTORY     Past Medical History:   Diagnosis Date    Anxiety     Asthma     Atrial fibrillation (HCC)     A-fib noted on 05/25/2024 visit to ER    Bronchitis     DDD (degenerative disc disease), lumbar     Depression     Diabetes mellitus (HCC)     Elevated glucose     Headache(784.0)     Hernia of abdominal cavity     HH (hiatus hernia)     Hyperlipemia, mixed     Hypertension     Osteoarthritis     Right femoral vein DVT (HCC) 05/2009    Dr. Elizabeth    Uterine hyperplasia        SURGICAL HISTORY       Past Surgical History:   Procedure Laterality Date    BREAST REDUCTION SURGERY  12/1997    BREAST REDUCTION SURGERY  12/1997    DILATION AND CURETTAGE OF UTERUS  10/08 and 09/07    HYSTERECTOMY (CERVIX STATUS UNKNOWN)  7/17/15    Dr Kaitlynn Jaime, endometial cancer, FIGO grade 1    TONSILLECTOMY AND ADENOIDECTOMY  1962    TYMPANOSTOMY TUBE PLACEMENT  03/1967       CURRENT MEDICATIONS       Current Discharge Medication List        CONTINUE these medications which have NOT CHANGED    Details   !! furosemide (LASIX) 40 MG tablet Take 1 tablet by mouth in the morning and 1 tablet in the evening. For 5 days.      !! furosemide (LASIX) 40 MG tablet Take 1 tablet by mouth daily To start on 7/25/24      glucagon, rDNA, 1 MG injection Inject 1 mg into the muscle as needed for Low blood sugar (patient unresponsive)      midodrine (PROAMATINE) 5 MG tablet

## 2024-07-22 NOTE — H&P
[facility]   IN-PATIENT SERVICE   [facility]    HISTORY AND PHYSICAL EXAMINATION            Date:   7/22/2024  Patient name:  Shazia ROGERS  Date of admission:  7/22/2024 11:25 AM  MRN:   [id number]  Account:  [id number]  YOB: 1955  PCP:    Jyoti Mauricio APRN - CNP  Room:   [address] [place] [address]  Code Status:    Prior    Chief Complaint:     Chief Complaint   Patient presents with    Shortness of Breath    Altered Mental Status       History Obtained From:     patient    History of Present Illness:     The patient is a 68 y.o.  Declined female who presents with Shortness of Breath and Altered Mental Status   and she is admitted to the hospital for the management of worsening shortness of breath.    68-year-old female with history of chronic hypoxemic respiratory failure, on 2 L nasal cannula, chronic hypercapnic respiratory failure with pCO2 in the 80s to mid 90s, predominantly secondary to obesity hypoventilation syndrome, SHWETA, noncompliant with CPAP, asthma, pulmonary embolism/chronic DVT with goal INR of 3-3 0.5, atrial fibrillation, hypertension, morbid obesity, hyperlipidemia, depression, was brought to the emergency room for acute on chronic hypoxemic respiratory failure and somnolence.    When seen the patient opens eyes, denies having any chest pain, denies fevers chills, is on the BiPAP.  Unable to really tell me if she has noticed worsening lower extremity edema, if she has a cough and is bringing up sputum.  She denies any fevers chills, denies any genitourinary or GI concerns    The patient was recently discharged from the hospital on 7/13/2024 after admission for similar concerns, had presented with altered mental status and shortness of breath.  Was treated for SHWETA/OHS, was placed on the BiPAP, received diuretics given her HFpEF,.  She completed a course of antibiotics with cephalexin.  Was bridged with Lovenox for goal INR of 3-3 0.5 given history of atrial

## 2024-07-22 NOTE — FLOWSHEET NOTE
Patient pulling at lines, pulled off bipap mask, pulled out IV. Writer attempted to reorient patient. Patient closed eyes and became quiet.

## 2024-07-23 LAB
ANION GAP SERPL CALCULATED.3IONS-SCNC: 9 MMOL/L (ref 9–17)
ARTERIAL PATENCY WRIST A: ABNORMAL
BASOPHILS # BLD: 0 K/UL (ref 0–0.2)
BASOPHILS NFR BLD: 0 % (ref 0–2)
BDY SITE: ABNORMAL
BODY TEMPERATURE: 37
BUN SERPL-MCNC: 22 MG/DL (ref 8–23)
CALCIUM SERPL-MCNC: 9.3 MG/DL (ref 8.6–10.4)
CHLORIDE SERPL-SCNC: 85 MMOL/L (ref 98–107)
CO2 SERPL-SCNC: 45 MMOL/L (ref 20–31)
COHGB MFR BLD: 2 % (ref 0–5)
CREAT SERPL-MCNC: 0.8 MG/DL (ref 0.5–0.9)
EOSINOPHIL # BLD: 0 K/UL (ref 0–0.4)
EOSINOPHILS RELATIVE PERCENT: 0 % (ref 0–4)
ERYTHROCYTE [DISTWIDTH] IN BLOOD BY AUTOMATED COUNT: 19 % (ref 11.5–14.9)
GAS FLOW.O2 O2 DELIVERY SYS: ABNORMAL L/MIN
GFR, ESTIMATED: 81 ML/MIN/1.73M2
GLUCOSE BLD-MCNC: 215 MG/DL (ref 65–105)
GLUCOSE BLD-MCNC: 215 MG/DL (ref 65–105)
GLUCOSE BLD-MCNC: 224 MG/DL (ref 65–105)
GLUCOSE BLD-MCNC: 226 MG/DL (ref 65–105)
GLUCOSE SERPL-MCNC: 222 MG/DL (ref 70–99)
HCO3 ARTERIAL: 56.6 MMOL/L (ref 22–26)
HCT VFR BLD AUTO: 29.3 % (ref 36–46)
HGB BLD-MCNC: 9 G/DL (ref 12–16)
INR PPP: 3
LYMPHOCYTES NFR BLD: 0.14 K/UL (ref 1–4.8)
LYMPHOCYTES RELATIVE PERCENT: 3 % (ref 24–44)
MAGNESIUM SERPL-MCNC: 1.6 MG/DL (ref 1.6–2.6)
MCH RBC QN AUTO: 26.4 PG (ref 26–34)
MCHC RBC AUTO-ENTMCNC: 30.6 G/DL (ref 31–37)
MCV RBC AUTO: 86.3 FL (ref 80–100)
METHEMOGLOBIN: 0.9 % (ref 0–1.9)
MONOCYTES NFR BLD: 0 % (ref 1–7)
MONOCYTES NFR BLD: 0 K/UL (ref 0.1–1.3)
MORPHOLOGY: ABNORMAL
MRSA, DNA, NASAL: ABNORMAL
NEUTROPHILS NFR BLD: 97 % (ref 36–66)
NEUTS SEG NFR BLD: 4.36 K/UL (ref 1.3–9.1)
O2 SAT, ARTERIAL: 89.6 % (ref 95–98)
PCO2 ARTERIAL: 81.5 MMHG (ref 35–45)
PH ARTERIAL: 7.45 (ref 7.35–7.45)
PLATELET # BLD AUTO: 242 K/UL (ref 150–450)
PMV BLD AUTO: 7.6 FL (ref 6–12)
PO2 ARTERIAL: 64.5 MMHG (ref 80–100)
POSITIVE BASE EXCESS, ART: 32.6 MMOL/L (ref 0–2)
POTASSIUM SERPL-SCNC: 3.6 MMOL/L (ref 3.7–5.3)
PROTHROMBIN TIME: 31.1 SEC (ref 11.8–14.6)
PT. POSITION: ABNORMAL
RBC # BLD AUTO: 3.4 M/UL (ref 4–5.2)
RESPIRATORY RATE: 24
SODIUM SERPL-SCNC: 139 MMOL/L (ref 135–144)
SPECIMEN DESCRIPTION: ABNORMAL
TEXT FOR RESPIRATORY: ABNORMAL
VENTILATION MODE VENT: ABNORMAL
WBC OTHER # BLD: 4.5 K/UL (ref 3.5–11)

## 2024-07-23 PROCEDURE — 6370000000 HC RX 637 (ALT 250 FOR IP): Performed by: INTERNAL MEDICINE

## 2024-07-23 PROCEDURE — 94640 AIRWAY INHALATION TREATMENT: CPT

## 2024-07-23 PROCEDURE — 82947 ASSAY GLUCOSE BLOOD QUANT: CPT

## 2024-07-23 PROCEDURE — 82805 BLOOD GASES W/O2 SATURATION: CPT

## 2024-07-23 PROCEDURE — 2700000000 HC OXYGEN THERAPY PER DAY

## 2024-07-23 PROCEDURE — 36415 COLL VENOUS BLD VENIPUNCTURE: CPT

## 2024-07-23 PROCEDURE — 94660 CPAP INITIATION&MGMT: CPT

## 2024-07-23 PROCEDURE — 85025 COMPLETE CBC W/AUTO DIFF WBC: CPT

## 2024-07-23 PROCEDURE — 36600 WITHDRAWAL OF ARTERIAL BLOOD: CPT

## 2024-07-23 PROCEDURE — 2580000003 HC RX 258: Performed by: INTERNAL MEDICINE

## 2024-07-23 PROCEDURE — 2000000000 HC ICU R&B

## 2024-07-23 PROCEDURE — 6360000002 HC RX W HCPCS: Performed by: INTERNAL MEDICINE

## 2024-07-23 PROCEDURE — 2500000003 HC RX 250 WO HCPCS: Performed by: INTERNAL MEDICINE

## 2024-07-23 PROCEDURE — 85610 PROTHROMBIN TIME: CPT

## 2024-07-23 PROCEDURE — 83735 ASSAY OF MAGNESIUM: CPT

## 2024-07-23 PROCEDURE — 99291 CRITICAL CARE FIRST HOUR: CPT | Performed by: INTERNAL MEDICINE

## 2024-07-23 PROCEDURE — 80048 BASIC METABOLIC PNL TOTAL CA: CPT

## 2024-07-23 PROCEDURE — 94761 N-INVAS EAR/PLS OXIMETRY MLT: CPT

## 2024-07-23 RX ORDER — BUDESONIDE 0.5 MG/2ML
0.5 INHALANT ORAL
Status: DISCONTINUED | OUTPATIENT
Start: 2024-07-23 | End: 2024-08-05

## 2024-07-23 RX ORDER — DEXMEDETOMIDINE HYDROCHLORIDE 4 UG/ML
.1-1.5 INJECTION, SOLUTION INTRAVENOUS CONTINUOUS
Status: DISCONTINUED | OUTPATIENT
Start: 2024-07-23 | End: 2024-07-28

## 2024-07-23 RX ADMIN — INSULIN LISPRO 2 UNITS: 100 INJECTION, SOLUTION INTRAVENOUS; SUBCUTANEOUS at 11:49

## 2024-07-23 RX ADMIN — DEXMEDETOMIDINE HYDROCHLORIDE 0.2 MCG/KG/HR: 400 INJECTION INTRAVENOUS at 01:26

## 2024-07-23 RX ADMIN — ENOXAPARIN SODIUM 180 MG: 100 INJECTION SUBCUTANEOUS at 08:51

## 2024-07-23 RX ADMIN — FUROSEMIDE 40 MG: 10 INJECTION, SOLUTION INTRAMUSCULAR; INTRAVENOUS at 21:40

## 2024-07-23 RX ADMIN — LEVALBUTEROL HYDROCHLORIDE 1.25 MG: 1.25 SOLUTION RESPIRATORY (INHALATION) at 15:00

## 2024-07-23 RX ADMIN — IPRATROPIUM BROMIDE 0.5 MG: 0.5 SOLUTION RESPIRATORY (INHALATION) at 19:20

## 2024-07-23 RX ADMIN — CEFEPIME 2000 MG: 2 INJECTION, POWDER, FOR SOLUTION INTRAVENOUS at 22:52

## 2024-07-23 RX ADMIN — WATER 40 MG: 1 INJECTION INTRAMUSCULAR; INTRAVENOUS; SUBCUTANEOUS at 14:20

## 2024-07-23 RX ADMIN — IPRATROPIUM BROMIDE 0.5 MG: 0.5 SOLUTION RESPIRATORY (INHALATION) at 07:47

## 2024-07-23 RX ADMIN — VANCOMYCIN HYDROCHLORIDE 1000 MG: 1 INJECTION, POWDER, LYOPHILIZED, FOR SOLUTION INTRAVENOUS at 05:19

## 2024-07-23 RX ADMIN — DEXMEDETOMIDINE HYDROCHLORIDE 0.4 MCG/KG/HR: 400 INJECTION INTRAVENOUS at 06:38

## 2024-07-23 RX ADMIN — LEVALBUTEROL HYDROCHLORIDE 1.25 MG: 1.25 SOLUTION RESPIRATORY (INHALATION) at 19:20

## 2024-07-23 RX ADMIN — MICONAZOLE NITRATE: 2 CREAM TOPICAL at 08:54

## 2024-07-23 RX ADMIN — MONTELUKAST 10 MG: 10 TABLET, FILM COATED ORAL at 21:40

## 2024-07-23 RX ADMIN — SODIUM CHLORIDE, PRESERVATIVE FREE 10 ML: 5 INJECTION INTRAVENOUS at 21:37

## 2024-07-23 RX ADMIN — CARVEDILOL 6.25 MG: 6.25 TABLET, FILM COATED ORAL at 21:40

## 2024-07-23 RX ADMIN — LEVALBUTEROL HYDROCHLORIDE 1.25 MG: 1.25 SOLUTION RESPIRATORY (INHALATION) at 07:47

## 2024-07-23 RX ADMIN — INSULIN LISPRO 2 UNITS: 100 INJECTION, SOLUTION INTRAVENOUS; SUBCUTANEOUS at 08:52

## 2024-07-23 RX ADMIN — WATER 40 MG: 1 INJECTION INTRAMUSCULAR; INTRAVENOUS; SUBCUTANEOUS at 05:29

## 2024-07-23 RX ADMIN — BUDESONIDE INHALATION 500 MCG: 0.5 SUSPENSION RESPIRATORY (INHALATION) at 19:20

## 2024-07-23 RX ADMIN — LEVALBUTEROL HYDROCHLORIDE 1.25 MG: 1.25 SOLUTION RESPIRATORY (INHALATION) at 01:03

## 2024-07-23 RX ADMIN — ENOXAPARIN SODIUM 180 MG: 100 INJECTION SUBCUTANEOUS at 21:40

## 2024-07-23 RX ADMIN — CEFEPIME 2000 MG: 2 INJECTION, POWDER, FOR SOLUTION INTRAVENOUS at 14:22

## 2024-07-23 RX ADMIN — FUROSEMIDE 40 MG: 10 INJECTION, SOLUTION INTRAMUSCULAR; INTRAVENOUS at 08:48

## 2024-07-23 RX ADMIN — INSULIN LISPRO 2 UNITS: 100 INJECTION, SOLUTION INTRAVENOUS; SUBCUTANEOUS at 21:38

## 2024-07-23 RX ADMIN — IPRATROPIUM BROMIDE 0.5 MG: 0.5 SOLUTION RESPIRATORY (INHALATION) at 15:00

## 2024-07-23 RX ADMIN — MICONAZOLE NITRATE: 2 CREAM TOPICAL at 21:36

## 2024-07-23 RX ADMIN — CEFEPIME 2000 MG: 2 INJECTION, POWDER, FOR SOLUTION INTRAVENOUS at 07:11

## 2024-07-23 RX ADMIN — PRAVASTATIN SODIUM 80 MG: 40 TABLET ORAL at 21:40

## 2024-07-23 RX ADMIN — WATER 40 MG: 1 INJECTION INTRAMUSCULAR; INTRAVENOUS; SUBCUTANEOUS at 21:39

## 2024-07-23 ASSESSMENT — PAIN SCALES - GENERAL: PAINLEVEL_OUTOF10: 0

## 2024-07-23 NOTE — ACP (ADVANCE CARE PLANNING)
..Advance Care Planning     Palliative Team Advance Care Planning (ACP) Conversation    Date of Conversation: 07/23/24    Individuals present for the conversation:  I talk at length to the patient's brother Álvaro Nuñez as he lives in [place]. He states that he has the HCPOA and will email it to me.      ACP documents on file prior to discussion:  -Power of  for Healthcare  -Living Will    Previously completed document/s not on file: Not discussed.    Healthcare Decision Maker:    Primary Decision Maker: Álvaro Nuñez - Brother/Sister - [phone]     Conversation Summary:  I talk at length to the patient's brother Álvaro and through this journey of many admissions and long term care at [facility], the patient mentioned to brother Álvaro that she wanted to be a DNR. The decision is to change to DNRCC-A no intubation and after talking ot a cousin and close friend, Álvaro is agreeable to talk to [facility] for information.     Resuscitation Status:   Code Status: DNR-CCA     Documentation Completed:  -No new documents completed.        Meghan Green RN

## 2024-07-24 LAB
ANION GAP SERPL CALCULATED.3IONS-SCNC: ABNORMAL MMOL/L (ref 9–17)
BASOPHILS # BLD: 0 K/UL (ref 0–0.2)
BASOPHILS NFR BLD: 0 % (ref 0–2)
BUN SERPL-MCNC: 27 MG/DL (ref 8–23)
CALCIUM SERPL-MCNC: 9.4 MG/DL (ref 8.6–10.4)
CHLORIDE SERPL-SCNC: 87 MMOL/L (ref 98–107)
CO2 SERPL-SCNC: >45 MMOL/L (ref 20–31)
CREAT SERPL-MCNC: 0.9 MG/DL (ref 0.5–0.9)
EOSINOPHIL # BLD: 0.07 K/UL (ref 0–0.4)
EOSINOPHILS RELATIVE PERCENT: 1 % (ref 0–4)
ERYTHROCYTE [DISTWIDTH] IN BLOOD BY AUTOMATED COUNT: 18.6 % (ref 11.5–14.9)
GFR, ESTIMATED: 70 ML/MIN/1.73M2
GLUCOSE BLD-MCNC: 188 MG/DL (ref 65–105)
GLUCOSE BLD-MCNC: 207 MG/DL (ref 65–105)
GLUCOSE BLD-MCNC: 208 MG/DL (ref 65–105)
GLUCOSE BLD-MCNC: 208 MG/DL (ref 65–105)
GLUCOSE BLD-MCNC: 222 MG/DL (ref 65–105)
GLUCOSE BLD-MCNC: 235 MG/DL (ref 65–105)
GLUCOSE SERPL-MCNC: 213 MG/DL (ref 70–99)
HCT VFR BLD AUTO: 30.1 % (ref 36–46)
HGB BLD-MCNC: 9.2 G/DL (ref 12–16)
INR PPP: 3.5
LYMPHOCYTES NFR BLD: 0.43 K/UL (ref 1–4.8)
LYMPHOCYTES RELATIVE PERCENT: 6 % (ref 24–44)
MCH RBC QN AUTO: 25.9 PG (ref 26–34)
MCHC RBC AUTO-ENTMCNC: 30.6 G/DL (ref 31–37)
MCV RBC AUTO: 84.6 FL (ref 80–100)
MONOCYTES NFR BLD: 0.29 K/UL (ref 0.1–1.3)
MONOCYTES NFR BLD: 4 % (ref 1–7)
MORPHOLOGY: ABNORMAL
NEUTROPHILS NFR BLD: 89 % (ref 36–66)
NEUTS SEG NFR BLD: 6.41 K/UL (ref 1.3–9.1)
PLATELET # BLD AUTO: 247 K/UL (ref 150–450)
PMV BLD AUTO: 8.3 FL (ref 6–12)
POTASSIUM SERPL-SCNC: 3.5 MMOL/L (ref 3.7–5.3)
PROTHROMBIN TIME: 34.9 SEC (ref 11.8–14.6)
RBC # BLD AUTO: 3.56 M/UL (ref 4–5.2)
SODIUM SERPL-SCNC: 141 MMOL/L (ref 135–144)
WBC OTHER # BLD: 7.2 K/UL (ref 3.5–11)

## 2024-07-24 PROCEDURE — 2580000003 HC RX 258: Performed by: INTERNAL MEDICINE

## 2024-07-24 PROCEDURE — 94640 AIRWAY INHALATION TREATMENT: CPT

## 2024-07-24 PROCEDURE — 6370000000 HC RX 637 (ALT 250 FOR IP): Performed by: INTERNAL MEDICINE

## 2024-07-24 PROCEDURE — 2700000000 HC OXYGEN THERAPY PER DAY

## 2024-07-24 PROCEDURE — 36415 COLL VENOUS BLD VENIPUNCTURE: CPT

## 2024-07-24 PROCEDURE — 2000000000 HC ICU R&B

## 2024-07-24 PROCEDURE — 6360000002 HC RX W HCPCS: Performed by: INTERNAL MEDICINE

## 2024-07-24 PROCEDURE — 99223 1ST HOSP IP/OBS HIGH 75: CPT | Performed by: SURGERY

## 2024-07-24 PROCEDURE — 85610 PROTHROMBIN TIME: CPT

## 2024-07-24 PROCEDURE — 82947 ASSAY GLUCOSE BLOOD QUANT: CPT

## 2024-07-24 PROCEDURE — 94660 CPAP INITIATION&MGMT: CPT

## 2024-07-24 PROCEDURE — 85025 COMPLETE CBC W/AUTO DIFF WBC: CPT

## 2024-07-24 PROCEDURE — 80048 BASIC METABOLIC PNL TOTAL CA: CPT

## 2024-07-24 PROCEDURE — 94761 N-INVAS EAR/PLS OXIMETRY MLT: CPT

## 2024-07-24 PROCEDURE — 99233 SBSQ HOSP IP/OBS HIGH 50: CPT | Performed by: INTERNAL MEDICINE

## 2024-07-24 RX ADMIN — INSULIN LISPRO 2 UNITS: 100 INJECTION, SOLUTION INTRAVENOUS; SUBCUTANEOUS at 21:36

## 2024-07-24 RX ADMIN — CARVEDILOL 6.25 MG: 6.25 TABLET, FILM COATED ORAL at 07:52

## 2024-07-24 RX ADMIN — BUDESONIDE INHALATION 500 MCG: 0.5 SUSPENSION RESPIRATORY (INHALATION) at 08:11

## 2024-07-24 RX ADMIN — SODIUM CHLORIDE, PRESERVATIVE FREE 10 ML: 5 INJECTION INTRAVENOUS at 21:36

## 2024-07-24 RX ADMIN — CARVEDILOL 6.25 MG: 6.25 TABLET, FILM COATED ORAL at 17:01

## 2024-07-24 RX ADMIN — LEVALBUTEROL HYDROCHLORIDE 1.25 MG: 1.25 SOLUTION RESPIRATORY (INHALATION) at 08:11

## 2024-07-24 RX ADMIN — Medication 1 TABLET: at 07:52

## 2024-07-24 RX ADMIN — IPRATROPIUM BROMIDE 0.5 MG: 0.5 SOLUTION RESPIRATORY (INHALATION) at 08:11

## 2024-07-24 RX ADMIN — CEFEPIME 2000 MG: 2 INJECTION, POWDER, FOR SOLUTION INTRAVENOUS at 16:08

## 2024-07-24 RX ADMIN — IPRATROPIUM BROMIDE 0.5 MG: 0.5 SOLUTION RESPIRATORY (INHALATION) at 01:51

## 2024-07-24 RX ADMIN — LEVALBUTEROL HYDROCHLORIDE 1.25 MG: 1.25 SOLUTION RESPIRATORY (INHALATION) at 19:08

## 2024-07-24 RX ADMIN — PRAVASTATIN SODIUM 80 MG: 40 TABLET ORAL at 16:59

## 2024-07-24 RX ADMIN — MONTELUKAST 10 MG: 10 TABLET, FILM COATED ORAL at 21:36

## 2024-07-24 RX ADMIN — WATER 40 MG: 1 INJECTION INTRAMUSCULAR; INTRAVENOUS; SUBCUTANEOUS at 21:36

## 2024-07-24 RX ADMIN — ENOXAPARIN SODIUM 180 MG: 100 INJECTION SUBCUTANEOUS at 07:52

## 2024-07-24 RX ADMIN — INSULIN LISPRO 2 UNITS: 100 INJECTION, SOLUTION INTRAVENOUS; SUBCUTANEOUS at 11:23

## 2024-07-24 RX ADMIN — IPRATROPIUM BROMIDE 0.5 MG: 0.5 SOLUTION RESPIRATORY (INHALATION) at 13:04

## 2024-07-24 RX ADMIN — IPRATROPIUM BROMIDE 0.5 MG: 0.5 SOLUTION RESPIRATORY (INHALATION) at 19:08

## 2024-07-24 RX ADMIN — MICONAZOLE NITRATE: 2 CREAM TOPICAL at 07:53

## 2024-07-24 RX ADMIN — INSULIN LISPRO 2 UNITS: 100 INJECTION, SOLUTION INTRAVENOUS; SUBCUTANEOUS at 07:23

## 2024-07-24 RX ADMIN — CEFEPIME 2000 MG: 2 INJECTION, POWDER, FOR SOLUTION INTRAVENOUS at 07:20

## 2024-07-24 RX ADMIN — POTASSIUM CHLORIDE 40 MEQ: 1500 TABLET, EXTENDED RELEASE ORAL at 16:59

## 2024-07-24 RX ADMIN — CEFEPIME 2000 MG: 2 INJECTION, POWDER, FOR SOLUTION INTRAVENOUS at 21:38

## 2024-07-24 RX ADMIN — INSULIN LISPRO 2 UNITS: 100 INJECTION, SOLUTION INTRAVENOUS; SUBCUTANEOUS at 18:17

## 2024-07-24 RX ADMIN — MICONAZOLE NITRATE: 2 CREAM TOPICAL at 21:37

## 2024-07-24 RX ADMIN — FUROSEMIDE 40 MG: 10 INJECTION, SOLUTION INTRAMUSCULAR; INTRAVENOUS at 07:52

## 2024-07-24 RX ADMIN — FUROSEMIDE 40 MG: 10 INJECTION, SOLUTION INTRAMUSCULAR; INTRAVENOUS at 16:59

## 2024-07-24 RX ADMIN — SODIUM CHLORIDE, PRESERVATIVE FREE 10 ML: 5 INJECTION INTRAVENOUS at 07:58

## 2024-07-24 RX ADMIN — WATER 40 MG: 1 INJECTION INTRAMUSCULAR; INTRAVENOUS; SUBCUTANEOUS at 07:18

## 2024-07-24 RX ADMIN — DILTIAZEM HYDROCHLORIDE 120 MG: 120 CAPSULE, COATED, EXTENDED RELEASE ORAL at 07:52

## 2024-07-24 RX ADMIN — BUDESONIDE INHALATION 500 MCG: 0.5 SUSPENSION RESPIRATORY (INHALATION) at 19:08

## 2024-07-24 RX ADMIN — LEVALBUTEROL HYDROCHLORIDE 1.25 MG: 1.25 SOLUTION RESPIRATORY (INHALATION) at 13:04

## 2024-07-24 RX ADMIN — WATER 40 MG: 1 INJECTION INTRAMUSCULAR; INTRAVENOUS; SUBCUTANEOUS at 16:07

## 2024-07-24 RX ADMIN — PANTOPRAZOLE SODIUM 40 MG: 40 TABLET, DELAYED RELEASE ORAL at 07:19

## 2024-07-24 RX ADMIN — LEVALBUTEROL HYDROCHLORIDE 1.25 MG: 1.25 SOLUTION RESPIRATORY (INHALATION) at 01:51

## 2024-07-24 RX ADMIN — INSULIN LISPRO 2 UNITS: 100 INJECTION, SOLUTION INTRAVENOUS; SUBCUTANEOUS at 16:07

## 2024-07-24 ASSESSMENT — PAIN SCALES - GENERAL: PAINLEVEL_OUTOF10: 0

## 2024-07-25 ENCOUNTER — ANESTHESIA EVENT (OUTPATIENT)
Dept: OPERATING ROOM | Age: 69
End: 2024-07-25
Payer: MEDICARE

## 2024-07-25 LAB
ABO + RH BLD: NORMAL
ANION GAP SERPL CALCULATED.3IONS-SCNC: 7 MMOL/L (ref 9–17)
ARM BAND NUMBER: NORMAL
BASOPHILS # BLD: 0 K/UL (ref 0–0.2)
BASOPHILS NFR BLD: 0 % (ref 0–2)
BLOOD BANK SAMPLE EXPIRATION: NORMAL
BLOOD GROUP ANTIBODIES SERPL: NEGATIVE
BUN SERPL-MCNC: 27 MG/DL (ref 8–23)
CALCIUM SERPL-MCNC: 9.3 MG/DL (ref 8.6–10.4)
CHLORIDE SERPL-SCNC: 88 MMOL/L (ref 98–107)
CO2 SERPL-SCNC: 43 MMOL/L (ref 20–31)
CREAT SERPL-MCNC: 0.8 MG/DL (ref 0.5–0.9)
EOSINOPHIL # BLD: 0 K/UL (ref 0–0.4)
EOSINOPHILS RELATIVE PERCENT: 0 % (ref 0–4)
ERYTHROCYTE [DISTWIDTH] IN BLOOD BY AUTOMATED COUNT: 18.7 % (ref 11.5–14.9)
GFR, ESTIMATED: 81 ML/MIN/1.73M2
GLUCOSE BLD-MCNC: 168 MG/DL (ref 65–105)
GLUCOSE BLD-MCNC: 206 MG/DL (ref 65–105)
GLUCOSE BLD-MCNC: 211 MG/DL (ref 65–105)
GLUCOSE BLD-MCNC: 214 MG/DL (ref 65–105)
GLUCOSE BLD-MCNC: 214 MG/DL (ref 65–105)
GLUCOSE BLD-MCNC: 216 MG/DL (ref 65–105)
GLUCOSE SERPL-MCNC: 227 MG/DL (ref 70–99)
HCT VFR BLD AUTO: 29.5 % (ref 36–46)
HGB BLD-MCNC: 9 G/DL (ref 12–16)
INR PPP: 2.4
INR PPP: 3.4
LYMPHOCYTES NFR BLD: 0.15 K/UL (ref 1–4.8)
LYMPHOCYTES RELATIVE PERCENT: 3 % (ref 24–44)
MAGNESIUM SERPL-MCNC: 1.7 MG/DL (ref 1.6–2.6)
MCH RBC QN AUTO: 25.5 PG (ref 26–34)
MCHC RBC AUTO-ENTMCNC: 30.4 G/DL (ref 31–37)
MCV RBC AUTO: 83.7 FL (ref 80–100)
MONOCYTES NFR BLD: 0.2 K/UL (ref 0.1–1.3)
MONOCYTES NFR BLD: 4 % (ref 1–7)
MORPHOLOGY: ABNORMAL
NEUTROPHILS NFR BLD: 93 % (ref 36–66)
NEUTS SEG NFR BLD: 4.65 K/UL (ref 1.3–9.1)
PLATELET # BLD AUTO: 251 K/UL (ref 150–450)
PMV BLD AUTO: 7.2 FL (ref 6–12)
POTASSIUM SERPL-SCNC: 3.3 MMOL/L (ref 3.7–5.3)
PROTHROMBIN TIME: 26.2 SEC (ref 11.8–14.6)
PROTHROMBIN TIME: 34.3 SEC (ref 11.8–14.6)
RBC # BLD AUTO: 3.52 M/UL (ref 4–5.2)
SODIUM SERPL-SCNC: 138 MMOL/L (ref 135–144)
WBC OTHER # BLD: 5 K/UL (ref 3.5–11)

## 2024-07-25 PROCEDURE — 6360000002 HC RX W HCPCS: Performed by: INTERNAL MEDICINE

## 2024-07-25 PROCEDURE — 2580000003 HC RX 258: Performed by: INTERNAL MEDICINE

## 2024-07-25 PROCEDURE — 30233L1 TRANSFUSION OF NONAUTOLOGOUS FRESH PLASMA INTO PERIPHERAL VEIN, PERCUTANEOUS APPROACH: ICD-10-PCS | Performed by: INTERNAL MEDICINE

## 2024-07-25 PROCEDURE — 2700000000 HC OXYGEN THERAPY PER DAY

## 2024-07-25 PROCEDURE — 30233K1 TRANSFUSION OF NONAUTOLOGOUS FROZEN PLASMA INTO PERIPHERAL VEIN, PERCUTANEOUS APPROACH: ICD-10-PCS | Performed by: INTERNAL MEDICINE

## 2024-07-25 PROCEDURE — 85025 COMPLETE CBC W/AUTO DIFF WBC: CPT

## 2024-07-25 PROCEDURE — 36430 TRANSFUSION BLD/BLD COMPNT: CPT

## 2024-07-25 PROCEDURE — P9017 PLASMA 1 DONOR FRZ W/IN 8 HR: HCPCS

## 2024-07-25 PROCEDURE — 6370000000 HC RX 637 (ALT 250 FOR IP): Performed by: INTERNAL MEDICINE

## 2024-07-25 PROCEDURE — 86850 RBC ANTIBODY SCREEN: CPT

## 2024-07-25 PROCEDURE — 86927 PLASMA FRESH FROZEN: CPT

## 2024-07-25 PROCEDURE — 86900 BLOOD TYPING SEROLOGIC ABO: CPT

## 2024-07-25 PROCEDURE — 36415 COLL VENOUS BLD VENIPUNCTURE: CPT

## 2024-07-25 PROCEDURE — 94761 N-INVAS EAR/PLS OXIMETRY MLT: CPT

## 2024-07-25 PROCEDURE — 99232 SBSQ HOSP IP/OBS MODERATE 35: CPT | Performed by: SURGERY

## 2024-07-25 PROCEDURE — 83735 ASSAY OF MAGNESIUM: CPT

## 2024-07-25 PROCEDURE — 94640 AIRWAY INHALATION TREATMENT: CPT

## 2024-07-25 PROCEDURE — 85610 PROTHROMBIN TIME: CPT

## 2024-07-25 PROCEDURE — 86901 BLOOD TYPING SEROLOGIC RH(D): CPT

## 2024-07-25 PROCEDURE — 99233 SBSQ HOSP IP/OBS HIGH 50: CPT | Performed by: INTERNAL MEDICINE

## 2024-07-25 PROCEDURE — 2000000000 HC ICU R&B

## 2024-07-25 PROCEDURE — 82947 ASSAY GLUCOSE BLOOD QUANT: CPT

## 2024-07-25 PROCEDURE — 94660 CPAP INITIATION&MGMT: CPT

## 2024-07-25 PROCEDURE — 80048 BASIC METABOLIC PNL TOTAL CA: CPT

## 2024-07-25 RX ORDER — SODIUM CHLORIDE 9 MG/ML
INJECTION, SOLUTION INTRAVENOUS PRN
Status: DISCONTINUED | OUTPATIENT
Start: 2024-07-25 | End: 2024-07-31

## 2024-07-25 RX ORDER — PHYTONADIONE 5 MG/1
10 TABLET ORAL ONCE
Status: COMPLETED | OUTPATIENT
Start: 2024-07-25 | End: 2024-07-25

## 2024-07-25 RX ADMIN — LEVALBUTEROL HYDROCHLORIDE 1.25 MG: 1.25 SOLUTION RESPIRATORY (INHALATION) at 19:14

## 2024-07-25 RX ADMIN — CEFEPIME 2000 MG: 2 INJECTION, POWDER, FOR SOLUTION INTRAVENOUS at 06:28

## 2024-07-25 RX ADMIN — CEFEPIME 2000 MG: 2 INJECTION, POWDER, FOR SOLUTION INTRAVENOUS at 15:47

## 2024-07-25 RX ADMIN — IPRATROPIUM BROMIDE 0.5 MG: 0.5 SOLUTION RESPIRATORY (INHALATION) at 19:14

## 2024-07-25 RX ADMIN — DILTIAZEM HYDROCHLORIDE 120 MG: 120 CAPSULE, COATED, EXTENDED RELEASE ORAL at 07:53

## 2024-07-25 RX ADMIN — CARVEDILOL 6.25 MG: 6.25 TABLET, FILM COATED ORAL at 07:53

## 2024-07-25 RX ADMIN — IPRATROPIUM BROMIDE 0.5 MG: 0.5 SOLUTION RESPIRATORY (INHALATION) at 13:37

## 2024-07-25 RX ADMIN — INSULIN LISPRO 2 UNITS: 100 INJECTION, SOLUTION INTRAVENOUS; SUBCUTANEOUS at 10:24

## 2024-07-25 RX ADMIN — WATER 40 MG: 1 INJECTION INTRAMUSCULAR; INTRAVENOUS; SUBCUTANEOUS at 15:46

## 2024-07-25 RX ADMIN — PHYTONADIONE 10 MG: 5 TABLET ORAL at 10:24

## 2024-07-25 RX ADMIN — INSULIN LISPRO 2 UNITS: 100 INJECTION, SOLUTION INTRAVENOUS; SUBCUTANEOUS at 15:46

## 2024-07-25 RX ADMIN — IPRATROPIUM BROMIDE 0.5 MG: 0.5 SOLUTION RESPIRATORY (INHALATION) at 01:15

## 2024-07-25 RX ADMIN — FUROSEMIDE 40 MG: 10 INJECTION, SOLUTION INTRAMUSCULAR; INTRAVENOUS at 17:43

## 2024-07-25 RX ADMIN — LEVALBUTEROL HYDROCHLORIDE 1.25 MG: 1.25 SOLUTION RESPIRATORY (INHALATION) at 01:15

## 2024-07-25 RX ADMIN — LEVALBUTEROL HYDROCHLORIDE 1.25 MG: 1.25 SOLUTION RESPIRATORY (INHALATION) at 07:42

## 2024-07-25 RX ADMIN — PRAVASTATIN SODIUM 80 MG: 40 TABLET ORAL at 17:44

## 2024-07-25 RX ADMIN — INSULIN LISPRO 2 UNITS: 100 INJECTION, SOLUTION INTRAVENOUS; SUBCUTANEOUS at 20:39

## 2024-07-25 RX ADMIN — MICONAZOLE NITRATE: 2 CREAM TOPICAL at 07:53

## 2024-07-25 RX ADMIN — SODIUM CHLORIDE, PRESERVATIVE FREE 10 ML: 5 INJECTION INTRAVENOUS at 20:13

## 2024-07-25 RX ADMIN — LEVALBUTEROL HYDROCHLORIDE 1.25 MG: 1.25 SOLUTION RESPIRATORY (INHALATION) at 13:37

## 2024-07-25 RX ADMIN — Medication 1 TABLET: at 07:53

## 2024-07-25 RX ADMIN — BUDESONIDE INHALATION 500 MCG: 0.5 SUSPENSION RESPIRATORY (INHALATION) at 08:09

## 2024-07-25 RX ADMIN — IPRATROPIUM BROMIDE 0.5 MG: 0.5 SOLUTION RESPIRATORY (INHALATION) at 07:42

## 2024-07-25 RX ADMIN — MONTELUKAST 10 MG: 10 TABLET, FILM COATED ORAL at 20:38

## 2024-07-25 RX ADMIN — INSULIN LISPRO 2 UNITS: 100 INJECTION, SOLUTION INTRAVENOUS; SUBCUTANEOUS at 06:24

## 2024-07-25 RX ADMIN — CARVEDILOL 6.25 MG: 6.25 TABLET, FILM COATED ORAL at 17:44

## 2024-07-25 RX ADMIN — INSULIN LISPRO 2 UNITS: 100 INJECTION, SOLUTION INTRAVENOUS; SUBCUTANEOUS at 02:58

## 2024-07-25 RX ADMIN — CEFEPIME 2000 MG: 2 INJECTION, POWDER, FOR SOLUTION INTRAVENOUS at 21:16

## 2024-07-25 RX ADMIN — FUROSEMIDE 40 MG: 10 INJECTION, SOLUTION INTRAMUSCULAR; INTRAVENOUS at 07:53

## 2024-07-25 RX ADMIN — WATER 40 MG: 1 INJECTION INTRAMUSCULAR; INTRAVENOUS; SUBCUTANEOUS at 06:24

## 2024-07-25 RX ADMIN — BUDESONIDE INHALATION 500 MCG: 0.5 SUSPENSION RESPIRATORY (INHALATION) at 19:18

## 2024-07-25 RX ADMIN — POTASSIUM CHLORIDE 40 MEQ: 1500 TABLET, EXTENDED RELEASE ORAL at 06:24

## 2024-07-25 RX ADMIN — WATER 40 MG: 1 INJECTION INTRAMUSCULAR; INTRAVENOUS; SUBCUTANEOUS at 21:12

## 2024-07-25 RX ADMIN — PANTOPRAZOLE SODIUM 40 MG: 40 TABLET, DELAYED RELEASE ORAL at 06:24

## 2024-07-25 RX ADMIN — MICONAZOLE NITRATE: 2 CREAM TOPICAL at 20:39

## 2024-07-25 RX ADMIN — SODIUM CHLORIDE, PRESERVATIVE FREE 10 ML: 5 INJECTION INTRAVENOUS at 07:52

## 2024-07-25 ASSESSMENT — ENCOUNTER SYMPTOMS: SHORTNESS OF BREATH: 1

## 2024-07-25 NOTE — ANESTHESIA PRE PROCEDURE
Facility-Administered Medications   Medication Dose Route Frequency Provider Last Rate Last Admin   • 0.9 % sodium chloride infusion   IntraVENous PRN Fernando Lentz MD       • ceFEPIme (MAXIPIME) 2,000 mg in sodium chloride 0.9 % 100 mL IVPB ([id number])  2,000 mg IntraVENous Q8H Moises Henson MD   Stopped at 07/25/24 1028   • dexmedeTOMIDine (PRECEDEX) 400 mcg in sodium chloride 0.9 % 100 mL infusion  0.1-1.5 mcg/kg/hr IntraVENous Continuous Rubin Kasper MD   Stopped at 07/23/24 1200   • ipratropium (ATROVENT) 0.02 % nebulizer solution 0.5 mg  0.5 mg Nebulization Q6H RT Moises Henson MD   0.5 mg at 07/25/24 0742   • budesonide (PULMICORT) nebulizer suspension 500 mcg  0.5 mg Nebulization BID RT Mervin Gill MD   500 mcg at 07/25/24 0809   • dilTIAZem (CARDIZEM CD) extended release capsule 120 mg  120 mg Oral Daily Mervin Gill MD   120 mg at 07/25/24 0753   • miconazole (MICOTIN) 2 % cream   Topical BID Mervin Gill MD   Given at 07/25/24 0753   • montelukast (SINGULAIR) tablet 10 mg  10 mg Oral Nightly Mervin Gill MD   10 mg at 07/24/24 2136   • pantoprazole (PROTONIX) tablet 40 mg  40 mg Oral QAM AC Mervin Gill MD   40 mg at 07/25/24 0624   • pravastatin (PRAVACHOL) tablet 80 mg  80 mg Oral QPM Mervin Gill MD   80 mg at 07/24/24 1659   • carvedilol (COREG) tablet 6.25 mg  6.25 mg Oral BID WC Mervin Gill MD   6.25 mg at 07/25/24 0753   • levalbuterol (XOPENEX) nebulizer solution 1.25 mg  1.25 mg Nebulization Q6H Mervin Gill MD   1.25 mg at 07/25/24 0742   • sodium chloride flush 0.9 % injection 5-40 mL  5-40 mL IntraVENous 2 times per day Mervin Gill MD   10 mL at 07/25/24 0752   • sodium chloride flush 0.9 % injection 5-40 mL  5-40 mL IntraVENous PRN Mervin Gill MD       • 0.9 % sodium chloride infusion   IntraVENous PRN Mervin Gill MD       • potassium chloride (KLOR-CON M) extended release tablet 40 mEq  40 mEq Oral PRN Mervin Gill MD   40 mEq at

## 2024-07-25 NOTE — CONSENT
Informed Consent for Blood Component Transfusion Note    I have discussed with the patient the rationale for blood component transfusion; its benefits in treating or preventing fatigue, organ damage, or death; and its risk which includes mild transfusion reactions, rare risk of blood borne infection, or more serious but rare reactions. I have discussed the alternatives to transfusion, including the risk and consequences of not receiving transfusion. The patient had an opportunity to ask questions and had agreed to proceed with transfusion of blood components.    Electronically signed by Moises Henson MD on 7/25/24 at 10:23 AM EDT

## 2024-07-26 ENCOUNTER — ANESTHESIA (OUTPATIENT)
Dept: OPERATING ROOM | Age: 69
End: 2024-07-26
Payer: MEDICARE

## 2024-07-26 ENCOUNTER — APPOINTMENT (OUTPATIENT)
Dept: GENERAL RADIOLOGY | Age: 69
DRG: 004 | End: 2024-07-26
Payer: MEDICARE

## 2024-07-26 LAB
ANION GAP SERPL CALCULATED.3IONS-SCNC: 9 MMOL/L (ref 9–17)
ARTERIAL PATENCY WRIST A: ABNORMAL
BASOPHILS # BLD: 0 K/UL (ref 0–0.2)
BASOPHILS NFR BLD: 0 % (ref 0–2)
BDY SITE: ABNORMAL
BODY TEMPERATURE: 37
BUN SERPL-MCNC: 27 MG/DL (ref 8–23)
CALCIUM SERPL-MCNC: 9.8 MG/DL (ref 8.6–10.4)
CHLORIDE SERPL-SCNC: 90 MMOL/L (ref 98–107)
CO2 SERPL-SCNC: 41 MMOL/L (ref 20–31)
COHGB MFR BLD: 0.4 % (ref 0–5)
CREAT SERPL-MCNC: 0.8 MG/DL (ref 0.5–0.9)
EOSINOPHIL # BLD: 0 K/UL (ref 0–0.4)
EOSINOPHILS RELATIVE PERCENT: 0 % (ref 0–4)
ERYTHROCYTE [DISTWIDTH] IN BLOOD BY AUTOMATED COUNT: 19 % (ref 11.5–14.9)
FIO2 ON VENT: 50 %
GAS FLOW.O2 O2 DELIVERY SYS: ABNORMAL L/MIN
GAS FLOW.O2 SETTING OXYMISER: 14 L/MIN
GFR, ESTIMATED: 81 ML/MIN/1.73M2
GLUCOSE BLD-MCNC: 182 MG/DL (ref 65–105)
GLUCOSE BLD-MCNC: 191 MG/DL (ref 65–105)
GLUCOSE BLD-MCNC: 210 MG/DL (ref 65–105)
GLUCOSE BLD-MCNC: 221 MG/DL (ref 65–105)
GLUCOSE BLD-MCNC: 222 MG/DL (ref 65–105)
GLUCOSE SERPL-MCNC: 241 MG/DL (ref 70–99)
HCO3 ARTERIAL: 43.3 MMOL/L (ref 22–26)
HCT VFR BLD AUTO: 30.5 % (ref 36–46)
HGB BLD-MCNC: 9.2 G/DL (ref 12–16)
INR PPP: 1.7
INR PPP: 1.8
LYMPHOCYTES NFR BLD: 0.2 K/UL (ref 1–4.8)
LYMPHOCYTES RELATIVE PERCENT: 5 % (ref 24–44)
MCH RBC QN AUTO: 25.6 PG (ref 26–34)
MCHC RBC AUTO-ENTMCNC: 30.1 G/DL (ref 31–37)
MCV RBC AUTO: 85.1 FL (ref 80–100)
METHEMOGLOBIN: 0.3 % (ref 0–1.9)
MONOCYTES NFR BLD: 0.08 K/UL (ref 0.1–1.3)
MONOCYTES NFR BLD: 2 % (ref 1–7)
MORPHOLOGY: ABNORMAL
NEUTROPHILS NFR BLD: 93 % (ref 36–66)
NEUTS SEG NFR BLD: 3.72 K/UL (ref 1.3–9.1)
O2 SAT, ARTERIAL: 98.2 % (ref 95–98)
PARTIAL THROMBOPLASTIN TIME: 26.5 SEC (ref 24–36)
PCO2 ARTERIAL: 55.6 MMHG (ref 35–45)
PEEP RESPIRATORY: 8 CM[H2O]
PH ARTERIAL: 7.51 (ref 7.35–7.45)
PLATELET # BLD AUTO: 215 K/UL (ref 150–450)
PMV BLD AUTO: 7.2 FL (ref 6–12)
PO2 ARTERIAL: 106.7 MMHG (ref 80–100)
POSITIVE BASE EXCESS, ART: 17.9 MMOL/L (ref 0–2)
POTASSIUM SERPL-SCNC: 3.6 MMOL/L (ref 3.7–5.3)
PROTHROMBIN TIME: 20 SEC (ref 11.8–14.6)
PROTHROMBIN TIME: 21.3 SEC (ref 11.8–14.6)
PT. POSITION: ABNORMAL
RBC # BLD AUTO: 3.59 M/UL (ref 4–5.2)
RESPIRATORY RATE: 20
SODIUM SERPL-SCNC: 140 MMOL/L (ref 135–144)
TEXT FOR RESPIRATORY: ABNORMAL
TOTAL RATE: 20
VENTILATION MODE VENT: ABNORMAL
VT: 400
WBC OTHER # BLD: 4 K/UL (ref 3.5–11)

## 2024-07-26 PROCEDURE — 6360000002 HC RX W HCPCS: Performed by: SURGERY

## 2024-07-26 PROCEDURE — 36600 WITHDRAWAL OF ARTERIAL BLOOD: CPT

## 2024-07-26 PROCEDURE — 2580000003 HC RX 258: Performed by: INTERNAL MEDICINE

## 2024-07-26 PROCEDURE — 80048 BASIC METABOLIC PNL TOTAL CA: CPT

## 2024-07-26 PROCEDURE — 87205 SMEAR GRAM STAIN: CPT

## 2024-07-26 PROCEDURE — 85025 COMPLETE CBC W/AUTO DIFF WBC: CPT

## 2024-07-26 PROCEDURE — 6370000000 HC RX 637 (ALT 250 FOR IP): Performed by: SURGERY

## 2024-07-26 PROCEDURE — 3600000012 HC SURGERY LEVEL 2 ADDTL 15MIN: Performed by: SURGERY

## 2024-07-26 PROCEDURE — 6360000002 HC RX W HCPCS: Performed by: ANESTHESIOLOGY

## 2024-07-26 PROCEDURE — 99233 SBSQ HOSP IP/OBS HIGH 50: CPT | Performed by: INTERNAL MEDICINE

## 2024-07-26 PROCEDURE — 6360000002 HC RX W HCPCS: Performed by: INTERNAL MEDICINE

## 2024-07-26 PROCEDURE — 2700000000 HC OXYGEN THERAPY PER DAY

## 2024-07-26 PROCEDURE — 71045 X-RAY EXAM CHEST 1 VIEW: CPT

## 2024-07-26 PROCEDURE — 85610 PROTHROMBIN TIME: CPT

## 2024-07-26 PROCEDURE — 94761 N-INVAS EAR/PLS OXIMETRY MLT: CPT

## 2024-07-26 PROCEDURE — 89220 SPUTUM SPECIMEN COLLECTION: CPT

## 2024-07-26 PROCEDURE — 82805 BLOOD GASES W/O2 SATURATION: CPT

## 2024-07-26 PROCEDURE — 82947 ASSAY GLUCOSE BLOOD QUANT: CPT

## 2024-07-26 PROCEDURE — 3600000002 HC SURGERY LEVEL 2 BASE: Performed by: SURGERY

## 2024-07-26 PROCEDURE — 6360000002 HC RX W HCPCS: Performed by: NURSE ANESTHETIST, CERTIFIED REGISTERED

## 2024-07-26 PROCEDURE — 7100000000 HC PACU RECOVERY - FIRST 15 MIN: Performed by: SURGERY

## 2024-07-26 PROCEDURE — 2500000003 HC RX 250 WO HCPCS: Performed by: NURSE ANESTHETIST, CERTIFIED REGISTERED

## 2024-07-26 PROCEDURE — 5A1955Z RESPIRATORY VENTILATION, GREATER THAN 96 CONSECUTIVE HOURS: ICD-10-PCS | Performed by: SURGERY

## 2024-07-26 PROCEDURE — 6370000000 HC RX 637 (ALT 250 FOR IP): Performed by: INTERNAL MEDICINE

## 2024-07-26 PROCEDURE — 36415 COLL VENOUS BLD VENIPUNCTURE: CPT

## 2024-07-26 PROCEDURE — 0B110F4 BYPASS TRACHEA TO CUTANEOUS WITH TRACHEOSTOMY DEVICE, OPEN APPROACH: ICD-10-PCS | Performed by: SURGERY

## 2024-07-26 PROCEDURE — 2580000003 HC RX 258: Performed by: SURGERY

## 2024-07-26 PROCEDURE — 2709999900 HC NON-CHARGEABLE SUPPLY: Performed by: SURGERY

## 2024-07-26 PROCEDURE — 3700000000 HC ANESTHESIA ATTENDED CARE: Performed by: SURGERY

## 2024-07-26 PROCEDURE — 85730 THROMBOPLASTIN TIME PARTIAL: CPT

## 2024-07-26 PROCEDURE — 94640 AIRWAY INHALATION TREATMENT: CPT

## 2024-07-26 PROCEDURE — 2060000000 HC ICU INTERMEDIATE R&B

## 2024-07-26 PROCEDURE — 7100000001 HC PACU RECOVERY - ADDTL 15 MIN: Performed by: SURGERY

## 2024-07-26 PROCEDURE — 2000000000 HC ICU R&B

## 2024-07-26 PROCEDURE — 2720000010 HC SURG SUPPLY STERILE: Performed by: SURGERY

## 2024-07-26 PROCEDURE — 94002 VENT MGMT INPAT INIT DAY: CPT

## 2024-07-26 PROCEDURE — 2580000003 HC RX 258: Performed by: ANESTHESIOLOGY

## 2024-07-26 PROCEDURE — 31600 PLANNED TRACHEOSTOMY: CPT | Performed by: SURGERY

## 2024-07-26 PROCEDURE — 87070 CULTURE OTHR SPECIMN AEROBIC: CPT

## 2024-07-26 PROCEDURE — 3700000001 HC ADD 15 MINUTES (ANESTHESIA): Performed by: SURGERY

## 2024-07-26 RX ORDER — FENTANYL CITRATE 50 UG/ML
INJECTION, SOLUTION INTRAMUSCULAR; INTRAVENOUS PRN
Status: DISCONTINUED | OUTPATIENT
Start: 2024-07-26 | End: 2024-07-26 | Stop reason: SDUPTHER

## 2024-07-26 RX ORDER — LIDOCAINE HYDROCHLORIDE 20 MG/ML
INJECTION, SOLUTION EPIDURAL; INFILTRATION; INTRACAUDAL; PERINEURAL PRN
Status: DISCONTINUED | OUTPATIENT
Start: 2024-07-26 | End: 2024-07-26 | Stop reason: SDUPTHER

## 2024-07-26 RX ORDER — PROPOFOL 10 MG/ML
INJECTION, EMULSION INTRAVENOUS PRN
Status: DISCONTINUED | OUTPATIENT
Start: 2024-07-26 | End: 2024-07-26 | Stop reason: SDUPTHER

## 2024-07-26 RX ORDER — MIDAZOLAM HYDROCHLORIDE 1 MG/ML
2 INJECTION INTRAMUSCULAR; INTRAVENOUS ONCE
Status: COMPLETED | OUTPATIENT
Start: 2024-07-26 | End: 2024-07-26

## 2024-07-26 RX ORDER — ROCURONIUM BROMIDE 10 MG/ML
INJECTION, SOLUTION INTRAVENOUS PRN
Status: DISCONTINUED | OUTPATIENT
Start: 2024-07-26 | End: 2024-07-26 | Stop reason: SDUPTHER

## 2024-07-26 RX ORDER — SUCCINYLCHOLINE/SOD CL,ISO/PF 200MG/10ML
SYRINGE (ML) INTRAVENOUS PRN
Status: DISCONTINUED | OUTPATIENT
Start: 2024-07-26 | End: 2024-07-26 | Stop reason: SDUPTHER

## 2024-07-26 RX ORDER — ONDANSETRON 2 MG/ML
INJECTION INTRAMUSCULAR; INTRAVENOUS PRN
Status: DISCONTINUED | OUTPATIENT
Start: 2024-07-26 | End: 2024-07-26 | Stop reason: SDUPTHER

## 2024-07-26 RX ORDER — SODIUM CHLORIDE 9 MG/ML
INJECTION, SOLUTION INTRAVENOUS CONTINUOUS
Status: DISCONTINUED | OUTPATIENT
Start: 2024-07-26 | End: 2024-07-26 | Stop reason: HOSPADM

## 2024-07-26 RX ORDER — DEXAMETHASONE SODIUM PHOSPHATE 4 MG/ML
INJECTION, SOLUTION INTRA-ARTICULAR; INTRALESIONAL; INTRAMUSCULAR; INTRAVENOUS; SOFT TISSUE PRN
Status: DISCONTINUED | OUTPATIENT
Start: 2024-07-26 | End: 2024-07-26 | Stop reason: SDUPTHER

## 2024-07-26 RX ADMIN — SODIUM CHLORIDE, PRESERVATIVE FREE 10 ML: 5 INJECTION INTRAVENOUS at 19:50

## 2024-07-26 RX ADMIN — Medication 1 TABLET: at 08:08

## 2024-07-26 RX ADMIN — MICONAZOLE NITRATE: 2 CREAM TOPICAL at 19:49

## 2024-07-26 RX ADMIN — CEFTAZIDIME, AVIBACTAM 2.5 G: 2; .5 POWDER, FOR SOLUTION INTRAVENOUS at 19:00

## 2024-07-26 RX ADMIN — MONTELUKAST 10 MG: 10 TABLET, FILM COATED ORAL at 19:49

## 2024-07-26 RX ADMIN — ROCURONIUM BROMIDE 20 MG: 10 INJECTION, SOLUTION INTRAVENOUS at 13:00

## 2024-07-26 RX ADMIN — LEVALBUTEROL HYDROCHLORIDE 1.25 MG: 1.25 SOLUTION RESPIRATORY (INHALATION) at 19:10

## 2024-07-26 RX ADMIN — PROPOFOL 200 MG: 10 INJECTION, EMULSION INTRAVENOUS at 12:39

## 2024-07-26 RX ADMIN — LEVALBUTEROL HYDROCHLORIDE 1.25 MG: 1.25 SOLUTION RESPIRATORY (INHALATION) at 07:22

## 2024-07-26 RX ADMIN — WATER 40 MG: 1 INJECTION INTRAMUSCULAR; INTRAVENOUS; SUBCUTANEOUS at 06:11

## 2024-07-26 RX ADMIN — CARVEDILOL 6.25 MG: 6.25 TABLET, FILM COATED ORAL at 18:34

## 2024-07-26 RX ADMIN — DEXAMETHASONE SODIUM PHOSPHATE 4 MG: 4 INJECTION INTRA-ARTICULAR; INTRALESIONAL; INTRAMUSCULAR; INTRAVENOUS; SOFT TISSUE at 12:41

## 2024-07-26 RX ADMIN — ONDANSETRON 4 MG: 2 INJECTION INTRAMUSCULAR; INTRAVENOUS at 12:41

## 2024-07-26 RX ADMIN — LEVALBUTEROL HYDROCHLORIDE 1.25 MG: 1.25 SOLUTION RESPIRATORY (INHALATION) at 00:49

## 2024-07-26 RX ADMIN — DILTIAZEM HYDROCHLORIDE 120 MG: 120 CAPSULE, COATED, EXTENDED RELEASE ORAL at 08:09

## 2024-07-26 RX ADMIN — CARVEDILOL 6.25 MG: 6.25 TABLET, FILM COATED ORAL at 08:08

## 2024-07-26 RX ADMIN — CEFEPIME 2000 MG: 2 INJECTION, POWDER, FOR SOLUTION INTRAVENOUS at 06:06

## 2024-07-26 RX ADMIN — MIDAZOLAM 2 MG: 1 INJECTION INTRAMUSCULAR; INTRAVENOUS at 14:31

## 2024-07-26 RX ADMIN — FENTANYL CITRATE 25 MCG: 50 INJECTION, SOLUTION INTRAMUSCULAR; INTRAVENOUS at 12:39

## 2024-07-26 RX ADMIN — PANTOPRAZOLE SODIUM 40 MG: 40 TABLET, DELAYED RELEASE ORAL at 08:08

## 2024-07-26 RX ADMIN — FUROSEMIDE 40 MG: 10 INJECTION, SOLUTION INTRAMUSCULAR; INTRAVENOUS at 08:08

## 2024-07-26 RX ADMIN — SODIUM CHLORIDE: 9 INJECTION, SOLUTION INTRAVENOUS at 10:54

## 2024-07-26 RX ADMIN — SODIUM CHLORIDE, PRESERVATIVE FREE 10 ML: 5 INJECTION INTRAVENOUS at 08:10

## 2024-07-26 RX ADMIN — FENTANYL CITRATE 25 MCG: 50 INJECTION, SOLUTION INTRAMUSCULAR; INTRAVENOUS at 13:03

## 2024-07-26 RX ADMIN — INSULIN LISPRO 2 UNITS: 100 INJECTION, SOLUTION INTRAVENOUS; SUBCUTANEOUS at 20:53

## 2024-07-26 RX ADMIN — Medication 3000 MG: at 12:33

## 2024-07-26 RX ADMIN — WATER 40 MG: 1 INJECTION INTRAMUSCULAR; INTRAVENOUS; SUBCUTANEOUS at 20:53

## 2024-07-26 RX ADMIN — FENTANYL CITRATE 25 MCG: 50 INJECTION, SOLUTION INTRAMUSCULAR; INTRAVENOUS at 13:38

## 2024-07-26 RX ADMIN — INSULIN LISPRO 2 UNITS: 100 INJECTION, SOLUTION INTRAVENOUS; SUBCUTANEOUS at 06:10

## 2024-07-26 RX ADMIN — IPRATROPIUM BROMIDE 0.5 MG: 0.5 SOLUTION RESPIRATORY (INHALATION) at 19:10

## 2024-07-26 RX ADMIN — PRAVASTATIN SODIUM 80 MG: 40 TABLET ORAL at 18:34

## 2024-07-26 RX ADMIN — ROCURONIUM BROMIDE 50 MG: 10 INJECTION, SOLUTION INTRAVENOUS at 12:47

## 2024-07-26 RX ADMIN — BUDESONIDE INHALATION 500 MCG: 0.5 SUSPENSION RESPIRATORY (INHALATION) at 07:25

## 2024-07-26 RX ADMIN — LIDOCAINE HYDROCHLORIDE 100 MG: 20 INJECTION, SOLUTION EPIDURAL; INFILTRATION; INTRACAUDAL; PERINEURAL at 12:39

## 2024-07-26 RX ADMIN — IPRATROPIUM BROMIDE 0.5 MG: 0.5 SOLUTION RESPIRATORY (INHALATION) at 07:22

## 2024-07-26 RX ADMIN — MICONAZOLE NITRATE: 2 CREAM TOPICAL at 08:09

## 2024-07-26 RX ADMIN — IPRATROPIUM BROMIDE 0.5 MG: 0.5 SOLUTION RESPIRATORY (INHALATION) at 00:48

## 2024-07-26 RX ADMIN — INSULIN LISPRO 2 UNITS: 100 INJECTION, SOLUTION INTRAVENOUS; SUBCUTANEOUS at 02:58

## 2024-07-26 RX ADMIN — Medication 140 MG: at 12:39

## 2024-07-26 RX ADMIN — FUROSEMIDE 40 MG: 10 INJECTION, SOLUTION INTRAMUSCULAR; INTRAVENOUS at 18:09

## 2024-07-26 ASSESSMENT — PAIN SCALES - GENERAL
PAINLEVEL_OUTOF10: 0

## 2024-07-26 ASSESSMENT — PULMONARY FUNCTION TESTS
PIF_VALUE: 18
PIF_VALUE: 30
PIF_VALUE: 15
PIF_VALUE: 13
PIF_VALUE: 12
PIF_VALUE: 18
PIF_VALUE: 15
PIF_VALUE: 14
PIF_VALUE: 20
PIF_VALUE: 15
PIF_VALUE: 17

## 2024-07-26 ASSESSMENT — PAIN - FUNCTIONAL ASSESSMENT
PAIN_FUNCTIONAL_ASSESSMENT: NONE - DENIES PAIN
PAIN_FUNCTIONAL_ASSESSMENT: 0-10

## 2024-07-26 NOTE — ANESTHESIA POSTPROCEDURE EVALUATION
Department of Anesthesiology  Postprocedure Note    Patient: Shazia ROGERS  MRN: [id number]  YOB: 1955  Date of evaluation: 7/26/2024    Procedure Summary       Date: 07/26/24 Room / Location: [address]    Anesthesia Start: 1233 Anesthesia Stop: 1409    Procedure: TRACHEOSTOMY INSERTION (Throat) Diagnosis:       Acute respiratory failure, unspecified whether with hypoxia or hypercapnia (HCC)      (Acute respiratory failure, unspecified whether with hypoxia or hypercapnia (HCC) [J96.00])    Surgeons: Fernando Lentz MD Responsible Provider: Homa Jensen MD    Anesthesia Type: general ASA Status: 4            Anesthesia Type: No value filed.    Tom Phase I: Tom Score: 3    Tom Phase II:      Anesthesia Post Evaluation    Patient location during evaluation: PACU  Patient participation: complete - patient participated  Level of consciousness: awake  Airway patency: patent  Cardiovascular status: blood pressure returned to baseline  Respiratory status: acceptable and ventilator  Comments: POST- ANESTHESIA EVALUATION       Pt Name: Shazia ROGERS  MRN: [id number]  YOB: 1955  Date of evaluation: 7/26/2024  Time:  3:24 PM      BP (!) 144/78   Pulse 83   Temp 98.4 °F (36.9 °C) (Infrared)   Resp 16   Ht 1.651 m (5' 5\")   Wt (!) 174.1 kg (383 lb 13.1 oz)   LMP 07/16/2014   SpO2 99%   BMI 63.87 kg/m²      Consciousness Level  Awake  Cardiopulmonary Status  Stable  Pain Adequately Treated YES  Nausea / Vomiting  NO  Adequate Hydration  YES  Anesthesia Related Complications NONE      Electronically signed by Homa Jensen MD on 7/26/2024 at 3:24 PM    Pain management: adequate    No notable events documented.

## 2024-07-26 NOTE — SIGNIFICANT EVENT
Case discussed with Dr. Lentz    Patient has been BiPAP dependent now for greater than 48 hours.  The tracheostomy that is scheduled today is not elective but rather emergent.  An INR of 1.7 is not prohibitive in my professional opinion to proceed with tracheostomy and I am perfectly comfortable with proceeding.  We will deal with the ramifications.  Patient has multiple risk factors for clotting so further reversal of the coagulations is also a risk in this patient with prior history of pulmonary emboli.    I am in favor of proceeding with tracheostomy as planned which I discussed directly with Dr. Lentz.  I will also discuss with the anesthesia team to alleviate some of their concerns and certainly available if any issues should arise to assist.    Rubin Kasper MD  Pulmonary and Critical Care  [phone] Perfect Serve  [phone] Cell

## 2024-07-26 NOTE — OP NOTE
tracheotomy.  At this point 0 Vicryl suture was placed on the inferior tracheal ring for traction purposes.  Endotracheal tube was withdrawn above the tracheotomy.  Tracheal  was used.  At this point a #7 Shiley XLT tracheostomy tube was inserted without any difficulty.  Obturator was removed.  Inner cannula was placed.  Cuff of the tracheostomy tube was inflated.  Tracheostomy tube was connected to the ventilator.  End-tidal CO2 was obtained.  Adequate ventilation was obtained.  Tidal volumes were over 600.  Oxygen saturation was adequate.  Satisfactory placement of the tracheostomy tube was confirmed clinically at this time.  All the retractors were removed.  Hemostasis was confirmed.  Sponge needle instrument count was found to be correct.  Skin was approximated using nylon sutures.  Tracheostomy was secured using Prolene suture as well as a trach tie.  Complete hemostasis was confirmed at the end of the procedure.  Sponge needle instrument count was found to be correct.  Sterile dressing was applied.  Patient tolerated procedure well and was transferred to the recovery room in a stable condition.    Recommendations: Continue ICU management at this time.  I will follow with you.  There is no family here to discuss the findings.

## 2024-07-27 PROBLEM — Z88.1 ALLERGY TO MULTIPLE ANTIBIOTICS: Status: ACTIVE | Noted: 2024-07-27

## 2024-07-27 PROBLEM — B96.89 URINARY TRACT INFECTION DUE TO ESBL KLEBSIELLA: Status: ACTIVE | Noted: 2018-05-05

## 2024-07-27 PROBLEM — Z16.24 MULTIPLE DRUG RESISTANT ORGANISM (MDRO) CULTURE POSITIVE: Status: ACTIVE | Noted: 2024-07-27

## 2024-07-27 PROBLEM — Z22.322 MRSA CARRIER: Status: ACTIVE | Noted: 2024-07-27

## 2024-07-27 LAB
ANION GAP SERPL CALCULATED.3IONS-SCNC: 11 MMOL/L (ref 9–17)
BASOPHILS # BLD: 0 K/UL (ref 0–0.2)
BASOPHILS NFR BLD: 0 % (ref 0–2)
BUN SERPL-MCNC: 29 MG/DL (ref 8–23)
CALCIUM SERPL-MCNC: 9.5 MG/DL (ref 8.6–10.4)
CHLORIDE SERPL-SCNC: 89 MMOL/L (ref 98–107)
CO2 SERPL-SCNC: 40 MMOL/L (ref 20–31)
CREAT SERPL-MCNC: 0.8 MG/DL (ref 0.5–0.9)
EOSINOPHIL # BLD: 0 K/UL (ref 0–0.4)
EOSINOPHILS RELATIVE PERCENT: 0 % (ref 0–4)
ERYTHROCYTE [DISTWIDTH] IN BLOOD BY AUTOMATED COUNT: 18.7 % (ref 11.5–14.9)
GFR, ESTIMATED: 81 ML/MIN/1.73M2
GLUCOSE BLD-MCNC: 190 MG/DL (ref 65–105)
GLUCOSE BLD-MCNC: 198 MG/DL (ref 65–105)
GLUCOSE BLD-MCNC: 203 MG/DL (ref 65–105)
GLUCOSE BLD-MCNC: 209 MG/DL (ref 65–105)
GLUCOSE BLD-MCNC: 209 MG/DL (ref 65–105)
GLUCOSE BLD-MCNC: 216 MG/DL (ref 65–105)
GLUCOSE BLD-MCNC: 219 MG/DL (ref 65–105)
GLUCOSE SERPL-MCNC: 233 MG/DL (ref 70–99)
HCT VFR BLD AUTO: 29.7 % (ref 36–46)
HGB BLD-MCNC: 9.2 G/DL (ref 12–16)
INR PPP: 1.3
LYMPHOCYTES NFR BLD: 0.27 K/UL (ref 1–4.8)
LYMPHOCYTES RELATIVE PERCENT: 4 % (ref 24–44)
MAGNESIUM SERPL-MCNC: 1.9 MG/DL (ref 1.6–2.6)
MCH RBC QN AUTO: 25.8 PG (ref 26–34)
MCHC RBC AUTO-ENTMCNC: 30.9 G/DL (ref 31–37)
MCV RBC AUTO: 83.4 FL (ref 80–100)
MICROORGANISM SPEC CULT: ABNORMAL
MICROORGANISM SPEC CULT: NORMAL
MICROORGANISM/AGENT SPEC: NORMAL
MONOCYTES NFR BLD: 0.27 K/UL (ref 0.1–1.3)
MONOCYTES NFR BLD: 4 % (ref 1–7)
MORPHOLOGY: ABNORMAL
NEUTROPHILS NFR BLD: 92 % (ref 36–66)
NEUTS SEG NFR BLD: 6.26 K/UL (ref 1.3–9.1)
PLATELET # BLD AUTO: 214 K/UL (ref 150–450)
PMV BLD AUTO: 7.2 FL (ref 6–12)
POTASSIUM SERPL-SCNC: 3.3 MMOL/L (ref 3.7–5.3)
PROTHROMBIN TIME: 16.7 SEC (ref 11.8–14.6)
RBC # BLD AUTO: 3.56 M/UL (ref 4–5.2)
SERVICE CMNT-IMP: NORMAL
SERVICE CMNT-IMP: NORMAL
SODIUM SERPL-SCNC: 140 MMOL/L (ref 135–144)
SPECIMEN DESCRIPTION: ABNORMAL
SPECIMEN DESCRIPTION: NORMAL
WBC OTHER # BLD: 6.8 K/UL (ref 3.5–11)

## 2024-07-27 PROCEDURE — 80048 BASIC METABOLIC PNL TOTAL CA: CPT

## 2024-07-27 PROCEDURE — 99232 SBSQ HOSP IP/OBS MODERATE 35: CPT | Performed by: SURGERY

## 2024-07-27 PROCEDURE — 2000000000 HC ICU R&B

## 2024-07-27 PROCEDURE — 6360000002 HC RX W HCPCS: Performed by: SURGERY

## 2024-07-27 PROCEDURE — 6370000000 HC RX 637 (ALT 250 FOR IP): Performed by: SURGERY

## 2024-07-27 PROCEDURE — 2580000003 HC RX 258: Performed by: NURSE PRACTITIONER

## 2024-07-27 PROCEDURE — 94003 VENT MGMT INPAT SUBQ DAY: CPT

## 2024-07-27 PROCEDURE — 99222 1ST HOSP IP/OBS MODERATE 55: CPT | Performed by: NURSE PRACTITIONER

## 2024-07-27 PROCEDURE — 36415 COLL VENOUS BLD VENIPUNCTURE: CPT

## 2024-07-27 PROCEDURE — 6360000002 HC RX W HCPCS: Performed by: INTERNAL MEDICINE

## 2024-07-27 PROCEDURE — 2580000003 HC RX 258: Performed by: INTERNAL MEDICINE

## 2024-07-27 PROCEDURE — 6370000000 HC RX 637 (ALT 250 FOR IP): Performed by: INTERNAL MEDICINE

## 2024-07-27 PROCEDURE — 6360000002 HC RX W HCPCS: Performed by: NURSE PRACTITIONER

## 2024-07-27 PROCEDURE — 2580000003 HC RX 258: Performed by: SURGERY

## 2024-07-27 PROCEDURE — 6360000002 HC RX W HCPCS

## 2024-07-27 PROCEDURE — 85025 COMPLETE CBC W/AUTO DIFF WBC: CPT

## 2024-07-27 PROCEDURE — 94761 N-INVAS EAR/PLS OXIMETRY MLT: CPT

## 2024-07-27 PROCEDURE — 2700000000 HC OXYGEN THERAPY PER DAY

## 2024-07-27 PROCEDURE — 82947 ASSAY GLUCOSE BLOOD QUANT: CPT

## 2024-07-27 PROCEDURE — 85610 PROTHROMBIN TIME: CPT

## 2024-07-27 PROCEDURE — 2060000000 HC ICU INTERMEDIATE R&B

## 2024-07-27 PROCEDURE — 83735 ASSAY OF MAGNESIUM: CPT

## 2024-07-27 PROCEDURE — 99233 SBSQ HOSP IP/OBS HIGH 50: CPT | Performed by: INTERNAL MEDICINE

## 2024-07-27 PROCEDURE — 94640 AIRWAY INHALATION TREATMENT: CPT

## 2024-07-27 RX ORDER — LANSOPRAZOLE 30 MG/1
30 TABLET, ORALLY DISINTEGRATING, DELAYED RELEASE ORAL
Status: DISCONTINUED | OUTPATIENT
Start: 2024-07-27 | End: 2024-08-05 | Stop reason: HOSPADM

## 2024-07-27 RX ORDER — MONTELUKAST SODIUM 10 MG/1
10 TABLET ORAL NIGHTLY
Status: DISCONTINUED | OUTPATIENT
Start: 2024-07-27 | End: 2024-08-05 | Stop reason: HOSPADM

## 2024-07-27 RX ORDER — PRAVASTATIN SODIUM 40 MG
80 TABLET ORAL EVERY EVENING
Status: DISCONTINUED | OUTPATIENT
Start: 2024-07-27 | End: 2024-08-05 | Stop reason: HOSPADM

## 2024-07-27 RX ORDER — HYDRALAZINE HYDROCHLORIDE 20 MG/ML
10 INJECTION INTRAMUSCULAR; INTRAVENOUS EVERY 6 HOURS PRN
Status: DISCONTINUED | OUTPATIENT
Start: 2024-07-27 | End: 2024-08-05 | Stop reason: HOSPADM

## 2024-07-27 RX ORDER — CARVEDILOL 6.25 MG/1
6.25 TABLET ORAL 2 TIMES DAILY WITH MEALS
Status: DISCONTINUED | OUTPATIENT
Start: 2024-07-27 | End: 2024-08-05 | Stop reason: HOSPADM

## 2024-07-27 RX ORDER — MULTIVIT-MIN/FERROUS GLUCONATE 9 MG/15 ML
15 LIQUID (ML) ORAL DAILY
Status: DISCONTINUED | OUTPATIENT
Start: 2024-07-28 | End: 2024-08-05 | Stop reason: HOSPADM

## 2024-07-27 RX ADMIN — IPRATROPIUM BROMIDE 0.5 MG: 0.5 SOLUTION RESPIRATORY (INHALATION) at 01:59

## 2024-07-27 RX ADMIN — PRAVASTATIN SODIUM 80 MG: 40 TABLET ORAL at 18:09

## 2024-07-27 RX ADMIN — DILTIAZEM HYDROCHLORIDE 30 MG: 30 TABLET, FILM COATED ORAL at 18:09

## 2024-07-27 RX ADMIN — DILTIAZEM HYDROCHLORIDE 30 MG: 30 TABLET, FILM COATED ORAL at 12:48

## 2024-07-27 RX ADMIN — CARVEDILOL 6.25 MG: 6.25 TABLET, FILM COATED ORAL at 07:19

## 2024-07-27 RX ADMIN — IPRATROPIUM BROMIDE 0.5 MG: 0.5 SOLUTION RESPIRATORY (INHALATION) at 19:12

## 2024-07-27 RX ADMIN — BUDESONIDE INHALATION 500 MCG: 0.5 SUSPENSION RESPIRATORY (INHALATION) at 19:12

## 2024-07-27 RX ADMIN — SODIUM CHLORIDE, PRESERVATIVE FREE 10 ML: 5 INJECTION INTRAVENOUS at 21:53

## 2024-07-27 RX ADMIN — LANSOPRAZOLE 30 MG: 30 TABLET, ORALLY DISINTEGRATING, DELAYED RELEASE ORAL at 12:48

## 2024-07-27 RX ADMIN — MONTELUKAST 10 MG: 10 TABLET, FILM COATED ORAL at 21:53

## 2024-07-27 RX ADMIN — INSULIN LISPRO 2 UNITS: 100 INJECTION, SOLUTION INTRAVENOUS; SUBCUTANEOUS at 09:25

## 2024-07-27 RX ADMIN — LEVALBUTEROL HYDROCHLORIDE 1.25 MG: 1.25 SOLUTION RESPIRATORY (INHALATION) at 13:53

## 2024-07-27 RX ADMIN — WATER 40 MG: 1 INJECTION INTRAMUSCULAR; INTRAVENOUS; SUBCUTANEOUS at 12:48

## 2024-07-27 RX ADMIN — WATER 40 MG: 1 INJECTION INTRAMUSCULAR; INTRAVENOUS; SUBCUTANEOUS at 05:41

## 2024-07-27 RX ADMIN — BUDESONIDE INHALATION 500 MCG: 0.5 SUSPENSION RESPIRATORY (INHALATION) at 07:05

## 2024-07-27 RX ADMIN — LEVALBUTEROL HYDROCHLORIDE 1.25 MG: 1.25 SOLUTION RESPIRATORY (INHALATION) at 01:59

## 2024-07-27 RX ADMIN — INSULIN LISPRO 2 UNITS: 100 INJECTION, SOLUTION INTRAVENOUS; SUBCUTANEOUS at 06:01

## 2024-07-27 RX ADMIN — POTASSIUM BICARBONATE 40 MEQ: 782 TABLET, EFFERVESCENT ORAL at 09:07

## 2024-07-27 RX ADMIN — CEFTAZIDIME, AVIBACTAM 2.5 G: 2; .5 POWDER, FOR SOLUTION INTRAVENOUS at 03:06

## 2024-07-27 RX ADMIN — LEVALBUTEROL HYDROCHLORIDE 1.25 MG: 1.25 SOLUTION RESPIRATORY (INHALATION) at 19:12

## 2024-07-27 RX ADMIN — INSULIN LISPRO 2 UNITS: 100 INJECTION, SOLUTION INTRAVENOUS; SUBCUTANEOUS at 12:48

## 2024-07-27 RX ADMIN — HYDRALAZINE HYDROCHLORIDE 10 MG: 20 INJECTION INTRAMUSCULAR; INTRAVENOUS at 16:22

## 2024-07-27 RX ADMIN — FUROSEMIDE 40 MG: 10 INJECTION, SOLUTION INTRAMUSCULAR; INTRAVENOUS at 18:08

## 2024-07-27 RX ADMIN — WATER 40 MG: 1 INJECTION INTRAMUSCULAR; INTRAVENOUS; SUBCUTANEOUS at 21:53

## 2024-07-27 RX ADMIN — CARVEDILOL 6.25 MG: 6.25 TABLET, FILM COATED ORAL at 18:09

## 2024-07-27 RX ADMIN — SODIUM CHLORIDE, PRESERVATIVE FREE 10 ML: 5 INJECTION INTRAVENOUS at 09:09

## 2024-07-27 RX ADMIN — LEVALBUTEROL HYDROCHLORIDE 1.25 MG: 1.25 SOLUTION RESPIRATORY (INHALATION) at 06:57

## 2024-07-27 RX ADMIN — FUROSEMIDE 40 MG: 10 INJECTION, SOLUTION INTRAMUSCULAR; INTRAVENOUS at 07:35

## 2024-07-27 RX ADMIN — MICONAZOLE NITRATE: 2 CREAM TOPICAL at 09:08

## 2024-07-27 RX ADMIN — INSULIN LISPRO 2 UNITS: 100 INJECTION, SOLUTION INTRAVENOUS; SUBCUTANEOUS at 21:53

## 2024-07-27 RX ADMIN — Medication 1 TABLET: at 09:07

## 2024-07-27 RX ADMIN — IPRATROPIUM BROMIDE 0.5 MG: 0.5 SOLUTION RESPIRATORY (INHALATION) at 06:57

## 2024-07-27 RX ADMIN — IPRATROPIUM BROMIDE 0.5 MG: 0.5 SOLUTION RESPIRATORY (INHALATION) at 13:53

## 2024-07-27 RX ADMIN — MICONAZOLE NITRATE: 2 CREAM TOPICAL at 21:53

## 2024-07-27 RX ADMIN — TIGECYCLINE 100 MG: 50 INJECTION, POWDER, LYOPHILIZED, FOR SOLUTION INTRAVENOUS at 11:16

## 2024-07-27 ASSESSMENT — PULMONARY FUNCTION TESTS
PIF_VALUE: 25
PIF_VALUE: 13
PIF_VALUE: 17
PIF_VALUE: 17
PIF_VALUE: 27
PIF_VALUE: 10
PIF_VALUE: 12
PIF_VALUE: 12
PIF_VALUE: 16
PIF_VALUE: 16
PIF_VALUE: 9
PIF_VALUE: 16

## 2024-07-27 ASSESSMENT — PAIN SCALES - GENERAL: PAINLEVEL_OUTOF10: 0

## 2024-07-28 LAB
ANION GAP SERPL CALCULATED.3IONS-SCNC: 5 MMOL/L (ref 9–17)
BASOPHILS # BLD: 0 K/UL (ref 0–0.2)
BASOPHILS NFR BLD: 0 % (ref 0–2)
BODY TEMPERATURE: 37
BUN SERPL-MCNC: 42 MG/DL (ref 8–23)
CALCIUM SERPL-MCNC: 9.6 MG/DL (ref 8.6–10.4)
CHLORIDE SERPL-SCNC: 91 MMOL/L (ref 98–107)
CO2 SERPL-SCNC: 43 MMOL/L (ref 20–31)
COHGB MFR BLD: 3.2 % (ref 0–5)
CREAT SERPL-MCNC: 0.8 MG/DL (ref 0.5–0.9)
EOSINOPHIL # BLD: 0 K/UL (ref 0–0.4)
EOSINOPHILS RELATIVE PERCENT: 0 % (ref 0–4)
ERYTHROCYTE [DISTWIDTH] IN BLOOD BY AUTOMATED COUNT: 18.7 % (ref 11.5–14.9)
GAS FLOW.O2 O2 DELIVERY SYS: ABNORMAL L/MIN
GAS FLOW.O2 SETTING OXYMISER: 12 L/MIN
GFR, ESTIMATED: 81 ML/MIN/1.73M2
GLUCOSE BLD-MCNC: 192 MG/DL (ref 65–105)
GLUCOSE BLD-MCNC: 202 MG/DL (ref 65–105)
GLUCOSE BLD-MCNC: 205 MG/DL (ref 65–105)
GLUCOSE BLD-MCNC: 234 MG/DL (ref 65–105)
GLUCOSE BLD-MCNC: 238 MG/DL (ref 65–105)
GLUCOSE SERPL-MCNC: 240 MG/DL (ref 70–99)
HCO3 VENOUS: 48 MMOL/L (ref 24–30)
HCT VFR BLD AUTO: 31.8 % (ref 36–46)
HGB BLD-MCNC: 9.7 G/DL (ref 12–16)
INR PPP: 1.4
LYMPHOCYTES NFR BLD: 0.32 K/UL (ref 1–4.8)
LYMPHOCYTES RELATIVE PERCENT: 5 % (ref 24–44)
MCH RBC QN AUTO: 25.8 PG (ref 26–34)
MCHC RBC AUTO-ENTMCNC: 30.6 G/DL (ref 31–37)
MCV RBC AUTO: 84.3 FL (ref 80–100)
METHEMOGLOBIN: 0.8 % (ref 0–1.9)
MONOCYTES NFR BLD: 0.19 K/UL (ref 0.1–1.3)
MONOCYTES NFR BLD: 3 % (ref 1–7)
MORPHOLOGY: ABNORMAL
NEUTROPHILS NFR BLD: 92 % (ref 36–66)
NEUTS SEG NFR BLD: 5.79 K/UL (ref 1.3–9.1)
O2 SAT, VEN: 85.4 % (ref 60–85)
PCO2 VENOUS: 59 MM HG (ref 39–55)
PEEP RESPIRATORY: 8 CM[H2O]
PH VENOUS: 7.52 (ref 7.32–7.42)
PLATELET # BLD AUTO: 225 K/UL (ref 150–450)
PMV BLD AUTO: 7.3 FL (ref 6–12)
PO2 VENOUS: 54.2 MM HG (ref 30–50)
POSITIVE BASE EXCESS, VEN: 25.1 MMOL/L (ref 0–2)
POTASSIUM SERPL-SCNC: 3.7 MMOL/L (ref 3.7–5.3)
PROTHROMBIN TIME: 17.1 SEC (ref 11.8–14.6)
RBC # BLD AUTO: 3.77 M/UL (ref 4–5.2)
SODIUM SERPL-SCNC: 139 MMOL/L (ref 135–144)
TEXT FOR RESPIRATORY: ABNORMAL
TOTAL RATE: 12
VENTILATION MODE VENT: ABNORMAL
VT: 400
WBC OTHER # BLD: 6.3 K/UL (ref 3.5–11)

## 2024-07-28 PROCEDURE — 99232 SBSQ HOSP IP/OBS MODERATE 35: CPT | Performed by: INTERNAL MEDICINE

## 2024-07-28 PROCEDURE — 2580000003 HC RX 258: Performed by: SURGERY

## 2024-07-28 PROCEDURE — 2060000000 HC ICU INTERMEDIATE R&B

## 2024-07-28 PROCEDURE — 6370000000 HC RX 637 (ALT 250 FOR IP): Performed by: SURGERY

## 2024-07-28 PROCEDURE — 94761 N-INVAS EAR/PLS OXIMETRY MLT: CPT

## 2024-07-28 PROCEDURE — 82800 BLOOD PH: CPT

## 2024-07-28 PROCEDURE — 82947 ASSAY GLUCOSE BLOOD QUANT: CPT

## 2024-07-28 PROCEDURE — 2580000003 HC RX 258: Performed by: NURSE PRACTITIONER

## 2024-07-28 PROCEDURE — 6360000002 HC RX W HCPCS: Performed by: INTERNAL MEDICINE

## 2024-07-28 PROCEDURE — 94003 VENT MGMT INPAT SUBQ DAY: CPT

## 2024-07-28 PROCEDURE — 6360000002 HC RX W HCPCS: Performed by: SURGERY

## 2024-07-28 PROCEDURE — 2580000003 HC RX 258: Performed by: INTERNAL MEDICINE

## 2024-07-28 PROCEDURE — 6370000000 HC RX 637 (ALT 250 FOR IP): Performed by: INTERNAL MEDICINE

## 2024-07-28 PROCEDURE — 6360000002 HC RX W HCPCS

## 2024-07-28 PROCEDURE — 2700000000 HC OXYGEN THERAPY PER DAY

## 2024-07-28 PROCEDURE — 99232 SBSQ HOSP IP/OBS MODERATE 35: CPT | Performed by: NURSE PRACTITIONER

## 2024-07-28 PROCEDURE — 94640 AIRWAY INHALATION TREATMENT: CPT

## 2024-07-28 PROCEDURE — 6360000002 HC RX W HCPCS: Performed by: NURSE PRACTITIONER

## 2024-07-28 PROCEDURE — 36415 COLL VENOUS BLD VENIPUNCTURE: CPT

## 2024-07-28 PROCEDURE — 82805 BLOOD GASES W/O2 SATURATION: CPT

## 2024-07-28 PROCEDURE — 85610 PROTHROMBIN TIME: CPT

## 2024-07-28 PROCEDURE — 85025 COMPLETE CBC W/AUTO DIFF WBC: CPT

## 2024-07-28 PROCEDURE — 80048 BASIC METABOLIC PNL TOTAL CA: CPT

## 2024-07-28 RX ORDER — WARFARIN SODIUM 2 MG/1
4 TABLET ORAL
Status: COMPLETED | OUTPATIENT
Start: 2024-07-28 | End: 2024-07-28

## 2024-07-28 RX ADMIN — ENOXAPARIN SODIUM 180 MG: 100 INJECTION SUBCUTANEOUS at 20:42

## 2024-07-28 RX ADMIN — LANSOPRAZOLE 30 MG: 30 TABLET, ORALLY DISINTEGRATING, DELAYED RELEASE ORAL at 06:08

## 2024-07-28 RX ADMIN — INSULIN LISPRO 2 UNITS: 100 INJECTION, SOLUTION INTRAVENOUS; SUBCUTANEOUS at 09:46

## 2024-07-28 RX ADMIN — BUDESONIDE INHALATION 500 MCG: 0.5 SUSPENSION RESPIRATORY (INHALATION) at 19:20

## 2024-07-28 RX ADMIN — PRAVASTATIN SODIUM 80 MG: 40 TABLET ORAL at 17:10

## 2024-07-28 RX ADMIN — ENOXAPARIN SODIUM 180 MG: 100 INJECTION SUBCUTANEOUS at 12:30

## 2024-07-28 RX ADMIN — MONTELUKAST 10 MG: 10 TABLET, FILM COATED ORAL at 20:42

## 2024-07-28 RX ADMIN — DILTIAZEM HYDROCHLORIDE 30 MG: 30 TABLET, FILM COATED ORAL at 17:10

## 2024-07-28 RX ADMIN — CARVEDILOL 6.25 MG: 6.25 TABLET, FILM COATED ORAL at 17:10

## 2024-07-28 RX ADMIN — INSULIN LISPRO 2 UNITS: 100 INJECTION, SOLUTION INTRAVENOUS; SUBCUTANEOUS at 12:30

## 2024-07-28 RX ADMIN — DILTIAZEM HYDROCHLORIDE 30 MG: 30 TABLET, FILM COATED ORAL at 11:57

## 2024-07-28 RX ADMIN — LEVALBUTEROL HYDROCHLORIDE 1.25 MG: 1.25 SOLUTION RESPIRATORY (INHALATION) at 13:24

## 2024-07-28 RX ADMIN — MICONAZOLE NITRATE: 2 CREAM TOPICAL at 20:43

## 2024-07-28 RX ADMIN — FUROSEMIDE 40 MG: 10 INJECTION, SOLUTION INTRAMUSCULAR; INTRAVENOUS at 07:44

## 2024-07-28 RX ADMIN — WATER 40 MG: 1 INJECTION INTRAMUSCULAR; INTRAVENOUS; SUBCUTANEOUS at 17:10

## 2024-07-28 RX ADMIN — BUDESONIDE INHALATION 500 MCG: 0.5 SUSPENSION RESPIRATORY (INHALATION) at 07:10

## 2024-07-28 RX ADMIN — IPRATROPIUM BROMIDE 0.5 MG: 0.5 SOLUTION RESPIRATORY (INHALATION) at 01:44

## 2024-07-28 RX ADMIN — Medication 15 ML: at 07:44

## 2024-07-28 RX ADMIN — DILTIAZEM HYDROCHLORIDE 30 MG: 30 TABLET, FILM COATED ORAL at 06:08

## 2024-07-28 RX ADMIN — SODIUM CHLORIDE, PRESERVATIVE FREE 10 ML: 5 INJECTION INTRAVENOUS at 07:45

## 2024-07-28 RX ADMIN — DILTIAZEM HYDROCHLORIDE 30 MG: 30 TABLET, FILM COATED ORAL at 00:08

## 2024-07-28 RX ADMIN — TIGECYCLINE 50 MG: 50 INJECTION, POWDER, LYOPHILIZED, FOR SOLUTION INTRAVENOUS at 22:16

## 2024-07-28 RX ADMIN — FUROSEMIDE 40 MG: 10 INJECTION, SOLUTION INTRAMUSCULAR; INTRAVENOUS at 17:10

## 2024-07-28 RX ADMIN — INSULIN LISPRO 2 UNITS: 100 INJECTION, SOLUTION INTRAVENOUS; SUBCUTANEOUS at 01:44

## 2024-07-28 RX ADMIN — TIGECYCLINE 50 MG: 50 INJECTION, POWDER, LYOPHILIZED, FOR SOLUTION INTRAVENOUS at 09:47

## 2024-07-28 RX ADMIN — SODIUM CHLORIDE, PRESERVATIVE FREE 10 ML: 5 INJECTION INTRAVENOUS at 20:42

## 2024-07-28 RX ADMIN — INSULIN LISPRO 2 UNITS: 100 INJECTION, SOLUTION INTRAVENOUS; SUBCUTANEOUS at 17:10

## 2024-07-28 RX ADMIN — IPRATROPIUM BROMIDE 0.5 MG: 0.5 SOLUTION RESPIRATORY (INHALATION) at 13:24

## 2024-07-28 RX ADMIN — CARVEDILOL 6.25 MG: 6.25 TABLET, FILM COATED ORAL at 07:44

## 2024-07-28 RX ADMIN — LEVALBUTEROL HYDROCHLORIDE 1.25 MG: 1.25 SOLUTION RESPIRATORY (INHALATION) at 19:20

## 2024-07-28 RX ADMIN — IPRATROPIUM BROMIDE 0.5 MG: 0.5 SOLUTION RESPIRATORY (INHALATION) at 07:03

## 2024-07-28 RX ADMIN — HYDRALAZINE HYDROCHLORIDE 10 MG: 20 INJECTION INTRAMUSCULAR; INTRAVENOUS at 20:57

## 2024-07-28 RX ADMIN — WATER 40 MG: 1 INJECTION INTRAMUSCULAR; INTRAVENOUS; SUBCUTANEOUS at 06:08

## 2024-07-28 RX ADMIN — MICONAZOLE NITRATE: 2 CREAM TOPICAL at 07:45

## 2024-07-28 RX ADMIN — LEVALBUTEROL HYDROCHLORIDE 1.25 MG: 1.25 SOLUTION RESPIRATORY (INHALATION) at 07:03

## 2024-07-28 RX ADMIN — WARFARIN SODIUM 4 MG: 2 TABLET ORAL at 17:30

## 2024-07-28 RX ADMIN — IPRATROPIUM BROMIDE 0.5 MG: 0.5 SOLUTION RESPIRATORY (INHALATION) at 19:19

## 2024-07-28 RX ADMIN — LEVALBUTEROL HYDROCHLORIDE 1.25 MG: 1.25 SOLUTION RESPIRATORY (INHALATION) at 01:44

## 2024-07-28 RX ADMIN — INSULIN LISPRO 2 UNITS: 100 INJECTION, SOLUTION INTRAVENOUS; SUBCUTANEOUS at 06:08

## 2024-07-28 ASSESSMENT — PAIN SCALES - GENERAL
PAINLEVEL_OUTOF10: 0

## 2024-07-28 ASSESSMENT — PULMONARY FUNCTION TESTS
PIF_VALUE: 14
PIF_VALUE: 18
PIF_VALUE: 23
PIF_VALUE: 23
PIF_VALUE: 20
PIF_VALUE: 15
PIF_VALUE: 16
PIF_VALUE: 11
PIF_VALUE: 17
PIF_VALUE: 10
PIF_VALUE: 9
PIF_VALUE: 29
PIF_VALUE: 18
PIF_VALUE: 14
PIF_VALUE: 30
PIF_VALUE: 10
PIF_VALUE: 18
PIF_VALUE: 15
PIF_VALUE: 8
PIF_VALUE: 18
PIF_VALUE: 18
PIF_VALUE: 17
PIF_VALUE: 18
PIF_VALUE: 15
PIF_VALUE: 18
PIF_VALUE: 16

## 2024-07-29 LAB
ANION GAP SERPL CALCULATED.3IONS-SCNC: 7 MMOL/L (ref 9–17)
BASOPHILS # BLD: 0 K/UL (ref 0–0.2)
BASOPHILS NFR BLD: 0 % (ref 0–2)
BUN SERPL-MCNC: 53 MG/DL (ref 8–23)
CALCIUM SERPL-MCNC: 9.4 MG/DL (ref 8.6–10.4)
CHLORIDE SERPL-SCNC: 91 MMOL/L (ref 98–107)
CO2 SERPL-SCNC: 42 MMOL/L (ref 20–31)
CREAT SERPL-MCNC: 0.8 MG/DL (ref 0.5–0.9)
EOSINOPHIL # BLD: 0 K/UL (ref 0–0.4)
EOSINOPHILS RELATIVE PERCENT: 0 % (ref 0–4)
ERYTHROCYTE [DISTWIDTH] IN BLOOD BY AUTOMATED COUNT: 18.7 % (ref 11.5–14.9)
GFR, ESTIMATED: 81 ML/MIN/1.73M2
GLUCOSE BLD-MCNC: 192 MG/DL (ref 65–105)
GLUCOSE BLD-MCNC: 192 MG/DL (ref 65–105)
GLUCOSE BLD-MCNC: 200 MG/DL (ref 65–105)
GLUCOSE BLD-MCNC: 207 MG/DL (ref 65–105)
GLUCOSE BLD-MCNC: 248 MG/DL (ref 65–105)
GLUCOSE BLD-MCNC: 250 MG/DL (ref 65–105)
GLUCOSE SERPL-MCNC: 225 MG/DL (ref 70–99)
HCT VFR BLD AUTO: 32.8 % (ref 36–46)
HGB BLD-MCNC: 10.1 G/DL (ref 12–16)
INR PPP: 1.4
LYMPHOCYTES NFR BLD: 0.33 K/UL (ref 1–4.8)
LYMPHOCYTES RELATIVE PERCENT: 4 % (ref 24–44)
MAGNESIUM SERPL-MCNC: 2.2 MG/DL (ref 1.6–2.6)
MCH RBC QN AUTO: 25.8 PG (ref 26–34)
MCHC RBC AUTO-ENTMCNC: 30.7 G/DL (ref 31–37)
MCV RBC AUTO: 84.3 FL (ref 80–100)
MONOCYTES NFR BLD: 0.5 K/UL (ref 0.1–1.3)
MONOCYTES NFR BLD: 6 % (ref 1–7)
MORPHOLOGY: ABNORMAL
NEUTROPHILS NFR BLD: 90 % (ref 36–66)
NEUTS SEG NFR BLD: 7.47 K/UL (ref 1.3–9.1)
PLATELET # BLD AUTO: 245 K/UL (ref 150–450)
PMV BLD AUTO: 7.4 FL (ref 6–12)
POTASSIUM SERPL-SCNC: 3.4 MMOL/L (ref 3.7–5.3)
PROTHROMBIN TIME: 17.2 SEC (ref 11.8–14.6)
RBC # BLD AUTO: 3.9 M/UL (ref 4–5.2)
SODIUM SERPL-SCNC: 140 MMOL/L (ref 135–144)
WBC OTHER # BLD: 8.3 K/UL (ref 3.5–11)

## 2024-07-29 PROCEDURE — 94761 N-INVAS EAR/PLS OXIMETRY MLT: CPT

## 2024-07-29 PROCEDURE — 6360000002 HC RX W HCPCS: Performed by: INTERNAL MEDICINE

## 2024-07-29 PROCEDURE — 99233 SBSQ HOSP IP/OBS HIGH 50: CPT | Performed by: INTERNAL MEDICINE

## 2024-07-29 PROCEDURE — 2700000000 HC OXYGEN THERAPY PER DAY

## 2024-07-29 PROCEDURE — 6370000000 HC RX 637 (ALT 250 FOR IP): Performed by: INTERNAL MEDICINE

## 2024-07-29 PROCEDURE — 94003 VENT MGMT INPAT SUBQ DAY: CPT

## 2024-07-29 PROCEDURE — 6360000002 HC RX W HCPCS: Performed by: SURGERY

## 2024-07-29 PROCEDURE — 85025 COMPLETE CBC W/AUTO DIFF WBC: CPT

## 2024-07-29 PROCEDURE — 85610 PROTHROMBIN TIME: CPT

## 2024-07-29 PROCEDURE — 80048 BASIC METABOLIC PNL TOTAL CA: CPT

## 2024-07-29 PROCEDURE — 82947 ASSAY GLUCOSE BLOOD QUANT: CPT

## 2024-07-29 PROCEDURE — 6370000000 HC RX 637 (ALT 250 FOR IP): Performed by: SURGERY

## 2024-07-29 PROCEDURE — 2060000000 HC ICU INTERMEDIATE R&B

## 2024-07-29 PROCEDURE — 6360000002 HC RX W HCPCS: Performed by: NURSE PRACTITIONER

## 2024-07-29 PROCEDURE — 2580000003 HC RX 258: Performed by: SURGERY

## 2024-07-29 PROCEDURE — 2580000003 HC RX 258: Performed by: NURSE PRACTITIONER

## 2024-07-29 PROCEDURE — 2580000003 HC RX 258: Performed by: INTERNAL MEDICINE

## 2024-07-29 PROCEDURE — 83735 ASSAY OF MAGNESIUM: CPT

## 2024-07-29 PROCEDURE — 94640 AIRWAY INHALATION TREATMENT: CPT

## 2024-07-29 PROCEDURE — 36415 COLL VENOUS BLD VENIPUNCTURE: CPT

## 2024-07-29 RX ORDER — SULFAMETHOXAZOLE AND TRIMETHOPRIM 200; 40 MG/5ML; MG/5ML
160 SUSPENSION ORAL EVERY 12 HOURS
Status: CANCELLED | OUTPATIENT
Start: 2024-07-29 | End: 2024-08-05

## 2024-07-29 RX ORDER — SULFAMETHOXAZOLE AND TRIMETHOPRIM 200; 40 MG/5ML; MG/5ML
160 SUSPENSION ORAL EVERY 12 HOURS
Status: DISCONTINUED | OUTPATIENT
Start: 2024-07-29 | End: 2024-08-04

## 2024-07-29 RX ORDER — WARFARIN SODIUM 2 MG/1
4 TABLET ORAL
Status: COMPLETED | OUTPATIENT
Start: 2024-07-29 | End: 2024-07-29

## 2024-07-29 RX ADMIN — DILTIAZEM HYDROCHLORIDE 30 MG: 30 TABLET, FILM COATED ORAL at 11:43

## 2024-07-29 RX ADMIN — ACETAMINOPHEN 650 MG: 325 TABLET ORAL at 16:10

## 2024-07-29 RX ADMIN — ENOXAPARIN SODIUM 180 MG: 100 INJECTION SUBCUTANEOUS at 23:18

## 2024-07-29 RX ADMIN — INSULIN LISPRO 2 UNITS: 100 INJECTION, SOLUTION INTRAVENOUS; SUBCUTANEOUS at 11:43

## 2024-07-29 RX ADMIN — CARVEDILOL 6.25 MG: 6.25 TABLET, FILM COATED ORAL at 08:28

## 2024-07-29 RX ADMIN — IPRATROPIUM BROMIDE 0.5 MG: 0.5 SOLUTION RESPIRATORY (INHALATION) at 08:52

## 2024-07-29 RX ADMIN — MICONAZOLE NITRATE: 2 CREAM TOPICAL at 08:28

## 2024-07-29 RX ADMIN — WATER 40 MG: 1 INJECTION INTRAMUSCULAR; INTRAVENOUS; SUBCUTANEOUS at 17:40

## 2024-07-29 RX ADMIN — MICONAZOLE NITRATE: 2 CREAM TOPICAL at 23:15

## 2024-07-29 RX ADMIN — LEVALBUTEROL HYDROCHLORIDE 1.25 MG: 1.25 SOLUTION RESPIRATORY (INHALATION) at 19:04

## 2024-07-29 RX ADMIN — PRAVASTATIN SODIUM 80 MG: 40 TABLET ORAL at 17:40

## 2024-07-29 RX ADMIN — IPRATROPIUM BROMIDE 0.5 MG: 0.5 SOLUTION RESPIRATORY (INHALATION) at 19:04

## 2024-07-29 RX ADMIN — WARFARIN SODIUM 4 MG: 2 TABLET ORAL at 17:40

## 2024-07-29 RX ADMIN — IPRATROPIUM BROMIDE 0.5 MG: 0.5 SOLUTION RESPIRATORY (INHALATION) at 01:48

## 2024-07-29 RX ADMIN — BUDESONIDE INHALATION 500 MCG: 0.5 SUSPENSION RESPIRATORY (INHALATION) at 09:00

## 2024-07-29 RX ADMIN — INSULIN LISPRO 2 UNITS: 100 INJECTION, SOLUTION INTRAVENOUS; SUBCUTANEOUS at 16:31

## 2024-07-29 RX ADMIN — LEVALBUTEROL HYDROCHLORIDE 1.25 MG: 1.25 SOLUTION RESPIRATORY (INHALATION) at 13:13

## 2024-07-29 RX ADMIN — FUROSEMIDE 40 MG: 10 INJECTION, SOLUTION INTRAMUSCULAR; INTRAVENOUS at 17:40

## 2024-07-29 RX ADMIN — DILTIAZEM HYDROCHLORIDE 30 MG: 30 TABLET, FILM COATED ORAL at 23:14

## 2024-07-29 RX ADMIN — MONTELUKAST 10 MG: 10 TABLET, FILM COATED ORAL at 23:14

## 2024-07-29 RX ADMIN — CARVEDILOL 6.25 MG: 6.25 TABLET, FILM COATED ORAL at 16:10

## 2024-07-29 RX ADMIN — SODIUM CHLORIDE, PRESERVATIVE FREE 10 ML: 5 INJECTION INTRAVENOUS at 23:15

## 2024-07-29 RX ADMIN — IPRATROPIUM BROMIDE 0.5 MG: 0.5 SOLUTION RESPIRATORY (INHALATION) at 13:13

## 2024-07-29 RX ADMIN — INSULIN LISPRO 4 UNITS: 100 INJECTION, SOLUTION INTRAVENOUS; SUBCUTANEOUS at 23:54

## 2024-07-29 RX ADMIN — ENOXAPARIN SODIUM 180 MG: 100 INJECTION SUBCUTANEOUS at 08:28

## 2024-07-29 RX ADMIN — LEVALBUTEROL HYDROCHLORIDE 1.25 MG: 1.25 SOLUTION RESPIRATORY (INHALATION) at 08:52

## 2024-07-29 RX ADMIN — SULFAMETHOXAZOLE AND TRIMETHOPRIM 20 ML: 200; 40 SUSPENSION ORAL at 23:17

## 2024-07-29 RX ADMIN — POTASSIUM BICARBONATE 40 MEQ: 782 TABLET, EFFERVESCENT ORAL at 05:15

## 2024-07-29 RX ADMIN — LEVALBUTEROL HYDROCHLORIDE 1.25 MG: 1.25 SOLUTION RESPIRATORY (INHALATION) at 01:48

## 2024-07-29 RX ADMIN — DILTIAZEM HYDROCHLORIDE 30 MG: 30 TABLET, FILM COATED ORAL at 00:13

## 2024-07-29 RX ADMIN — FUROSEMIDE 40 MG: 10 INJECTION, SOLUTION INTRAMUSCULAR; INTRAVENOUS at 08:28

## 2024-07-29 RX ADMIN — TIGECYCLINE 50 MG: 50 INJECTION, POWDER, LYOPHILIZED, FOR SOLUTION INTRAVENOUS at 10:13

## 2024-07-29 RX ADMIN — INSULIN LISPRO 2 UNITS: 100 INJECTION, SOLUTION INTRAVENOUS; SUBCUTANEOUS at 04:21

## 2024-07-29 RX ADMIN — Medication 15 ML: at 08:29

## 2024-07-29 RX ADMIN — DILTIAZEM HYDROCHLORIDE 30 MG: 30 TABLET, FILM COATED ORAL at 05:15

## 2024-07-29 RX ADMIN — DILTIAZEM HYDROCHLORIDE 30 MG: 30 TABLET, FILM COATED ORAL at 17:40

## 2024-07-29 RX ADMIN — LANSOPRAZOLE 30 MG: 30 TABLET, ORALLY DISINTEGRATING, DELAYED RELEASE ORAL at 08:28

## 2024-07-29 RX ADMIN — BUDESONIDE INHALATION 500 MCG: 0.5 SUSPENSION RESPIRATORY (INHALATION) at 19:04

## 2024-07-29 RX ADMIN — WATER 40 MG: 1 INJECTION INTRAMUSCULAR; INTRAVENOUS; SUBCUTANEOUS at 05:15

## 2024-07-29 ASSESSMENT — PAIN SCALES - GENERAL
PAINLEVEL_OUTOF10: 0
PAINLEVEL_OUTOF10: 3
PAINLEVEL_OUTOF10: 3

## 2024-07-29 ASSESSMENT — PULMONARY FUNCTION TESTS
PIF_VALUE: 25
PIF_VALUE: 26
PIF_VALUE: 9
PIF_VALUE: 17
PIF_VALUE: 11
PIF_VALUE: 11
PIF_VALUE: 13
PIF_VALUE: 13
PIF_VALUE: 15
PIF_VALUE: 9
PIF_VALUE: 11
PIF_VALUE: 10
PIF_VALUE: 15
PIF_VALUE: 18
PIF_VALUE: 17
PIF_VALUE: 14
PIF_VALUE: 11
PIF_VALUE: 19

## 2024-07-29 ASSESSMENT — PAIN DESCRIPTION - LOCATION: LOCATION: GENERALIZED

## 2024-07-30 LAB
BLOOD BANK BLOOD PRODUCT EXPIRATION DATE: NORMAL
BLOOD BANK DISPENSE STATUS: NORMAL
BLOOD BANK ISBT PRODUCT BLOOD TYPE: 5100
BLOOD BANK PRODUCT CODE: NORMAL
BLOOD BANK UNIT TYPE AND RH: NORMAL
BPU ID: NORMAL
COMPONENT: NORMAL
EST. AVERAGE GLUCOSE BLD GHB EST-MCNC: 148 MG/DL
GLUCOSE BLD-MCNC: 172 MG/DL (ref 65–105)
GLUCOSE BLD-MCNC: 191 MG/DL (ref 65–105)
GLUCOSE BLD-MCNC: 204 MG/DL (ref 65–105)
GLUCOSE BLD-MCNC: 260 MG/DL (ref 65–105)
HBA1C MFR BLD: 6.8 % (ref 4–6)
INR PPP: 1.3
PROTHROMBIN TIME: 16.2 SEC (ref 11.8–14.6)
TRANSFUSION STATUS: NORMAL
UNIT DIVISION: 0
UNIT ISSUE DATE/TIME: NORMAL

## 2024-07-30 PROCEDURE — 94003 VENT MGMT INPAT SUBQ DAY: CPT

## 2024-07-30 PROCEDURE — 6370000000 HC RX 637 (ALT 250 FOR IP): Performed by: SURGERY

## 2024-07-30 PROCEDURE — 6360000002 HC RX W HCPCS: Performed by: SURGERY

## 2024-07-30 PROCEDURE — 6370000000 HC RX 637 (ALT 250 FOR IP): Performed by: INTERNAL MEDICINE

## 2024-07-30 PROCEDURE — 2700000000 HC OXYGEN THERAPY PER DAY

## 2024-07-30 PROCEDURE — 6360000002 HC RX W HCPCS: Performed by: INTERNAL MEDICINE

## 2024-07-30 PROCEDURE — 99233 SBSQ HOSP IP/OBS HIGH 50: CPT | Performed by: INTERNAL MEDICINE

## 2024-07-30 PROCEDURE — 94640 AIRWAY INHALATION TREATMENT: CPT

## 2024-07-30 PROCEDURE — 2580000003 HC RX 258: Performed by: SURGERY

## 2024-07-30 PROCEDURE — 6370000000 HC RX 637 (ALT 250 FOR IP): Performed by: NURSE PRACTITIONER

## 2024-07-30 PROCEDURE — 82947 ASSAY GLUCOSE BLOOD QUANT: CPT

## 2024-07-30 PROCEDURE — 94761 N-INVAS EAR/PLS OXIMETRY MLT: CPT

## 2024-07-30 PROCEDURE — 83036 HEMOGLOBIN GLYCOSYLATED A1C: CPT

## 2024-07-30 PROCEDURE — 36415 COLL VENOUS BLD VENIPUNCTURE: CPT

## 2024-07-30 PROCEDURE — 6360000002 HC RX W HCPCS

## 2024-07-30 PROCEDURE — 2060000000 HC ICU INTERMEDIATE R&B

## 2024-07-30 PROCEDURE — 31502 CHANGE OF WINDPIPE AIRWAY: CPT

## 2024-07-30 PROCEDURE — 85610 PROTHROMBIN TIME: CPT

## 2024-07-30 PROCEDURE — 2580000003 HC RX 258: Performed by: INTERNAL MEDICINE

## 2024-07-30 RX ORDER — DEXTROSE MONOHYDRATE 100 MG/ML
INJECTION, SOLUTION INTRAVENOUS CONTINUOUS PRN
Status: DISCONTINUED | OUTPATIENT
Start: 2024-07-30 | End: 2024-08-05 | Stop reason: HOSPADM

## 2024-07-30 RX ORDER — WARFARIN SODIUM 3 MG/1
6 TABLET ORAL
Status: COMPLETED | OUTPATIENT
Start: 2024-07-30 | End: 2024-07-30

## 2024-07-30 RX ORDER — INSULIN GLARGINE 100 [IU]/ML
15 INJECTION, SOLUTION SUBCUTANEOUS NIGHTLY
Status: DISCONTINUED | OUTPATIENT
Start: 2024-07-30 | End: 2024-08-04

## 2024-07-30 RX ORDER — SIMETHICONE 80 MG
80 TABLET,CHEWABLE ORAL EVERY 6 HOURS PRN
Status: DISCONTINUED | OUTPATIENT
Start: 2024-07-30 | End: 2024-08-05 | Stop reason: HOSPADM

## 2024-07-30 RX ORDER — PREDNISONE 20 MG/1
20 TABLET ORAL DAILY
Status: DISCONTINUED | OUTPATIENT
Start: 2024-07-30 | End: 2024-08-05

## 2024-07-30 RX ADMIN — ENOXAPARIN SODIUM 180 MG: 100 INJECTION SUBCUTANEOUS at 09:42

## 2024-07-30 RX ADMIN — IPRATROPIUM BROMIDE 0.5 MG: 0.5 SOLUTION RESPIRATORY (INHALATION) at 08:17

## 2024-07-30 RX ADMIN — INSULIN LISPRO 4 UNITS: 100 INJECTION, SOLUTION INTRAVENOUS; SUBCUTANEOUS at 04:59

## 2024-07-30 RX ADMIN — INSULIN LISPRO 2 UNITS: 100 INJECTION, SOLUTION INTRAVENOUS; SUBCUTANEOUS at 16:16

## 2024-07-30 RX ADMIN — LANSOPRAZOLE 30 MG: 30 TABLET, ORALLY DISINTEGRATING, DELAYED RELEASE ORAL at 07:08

## 2024-07-30 RX ADMIN — LEVALBUTEROL HYDROCHLORIDE 1.25 MG: 1.25 SOLUTION RESPIRATORY (INHALATION) at 13:11

## 2024-07-30 RX ADMIN — FUROSEMIDE 40 MG: 10 INJECTION, SOLUTION INTRAMUSCULAR; INTRAVENOUS at 09:42

## 2024-07-30 RX ADMIN — CARVEDILOL 6.25 MG: 6.25 TABLET, FILM COATED ORAL at 16:59

## 2024-07-30 RX ADMIN — HYDRALAZINE HYDROCHLORIDE 10 MG: 20 INJECTION INTRAMUSCULAR; INTRAVENOUS at 07:08

## 2024-07-30 RX ADMIN — INSULIN LISPRO 2 UNITS: 100 INJECTION, SOLUTION INTRAVENOUS; SUBCUTANEOUS at 16:59

## 2024-07-30 RX ADMIN — LEVALBUTEROL HYDROCHLORIDE 1.25 MG: 1.25 SOLUTION RESPIRATORY (INHALATION) at 08:17

## 2024-07-30 RX ADMIN — SIMETHICONE 80 MG: 80 TABLET, CHEWABLE ORAL at 04:59

## 2024-07-30 RX ADMIN — SULFAMETHOXAZOLE AND TRIMETHOPRIM 20 ML: 200; 40 SUSPENSION ORAL at 09:46

## 2024-07-30 RX ADMIN — LEVALBUTEROL HYDROCHLORIDE 1.25 MG: 1.25 SOLUTION RESPIRATORY (INHALATION) at 19:11

## 2024-07-30 RX ADMIN — BUDESONIDE INHALATION 500 MCG: 0.5 SUSPENSION RESPIRATORY (INHALATION) at 19:11

## 2024-07-30 RX ADMIN — SULFAMETHOXAZOLE AND TRIMETHOPRIM 20 ML: 200; 40 SUSPENSION ORAL at 20:23

## 2024-07-30 RX ADMIN — IPRATROPIUM BROMIDE 0.5 MG: 0.5 SOLUTION RESPIRATORY (INHALATION) at 19:11

## 2024-07-30 RX ADMIN — WATER 40 MG: 1 INJECTION INTRAMUSCULAR; INTRAVENOUS; SUBCUTANEOUS at 04:59

## 2024-07-30 RX ADMIN — WARFARIN SODIUM 6 MG: 3 TABLET ORAL at 18:23

## 2024-07-30 RX ADMIN — LEVALBUTEROL HYDROCHLORIDE 1.25 MG: 1.25 SOLUTION RESPIRATORY (INHALATION) at 01:20

## 2024-07-30 RX ADMIN — MICONAZOLE NITRATE: 2 CREAM TOPICAL at 20:07

## 2024-07-30 RX ADMIN — SODIUM CHLORIDE, PRESERVATIVE FREE 10 ML: 5 INJECTION INTRAVENOUS at 09:45

## 2024-07-30 RX ADMIN — MONTELUKAST 10 MG: 10 TABLET, FILM COATED ORAL at 20:23

## 2024-07-30 RX ADMIN — DILTIAZEM HYDROCHLORIDE 30 MG: 30 TABLET, FILM COATED ORAL at 12:15

## 2024-07-30 RX ADMIN — MICONAZOLE NITRATE: 2 CREAM TOPICAL at 09:44

## 2024-07-30 RX ADMIN — INSULIN LISPRO 4 UNITS: 100 INJECTION, SOLUTION INTRAVENOUS; SUBCUTANEOUS at 08:46

## 2024-07-30 RX ADMIN — CARVEDILOL 6.25 MG: 6.25 TABLET, FILM COATED ORAL at 09:42

## 2024-07-30 RX ADMIN — PRAVASTATIN SODIUM 80 MG: 40 TABLET ORAL at 16:59

## 2024-07-30 RX ADMIN — Medication 15 ML: at 09:43

## 2024-07-30 RX ADMIN — INSULIN LISPRO 4 UNITS: 100 INJECTION, SOLUTION INTRAVENOUS; SUBCUTANEOUS at 12:15

## 2024-07-30 RX ADMIN — DILTIAZEM HYDROCHLORIDE 30 MG: 30 TABLET, FILM COATED ORAL at 16:59

## 2024-07-30 RX ADMIN — DILTIAZEM HYDROCHLORIDE 30 MG: 30 TABLET, FILM COATED ORAL at 04:59

## 2024-07-30 RX ADMIN — ENOXAPARIN SODIUM 180 MG: 100 INJECTION SUBCUTANEOUS at 20:22

## 2024-07-30 RX ADMIN — SODIUM CHLORIDE: 9 INJECTION, SOLUTION INTRAVENOUS at 20:11

## 2024-07-30 RX ADMIN — PREDNISONE 20 MG: 20 TABLET ORAL at 18:25

## 2024-07-30 RX ADMIN — DILTIAZEM HYDROCHLORIDE 30 MG: 30 TABLET, FILM COATED ORAL at 23:48

## 2024-07-30 RX ADMIN — FUROSEMIDE 40 MG: 10 INJECTION, SOLUTION INTRAMUSCULAR; INTRAVENOUS at 16:59

## 2024-07-30 RX ADMIN — SODIUM CHLORIDE, PRESERVATIVE FREE 10 ML: 5 INJECTION INTRAVENOUS at 20:05

## 2024-07-30 RX ADMIN — IPRATROPIUM BROMIDE 0.5 MG: 0.5 SOLUTION RESPIRATORY (INHALATION) at 01:20

## 2024-07-30 RX ADMIN — IPRATROPIUM BROMIDE 0.5 MG: 0.5 SOLUTION RESPIRATORY (INHALATION) at 13:11

## 2024-07-30 RX ADMIN — BUDESONIDE INHALATION 500 MCG: 0.5 SUSPENSION RESPIRATORY (INHALATION) at 08:17

## 2024-07-30 RX ADMIN — INSULIN GLARGINE 15 UNITS: 100 INJECTION, SOLUTION SUBCUTANEOUS at 20:22

## 2024-07-30 ASSESSMENT — PULMONARY FUNCTION TESTS
PIF_VALUE: 11
PIF_VALUE: 12
PIF_VALUE: 24
PIF_VALUE: 12
PIF_VALUE: 14
PIF_VALUE: 15
PIF_VALUE: 9
PIF_VALUE: 18
PIF_VALUE: 18
PIF_VALUE: 11
PIF_VALUE: 12
PIF_VALUE: 11
PIF_VALUE: 15
PIF_VALUE: 12
PIF_VALUE: 20
PIF_VALUE: 12
PIF_VALUE: 6
PIF_VALUE: 24
PIF_VALUE: 13
PIF_VALUE: 18

## 2024-07-30 ASSESSMENT — PAIN SCALES - GENERAL
PAINLEVEL_OUTOF10: 0
PAINLEVEL_OUTOF10: 0

## 2024-07-31 ENCOUNTER — APPOINTMENT (OUTPATIENT)
Dept: GENERAL RADIOLOGY | Age: 69
DRG: 004 | End: 2024-07-31
Attending: INTERNAL MEDICINE
Payer: MEDICARE

## 2024-07-31 LAB
ALBUMIN SERPL-MCNC: 2.9 G/DL (ref 3.5–5.2)
ALP SERPL-CCNC: 68 U/L (ref 35–104)
ALT SERPL-CCNC: 10 U/L (ref 5–33)
ANION GAP SERPL CALCULATED.3IONS-SCNC: 6 MMOL/L (ref 9–17)
AST SERPL-CCNC: 10 U/L
BASOPHILS # BLD: 0 K/UL (ref 0–0.2)
BASOPHILS NFR BLD: 0 % (ref 0–2)
BILIRUB SERPL-MCNC: 0.5 MG/DL (ref 0.3–1.2)
BUN SERPL-MCNC: 58 MG/DL (ref 8–23)
CALCIUM SERPL-MCNC: 9.4 MG/DL (ref 8.6–10.4)
CHLORIDE SERPL-SCNC: 89 MMOL/L (ref 98–107)
CO2 SERPL-SCNC: 43 MMOL/L (ref 20–31)
CREAT SERPL-MCNC: 0.8 MG/DL (ref 0.5–0.9)
EOSINOPHIL # BLD: 0 K/UL (ref 0–0.4)
EOSINOPHILS RELATIVE PERCENT: 0 % (ref 0–4)
ERYTHROCYTE [DISTWIDTH] IN BLOOD BY AUTOMATED COUNT: 18.8 % (ref 11.5–14.9)
GFR, ESTIMATED: 81 ML/MIN/1.73M2
GLUCOSE BLD-MCNC: 169 MG/DL (ref 65–105)
GLUCOSE BLD-MCNC: 176 MG/DL (ref 65–105)
GLUCOSE BLD-MCNC: 193 MG/DL (ref 65–105)
GLUCOSE BLD-MCNC: 206 MG/DL (ref 65–105)
HCT VFR BLD AUTO: 32.9 % (ref 36–46)
HGB BLD-MCNC: 9.8 G/DL (ref 12–16)
INR PPP: 1.5
LYMPHOCYTES NFR BLD: 0.4 K/UL (ref 1–4.8)
LYMPHOCYTES RELATIVE PERCENT: 6 % (ref 24–44)
MCH RBC QN AUTO: 25.1 PG (ref 26–34)
MCHC RBC AUTO-ENTMCNC: 29.7 G/DL (ref 31–37)
MCV RBC AUTO: 84.5 FL (ref 80–100)
MONOCYTES NFR BLD: 0.5 K/UL (ref 0.1–1.3)
MONOCYTES NFR BLD: 7 % (ref 1–7)
NEUTROPHILS NFR BLD: 87 % (ref 36–66)
NEUTS SEG NFR BLD: 6 K/UL (ref 1.3–9.1)
PLATELET # BLD AUTO: 233 K/UL (ref 150–450)
PMV BLD AUTO: 8.1 FL (ref 6–12)
POTASSIUM SERPL-SCNC: 3.7 MMOL/L (ref 3.7–5.3)
PROT SERPL-MCNC: 6.4 G/DL (ref 6.4–8.3)
PROTHROMBIN TIME: 18.5 SEC (ref 11.8–14.6)
RBC # BLD AUTO: 3.89 M/UL (ref 4–5.2)
SODIUM SERPL-SCNC: 138 MMOL/L (ref 135–144)
WBC OTHER # BLD: 6.9 K/UL (ref 3.5–11)

## 2024-07-31 PROCEDURE — 6370000000 HC RX 637 (ALT 250 FOR IP): Performed by: INTERNAL MEDICINE

## 2024-07-31 PROCEDURE — 6370000000 HC RX 637 (ALT 250 FOR IP): Performed by: SURGERY

## 2024-07-31 PROCEDURE — 6360000002 HC RX W HCPCS: Performed by: SURGERY

## 2024-07-31 PROCEDURE — 94761 N-INVAS EAR/PLS OXIMETRY MLT: CPT

## 2024-07-31 PROCEDURE — 74230 X-RAY XM SWLNG FUNCJ C+: CPT

## 2024-07-31 PROCEDURE — 6360000002 HC RX W HCPCS: Performed by: INTERNAL MEDICINE

## 2024-07-31 PROCEDURE — 36415 COLL VENOUS BLD VENIPUNCTURE: CPT

## 2024-07-31 PROCEDURE — 85025 COMPLETE CBC W/AUTO DIFF WBC: CPT

## 2024-07-31 PROCEDURE — 94640 AIRWAY INHALATION TREATMENT: CPT

## 2024-07-31 PROCEDURE — 2700000000 HC OXYGEN THERAPY PER DAY

## 2024-07-31 PROCEDURE — 80053 COMPREHEN METABOLIC PANEL: CPT

## 2024-07-31 PROCEDURE — 99233 SBSQ HOSP IP/OBS HIGH 50: CPT | Performed by: INTERNAL MEDICINE

## 2024-07-31 PROCEDURE — 82947 ASSAY GLUCOSE BLOOD QUANT: CPT

## 2024-07-31 PROCEDURE — 97162 PT EVAL MOD COMPLEX 30 MIN: CPT

## 2024-07-31 PROCEDURE — 2060000000 HC ICU INTERMEDIATE R&B

## 2024-07-31 PROCEDURE — 85610 PROTHROMBIN TIME: CPT

## 2024-07-31 PROCEDURE — 97110 THERAPEUTIC EXERCISES: CPT

## 2024-07-31 PROCEDURE — 94003 VENT MGMT INPAT SUBQ DAY: CPT

## 2024-07-31 PROCEDURE — 2580000003 HC RX 258: Performed by: SURGERY

## 2024-07-31 RX ORDER — WARFARIN SODIUM 3 MG/1
6 TABLET ORAL
Status: COMPLETED | OUTPATIENT
Start: 2024-07-31 | End: 2024-07-31

## 2024-07-31 RX ADMIN — LEVALBUTEROL HYDROCHLORIDE 1.25 MG: 1.25 SOLUTION RESPIRATORY (INHALATION) at 19:16

## 2024-07-31 RX ADMIN — LEVALBUTEROL HYDROCHLORIDE 1.25 MG: 1.25 SOLUTION RESPIRATORY (INHALATION) at 07:30

## 2024-07-31 RX ADMIN — MONTELUKAST 10 MG: 10 TABLET, FILM COATED ORAL at 20:53

## 2024-07-31 RX ADMIN — INSULIN GLARGINE 15 UNITS: 100 INJECTION, SOLUTION SUBCUTANEOUS at 20:56

## 2024-07-31 RX ADMIN — LEVALBUTEROL HYDROCHLORIDE 1.25 MG: 1.25 SOLUTION RESPIRATORY (INHALATION) at 02:09

## 2024-07-31 RX ADMIN — DILTIAZEM HYDROCHLORIDE 30 MG: 30 TABLET, FILM COATED ORAL at 13:07

## 2024-07-31 RX ADMIN — IPRATROPIUM BROMIDE 0.5 MG: 0.5 SOLUTION RESPIRATORY (INHALATION) at 19:17

## 2024-07-31 RX ADMIN — DILTIAZEM HYDROCHLORIDE 30 MG: 30 TABLET, FILM COATED ORAL at 05:37

## 2024-07-31 RX ADMIN — FUROSEMIDE 40 MG: 10 INJECTION, SOLUTION INTRAMUSCULAR; INTRAVENOUS at 17:40

## 2024-07-31 RX ADMIN — SODIUM CHLORIDE, PRESERVATIVE FREE 10 ML: 5 INJECTION INTRAVENOUS at 20:57

## 2024-07-31 RX ADMIN — SODIUM CHLORIDE, PRESERVATIVE FREE 10 ML: 5 INJECTION INTRAVENOUS at 09:35

## 2024-07-31 RX ADMIN — IPRATROPIUM BROMIDE 0.5 MG: 0.5 SOLUTION RESPIRATORY (INHALATION) at 02:09

## 2024-07-31 RX ADMIN — LANSOPRAZOLE 30 MG: 30 TABLET, ORALLY DISINTEGRATING, DELAYED RELEASE ORAL at 05:37

## 2024-07-31 RX ADMIN — WARFARIN SODIUM 6 MG: 3 TABLET ORAL at 17:43

## 2024-07-31 RX ADMIN — PRAVASTATIN SODIUM 80 MG: 40 TABLET ORAL at 17:39

## 2024-07-31 RX ADMIN — BUDESONIDE INHALATION 500 MCG: 0.5 SUSPENSION RESPIRATORY (INHALATION) at 19:16

## 2024-07-31 RX ADMIN — FUROSEMIDE 40 MG: 10 INJECTION, SOLUTION INTRAMUSCULAR; INTRAVENOUS at 09:27

## 2024-07-31 RX ADMIN — SULFAMETHOXAZOLE AND TRIMETHOPRIM 20 ML: 200; 40 SUSPENSION ORAL at 20:53

## 2024-07-31 RX ADMIN — LEVALBUTEROL HYDROCHLORIDE 1.25 MG: 1.25 SOLUTION RESPIRATORY (INHALATION) at 13:10

## 2024-07-31 RX ADMIN — INSULIN LISPRO 2 UNITS: 100 INJECTION, SOLUTION INTRAVENOUS; SUBCUTANEOUS at 09:27

## 2024-07-31 RX ADMIN — BUDESONIDE INHALATION 500 MCG: 0.5 SUSPENSION RESPIRATORY (INHALATION) at 07:30

## 2024-07-31 RX ADMIN — IPRATROPIUM BROMIDE 0.5 MG: 0.5 SOLUTION RESPIRATORY (INHALATION) at 07:30

## 2024-07-31 RX ADMIN — ENOXAPARIN SODIUM 180 MG: 100 INJECTION SUBCUTANEOUS at 20:51

## 2024-07-31 RX ADMIN — SULFAMETHOXAZOLE AND TRIMETHOPRIM 20 ML: 200; 40 SUSPENSION ORAL at 10:16

## 2024-07-31 RX ADMIN — MICONAZOLE NITRATE: 2 CREAM TOPICAL at 09:29

## 2024-07-31 RX ADMIN — Medication 15 ML: at 09:28

## 2024-07-31 RX ADMIN — CARVEDILOL 6.25 MG: 6.25 TABLET, FILM COATED ORAL at 09:27

## 2024-07-31 RX ADMIN — ENOXAPARIN SODIUM 180 MG: 100 INJECTION SUBCUTANEOUS at 09:27

## 2024-07-31 RX ADMIN — CARVEDILOL 6.25 MG: 6.25 TABLET, FILM COATED ORAL at 17:39

## 2024-07-31 RX ADMIN — IPRATROPIUM BROMIDE 0.5 MG: 0.5 SOLUTION RESPIRATORY (INHALATION) at 13:10

## 2024-07-31 RX ADMIN — PREDNISONE 20 MG: 20 TABLET ORAL at 09:27

## 2024-07-31 RX ADMIN — DILTIAZEM HYDROCHLORIDE 30 MG: 30 TABLET, FILM COATED ORAL at 17:39

## 2024-07-31 ASSESSMENT — PULMONARY FUNCTION TESTS
PIF_VALUE: 13
PIF_VALUE: 18
PIF_VALUE: 18
PIF_VALUE: 8
PIF_VALUE: 13
PIF_VALUE: 18
PIF_VALUE: 18
PIF_VALUE: 9
PIF_VALUE: 9
PIF_VALUE: 12
PIF_VALUE: 13
PIF_VALUE: 26
PIF_VALUE: 18
PIF_VALUE: 26
PIF_VALUE: 14
PIF_VALUE: 27
PIF_VALUE: 12
PIF_VALUE: 10

## 2024-07-31 ASSESSMENT — PAIN SCALES - GENERAL
PAINLEVEL_OUTOF10: 0
PAINLEVEL_OUTOF10: 0
PAINLEVEL_OUTOF10: 5

## 2024-07-31 NOTE — PROCEDURES
INSTRUMENTAL SWALLOW REPORT  MODIFIED BARIUM SWALLOW    NAME: Shazia ROGERS   : 1955  MRN: [id number]       Date of Eval: 2024              Referring Diagnosis(es):  dysphagia    Past Medical History:  has a past medical history of Anxiety, Asthma, Atrial fibrillation (HCC), Bronchitis, DDD (degenerative disc disease), lumbar, Depression, Diabetes mellitus (HCC), Elevated glucose, Headache(784.0), Hernia of abdominal cavity, HH (hiatus hernia), Hyperlipemia, mixed, Hypertension, Osteoarthritis, Right femoral vein DVT (HCC), and Uterine hyperplasia.  Past Surgical History:  has a past surgical history that includes Dilation and curettage of uterus (10/08 and ); Breast reduction surgery (1997); Breast reduction surgery (1997); Tympanostomy tube placement (1967); Tonsillectomy and adenoidectomy (); Hysterectomy (7/17/15); and tracheostomy (N/A, 2024).               Type of Study: Initial MBS       Recent CXR/CT of Chest: Date - CXR- Left basilar atelectasis.     Patient Complaints/Reason for Referral:  Shazia ROGERS was referred for a MBS to assess the efficiency of his/her swallow function, assess for aspiration, and to make recommendations regarding safe dietary consistencies, effective compensatory strategies, and safe eating environment.       Onset of problem:      Pt. Is O2 to trach collar, cuff deflated.           Behavior/Cognition/Vision/Hearing:  Behavior/Cognition: Alert;Cooperative  Vision: Impaired  Vision Exceptions: Wears glasses at all times  Hearing: Within functional limits    Impressions:     Patient Position: Lateral       Consistencies Administered: Regular;Soft & Bite Sized;Pureed;Thin cup;Moderately Thickstraw;Mildly Thick cup;Mildly Thick straw          Oral Phase:   premature vallecular spillage of all consistencies.  Pyriform sinus cavity spillover c thin, mildly thick, moderately thick and soft solids.        Pharyngeal Phase:    +SILENT

## 2024-08-01 LAB
ANION GAP SERPL CALCULATED.3IONS-SCNC: 5 MMOL/L (ref 9–17)
BASOPHILS # BLD: 0 K/UL (ref 0–0.2)
BASOPHILS NFR BLD: 0 % (ref 0–2)
BUN SERPL-MCNC: 52 MG/DL (ref 8–23)
CALCIUM SERPL-MCNC: 9.3 MG/DL (ref 8.6–10.4)
CHLORIDE SERPL-SCNC: 93 MMOL/L (ref 98–107)
CO2 SERPL-SCNC: 45 MMOL/L (ref 20–31)
CREAT SERPL-MCNC: 1.1 MG/DL (ref 0.5–0.9)
EOSINOPHIL # BLD: 0.1 K/UL (ref 0–0.4)
EOSINOPHILS RELATIVE PERCENT: 1 % (ref 0–4)
ERYTHROCYTE [DISTWIDTH] IN BLOOD BY AUTOMATED COUNT: 19.6 % (ref 11.5–14.9)
GFR, ESTIMATED: 55 ML/MIN/1.73M2
GLUCOSE BLD-MCNC: 113 MG/DL (ref 65–105)
GLUCOSE BLD-MCNC: 131 MG/DL (ref 65–105)
GLUCOSE BLD-MCNC: 155 MG/DL (ref 65–105)
GLUCOSE BLD-MCNC: 166 MG/DL (ref 65–105)
GLUCOSE BLD-MCNC: 201 MG/DL (ref 65–105)
GLUCOSE BLD-MCNC: 257 MG/DL (ref 65–105)
GLUCOSE SERPL-MCNC: 124 MG/DL (ref 70–99)
HCT VFR BLD AUTO: 35.3 % (ref 36–46)
HGB BLD-MCNC: 10.3 G/DL (ref 12–16)
INR PPP: 1.7
LYMPHOCYTES NFR BLD: 1.1 K/UL (ref 1–4.8)
LYMPHOCYTES RELATIVE PERCENT: 16 % (ref 24–44)
MAGNESIUM SERPL-MCNC: 2.2 MG/DL (ref 1.6–2.6)
MCH RBC QN AUTO: 25.1 PG (ref 26–34)
MCHC RBC AUTO-ENTMCNC: 29.3 G/DL (ref 31–37)
MCV RBC AUTO: 85.7 FL (ref 80–100)
MONOCYTES NFR BLD: 0.6 K/UL (ref 0.1–1.3)
MONOCYTES NFR BLD: 9 % (ref 1–7)
NEUTROPHILS NFR BLD: 74 % (ref 36–66)
NEUTS SEG NFR BLD: 5.1 K/UL (ref 1.3–9.1)
PLATELET # BLD AUTO: 235 K/UL (ref 150–450)
PMV BLD AUTO: 8.2 FL (ref 6–12)
POTASSIUM SERPL-SCNC: 3.4 MMOL/L (ref 3.7–5.3)
PROTHROMBIN TIME: 20.5 SEC (ref 11.8–14.6)
RBC # BLD AUTO: 4.12 M/UL (ref 4–5.2)
SODIUM SERPL-SCNC: 143 MMOL/L (ref 135–144)
WBC OTHER # BLD: 6.8 K/UL (ref 3.5–11)

## 2024-08-01 PROCEDURE — 94003 VENT MGMT INPAT SUBQ DAY: CPT

## 2024-08-01 PROCEDURE — 99232 SBSQ HOSP IP/OBS MODERATE 35: CPT | Performed by: INTERNAL MEDICINE

## 2024-08-01 PROCEDURE — 36415 COLL VENOUS BLD VENIPUNCTURE: CPT

## 2024-08-01 PROCEDURE — 92526 ORAL FUNCTION THERAPY: CPT

## 2024-08-01 PROCEDURE — 83735 ASSAY OF MAGNESIUM: CPT

## 2024-08-01 PROCEDURE — 6370000000 HC RX 637 (ALT 250 FOR IP): Performed by: INTERNAL MEDICINE

## 2024-08-01 PROCEDURE — 94640 AIRWAY INHALATION TREATMENT: CPT

## 2024-08-01 PROCEDURE — 2060000000 HC ICU INTERMEDIATE R&B

## 2024-08-01 PROCEDURE — 6370000000 HC RX 637 (ALT 250 FOR IP): Performed by: SURGERY

## 2024-08-01 PROCEDURE — 2700000000 HC OXYGEN THERAPY PER DAY

## 2024-08-01 PROCEDURE — 2580000003 HC RX 258: Performed by: SURGERY

## 2024-08-01 PROCEDURE — 94761 N-INVAS EAR/PLS OXIMETRY MLT: CPT

## 2024-08-01 PROCEDURE — 82947 ASSAY GLUCOSE BLOOD QUANT: CPT

## 2024-08-01 PROCEDURE — 85025 COMPLETE CBC W/AUTO DIFF WBC: CPT

## 2024-08-01 PROCEDURE — 85610 PROTHROMBIN TIME: CPT

## 2024-08-01 PROCEDURE — 6360000002 HC RX W HCPCS: Performed by: SURGERY

## 2024-08-01 PROCEDURE — 80048 BASIC METABOLIC PNL TOTAL CA: CPT

## 2024-08-01 PROCEDURE — 6360000002 HC RX W HCPCS: Performed by: INTERNAL MEDICINE

## 2024-08-01 RX ORDER — WARFARIN SODIUM 3 MG/1
6 TABLET ORAL
Status: COMPLETED | OUTPATIENT
Start: 2024-08-01 | End: 2024-08-01

## 2024-08-01 RX ADMIN — MONTELUKAST 10 MG: 10 TABLET, FILM COATED ORAL at 19:48

## 2024-08-01 RX ADMIN — INSULIN LISPRO 2 UNITS: 100 INJECTION, SOLUTION INTRAVENOUS; SUBCUTANEOUS at 16:18

## 2024-08-01 RX ADMIN — ENOXAPARIN SODIUM 180 MG: 100 INJECTION SUBCUTANEOUS at 08:28

## 2024-08-01 RX ADMIN — MICONAZOLE NITRATE: 2 CREAM TOPICAL at 00:30

## 2024-08-01 RX ADMIN — INSULIN GLARGINE 15 UNITS: 100 INJECTION, SOLUTION SUBCUTANEOUS at 20:00

## 2024-08-01 RX ADMIN — FUROSEMIDE 40 MG: 10 INJECTION, SOLUTION INTRAMUSCULAR; INTRAVENOUS at 08:25

## 2024-08-01 RX ADMIN — LEVALBUTEROL HYDROCHLORIDE 1.25 MG: 1.25 SOLUTION RESPIRATORY (INHALATION) at 07:52

## 2024-08-01 RX ADMIN — CARVEDILOL 6.25 MG: 6.25 TABLET, FILM COATED ORAL at 17:20

## 2024-08-01 RX ADMIN — POTASSIUM BICARBONATE 40 MEQ: 782 TABLET, EFFERVESCENT ORAL at 05:19

## 2024-08-01 RX ADMIN — PREDNISONE 20 MG: 20 TABLET ORAL at 08:25

## 2024-08-01 RX ADMIN — DILTIAZEM HYDROCHLORIDE 30 MG: 30 TABLET, FILM COATED ORAL at 05:18

## 2024-08-01 RX ADMIN — LEVALBUTEROL HYDROCHLORIDE 1.25 MG: 1.25 SOLUTION RESPIRATORY (INHALATION) at 19:03

## 2024-08-01 RX ADMIN — IPRATROPIUM BROMIDE 0.5 MG: 0.5 SOLUTION RESPIRATORY (INHALATION) at 14:58

## 2024-08-01 RX ADMIN — FUROSEMIDE 40 MG: 10 INJECTION, SOLUTION INTRAMUSCULAR; INTRAVENOUS at 17:20

## 2024-08-01 RX ADMIN — DILTIAZEM HYDROCHLORIDE 30 MG: 30 TABLET, FILM COATED ORAL at 00:30

## 2024-08-01 RX ADMIN — SULFAMETHOXAZOLE AND TRIMETHOPRIM 20 ML: 200; 40 SUSPENSION ORAL at 08:26

## 2024-08-01 RX ADMIN — IPRATROPIUM BROMIDE 0.5 MG: 0.5 SOLUTION RESPIRATORY (INHALATION) at 19:03

## 2024-08-01 RX ADMIN — IPRATROPIUM BROMIDE 0.5 MG: 0.5 SOLUTION RESPIRATORY (INHALATION) at 07:52

## 2024-08-01 RX ADMIN — INSULIN LISPRO 2 UNITS: 100 INJECTION, SOLUTION INTRAVENOUS; SUBCUTANEOUS at 19:56

## 2024-08-01 RX ADMIN — Medication 15 ML: at 08:28

## 2024-08-01 RX ADMIN — LANSOPRAZOLE 30 MG: 30 TABLET, ORALLY DISINTEGRATING, DELAYED RELEASE ORAL at 08:26

## 2024-08-01 RX ADMIN — SODIUM CHLORIDE, PRESERVATIVE FREE 10 ML: 5 INJECTION INTRAVENOUS at 08:32

## 2024-08-01 RX ADMIN — DILTIAZEM HYDROCHLORIDE 30 MG: 30 TABLET, FILM COATED ORAL at 17:20

## 2024-08-01 RX ADMIN — SULFAMETHOXAZOLE AND TRIMETHOPRIM 20 ML: 200; 40 SUSPENSION ORAL at 19:48

## 2024-08-01 RX ADMIN — MICONAZOLE NITRATE: 2 CREAM TOPICAL at 19:51

## 2024-08-01 RX ADMIN — WARFARIN SODIUM 6 MG: 3 TABLET ORAL at 17:20

## 2024-08-01 RX ADMIN — BUDESONIDE INHALATION 500 MCG: 0.5 SUSPENSION RESPIRATORY (INHALATION) at 19:03

## 2024-08-01 RX ADMIN — DILTIAZEM HYDROCHLORIDE 30 MG: 30 TABLET, FILM COATED ORAL at 12:00

## 2024-08-01 RX ADMIN — PRAVASTATIN SODIUM 80 MG: 40 TABLET ORAL at 17:20

## 2024-08-01 RX ADMIN — LEVALBUTEROL HYDROCHLORIDE 1.25 MG: 1.25 SOLUTION RESPIRATORY (INHALATION) at 02:14

## 2024-08-01 RX ADMIN — SODIUM CHLORIDE, PRESERVATIVE FREE 10 ML: 5 INJECTION INTRAVENOUS at 20:02

## 2024-08-01 RX ADMIN — LEVALBUTEROL HYDROCHLORIDE 1.25 MG: 1.25 SOLUTION RESPIRATORY (INHALATION) at 14:59

## 2024-08-01 RX ADMIN — MICONAZOLE NITRATE: 2 CREAM TOPICAL at 08:25

## 2024-08-01 RX ADMIN — BUDESONIDE INHALATION 500 MCG: 0.5 SUSPENSION RESPIRATORY (INHALATION) at 07:52

## 2024-08-01 RX ADMIN — ENOXAPARIN SODIUM 180 MG: 100 INJECTION SUBCUTANEOUS at 19:49

## 2024-08-01 RX ADMIN — DILTIAZEM HYDROCHLORIDE 30 MG: 30 TABLET, FILM COATED ORAL at 23:31

## 2024-08-01 RX ADMIN — CARVEDILOL 6.25 MG: 6.25 TABLET, FILM COATED ORAL at 08:25

## 2024-08-01 RX ADMIN — IPRATROPIUM BROMIDE 0.5 MG: 0.5 SOLUTION RESPIRATORY (INHALATION) at 02:14

## 2024-08-01 RX ADMIN — POLYETHYLENE GLYCOL 3350 17 G: 17 POWDER, FOR SOLUTION ORAL at 08:41

## 2024-08-01 ASSESSMENT — PULMONARY FUNCTION TESTS
PIF_VALUE: 18
PIF_VALUE: 25
PIF_VALUE: 19
PIF_VALUE: 25
PIF_VALUE: 26
PIF_VALUE: 27
PIF_VALUE: 26
PIF_VALUE: 9
PIF_VALUE: 31
PIF_VALUE: 30
PIF_VALUE: 24
PIF_VALUE: 28
PIF_VALUE: 25
PIF_VALUE: 22
PIF_VALUE: 34

## 2024-08-01 ASSESSMENT — PAIN SCALES - GENERAL: PAINLEVEL_OUTOF10: 0

## 2024-08-02 ENCOUNTER — APPOINTMENT (OUTPATIENT)
Dept: GENERAL RADIOLOGY | Age: 69
DRG: 004 | End: 2024-08-02
Payer: MEDICARE

## 2024-08-02 LAB
ANION GAP SERPL CALCULATED.3IONS-SCNC: 8 MMOL/L (ref 9–17)
BACTERIA URNS QL MICRO: ABNORMAL
BASOPHILS # BLD: 0 K/UL (ref 0–0.2)
BASOPHILS NFR BLD: 0 % (ref 0–2)
BILIRUB UR QL STRIP: NEGATIVE
BUN SERPL-MCNC: 48 MG/DL (ref 8–23)
CALCIUM SERPL-MCNC: 9.5 MG/DL (ref 8.6–10.4)
CASTS #/AREA URNS LPF: ABNORMAL /LPF
CHLORIDE SERPL-SCNC: 90 MMOL/L (ref 98–107)
CHLORIDE UR-SCNC: 21 MMOL/L
CLARITY UR: CLEAR
CO2 SERPL-SCNC: 39 MMOL/L (ref 20–31)
COLOR UR: YELLOW
CREAT SERPL-MCNC: 1.3 MG/DL (ref 0.5–0.9)
CREAT UR-MCNC: 42.4 MG/DL (ref 28–217)
EOSINOPHIL # BLD: 0.09 K/UL (ref 0–0.4)
EOSINOPHIL,URINE: NORMAL
EOSINOPHILS RELATIVE PERCENT: 1 % (ref 0–4)
EPI CELLS #/AREA URNS HPF: ABNORMAL /HPF
ERYTHROCYTE [DISTWIDTH] IN BLOOD BY AUTOMATED COUNT: 19.4 % (ref 11.5–14.9)
GFR, ESTIMATED: 45 ML/MIN/1.73M2
GLUCOSE BLD-MCNC: 119 MG/DL (ref 65–105)
GLUCOSE BLD-MCNC: 205 MG/DL (ref 65–105)
GLUCOSE BLD-MCNC: 218 MG/DL (ref 65–105)
GLUCOSE BLD-MCNC: 245 MG/DL (ref 65–105)
GLUCOSE SERPL-MCNC: 126 MG/DL (ref 70–99)
GLUCOSE UR STRIP-MCNC: NEGATIVE MG/DL
HCT VFR BLD AUTO: 33.8 % (ref 36–46)
HGB BLD-MCNC: 10.1 G/DL (ref 12–16)
HGB UR QL STRIP.AUTO: NEGATIVE
INR PPP: 2
KETONES UR STRIP-MCNC: NEGATIVE MG/DL
LEUKOCYTE ESTERASE UR QL STRIP: NEGATIVE
LYMPHOCYTES NFR BLD: 1.34 K/UL (ref 1–4.8)
LYMPHOCYTES RELATIVE PERCENT: 15 % (ref 24–44)
MCH RBC QN AUTO: 25.3 PG (ref 26–34)
MCHC RBC AUTO-ENTMCNC: 29.8 G/DL (ref 31–37)
MCV RBC AUTO: 84.9 FL (ref 80–100)
MONOCYTES NFR BLD: 0.62 K/UL (ref 0.1–1.3)
MONOCYTES NFR BLD: 7 % (ref 1–7)
MORPHOLOGY: ABNORMAL
NEUTROPHILS NFR BLD: 77 % (ref 36–66)
NEUTS SEG NFR BLD: 6.85 K/UL (ref 1.3–9.1)
NITRITE UR QL STRIP: NEGATIVE
PH UR STRIP: 7.5 [PH] (ref 5–8)
PLATELET # BLD AUTO: 246 K/UL (ref 150–450)
PMV BLD AUTO: 8.2 FL (ref 6–12)
POTASSIUM SERPL-SCNC: 4.4 MMOL/L (ref 3.7–5.3)
PROT UR STRIP-MCNC: ABNORMAL MG/DL
PROTHROMBIN TIME: 22.7 SEC (ref 11.8–14.6)
RBC # BLD AUTO: 3.98 M/UL (ref 4–5.2)
RBC #/AREA URNS HPF: ABNORMAL /HPF
SODIUM SERPL-SCNC: 137 MMOL/L (ref 135–144)
SODIUM UR-SCNC: 68 MMOL/L
SP GR UR STRIP: 1.01 (ref 1–1.03)
TOTAL PROTEIN, URINE: 30 MG/DL
URINE TOTAL PROTEIN CREATININE RATIO: 0.71 (ref 0–0.2)
UROBILINOGEN UR STRIP-ACNC: NORMAL EU/DL (ref 0–1)
UUN UR-MCNC: 836 MG/DL
WBC #/AREA URNS HPF: ABNORMAL /HPF
WBC OTHER # BLD: 8.9 K/UL (ref 3.5–11)

## 2024-08-02 PROCEDURE — 94761 N-INVAS EAR/PLS OXIMETRY MLT: CPT

## 2024-08-02 PROCEDURE — 85610 PROTHROMBIN TIME: CPT

## 2024-08-02 PROCEDURE — 94003 VENT MGMT INPAT SUBQ DAY: CPT

## 2024-08-02 PROCEDURE — 36415 COLL VENOUS BLD VENIPUNCTURE: CPT

## 2024-08-02 PROCEDURE — 2060000000 HC ICU INTERMEDIATE R&B

## 2024-08-02 PROCEDURE — 2580000003 HC RX 258: Performed by: SURGERY

## 2024-08-02 PROCEDURE — 99233 SBSQ HOSP IP/OBS HIGH 50: CPT | Performed by: INTERNAL MEDICINE

## 2024-08-02 PROCEDURE — 84156 ASSAY OF PROTEIN URINE: CPT

## 2024-08-02 PROCEDURE — 6370000000 HC RX 637 (ALT 250 FOR IP): Performed by: INTERNAL MEDICINE

## 2024-08-02 PROCEDURE — 71045 X-RAY EXAM CHEST 1 VIEW: CPT

## 2024-08-02 PROCEDURE — 94640 AIRWAY INHALATION TREATMENT: CPT

## 2024-08-02 PROCEDURE — 6370000000 HC RX 637 (ALT 250 FOR IP): Performed by: SURGERY

## 2024-08-02 PROCEDURE — 82947 ASSAY GLUCOSE BLOOD QUANT: CPT

## 2024-08-02 PROCEDURE — 81001 URINALYSIS AUTO W/SCOPE: CPT

## 2024-08-02 PROCEDURE — 6360000002 HC RX W HCPCS: Performed by: SURGERY

## 2024-08-02 PROCEDURE — 82436 ASSAY OF URINE CHLORIDE: CPT

## 2024-08-02 PROCEDURE — 51798 US URINE CAPACITY MEASURE: CPT

## 2024-08-02 PROCEDURE — 51701 INSERT BLADDER CATHETER: CPT

## 2024-08-02 PROCEDURE — 92526 ORAL FUNCTION THERAPY: CPT

## 2024-08-02 PROCEDURE — 84300 ASSAY OF URINE SODIUM: CPT

## 2024-08-02 PROCEDURE — 85025 COMPLETE CBC W/AUTO DIFF WBC: CPT

## 2024-08-02 PROCEDURE — 87205 SMEAR GRAM STAIN: CPT

## 2024-08-02 PROCEDURE — 80048 BASIC METABOLIC PNL TOTAL CA: CPT

## 2024-08-02 PROCEDURE — 82570 ASSAY OF URINE CREATININE: CPT

## 2024-08-02 PROCEDURE — 2700000000 HC OXYGEN THERAPY PER DAY

## 2024-08-02 PROCEDURE — 84540 ASSAY OF URINE/UREA-N: CPT

## 2024-08-02 RX ORDER — WARFARIN SODIUM 3 MG/1
6 TABLET ORAL
Status: DISCONTINUED | OUTPATIENT
Start: 2024-08-02 | End: 2024-08-02

## 2024-08-02 RX ORDER — DOCUSATE SODIUM 100 MG/1
100 CAPSULE, LIQUID FILLED ORAL 2 TIMES DAILY
Status: DISCONTINUED | OUTPATIENT
Start: 2024-08-02 | End: 2024-08-04

## 2024-08-02 RX ADMIN — DILTIAZEM HYDROCHLORIDE 30 MG: 30 TABLET, FILM COATED ORAL at 13:08

## 2024-08-02 RX ADMIN — LEVALBUTEROL HYDROCHLORIDE 1.25 MG: 1.25 SOLUTION RESPIRATORY (INHALATION) at 19:19

## 2024-08-02 RX ADMIN — DILTIAZEM HYDROCHLORIDE 30 MG: 30 TABLET, FILM COATED ORAL at 16:05

## 2024-08-02 RX ADMIN — BUDESONIDE INHALATION 500 MCG: 0.5 SUSPENSION RESPIRATORY (INHALATION) at 07:14

## 2024-08-02 RX ADMIN — LEVALBUTEROL HYDROCHLORIDE 1.25 MG: 1.25 SOLUTION RESPIRATORY (INHALATION) at 01:41

## 2024-08-02 RX ADMIN — Medication 15 ML: at 07:52

## 2024-08-02 RX ADMIN — MICONAZOLE NITRATE: 2 CREAM TOPICAL at 09:00

## 2024-08-02 RX ADMIN — DOCUSATE SODIUM 100 MG: 100 CAPSULE, LIQUID FILLED ORAL at 15:32

## 2024-08-02 RX ADMIN — ACETAMINOPHEN 650 MG: 325 TABLET ORAL at 07:52

## 2024-08-02 RX ADMIN — PREDNISONE 20 MG: 20 TABLET ORAL at 07:52

## 2024-08-02 RX ADMIN — DOCUSATE SODIUM 100 MG: 100 CAPSULE, LIQUID FILLED ORAL at 22:07

## 2024-08-02 RX ADMIN — IPRATROPIUM BROMIDE 0.5 MG: 0.5 SOLUTION RESPIRATORY (INHALATION) at 19:19

## 2024-08-02 RX ADMIN — IPRATROPIUM BROMIDE 0.5 MG: 0.5 SOLUTION RESPIRATORY (INHALATION) at 14:45

## 2024-08-02 RX ADMIN — LEVALBUTEROL HYDROCHLORIDE 1.25 MG: 1.25 SOLUTION RESPIRATORY (INHALATION) at 07:14

## 2024-08-02 RX ADMIN — SODIUM CHLORIDE, PRESERVATIVE FREE 10 ML: 5 INJECTION INTRAVENOUS at 22:07

## 2024-08-02 RX ADMIN — INSULIN LISPRO 2 UNITS: 100 INJECTION, SOLUTION INTRAVENOUS; SUBCUTANEOUS at 22:17

## 2024-08-02 RX ADMIN — MICONAZOLE NITRATE: 2 CREAM TOPICAL at 22:18

## 2024-08-02 RX ADMIN — IPRATROPIUM BROMIDE 0.5 MG: 0.5 SOLUTION RESPIRATORY (INHALATION) at 07:14

## 2024-08-02 RX ADMIN — INSULIN GLARGINE 15 UNITS: 100 INJECTION, SOLUTION SUBCUTANEOUS at 22:17

## 2024-08-02 RX ADMIN — CARVEDILOL 6.25 MG: 6.25 TABLET, FILM COATED ORAL at 16:05

## 2024-08-02 RX ADMIN — IPRATROPIUM BROMIDE 0.5 MG: 0.5 SOLUTION RESPIRATORY (INHALATION) at 01:41

## 2024-08-02 RX ADMIN — DILTIAZEM HYDROCHLORIDE 30 MG: 30 TABLET, FILM COATED ORAL at 22:17

## 2024-08-02 RX ADMIN — INSULIN LISPRO 2 UNITS: 100 INJECTION, SOLUTION INTRAVENOUS; SUBCUTANEOUS at 16:05

## 2024-08-02 RX ADMIN — MONTELUKAST 10 MG: 10 TABLET, FILM COATED ORAL at 22:07

## 2024-08-02 RX ADMIN — PRAVASTATIN SODIUM 80 MG: 40 TABLET ORAL at 16:05

## 2024-08-02 RX ADMIN — LEVALBUTEROL HYDROCHLORIDE 1.25 MG: 1.25 SOLUTION RESPIRATORY (INHALATION) at 14:45

## 2024-08-02 RX ADMIN — LANSOPRAZOLE 30 MG: 30 TABLET, ORALLY DISINTEGRATING, DELAYED RELEASE ORAL at 07:52

## 2024-08-02 RX ADMIN — SODIUM CHLORIDE, PRESERVATIVE FREE 10 ML: 5 INJECTION INTRAVENOUS at 10:22

## 2024-08-02 RX ADMIN — CARVEDILOL 6.25 MG: 6.25 TABLET, FILM COATED ORAL at 07:52

## 2024-08-02 RX ADMIN — INSULIN LISPRO 2 UNITS: 100 INJECTION, SOLUTION INTRAVENOUS; SUBCUTANEOUS at 13:08

## 2024-08-02 RX ADMIN — BUDESONIDE INHALATION 500 MCG: 0.5 SUSPENSION RESPIRATORY (INHALATION) at 19:29

## 2024-08-02 ASSESSMENT — PULMONARY FUNCTION TESTS
PIF_VALUE: 22
PIF_VALUE: 24
PIF_VALUE: 20
PIF_VALUE: 23
PIF_VALUE: 23
PIF_VALUE: 25
PIF_VALUE: 20
PIF_VALUE: 20
PIF_VALUE: 12
PIF_VALUE: 16
PIF_VALUE: 19
PIF_VALUE: 23
PIF_VALUE: 19
PIF_VALUE: 19
PIF_VALUE: 25
PIF_VALUE: 23

## 2024-08-02 ASSESSMENT — PAIN SCALES - GENERAL
PAINLEVEL_OUTOF10: 3
PAINLEVEL_OUTOF10: 3

## 2024-08-02 ASSESSMENT — PAIN DESCRIPTION - LOCATION: LOCATION: HEAD

## 2024-08-02 NOTE — CONSULTS
[facility] -   Infectious diseases evaluation  admission date 7/22/2024    reason for consultation:   MDRO UTI    Impression :   Current:  MDRO ESBL Klebsiella Pneumoniae UTI  Acute/chronic respiratory failure s/p tracheostomy tube placement 7/26/24  Allergies to multiple antibiotics.  MRSA Carrier.  Morbid Obesity    Other:    Discussion / summary of stay / plan of care/ Recommendations:     HENCE:   Stop Avycaz.  Tigecycline 100 mg IV x 1 followed by Tigecycline 50 mg IV every 12 ours x 5 days.  Follow CBC and renal function.  Follow blood and sputum cx.  Adjust antibiotics.  Supportive care.  Discussed with patient, RN.    Infection Control Recommendations   [place] Precautions  Contact Isolation       Antimicrobial Stewardship Recommendations   Simplification of therapy  Targeted therapy      History of Present Illness:   Initial history:  Shazia ROGERS is a 68 y.o.-year-old female with a history of chronic hypoxemic respiratory failure, on 2 L nasal cannula, chronic hypercapnic respiratory failure with pCO2 in the 80s to mid 90s, predominantly secondary to obesity hypoventilation syndrome, SHWETA, noncompliant with CPAP, asthma, pulmonary embolism/chronic DVT with goal INR of 3-3 0.5, atrial fibrillation, hypertension, morbid obesity, hyperlipidemia, depression, who presented to the emergency department on 7/22/24 for acute on chronic hypoxemic respiratory failure and somnolence. Patient underwent tracheostomy tube placement on 7/26/24.  Urine cx from 7/22/24 showing ESBL Klebsiella Pneumoniae 10-50 K.  Asked to see in consult for assistance with antibiotic choice.      Interval changes  7/27/2024   Patient Vitals for the past 8 hrs:   BP Temp Temp src Pulse Resp SpO2   07/27/24 0900 (!) 142/100 98.5 °F (36.9 °C) Axillary 82 18 96 %   07/27/24 0847 -- -- -- 86 12 91 %   07/27/24 0830 -- -- -- 79 16 96 %   07/27/24 0815 -- -- -- 78 13 96 %   07/27/24 0810 -- -- 
  [facility]  Karel Solorzano MD/Jag HSU AGACNP-BC, NP-C      Manju HSU NP-C      Jeffry HSU NP-C     Pulmonary and Critical Care CONSULT NOTE:      DATE OF CONSULT 7/22/2024    REASON FOR CONSULTATION:  Acute on chronic hypercapnia      PCP Jyoti Mauricio, APRN - CNP     CHIEF COMPLAINT: Altered mental status    HISTORY OF PRESENT ILLNESS:   This is a 68-year-old female known to me from previous hospital visit here for similar event.  Patient was brought in from a nursing home due to hypoxia and altered mental status.  She has known chronic hypercapnia with recurrent acute on chronic hypercapnic respiratory failure.  According to paramedics and the ER note she was refusing to wear her oxygen or BiPAP which has been a recurrent issue.  I had this discussion directly with the patient and her brother last hospital visit.  I told her her 3 options would include diligent use of BiPAP every single night, elective tracheostomy, or comfort measures.  She insisted that she would wear the BiPAP every single night and would not refuse it but that does not seem to be the case.    Patient just arrived in the intensive care unit.  She opens her eyes when I call her name and does respond to yes no questioning.  She tells me she feels fine.  When I asked her if she had been compliant with her BiPAP I did not get an answer 1 way or another.  I did call [facility].  I spoke directly to the respiratory therapist who tells me that the patient refused to wear the BiPAP on Wednesday and Thursday.  They are not sure about Friday or Saturday but then again refused it on Sunday night.  Even this morning when they tried to put BiPAP on to recover her she would start screaming and ripped the mask off stating she would not wear it.    ALLERGIES:  Allergies   Allergen Reactions    Amoxil [Amoxicillin Trihydrate] Hives    Neomycin     
..PALLIATIVE CARE TEAM    Patient: Shazia ROGERS  Room: 2002/2002-01    Reason For Consult   Goals of care evaluation  Distress management  Symptom Management  Guidance and support  Facilitate communications  Assistance in coordinating care  Recommendations for the above    Impression: Shazia ROGERS is a 68 y.o. year old female with  has a past medical history of Anxiety, Asthma, Atrial fibrillation (HCC), Bronchitis, DDD (degenerative disc disease), lumbar, Depression, Diabetes mellitus (HCC), Elevated glucose, Headache(784.0), Hernia of abdominal cavity, HH (hiatus hernia), Hyperlipemia, mixed, Hypertension, Osteoarthritis, Right femoral vein DVT (HCC), and Uterine hyperplasia..  Currently hospitalized for the management of acute respiratory failure.  The Palliative Care Team is following to assist with goals of care and support.     Code Status  DNR-CCA  PLAN:   - the patient was from [facility], and she refused bipap and has continued to   - she was admitted with AMS and respiratory failure  - I call her brother Álvaro and I receive the HCPOA naming him as primary decision maker  - he emailed it to me \  - he talks about his sister , her debility and her quailty of life and his last visit she mentioned a DNR  - we talk about the DNR levels and the decision for now is DNRCC-A no intubation  - Álvaro calls back and he and a cousin and a close friend are agreeable to talk to hospice  - the company of choice is [facility]   - will follow for goals of care and support.   Vital Signs:  BP (!) 149/83   Pulse 75   Temp 98 °F (36.7 °C) (Axillary)   Resp 24   Ht 1.651 m (5' 5\")   Wt (!) 178.6 kg (393 lb 11.9 oz)   LMP 07/16/2014   SpO2 94%   BMI 65.52 kg/m²     Patient Active Problem List   Diagnosis    Anxiety    Depression    Asthma    DDD (degenerative disc disease), lumbar    Seasonal allergies    Hyperlipemia, mixed    Anticoagulated on Coumadin    Family history of breast cancer    Endometrial 
(TF) Orders:  Feeding Route: Nasogastric  Formula: Peptide Based High Protein  Schedule: Continuous  Feeding Regimen: Goal of 60 mL per hr  Water Flushes: 250 mL x 4 (per physician)  Goal TF & Flush Orders Provides: 1440 kcal, 126 gm protein, 2204 mL free fluid    Anthropometric Measures:  Height: 165.1 cm (5' 5\")  Ideal Body Weight (IBW): 125 lbs (57 kg)    Admission Body Weight: 178.6 kg (393 lb 11.9 oz)  Current Body Weight: 174.1 kg (383 lb 13.1 oz), 307.1 % IBW.    Current BMI (kg/m2): 63.9  Usual Body Weight: 176.9 kg (390 lb) (390 lb (4/6/24); 390-412lb)  % Weight Change (Calculated): -1.6                    BMI Categories: Obese Class 3 (BMI 40.0 or greater)    Estimated Daily Nutrient Needs:  Energy Requirements Based On: Kcal/kg  Weight Used for Energy Requirements:  (wt 7/26)  Energy (kcal/day): 7296-5524 based on 8-9 kcal per kg (68-77% of estimated maintenance needs  Weight Used for Protein Requirements: Ideal  Protein (g/day): 143 based on 2.5 gm per kg     Fluid (ml/day): per physician    Nutrition Diagnosis:   Inadequate oral intake related to impaired respiratory function, cognitive or neurological impairment as evidenced by NPO or clear liquid status due to medical condition    Nutrition Interventions:   Food and/or Nutrient Delivery: Continue NPO, Start Tube Feeding     Coordination of Nutrition Care: Continue to monitor while inpatient       Goals:     Goals: other (specify) (Meet approximately 70-80% of estimated needs)       Nutrition Monitoring and Evaluation:      Food/Nutrient Intake Outcomes: Enteral Nutrition Intake/Tolerance  Physical Signs/Symptoms Outcomes: Biochemical Data, Chewing or Swallowing, GI Status, Fluid Status or Edema, Weight, Skin    Discharge Planning:    Too soon to determine     Lorraine Melchor, JOHN, LD  Contact: [phone]      
Comment: rare    Drug use: No    Sexual activity: Never     Social Determinants of Health     Financial Resource Strain: Low Risk  (3/24/2023)    Overall Financial Resource Strain (CARDIA)     Difficulty of Paying Living Expenses: Not hard at all   Food Insecurity: No Food Insecurity (2024)    Hunger Vital Sign     Worried About Running Out of Food in the Last Year: Never true     Ran Out of Food in the Last Year: Never true   Transportation Needs: No Transportation Needs (2024)    PRAPARE - Transportation     Lack of Transportation (Medical): No     Lack of Transportation (Non-Medical): No   Physical Activity: Inactive (10/20/2023)    Exercise Vital Sign     Days of Exercise per Week: 0 days     Minutes of Exercise per Session: 0 min   Housing Stability: Low Risk  (2024)    Housing Stability Vital Sign     Unable to Pay for Housing in the Last Year: No     Number of Places Lived in the Last Year: 2     Unstable Housing in the Last Year: No     Review of systems: CNS - no headache or dizziness; Cardiac - no chest pain; Respiratory - +ve shortness of breath; Gastrointestinal -  no nausea, vomiting or diarrhea; Musculoskeletal - general body aches; Skin/Integument - no rashes.    Physical Exam:    VITALS:  BP (!) 152/60   Pulse (!) 106   Temp 97.8 °F (36.6 °C) (Oral)   Resp 23   Ht 1.651 m (5' 5\")   Wt (!) 174.1 kg (383 lb 13.1 oz)   LMP 2014   SpO2 96%   BMI 63.87 kg/m²   TEMPERATURE:  Current - Temp: 97.8 °F (36.6 °C); Max - Temp  Av.9 °F (36.6 °C)  Min: 97 °F (36.1 °C)  Max: 98.4 °F (36.9 °C)  RESPIRATIONS RANGE: Resp  Av  Min: 14  Max: 23  PULSE RANGE: Pulse  Av.3  Min: 79  Max: 118  BLOOD PRESSURE RANGE:  Systolic (24hrs), Av , Min:111 , Max:152  ; Diastolic (24hrs), Av, Min:38, Max:84   PULSE OXIMETRY RANGE: SpO2  Av.9 %  Min: 87 %  Max: 99 %  24HR INTAKE/OUTPUT:    Intake/Output Summary (Last 24 hours) at 2024 1518  Last data filed at 2024 
nursing staff.  Will talk to  given her comorbidities and morbid obesity regarding tracheostomy.  Patient's INR will have to be reversed as well.  Patient will be very high risk for surgical intervention.  This was explained to the patient.  Tentatively patient will be scheduled for tracheostomy tube placement on Friday.  She seems to be in agreement.    Thank you for this interesting consult and for allowing us to participate in the care of this patient. If you have any questions please don't hesitate to call.    The patient, Shazia ROGERS is a 68 y.o. female, was seen with a total time spent of 75 minutes for the visit on this date of service by the E/M provider. The time component had both face to face and non face to face time spent in determining the total time component.  Counseling and education regarding the patient's diagnosis listed below and the patient's options regarding those diagnoses were also included in determining the time component.      Electronically signed by Fernando Lentz MD  on 7/24/2024 at 5:34 PM

## 2024-08-03 LAB
ANION GAP SERPL CALCULATED.3IONS-SCNC: 6 MMOL/L (ref 9–17)
BASOPHILS # BLD: 0.1 K/UL (ref 0–0.2)
BASOPHILS NFR BLD: 1 % (ref 0–2)
BUN SERPL-MCNC: 38 MG/DL (ref 8–23)
CALCIUM SERPL-MCNC: 9.7 MG/DL (ref 8.6–10.4)
CHLORIDE SERPL-SCNC: 92 MMOL/L (ref 98–107)
CO2 SERPL-SCNC: 39 MMOL/L (ref 20–31)
CREAT SERPL-MCNC: 1.1 MG/DL (ref 0.5–0.9)
EOSINOPHIL # BLD: 0.1 K/UL (ref 0–0.4)
EOSINOPHILS RELATIVE PERCENT: 1 % (ref 0–4)
ERYTHROCYTE [DISTWIDTH] IN BLOOD BY AUTOMATED COUNT: 19.8 % (ref 11.5–14.9)
GFR, ESTIMATED: 55 ML/MIN/1.73M2
GLUCOSE BLD-MCNC: 186 MG/DL (ref 65–105)
GLUCOSE BLD-MCNC: 187 MG/DL (ref 65–105)
GLUCOSE BLD-MCNC: 270 MG/DL (ref 65–105)
GLUCOSE SERPL-MCNC: 129 MG/DL (ref 70–99)
HCT VFR BLD AUTO: 33.3 % (ref 36–46)
HGB BLD-MCNC: 10.2 G/DL (ref 12–16)
INR PPP: 2.1
LYMPHOCYTES NFR BLD: 1.4 K/UL (ref 1–4.8)
LYMPHOCYTES RELATIVE PERCENT: 17 % (ref 24–44)
MAGNESIUM SERPL-MCNC: 2.1 MG/DL (ref 1.6–2.6)
MCH RBC QN AUTO: 26.1 PG (ref 26–34)
MCHC RBC AUTO-ENTMCNC: 30.6 G/DL (ref 31–37)
MCV RBC AUTO: 85.1 FL (ref 80–100)
MONOCYTES NFR BLD: 0.5 K/UL (ref 0.1–1.3)
MONOCYTES NFR BLD: 6 % (ref 1–7)
NEUTROPHILS NFR BLD: 75 % (ref 36–66)
NEUTS SEG NFR BLD: 6.3 K/UL (ref 1.3–9.1)
PLATELET # BLD AUTO: 227 K/UL (ref 150–450)
PMV BLD AUTO: 8.1 FL (ref 6–12)
POTASSIUM SERPL-SCNC: 5 MMOL/L (ref 3.7–5.3)
PROTHROMBIN TIME: 23.5 SEC (ref 11.8–14.6)
RBC # BLD AUTO: 3.91 M/UL (ref 4–5.2)
SODIUM SERPL-SCNC: 137 MMOL/L (ref 135–144)
WBC OTHER # BLD: 8.4 K/UL (ref 3.5–11)

## 2024-08-03 PROCEDURE — 99233 SBSQ HOSP IP/OBS HIGH 50: CPT | Performed by: INTERNAL MEDICINE

## 2024-08-03 PROCEDURE — 85025 COMPLETE CBC W/AUTO DIFF WBC: CPT

## 2024-08-03 PROCEDURE — 2060000000 HC ICU INTERMEDIATE R&B

## 2024-08-03 PROCEDURE — 6370000000 HC RX 637 (ALT 250 FOR IP): Performed by: INTERNAL MEDICINE

## 2024-08-03 PROCEDURE — 85610 PROTHROMBIN TIME: CPT

## 2024-08-03 PROCEDURE — 94640 AIRWAY INHALATION TREATMENT: CPT

## 2024-08-03 PROCEDURE — 82947 ASSAY GLUCOSE BLOOD QUANT: CPT

## 2024-08-03 PROCEDURE — 51798 US URINE CAPACITY MEASURE: CPT

## 2024-08-03 PROCEDURE — 83735 ASSAY OF MAGNESIUM: CPT

## 2024-08-03 PROCEDURE — 2700000000 HC OXYGEN THERAPY PER DAY

## 2024-08-03 PROCEDURE — 94761 N-INVAS EAR/PLS OXIMETRY MLT: CPT

## 2024-08-03 PROCEDURE — 6370000000 HC RX 637 (ALT 250 FOR IP): Performed by: SURGERY

## 2024-08-03 PROCEDURE — 6360000002 HC RX W HCPCS: Performed by: SURGERY

## 2024-08-03 PROCEDURE — 80048 BASIC METABOLIC PNL TOTAL CA: CPT

## 2024-08-03 PROCEDURE — 94003 VENT MGMT INPAT SUBQ DAY: CPT

## 2024-08-03 PROCEDURE — 36415 COLL VENOUS BLD VENIPUNCTURE: CPT

## 2024-08-03 PROCEDURE — 2580000003 HC RX 258: Performed by: SURGERY

## 2024-08-03 RX ADMIN — INSULIN LISPRO 4 UNITS: 100 INJECTION, SOLUTION INTRAVENOUS; SUBCUTANEOUS at 18:03

## 2024-08-03 RX ADMIN — Medication 15 ML: at 08:22

## 2024-08-03 RX ADMIN — MICONAZOLE NITRATE: 2 CREAM TOPICAL at 08:25

## 2024-08-03 RX ADMIN — BUDESONIDE INHALATION 500 MCG: 0.5 SUSPENSION RESPIRATORY (INHALATION) at 19:05

## 2024-08-03 RX ADMIN — MONTELUKAST 10 MG: 10 TABLET, FILM COATED ORAL at 21:31

## 2024-08-03 RX ADMIN — CARVEDILOL 6.25 MG: 6.25 TABLET, FILM COATED ORAL at 08:23

## 2024-08-03 RX ADMIN — INSULIN GLARGINE 15 UNITS: 100 INJECTION, SOLUTION SUBCUTANEOUS at 21:30

## 2024-08-03 RX ADMIN — LANSOPRAZOLE 30 MG: 30 TABLET, ORALLY DISINTEGRATING, DELAYED RELEASE ORAL at 08:22

## 2024-08-03 RX ADMIN — CARVEDILOL 6.25 MG: 6.25 TABLET, FILM COATED ORAL at 16:36

## 2024-08-03 RX ADMIN — LEVALBUTEROL HYDROCHLORIDE 1.25 MG: 1.25 SOLUTION RESPIRATORY (INHALATION) at 07:26

## 2024-08-03 RX ADMIN — LEVALBUTEROL HYDROCHLORIDE 1.25 MG: 1.25 SOLUTION RESPIRATORY (INHALATION) at 02:27

## 2024-08-03 RX ADMIN — IPRATROPIUM BROMIDE 0.5 MG: 0.5 SOLUTION RESPIRATORY (INHALATION) at 19:05

## 2024-08-03 RX ADMIN — PREDNISONE 20 MG: 20 TABLET ORAL at 08:22

## 2024-08-03 RX ADMIN — IPRATROPIUM BROMIDE 0.5 MG: 0.5 SOLUTION RESPIRATORY (INHALATION) at 02:27

## 2024-08-03 RX ADMIN — LEVALBUTEROL HYDROCHLORIDE 1.25 MG: 1.25 SOLUTION RESPIRATORY (INHALATION) at 19:05

## 2024-08-03 RX ADMIN — DOCUSATE SODIUM 100 MG: 100 CAPSULE, LIQUID FILLED ORAL at 08:23

## 2024-08-03 RX ADMIN — ACETAMINOPHEN 650 MG: 325 TABLET ORAL at 16:36

## 2024-08-03 RX ADMIN — MICONAZOLE NITRATE: 2 CREAM TOPICAL at 21:32

## 2024-08-03 RX ADMIN — IPRATROPIUM BROMIDE 0.5 MG: 0.5 SOLUTION RESPIRATORY (INHALATION) at 07:26

## 2024-08-03 RX ADMIN — LEVALBUTEROL HYDROCHLORIDE 1.25 MG: 1.25 SOLUTION RESPIRATORY (INHALATION) at 15:02

## 2024-08-03 RX ADMIN — SODIUM CHLORIDE, PRESERVATIVE FREE 10 ML: 5 INJECTION INTRAVENOUS at 21:32

## 2024-08-03 RX ADMIN — PRAVASTATIN SODIUM 80 MG: 40 TABLET ORAL at 16:36

## 2024-08-03 RX ADMIN — SODIUM CHLORIDE, PRESERVATIVE FREE 10 ML: 5 INJECTION INTRAVENOUS at 08:34

## 2024-08-03 RX ADMIN — ACETAMINOPHEN 650 MG: 325 TABLET ORAL at 01:46

## 2024-08-03 RX ADMIN — DILTIAZEM HYDROCHLORIDE 30 MG: 30 TABLET, FILM COATED ORAL at 21:28

## 2024-08-03 RX ADMIN — DOCUSATE SODIUM 100 MG: 100 CAPSULE, LIQUID FILLED ORAL at 21:31

## 2024-08-03 RX ADMIN — BUDESONIDE INHALATION 500 MCG: 0.5 SUSPENSION RESPIRATORY (INHALATION) at 07:26

## 2024-08-03 RX ADMIN — IPRATROPIUM BROMIDE 0.5 MG: 0.5 SOLUTION RESPIRATORY (INHALATION) at 15:02

## 2024-08-03 RX ADMIN — DILTIAZEM HYDROCHLORIDE 30 MG: 30 TABLET, FILM COATED ORAL at 08:22

## 2024-08-03 RX ADMIN — ACETAMINOPHEN 650 MG: 325 TABLET ORAL at 08:32

## 2024-08-03 ASSESSMENT — PAIN SCALES - GENERAL
PAINLEVEL_OUTOF10: 0
PAINLEVEL_OUTOF10: 4
PAINLEVEL_OUTOF10: 3
PAINLEVEL_OUTOF10: 3

## 2024-08-03 ASSESSMENT — PULMONARY FUNCTION TESTS
PIF_VALUE: 13
PIF_VALUE: 20
PIF_VALUE: 17
PIF_VALUE: 24
PIF_VALUE: 17

## 2024-08-03 ASSESSMENT — PAIN SCALES - WONG BAKER: WONGBAKER_NUMERICALRESPONSE: NO HURT

## 2024-08-03 ASSESSMENT — PAIN DESCRIPTION - LOCATION
LOCATION: HEAD
LOCATION: HEAD

## 2024-08-03 ASSESSMENT — PAIN DESCRIPTION - DESCRIPTORS: DESCRIPTORS: ACHING

## 2024-08-03 NOTE — FLOWSHEET NOTE
08/03/24 0150   Urine Assessment   Urinary Status Voiding   Urinary Incontinence Present   Urine Color Yellow/straw   Urine Appearance Clear   Urine Odor Malodorous   Bladder Scan Volume (mL) 500 mL  (Patient was given time to void- 450 out via purewick)   $ Bladder scan $ Yes   Post Void Cath Residual (mL) 16  (after voiding via pure wick.)     Bladder scan competed per order. Patient had 500 ml in bladder, Patient didn't want to be straight cath, writer gave patient time to void. 450 ml out via purewick. 16 ml residual. No straight cath needed.

## 2024-08-04 LAB
ANION GAP SERPL CALCULATED.3IONS-SCNC: 3 MMOL/L (ref 9–17)
ANION GAP SERPL CALCULATED.3IONS-SCNC: 8 MMOL/L (ref 9–17)
BASOPHILS # BLD: 0 K/UL (ref 0–0.2)
BASOPHILS NFR BLD: 0 % (ref 0–2)
BUN SERPL-MCNC: 29 MG/DL (ref 8–23)
BUN SERPL-MCNC: 30 MG/DL (ref 8–23)
CALCIUM SERPL-MCNC: 9.3 MG/DL (ref 8.6–10.4)
CALCIUM SERPL-MCNC: 9.5 MG/DL (ref 8.6–10.4)
CHLORIDE SERPL-SCNC: 92 MMOL/L (ref 98–107)
CHLORIDE SERPL-SCNC: 93 MMOL/L (ref 98–107)
CO2 SERPL-SCNC: 37 MMOL/L (ref 20–31)
CO2 SERPL-SCNC: 40 MMOL/L (ref 20–31)
CREAT SERPL-MCNC: 1 MG/DL (ref 0.5–0.9)
CREAT SERPL-MCNC: 1 MG/DL (ref 0.5–0.9)
EOSINOPHIL # BLD: 0.16 K/UL (ref 0–0.4)
EOSINOPHILS RELATIVE PERCENT: 2 % (ref 0–4)
ERYTHROCYTE [DISTWIDTH] IN BLOOD BY AUTOMATED COUNT: 19.8 % (ref 11.5–14.9)
GFR, ESTIMATED: 62 ML/MIN/1.73M2
GFR, ESTIMATED: 62 ML/MIN/1.73M2
GLUCOSE BLD-MCNC: 149 MG/DL (ref 65–105)
GLUCOSE BLD-MCNC: 175 MG/DL (ref 65–105)
GLUCOSE BLD-MCNC: 208 MG/DL (ref 65–105)
GLUCOSE BLD-MCNC: 226 MG/DL (ref 65–105)
GLUCOSE SERPL-MCNC: 204 MG/DL (ref 70–99)
GLUCOSE SERPL-MCNC: 229 MG/DL (ref 70–99)
HCT VFR BLD AUTO: 32.1 % (ref 36–46)
HGB BLD-MCNC: 9.7 G/DL (ref 12–16)
INR PPP: 2
LYMPHOCYTES NFR BLD: 1.2 K/UL (ref 1–4.8)
LYMPHOCYTES RELATIVE PERCENT: 15 % (ref 24–44)
MAGNESIUM SERPL-MCNC: 2.1 MG/DL (ref 1.6–2.6)
MCH RBC QN AUTO: 25.9 PG (ref 26–34)
MCHC RBC AUTO-ENTMCNC: 30.3 G/DL (ref 31–37)
MCV RBC AUTO: 85.5 FL (ref 80–100)
MONOCYTES NFR BLD: 0.48 K/UL (ref 0.1–1.3)
MONOCYTES NFR BLD: 6 % (ref 1–7)
MORPHOLOGY: ABNORMAL
MORPHOLOGY: ABNORMAL
NEUTROPHILS NFR BLD: 77 % (ref 36–66)
NEUTS SEG NFR BLD: 6.16 K/UL (ref 1.3–9.1)
PLATELET # BLD AUTO: 211 K/UL (ref 150–450)
PMV BLD AUTO: 7.8 FL (ref 6–12)
POTASSIUM SERPL-SCNC: 4.1 MMOL/L (ref 3.7–5.3)
POTASSIUM SERPL-SCNC: 4.4 MMOL/L (ref 3.7–5.3)
POTASSIUM SERPL-SCNC: 5.4 MMOL/L (ref 3.7–5.3)
PROTHROMBIN TIME: 23.2 SEC (ref 11.8–14.6)
RBC # BLD AUTO: 3.76 M/UL (ref 4–5.2)
SODIUM SERPL-SCNC: 136 MMOL/L (ref 135–144)
SODIUM SERPL-SCNC: 137 MMOL/L (ref 135–144)
WBC OTHER # BLD: 8 K/UL (ref 3.5–11)

## 2024-08-04 PROCEDURE — 94761 N-INVAS EAR/PLS OXIMETRY MLT: CPT

## 2024-08-04 PROCEDURE — 83735 ASSAY OF MAGNESIUM: CPT

## 2024-08-04 PROCEDURE — 36415 COLL VENOUS BLD VENIPUNCTURE: CPT

## 2024-08-04 PROCEDURE — 82947 ASSAY GLUCOSE BLOOD QUANT: CPT

## 2024-08-04 PROCEDURE — 2580000003 HC RX 258: Performed by: INTERNAL MEDICINE

## 2024-08-04 PROCEDURE — 2580000003 HC RX 258: Performed by: SURGERY

## 2024-08-04 PROCEDURE — 94640 AIRWAY INHALATION TREATMENT: CPT

## 2024-08-04 PROCEDURE — 99232 SBSQ HOSP IP/OBS MODERATE 35: CPT | Performed by: INTERNAL MEDICINE

## 2024-08-04 PROCEDURE — 85610 PROTHROMBIN TIME: CPT

## 2024-08-04 PROCEDURE — 99233 SBSQ HOSP IP/OBS HIGH 50: CPT | Performed by: INTERNAL MEDICINE

## 2024-08-04 PROCEDURE — 6360000002 HC RX W HCPCS: Performed by: INTERNAL MEDICINE

## 2024-08-04 PROCEDURE — 6370000000 HC RX 637 (ALT 250 FOR IP): Performed by: INTERNAL MEDICINE

## 2024-08-04 PROCEDURE — 2700000000 HC OXYGEN THERAPY PER DAY

## 2024-08-04 PROCEDURE — 80048 BASIC METABOLIC PNL TOTAL CA: CPT

## 2024-08-04 PROCEDURE — 6360000002 HC RX W HCPCS: Performed by: SURGERY

## 2024-08-04 PROCEDURE — 2060000000 HC ICU INTERMEDIATE R&B

## 2024-08-04 PROCEDURE — 6370000000 HC RX 637 (ALT 250 FOR IP): Performed by: SURGERY

## 2024-08-04 PROCEDURE — 84132 ASSAY OF SERUM POTASSIUM: CPT

## 2024-08-04 PROCEDURE — 94003 VENT MGMT INPAT SUBQ DAY: CPT

## 2024-08-04 PROCEDURE — 85025 COMPLETE CBC W/AUTO DIFF WBC: CPT

## 2024-08-04 RX ORDER — INSULIN GLARGINE 100 [IU]/ML
16 INJECTION, SOLUTION SUBCUTANEOUS NIGHTLY
Status: DISCONTINUED | OUTPATIENT
Start: 2024-08-04 | End: 2024-08-05 | Stop reason: HOSPADM

## 2024-08-04 RX ORDER — FUROSEMIDE 10 MG/ML
40 INJECTION INTRAMUSCULAR; INTRAVENOUS ONCE
Status: COMPLETED | OUTPATIENT
Start: 2024-08-04 | End: 2024-08-04

## 2024-08-04 RX ORDER — SENNA AND DOCUSATE SODIUM 50; 8.6 MG/1; MG/1
2 TABLET, FILM COATED ORAL DAILY
Status: DISCONTINUED | OUTPATIENT
Start: 2024-08-04 | End: 2024-08-05 | Stop reason: HOSPADM

## 2024-08-04 RX ORDER — LACTULOSE 10 G/15ML
20 SOLUTION ORAL 3 TIMES DAILY
Status: DISCONTINUED | OUTPATIENT
Start: 2024-08-04 | End: 2024-08-05 | Stop reason: HOSPADM

## 2024-08-04 RX ADMIN — ACETAMINOPHEN 650 MG: 325 TABLET ORAL at 07:36

## 2024-08-04 RX ADMIN — PREDNISONE 20 MG: 20 TABLET ORAL at 07:36

## 2024-08-04 RX ADMIN — FUROSEMIDE 40 MG: 10 INJECTION, SOLUTION INTRAMUSCULAR; INTRAVENOUS at 08:49

## 2024-08-04 RX ADMIN — INSULIN HUMAN 10 UNITS: 100 INJECTION, SOLUTION PARENTERAL at 08:49

## 2024-08-04 RX ADMIN — BUDESONIDE INHALATION 500 MCG: 0.5 SUSPENSION RESPIRATORY (INHALATION) at 19:00

## 2024-08-04 RX ADMIN — SENNOSIDES AND DOCUSATE SODIUM 2 TABLET: 50; 8.6 TABLET ORAL at 08:48

## 2024-08-04 RX ADMIN — LANSOPRAZOLE 30 MG: 30 TABLET, ORALLY DISINTEGRATING, DELAYED RELEASE ORAL at 05:45

## 2024-08-04 RX ADMIN — LEVALBUTEROL HYDROCHLORIDE 1.25 MG: 1.25 SOLUTION RESPIRATORY (INHALATION) at 14:00

## 2024-08-04 RX ADMIN — IPRATROPIUM BROMIDE 0.5 MG: 0.5 SOLUTION RESPIRATORY (INHALATION) at 01:00

## 2024-08-04 RX ADMIN — MICONAZOLE NITRATE: 2 CREAM TOPICAL at 20:59

## 2024-08-04 RX ADMIN — IPRATROPIUM BROMIDE 0.5 MG: 0.5 SOLUTION RESPIRATORY (INHALATION) at 14:00

## 2024-08-04 RX ADMIN — LACTULOSE 20 G: 20 SOLUTION ORAL at 12:43

## 2024-08-04 RX ADMIN — LEVALBUTEROL HYDROCHLORIDE 1.25 MG: 1.25 SOLUTION RESPIRATORY (INHALATION) at 01:00

## 2024-08-04 RX ADMIN — INSULIN LISPRO 2 UNITS: 100 INJECTION, SOLUTION INTRAVENOUS; SUBCUTANEOUS at 17:25

## 2024-08-04 RX ADMIN — IPRATROPIUM BROMIDE 0.5 MG: 0.5 SOLUTION RESPIRATORY (INHALATION) at 19:00

## 2024-08-04 RX ADMIN — INSULIN LISPRO 2 UNITS: 100 INJECTION, SOLUTION INTRAVENOUS; SUBCUTANEOUS at 05:47

## 2024-08-04 RX ADMIN — INSULIN LISPRO 2 UNITS: 100 INJECTION, SOLUTION INTRAVENOUS; SUBCUTANEOUS at 12:43

## 2024-08-04 RX ADMIN — PRAVASTATIN SODIUM 80 MG: 40 TABLET ORAL at 17:25

## 2024-08-04 RX ADMIN — CARVEDILOL 6.25 MG: 6.25 TABLET, FILM COATED ORAL at 07:36

## 2024-08-04 RX ADMIN — DEXTROSE MONOHYDRATE 250 ML: 100 INJECTION, SOLUTION INTRAVENOUS at 08:56

## 2024-08-04 RX ADMIN — MONTELUKAST 10 MG: 10 TABLET, FILM COATED ORAL at 20:57

## 2024-08-04 RX ADMIN — DILTIAZEM HYDROCHLORIDE 30 MG: 30 TABLET, FILM COATED ORAL at 17:25

## 2024-08-04 RX ADMIN — LACTULOSE 20 G: 20 SOLUTION ORAL at 20:57

## 2024-08-04 RX ADMIN — SODIUM CHLORIDE, PRESERVATIVE FREE 10 ML: 5 INJECTION INTRAVENOUS at 08:49

## 2024-08-04 RX ADMIN — INSULIN GLARGINE 16 UNITS: 100 INJECTION, SOLUTION SUBCUTANEOUS at 21:02

## 2024-08-04 RX ADMIN — BUDESONIDE INHALATION 500 MCG: 0.5 SUSPENSION RESPIRATORY (INHALATION) at 08:03

## 2024-08-04 RX ADMIN — SODIUM CHLORIDE, PRESERVATIVE FREE 10 ML: 5 INJECTION INTRAVENOUS at 21:02

## 2024-08-04 RX ADMIN — LACTULOSE 20 G: 20 SOLUTION ORAL at 08:49

## 2024-08-04 RX ADMIN — LEVALBUTEROL HYDROCHLORIDE 1.25 MG: 1.25 SOLUTION RESPIRATORY (INHALATION) at 19:00

## 2024-08-04 RX ADMIN — DILTIAZEM HYDROCHLORIDE 30 MG: 30 TABLET, FILM COATED ORAL at 01:08

## 2024-08-04 RX ADMIN — CARVEDILOL 6.25 MG: 6.25 TABLET, FILM COATED ORAL at 17:26

## 2024-08-04 RX ADMIN — LEVALBUTEROL HYDROCHLORIDE 1.25 MG: 1.25 SOLUTION RESPIRATORY (INHALATION) at 08:03

## 2024-08-04 RX ADMIN — DILTIAZEM HYDROCHLORIDE 30 MG: 30 TABLET, FILM COATED ORAL at 05:42

## 2024-08-04 RX ADMIN — IPRATROPIUM BROMIDE 0.5 MG: 0.5 SOLUTION RESPIRATORY (INHALATION) at 08:03

## 2024-08-04 RX ADMIN — DILTIAZEM HYDROCHLORIDE 30 MG: 30 TABLET, FILM COATED ORAL at 12:43

## 2024-08-04 ASSESSMENT — PULMONARY FUNCTION TESTS
PIF_VALUE: 31
PIF_VALUE: 12
PIF_VALUE: 16
PIF_VALUE: 18
PIF_VALUE: 14
PIF_VALUE: 16
PIF_VALUE: 7
PIF_VALUE: 15
PIF_VALUE: 14
PIF_VALUE: 16
PIF_VALUE: 17
PIF_VALUE: 14
PIF_VALUE: 14
PIF_VALUE: 16
PIF_VALUE: 15

## 2024-08-04 ASSESSMENT — PAIN SCALES - GENERAL
PAINLEVEL_OUTOF10: 0
PAINLEVEL_OUTOF10: 1
PAINLEVEL_OUTOF10: 0
PAINLEVEL_OUTOF10: 3
PAINLEVEL_OUTOF10: 0

## 2024-08-04 ASSESSMENT — PAIN DESCRIPTION - LOCATION: LOCATION: HEAD

## 2024-08-04 ASSESSMENT — PAIN - FUNCTIONAL ASSESSMENT: PAIN_FUNCTIONAL_ASSESSMENT: ACTIVITIES ARE NOT PREVENTED

## 2024-08-04 ASSESSMENT — PAIN DESCRIPTION - DESCRIPTORS: DESCRIPTORS: ACHING

## 2024-08-04 ASSESSMENT — PAIN DESCRIPTION - ORIENTATION: ORIENTATION: MID

## 2024-08-05 VITALS
SYSTOLIC BLOOD PRESSURE: 117 MMHG | OXYGEN SATURATION: 95 % | HEART RATE: 95 BPM | TEMPERATURE: 97.4 F | HEIGHT: 65 IN | DIASTOLIC BLOOD PRESSURE: 56 MMHG | BODY MASS INDEX: 48.82 KG/M2 | RESPIRATION RATE: 20 BRPM | WEIGHT: 293 LBS

## 2024-08-05 LAB
ANION GAP SERPL CALCULATED.3IONS-SCNC: 11 MMOL/L (ref 9–17)
BASOPHILS # BLD: 0 K/UL (ref 0–0.2)
BASOPHILS NFR BLD: 0 % (ref 0–2)
BUN SERPL-MCNC: 27 MG/DL (ref 8–23)
CALCIUM SERPL-MCNC: 9.5 MG/DL (ref 8.6–10.4)
CHLORIDE SERPL-SCNC: 95 MMOL/L (ref 98–107)
CO2 SERPL-SCNC: 34 MMOL/L (ref 20–31)
CREAT SERPL-MCNC: 1 MG/DL (ref 0.5–0.9)
EOSINOPHIL # BLD: 0.17 K/UL (ref 0–0.4)
EOSINOPHILS RELATIVE PERCENT: 2 % (ref 0–4)
ERYTHROCYTE [DISTWIDTH] IN BLOOD BY AUTOMATED COUNT: 19.9 % (ref 11.5–14.9)
GFR, ESTIMATED: 62 ML/MIN/1.73M2
GLUCOSE BLD-MCNC: 113 MG/DL (ref 65–105)
GLUCOSE BLD-MCNC: 130 MG/DL (ref 65–105)
GLUCOSE BLD-MCNC: 254 MG/DL (ref 65–105)
GLUCOSE SERPL-MCNC: 146 MG/DL (ref 70–99)
HCT VFR BLD AUTO: 33.9 % (ref 36–46)
HGB BLD-MCNC: 10.1 G/DL (ref 12–16)
INR PPP: 1.8
LYMPHOCYTES NFR BLD: 1.25 K/UL (ref 1–4.8)
LYMPHOCYTES RELATIVE PERCENT: 15 % (ref 24–44)
MAGNESIUM SERPL-MCNC: 1.9 MG/DL (ref 1.6–2.6)
MCH RBC QN AUTO: 25.6 PG (ref 26–34)
MCHC RBC AUTO-ENTMCNC: 29.9 G/DL (ref 31–37)
MCV RBC AUTO: 85.5 FL (ref 80–100)
MONOCYTES NFR BLD: 0.5 K/UL (ref 0.1–1.3)
MONOCYTES NFR BLD: 6 % (ref 1–7)
MORPHOLOGY: ABNORMAL
NEUTROPHILS NFR BLD: 77 % (ref 36–66)
NEUTS SEG NFR BLD: 6.38 K/UL (ref 1.3–9.1)
PLATELET # BLD AUTO: 211 K/UL (ref 150–450)
PMV BLD AUTO: 7.8 FL (ref 6–12)
POTASSIUM SERPL-SCNC: 3.6 MMOL/L (ref 3.7–5.3)
PROTHROMBIN TIME: 20.9 SEC (ref 11.8–14.6)
RBC # BLD AUTO: 3.96 M/UL (ref 4–5.2)
SODIUM SERPL-SCNC: 140 MMOL/L (ref 135–144)
WBC OTHER # BLD: 8.3 K/UL (ref 3.5–11)

## 2024-08-05 PROCEDURE — 36415 COLL VENOUS BLD VENIPUNCTURE: CPT

## 2024-08-05 PROCEDURE — 83735 ASSAY OF MAGNESIUM: CPT

## 2024-08-05 PROCEDURE — 6370000000 HC RX 637 (ALT 250 FOR IP): Performed by: INTERNAL MEDICINE

## 2024-08-05 PROCEDURE — 6370000000 HC RX 637 (ALT 250 FOR IP): Performed by: SURGERY

## 2024-08-05 PROCEDURE — 6360000002 HC RX W HCPCS: Performed by: SURGERY

## 2024-08-05 PROCEDURE — 92526 ORAL FUNCTION THERAPY: CPT

## 2024-08-05 PROCEDURE — 80048 BASIC METABOLIC PNL TOTAL CA: CPT

## 2024-08-05 PROCEDURE — 82947 ASSAY GLUCOSE BLOOD QUANT: CPT

## 2024-08-05 PROCEDURE — 99232 SBSQ HOSP IP/OBS MODERATE 35: CPT | Performed by: INTERNAL MEDICINE

## 2024-08-05 PROCEDURE — 2580000003 HC RX 258: Performed by: SURGERY

## 2024-08-05 PROCEDURE — 94640 AIRWAY INHALATION TREATMENT: CPT

## 2024-08-05 PROCEDURE — 94003 VENT MGMT INPAT SUBQ DAY: CPT

## 2024-08-05 PROCEDURE — 85610 PROTHROMBIN TIME: CPT

## 2024-08-05 PROCEDURE — 2700000000 HC OXYGEN THERAPY PER DAY

## 2024-08-05 PROCEDURE — 85025 COMPLETE CBC W/AUTO DIFF WBC: CPT

## 2024-08-05 PROCEDURE — 94761 N-INVAS EAR/PLS OXIMETRY MLT: CPT

## 2024-08-05 RX ORDER — PREDNISONE 10 MG/1
10 TABLET ORAL DAILY
Status: DISCONTINUED | OUTPATIENT
Start: 2024-08-06 | End: 2024-08-05 | Stop reason: HOSPADM

## 2024-08-05 RX ORDER — LACTULOSE 10 G/15ML
20 SOLUTION ORAL 3 TIMES DAILY PRN
Qty: 946 ML | Refills: 1 | Status: SHIPPED | OUTPATIENT
Start: 2024-08-05

## 2024-08-05 RX ORDER — SENNA AND DOCUSATE SODIUM 50; 8.6 MG/1; MG/1
2 TABLET, FILM COATED ORAL DAILY
Qty: 180 TABLET | Refills: 0 | Status: SHIPPED | OUTPATIENT
Start: 2024-08-06

## 2024-08-05 RX ORDER — INSULIN LISPRO 100 [IU]/ML
0-8 INJECTION, SOLUTION INTRAVENOUS; SUBCUTANEOUS EVERY 4 HOURS
Qty: 10 ML | Refills: 2 | Status: SHIPPED | OUTPATIENT
Start: 2024-08-05

## 2024-08-05 RX ORDER — LEVALBUTEROL INHALATION SOLUTION 1.25 MG/3ML
1.25 SOLUTION RESPIRATORY (INHALATION) EVERY 6 HOURS
Qty: 120 EACH | Refills: 0 | Status: SHIPPED | OUTPATIENT
Start: 2024-08-05 | End: 2024-09-04

## 2024-08-05 RX ORDER — LEVALBUTEROL INHALATION SOLUTION 1.25 MG/3ML
1.25 SOLUTION RESPIRATORY (INHALATION) EVERY 6 HOURS
Qty: 120 EACH | Refills: 0
Start: 2024-08-05 | End: 2024-08-05

## 2024-08-05 RX ORDER — PREDNISONE 10 MG/1
10 TABLET ORAL DAILY
Qty: 5 TABLET | Refills: 0
Start: 2024-08-06 | End: 2024-08-05

## 2024-08-05 RX ORDER — POLYETHYLENE GLYCOL 3350 17 G/17G
17 POWDER, FOR SOLUTION ORAL DAILY PRN
Qty: 527 G | Refills: 1 | Status: SHIPPED | OUTPATIENT
Start: 2024-08-05 | End: 2024-10-06

## 2024-08-05 RX ORDER — WARFARIN SODIUM 3 MG/1
6 TABLET ORAL
Status: COMPLETED | OUTPATIENT
Start: 2024-08-05 | End: 2024-08-05

## 2024-08-05 RX ORDER — INSULIN GLARGINE 100 [IU]/ML
16 INJECTION, SOLUTION SUBCUTANEOUS NIGHTLY
Qty: 10 ML | Refills: 3 | Status: SHIPPED | OUTPATIENT
Start: 2024-08-05

## 2024-08-05 RX ORDER — PREDNISONE 10 MG/1
10 TABLET ORAL DAILY
Qty: 5 TABLET | Refills: 0 | Status: SHIPPED | OUTPATIENT
Start: 2024-08-06 | End: 2024-08-11

## 2024-08-05 RX ORDER — WARFARIN SODIUM 6 MG/1
6 TABLET ORAL
Qty: 1 TABLET | Refills: 0
Start: 2024-08-05 | End: 2024-08-05

## 2024-08-05 RX ORDER — MULTIVIT-MIN/FERROUS GLUCONATE 9 MG/15 ML
15 LIQUID (ML) ORAL DAILY
Qty: 480 ML | Refills: 0 | Status: SHIPPED | OUTPATIENT
Start: 2024-08-06

## 2024-08-05 RX ORDER — WARFARIN SODIUM 6 MG/1
6 TABLET ORAL DAILY
Qty: 30 TABLET | Refills: 3 | Status: CANCELLED | OUTPATIENT
Start: 2024-08-05

## 2024-08-05 RX ORDER — CARVEDILOL 6.25 MG/1
6.25 TABLET ORAL 2 TIMES DAILY WITH MEALS
Qty: 60 TABLET | Refills: 3 | Status: SHIPPED | OUTPATIENT
Start: 2024-08-05

## 2024-08-05 RX ADMIN — PREDNISONE 20 MG: 20 TABLET ORAL at 09:00

## 2024-08-05 RX ADMIN — LEVALBUTEROL HYDROCHLORIDE 1.25 MG: 1.25 SOLUTION RESPIRATORY (INHALATION) at 01:30

## 2024-08-05 RX ADMIN — WARFARIN SODIUM 6 MG: 3 TABLET ORAL at 17:37

## 2024-08-05 RX ADMIN — BUDESONIDE INHALATION 500 MCG: 0.5 SUSPENSION RESPIRATORY (INHALATION) at 07:46

## 2024-08-05 RX ADMIN — LEVALBUTEROL HYDROCHLORIDE 1.25 MG: 1.25 SOLUTION RESPIRATORY (INHALATION) at 13:34

## 2024-08-05 RX ADMIN — ACETAMINOPHEN 650 MG: 325 TABLET ORAL at 05:59

## 2024-08-05 RX ADMIN — ACETAMINOPHEN 650 MG: 325 TABLET ORAL at 00:55

## 2024-08-05 RX ADMIN — CARVEDILOL 6.25 MG: 6.25 TABLET, FILM COATED ORAL at 09:01

## 2024-08-05 RX ADMIN — LANSOPRAZOLE 30 MG: 30 TABLET, ORALLY DISINTEGRATING, DELAYED RELEASE ORAL at 10:13

## 2024-08-05 RX ADMIN — INSULIN LISPRO 4 UNITS: 100 INJECTION, SOLUTION INTRAVENOUS; SUBCUTANEOUS at 16:44

## 2024-08-05 RX ADMIN — IPRATROPIUM BROMIDE 0.5 MG: 0.5 SOLUTION RESPIRATORY (INHALATION) at 01:30

## 2024-08-05 RX ADMIN — LEVALBUTEROL HYDROCHLORIDE 1.25 MG: 1.25 SOLUTION RESPIRATORY (INHALATION) at 07:46

## 2024-08-05 RX ADMIN — SODIUM CHLORIDE, PRESERVATIVE FREE 10 ML: 5 INJECTION INTRAVENOUS at 09:08

## 2024-08-05 RX ADMIN — MICONAZOLE NITRATE: 2 CREAM TOPICAL at 11:45

## 2024-08-05 RX ADMIN — DILTIAZEM HYDROCHLORIDE 30 MG: 30 TABLET, FILM COATED ORAL at 17:37

## 2024-08-05 RX ADMIN — CARVEDILOL 6.25 MG: 6.25 TABLET, FILM COATED ORAL at 16:44

## 2024-08-05 RX ADMIN — LACTULOSE 20 G: 20 SOLUTION ORAL at 09:00

## 2024-08-05 RX ADMIN — LACTULOSE 20 G: 20 SOLUTION ORAL at 14:39

## 2024-08-05 RX ADMIN — IPRATROPIUM BROMIDE 0.5 MG: 0.5 SOLUTION RESPIRATORY (INHALATION) at 07:46

## 2024-08-05 RX ADMIN — SENNOSIDES AND DOCUSATE SODIUM 2 TABLET: 50; 8.6 TABLET ORAL at 09:07

## 2024-08-05 RX ADMIN — DILTIAZEM HYDROCHLORIDE 30 MG: 30 TABLET, FILM COATED ORAL at 05:59

## 2024-08-05 RX ADMIN — DILTIAZEM HYDROCHLORIDE 30 MG: 30 TABLET, FILM COATED ORAL at 12:29

## 2024-08-05 RX ADMIN — Medication 15 ML: at 09:02

## 2024-08-05 RX ADMIN — DILTIAZEM HYDROCHLORIDE 30 MG: 30 TABLET, FILM COATED ORAL at 00:55

## 2024-08-05 ASSESSMENT — PULMONARY FUNCTION TESTS
PIF_VALUE: 18
PIF_VALUE: 17
PIF_VALUE: 30
PIF_VALUE: 28
PIF_VALUE: 18

## 2024-08-05 ASSESSMENT — PAIN DESCRIPTION - LOCATION: LOCATION: HEAD

## 2024-08-05 ASSESSMENT — PAIN SCALES - GENERAL
PAINLEVEL_OUTOF10: 3
PAINLEVEL_OUTOF10: 3
PAINLEVEL_OUTOF10: 0

## 2024-08-05 NOTE — PLAN OF CARE
Problem: Discharge Planning  Goal: Discharge to home or other facility with appropriate resources  7/23/2024 0308 by Val Salazar RN  Outcome: Progressing  7/22/2024 1809 by Tara Guzmán RN  Outcome: Progressing     Problem: Safety - Adult  Goal: Free from fall injury  7/23/2024 0308 by Val Salazar RN  Outcome: Progressing  7/22/2024 1809 by Tara Guzmán RN  Outcome: Progressing     Problem: Pain  Goal: Verbalizes/displays adequate comfort level or baseline comfort level  7/23/2024 0308 by Val Salazar RN  Outcome: Progressing  7/22/2024 1809 by Tara Guzmán RN  Outcome: Progressing     Problem: Neurosensory - Adult  Goal: Achieves stable or improved neurological status  7/23/2024 0308 by Val Salazar RN  Outcome: Progressing  7/22/2024 1809 by Tara Guzmán RN  Outcome: Progressing  Flowsheets (Taken 7/22/2024 1600)  Achieves stable or improved neurological status:   Assess for and report changes in neurological status   Initiate measures to prevent increased intracranial pressure   Monitor temperature, glucose, and sodium. Initiate appropriate interventions as ordered     Problem: Infection - Adult  Goal: Absence of infection at discharge  7/23/2024 0308 by Val Salazar RN  Outcome: Progressing  7/22/2024 1809 by Tara Guzmán RN  Outcome: Progressing     Problem: Cardiovascular - Adult  Goal: Maintains optimal cardiac output and hemodynamic stability  Outcome: Progressing  Goal: Absence of cardiac dysrhythmias or at baseline  Outcome: Progressing     Problem: Skin/Tissue Integrity - Adult  Goal: Skin integrity remains intact  Outcome: Progressing  Flowsheets (Taken 7/22/2024 1600 by Tara Guzmán RN)  Skin Integrity Remains Intact: Monitor for areas of redness and/or skin breakdown  Goal: Oral mucous membranes remain intact  Outcome: Progressing  Flowsheets (Taken 7/22/2024 1600 by Tara Guzmán RN)  Oral Mucous Membranes Remain Intact: Assess oral mucosa and hygiene 
  Problem: Discharge Planning  Goal: Discharge to home or other facility with appropriate resources  7/24/2024 0542 by Katie Roach RN  Outcome: Progressing  Flowsheets (Taken 7/23/2024 2000)  Discharge to home or other facility with appropriate resources:   Identify barriers to discharge with patient and caregiver   Arrange for needed discharge resources and transportation as appropriate   Refer to discharge planning if patient needs post-hospital services based on physician order or complex needs related to functional status, cognitive ability or social support system  7/23/2024 1833 by Maddy Moss RN  Outcome: Progressing     Problem: Safety - Adult  Goal: Free from fall injury  7/24/2024 0542 by Katie Roach RN  Outcome: Progressing  7/23/2024 1833 by Maddy Moss RN  Outcome: Progressing     Problem: Pain  Goal: Verbalizes/displays adequate comfort level or baseline comfort level  7/24/2024 0542 by Katie Roach RN  Outcome: Progressing  7/23/2024 1833 by Maddy Moss RN  Outcome: Progressing     Problem: Neurosensory - Adult  Goal: Achieves stable or improved neurological status  7/24/2024 0542 by Katei Roach RN  Outcome: Progressing  Flowsheets (Taken 7/23/2024 2000)  Achieves stable or improved neurological status:   Assess for and report changes in neurological status   Initiate measures to prevent increased intracranial pressure  7/23/2024 1833 by Maddy Moss RN  Outcome: Progressing     Problem: Infection - Adult  Goal: Absence of infection at discharge  7/24/2024 0542 by Katie Roach RN  Outcome: Progressing  Flowsheets (Taken 7/23/2024 2000)  Absence of infection at discharge:   Assess and monitor for signs and symptoms of infection   Monitor all insertion sites i.e., indwelling lines, tubes and drains   Monitor lab/diagnostic results  7/23/2024 1833 by Maddy Moss RN  Outcome: Progressing     Problem: Cardiovascular - Adult  Goal: 
  Problem: Discharge Planning  Goal: Discharge to home or other facility with appropriate resources  7/24/2024 1708 by Seema Gentile RN  Outcome: Progressing     Problem: Safety - Adult  Goal: Free from fall injury  7/24/2024 1708 by Seema Gentile RN  Outcome: Progressing     Problem: Pain  Goal: Verbalizes/displays adequate comfort level or baseline comfort level  7/24/2024 1708 by Seema Gentile RN  Outcome: Progressing     Problem: Neurosensory - Adult  Goal: Achieves stable or improved neurological status  7/24/2024 1708 by Seema Gentile RN  Outcome: Progressing  Flowsheets (Taken 7/24/2024 0800)  Achieves stable or improved neurological status:   Assess for and report changes in neurological status   Initiate measures to prevent increased intracranial pressure   Maintain blood pressure and fluid volume within ordered parameters to optimize cerebral perfusion and minimize risk of hemorrhage     Problem: Infection - Adult  Goal: Absence of infection at discharge  7/24/2024 1708 by Seema Gentile RN  Outcome: Progressing     Problem: Cardiovascular - Adult  Goal: Maintains optimal cardiac output and hemodynamic stability  7/24/2024 1708 by Seema Gentile RN  Outcome: Progressing  Flowsheets (Taken 7/24/2024 0800)  Maintains optimal cardiac output and hemodynamic stability:   Monitor blood pressure and heart rate   Monitor urine output and notify Licensed Independent Practitioner for values outside of normal range   Assess for signs of decreased cardiac output     Problem: Cardiovascular - Adult  Goal: Absence of cardiac dysrhythmias or at baseline  7/24/2024 1708 by Seema Gentile RN  Outcome: Progressing  Flowsheets (Taken 7/24/2024 0800)  Absence of cardiac dysrhythmias or at baseline:   Monitor cardiac rate and rhythm   Assess for signs of decreased cardiac output   Administer antiarrhythmia medication and electrolyte replacement as ordered     Problem: Skin/Tissue Integrity - 
  Problem: Discharge Planning  Goal: Discharge to home or other facility with appropriate resources  7/24/2024 1708 by Seema Gentile RN  Outcome: Progressing     Problem: Safety - Adult  Goal: Free from fall injury  7/24/2024 2328 by Liliana Hart RN  Outcome: Progressing  Flowsheets (Taken 7/24/2024 2328)  Free From Fall Injury: Instruct family/caregiver on patient safety  7/24/2024 1708 by Seema Gentile RN  Outcome: Progressing     Problem: Pain  Goal: Verbalizes/displays adequate comfort level or baseline comfort level  7/24/2024 2328 by Liliana Hart RN  Outcome: Progressing  Flowsheets (Taken 7/24/2024 2328)  Verbalizes/displays adequate comfort level or baseline comfort level:   Encourage patient to monitor pain and request assistance   Administer analgesics based on type and severity of pain and evaluate response   Consider cultural and social influences on pain and pain management   Assess pain using appropriate pain scale   Implement non-pharmacological measures as appropriate and evaluate response   Notify Licensed Independent Practitioner if interventions unsuccessful or patient reports new pain  7/24/2024 1708 by Seema Gentile RN  Outcome: Progressing     Problem: Neurosensory - Adult  Goal: Achieves stable or improved neurological status  7/24/2024 2328 by Liliana Hart RN  Outcome: Progressing  Flowsheets (Taken 7/24/2024 0800 by Seema Gentile RN)  Achieves stable or improved neurological status:   Assess for and report changes in neurological status   Initiate measures to prevent increased intracranial pressure   Maintain blood pressure and fluid volume within ordered parameters to optimize cerebral perfusion and minimize risk of hemorrhage  7/24/2024 1708 by Seema Gentile RN  Outcome: Progressing  Flowsheets (Taken 7/24/2024 0800)  Achieves stable or improved neurological status:   Assess for and report changes in neurological status   Initiate measures to 
  Problem: Discharge Planning  Goal: Discharge to home or other facility with appropriate resources  7/26/2024 0431 by Gregg Jorge  Outcome: Progressing  Flowsheets (Taken 7/26/2024 0431)  Discharge to home or other facility with appropriate resources:   Identify barriers to discharge with patient and caregiver   Identify discharge learning needs (meds, wound care, etc)     Problem: Safety - Adult  Goal: Free from fall injury  7/26/2024 0431 by Gregg Jorge  Outcome: Progressing  Flowsheets (Taken 7/26/2024 0431)  Free From Fall Injury:   Instruct family/caregiver on patient safety   Based on caregiver fall risk screen, instruct family/caregiver to ask for assistance with transferring infant if caregiver noted to have fall risk factors     Problem: Pain  Goal: Verbalizes/displays adequate comfort level or baseline comfort level  7/26/2024 0431 by Gregg Jorge  Outcome: Progressing  Flowsheets (Taken 7/26/2024 0431)  Verbalizes/displays adequate comfort level or baseline comfort level:   Encourage patient to monitor pain and request assistance   Administer analgesics based on type and severity of pain and evaluate response   Assess pain using appropriate pain scale   Implement non-pharmacological measures as appropriate and evaluate response     Problem: Neurosensory - Adult  Goal: Achieves stable or improved neurological status  7/26/2024 0431 by Gregg Jorge  Outcome: Progressing  Flowsheets (Taken 7/26/2024 0431)  Achieves stable or improved neurological status:   Assess for and report changes in neurological status   Initiate measures to prevent increased intracranial pressure   Maintain blood pressure and fluid volume within ordered parameters to optimize cerebral perfusion and minimize risk of hemorrhage   Monitor temperature, glucose, and sodium. Initiate appropriate interventions as ordered     Problem: Infection - Adult  Goal: Absence of infection at discharge  7/26/2024 0431 by Luisito 
  Problem: Discharge Planning  Goal: Discharge to home or other facility with appropriate resources  7/27/2024 0333 by Any Mendoza RN  Outcome: Progressing  Flowsheets (Taken 7/26/2024 0431 by Gregg Jorge)  Discharge to home or other facility with appropriate resources:   Identify barriers to discharge with patient and caregiver   Identify discharge learning needs (meds, wound care, etc)  7/26/2024 1654 by Judit Khan RN  Outcome: Progressing     Problem: Safety - Adult  Goal: Free from fall injury  7/27/2024 0333 by Any Mendoza RN  Outcome: Progressing  Flowsheets (Taken 7/26/2024 2000)  Free From Fall Injury: Instruct family/caregiver on patient safety  7/26/2024 1654 by Judit Khan RN  Outcome: Progressing     Problem: Pain  Goal: Verbalizes/displays adequate comfort level or baseline comfort level  7/27/2024 0333 by Any Mendoza RN  Outcome: Progressing  Flowsheets (Taken 7/26/2024 2000)  Verbalizes/displays adequate comfort level or baseline comfort level:   Encourage patient to monitor pain and request assistance   Assess pain using appropriate pain scale   Administer analgesics based on type and severity of pain and evaluate response   Implement non-pharmacological measures as appropriate and evaluate response  7/26/2024 1654 by Judit Khan RN  Outcome: Progressing     Problem: Neurosensory - Adult  Goal: Achieves stable or improved neurological status  7/27/2024 0333 by Any Mendoza RN  Outcome: Progressing  Flowsheets (Taken 7/26/2024 2000)  Achieves stable or improved neurological status:   Assess for and report changes in neurological status   Initiate measures to prevent increased intracranial pressure   Maintain blood pressure and fluid volume within ordered parameters to optimize cerebral perfusion and minimize risk of hemorrhage   Monitor temperature, glucose, and sodium. Initiate appropriate interventions as ordered  7/26/2024 1654 by Judit Khan RN  Outcome: 
  Problem: Discharge Planning  Goal: Discharge to home or other facility with appropriate resources  7/27/2024 1106 by Katie Hanna RN  Outcome: Progressing     Problem: Safety - Adult  Goal: Free from fall injury  7/27/2024 1106 by Katie Hanna RN  Outcome: Progressing     Problem: Pain  Goal: Verbalizes/displays adequate comfort level or baseline comfort level  7/27/2024 1106 by Katie Hanna RN  Outcome: Progressing     Problem: Neurosensory - Adult  Goal: Achieves stable or improved neurological status  7/27/2024 1106 by Katie Hanna RN  Outcome: Progressing     Problem: Infection - Adult  Goal: Absence of infection at discharge  7/27/2024 1106 by Katie Hanna RN  Outcome: Progressing     Problem: Cardiovascular - Adult  Goal: Maintains optimal cardiac output and hemodynamic stability  7/27/2024 1106 by Katie Hanna RN  Outcome: Progressing     
  Problem: Discharge Planning  Goal: Discharge to home or other facility with appropriate resources  7/28/2024 1701 by Judit Khan RN  Outcome: Progressing  7/28/2024 0414 by Comfort Valenzuela RN  Outcome: Progressing  Flowsheets (Taken 7/27/2024 2000)  Discharge to home or other facility with appropriate resources:   Identify barriers to discharge with patient and caregiver   Arrange for needed discharge resources and transportation as appropriate   Identify discharge learning needs (meds, wound care, etc)   Arrange for interpreters to assist at discharge as needed   Refer to discharge planning if patient needs post-hospital services based on physician order or complex needs related to functional status, cognitive ability or social support system     Problem: Safety - Adult  Goal: Free from fall injury  7/28/2024 1701 by Judit Khan RN  Outcome: Progressing  7/28/2024 0414 by Comfort Valenzuela RN  Outcome: Progressing  Flowsheets (Taken 7/27/2024 2000)  Free From Fall Injury: Instruct family/caregiver on patient safety     Problem: Pain  Goal: Verbalizes/displays adequate comfort level or baseline comfort level  7/28/2024 1701 by Judit Khan RN  Outcome: Progressing  7/28/2024 0414 by Comfort Valenzuela RN  Outcome: Progressing  Flowsheets (Taken 7/27/2024 2000)  Verbalizes/displays adequate comfort level or baseline comfort level:   Encourage patient to monitor pain and request assistance   Assess pain using appropriate pain scale   Administer analgesics based on type and severity of pain and evaluate response   Implement non-pharmacological measures as appropriate and evaluate response   Consider cultural and social influences on pain and pain management   Notify Licensed Independent Practitioner if interventions unsuccessful or patient reports new pain     Problem: Neurosensory - Adult  Goal: Achieves stable or improved neurological status  7/28/2024 1701 by Judit Khan RN  Outcome: 
  Problem: Discharge Planning  Goal: Discharge to home or other facility with appropriate resources  7/29/2024 0448 by Katie Roach RN  Outcome: Progressing  Flowsheets (Taken 7/29/2024 0000)  Discharge to home or other facility with appropriate resources:   Identify barriers to discharge with patient and caregiver   Arrange for needed discharge resources and transportation as appropriate   Refer to discharge planning if patient needs post-hospital services based on physician order or complex needs related to functional status, cognitive ability or social support system  7/28/2024 1701 by Judit Khan RN  Outcome: Progressing     Problem: Safety - Adult  Goal: Free from fall injury  7/29/2024 0448 by Katie Roach RN  Outcome: Progressing  7/28/2024 1701 by Judit Khan RN  Outcome: Progressing     Problem: Pain  Goal: Verbalizes/displays adequate comfort level or baseline comfort level  7/29/2024 0448 by Katie Roach RN  Outcome: Progressing  7/28/2024 1701 by Judit Khan RN  Outcome: Progressing     Problem: Neurosensory - Adult  Goal: Achieves stable or improved neurological status  7/29/2024 0448 by Katie Roach RN  Outcome: Progressing  7/28/2024 1701 by Judit Khan RN  Outcome: Progressing     Problem: Infection - Adult  Goal: Absence of infection at discharge  7/29/2024 0448 by Katie Roach RN  Outcome: Progressing  Flowsheets (Taken 7/29/2024 0000)  Absence of infection at discharge:   Assess and monitor for signs and symptoms of infection   Monitor lab/diagnostic results   Monitor all insertion sites i.e., indwelling lines, tubes and drains  7/28/2024 1701 by Judit Khan RN  Outcome: Progressing     Problem: Cardiovascular - Adult  Goal: Maintains optimal cardiac output and hemodynamic stability  7/29/2024 0448 by Katie Roach RN  Outcome: Progressing  7/28/2024 1701 by Judit Khan RN  Outcome: Progressing  Goal: Absence of cardiac dysrhythmias or at 
  Problem: Discharge Planning  Goal: Discharge to home or other facility with appropriate resources  7/29/2024 0951 by Jessi Finch RN  Outcome: Progressing  7/29/2024 0448 by Katie Roach RN  Outcome: Progressing  Flowsheets (Taken 7/29/2024 0000)  Discharge to home or other facility with appropriate resources:   Identify barriers to discharge with patient and caregiver   Arrange for needed discharge resources and transportation as appropriate   Refer to discharge planning if patient needs post-hospital services based on physician order or complex needs related to functional status, cognitive ability or social support system     Problem: Safety - Adult  Goal: Free from fall injury  7/29/2024 0951 by Jessi Finch RN  Outcome: Progressing  7/29/2024 0448 by Katie Roach RN  Outcome: Progressing     Problem: Pain  Goal: Verbalizes/displays adequate comfort level or baseline comfort level  7/29/2024 0951 by Jessi Finch RN  Outcome: Progressing  7/29/2024 0448 by Katie Roach RN  Outcome: Progressing     Problem: Neurosensory - Adult  Goal: Achieves stable or improved neurological status  7/29/2024 0951 by Jessi Finch RN  Outcome: Progressing  7/29/2024 0448 by Katie Roach RN  Outcome: Progressing     Problem: Infection - Adult  Goal: Absence of infection at discharge  7/29/2024 0448 by Katie Roach RN  Outcome: Progressing  Flowsheets (Taken 7/29/2024 0000)  Absence of infection at discharge:   Assess and monitor for signs and symptoms of infection   Monitor lab/diagnostic results   Monitor all insertion sites i.e., indwelling lines, tubes and drains     Problem: Cardiovascular - Adult  Goal: Maintains optimal cardiac output and hemodynamic stability  7/29/2024 0448 by Katie Roach RN  Outcome: Progressing  Goal: Absence of cardiac dysrhythmias or at baseline  7/29/2024 0448 by Katie Roach RN  Outcome: Progressing     Problem: Skin/Tissue Integrity - 
  Problem: Discharge Planning  Goal: Discharge to home or other facility with appropriate resources  8/1/2024 0240 by Raquel Hope RN  Outcome: Progressing     Problem: Safety - Adult  Goal: Free from fall injury  8/1/2024 0240 by Raquel Hope RN  Outcome: Progressing  Note: Safety maintained this shift     Problem: Pain  Goal: Verbalizes/displays adequate comfort level or baseline comfort level  8/1/2024 0240 by Raquel Hope RN  Outcome: Adequate for Discharge     Problem: Neurosensory - Adult  Goal: Achieves stable or improved neurological status  8/1/2024 0240 by Raquel Hope RN  Outcome: Progressing     Problem: Infection - Adult  Goal: Absence of infection at discharge  8/1/2024 0240 by Raquel Hope RN  Outcome: Progressing  Flowsheets (Taken 8/1/2024 0240)  Absence of infection at discharge:   Monitor lab/diagnostic results   Assess and monitor for signs and symptoms of infection   Monitor all insertion sites i.e., indwelling lines, tubes and drains   Administer medications as ordered  Note: Pt with low grade temp, will continue to monitor, bactrim continues as ordered     Problem: Cardiovascular - Adult  Goal: Maintains optimal cardiac output and hemodynamic stability  8/1/2024 0240 by Raquel Hope RN  Outcome: Progressing  Flowsheets (Taken 8/1/2024 0240)  Maintains optimal cardiac output and hemodynamic stability:   Monitor blood pressure and heart rate   Monitor urine output and notify Licensed Independent Practitioner for values outside of normal range   Assess for signs of decreased cardiac output  Note: BP stable, cardizem 30mg given every 6 hrs, HR controlled     Problem: Cardiovascular - Adult  Goal: Absence of cardiac dysrhythmias or at baseline  8/1/2024 0240 by Raquel Hope RN  Outcome: Progressing  Flowsheets (Taken 8/1/2024 0240)  Absence of cardiac dysrhythmias or at baseline:   Monitor cardiac rate and rhythm   Assess for signs of decreased cardiac output  Note: Afib 
  Problem: Discharge Planning  Goal: Discharge to home or other facility with appropriate resources  8/1/2024 1511 by Seema Gentile RN  Outcome: Progressing  Flowsheets (Taken 8/1/2024 0800)  Discharge to home or other facility with appropriate resources:   Identify barriers to discharge with patient and caregiver   Arrange for needed discharge resources and transportation as appropriate   Identify discharge learning needs (meds, wound care, etc)     Problem: Safety - Adult  Goal: Free from fall injury  8/1/2024 1511 by Seema Gentile RN  Outcome: Progressing     Problem: Pain  Goal: Verbalizes/displays adequate comfort level or baseline comfort level  8/1/2024 1511 by Seema Gentile RN  Outcome: Progressing     Problem: Neurosensory - Adult  Goal: Achieves stable or improved neurological status  8/1/2024 1511 by Seema Gentile RN  Outcome: Progressing     Problem: Infection - Adult  Goal: Absence of infection at discharge  8/1/2024 1511 by Seema Gentile RN  Outcome: Progressing  Flowsheets (Taken 8/1/2024 0800)  Absence of infection at discharge:   Assess and monitor for signs and symptoms of infection   Monitor lab/diagnostic results   Monitor all insertion sites i.e., indwelling lines, tubes and drains   Administer medications as ordered     Problem: Cardiovascular - Adult  Goal: Maintains optimal cardiac output and hemodynamic stability  8/1/2024 1511 by Seema Gentile RN  Outcome: Progressing     Problem: Cardiovascular - Adult  Goal: Absence of cardiac dysrhythmias or at baseline  8/1/2024 1511 by Seema Gentile RN  Outcome: Progressing     Problem: Skin/Tissue Integrity - Adult  Goal: Skin integrity remains intact  8/1/2024 1511 by Seema Gentile RN  Outcome: Progressing     Problem: Skin/Tissue Integrity - Adult  Goal: Oral mucous membranes remain intact  8/1/2024 1511 by Seema Gentile RN  Outcome: Progressing     Problem: Gastrointestinal - Adult  Goal: Minimal or absence of 
  Problem: Discharge Planning  Goal: Discharge to home or other facility with appropriate resources  Outcome: Not Progressing     Problem: Safety - Adult  Goal: Free from fall injury  Outcome: Progressing     Problem: Pain  Goal: Verbalizes/displays adequate comfort level or baseline comfort level  Outcome: Progressing     Problem: Neurosensory - Adult  Goal: Achieves stable or improved neurological status  Outcome: Progressing     Problem: Infection - Adult  Goal: Absence of infection at discharge  Outcome: Progressing  Flowsheets (Taken 7/30/2024 0800)  Absence of infection at discharge: Assess and monitor for signs and symptoms of infection     Problem: Cardiovascular - Adult  Goal: Maintains optimal cardiac output and hemodynamic stability  Outcome: Progressing  Goal: Absence of cardiac dysrhythmias or at baseline  Outcome: Progressing     Problem: Skin/Tissue Integrity - Adult  Goal: Skin integrity remains intact  Outcome: Progressing  Goal: Oral mucous membranes remain intact  Outcome: Progressing     Problem: Gastrointestinal - Adult  Goal: Minimal or absence of nausea and vomiting  Outcome: Progressing  Goal: Maintains or returns to baseline bowel function  Outcome: Progressing  Goal: Maintains adequate nutritional intake  Outcome: Progressing     Problem: Genitourinary - Adult  Goal: Absence of urinary retention  Outcome: Progressing     Problem: Musculoskeletal - Adult  Goal: Return mobility to safest level of function  Outcome: Progressing     Problem: Anxiety  Goal: Will report anxiety at manageable levels  Description: INTERVENTIONS:  1. Administer medication as ordered  2. Teach and rehearse alternative coping skills  3. Provide emotional support with 1:1 interaction with staff  Outcome: Progressing     Problem: Coping  Goal: Pt/Family able to verbalize concerns and demonstrate effective coping strategies  Description: INTERVENTIONS:  1. Assist patient/family to identify coping skills, available 
  Problem: Discharge Planning  Goal: Discharge to home or other facility with appropriate resources  Outcome: Progressing     Problem: Neurosensory - Adult  Goal: Achieves stable or improved neurological status  Outcome: Progressing     Problem: Infection - Adult  Goal: Absence of infection at discharge  Outcome: Progressing     
  Problem: Discharge Planning  Goal: Discharge to home or other facility with appropriate resources  Outcome: Progressing     Problem: Safety - Adult  Goal: Free from fall injury  Outcome: Progressing     Problem: Pain  Goal: Verbalizes/displays adequate comfort level or baseline comfort level  Outcome: Progressing     Problem: Neurosensory - Adult  Goal: Achieves stable or improved neurological status  Outcome: Progressing     Problem: Infection - Adult  Goal: Absence of infection at discharge  Outcome: Progressing     Problem: Cardiovascular - Adult  Goal: Maintains optimal cardiac output and hemodynamic stability  Outcome: Progressing     Problem: Skin/Tissue Integrity - Adult  Goal: Skin integrity remains intact  Outcome: Progressing  Skin Integrity Remains Intact:   Monitor for areas of redness and/or skin breakdown   Assess vascular access sites hourly   Every 4-6 hours minimum: Change oxygen saturation probe site   Every 4-6 hours: If on nasal continuous positive airway pressure, respiratory therapy assesses nares and determine need for appliance change or resting period     Problem: Gastrointestinal - Adult  Goal: Minimal or absence of nausea and vomiting  Outcome: Progressing     Problem: Gastrointestinal - Adult  Goal: Maintains adequate nutritional intake  Outcome: Progressing     Problem: Genitourinary - Adult  Goal: Absence of urinary retention  Outcome: Progressing     Problem: Musculoskeletal - Adult  Goal: Return mobility to safest level of function  Outcome: Progressing     Problem: Coping  Goal: Pt/Family able to verbalize concerns and demonstrate effective coping strategies  Outcome: Progressing  Patient/family able to verbalize anxieties, fears, and concerns, and demonstrate effective coping:   Assist patient/family to identify coping skills, available support systems and cultural and spiritual values   Provide emotional support, including active listening and acknowledgement of concerns of 
  Problem: Discharge Planning  Goal: Discharge to home or other facility with appropriate resources  Outcome: Progressing     Problem: Safety - Adult  Goal: Free from fall injury  Outcome: Progressing     Problem: Pain  Goal: Verbalizes/displays adequate comfort level or baseline comfort level  Outcome: Progressing     Problem: Neurosensory - Adult  Goal: Achieves stable or improved neurological status  Outcome: Progressing  Flowsheets (Taken 7/22/2024 1600)  Achieves stable or improved neurological status:   Assess for and report changes in neurological status   Initiate measures to prevent increased intracranial pressure   Monitor temperature, glucose, and sodium. Initiate appropriate interventions as ordered     Problem: Infection - Adult  Goal: Absence of infection at discharge  Outcome: Progressing     
  Problem: Discharge Planning  Goal: Discharge to home or other facility with appropriate resources  Outcome: Progressing     Problem: Safety - Adult  Goal: Free from fall injury  Outcome: Progressing     Problem: Pain  Goal: Verbalizes/displays adequate comfort level or baseline comfort level  Outcome: Progressing     Problem: Pain  Goal: Verbalizes/displays adequate comfort level or baseline comfort level  Outcome: Progressing     Problem: Neurosensory - Adult  Goal: Achieves stable or improved neurological status  Outcome: Progressing     Problem: Infection - Adult  Goal: Absence of infection at discharge  Outcome: Progressing     Problem: Cardiovascular - Adult  Goal: Maintains optimal cardiac output and hemodynamic stability  Outcome: Progressing     Problem: Cardiovascular - Adult  Goal: Absence of cardiac dysrhythmias or at baseline  Outcome: Progressing     Problem: Skin/Tissue Integrity - Adult  Goal: Skin integrity remains intact  Outcome: Progressing     Problem: Skin/Tissue Integrity - Adult  Goal: Oral mucous membranes remain intact  Outcome: Progressing     Problem: Gastrointestinal - Adult  Goal: Minimal or absence of nausea and vomiting  Outcome: Progressing     Problem: Gastrointestinal - Adult  Goal: Maintains or returns to baseline bowel function  Outcome: Progressing     Problem: Genitourinary - Adult  Goal: Absence of urinary retention  Outcome: Progressing     Problem: Gastrointestinal - Adult  Goal: Maintains adequate nutritional intake  Outcome: Progressing     Problem: Anxiety  Goal: Will report anxiety at manageable levels  Description: INTERVENTIONS:  1. Administer medication as ordered  2. Teach and rehearse alternative coping skills  3. Provide emotional support with 1:1 interaction with staff  Outcome: Progressing   Precedex stopped this afternoon, pt remaining calm  Problem: Genitourinary - Adult  Goal: Absence of urinary retention  Outcome: Progressing     Problem: ABCDS Injury 
  Problem: Discharge Planning  Goal: Discharge to home or other facility with appropriate resources  Outcome: Progressing  Flowsheets (Taken 7/27/2024 2000)  Discharge to home or other facility with appropriate resources:   Identify barriers to discharge with patient and caregiver   Arrange for needed discharge resources and transportation as appropriate   Identify discharge learning needs (meds, wound care, etc)   Arrange for interpreters to assist at discharge as needed   Refer to discharge planning if patient needs post-hospital services based on physician order or complex needs related to functional status, cognitive ability or social support system     Problem: Safety - Adult  Goal: Free from fall injury  Outcome: Progressing  Flowsheets (Taken 7/27/2024 2000)  Free From Fall Injury: Instruct family/caregiver on patient safety     Problem: Pain  Goal: Verbalizes/displays adequate comfort level or baseline comfort level  Outcome: Progressing  Flowsheets (Taken 7/27/2024 2000)  Verbalizes/displays adequate comfort level or baseline comfort level:   Encourage patient to monitor pain and request assistance   Assess pain using appropriate pain scale   Administer analgesics based on type and severity of pain and evaluate response   Implement non-pharmacological measures as appropriate and evaluate response   Consider cultural and social influences on pain and pain management   Notify Licensed Independent Practitioner if interventions unsuccessful or patient reports new pain     Problem: Neurosensory - Adult  Goal: Achieves stable or improved neurological status  Outcome: Progressing  Flowsheets (Taken 7/27/2024 2000)  Achieves stable or improved neurological status: Assess for and report changes in neurological status     Problem: Infection - Adult  Goal: Absence of infection at discharge  Outcome: Progressing  Flowsheets (Taken 7/27/2024 2000)  Absence of infection at discharge:   Assess and monitor for signs and 
  Problem: Discharge Planning  Goal: Discharge to home or other facility with appropriate resources  Outcome: Progressing  Flowsheets (Taken 7/29/2024 2000)  Discharge to home or other facility with appropriate resources: Identify barriers to discharge with patient and caregiver     Problem: Safety - Adult  Goal: Free from fall injury  Outcome: Progressing     Problem: Pain  Goal: Verbalizes/displays adequate comfort level or baseline comfort level  Outcome: Progressing     Problem: Neurosensory - Adult  Goal: Achieves stable or improved neurological status  Outcome: Progressing  Flowsheets (Taken 7/29/2024 2000)  Achieves stable or improved neurological status: Assess for and report changes in neurological status     Problem: Infection - Adult  Goal: Absence of infection at discharge  Outcome: Progressing  Flowsheets (Taken 7/29/2024 2000)  Absence of infection at discharge: Assess and monitor for signs and symptoms of infection     Problem: Cardiovascular - Adult  Goal: Maintains optimal cardiac output and hemodynamic stability  Outcome: Progressing  Flowsheets (Taken 7/29/2024 2000)  Maintains optimal cardiac output and hemodynamic stability: Monitor blood pressure and heart rate     Problem: Cardiovascular - Adult  Goal: Absence of cardiac dysrhythmias or at baseline  Outcome: Progressing  Flowsheets (Taken 7/29/2024 2000)  Absence of cardiac dysrhythmias or at baseline: Monitor cardiac rate and rhythm     Problem: Skin/Tissue Integrity - Adult  Goal: Skin integrity remains intact  Outcome: Progressing  Flowsheets (Taken 7/29/2024 2000)  Skin Integrity Remains Intact: Monitor for areas of redness and/or skin breakdown     Problem: Skin/Tissue Integrity - Adult  Goal: Oral mucous membranes remain intact  Outcome: Progressing  Flowsheets (Taken 7/29/2024 2000)  Oral Mucous Membranes Remain Intact: Assess oral mucosa and hygiene practices     Problem: Gastrointestinal - Adult  Goal: Minimal or absence of nausea and 
  Problem: Discharge Planning  Goal: Discharge to home or other facility with appropriate resources  Outcome: Progressing  Flowsheets (Taken 8/3/2024 1917)  Discharge to home or other facility with appropriate resources:   Arrange for needed discharge resources and transportation as appropriate   Identify discharge learning needs (meds, wound care, etc)     Problem: Safety - Adult  Goal: Free from fall injury  8/3/2024 1917 by Isabel Coats RN  Outcome: Progressing  Flowsheets (Taken 8/3/2024 1917)  Free From Fall Injury: Instruct family/caregiver on patient safety     Problem: Pain  Goal: Verbalizes/displays adequate comfort level or baseline comfort level  8/3/2024 1917 by Isabel Coats RN  Outcome: Progressing  Flowsheets (Taken 8/3/2024 1917)  Verbalizes/displays adequate comfort level or baseline comfort level:   Encourage patient to monitor pain and request assistance   Assess pain using appropriate pain scale   Administer analgesics based on type and severity of pain and evaluate response     Problem: Neurosensory - Adult  Goal: Achieves stable or improved neurological status  8/3/2024 1917 by Isabel Coats RN  Outcome: Progressing  Flowsheets (Taken 8/3/2024 1917)  Achieves stable or improved neurological status: Assess for and report changes in neurological status     Problem: Infection - Adult  Goal: Absence of infection at discharge  Outcome: Progressing  Flowsheets (Taken 8/3/2024 1917)  Absence of infection at discharge:   Assess and monitor for signs and symptoms of infection   Monitor lab/diagnostic results     Problem: Cardiovascular - Adult  Goal: Maintains optimal cardiac output and hemodynamic stability  Outcome: Progressing  Flowsheets (Taken 8/3/2024 1917)  Maintains optimal cardiac output and hemodynamic stability: Monitor blood pressure and heart rate     Problem: Cardiovascular - Adult  Goal: Absence of cardiac dysrhythmias or at baseline  8/3/2024 1917 by Isabel Coats 
  Problem: Pain  Goal: Verbalizes/displays adequate comfort level or baseline comfort level  Outcome: Progressing     Problem: Neurosensory - Adult  Goal: Achieves stable or improved neurological status  Outcome: Progressing     Problem: Cardiovascular - Adult  Goal: Absence of cardiac dysrhythmias or at baseline  Outcome: Progressing     Problem: Safety - Adult  Goal: Free from fall injury  Outcome: Progressing     Problem: Genitourinary - Adult  Goal: Absence of urinary retention  Outcome: Progressing     Problem: Anxiety  Goal: Will report anxiety at manageable levels  Outcome: Progressing     
Care plans initiated.  
Patient discharged.  
Problem: Discharge Planning  Goal: Discharge to home or other facility with appropriate resources  7/31/2024 0036 by Seema Hinkle RN  Outcome: Progressing  Problem: Safety - Adult  Goal: Free from fall injury  7/31/2024 0036 by Seema Hinkle, RN  Outcome: Progressing   No falls/injuries this shift, bed in lowest position, brakes on, bed alarm on, call light in reach, side rails up x2  Problem: Pain  Goal: Verbalizes/displays adequate comfort level or baseline comfort level  7/31/2024 0036 by Seema Hinkle, RN  Outcome: Progressing   No new signs/symptoms of pain noted, pain rating 0 on scale 0-10, pain controlled with repositioning  Problem: Infection - Adult  Goal: Absence of infection at discharge  7/31/2024 0036 by Seema Hinkle RN  Outcome: Progressing  No new skin breakdown noted, no new signs/symptoms of infection, continue to monitor labwork including WBC, medications administered per physician orders     
intact  Outcome: Progressing  Flowsheets (Taken 7/25/2024 0946)  Oral Mucous Membranes Remain Intact:   Assess oral mucosa and hygiene practices   Implement preventative oral hygiene regimen     Problem: Gastrointestinal - Adult  Goal: Minimal or absence of nausea and vomiting  Outcome: Progressing  Flowsheets (Taken 7/25/2024 0800)  Minimal or absence of nausea and vomiting:   Administer IV fluids as ordered to ensure adequate hydration   Provide nonpharmacologic comfort measures as appropriate   Advance diet as tolerated, if ordered   Nutrition consult to assist patient with adequate nutrition and appropriate food choices     Problem: Gastrointestinal - Adult  Goal: Maintains or returns to baseline bowel function  Outcome: Progressing  Flowsheets (Taken 7/25/2024 0800)  Maintains or returns to baseline bowel function:   Assess bowel function   Encourage oral fluids to ensure adequate hydration   Administer IV fluids as ordered to ensure adequate hydration   Administer ordered medications as needed     Problem: Gastrointestinal - Adult  Goal: Maintains adequate nutritional intake  Outcome: Progressing  Flowsheets (Taken 7/25/2024 0800)  Maintains adequate nutritional intake:   Monitor percentage of each meal consumed   Identify factors contributing to decreased intake, treat as appropriate   Assist with meals as needed     Problem: Genitourinary - Adult  Goal: Absence of urinary retention  Outcome: Progressing     Problem: Musculoskeletal - Adult  Goal: Return mobility to safest level of function  Outcome: Progressing     Problem: Anxiety  Goal: Will report anxiety at manageable levels  Outcome: Progressing     Problem: Coping  Goal: Pt/Family able to verbalize concerns and demonstrate effective coping strategies  Outcome: Progressing     Problem: Skin/Tissue Integrity  Goal: Absence of new skin breakdown  Outcome: Progressing     Problem: ABCDS Injury Assessment  Goal: Absence of physical injury  Outcome: 
or at baseline  8/2/2024 1511 by Candi Dale RN  Outcome: Progressing  8/2/2024 0242 by Raquel Hope RN  Outcome: Progressing     Problem: Skin/Tissue Integrity - Adult  Goal: Skin integrity remains intact  8/2/2024 1511 by Candi Dale RN  Outcome: Progressing  8/2/2024 0242 by Raquel Hope RN  Outcome: Progressing  Goal: Oral mucous membranes remain intact  8/2/2024 1511 by Candi Dale RN  Outcome: Progressing  8/2/2024 0242 by Raquel Hope RN  Outcome: Progressing     Problem: Gastrointestinal - Adult  Goal: Minimal or absence of nausea and vomiting  8/2/2024 1511 by Candi Dale RN  Outcome: Progressing  8/2/2024 0242 by Raquel Hope RN  Outcome: Progressing  Goal: Maintains or returns to baseline bowel function  8/2/2024 1511 by Candi Dale RN  Outcome: Progressing  8/2/2024 0242 by Raquel Hope RN  Outcome: Progressing  Flowsheets (Taken 8/2/2024 0242)  Maintains or returns to baseline bowel function:   Assess bowel function   Encourage oral fluids to ensure adequate hydration  Note: Np BM this shift  Goal: Maintains adequate nutritional intake  8/2/2024 1511 by Candi Dale RN  Outcome: Progressing  8/2/2024 0242 by Raquel Hope RN  Outcome: Progressing     Problem: Genitourinary - Adult  Goal: Absence of urinary retention  8/2/2024 1511 by Candi Dale RN  Outcome: Progressing  8/2/2024 0242 by Raquel Hope RN  Outcome: Progressing  Flowsheets (Taken 8/2/2024 0242)  Absence of urinary retention: Monitor intake/output and perform bladder scan as needed  Note: Adequate urine output per external cath     Problem: Anxiety  Goal: Will report anxiety at manageable levels  Description: INTERVENTIONS:  1. Administer medication as ordered  2. Teach and rehearse alternative coping skills  3. Provide emotional support with 1:1 interaction with staff  8/2/2024 1511 by Candi Dale RN  Outcome: Progressing  8/2/2024 0242 by Shontz, Raquel L, RN  Outcome: Adequate for 
initiate Spiritual Care, Psychosocial Clinical Specialist consults as needed  8/2/2024 0242 by Raquel Hope, RN  Outcome: Adequate for Discharge     Problem: Skin/Tissue Integrity  Goal: Absence of new skin breakdown  Description: 1.  Monitor for areas of redness and/or skin breakdown  2.  Assess vascular access sites hourly  3.  Every 4-6 hours minimum:  Change oxygen saturation probe site  4.  Every 4-6 hours:  If on nasal continuous positive airway pressure, respiratory therapy assess nares and determine need for appliance change or resting period.  8/2/2024 0242 by Raquel Hope, RN  Outcome: Progressing     
treatments as ordered     Problem: Gastrointestinal - Adult  Goal: Minimal or absence of nausea and vomiting  7/26/2024 1654 by Judit Khan RN  Outcome: Progressing  7/26/2024 0431 by Gregg Jorge  Outcome: Progressing  Flowsheets (Taken 7/26/2024 0431)  Minimal or absence of nausea and vomiting:   Administer IV fluids as ordered to ensure adequate hydration   Provide nonpharmacologic comfort measures as appropriate   Advance diet as tolerated, if ordered  Goal: Maintains or returns to baseline bowel function  7/26/2024 1654 by Judit Khan RN  Outcome: Progressing  7/26/2024 0431 by Gregg Jorge  Outcome: Progressing  Flowsheets (Taken 7/26/2024 0431)  Maintains or returns to baseline bowel function:   Assess bowel function   Administer IV fluids as ordered to ensure adequate hydration   Administer ordered medications as needed   Encourage oral fluids to ensure adequate hydration   Encourage mobilization and activity  Goal: Maintains adequate nutritional intake  7/26/2024 1654 by Judit Khan RN  Outcome: Progressing  7/26/2024 0431 by Gregg Jorge  Outcome: Progressing  Flowsheets (Taken 7/26/2024 0431)  Maintains adequate nutritional intake:   Monitor percentage of each meal consumed   Identify factors contributing to decreased intake, treat as appropriate   Assist with meals as needed   Monitor intake and output, weight and lab values     Problem: Genitourinary - Adult  Goal: Absence of urinary retention  7/26/2024 1654 by Judit Khan RN  Outcome: Progressing  7/26/2024 0431 by Gregg Jorge  Outcome: Progressing  Flowsheets (Taken 7/26/2024 0431)  Absence of urinary retention:   Assess patient’s ability to void and empty bladder   Monitor intake/output and perform bladder scan as needed     Problem: Anxiety  Goal: Will report anxiety at manageable levels  Description: INTERVENTIONS:  1. Administer medication as ordered  2. Teach and rehearse alternative coping skills  3. Provide

## 2024-08-05 NOTE — DISCHARGE INSTRUCTIONS
Trach collar when awake, and as tolerated  BiPAP when sleeping and as needed  Continue Coumadin, daily INR's.  Goal 3-3.5.  Continue Lovenox till goal of 3 achieved  Cardiac diabetic diet  Blood sugar checks 3 times daily before meals, nightly, follow sliding scale  Avoid constipation  5 more days of steroids, then stop  Monitor trach secretions for blood, small amount of bleeding is acceptable however if has gross bleeding/worsening please send back to ER

## 2024-08-05 NOTE — CARE COORDINATION
At this time, Patient meets LTAC criteria.    Electronically signed by Margi Garcia RN on 7/24/2024 at 11:22 AM   
Cindy with [facility] will start auth today with [employer]. Electronically signed by Kamini Horan RN on 7/26/2024 at 4:37 PM   
DISCHARGE PLANNING NOTE:    LSW is following for discharge back to SNF. Authorization submitted to return on 7/26, however patient is able to admit back under medicaid when medically ready for discharge per Annika in admissions.   
ONGOING DISCHARGE PLAN:      Patient in surgery for trach.    Writer called to let Cindy with [facility] know that patient is getting trach and will likely need VENT.    Will continue to follow for additional discharge needs.    If patient is discharged prior to next notation, then this note serves as note for discharge by case management.    Electronically signed by Kamini Horan RN on 7/26/2024 at 2:49 PM   
ONGOING DISCHARGE PLAN:    Patient is alert and oriented x4.    Spoke with patient regarding discharge plan and patient confirms that plan is still [facility]. She states she is \"not mentally ready to discharge\". Dr Calles and residents updated.    POD #6 Trach  Nocturnal VENT  Trach collar 35%    7/31 barium swallow-reg carb control diet, nectar thick liquids  SLP    INR 1.7    Will continue to follow for additional discharge needs.    If patient is discharged prior to next notation, then this note serves as note for discharge by case management.    Electronically signed by Kamini Horan RN on 8/1/2024 at 11:20 AM   
ONGOING DISCHARGE PLAN:    Patient is alert and oriented x4.    Spoke with patient regarding discharge plan and patient confirms that plan is still [facility].      Bipap dependent 45% FiO2    Surgery consult-Trach     IV cefepime    IV steroids 40 Q 8    IV lasix 40 twice daily    INR 3.5    Palliative Care     Will continue to follow for additional discharge needs.    If patient is discharged prior to next notation, then this note serves as note for discharge by case management.    Electronically signed by Kamini Horan RN on 7/24/2024 at 2:36 PM   
ONGOING DISCHARGE PLAN:    Patient is alert and oriented x4.    Spoke with patient regarding discharge plan and patient confirms that plan is still [facility].      Bipap dependent 45% FiO2    Surgery consult-Trach   Plan for surgery 7/26    IV cefepime    IV steroids 40 Q 8    IV lasix 40 twice daily    INR 3.4  1 unit FFP given  Vitamin K x1    Palliative Care     Will continue to follow for additional discharge needs.    If patient is discharged prior to next notation, then this note serves as note for discharge by case management.    Electronically signed by Kamini Horan RN on 7/25/2024 at 3:31 PM   
Transportation arranged for patient to go to [facility] at [facility] via [facility]. Facility informed. Bedside nurse informed.     Number for Report: [phone]    
Writer arranged for the pt to be transported to [facility] at [facility] by [facility]  
Writer left a message [facility] [employer] asking for an update on pts pre cert  
Writer received a vmail from Jessi from [facility] [phone]. Still dont don is sttill pending. She said the pt has a medicaid and she can return under medicaid  
Writer will meet LTAC Criteria once patient gets her Trach and Peg.      Electronically signed by Margi Garcia RN on 7/25/2024 at 12:35 PM   
([facility]) [J96.21, J96.22]    IF APPLICABLE: The Patient and/or patient representative Shazia and her family were provided with a choice of provider and agrees with the discharge plan. Freedom of choice list with basic dialogue that supports the patient's individualized plan of care/goals and shares the quality data associated with the providers was provided to:     Patient Representative Name:       The Patient and/or Patient Representative Agree with the Discharge Plan?      Tara Burrell RN  Case Management Department  Ph:  Fax:

## 2024-08-05 NOTE — DISCHARGE INSTR - COC
Continuity of Care Form    Patient Name: Shazia NUÑEZ   :  1955  MRN:  [id number]    Admit date:  2024  Discharge date:  2024    Code Status Order: Full Code   Advance Directives:   Advance Care Flowsheet Documentation       Date/Time Healthcare Directive Type of Healthcare Directive Copy in Chart Healthcare Agent Appointed Healthcare Agent's Name Healthcare Agent's Phone Number    [phone] Yes, patient has an advance directive for healthcare treatment Durable power of  for health care;Living will Yes, copy in chart -- -- --            Admitting Physician:  Moises Henson MD  PCP: Jyoti Mauricio, APRN - CNP    Discharging Nurse: MARY Guzmán RN  Discharging Hospital Unit/Room#:   Discharging Unit Phone Number: [phone]    Emergency Contact:   Extended Emergency Contact Information  Primary Emergency Contact: Álvaro Nuñez   [facility]  Home Phone: [phone]  Mobile Phone: [phone]  Relation: Brother/Sister  Secondary Emergency Contact: Lea Bryan  Address: [address]  Home Phone: [phone]  Mobile Phone: [phone]  Relation: Other    Past Surgical History:  Past Surgical History:   Procedure Laterality Date    BREAST REDUCTION SURGERY  1997    BREAST REDUCTION SURGERY  1997    DILATION AND CURETTAGE OF UTERUS  10/08 and     HYSTERECTOMY (CERVIX STATUS UNKNOWN)  7/17/15    Dr Kaitlynn Jaime, endometial cancer, FIGO grade 1    TONSILLECTOMY AND ADENOIDECTOMY      TRACHEOSTOMY N/A 2024    TRACHEOSTOMY INSERTION performed by Fernando Lentz MD at [facility] OR    TYMPANOSTOMY TUBE PLACEMENT  1967       Immunization History:   Immunization History   Administered Date(s) Administered    COVID-19, MODERNA Bivalent, (age 12y+), IM, 50 mcg/0.5 mL 2023    COVID-19, MODERNA Booster BLUE border, (age 18y+), IM, 50mcg/0.25mL 2022, 10/07/2022    FLUARIX 3 YEARS AND OVER

## 2024-08-05 NOTE — PROGRESS NOTES
[facility] -   Infectious diseases evaluation  admission date 7/22/2024    reason for consultation:   MDRO UTI    Impression :   Current:  MDRO ESBL Carbapenem resistant Klebsiella Pneumoniae UTI  Acute/chronic respiratory failure s/p tracheostomy tube placement 7/26/24  Allergies to multiple antibiotics includes amoxicillin and penicillin, tolerates/cefepime.  Diabetes mellitus  MRSA Carrier.  Morbid Obesity      HENCE:     Bactrim per NG tube through August /4/24  Plan for swallow study   Follow CBC and renal function.  No growth on blood cultures from 7/22/2024  Normal hermann growth on respiratory culture from 7/26/2024    Infection Control Recommendations   [place] Precautions  Contact Isolation       Antimicrobial Stewardship Recommendations   Simplification of therapy  Targeted therapy      History of Present Illness:   Initial history:  Shazia ROGERS is a 68 y.o.-year-old female with a history of chronic hypoxemic respiratory failure, on 2 L nasal cannula, chronic hypercapnic respiratory failure with pCO2 in the 80s to mid 90s, predominantly secondary to obesity hypoventilation syndrome, SHWETA, noncompliant with CPAP, asthma, pulmonary embolism/chronic DVT with goal INR of 3-3 0.5, atrial fibrillation, hypertension, morbid obesity, hyperlipidemia, depression, who presented to the emergency department on 7/22/24 for acute on chronic hypoxemic respiratory failure and somnolence. Patient underwent tracheostomy tube placement on 7/26/24.  Urine cx from 7/22/24 showing ESBL Klebsiella Pneumoniae 10-50 K.  Asked to see in consult for assistance with antibiotic choice.      Interval changes  7/31/2024   He is afebrile, awake, communicable, denied significant pain, no new events.  NG tube in place        Micro:  7/22 BC x 2-Negative to date.  7/22/24 Urine cx: MDRO ESBL Klebsiella Pneumoniae 10-50 K.  Resistant to imipenem, S-Tigecycline, Tetracycline, sensitive to 
          [facility] -   Infectious diseases evaluation  admission date 7/22/2024    reason for consultation:   MDRO UTI    Impression :   Current:  MDRO ESBL Carbapenem resistant Klebsiella Pneumoniae UTI  Acute/chronic respiratory failure s/p tracheostomy tube placement 7/26/24  Allergies to multiple antibiotics includes amoxicillin and penicillin, tolerates/cefepime.  Diabetes mellitus  MRSA Carrier.  Morbid Obesity      HENCE:      Bactrim was discontinued due to worsening renal function  Monitor off antibiotics   Monitor renal function closely  No growth on blood cultures from 7/22/2024  Normal hermann growth on respiratory culture from 7/26/2024      Infection Control Recommendations   [place] Precautions  Contact Isolation       Antimicrobial Stewardship Recommendations   Simplification of therapy  Targeted therapy      History of Present Illness:   Initial history:  Shazia ROGERS is a 68 y.o.-year-old female with a history of chronic hypoxemic respiratory failure, on 2 L nasal cannula, chronic hypercapnic respiratory failure with pCO2 in the 80s to mid 90s, predominantly secondary to obesity hypoventilation syndrome, SHWETA, noncompliant with CPAP, asthma, pulmonary embolism/chronic DVT with goal INR of 3-3 0.5, atrial fibrillation, hypertension, morbid obesity, hyperlipidemia, depression, who presented to the emergency department on 7/22/24 for acute on chronic hypoxemic respiratory failure and somnolence. Patient underwent tracheostomy tube placement on 7/26/24.  Urine cx from 7/22/24 showing ESBL Klebsiella Pneumoniae 10-50 K.  Asked to see in consult for assistance with antibiotic choice.      Interval changes  8/2/2024   She was seen and examined earlier today, afebrile, abdominal wall wound reopened with mild amount of bleeding, also had hemoptysis, Lovenox was held.  She denied fever or chills, no urinary symptoms.  Bactrim was discontinued due to worsening renal 
          [facility] -   Infectious diseases evaluation  admission date 7/22/2024    reason for consultation:   MDRO UTI    Impression :   Current:  MDRO ESBL Carbapenem resistant Klebsiella Pneumoniae UTI  Acute/chronic respiratory failure s/p tracheostomy tube placement 7/26/24  Allergies to multiple antibiotics includes amoxicillin and penicillin, tolerates/cefepime.  Diabetes mellitus  MRSA Carrier.  Morbid Obesity      HENCE:     Bactrim per NG tube through August /4/24  Follow CBC and renal function.  No growth on blood cultures from 7/22/2024  Normal hermann growth on respiratory culture from 7/26/2024    Infection Control Recommendations   [place] Precautions  Contact Isolation       Antimicrobial Stewardship Recommendations   Simplification of therapy  Targeted therapy      History of Present Illness:   Initial history:  Shazia ROGERS is a 68 y.o.-year-old female with a history of chronic hypoxemic respiratory failure, on 2 L nasal cannula, chronic hypercapnic respiratory failure with pCO2 in the 80s to mid 90s, predominantly secondary to obesity hypoventilation syndrome, SHWETA, noncompliant with CPAP, asthma, pulmonary embolism/chronic DVT with goal INR of 3-3 0.5, atrial fibrillation, hypertension, morbid obesity, hyperlipidemia, depression, who presented to the emergency department on 7/22/24 for acute on chronic hypoxemic respiratory failure and somnolence. Patient underwent tracheostomy tube placement on 7/26/24.  Urine cx from 7/22/24 showing ESBL Klebsiella Pneumoniae 10-50 K.  Asked to see in consult for assistance with antibiotic choice.      Interval changes  8/1/2024   She is afebrile, still has cough, multiple suctioning, passed swallow study, tolerating oral, no new events        Micro:  7/22 BC x 2-Negative to date.  7/22/24 Urine cx: MDRO ESBL Klebsiella Pneumoniae 10-50 K.  Resistant to imipenem, S-Tigecycline, Tetracycline, sensitive to Bactrim  7/22 
          [facility] -   Infectious diseases evaluation  admission date 7/22/2024    reason for consultation:   MDRO UTI    Impression :   Current:  MDRO ESBL Carbapenem resistant Klebsiella Pneumoniae UTI  Acute/chronic respiratory failure s/p tracheostomy tube placement 7/26/24  Allergies to multiple antibiotics includes amoxicillin and penicillin, tolerates/cefepime.  Diabetes mellitus  MRSA Carrier.  Morbid Obesity      HENCE:   Discontinue Tygacil   Bactrim per NG tube through August /4/24  Follow CBC and renal function.  No growth on blood cultures from 7/22/2024  Normal hermann growth on respiratory culture from 7/26/2024    Infection Control Recommendations   [place] Precautions  Contact Isolation       Antimicrobial Stewardship Recommendations   Simplification of therapy  Targeted therapy      History of Present Illness:   Initial history:  Shazia ROGERS is a 68 y.o.-year-old female with a history of chronic hypoxemic respiratory failure, on 2 L nasal cannula, chronic hypercapnic respiratory failure with pCO2 in the 80s to mid 90s, predominantly secondary to obesity hypoventilation syndrome, SHWETA, noncompliant with CPAP, asthma, pulmonary embolism/chronic DVT with goal INR of 3-3 0.5, atrial fibrillation, hypertension, morbid obesity, hyperlipidemia, depression, who presented to the emergency department on 7/22/24 for acute on chronic hypoxemic respiratory failure and somnolence. Patient underwent tracheostomy tube placement on 7/26/24.  Urine cx from 7/22/24 showing ESBL Klebsiella Pneumoniae 10-50 K.  Asked to see in consult for assistance with antibiotic choice.      Interval changes  7/29/2024   She is afebrile, awake, communicates through writing, denied significant pain, external urinary catheter in place with clear urine with some discomfort,  NG Rt nostril, tolerating tube feeding  PIV x2 Rt arm - sites clean  External urinary catheter with clear urine  No 
          [facility] -   Infectious diseases evaluation  admission date 7/22/2024    reason for consultation:   MDRO UTI    Impression :   Current:  MDRO ESBL Carbapenem resistant Klebsiella Pneumoniae UTI  Acute/chronic respiratory failure s/p tracheostomy tube placement 7/26/24  Allergies to multiple antibiotics includes amoxicillin and penicillin, tolerates/cefepime.  Diabetes mellitus  MRSA Carrier.  Morbid Obesity      HENCE:     Bactrim per NG tube through August /4/24  Follow CBC and renal function.  No growth on blood cultures from 7/22/2024  Normal hermann growth on respiratory culture from 7/26/2024    Infection Control Recommendations   [place] Precautions  Contact Isolation       Antimicrobial Stewardship Recommendations   Simplification of therapy  Targeted therapy      History of Present Illness:   Initial history:  Shazia ROGERS is a 68 y.o.-year-old female with a history of chronic hypoxemic respiratory failure, on 2 L nasal cannula, chronic hypercapnic respiratory failure with pCO2 in the 80s to mid 90s, predominantly secondary to obesity hypoventilation syndrome, SHWETA, noncompliant with CPAP, asthma, pulmonary embolism/chronic DVT with goal INR of 3-3 0.5, atrial fibrillation, hypertension, morbid obesity, hyperlipidemia, depression, who presented to the emergency department on 7/22/24 for acute on chronic hypoxemic respiratory failure and somnolence. Patient underwent tracheostomy tube placement on 7/26/24.  Urine cx from 7/22/24 showing ESBL Klebsiella Pneumoniae 10-50 K.  Asked to see in consult for assistance with antibiotic choice.      Interval changes  7/30/2024   She is afebrile, denied significant pain, denied increased cough, no nausea or vomiting, no abdominal pain, no new events  External urinary catheter with clear urine  No leukocytosis  7.26 CXR -Left basilar atelectasis.       Summary of relevant labs:      Micro:  7/22 BC x 2-Negative to 
          [facility]    [address] [facility] [address]    Phone:  [phone]       Thank You for choosing us for your health care visit. We are glad to serve you and happy to provide you with this summary of your visit. Please help us to ensure we have accurate records. If you find anything that needs to be changed, please let our staff know as soon as possible.          Your Demographic Information     Patient Name Sex Elver Moyer Male 1953       Ethnic Group Patient Race    Not of  or  Origin White      Your Visit Details     Date & Time Provider Department    2017 12:10 PM Adeline Guerrero MD [facility]      Your To Do List     Future Orders Please Complete On or Around Expires    BASIC METABOLIC PANEL      CBC & AUTO DIFFERENTIAL      FOLATE LEVEL      GLYCOHEMOGLOBIN      LIPID PANEL WITH REFLEX      THYROID STIMULATING HORMONE      VITAMIN B12 LEVEL      Follow-Up    Return in about 2 weeks (around 2017) for blood pressure follow up with MD and lab results 40 minutes.      Conditions Discussed Today or Order-Related Diagnoses        Comments    Prediabetes    -  Primary     Abnormal glucose         Dyslipidemia         Essential hypertension         Formication           Your Vitals Were     BP Pulse Temp Height    142/91 (BP Location: [facility], Patient Position: Sitting, Cuff Size: Regular) 64 97.4 °F (36.3 °C) (Temporal Artery) 5' 7\" (1.702 m)    Weight SpO2 BMI Smoking Status    184 lb 9.6 oz (83.7 kg) 98% 28.91 kg/m2 Former Smoker      Medications Prescribed or Re-Ordered Today     None      Your Current Medications Are        Disp Refills Start End    Cholecalciferol (VITAMIN D3) 1000 UNITS capsule        Sig - 
    [facility]   IN-PATIENT SERVICE   [facility]      Progress note            Date:   8/2/2024  Patient name:  Shazia ROGERS  Date of admission:  7/22/2024 11:25 AM  MRN:   [id number]  Account:  [id number]  YOB: 1955  PCP:    Jyoti Mauricio APRN - CNP  Room:   2002/2002-01  Code Status:    Full Code    Chief Complaint:     Chief Complaint   Patient presents with    Shortness of Breath    Altered Mental Status       History Obtained From:     patient    History of Present Illness:     The patient is a 68 y.o.  Declined female who presents with Shortness of Breath and Altered Mental Status   and she is admitted to the hospital for the management of worsening shortness of breath.    68-year-old female with history of chronic hypoxemic respiratory failure, on 2 L nasal cannula, chronic hypercapnic respiratory failure with pCO2 in the 80s to mid 90s, predominantly secondary to obesity hypoventilation syndrome, SHWETA, noncompliant with CPAP, asthma, pulmonary embolism/chronic DVT with goal INR of 3-3 0.5, atrial fibrillation, hypertension, morbid obesity, hyperlipidemia, depression, was brought to the emergency room for acute on chronic hypoxemic respiratory failure and somnolence.    When seen the patient opens eyes, denies having any chest pain, denies fevers chills, is on the BiPAP.  Unable to really tell me if she has noticed worsening lower extremity edema, if she has a cough and is bringing up sputum.  She denies any fevers chills, denies any genitourinary or GI concerns    The patient was recently discharged from the hospital on 7/13/2024 after admission for similar concerns, had presented with altered mental status and shortness of breath.  Was treated for SHWETA/OHS, was placed on the BiPAP, received diuretics given her HFpEF,.  She completed a course of antibiotics with cephalexin.  Was bridged with Lovenox for goal INR of 3-3 0.5 given history of atrial fibrillation and 
    Department of Internal Medicine  Nephrology Andrey Whitehead MD  Progress Note    Reason for consultation: Management of acute kidney injury.    Consulting physician: Moises Henson MD.    Interval history: Patient was seen and examined today in the intensive care unit and she continues to have bloody tracheal secretions.  Lovenox has been placed on hold.  Platelet count is within normal.  She is nonoliguric with diuretics remaining on hold.  Laboratory studies today were reviewed.    History of present illness: This is a 68 y.o. female with a significant past medical history of  systemic hypertension, venous thromboembolism [right femoral vein], chronic atrial fibrillation [on Warfarin], mixed hyperlipidemia, type 2 diabetes mellitus and COPD [on chronic BiPAP], who [facility] the hospital [facility] on July 22, 2024 for further management of shortness of breath secondary to pulmonary edema and acute exacerbation of COPD.  She was placed on diuretics and underwent tracheostomy on July 26, 2024 due to poor weaning prognosis and chronic hypercapnia.  Serum creatinine remained stable at baseline 0.8 mg/dL until 7/31/2024.  However, yesterday [8/1/2024] serum creatinine increased to 1.1 mg/dL and is up to 1.3 mg/dL today and hence nephrology consultation.  She has been alternating between ventilator support and trach collar.  IV Lasix was discontinued this morning due to rising serum creatinine.  She is awake and alert and responsive to verbal stimuli.    She is concerned about new onset bleeding from tracheostomy.    Scheduled Meds:   docusate sodium  100 mg Oral BID    insulin glargine  15 Units SubCUTAneous Nightly    predniSONE  20 mg Oral Daily    [Held by provider] sulfamethoxazole-trimethoprim  160 mg Per NG tube Q12H    [Held by provider] enoxaparin  1 mg/kg SubCUTAneous BID    warfarin placeholder: dosing by pharmacy   Other RX Placeholder    carvedilol  6.25 mg Per NG tube BID     
    [facility]   IN-PATIENT SERVICE   [facility]    HISTORY AND PHYSICAL EXAMINATION            Date:   7/25/2024  Patient name:  Shazia ROGERS  Date of admission:  7/22/2024 11:25 AM  MRN:   [id number]  Account:  [id number]  YOB: 1955  PCP:    Jyoti Mauricio APRN - CNP  Room:   2002/2002-01  Code Status:    Full Code    Chief Complaint:     Chief Complaint   Patient presents with    Shortness of Breath    Altered Mental Status       History Obtained From:     patient    History of Present Illness:     The patient is a 68 y.o.  Declined female who presents with Shortness of Breath and Altered Mental Status   and she is admitted to the hospital for the management of worsening shortness of breath.    68-year-old female with history of chronic hypoxemic respiratory failure, on 2 L nasal cannula, chronic hypercapnic respiratory failure with pCO2 in the 80s to mid 90s, predominantly secondary to obesity hypoventilation syndrome, SHWETA, noncompliant with CPAP, asthma, pulmonary embolism/chronic DVT with goal INR of 3-3 0.5, atrial fibrillation, hypertension, morbid obesity, hyperlipidemia, depression, was brought to the emergency room for acute on chronic hypoxemic respiratory failure and somnolence.    When seen the patient opens eyes, denies having any chest pain, denies fevers chills, is on the BiPAP.  Unable to really tell me if she has noticed worsening lower extremity edema, if she has a cough and is bringing up sputum.  She denies any fevers chills, denies any genitourinary or GI concerns    The patient was recently discharged from the hospital on 7/13/2024 after admission for similar concerns, had presented with altered mental status and shortness of breath.  Was treated for SHWETA/OHS, was placed on the BiPAP, received diuretics given her HFpEF,.  She completed a course of antibiotics with cephalexin.  Was bridged with Lovenox for goal INR of 3-3 0.5 given history of atrial 
    [facility]   IN-PATIENT SERVICE   [facility]    HISTORY AND PHYSICAL EXAMINATION            Date:   7/24/2024  Patient name:  Shazia ROGERS  Date of admission:  7/22/2024 11:25 AM  MRN:   [id number]  Account:  [id number]  YOB: 1955  PCP:    Jyoti Mauricio APRN - CNP  Room:   2002/2002-01  Code Status:    DNR-CCA    Chief Complaint:     Chief Complaint   Patient presents with    Shortness of Breath    Altered Mental Status       History Obtained From:     patient    History of Present Illness:     The patient is a 68 y.o.  Declined female who presents with Shortness of Breath and Altered Mental Status   and she is admitted to the hospital for the management of worsening shortness of breath.    68-year-old female with history of chronic hypoxemic respiratory failure, on 2 L nasal cannula, chronic hypercapnic respiratory failure with pCO2 in the 80s to mid 90s, predominantly secondary to obesity hypoventilation syndrome, SHWETA, noncompliant with CPAP, asthma, pulmonary embolism/chronic DVT with goal INR of 3-3 0.5, atrial fibrillation, hypertension, morbid obesity, hyperlipidemia, depression, was brought to the emergency room for acute on chronic hypoxemic respiratory failure and somnolence.    When seen the patient opens eyes, denies having any chest pain, denies fevers chills, is on the BiPAP.  Unable to really tell me if she has noticed worsening lower extremity edema, if she has a cough and is bringing up sputum.  She denies any fevers chills, denies any genitourinary or GI concerns    The patient was recently discharged from the hospital on 7/13/2024 after admission for similar concerns, had presented with altered mental status and shortness of breath.  Was treated for SHWETA/OHS, was placed on the BiPAP, received diuretics given her HFpEF,.  She completed a course of antibiotics with cephalexin.  Was bridged with Lovenox for goal INR of 3-3 0.5 given history of atrial 
    [facility]   IN-PATIENT SERVICE   [facility]      Progress note            Date:   8/1/2024  Patient name:  Shazia ROGERS  Date of admission:  7/22/2024 11:25 AM  MRN:   [id number]  Account:  [id number]  YOB: 1955  PCP:    Jyoti Mauricio APRN - CNP  Room:   2002/2002-01  Code Status:    Full Code    Chief Complaint:     Chief Complaint   Patient presents with    Shortness of Breath    Altered Mental Status       History Obtained From:     patient    History of Present Illness:     The patient is a 68 y.o.  Declined female who presents with Shortness of Breath and Altered Mental Status   and she is admitted to the hospital for the management of worsening shortness of breath.    68-year-old female with history of chronic hypoxemic respiratory failure, on 2 L nasal cannula, chronic hypercapnic respiratory failure with pCO2 in the 80s to mid 90s, predominantly secondary to obesity hypoventilation syndrome, SHWETA, noncompliant with CPAP, asthma, pulmonary embolism/chronic DVT with goal INR of 3-3 0.5, atrial fibrillation, hypertension, morbid obesity, hyperlipidemia, depression, was brought to the emergency room for acute on chronic hypoxemic respiratory failure and somnolence.    When seen the patient opens eyes, denies having any chest pain, denies fevers chills, is on the BiPAP.  Unable to really tell me if she has noticed worsening lower extremity edema, if she has a cough and is bringing up sputum.  She denies any fevers chills, denies any genitourinary or GI concerns    The patient was recently discharged from the hospital on 7/13/2024 after admission for similar concerns, had presented with altered mental status and shortness of breath.  Was treated for SHWETA/OHS, was placed on the BiPAP, received diuretics given her HFpEF,.  She completed a course of antibiotics with cephalexin.  Was bridged with Lovenox for goal INR of 3-3 0.5 given history of atrial fibrillation and 
    [facility]   IN-PATIENT SERVICE   [facility]    HISTORY AND PHYSICAL EXAMINATION            Date:   7/23/2024  Patient name:  Shazia ROGERS  Date of admission:  7/22/2024 11:25 AM  MRN:   [id number]  Account:  [id number]  YOB: 1955  PCP:    Jyoti Mauricio APRN - CNP  Room:   2002/2002-01  Code Status:    Full Code    Chief Complaint:     Chief Complaint   Patient presents with    Shortness of Breath    Altered Mental Status       History Obtained From:     patient    History of Present Illness:     The patient is a 68 y.o.  Declined female who presents with Shortness of Breath and Altered Mental Status   and she is admitted to the hospital for the management of worsening shortness of breath.    68-year-old female with history of chronic hypoxemic respiratory failure, on 2 L nasal cannula, chronic hypercapnic respiratory failure with pCO2 in the 80s to mid 90s, predominantly secondary to obesity hypoventilation syndrome, SHWETA, noncompliant with CPAP, asthma, pulmonary embolism/chronic DVT with goal INR of 3-3 0.5, atrial fibrillation, hypertension, morbid obesity, hyperlipidemia, depression, was brought to the emergency room for acute on chronic hypoxemic respiratory failure and somnolence.    When seen the patient opens eyes, denies having any chest pain, denies fevers chills, is on the BiPAP.  Unable to really tell me if she has noticed worsening lower extremity edema, if she has a cough and is bringing up sputum.  She denies any fevers chills, denies any genitourinary or GI concerns    The patient was recently discharged from the hospital on 7/13/2024 after admission for similar concerns, had presented with altered mental status and shortness of breath.  Was treated for SHWETA/OHS, was placed on the BiPAP, received diuretics given her HFpEF,.  She completed a course of antibiotics with cephalexin.  Was bridged with Lovenox for goal INR of 3-3 0.5 given history of atrial 
    [facility]   IN-PATIENT SERVICE   [facility]      Progress note            Date:   7/27/2024  Patient name:  Shazia ROGERS  Date of admission:  7/22/2024 11:25 AM  MRN:   [id number]  Account:  [id number]  YOB: 1955  PCP:    Jyoti Mauricio APRN - CNP  Room:   2002/2002-01  Code Status:    Full Code    Chief Complaint:     Chief Complaint   Patient presents with    Shortness of Breath    Altered Mental Status       History Obtained From:     patient    History of Present Illness:     The patient is a 68 y.o.  Declined female who presents with Shortness of Breath and Altered Mental Status   and she is admitted to the hospital for the management of worsening shortness of breath.    68-year-old female with history of chronic hypoxemic respiratory failure, on 2 L nasal cannula, chronic hypercapnic respiratory failure with pCO2 in the 80s to mid 90s, predominantly secondary to obesity hypoventilation syndrome, SHWETA, noncompliant with CPAP, asthma, pulmonary embolism/chronic DVT with goal INR of 3-3 0.5, atrial fibrillation, hypertension, morbid obesity, hyperlipidemia, depression, was brought to the emergency room for acute on chronic hypoxemic respiratory failure and somnolence.    When seen the patient opens eyes, denies having any chest pain, denies fevers chills, is on the BiPAP.  Unable to really tell me if she has noticed worsening lower extremity edema, if she has a cough and is bringing up sputum.  She denies any fevers chills, denies any genitourinary or GI concerns    The patient was recently discharged from the hospital on 7/13/2024 after admission for similar concerns, had presented with altered mental status and shortness of breath.  Was treated for SHWETA/OHS, was placed on the BiPAP, received diuretics given her HFpEF,.  She completed a course of antibiotics with cephalexin.  Was bridged with Lovenox for goal INR of 3-3 0.5 given history of atrial fibrillation and 
   07/23/24 1628   Encounter Summary   Encounter Overview/Reason Spiritual/Emotional Needs   Service Provided For Patient   Referral/Consult From Nurse   Support System Family members   Last Encounter  07/23/24   Complexity of Encounter Moderate   Spiritual/Emotional needs   Type Spiritual Support   Assessment/Intervention/Outcome   Assessment Calm;Coping   Intervention Active listening;Prayer (assurance of)/[place];Sustaining Presence/Ministry of presence   Outcome Acceptance;Comfort;Engaged in conversation;Receptive       
   07/24/24 1444   Encounter Summary   Encounter Overview/Reason Spiritual/Emotional Needs   Service Provided For Patient   Referral/Consult From Rounding   Support System Parent   Complexity of Encounter Low   Spiritual/Emotional needs   Type Spiritual Support   Assessment/Intervention/Outcome   Assessment Unable to assess   Intervention Prayer (assurance of)/[place]       
   07/26/24 1241   Encounter Summary   Encounter Overview/Reason Spiritual/Emotional Needs   Service Provided For Patient   Referral/Consult From Nurse   Support System Family members   Last Encounter  07/26/24   Begin Time 0820   End Time  0850   Total Time Calculated 30 min   Spiritual/Emotional needs   Type Spiritual Support;Emotional Distress   Assessment/Intervention/Outcome   Assessment Anxious;Compromised coping;Concerns with suffering;Decisional conflict;Questioning bill;Questioning meaning and purpose;Stress overload   Intervention Active listening;Confronted/Challenged;Discussed belief system/Gnosticist practices/bill;Discussed death, afterlife;Discussed illness injury and it’s impact;Discussed meaning/purpose;Discussed relationship with God;Empowerment;Explored/Affirmed feelings, thoughts, concerns;Prayer (assurance of)/[place];Sustaining Presence/Ministry of presence   Outcome Comfort;Engaged in conversation;Expressed feelings, needs, and concerns;Expressed Gratitude;Receptive       
   07/27/24 1427   Encounter Summary   Encounter Overview/Reason Spiritual/Emotional Needs   Service Provided For Patient   Referral/Consult From Palliative Care   Support System Family members   Last Encounter  07/27/24   Complexity of Encounter Low   Spiritual/Emotional needs   Type Spiritual Support   Palliative Care   Type Palliative Care, Follow-up   Assessment/Intervention/Outcome   Assessment Unable to assess  (Sleeping)   Intervention Prayer (assurance of)/[place]       
   07/29/24 1026   Encounter Summary   Encounter Overview/Reason Palliative Care   Service Provided For Patient   Referral/Consult From Palliative Care   Support System Family members   Last Encounter  07/29/24   Complexity of Encounter Low   Begin Time 1015   End Time  1035   Total Time Calculated 20 min   Palliative Care   Type Palliative Care, Follow-up   Assessment/Intervention/Outcome   Assessment Calm;Coping   Intervention Active listening;Discussed illness injury and it’s impact;Empowerment;Explored/Affirmed feelings, thoughts, concerns;Explored Coping Skills/Resources;Nurtured Hope;Read/Provided Scripture;Sustaining Presence/Ministry of presence   Outcome Acceptance;Engaged in conversation;Expressed Gratitude;Receptive       
   07/31/24 1144   Encounter Summary   Encounter Overview/Reason Spiritual/Emotional Needs   Service Provided For Patient   Referral/Consult From Palliative Care   Last Encounter  07/31/24   Complexity of Encounter Moderate   Spiritual/Emotional needs   Type Spiritual Support   Palliative Care   Type Palliative Care, Follow-up   Assessment/Intervention/Outcome   Assessment Impaired resilience  (Frustrated)   Intervention Active listening;Prayer (assurance of)/[place];Sustaining Presence/Ministry of presence   Outcome Engaged in conversation;Expressed feelings, needs, and concerns;Expressed Gratitude       
   08/01/24 1620   Encounter Summary   Encounter Overview/Reason Spiritual/Emotional Needs   Service Provided For Patient   Referral/Consult From Palliative Care   Last Encounter  08/01/24   Complexity of Encounter Low   Spiritual/Emotional needs   Type Spiritual Support   Palliative Care   Type Palliative Care, Follow-up   Assessment/Intervention/Outcome   Assessment Unable to assess  (patient with nurse)   Intervention Prayer (assurance of)/[place]       
   08/03/24 1150   Encounter Summary   Encounter Overview/Reason Spiritual/Emotional Needs   Service Provided For Patient   Referral/Consult From Palliative Care   Last Encounter  08/03/24   Complexity of Encounter Low   Spiritual/Emotional needs   Type Spiritual Support   Palliative Care   Type Palliative Care, Follow-up   Assessment/Intervention/Outcome   Assessment Unable to assess   Intervention Prayer (assurance of)/[place]       
   08/04/24 1305   Encounter Summary   Encounter Overview/Reason Spiritual/Emotional Needs   Service Provided For Patient   Referral/Consult From Palliative Care   Last Encounter  08/04/24   Complexity of Encounter Low   Spiritual/Emotional needs   Type Spiritual Support   Palliative Care   Type Palliative Care, Follow-up   Assessment/Intervention/Outcome   Assessment Unable to assess  (patient sleeping)   Intervention Prayer (assurance of)/[place]       
   08/05/24 0941   Encounter Summary   Encounter Overview/Reason Spiritual/Emotional Needs   Service Provided For Patient   Referral/Consult From Palliative Care   Last Encounter  08/05/24   Complexity of Encounter Low   Spiritual/Emotional needs   Type Spiritual Support   Palliative Care   Type Palliative Care, Follow-up   Assessment/Intervention/Outcome   Assessment Unable to assess   Intervention Prayer (assurance of)/[place]       
  [facility]   Fernando Lentz MD, FACS  Nina Olea, APRN-CNP  [address], [address]  [place] [address]  P: [phone], F: [phone]    General and Robotic Surgery  Progress Note             PATIENT NAME: Shazia ROGERS   :  1955   MRN: [id number]   PCP:  Jyoti Mauricio, APRN - CNP     TODAY'S DATE: 2024    68 y.o. female seen and examined.  Doing well.  Resting comfortably.  Vital signs are stable.  O2 saturation 94%.  Respiratory input noted.    PAST MEDICAL HISTORY     Past Medical History:   Diagnosis Date    Anxiety     Asthma     Atrial fibrillation (HCC)     A-fib noted on 2024 visit to ER    Bronchitis     DDD (degenerative disc disease), lumbar     Depression     Diabetes mellitus (HCC)     Elevated glucose     Headache(784.0)     Hernia of abdominal cavity     HH (hiatus hernia)     Hyperlipemia, mixed     Hypertension     Osteoarthritis     Right femoral vein DVT (HCC) 2009    Dr. Elizabeth    Uterine hyperplasia        PROBLEM LIST     Patient Active Problem List   Diagnosis    Anxiety    Depression    Asthma    DDD (degenerative disc disease), lumbar    Seasonal allergies    Hyperlipemia, mixed    Anticoagulated on Coumadin    Family history of breast cancer    Endometrial cancer (HCC)    Normocytic anemia    S/P FABBY-BSO (total abdominal hysterectomy and bilateral salpingo-oophorectomy)    Essential hypertension    Blood loss anemia    BPPV (benign paroxysmal positional vertigo)    Stage 3a chronic kidney disease (HCC)    Chronic deep vein thrombosis (DVT) of femoral vein of right lower extremity (HCC)    Pain and swelling of right lower leg    BMI 60.0-69.9, adult (HCC)    Pulmonary embolism on right (HCC)    Acute on chronic respiratory failure with hypoxia and hypercapnia (HCC)    Diabetes mellitus type 2, noninsulin dependent (HCC)    Pulmonary HTN (HCC)    Urinary retention    History of DVT (deep vein thrombosis)    Bilateral 
  Physician Progress Note      PATIENT:               SHOBHA ROGERS  [facility] #:                  [id number]  :                       1955  ADMIT DATE:       2024 11:25 AM  DISCH DATE:  RESPONDING  PROVIDER #:        Moises Pham MD          QUERY TEXT:    Pt admitted with acute on chronic respiratory failure and has CHF with   preserved EF documented. If possible, please document in progress notes and   discharge summary further specificity regarding the acuity of CHF:    The medical record reflects the following:    Risk Factors: HTN, HLD  Clinical Indicators: Pro-.0, Echo 24 EF 55-60%, per IM PN Reviewed   chest x-ray, low Pro-Jakob, normal white count, I do not believe chest shadows   are due to pneumonia, I think it is more of pulmonary infiltrates due to CHF  Treatment: IV Lasix, Coreg, Pravastatin    Thank you!    Primo Alejandre, FLORENCION,RN, CRCR  RN Clinical   Options provided:  -- Acute on Chronic Diastolic CHF/HFpEF  -- Acute Diastolic CHF/HFpEF  -- Chronic Diastolic CHF/HFpEF  -- Other - I will add my own diagnosis  -- Disagree - Not applicable / Not valid  -- Disagree - Clinically unable to determine / Unknown  -- Refer to Clinical Documentation Reviewer    PROVIDER RESPONSE TEXT:    This patient is in acute on chronic diastolic CHF/HFpEF.    Query created by: Primo Alejandre on 2024 4:12 PM      Electronically signed by:  Moises Pham MD 2024 11:11 AM          
  [facility] PULMONARY,CRITICAL CARE & SLEEP   Kareltommie Solorzano MD/Jag HSU AGACHRISTYP-BC, NP-C       Manju HSU NP-C      Jeffry HSU NP-C                                  Pulmonary Critical Care Progress Note    Patient - Shazia ROGERS   Age - 68 y.o.   - 1955  MRN - [id number]  [id number]  Date of Admission - 2024 11:25 AM    Consulting Service/Physician:       Primary Care Physician: Jyoti Mauricio, APRN - CNP    SUBJECTIVE:     Chief Complaint:   Chief Complaint   Patient presents with    Shortness of Breath    Altered Mental Status   Acute on chronic hypercapnic respiratory failure  Subjective:    Tracheostomy placed yesterday uneventfully.  ABG following tracheostomy shows adequate ventilation and oxygenation.  Chest x-ray shows fairly clear lung fields with tracheostomy in place.    Attempts at CPAP and trach collar this morning resulted in hypercapnia so she is back on full ventilator support.  Still somewhat drowsy but arouses and answers questions.  She was asking to eat earlier today but is more drowsy now.  She has a nasogastric tube in place    VITALS  BP (!) 163/64   Pulse 83   Temp 98.5 °F (36.9 °C) (Axillary)   Resp 16   Ht 1.651 m (5' 5\")   Wt (!) 174.1 kg (383 lb 13.1 oz)   LMP 2014   SpO2 93%   BMI 63.87 kg/m²   Wt Readings from Last 3 Encounters:   24 (!) 174.1 kg (383 lb 13.1 oz)   24 (!) 187 kg (412 lb 4.2 oz)   24 (!) 183.9 kg (405 lb 6.8 oz)     I/O (24 Hours)    Intake/Output Summary (Last 24 hours) at 2024 1156  Last data filed at 2024 0900  Gross per 24 hour   Intake 1128.41 ml   Output 1500 ml   Net -371.59 ml     Ventilator:   Settings  FiO2 : 40 %  Insp Time (sec): 1 sec  Insp Rise Time (%): 0.15 %  Flow Sensitivity: 5 L/min  Exam:   Physical Exam   Constitutional: Morbidly obese, awake and alert in no distress  HENT: Unremarkable, NG tube in place  Neck: Neck 
  [facility] PULMONARY,CRITICAL CARE & SLEEP   Kareltommie Solorzano MD/Jag HSU AGACHRISTYP-BC, NP-C       Manju HSU NP-C      Jeffry HSU NP-C                                  Pulmonary Critical Care Progress Note    Patient - Shazia ROGERS   Age - 68 y.o.   - 1955  MRN - [id number]  [facility] # - [id number]  Date of Admission - 2024 11:25 AM    Consulting Service/Physician:       Primary Care Physician: Jyoti Mauricio, APRN - CNP    SUBJECTIVE:     Chief Complaint:   Chief Complaint   Patient presents with    Shortness of Breath    Altered Mental Status   Acute on chronic hypercapnic respiratory failure  Subjective:    Attempted trach collar today resulted in almost immediate hypercapnia and end-tidal CO2 so she was placed on CPAP with pressure support of 8.  Tidal volumes were inadequate with rising end-tidal CO2 so we increased to 12 for pressure support.  Tidal volumes were 600-800 so we reduce the pressure support to 10 which seems to be working fine to maintain tidal volumes around 500 with end-tidal CO2 in the 50s.  Patient is awake and asking appropriate questions.  She is not in any distress.  She appears comfortable.  She is anxious to move on to LTAC.    VITALS  BP (!) 159/55   Pulse 77   Temp 98 °F (36.7 °C) (Axillary)   Resp 12   Ht 1.651 m (5' 5\")   Wt (!) 174.1 kg (383 lb 13.1 oz)   LMP 2014   SpO2 94%   BMI 63.87 kg/m²   Wt Readings from Last 3 Encounters:   24 (!) 174.1 kg (383 lb 13.1 oz)   24 (!) 187 kg (412 lb 4.2 oz)   24 (!) 183.9 kg (405 lb 6.8 oz)     I/O (24 Hours)    Intake/Output Summary (Last 24 hours) at 2024 0941  Last data filed at 2024 0800  Gross per 24 hour   Intake 1412 ml   Output 1700 ml   Net -288 ml     Ventilator:   Settings  FiO2 : 40 %  Insp Time (sec): 1 sec  Insp Rise Time (%): 0.15 %  Flow Sensitivity: 5 L/min  Exam:   Physical Exam   Constitutional: Morbidly obese, 
  [facility] PULMONARY,CRITICAL CARE & SLEEP   Karelbhavik Solorzano MD/Jag Rosa APRBHAVIK AGACHRISTYP-BC, NP-C       Manju HSU NP-C      Jeffry HSU NP-C                                  Pulmonary Critical Care Progress Note    Patient - Shazia ROGERS   Age - 68 y.o.   - 1955  MR[facility] - [id number]  [id number]  Date of Admission - 2024 11:25 AM    Consulting Service/Physician:       Primary Care Physician: Jyoti Mauricio, APRN - CNP    SUBJECTIVE:     Chief Complaint:   Chief Complaint   Patient presents with    Shortness of Breath    Altered Mental Status   Acute on chronic hypercapnic respiratory failure  Subjective:    Patient seen in preop.  Still BiPAP dependent.  Coagulopathy corrected to an INR of 1.7.  Patient appears to be acceptable for proceeding with tracheostomy.  Discussed directly with anesthesia and general surgery.  Ventilator settings and orders are placed    VITALS  BP (!) 157/70   Pulse 75   Temp 97.7 °F (36.5 °C) (Infrared)   Resp 15   Ht 1.651 m (5' 5\")   Wt (!) 174.1 kg (383 lb 13.1 oz)   LMP 2014   SpO2 95%   BMI 63.87 kg/m²   Wt Readings from Last 3 Encounters:   24 (!) 174.1 kg (383 lb 13.1 oz)   24 (!) 187 kg (412 lb 4.2 oz)   24 (!) 183.9 kg (405 lb 6.8 oz)     I/O (24 Hours)    Intake/Output Summary (Last 24 hours) at 2024 1326  Last data filed at 2024 1233  Gross per 24 hour   Intake 1613.51 ml   Output 3900 ml   Net -2286.49 ml     Ventilator:   Settings  FiO2 : 45 %  Insp Rise Time (%): 2 %  Exam:   Physical Exam   Constitutional: Morbidly obese, awake and alert in no distress  HENT: Noninvasive ventilation mask in place  Neck: Neck supple.  Thick neck  Cardiovascular: Regular but  Pulmonary/Chest: Very diminished but clear  Abdominal: Obese, soft  Neurological: Alert and responsive in no distress on noninvasive ventilation  Extremities: Chronic peripheral edema  Infusions:      
..PALLIATIVE CARE TEAM    Patient: Shazia ROGERS  Room: 2002/2002-01    Reason For Consult   Goals of care evaluation  Distress management  Symptom Management  Guidance and support  Facilitate communications  Assistance in coordinating care  Recommendations for the above    Impression: Shazia ROGERS is a 68 y.o. year old female with  has a past medical history of Anxiety, Asthma, Atrial fibrillation (HCC), Bronchitis, DDD (degenerative disc disease), lumbar, Depression, Diabetes mellitus (HCC), Elevated glucose, Headache(784.0), Hernia of abdominal cavity, HH (hiatus hernia), Hyperlipemia, mixed, Hypertension, Osteoarthritis, Right femoral vein DVT (HCC), and Uterine hyperplasia..  Currently hospitalized for the management of \respiratory failure .  The Palliative Care Team is following to assist with goals of care and support.     Code Status  Full Code  PLAN:   - the patient is on full vent support after a CPAP trial as she fell asleep and her respirations dropped to 6   - she is fully alert and oriented and does communicate by writing   - I call her brother Álvaro RENETTA and update and he as well states that \" yes \" he did talk to her last Friday am   - I talk to Álvaro about what is to come and the risks that his sister is facing being bed bound, trach and vent dependent and he does understand   - Álvaro as well lets his sister know that he is only in place if she cannot make decisions for herself  - I explain LTACH to Álvaro as she may meet criteria and that would be before [facility] back to [facility]     Vital Signs:  BP (!) 134/55   Pulse 97   Temp 98 °F (36.7 °C) (Axillary)   Resp 17   Ht 1.651 m (5' 5\")   Wt (!) 174.1 kg (383 lb 13.1 oz)   LMP 07/16/2014   SpO2 97%   BMI 63.87 kg/m²     Patient Active Problem List   Diagnosis    Anxiety    Depression    Asthma    DDD (degenerative disc disease), lumbar    Seasonal allergies    Hyperlipemia, mixed    Anticoagulated on Coumadin    Family history of 
Adult Non-Invasive Positive Pressure Ventilation for Acute Respiratory Distress  Patient & Family Education Note     Patient: Shazia ROGERS  Age: 68 y.o.     The patient and/or family has been educated on the following items and have verbalized understanding and agreement:    [x]Patient and/or family have been educated on the purpose and advantages of NIV and have verbalized understanding and agreement.  [x]Patient and/or family is in agreement that the patient will be NPO (Nothing by Mouth) for the duration of NIV use.  [x]Patient and/or  family is in agreement that NIV will not be routinely disrupted except to complete oral care.  [x]Patient and/or family have been educated on the level of care, vital signs frequency and monitoring requirements for NIV and are in agreement.  [x]Patient and/or family have been educated on reporting any nausea, chest discomfort, sudden increase in shortness of breath, or a severe headache to the staff immediately.       
BRONCHOSPASM/BRONCHOCONSTRICTION     [x]         IMPROVE AERATION/BREATH SOUNDS  [x]   ADMINISTER BRONCHODILATOR THERAPY AS APPROPRIATE  [x]   ASSESS BREATH SOUNDS  []   IMPLEMENT AEROSOL/MDI PROTOCOL  [x]   PATIENT EDUCATION AS NEEDED      NONINVASIVE VENTILATION    PROVIDE OPTIMAL VENTILATION/ACCEPTABLE SPO2   IMPLEMENT NONINVASIVE VENTILATION PROTOCOL   MAINTAIN ACCEPTABLE SPO2   ASSESS SKIN INTEGRITY/BREAKDOWN SCORE   PATIENT EDUCATION AS NEEDED   BIPAP AS NEEDED        
BRONCHOSPASM/BRONCHOCONSTRICTION     [x]         IMPROVE AERATION/BREATH SOUNDS  [x]   ADMINISTER BRONCHODILATOR THERAPY AS APPROPRIATE  [x]   ASSESS BREATH SOUNDS  []   IMPLEMENT AEROSOL/MDI PROTOCOL  [x]   PATIENT EDUCATION AS NEEDED    NONINVASIVE VENTILATION    PROVIDE OPTIMAL VENTILATION/ACCEPTABLE SPO2   IMPLEMENT NONINVASIVE VENTILATION PROTOCOL   MAINTAIN ACCEPTABLE SPO2   ASSESS SKIN INTEGRITY/BREAKDOWN SCORE   PATIENT EDUCATION AS NEEDED   BIPAP AS NEEDED        
BRONCHOSPASM/BRONCHOCONSTRICTION     [x]         IMPROVE AERATION/BREATH SOUNDS  [x]   ADMINISTER BRONCHODILATOR THERAPY AS APPROPRIATE  [x]   ASSESS BREATH SOUNDS  []   IMPLEMENT AEROSOL/MDI PROTOCOL  [x]   PATIENT EDUCATION AS NEEDED  MECHANICAL VENTILATION     []  PROVIDE OPTIMAL VENTILATION  []   ASSESS FOR EXTUBATION READINESS  [x]   ASSESS FOR WEANING READINESS  []  EXTUBATE AS TOLERATED  [x]  IMPLEMENT ADULT MECHANICAL VENTILATION PROTOCOL  [x]  MAINTAIN ADEQUATE OXYGENATION  [x]  PERFORM SPONTANEOUS WEANING TRIAL AS TOLERATED    
BRONCHOSPASM/BRONCHOCONSTRICTION     [x]         IMPROVE AERATION/BREATH SOUNDS  [x]   ADMINISTER BRONCHODILATOR THERAPY AS APPROPRIATE  [x]   ASSESS BREATH SOUNDS  []   IMPLEMENT AEROSOL/MDI PROTOCOL  [x]   PATIENT EDUCATION AS NEEDED  MECHANICAL VENTILATION     [x]  PROVIDE OPTIMAL VENTILATION  []   ASSESS FOR EXTUBATION READINESS  [x]   ASSESS FOR WEANING READINESS  []  EXTUBATE AS TOLERATED  [x]  IMPLEMENT ADULT MECHANICAL VENTILATION PROTOCOL  [x]  MAINTAIN ADEQUATE OXYGENATION  [x]  PERFORM SPONTANEOUS WEANING TRIAL AS TOLERATED    
Bedside swallow attempted. Patient did take one sip of water, swirled it in her mouth for approximately 30 seconds. She then swallowed it without coughing or choking but refused more. She will not be able to take her PO medications. She also continues to pull out her IV lines and off her bipap. Dr. Henson notified. Waiting on response.   
BiPap removed and patient placed on nasal cannula for lunch. SpO2 maintained 80-83%. BiPap placed back on patient. Education provided.  
CXR completed. Dr Solorzano to look at CXR  
Call received from Dr Whitehead regarding new consult. Updates given on plan of care. Dr Whitehead to see patient at bedside.   
Code status clarified with Dr Gill. \"DNR CCA no intubation\" order in nursing communication and full code also ordered. Patient wishes to be a full code. Orders received to discontinue DNR CCA no intubation order and patient to remain a full code.   
Comprehensive Nutrition Assessment    Type and Reason for Visit:  Reassess    Nutrition Recommendations/Plan:   Will continue to provide 4 carbohydrate choices per tray with Nectar thick liquids     Malnutrition Assessment:  Malnutrition Status:  At risk for malnutrition (Comment) (07/27/24 1414)    Context:  Acute Illness     Findings of the 6 clinical characteristics of malnutrition:  Energy Intake:  50% or less of estimated energy requirements for 5 or more days  Weight Loss:  Unable to assess     Body Fat Loss:  Unable to assess     Muscle Mass Loss:  Unable to assess    Fluid Accumulation:  Moderate to Severe (Likely not related to nutritional status but may mask wt loss) Extremities, Generalized   Strength:  Not Performed    Nutrition Assessment:    Pt passed swallow eval for Regular diet with Nectar Thick Liquids. She is consuming adequate amounts (greater than 75%). Tube feed has been discontinued.    Nutrition Related Findings:    mild edema all extremities/generalized, Labs/Meds: Reviewed,  7/31 Wound Type: Surgical Incision, Multiple, Stage I       Current Nutrition Intake & Therapies:    Average Meal Intake: %     ADULT DIET; Regular; 4 carb choices (60 gm/meal); Mildly Thick (Nectar)    Anthropometric Measures:  Height: 165.1 cm (5' 5\")  Ideal Body Weight (IBW): 125 lbs (57 kg)    Admission Body Weight: 178.6 kg (393 lb 11.9 oz)  Current Body Weight: 174.1 kg (383 lb 13.1 oz), 307.1 % IBW.    Current BMI (kg/m2): 63.9  Usual Body Weight: 176.9 kg (390 lb) (390 lb (4/6/24); 390-412lb)  % Weight Change (Calculated): -1.6                    BMI Categories: Obese Class 3 (BMI 40.0 or greater)    Estimated Daily Nutrient Needs:  Energy Requirements Based On: Kcal/kg  Weight Used for Energy Requirements:  (wt 7/26)  Energy (kcal/day): 0943-6827 based on 8-9 kcal per kg (68-77% of estimated maintenance needs  Weight Used for Protein Requirements: Ideal  Protein (g/day): 143 based on 2.5 gm per kg   
Comprehensive Nutrition Assessment    Type and Reason for Visit:  Reassess    Nutrition Recommendations/Plan:   Will continue to provide Vital High Protein at 60 ml per hour to provide (1440 kcal, 126 g protein)     Malnutrition Assessment:  Malnutrition Status:  At risk for malnutrition (Comment) (07/27/24 1414)    Context:  Acute Illness     Findings of the 6 clinical characteristics of malnutrition:  Energy Intake:  50% or less of estimated energy requirements for 5 or more days  Weight Loss:  Unable to assess     Body Fat Loss:  Unable to assess     Muscle Mass Loss:  Unable to assess    Fluid Accumulation:  Moderate to Severe (Likely not related to nutritional status but may mask wt loss) Extremities, Generalized   Strength:  Not Performed    Nutrition Assessment:    Pt is tolerating tube feed at goal rate. Discussion of possible swallow evaluation noted.    Nutrition Related Findings:    mild edema all extremities and generalized, Glu 204, Meds:Solumedrol, BM 7/29 Wound Type: Surgical Incision, Multiple, Stage I       Current Nutrition Intake & Therapies:    Average Meal Intake: NPO     Diet NPO Exceptions are: Sips of Water with Meds  ADULT TUBE FEEDING; Nasogastric; Peptide Based High Protein; Continuous; 20; Yes; 20; Q 8 hours; 60; 250; Q 6 hours  Current Tube Feeding (TF) Orders:  Feeding Route: Nasogastric  Formula: Peptide Based High Protein  Schedule: Continuous  Feeding Regimen: Goal of 60 mL per hr  Water Flushes: 250 mL x 4 (per physician)  Goal TF & Flush Orders Provides: 1440 kcal, 126 gm protein, 2204 mL free fluid    Anthropometric Measures:  Height: 165.1 cm (5' 5\")  Ideal Body Weight (IBW): 125 lbs (57 kg)    Admission Body Weight: 178.6 kg (393 lb 11.9 oz)  Current Body Weight: 174.1 kg (383 lb 13.1 oz), 307.1 % IBW.    Current BMI (kg/m2): 63.9  Usual Body Weight: 176.9 kg (390 lb) (390 lb (4/6/24); 390-412lb)  % Weight Change (Calculated): -1.6                    BMI Categories: Obese 
Coumadin held per Dr. Lentz for possible trach placement. Will re-evaluate INR tomorrow.  
Dr Rashad rodriguez at bedside. Updates given on plan of care. New orders for STAT CXR. Radiology notified.   
Dr. Cha sent secure message to regarding new consult.   
Dr. Gill rounded at bedside. Updates given on plan of care and moderate blood tinged sputum. Lovenox order clarified, INR 2.0.     Writer to wait for Dr. Solorzano's recommendation when he rounds at bedside per Dr Gill  
Dr. Kasper notified of ABG's post Trach, order received to decrease RR to 12. RRT Matthew notified. Dr. Kasper gave okay to give PO medications though NGT.  
Dr. Lentz requesting 2u FFP, recheck 1 hr post transfusion, and pt to be NPO after midnight for trach placement tomorrow.  
Dr. Lentz stated no plan to restart anticoagulation today. Will re-evaluate tomorrow.   
Dr. Solorzano notified of barium swallow study results - recommending regular diet and mildly thick liquids. Orders placed. OK to DC tube feed and NGT.  
Dr. South rounddesean at bedside. Updates given on plan of care. See new orders.  
Entered room to placed pt on trach collar, pt sleeping, will attempt later.  
Epifanio Wood NP okay to give morning PO meds.   
[facility] will meet with the patient's family via a conference call for hospice information tomorrow 7-24 at 1:30pm.       ..[facility]  Meghan Green RN,CHPN   [facility]: [phone]  [facility]: [phone]  [facility]: [phone]   
I sent a perfect serve to dr tyler for a nephrology consult.  
ICU Progress Note (Vent)   Pulmonary and Critical Care Specialists    Patient - Shazia ROGERS,  Age - 68 y.o.    - 1955      Room Number -    N -  [id number]   [facility] # - [id number]  Date of Admission -  2024 11:25 AM    Events of Past 24 Hours   Patient appears to be in fair spirits.  Of concern is that there was some element of nonmassive hemoptysis meaning bloody type secretions coming out of the trach.  Chest x-ray done this afternoon revealed no new process    I did request the nurse to hold the Lovenox    Vitals    height is 1.651 m (5' 5\") and weight is 174.1 kg (383 lb 13.1 oz) (abnormal). Her oral temperature is 98.7 °F (37.1 °C). Her blood pressure is 130/61 and her pulse is 107 (abnormal). Her respiration is 20 and oxygen saturation is 96%.       Temperature Range: Temp: 98.7 °F (37.1 °C) Temp  Av.9 °F (36.6 °C)  Min: 97 °F (36.1 °C)  Max: 98.7 °F (37.1 °C)  BP Range:  Systolic (24hrs), Av , Min:119 , Max:152     Diastolic (24hrs), Av, Min:38, Max:84    Pulse Range: Pulse  Av.1  Min: 79  Max: 118  Respiration Range: Resp  Av.7  Min: 14  Max: 26  Current Pulse Ox::  SpO2: 96 %  24HR Pulse Ox Range:  SpO2  Av.8 %  Min: 87 %  Max: 99 %  Oxygen Amount and Delivery: O2 Flow Rate (L/min): 8 L/min      Wt Readings from Last 3 Encounters:   24 (!) 174.1 kg (383 lb 13.1 oz)   24 (!) 187 kg (412 lb 4.2 oz)   24 (!) 183.9 kg (405 lb 6.8 oz)     I/O     Intake/Output Summary (Last 24 hours) at 2024 1629  Last data filed at 2024 1601  Gross per 24 hour   Intake 200 ml   Output 2300 ml   Net -2100 ml     I/O last 3 completed shifts:  In: 640 [P.O.:640]  Out: 3400 [Urine:3400]     DRAIN/TUBE OUTPUT:     Invasive Lines   ETT Day -   chronic tracheostomy         Mechanical Ventilation Data   SETTINGS (Comprehensive)  Vent Information  Ventilator ID: [id number]  Equipment Changed: HME  Vent Mode: AC/PRVC  Additional Respiratory 
ICU Progress Note (Vent)   Pulmonary and Critical Care Specialists    Patient - Shazia ROGERS,  Age - 68 y.o.    - 1955      Room Number -    N -  [id number]   [facility] # - [id number]  Date of Admission -  2024 11:25 AM    Events of Past 24 Hours   Patient currently is on a CPAP trial at this time she is much more alert from yesterday.      Vitals    height is 1.651 m (5' 5\") and weight is 174.1 kg (383 lb 13.1 oz) (abnormal). Her axillary temperature is 98.6 °F (37 °C). Her blood pressure is 158/56 (abnormal) and her pulse is 95. Her respiration is 14 and oxygen saturation is 97%.       Temperature Range: Temp: 98.6 °F (37 °C) Temp  Av °F (37.2 °C)  Min: 98.6 °F (37 °C)  Max: 99.6 °F (37.6 °C)  BP Range:  Systolic (24hrs), Av , Min:112 , Max:160     Diastolic (24hrs), Av, Min:42, Max:108    Pulse Range: Pulse  Av.6  Min: 83  Max: 117  Respiration Range: Resp  Avg: 15.8  Min: 8  Max: 21  Current Pulse Ox::  SpO2: 97 %  24HR Pulse Ox Range:  SpO2  Av %  Min: 92 %  Max: 98 %  Oxygen Amount and Delivery: O2 Flow Rate (L/min): 6 L/min      Wt Readings from Last 3 Encounters:   24 (!) 174.1 kg (383 lb 13.1 oz)   24 (!) 187 kg (412 lb 4.2 oz)   24 (!) 183.9 kg (405 lb 6.8 oz)     I/O     Intake/Output Summary (Last 24 hours) at 2024 1258  Last data filed at 2024 0400  Gross per 24 hour   Intake 2090 ml   Output 2250 ml   Net -160 ml     I/O last 3 completed shifts:  In: 3561.9 [NG/GT:3480; IV Piggyback:81.9]  Out: 4350 [Urine:4350]     DRAIN/TUBE OUTPUT:     Invasive Lines   ETT Day -   patient has a tracheostomy done 2024    Mechanical Ventilation Data   SETTINGS (Comprehensive)  Vent Information  Ventilator ID: [id number]  Equipment Changed: HME  Vent Mode: CPAP/PS  Additional Respiratory Assessments  Pulse: 95  Respirations: 14  SpO2: 97 %  ETCO2 (mmHg): 52 mmHg  Humidification Source: HME  Circuit Condensation: Drained       ABGs: 
ICU Progress Note (Vent)   Pulmonary and Critical Care Specialists    Patient - Shazia ROGERS,  Age - 68 y.o.    - 1955      Room Number -    N -  [id number]   [facility] # - [id number]  Date of Admission -  2024 11:25 AM    Events of Past 24 Hours   Patient is resting quietly.  The amount of bloody secretions coming out of her tracheostomy has improved.    Vitals    height is 1.651 m (5' 5\") and weight is 174.1 kg (383 lb 13.1 oz) (abnormal). Her temperature is 98.4 °F (36.9 °C). Her blood pressure is 100/75 and her pulse is 102 (abnormal). Her respiration is 18 and oxygen saturation is 96%.       Temperature Range: Temp: 98.4 °F (36.9 °C) Temp  Av.3 °F (36.8 °C)  Min: 97.4 °F (36.3 °C)  Max: 99.1 °F (37.3 °C)  BP Range:  Systolic (24hrs), Av , Min:100 , Max:145     Diastolic (24hrs), Av, Min:50, Max:75    Pulse Range: Pulse  Av.3  Min: 72  Max: 112  Respiration Range: Resp  Av.8  Min: 13  Max: 27  Current Pulse Ox::  SpO2: 96 %  24HR Pulse Ox Range:  SpO2  Av.1 %  Min: 94 %  Max: 100 %  Oxygen Amount and Delivery: O2 Flow Rate (L/min): 8 L/min      Wt Readings from Last 3 Encounters:   24 (!) 174.1 kg (383 lb 13.1 oz)   24 (!) 187 kg (412 lb 4.2 oz)   24 (!) 183.9 kg (405 lb 6.8 oz)     I/O     Intake/Output Summary (Last 24 hours) at 8/3/2024 1515  Last data filed at 8/3/2024 1400  Gross per 24 hour   Intake --   Output 2596 ml   Net -2596 ml     I/O last 3 completed shifts:  In: 200 [P.O.:200]  Out: 3446 [Urine:3446]     DRAIN/TUBE OUTPUT:     Invasive Lines   ETT Day -   chronic tracheostomy.      Mechanical Ventilation Data   SETTINGS (Comprehensive)  Vent Information  Ventilator ID: [id number]  Equipment Changed: HME  Vent Mode: AC/PRVC  Additional Respiratory Assessments  Pulse: (!) 102  Respirations: 18  SpO2: 96 %  ETCO2 (mmHg): 24 mmHg  Humidification Source: HME  Circuit Condensation: Drained       ABGs:   Lab Results   Component 
ICU Progress Note (Vent)   Pulmonary and Critical Care Specialists    Patient - Shazia ROGERS,  Age - 68 y.o.    - 1955      Room Number -    N -  [id number]   [facility] # - [id number]  Date of Admission -  2024 11:25 AM    Events of Past 24 Hours   Patient appears to be in fair spirits.  She had problems off the vent earlier was placed back on vent support and did very well.  She is back on trach collar at this time.  The secretions that were bloody are much improved if nonexistent at this time.        Vitals    height is 1.651 m (5' 5\") and weight is 174.1 kg (383 lb 13.1 oz) (abnormal). Her oral temperature is 97.6 °F (36.4 °C). Her blood pressure is 119/54 (abnormal) and her pulse is 98. Her respiration is 20 and oxygen saturation is 94%.       Temperature Range: Temp: 97.6 °F (36.4 °C) Temp  Av.8 °F (36.6 °C)  Min: 97.3 °F (36.3 °C)  Max: 98.3 °F (36.8 °C)  BP Range:  Systolic (24hrs), Av , Min:98 , Max:143     Diastolic (24hrs), Av, Min:48, Max:108    Pulse Range: Pulse  Av.8  Min: 69  Max: 118  Respiration Range: Resp  Av.5  Min: 12  Max: 27  Current Pulse Ox::  SpO2: 94 %  24HR Pulse Ox Range:  SpO2  Av.5 %  Min: 92 %  Max: 100 %  Oxygen Amount and Delivery: O2 Flow Rate (L/min): 8 L/min      Wt Readings from Last 3 Encounters:   24 (!) 174.1 kg (383 lb 13.1 oz)   24 (!) 187 kg (412 lb 4.2 oz)   24 (!) 183.9 kg (405 lb 6.8 oz)     I/O     Intake/Output Summary (Last 24 hours) at 2024 1624  Last data filed at 2024 1349  Gross per 24 hour   Intake 200 ml   Output 2010 ml   Net -1810 ml     I/O last 3 completed shifts:  In: -   Out: 3306 [Urine:3306]     DRAIN/TUBE OUTPUT:     Invasive Lines   ETT Day -   chronic tracheostomy    Mechanical Ventilation Data   SETTINGS (Comprehensive)  Vent Information  Ventilator ID: [id number]  Equipment Changed: HME  Vent Mode: AC/PRVC  Additional Respiratory Assessments  Pulse: 98  Respirations: 
Increased PS to 10 to aide in decreasing ETCO2 and increasing spont VT  
Lovenox held per Dr Solorzano  
Meghan Green (palliative care) notified of consult via Perfect Serve.   
Meghan, palliative care RN spoke with the patient brother who is power of , brother lives in [place]  CODE STATUS changed to DNR CCA, no intubation, updated in epic    
New IV placed in patient's upper Lt. Arm, Patient tangled her suction catheter tubing in IV tubing and accidentally pulled out IV.  
Notified Dr. Lentz of new consult.  
Notified Dr. Lentz of recheck INR 2.4. Orders received for 2u FFP.  
Patent refused full bath, john care and new purewick completed   
Patient arrives to PACU with a 7.0 shiley XLT proximal trach. Patient placed on ordered settings: PRVC, 400 (7ml/kg IBW), RR 14, PEEP 8. SpO2 93% on 50%. ABGs to follow.   
Patient continues to be restless, pulling at lines, bipap mask, pulled off BP cuff and wrist band. Patient coughed up large amount sputum, writer removed mask and suctioned patient. Bite block utilized R/T patient biting down and hitting. Mask placed back on. 1:1 initiated.   
Patient desat to 78% on trach collar, RN to room to assess and patient appeared to be sleeping. Patient woke up to voice, instructed to take some deep breaths and was placed back on vent support. Patient was ok with this and nodded in agreement to rest for a while. Sat 100% within minutes, RT updated.   
Patient discharged in stable condition to [facility] via ambulance. Personal belongings including cell phone and  with patient.  
Patient is refusing blood pressure checks at this time. Will attempt blood pressure checks every 4 hours.   
Patient placed on CATC 35%, SpO2 100%. No distress noted at this time.   
Patient placed on CATC 35%. SpO2 95%. No distress noted at this time.   
Patient placed on a 35% CATC, SpO2 93%. No distress noted at this time.   
Patient provided medical update and is very grateful for the staff and their excellent care; welcomed prayer;     07/30/24 1711   Encounter Summary   Encounter Overview/Reason Spiritual/Emotional Needs   Service Provided For Patient   Referral/Consult From Palliative Care   Last Encounter  07/30/24   Complexity of Encounter Low   Spiritual/Emotional needs   Type Spiritual Support   Palliative Care   Type Palliative Care, Follow-up   Assessment/Intervention/Outcome   Assessment Coping;Hopeful   Intervention Discussed illness injury and it’s impact;Explored/Affirmed feelings, thoughts, concerns;Prayer (assurance of)/[place];Sustaining Presence/Ministry of presence   Outcome Coping;Engaged in conversation;Expressed feelings, needs, and concerns;Expressed Gratitude;Receptive       
Patient received from ED via cart. Staff slid patient into bed room 2002. Monitor and SpO2 applied.  Assessment completed. Patient does not follow commands at present or look at writer. She does say some words when turned such as \"stop it\". Suzette care done, new pure wick and brief applied.  
Patient requesting farzana at bedside to pray before surgery. Writer sent message to [place].   
Patient requesting to come off vent, placed on trach collar.  
Patient sleeping on trach collar oxygen sat 85%, RT called and patient placed back on vent with bipap settings.   
Patient taken off ventilator to [facility], spo2 95% on 40% FiO2.  
Patient tried on CPAP/PS. RR keeps dropping below 6. Patient placed back on full support, will attempt again later when patient is more awake.   
Patient's brother notified of plans for tracheostomy placement tomorrow. All questions answered.   
Per Dr Kasper, abg not needed at this time, abg scheduled for 0600 tomorrow morning.  
Pharmacy Medication History Note      List of current medications patient is taking is complete.    Source of information: [facility] order summary     Changes made to medication list:  Medications removed (include reason, ex. therapy complete or physician discontinued, noncompliance):  Pantoprazole - changed to omeprazole     Medications flagged for provider review:  Cholecalciferol - not on facility list   Coenzyme Q10 - not on facility list   Glucosamine chondroitin - not on facility list   Miconazole - not on facility list   Fish oil - not on facility list    Medications added/doses adjusted:  Carvedilol 6.25 mg twice daily hold for SBP <100  Furosemide changed to 40 mg twice daily for 5 days then furosemide 40 mg daily   Glucagon 1 mg as needed for low blood sugar   Midodrine changed to 5 mg three times daily as needed for SBP <100  Omeprazole 20 mg daily   Warfarin changed to 2 mg on Tues, Fri and 4 mg all other days    Other notes (ex. Recent course of antibiotics, Coumadin dosing):  Furosemide 40 mg twice daily for 5 days started on 7/19/24. The patient will reduce to 40 mg daily on 7/25/24.       Current Home Medication List at Time of Admission:  Prior to Admission medications    Medication Sig   furosemide (LASIX) 40 MG tablet Take 1 tablet by mouth in the morning and 1 tablet in the evening. For 5 days.   furosemide (LASIX) 40 MG tablet Take 1 tablet by mouth daily To start on 7/25/24   glucagon, rDNA, 1 MG injection Inject 1 mg into the muscle as needed for Low blood sugar (patient unresponsive)   midodrine (PROAMATINE) 5 MG tablet Take 1 tablet by mouth 3 times daily as needed (SBP <100)   omeprazole (PRILOSEC) 20 MG delayed release capsule Take 1 capsule by mouth daily   warfarin (COUMADIN) 4 MG tablet Take 1 tablet by mouth Five times weekly Mon, Wed, Thurs, Sat, Sun   carvedilol (COREG) 6.25 MG tablet Take 1 tablet by mouth 2 times daily (with meals)  Patient taking differently: Take 1 tablet 
Pharmacy Note  Warfarin Consult    Shazia ROGERS is a 68 y.o. female for whom pharmacy has been consulted to manage warfarin therapy.     Consulting Physician: Dr. Kasper   Reason for Admission: acute rsp failure    Warfarin dose prior to admission: 2 mg Tues/Fri and 4 mg all other days   Warfarin indication: DVT/afib  Target INR range: 3-3.5     Past Medical History:   Diagnosis Date    Anxiety     Asthma     Atrial fibrillation (HCC)     A-fib noted on 05/25/2024 visit to ER    Bronchitis     DDD (degenerative disc disease), lumbar     Depression     Diabetes mellitus (HCC)     Elevated glucose     Headache(784.0)     Hernia of abdominal cavity     HH (hiatus hernia)     Hyperlipemia, mixed     Hypertension     Osteoarthritis     Right femoral vein DVT (HCC) 05/2009    Dr. Elizabeth    Uterine hyperplasia                 Recent Labs     07/28/24  0503   INR 1.4     Recent Labs     07/26/24  0457 07/27/24  0502 07/28/24  0503   HGB 9.2* 9.2* 9.7*   HCT 30.5* 29.7* 31.8*    214 225       Current warfarin drug-drug interactions: Tygacil, lovenox, solu medrol       Date                     INR                            Dose   7/28/2024            1.4(goal 3-3.5)               4 mg     INR 1.4, subtherapeutic. Coumadin will be re-started today at 4 mg dose. Lovenox bridge until INR >3 - please, note INR goal 3-3.5.     Daily PT/INR while inpatient.     Thank you for the consult.  Will continue to follow.   Janet SheffieldD, BCPS 7/28/2024 11:57 AM    
Pharmacy Note  Warfarin Consult follow-up      Recent Labs     07/22/24  0718 07/22/24  1140   INR 2.9 2.4     Recent Labs     07/22/24  1140   HGB 9.5*   HCT 31.5*          Significant Drug-Drug Interactions:  New warfarin drug-drug interactions: none  Discontinued drug-drug interactions: none  Current warfarin drug-drug interactions: Cefepime, Solu-medrol      Date             INR        Dose given previous day  Dose scheduled for today  7/22/2024            2.4       4mg           4mg        Notes:                     Daily PT/INR while inpatient.   Nicola Samuels RP,RPH, MS   7/22/2024  4:29 PM      
Pharmacy Note  Warfarin Consult follow-up      Recent Labs     07/22/24  0718 07/22/24  1140 07/23/24  0953   INR 2.9 2.4 3.0     Recent Labs     07/22/24  1140 07/23/24  0412   HGB 9.5* 9.0*   HCT 31.5* 29.3*    242       Significant Drug-Drug Interactions:  New warfarin drug-drug interactions: none  Discontinued drug-drug interactions: none  Current warfarin drug-drug interactions: Cefepime, solu-medrol       Date             INR              Dose given previous day  Dose scheduled for today  7/23/2024            3 (goal 3-3.5)       Not given         Hold         Notes:                   PT is NPO, unable to take tablets. INT therapeutic, goal 3-3.5. Will keep on Lovenox and hold warfarin for today.   Daily PT/INR while inpatient.    Janet SheffieldD, BCPS 7/23/2024 1:34 PM      
Pharmacy Note  Warfarin Consult follow-up      Recent Labs     07/22/24  1140 07/23/24  0953 07/24/24  0536   INR 2.4 3.0 3.5     Recent Labs     07/22/24  1140 07/23/24  0412 07/24/24  0536   HGB 9.5* 9.0* 9.2*   HCT 31.5* 29.3* 30.1*    242 247       Significant Drug-Drug Interactions:  New warfarin drug-drug interactions: none  Discontinued drug-drug interactions: none  Current warfarin drug-drug interactions: Cefepime, solu-medrol       Date             INR                 Dose given previous day  Dose scheduled for today  7/24/2024            3.5 (3-3.5)    Held          Hold        Notes:                   INR 3.5, therapeutic. Lovenox stopped due to therapeutic INR.  Warfarin was held since admission, INR continues to rise. Hold warfarin today.   Daily PT/INR while inpatient.   Janet Alegre PharmD, BCPS 7/24/2024 9:06 AM          
Pharmacy Note  Warfarin Consult follow-up      Recent Labs     07/27/24  0502 07/28/24  0503 07/29/24  0405   INR 1.3 1.4 1.4     Recent Labs     07/27/24  0502 07/28/24  0503 07/29/24  0405   HGB 9.2* 9.7* 10.1*   HCT 29.7* 31.8* 32.8*    225 245       Significant Drug-Drug Interactions:  New warfarin drug-drug interactions: medrol   Discontinued drug-drug interactions: none   Current warfarin drug-drug interactions: lovenox, tygacil      Date             INR        Dose given previous day  Dose scheduled for today  7/29/2024            1.4 (goal 3-3.5)       4 mg            4 mg     Notes:                     Continue lovenox bridge. CBC reviewed.   Daily PT/INR while inpatient.     Irineo Clay, PharmD, BCPS  7/29/2024 8:31 AM    
Pharmacy Note  Warfarin Consult follow-up      Recent Labs     07/28/24  0503 07/29/24  0405 07/30/24  0416   INR 1.4 1.4 1.3     Recent Labs     07/28/24  0503 07/29/24  0405   HGB 9.7* 10.1*   HCT 31.8* 32.8*    245       Significant Drug-Drug Interactions:  New warfarin drug-drug interactions: bactrim  Discontinued drug-drug interactions: none  Current warfarin drug-drug interactions: lovenox       Date             INR        Dose given previous day  Dose scheduled for today  7/30/2024            1.3 (goal 3-3.5)       4 mg            6 mg       Notes:                     Daily PT/INR while inpatient.     Irineo Clay, PharmD, BCPS  7/30/2024 7:45 AM    
Pharmacy Note  Warfarin Consult follow-up      Recent Labs     07/29/24  0405 07/30/24  0416 07/31/24  0342   INR 1.4 1.3 1.5     Recent Labs     07/29/24  0405 07/31/24  0342   HGB 10.1* 9.8*   HCT 32.8* 32.9*    233       Significant Drug-Drug Interactions:  New warfarin drug-drug interactions: none  Discontinued drug-drug interactions: none  Current warfarin drug-drug interactions: lovenox, bactrim       Date             INR        Dose given previous day  Dose scheduled for today  7/31/2024            1.5 (goal 3-3.5)       6 mg           6 mg    Notes:                     Daily PT/INR while inpatient.     Irineo Clay, PharmD, BCPS  7/31/2024 8:36 AM    
Pharmacy Note  Warfarin Consult follow-up      Recent Labs     07/30/24  0416 07/31/24  0342 08/01/24  0355   INR 1.3 1.5 1.7     Recent Labs     07/31/24  0342 08/01/24  0355   HGB 9.8* 10.3*   HCT 32.9* 35.3*    235       Significant Drug-Drug Interactions:  New warfarin drug-drug interactions: none  Discontinued drug-drug interactions: none  Current warfarin drug-drug interactions: lovenox, bactrim       Date             INR        Dose given previous day  Dose scheduled for today  8/1/2024            1.7       6 mg          6 mg        Notes:                     Daily PT/INR while inpatient.   Janet SheffieldD, BCPS 8/1/2024 1:36 PM      
Pharmacy Note  Warfarin Consult follow-up      Recent Labs     07/31/24  0342 08/01/24  0355 08/02/24  0346   INR 1.5 1.7 2.0     Recent Labs     07/31/24  0342 08/01/24  0355 08/02/24  0346   HGB 9.8* 10.3* 10.1*   HCT 32.9* 35.3* 33.8*    235 246       Significant Drug-Drug Interactions:  New warfarin drug-drug interactions: none  Discontinued drug-drug interactions: none  Current warfarin drug-drug interactions: Lovenox, Pravachol, Prednisone      Date             INR        Dose given previous day  Dose scheduled for today  8/2/2024            2       6mg           6mg        Notes:                     Daily PT/INR while inpatient.    Nicola Samuels RP,RPH, MS   8/2/2024  9:30 AM   
Pharmacy Note  Warfarin Consult follow-up      Recent Labs     08/02/24  0346 08/03/24  0428 08/04/24  0420   INR 2.0 2.1 2.0     Recent Labs     08/02/24  0346 08/03/24  0428 08/04/24  0420   HGB 10.1* 10.2* 9.7*   HCT 33.8* 33.3* 32.1*    227 211       Significant Drug-Drug Interactions:  New warfarin drug-drug interactions: none  Discontinued drug-drug interactions: Lovenox held  Current warfarin drug-drug interactions: pravastatin, prednisone      Date             INR        Dose given previous day  Dose scheduled for today  8/4/2024            2.0       Held            6 mg        Notes:                   Messaged Pulmonary about when anticoagulation should be restarted today at 12:39 message read by provider at 12:39 but no response yet (15:55). If anticoagulation to be restarted, recommend starting 6 mg coumadin and bridging until therapeutic at 3.0-3.5.    Daily PT/INR while inpatient.   Nettie RuizD. [facility]  8/4/2024  3:56 PM       
Pharmacy Note  Warfarin Consult follow-up      Recent Labs     08/03/24  0428 08/04/24  0420 08/05/24  0350   INR 2.1 2.0 1.8     Recent Labs     08/03/24  0428 08/04/24  0420 08/05/24  0350   HGB 10.2* 9.7* 10.1*   HCT 33.3* 32.1* 33.9*    211 211       Significant Drug-Drug Interactions:  New warfarin drug-drug interactions: Lovenox  Discontinued drug-drug interactions: none  Current warfarin drug-drug interactions: pravastatin, prednisone       Date             INR        Dose given previous day  Dose scheduled for today  8/5/2024            1.8       held           6 mg        Notes:                   INR 1.8, subtherapeutic. Lovenox full dose  and coumadin resumed today per crit care.   Daily PT/INR while inpatient.   Janet SheffieldD, BCPS 8/5/2024 10:43 AM       
Physical Therapy  Facility/Department: [facility] ICU  Physical Therapy Initial Assessment    Name: Shazia ROGERS  : 1955  MRN: [id number]  Date of Service: 2024    Discharge Recommendations:  Therapy recommended at discharge, Patient would benefit from continued therapy after discharge          Patient Diagnosis(es): The encounter diagnosis was Acute on chronic respiratory failure with hypoxia and hypercapnia (HCC).  Past Medical History:  has a past medical history of Anxiety, Asthma, Atrial fibrillation (HCC), Bronchitis, DDD (degenerative disc disease), lumbar, Depression, Diabetes mellitus (HCC), Elevated glucose, Headache(784.0), Hernia of abdominal cavity, HH (hiatus hernia), Hyperlipemia, mixed, Hypertension, Osteoarthritis, Right femoral vein DVT (HCC), and Uterine hyperplasia.  Past Surgical History:  has a past surgical history that includes Dilation and curettage of uterus (10/08 and ); Breast reduction surgery (1997); Breast reduction surgery (1997); Tympanostomy tube placement (1967); Tonsillectomy and adenoidectomy (); Hysterectomy (7/17/15); and tracheostomy (N/A, 2024).    Assessment   Assessment: Pt will benefit from skilled PT to increase srength/endurance and progrss to sitting tolerance in a chair.  Treatment Diagnosis: Impaired mobility  Therapy Prognosis: Fair  Decision Making: Medium Complexity  Requires PT Follow-Up: Yes     Plan   Physical Therapy Plan  General Plan:  (3 x/week)  Current Treatment Recommendations: Strengthening, Balance training, Functional mobility training, Endurance training, Safety education & training, Patient/Caregiver education & training, Home exercise program, Positioning, Therapeutic activities  Safety Devices  Type of Devices: Call light within reach, Left in bed     Restrictions  Restrictions/Precautions  Restrictions/Precautions: Fall Risk, Contact Precautions (recent Tracheostomy)  Position Activity Restriction  Other 
Pt concerned that she is unsure where her cell phone, , glasses, and wallet are. Writer attempts to contact staff at [facility] but they are unavailable at this time. States he will call back when has time  
Pt having desaturations low 80's on trach collar, pt placed back on vent CPAP/PS, SPO2 90-92%  
Pt not able to speak but would respond to 's questions via writing on a clip board  Pt expressed frustration  Writer and pt explored coping mechanisms to deal with frustrations.  In the past the pt would be active or eat in response to frustration  but both those options are not doable at this time.  Pt and writer explored the idea of praying, thinking about future, planning for the future as possible coping mechanisms.   Conversation ended due to medical staff needing to be with pt   Pt expressed appreciation for visit.  After pt was done with medical staff, pt requested  to come back to continue visit  Pt expressed feelings of being self conscious and low self esteem and how that has impacted pt's judit  Pt welcomed prayer to close the visit       Spiritual Health Assessment/Progress Note  [facility]    (P) Spiritual/Emotional Needs,  ,  ,      Name: Shazia ROGERS MRN: [id number]    Age: 68 y.o.     Sex: female   Language: English   Worship: Non-Christian   Acute respiratory failure with hypoxia and hypercarbia (HCC)     Date: 8/2/2024            Total Time Calculated: 20 min              Spiritual Assessment continued in [facility] ICU        Referral/Consult From: (P) Palliative Care   Encounter Overview/Reason: (P) Spiritual/Emotional Needs  Service Provided For: (P) Patient    Judit, Belief, Meaning:   Patient has beliefs or practices that help with coping during difficult times  Family/Friends No family/friends present      Importance and Influence:  Patient has no beliefs influential to healthcare decision-making identified during this visit  Family/Friends no family/friends present          Assessment and Plan of Care:     Patient Interventions include: Facilitated expression of thoughts and feelings, Explored spiritual coping/struggle/distress and theological reflection, and Affirmed coping skills/support systems      Patient Plan of Care: Spiritual Care available upon 
Pt placed back on full support pt lasted 3.5hrs ontrach collar  
Pt placed on 40% trach collar, SPO2 95%  
Pt placed on CPAP/PS for weaning  
Pt placed on CPAP/PS for weaning  
Pt placed on cpap/ps 10/8  
Pt placed on cpap/ps 10/8   
Pt placed on trach collar 35% pt tolerated Cpap/ps for 2 hrs and 45 minutes.   
Pt sleeping, ETCO2 increasing, spont VT decreasing, SPO2 decreasing, pt placed back on previus PRVC settings  
Pt taken off the bipap per her request. Placed on 3L nasal cannula, titrated up to 4L and then up to 4.5 to maintain SPO2 88-90%     
Pt taken off ventilator and placed on 35% trach collar. Pt tolerating at this time. Pt prior to tolerated 5 hrs on cpap/ps  
Pt was on bipap but noted she felt better and intended to \"fight\" and asked for prayer for healing. Writer provided listening presence and prayer.    07/25/24 1230   Encounter Summary   Encounter Overview/Reason Spiritual/Emotional Needs   Service Provided For Patient   Referral/Consult From Palliative Care   Last Encounter  07/25/24   Complexity of Encounter Moderate   Spiritual/Emotional needs   Type Spiritual Support   Palliative Care   Type Palliative Care, Follow-up   Assessment/Intervention/Outcome   Assessment Compromised coping;Impaired resilience   Intervention Active listening;Discussed illness injury and it’s impact;Explored/Affirmed feelings, thoughts, concerns;Prayer (assurance of)/[place];Sustaining Presence/Ministry of presence   Outcome Comfort;Engaged in conversation;Expressed feelings, needs, and concerns;Expressed Gratitude;Receptive       
Pulmonary Progress Note  Pulmonary and Critical Care Specialists      Patient - Shazia ROGERS,  Age - 68 y.o.    - 1955      Room Number -    N -  [id number]   [facility] # - [id number]  Date of Admission -  2024 11:25 AM    Consulting Service/Physician   Consulting - Moises Henson MD  Primary Care Physician - Jyoti Mauricio, ARACELIS - CNP     SUBJECTIVE   Patient easily awakens.  Alert and lucid.  She does require more than just a trach collar when she sleeps.  When she is awake reportedly she is able to tolerate the trach collar without difficulty.    OBJECTIVE   VITALS    height is 1.651 m (5' 5\") and weight is 174.1 kg (383 lb 13.1 oz) (abnormal). Her oral temperature is 98.6 °F (37 °C). Her blood pressure is 129/61 and her pulse is 96. Her respiration is 16 and oxygen saturation is 97%.     Body mass index is 63.87 kg/m².  Temperature Range: Temp: 98.6 °F (37 °C) Temp  Av.4 °F (36.9 °C)  Min: 98.1 °F (36.7 °C)  Max: 99 °F (37.2 °C)  BP Range:  Systolic (24hrs), Av , Min:110 , Max:156     Diastolic (24hrs), Av, Min:42, Max:75    Pulse Range: Pulse  Av.8  Min: 84  Max: 116  Respiration Range: Resp  Av.8  Min: 12  Max: 26  Current Pulse Ox::  SpO2: 97 %  24HR Pulse Ox Range:  SpO2  Av.7 %  Min: 89 %  Max: 100 %  Oxygen Amount and Delivery: O2 Flow Rate (L/min): 8 L/min    Wt Readings from Last 3 Encounters:   24 (!) 174.1 kg (383 lb 13.1 oz)   24 (!) 187 kg (412 lb 4.2 oz)   24 (!) 183.9 kg (405 lb 6.8 oz)       I/O (24 Hours)    Intake/Output Summary (Last 24 hours) at 2024 1324  Last data filed at 2024 1312  Gross per 24 hour   Intake 1976.21 ml   Output 3315 ml   Net -1338.79 ml       EXAM     General Appearance  Awake, alert, oriented, in no acute distress  HEENT - normocephalic, atraumatic.  Neck - Supple,  trachea midline   Lungs -coarse breath sounds no crackles rales or wheeze  Heart Exam:PMI normal. No lifts, heaves, or 
RN placed pt back on ventilator cpap/ps at 1207 d/t pt falling asleep and low sats,   
RR decreased to 12 per Dr Kasper  
Report called to Ana at [facility]. Discussed patient's admission, current condition, needs. All questions and concerns addressed.  
SLP ALL NOTES  [facility]  Speech Language Pathology    Date: 7/29/2024  Patient Name: Shazia ROGERS  YOB: 1955   AGE: 68 y.o.  MRN: [id number]        Patient Not Available for Speech Therapy     Due to:  [] Testing  [] Hemodialysis  [] Cancelled by RN  [] Surgery   [] Intubation/Sedation/Pain Medication  [] Medical instability  [x] Other:   Order received for video swallow study, per RN, pt. C new tracheostomy and video swallow study not appropriate at this time, will likely be needed in the next couple of days.    ST to await new order for video swallow study when appropriate.    Sherri Lynch M.A.CCC/SLP      
SLP ALL NOTES  [facility]  Speech Language Pathology    Date: 7/28/2024  Patient Name: Shazia ROGERS  YOB: 1955   AGE: 68 y.o.  MRN: [id number]        Patient Not Available for Speech Therapy     Due to:  [] Testing  [] Hemodialysis  [] Cancelled by RN  [] Surgery   [] Intubation/Sedation/Pain Medication  [] Medical instability  [x] Other:    Order for video swallow study received.   Unable to complete on this date d/t radiologist unavailable over the weekend.  ST  updated RN that writer to schedule c radiology for tomorrow, 07/29.    Sherri Lynch M.A.CCC/SLP      
SPEECH LANGUAGE PATHOLOGY  Speech Language Pathology  [facility] ICU    Dysphagia Treatment Note    Date: 8/2/2024  Patient’s Name: Shazia ROGERS  MRN: [id number]  Diagnosis: Dysphagia   Patient Active Problem List   Diagnosis Code    Anxiety F41.9    Depression F32.A    Asthma J45.909    DDD (degenerative disc disease), lumbar M51.36    Seasonal allergies J30.2    Hyperlipemia, mixed E78.2    Anticoagulated on Coumadin Z79.01    Family history of breast cancer Z80.3    Endometrial cancer (HCC) C54.1    Normocytic anemia D64.9    S/P FABBY-BSO (total abdominal hysterectomy and bilateral salpingo-oophorectomy) Z90.710, Z90.722, Z90.79    Essential hypertension I10    Blood loss anemia D50.0    BPPV (benign paroxysmal positional vertigo) H81.10    Stage 3a chronic kidney disease ([facility]) N18.31    Chronic deep vein thrombosis (DVT) of femoral vein of right lower extremity ([facility]) I82.511    Pain and swelling of right lower leg M79.661, M79.89    BMI 60.0-69.9, adult ([facility]) Z68.44    Pulmonary embolism on right ([facility]) I26.99    Acute on chronic respiratory failure with hypoxia and hypercapnia ([facility]) J96.21, J96.22    Diabetes mellitus type 2, noninsulin dependent ([facility]) E11.9    Urinary tract infection due to ESBL Klebsiella N39.0, B96.89    Pulmonary HTN ([facility]) I27.20    Urinary retention R33.9    History of DVT (deep vein thrombosis) Z86.718    Bilateral leg edema R60.0    CKD (chronic kidney disease) stage 3, GFR 30-59 ml/min ([facility]) N18.30    Class 3 severe obesity due to excess calories with serious comorbidity and body mass index (BMI) of 60.0 to 69.9 in adult ([facility]) E66.01, Z68.44    Osteopenia M85.80    Balance problems R26.89    Endometrial adenocarcinoma ([facility]) C54.1    Dyspnea R06.00    Mild asthma J45.909    Open abdominal wall wound S31.109A    Ventral hernia without obstruction or gangrene K43.9    Abdominal wall skin ulcer, with fat layer exposed ([facility]) L98.492    Supratherapeutic INR R79.1    COVID U07.1    Bleeding from 
SPEECH LANGUAGE PATHOLOGY  Speech Language Pathology  [facility] ICU    Dysphagia Treatment Note    Date: 8/5/2024  Patient’s Name: Shazia ROGERS  MRN: [id number]  Diagnosis: Dysphagia   Patient Active Problem List   Diagnosis Code    Anxiety F41.9    Depression F32.A    Asthma J45.909    DDD (degenerative disc disease), lumbar M51.36    Seasonal allergies J30.2    Hyperlipemia, mixed E78.2    Anticoagulated on Coumadin Z79.01    Family history of breast cancer Z80.3    Endometrial cancer (HCC) C54.1    Normocytic anemia D64.9    S/P FABBY-BSO (total abdominal hysterectomy and bilateral salpingo-oophorectomy) Z90.710, Z90.722, Z90.79    Essential hypertension I10    Blood loss anemia D50.0    BPPV (benign paroxysmal positional vertigo) H81.10    Stage 3a chronic kidney disease ([facility]) N18.31    Chronic deep vein thrombosis (DVT) of femoral vein of right lower extremity ([facility]) I82.511    Pain and swelling of right lower leg M79.661, M79.89    BMI 60.0-69.9, adult ([facility]) Z68.44    Pulmonary embolism on right ([facility]) I26.99    Acute on chronic respiratory failure with hypoxia and hypercapnia ([facility]) J96.21, J96.22    Diabetes mellitus type 2, noninsulin dependent ([facility]) E11.9    Urinary tract infection due to ESBL Klebsiella N39.0, B96.89    Pulmonary HTN ([facility]) I27.20    Urinary retention R33.9    History of DVT (deep vein thrombosis) Z86.718    Bilateral leg edema R60.0    CKD (chronic kidney disease) stage 3, GFR 30-59 ml/min ([facility]) N18.30    Class 3 severe obesity due to excess calories with serious comorbidity and body mass index (BMI) of 60.0 to 69.9 in adult ([facility]) E66.01, Z68.44    Osteopenia M85.80    Balance problems R26.89    Endometrial adenocarcinoma ([facility]) C54.1    Dyspnea R06.00    Mild asthma J45.909    Open abdominal wall wound S31.109A    Ventral hernia without obstruction or gangrene K43.9    Abdominal wall skin ulcer, with fat layer exposed ([facility]) L98.492    Supratherapeutic INR R79.1    COVID U07.1    Bleeding from 
Secure message received from Dr. Flowers. Ok for patient to discharge to [facility].  
Secure message sent to Dr Solorzano regarding Lovenox order and bright red secretions from trach site. Awaiting response.   
Secure message sent to Dr Solorzano to clarify coumadin order. 1800 dose of coumadin to be held, PT/INR to be drawn daily and writer to contact Pharmacy to update them on plan of care.     Call made to Pharmacist and all questions answered at this time.   
Secure message sent to Dr. Kasper regarding patient pulling off bipap, pulse ox and refusing blood pressure checks. Order received to start precedex.   
Secure message sent to YESY Rutledge NP notifying NP of multiple blood pressures with a SBP greater than 160. Order per NP placed.   
Sputum sent  
Staff from [facility] returns and states her cell phone, , glasses, and wallet are all in her room. Pt notified  
Vancomycin Dosing by Pharmacy - Initial Note   TODAY'S DATE:  7/22/2024  Patient name, age:  Shazia ROGERS, 68 y.o.    Indication: Respiratory infection, MRSA suspected  Additional antimicrobials:  Cefepime    Allergies:  Amoxil [amoxicillin trihydrate], Neomycin, Penicillins, Codeine, Neosporin [bacitracin-neomycin-polymyxin], and Polysporin [bacitracin-polymyxin b]   Actual Weight:    Wt Readings from Last 1 Encounters:   07/22/24 (!) 178.6 kg (393 lb 11.9 oz)     Labs/Ancillary Data  Estimated Creatinine Clearance: 101 mL/min (based on SCr of 0.9 mg/dL).  Recent Labs     07/22/24  1140   CREATININE 0.9   BUN 19   WBC 6.8     Procalcitonin   Date Value Ref Range Status   07/22/2024 0.09 (H) <0.09 ng/mL Final     Comment:           Suspected Sepsis:  <0.50 ng/mL     Low likelihood of sepsis.  0.50-2.00 ng/mL     Increased likelihood of sepsis. Antibiotics encouraged.  >2.00 ng/mL     High risk of sepsis/shock. Antibiotics strongly encouraged.        Suspected Lower Resp Tract Infections:  <0.24 ng/mL     Low likelihood of bacterial infection.  >0.24 ng/mL     Increased likelihood of bacterial infection. Antibiotics encouraged.        With successful antibiotic therapy, PCT levels should decrease rapidly. (Half-life of 24 to   36 hours.)        Procalcitonin values from samples collected within the first 6 hours of systemic infection   may still be low. Retesting may be indicated.  Values from day 1 and day 4 can be entered into the Change in Procalcitonin Calculator   ([email or web address]) to determine the patient's Mortality Risk Prognosis        In healthy neonates, plasma Procalcitonin (PCT) concentrations increase gradually after   birth, reaching peak values at about 24 hours of age then decrease to normal values below   0.5 ng/mL by 48-72 hours of age.         Intake/Output Summary (Last 24 hours) at 7/22/2024 1645  Last data filed at 7/22/2024 1542  Gross per 24 hour   Intake --   Output 700 ml 
[place] notified of potassium, creatinine and urine output. See new orders   
Writer spoke with Sharon Fernandez NP with concerns regarding patient condition. Orders received for 1:1 sitter and UA. May straight cath patient to obtain UA.  
Writer spoke with patient's brother Álvaro MONTANEZ on phone. He was updated on her condition, bipap, mental status, plan of care. Álvaro stated that last time patient was here she discussed changing her status from full code to DNR but never did it. Writer explained that she would not be able to make that decision at this time but as her POA he could discuss it with her physician. At this time brother want's to leave the patient as a full code but will discuss further plan of care regarding DNR with physician tomorrow.  
Writer spoke with patient's brother/POA, Álvaro. He explained that patient has been expressing concerns to him about not feeling prepared to be discharged to [facility] yet, especially because it is late in the week and they may not be equipped to take her. Reassurance provided. He stated he would like her to stay here until Monday so that they could be staffed appropriately for her arrival. He also wanted to be notified and to speak to Dr. Calles if she is discharged prior to Monday. Will relay to oncoming nurse and charge nurse. Dr. Calles also notified.  
   Anxiety    Depression    Asthma    DDD (degenerative disc disease), lumbar    Seasonal allergies    Hyperlipemia, mixed    Anticoagulated on Coumadin    Family history of breast cancer    Endometrial cancer (HCC)    Normocytic anemia    S/P FABBY-BSO (total abdominal hysterectomy and bilateral salpingo-oophorectomy)    Essential hypertension    Blood loss anemia    BPPV (benign paroxysmal positional vertigo)    Stage 3a chronic kidney disease (HCC)    Chronic deep vein thrombosis (DVT) of femoral vein of right lower extremity (HCC)    Pain and swelling of right lower leg    BMI 60.0-69.9, adult (HCC)    Pulmonary embolism on right (HCC)    Acute on chronic respiratory failure with hypoxia and hypercapnia (HCC)    Diabetes mellitus type 2, noninsulin dependent (HCC)    Pulmonary HTN (HCC)    Urinary retention    History of DVT (deep vein thrombosis)    Bilateral leg edema    CKD (chronic kidney disease) stage 3, GFR 30-59 ml/min (HCC)    Class 3 severe obesity due to excess calories with serious comorbidity and body mass index (BMI) of 60.0 to 69.9 in adult (HCC)    Osteopenia    Balance problems    Endometrial adenocarcinoma (HCC)    Dyspnea    Mild asthma    Open abdominal wall wound    Ventral hernia without obstruction or gangrene    Abdominal wall skin ulcer, with fat layer exposed (HCC)    Supratherapeutic INR    COVID    Bleeding from wound    Traumatic hematoma of left knee    Cellulitis of abdominal wall    Cytopenia    Increased oxygen demand    Pancytopenia (HCC)    Thrombocytopenia (HCC)    Shortness of breath    SOB (shortness of breath)    COPD exacerbation (HCC)    Acute on chronic diastolic heart failure (HCC)    Acute cystitis without hematuria    Metabolic encephalopathy    (HFpEF) heart failure with preserved ejection fraction (HCC)    A-fib (HCC)    Acute respiratory failure with hypoxia and hypercarbia (HCC)    Type 2 respiratory failure (HCC)       Palliative Interaction:Patient is on bipap and 
97  Respirations: 17  SpO2: 97 %  ETCO2 (mmHg): 60 mmHg  Humidification Source: [facility]       ABGs:   Lab Results   Component Value Date/Time    PHART 7.509 07/26/2024 03:00 PM    PO2ART 106.7 07/26/2024 03:00 PM    EFK3YIR 55.6 07/26/2024 03:00 PM       Lab Results   Component Value Date/Time    MODE PRVC 07/28/2024 05:03 AM         Medications   IV   sodium chloride      sodium chloride      dextrose        warfarin  4 mg Oral Once    methylPREDNISolone  40 mg IntraVENous Q12H    enoxaparin  1 mg/kg SubCUTAneous BID    warfarin placeholder: dosing by pharmacy   Other RX Placeholder    tigecycline (TYGACIL) 50 mg in sodium chloride 0.9 % 100 mL IVPB  50 mg IntraVENous Q12H    carvedilol  6.25 mg Per NG tube BID WC    dilTIAZem  30 mg Per NG tube 4 times per day    montelukast  10 mg Per NG tube Nightly    lansoprazole  30 mg Per NG tube QAM AC    pravastatin  80 mg Per NG tube QPM    CENTRUM/CERTA-VY with minerals oral  15 mL Per NG tube Daily    ipratropium  0.5 mg Nebulization Q6H RT    budesonide  0.5 mg Nebulization BID RT    miconazole   Topical BID    levalbuterol  1.25 mg Nebulization Q6H    sodium chloride flush  5-40 mL IntraVENous 2 times per day    furosemide  40 mg IntraVENous BID    insulin lispro  0-8 Units SubCUTAneous Q4H       Diet/Nutrition   Diet NPO Exceptions are: Sips of Water with Meds  ADULT TUBE FEEDING; Nasogastric; Peptide Based High Protein; Continuous; 20; Yes; 20; Q 8 hours; 60; 250; Q 6 hours    Exam   VITALS    height is 1.651 m (5' 5\") and weight is 174.1 kg (383 lb 13.1 oz) (abnormal). Her axillary temperature is 98 °F (36.7 °C). Her blood pressure is 134/55 (abnormal) and her pulse is 97. Her respiration is 17 and oxygen saturation is 97%.   Ventilator Settings (Basic)  Vent Mode: AC/PRVC Resp Rate (Set): 12 bpm/Vt (Set, mL): 400 mL/ /FiO2 : 40 %    Constitutional - Sedated  General Appearance  well developed, well nourished  HEENT - Life support devices in place (tracheostomy and 
Bactrim   MRSA nasal swab: Positive.   Sputum cx: normal resp hermann    Imagin.26 CXR   Left basilar atelectasis.    CXR   Similar or slightly improved vascular congestion pattern with cephalization of blood flow and bibasilar opacities, greater left base, either atelectasis or possibly infiltrate. Small effusions likely.     Patient Vitals for the past 8 hrs:   BP Temp Temp src Pulse Resp SpO2   24 1115 -- -- -- (!) 103 15 98 %   24 1113 -- 97.4 °F (36.3 °C) Oral -- -- --   24 0800 -- 98 °F (36.7 °C) Oral -- -- --   24 0749 -- -- -- -- -- 100 %   24 0738 -- -- -- 91 20 100 %   24 0728 -- -- -- 92 17 100 %   24 0713 -- 98.2 °F (36.8 °C) Oral -- -- --   24 0600 100/75 -- -- 77 14 97 %           I have personally reviewed the past medical history, past surgical history, medications, social history, and family history, and I haveupdated the database accordingly.      Allergies:   Amoxil [amoxicillin trihydrate], Neomycin, Penicillins, Codeine, Neosporin [bacitracin-neomycin-polymyxin], and Polysporin [bacitracin-polymyxin b]     Review of Systems:     As per [place]    Physical Examination :       Physical Exam  Vitals and nursing note reviewed.   Constitutional:       Appearance: She is not ill-appearing.   HENT:      Head: Normocephalic and atraumatic.      Right Ear: External ear normal.      Left Ear: External ear normal.   Cardiovascular:      Rate and Rhythm: Normal rate and regular rhythm.   Pulmonary:      Effort: Pulmonary effort is normal. No respiratory distress.   Abdominal:      General: There is no distension.      Palpations: Abdomen is soft.   Genitourinary:     Comments: No da silva.  Musculoskeletal:      Cervical back: Neck supple.      Right lower leg: Edema present.      Left lower leg: Edema present.   Skin:     General: Skin is warm and dry.      Findings: No rash.         Past Medical History:     Past Medical History:   Diagnosis Date    
Nightly    lansoprazole  30 mg Per NG tube QAM AC    pravastatin  80 mg Per NG tube QPM    CENTRUM/CERTA-VY with minerals oral  15 mL Per NG tube Daily    miconazole   Topical BID    levalbuterol  1.25 mg Nebulization Q6H    sodium chloride flush  5-40 mL IntraVENous 2 times per day    [Held by provider] furosemide  40 mg IntraVENous BID    insulin lispro  0-8 Units SubCUTAneous Q4H     Continuous Infusions:   dextrose      sodium chloride Stopped (24)     PRN Meds:.simethicone, glucose, dextrose bolus **OR** dextrose bolus, glucagon (rDNA), dextrose, hydrALAZINE, sodium chloride flush, sodium chloride, potassium chloride **OR** potassium alternative oral replacement **OR** potassium chloride, magnesium sulfate, ondansetron **OR** ondansetron, polyethylene glycol, acetaminophen **OR** acetaminophen    Physical Exam:    VITALS:  BP (!) 106/49   Pulse 93   Temp 97.4 °F (36.3 °C) (Axillary)   Resp (!) 38   Ht 1.651 m (5' 5\")   Wt (!) 174.1 kg (383 lb 13.1 oz)   LMP 2014   SpO2 94%   BMI 63.87 kg/m²   TEMPERATURE:  Current - Temp: 97.4 °F (36.3 °C); Max - Temp  Av.8 °F (36.6 °C)  Min: 97.4 °F (36.3 °C)  Max: 98.1 °F (36.7 °C)  RESPIRATIONS RANGE: Resp  Av  Min: 11  Max: 38  PULSE RANGE: Pulse  Av.9  Min: 75  Max: 108  BLOOD PRESSURE RANGE:  Systolic (24hrs), Av , Min:92 , Max:149   ; Diastolic (24hrs), Av, Min:45, Max:85  PULSE OXIMETRY RANGE: SpO2  Av %  Min: 90 %  Max: 100 %  24HR INTAKE/OUTPUT:    Intake/Output Summary (Last 24 hours) at 2024 1708  Last data filed at 2024 0700  Gross per 24 hour   Intake --   Output 2100 ml   Net -2100 ml     Constitutional: alert, appears stated age, and cooperative    Skin: Skin color, texture, turgor normal. No rashes or lesions    Head: Normocephalic, without obvious abnormality, atraumatic     Neck:  s/p tracheostomy with bloody secretions.    Cardiovascular/Edema: irregularly irregular rhythm    Respiratory: Lungs: 
Route: Take 1,000 Units by mouth daily. - Oral    Class: Historical Med    losartan-hydrochlorothiazide (HYZAAR) 100-25 MG per tablet 90 tablet 3 3/30/2016     Sig - Route: Take 1 tablet by mouth daily. - Oral    Class: Eprescribe    aspirin 81 MG chewable tablet        Sig - Route: Chew 81 mg by mouth daily. - Oral    Class: Historical Med    metFORMIN (GLUCOPHAGE) 850 MG tablet 60 tablet 11 3/30/2016     Sig - Route: Take 1 tablet by mouth 2 times daily (with meals). - Oral    Class: Eprescribe    atorvastatin (LIPITOR) 40 MG tablet 90 tablet 3 3/30/2016     Sig - Route: Take 1 tablet by mouth nightly. - Oral    Class: Eprescribe      Allergies     No Known Allergies      Immunizations History as of 1/4/2017     Name Date    INFLUENZA QUADRIVALENT 12/1/2015      Problem List as of 1/4/2017     Essential hypertension    Laryngitis    Hx of gastroesophageal reflux (GERD)    Dyslipidemia    Abnormal glucose    Chronic fatigue    Vitamin D deficiency    Prediabetes              Patient Instructions      Lifestyle Changes to Control Cholesterol  Diet, exercise, weight management, quitting smoking, stress management, and taking your medications right can help you control your cholesterol.  Diet  Your health care provider will give you information on changes to your diet you may need to make, based on your situation. Your provider may recommend that you see a registered dietitian for help with diet changes. Changes may include:  · Reducing the amount of fat and cholesterol in your meals  · Reducing the amount of sodium (salt) in your food, especially if you have high blood pressure  · Eating more fresh vegetables and fruits  · Eating lean proteins, such as fish, poultry, and legumes (beans and peas), and eating less red meat and processed meats  · Using low-fat dairy products  · Using vegetable and nut oils in limited amounts  · Limiting how many sweets and processed foods like chips, cookies, and baked goods that you 
wound T14.8XXA    Traumatic hematoma of left knee S80.02XA    Cellulitis of abdominal wall L03.311    Cytopenia D75.9    Increased oxygen demand R68.89    Pancytopenia ([facility]) D61.818    Thrombocytopenia ([facility]) D69.6    Shortness of breath R06.02    SOB (shortness of breath) R06.02    COPD exacerbation ([facility]) J44.1    Acute on chronic diastolic heart failure ([facility]) I50.33    Acute cystitis without hematuria N30.00    Metabolic encephalopathy G93.41    (HFpEF) heart failure with preserved ejection fraction ([facility]) I50.30    A-fib ([facility]) I48.91    Acute respiratory failure with hypoxia and hypercarbia ([facility]) J96.01, J96.02    Type 2 respiratory failure ([facility]) J96.92    Multiple drug resistant organism (MDRO) culture positive Z16.24    Allergy to multiple antibiotics Z88.1    MRSA carrier Z22.322       Pain: no c/o pain     Dysphagia Treatment  Treatment time: 5205-1499    Subjective: [x] Alert [x] Cooperative     [] Confused     [] Agitated    [] Lethargic    Objective/Assessment:    Pt seen for dysphagia management.  Pt mouthing words or writing down information on clipboard to communicate.  Pt pleasant and cooperative, highly motivated and engaged in treatment session.  Education provided re: results and recommendations of MBS from yesterday, aspiration risk, and dysphagia treatment.  Pt had questions.  All questions answered by writer in detail.  Pt verbalized understanding.  Pt completed BOT strengthening exercises x6 sets in reps of 10 c min cues.  Breaks incorporated between BOT exercises d/t coughing episodes.  Pt completed volitional effortful swallow x2 reps and jade maneuver x14 reps.  Education provided re: completion of swallowing exercises throughout the day to improve swallow function.  Handout of exercises reviewed with Pt and left in room.  Pt. verbalized understanding and agreeable.      Plan:  [x] Continue ST services    [] Discharge from ST:        Discharge recommendations: [] Inpatient Rehab   [] Skilled 
02/25/2012 09:38 AM    MCV 85.5 08/05/2024 03:50 AM    MCH 25.6 08/05/2024 03:50 AM    MCHC 29.9 08/05/2024 03:50 AM    RDW 19.9 08/05/2024 03:50 AM    NRBC 1 07/05/2024 04:42 AM    LYMPHOPCT 15 08/05/2024 03:50 AM    MONOPCT 6 08/05/2024 03:50 AM    EOSPCT 2 08/05/2024 03:50 AM    BASOPCT 0 08/05/2024 03:50 AM    MONOSABS 0.50 08/05/2024 03:50 AM    LYMPHSABS 1.25 08/05/2024 03:50 AM    EOSABS 0.17 08/05/2024 03:50 AM    BASOSABS 0.00 08/05/2024 03:50 AM    DIFFTYPE NOT REPORTED 12/13/2019 02:19 PM       BMP   Lab Results   Component Value Date/Time     08/05/2024 03:50 AM    K 3.6 08/05/2024 03:50 AM    CL 95 08/05/2024 03:50 AM    CO2 34 08/05/2024 03:50 AM    BUN 27 08/05/2024 03:50 AM    CREATININE 1.0 08/05/2024 03:50 AM    GLUCOSE 146 08/05/2024 03:50 AM    GLUCOSE 119 02/25/2012 09:38 AM    MG 1.9 08/05/2024 03:50 AM    PHOS 4.2 05/21/2018 04:49 AM       LFTS  Lab Results   Component Value Date/Time    ALKPHOS 68 07/31/2024 03:42 AM    ALT 10 07/31/2024 03:42 AM    AST 10 07/31/2024 03:42 AM    BILITOT 0.5 07/31/2024 03:42 AM    BILIDIR 0.1 07/04/2024 04:12 PM    IBILI 0.3 07/04/2024 04:12 PM       ABG ABGs:   Lab Results   Component Value Date/Time    PHART 7.509 07/26/2024 03:00 PM    PO2ART 106.7 07/26/2024 03:00 PM    OAZ4ZVW 55.6 07/26/2024 03:00 PM       Lab Results   Component Value Date/Time    MODE PRVC 07/28/2024 05:03 AM         INR  Recent Labs     08/03/24  0428 08/04/24  0420 08/05/24  0350   PROTIME 23.5* 23.2* 20.9*   INR 2.1 2.0 1.8       APTT  No results for input(s): \"APTT\" in the last 72 hours.    Lactic Acid  Lab Results   Component Value Date/Time    LACTA 1.1 07/22/2024 11:40 AM    LACTA 1.9 07/04/2024 09:38 AM        BNP   No results for input(s): \"BNP\" in the last 72 hours.     Cultures       Radiology     CXR      CT Scans    (See actual reports for details)      SYSTEMS ASSESSMENT    Acute on chronic hypercapnic respiratory failure status post tracheostomy 6/26 (Brittani #7 
40 mg, Oral, QAM AC, Mervin Gill MD, 40 mg at 07/24/24 0719    pravastatin (PRAVACHOL) tablet 80 mg, 80 mg, Oral, QPM, Mervin Gill MD, 80 mg at 07/23/24 2140    carvedilol (COREG) tablet 6.25 mg, 6.25 mg, Oral, BID WC, Mervin Gill MD, 6.25 mg at 07/24/24 0752    levalbuterol (XOPENEX) nebulizer solution 1.25 mg, 1.25 mg, Nebulization, Q6H, Mervin Gill MD, 1.25 mg at 07/24/24 1304    sodium chloride flush 0.9 % injection 5-40 mL, 5-40 mL, IntraVENous, 2 times per day, Mervin Gill MD, 10 mL at 07/24/24 0758    sodium chloride flush 0.9 % injection 5-40 mL, 5-40 mL, IntraVENous, PRN, Mervin Gill MD    0.9 % sodium chloride infusion, , IntraVENous, PRN, Mervin Gill MD    potassium chloride (KLOR-CON M) extended release tablet 40 mEq, 40 mEq, Oral, PRN **OR** potassium bicarb-citric acid (EFFER-K) effervescent tablet 40 mEq, 40 mEq, Oral, PRN **OR** potassium chloride 10 mEq/100 mL IVPB (Peripheral Line), 10 mEq, IntraVENous, PRN, Mervin Gill MD    magnesium sulfate 2000 mg in water 50 mL IVPB, 2,000 mg, IntraVENous, PRN, Mervin Gill MD    ondansetron (ZOFRAN-ODT) disintegrating tablet 4 mg, 4 mg, Oral, Q8H PRN **OR** ondansetron (ZOFRAN) injection 4 mg, 4 mg, IntraVENous, Q6H PRN, Mervin Gill MD    polyethylene glycol (GLYCOLAX) packet 17 g, 17 g, Oral, Daily PRN, Mervin Gill MD    acetaminophen (TYLENOL) tablet 650 mg, 650 mg, Oral, Q6H PRN **OR** acetaminophen (TYLENOL) suppository 650 mg, 650 mg, Rectal, Q6H PRN, Mervin Gill MD    furosemide (LASIX) injection 40 mg, 40 mg, IntraVENous, BID, Mervin Gill MD, 40 mg at 07/24/24 0752    methylPREDNISolone sodium succ (SOLU-MEDROL) 40 mg in sterile water 1 mL injection, 40 mg, IntraVENous, Q8H, Mervin Gill MD, 40 mg at 07/24/24 0718    glucose chewable tablet 16 g, 4 tablet, Oral, PRN, Mervin Gill MD    dextrose bolus 10% 125 mL, 125 mL, IntraVENous, PRN **OR** dextrose bolus 10% 250 mL, 250 mL, 
Systems:     Review of Systems   HENT:          Pain to trach site.(Fresh trach)   Gastrointestinal:         Upset stomach at times.   All other systems reviewed and are negative.      Physical Examination :       Physical Exam  Vitals and nursing note reviewed.   Constitutional:       Appearance: Normal appearance. She is obese. She is not ill-appearing.   HENT:      Head: Normocephalic and atraumatic.      Right Ear: External ear normal.      Left Ear: External ear normal.      Nose: Nose normal.      Mouth/Throat:      Mouth: Mucous membranes are moist.      Pharynx: Oropharynx is clear.   Eyes:      General: No scleral icterus.     Conjunctiva/sclera: Conjunctivae normal.   Cardiovascular:      Rate and Rhythm: Normal rate. Rhythm irregular.      Heart sounds: No murmur heard.  Pulmonary:      Effort: Pulmonary effort is normal. No respiratory distress.      Breath sounds: Normal breath sounds.      Comments: Trach site clean.  On CPAP/PS.  Abdominal:      General: Bowel sounds are normal. There is no distension.      Palpations: Abdomen is soft.      Tenderness: There is no abdominal tenderness. There is no right CVA tenderness or left CVA tenderness.   Genitourinary:     Comments: No da silva.  Musculoskeletal:      Cervical back: Neck supple.      Right lower leg: Edema present.      Left lower leg: Edema present.   Skin:     General: Skin is warm and dry.      Capillary Refill: Capillary refill takes less than 2 seconds.      Findings: No rash.   Neurological:      Mental Status: She is alert and oriented to person, place, and time.   Psychiatric:         Mood and Affect: Mood normal.         Behavior: Behavior normal.         Past Medical History:     Past Medical History:   Diagnosis Date    Anxiety     Asthma     Atrial fibrillation (HCC)     A-fib noted on 05/25/2024 visit to ER    Bronchitis     DDD (degenerative disc disease), lumbar     Depression     Diabetes mellitus (HCC)     Elevated glucose     
bloody secretions.    Cardiovascular/Edema: irregularly irregular rhythm    Respiratory: Lungs: diminished breath sounds bilaterally    Abdomen: soft, non-tender; bowel sounds normal; no masses,  no organomegaly    Back: symmetric, no curvature. ROM normal. No CVA tenderness.    Extremities: edema +    Neuro:  Grossly normal    CBC:   Recent Labs     08/02/24 0346 08/03/24  0428 08/04/24  0420   WBC 8.9 8.4 8.0   HGB 10.1* 10.2* 9.7*    227 211       BMP:    Recent Labs     08/02/24 0346 08/03/24  0428 08/04/24  0420    137 136   K 4.4 5.0 5.4*   CL 90* 92* 93*   CO2 39* 39* 40*   BUN 48* 38* 30*   CREATININE 1.3* 1.1* 1.0*   GLUCOSE 126* 129* 204*       Lab Results   Component Value Date/Time    NITRU NEGATIVE 08/02/2024 06:00 PM    COLORU Yellow 08/02/2024 06:00 PM    PHUR 7.5 08/02/2024 06:00 PM    PHUR 7.0 06/13/2023 01:50 PM    WBCUA 0 TO 2 08/02/2024 06:00 PM    RBCUA 0 TO 2 08/02/2024 06:00 PM    MUCUS NOT REPORTED 07/27/2014 05:27 PM    TRICHOMONAS NOT REPORTED 07/27/2014 05:27 PM    YEAST NOT REPORTED 07/27/2014 05:27 PM    BACTERIA None 08/02/2024 06:00 PM    CLARITYU clear 03/26/2012 05:31 PM    SPECGRAV 1.015 03/26/2012 05:31 PM    LEUKOCYTESUR NEGATIVE 08/02/2024 06:00 PM    UROBILINOGEN Normal 08/02/2024 06:00 PM    BILIRUBINUR NEGATIVE 08/02/2024 06:00 PM    BLOODU trace 03/26/2012 05:31 PM    GLUCOSEU NEGATIVE 08/02/2024 06:00 PM    GLUCOSEU NEGATIVE 11/21/2011 11:29 AM    KETUA NEGATIVE 08/02/2024 06:00 PM    AMORPHOUS NOT REPORTED 07/27/2014 05:27 PM     IMPRESSION/RECOMMENDATIONS:      1.  Acute kidney injury - most consistent with prerenal azotemia.  Renal function has improved with serum creatinine down to 1.0 mg/dL today.  Plan: Monitor urine output closely.  Avoid nephrotoxic agents.  Basic metabolic profile daily.    2.  Systemic hypertension - Blood pressure is adequately controlled.    3.  Hyperkalemia - will treat with 50% dextrose followed by insulin as well as IV 
dry.      Findings: No rash.         Past Medical History:     Past Medical History:   Diagnosis Date    Anxiety     Asthma     Atrial fibrillation (HCC)     A-fib noted on 05/25/2024 visit to ER    Bronchitis     DDD (degenerative disc disease), lumbar     Depression     Diabetes mellitus (HCC)     Elevated glucose     Headache(784.0)     Hernia of abdominal cavity     HH (hiatus hernia)     Hyperlipemia, mixed     Hypertension     Osteoarthritis     Right femoral vein DVT (HCC) 05/2009    Dr. Elizabeth    Uterine hyperplasia        Past Surgical  History:     Past Surgical History:   Procedure Laterality Date    BREAST REDUCTION SURGERY  12/1997    BREAST REDUCTION SURGERY  12/1997    DILATION AND CURETTAGE OF UTERUS  10/08 and 09/07    HYSTERECTOMY (CERVIX STATUS UNKNOWN)  7/17/15    Clive Miles, Dr Calderón, endometial cancer, FIGO grade 1    TONSILLECTOMY AND ADENOIDECTOMY  1962    TRACHEOSTOMY N/A 7/26/2024    TRACHEOSTOMY INSERTION performed by Fernando Lentz MD at [facility] OR    TYMPANOSTOMY TUBE PLACEMENT  03/1967       Medications:      [START ON 8/6/2024] predniSONE  10 mg Oral Daily    warfarin  6 mg Oral Once    insulin glargine  16 Units SubCUTAneous Nightly    lactulose  20 g Oral TID    sennosides-docusate sodium  2 tablet Oral Daily    enoxaparin  1 mg/kg SubCUTAneous BID    warfarin placeholder: dosing by pharmacy   Other RX Placeholder    carvedilol  6.25 mg Per NG tube BID WC    dilTIAZem  30 mg Per NG tube 4 times per day    montelukast  10 mg Per NG tube Nightly    lansoprazole  30 mg Per NG tube QAM AC    pravastatin  80 mg Per NG tube QPM    CENTRUM/CERTA-VY with minerals oral  15 mL Per NG tube Daily    miconazole   Topical BID    levalbuterol  1.25 mg Nebulization Q6H    sodium chloride flush  5-40 mL IntraVENous 2 times per day    [Held by provider] furosemide  40 mg IntraVENous BID    insulin lispro  0-8 Units SubCUTAneous Q4H       Social History:     Social History     Socioeconomic History    
eat   Exercise  Regular exercise is a good way to help your body control cholesterol. Regular exercise has many benefits. It can:  · Raise your good cholesterol.  · Help lower your bad cholesterol.  · Let blood flow better through your body.  · Give more oxygen to your muscles and tissues.  · Help you manage your weight.  Your health care provider may recommend that you get more physical activity if you haven't been active. Depending on your situation, your provider may recommend that you get moderate to vigorous physical activity for at least 40 minutes each day and for at least 3 to 4 days each week. A few examples of moderate to vigorous activity include:  · Walking at a brisk pace, about 3 to 4 miles per hour  · Jogging or running  · Swimming or water aerobics  · Hiking  · Dancing  · Martial arts  · Tennis  · Riding a bicycle or stationary bike  · Dancing  Weight management  If you are overweight or obese, your health care provider will work with you to help you lose weight and lower your BMI (body mass index) to a normal or near-normal level. Making diet changes and getting more physical activity can help.    Quitting smoking  Smoking and other tobacco use can raise cholesterol and make it harder to control. Quitting is tough. But millions of people have given up tobacco for good. You can quit, too! Think about some of the reasons below to quit smoking. Do any of them make you think twice about your smoking habit?  Stop smoking because it:  · Keeps your cholesterol high, even if you make all the other changes you’re supposed to.  · Damages your body, especially your heart, lungs, and blood vessels.  · Makes you more likely to have a heart attack (also known as acute myocardial infarction, or AMI), stroke, or cancer.  · Stains your teeth and makes your skin, clothes, and breath smell bad.  · Costs a lot of money.  Stress   Learn stress-management techniques to help you deal with stress in your home and work 
HGB 9.0 (L) 07/25/2024    HCT 29.5 (L) 07/25/2024    MCV 83.7 07/25/2024     07/25/2024     Lab Results   Component Value Date    CALCIUM 9.3 07/25/2024     07/25/2024    K 3.3 (L) 07/25/2024    CO2 43 (HH) 07/25/2024    CL 88 (L) 07/25/2024    BUN 27 (H) 07/25/2024    CREATININE 0.8 07/25/2024     No components found for: \"ABGSAMPLETYP\", \"ABGBODYTEMP\", \"ABGPHCORRFOR\", \"CNJZDS8ECVVGS\", \"ABGPHCORRFOOR\", \"ABGPH\", \"ABGPCO2\", \"ABGPO2\", \"ABGBASEEXCES\", \"ABGBASEDEFIC\", \"ABGHCO3\", \"RFPB3GXN\", \"ABGENDTIDALC\", \"ABGALLENSTES\", \"ABGSPO2\", \"ABGSAMEPLESIT\", \"[id number]\", \"ABGOXYGENSOU\"  Lab Results   Component Value Date    INR 3.4 07/25/2024    PROTIME 34.3 (H) 07/25/2024       Radiology:   [unfilled]  My reading of film: No new imaging.    ASSESSMENT:     Acute hypoxic respiratory failure-patient normally on 4 L nasal cannula but not tolerating nasal cannula now with a saturation in the low 80s  Acute on chronic hypercapnic respiratory failure-pCO2 baseline probably ranges between 70s and 80s, predominantly on the basis of obesity hypoventilation syndrome, BMI nearly 70!,  pCO2 119 in the ER currently down to 80s improved  SHWETA/OHS-clinically severe patient has refused to go for sleep studies noncompliant with her CPAP, BiPAP ordered for her at the [facility] nursing facility but according to paramedics she had been refusing it yet again!  Asthma-spirometry showed pseudo restriction in 2017; patient currently not bronchospastic  Pulmonary embolism/chronic DVT-2022 in 2018 based on high probability VQ scan (patient too large for CT Scanner); has been maintained on Coumadin aiming for INR 3-3.5, currently in the upper 2 range  Patient non-smoker>> she does not have COPD  Atrial fibrillation  Urine culture positive for gram-negative rods currently on cefepime  Hypertension  Morbid obesity BMI 67  Hyperlipidemia  Depression  Previous admission with Influenza A April 2024  Medical noncompliance-does not see any 
life.   Making the most of medications  Healthy eating and exercise are a good start to keeping your cholesterol down. But you may need some extra help from medication. If your doctor prescribes medication, be sure to take it exactly as directed. Remember:  · Tell your doctor about all other medications you take, including vitamins and herbs.  · Tell your doctor if you have any side effects after starting to take a medication. Examples of side effects to watch for include: muscle aches, weakness, blurred vision, rust-colored urine, yellowing of eyes or skin (jaundice), or headache.  · Don’t skip a dose or stop taking your medication because you feel better or because your cholesterol numbers go down. Never stop taking your medication unless your doctor has told you it’s OK.         
miconazole   Topical BID    levalbuterol  1.25 mg Nebulization Q6H    sodium chloride flush  5-40 mL IntraVENous 2 times per day    [Held by provider] furosemide  40 mg IntraVENous BID    insulin lispro  0-8 Units SubCUTAneous Q4H       Social History:     Social History     Socioeconomic History    Marital status: Single     Spouse name: Not on file    Number of children: Not on file    Years of education: Not on file    Highest education level: Not on file   Occupational History    Not on file   Tobacco Use    Smoking status: Never     Passive exposure: Past    Smokeless tobacco: Never   Vaping Use    Vaping Use: Never used   Substance and Sexual Activity    Alcohol use: Not Currently     Comment: rare    Drug use: No    Sexual activity: Never   Other Topics Concern    Not on file   Social History Narrative    Not on file     Social Determinants of Health     Financial Resource Strain: Low Risk  (3/24/2023)    Overall Financial Resource Strain (CARDIA)     Difficulty of Paying Living Expenses: Not hard at all   Food Insecurity: No Food Insecurity (7/23/2024)    Hunger Vital Sign     Worried About Running Out of Food in the Last Year: Never true     Ran Out of Food in the Last Year: Never true   Transportation Needs: No Transportation Needs (7/23/2024)    PRAPARE - Transportation     Lack of Transportation (Medical): No     Lack of Transportation (Non-Medical): No   Physical Activity: Inactive (10/20/2023)    Exercise Vital Sign     Days of Exercise per Week: 0 days     Minutes of Exercise per Session: 0 min   Stress: Not on file   Social Connections: Not on file   Intimate Partner Violence: Not on file   Housing Stability: Low Risk  (7/23/2024)    Housing Stability Vital Sign     Unable to Pay for Housing in the Last Year: No     Number of Places Lived in the Last Year: 2     Unstable Housing in the Last Year: No       Family History:     Family History   Problem Relation Age of Onset    Asthma Mother     Mental 
normally on 4 L nasal cannula but not tolerating nasal cannula now with a saturation in the low 80s, currently on ventilatory support  Acute on chronic hypercapnic respiratory failure-pCO2 baseline probably ranges between 70s and 80s, predominantly on the basis of obesity hypoventilation syndrome,  pCO2 119 in the ER currently down to 80s improved, status post tracheostomy July 26 with plans for nocturnal ventilation and as needed during the day, currently appears to be dependent on pressure support to maintain adequate pCO2  SHWETA/OHS-clinically severe patient has refused to go for sleep studies noncompliant with her CPAP, BiPAP ordered for her at the [facility] but according to paramedics she had been refusing it yet again!,  Now underwent tracheostomy due to chronic noncompliance with BiPAP  Asthma-spirometry showed pseudo restriction in 2017; patient currently not bronchospastic  Pulmonary embolism/chronic DVT-2022 in 2018 based on high probability VQ scan (patient too large for CT Scanner); has been maintained on Coumadin aiming for INR 3-3.5, currently in the upper 2 range,   Patient non-smoker>> she does not have COPD  Atrial fibrillation  Urine culture positive for Klebsiella pneumonia  MRSA nasal swab positive  Hypertension  Morbid obesity BMI 67  Hyperlipidemia  Depression  Previous admission with Influenza A April 2024  Medical noncompliance-does not see any pulmonary physician in the outpatient setting  Full code      Plan:    Patient's barium study was completed.  The recommendations of the diet has been placed in orders    Reported the patient is continuing on treatment doses of Lovenox until the INR is therapeutic.  On Xopenex and Atrovent  Will decrease prednisone to 10 mg    Discharge planning underway.  I was told that the patient may be going back to the F as early as August 2    Critical Care Time   0 min    Electronically signed by Karel Solorzano MD on 8/1/2024 at 11:00 
on high probability VQ scan (patient too large for CT Scanner); has been maintained on Coumadin aiming for INR 3-3.5, currently in the upper 2 range  Patient non-smoker>> she does not have COPD  Atrial fibrillation  Hypertension  Morbid obesity BMI 67  Hyperlipidemia  Depression  Previous admission with Influenza A April 2024  Medical noncompliance-does not see any pulmonary physician in the outpatient setting  DNR CC arrest/DO NOT INTUBATE  PLAN:   I would think pure comfort measures would be reasonable for this patient.  If she is going to refuse BiPAP we should just respect her wishes and leave her off the BiPAP even if she starts to decompensate.  No objection to transfer out of ICU to progressive care from my standpoint.  If she is agreeable to continued attempts for BiPAP I have no objection.      Electronically signed by Rubin Kasper MD on 07/23/24     This progress note was completed using a voice transcription system. Every effort was made to ensure accuracy. However, inadvertent computerized transcription errors may be present.    Rubin Kasper MD  Pulmonary and Critical Care   [phone] Perfect Serve  [phone] Cell  
regarding those diagnoses were also included in determining the time component.      Electronically signed by Fernando Lentz MD  on 7/25/2024 at 11:41 AM   
      Imaging/Diagnostics:    Reviewed    Assessment :      Primary Problem  Acute respiratory failure with hypoxia and hypercarbia (HCC)    Active Hospital Problems    Diagnosis Date Noted    Multiple drug resistant organism (MDRO) culture positive [Z16.24] 07/27/2024    Allergy to multiple antibiotics [Z88.1] 07/27/2024    MRSA carrier [Z22.322] 07/27/2024    Acute respiratory failure with hypoxia and hypercarbia (HCC) [J96.01, J96.02] 07/22/2024    Type 2 respiratory failure (HCC) [J96.92] 07/22/2024    Urinary tract infection due to ESBL Klebsiella [N39.0, B96.89] 05/05/2018       Plan:     Patient status Admit as inpatient in the  Medical ICU    Acute on chronic 4 L hypoxemic hypercarbic respiratory failure.  Multifactorial, BNP elevated, last echocardiogram shows EF of 55-60%, normal diastolic heart function.  Echocardiogram did show dilated right ventricle, likely from pulmonary hypertension versus from PE in the past.  Will place on IV Lasix, diuresed to renal tolerance., BiPAP as needed, pulmonology on consult.  Continue bronchodilators  Asthma, for now given limited physical exam due to body habitus we will place on steroids.  Concerns for pneumonia (Pro-Jakob slightly elevated, wbc count wnl), will treat as HCAP given concerns for deterioration., MRSA nares, vancomycin and cefepime  Pulmonary embolism/chronic DVT 2022, INR goal 3-3.5.  Pharmacy to manage Coumadin,  Atrial fibrillation, resume home medications.?  Will discuss alternate agents (discontinuation of beta-blockers with pulmonary service).  If heart rate elevated, consider Cardizem gtt.  SHWETA/OHS, noncompliant with CPAP BiPAP  Morbid obesity  UA concerning for UTI, currently on cefepime.  Follow-up on urine cultures.  7/23  Patient, has metabolic encephalopathy due to hypercapnic respiratory failure due to noncompliance with BiPAP  Currently on Precedex drip,  Heart failure with preserved ejection fraction, peripheral edema pulmonary hypertension, 
Hospital Problems    Diagnosis Date Noted    Multiple drug resistant organism (MDRO) culture positive [Z16.24] 07/27/2024    Allergy to multiple antibiotics [Z88.1] 07/27/2024    MRSA carrier [Z22.322] 07/27/2024    Acute respiratory failure with hypoxia and hypercarbia (HCC) [J96.01, J96.02] 07/22/2024    Type 2 respiratory failure (HCC) [J96.92] 07/22/2024    Urinary tract infection due to ESBL Klebsiella [N39.0, B96.89] 05/05/2018       Plan:     Patient status Admit as inpatient in the  Medical ICU    Acute on chronic 4 L hypoxemic hypercarbic respiratory failure.  Multifactorial, BNP elevated, last echocardiogram shows EF of 55-60%, normal diastolic heart function.  Echocardiogram did show dilated right ventricle, likely from pulmonary hypertension versus from PE in the past.  Will place on IV Lasix, diuresed to renal tolerance., BiPAP as needed, pulmonology on consult.  Continue bronchodilators  Asthma, for now given limited physical exam due to body habitus we will place on steroids.  Concerns for pneumonia (Pro-Jakob slightly elevated, wbc count wnl), will treat as HCAP given concerns for deterioration., MRSA nares, vancomycin and cefepime  Pulmonary embolism/chronic DVT 2022, INR goal 3-3.5.  Pharmacy to manage Coumadin,  Atrial fibrillation, resume home medications.?  Will discuss alternate agents (discontinuation of beta-blockers with pulmonary service).  If heart rate elevated, consider Cardizem gtt.  SHWETA/OHS, noncompliant with CPAP BiPAP  Morbid obesity  UA concerning for UTI, currently on cefepime.  Follow-up on urine cultures.  7/23  Patient, has metabolic encephalopathy due to hypercapnic respiratory failure due to noncompliance with BiPAP  Currently on Precedex drip,  Heart failure with preserved ejection fraction, peripheral edema pulmonary hypertension, on IV diuretics plan to keep in negative balance  Reviewed chest x-ray, low Pro-Jakob, normal white count, I do not believe chest shadows are due to 
beta-blockers with pulmonary service).  If heart rate elevated, consider Cardizem gtt.  SHWETA/OHS, noncompliant with CPAP BiPAP  Morbid obesity  UA concerning for UTI, currently on cefepime.  Follow-up on urine cultures.  7/23  Patient, has metabolic encephalopathy due to hypercapnic respiratory failure due to noncompliance with BiPAP  Currently on Precedex drip,  Heart failure with preserved ejection fraction, peripheral edema pulmonary hypertension, on IV diuretics plan to keep in negative balance  Reviewed chest x-ray, low Pro-Jakob, normal white count, I do not believe chest shadows are due to pneumonia, I think it is more of pulmonary infiltrates due to CHF  Will discontinue vancomycin  Continuing cefepime for now to cover possible UTI final urine culture pending  History of PE, A-fib on anticoagulation, pharmacy dosing  Overall prognosis very guarded  Pulmonary medicine following  Likely will need change of CODE STATUS to comfort care , otherwise may need intubation, mechanical ventilatory support  Will request palliative care consult.   Overall prognosis very guarded high chances of clinical deterioration  CC time 35-minute  7/24  Patient, change CODE STATUS back to full code  She is not full sentences to make decision for herself  Patient is alert oriented time place person  Patient told me that her brother is coming and they will be having a meeting with palliative care at 130 today  Until that point she will be full code  On cefepime urine culture growing gram-negative rods, final sensitivity pending  Diet advanced to regular diet  On IV diuretics, negative balance around 2 L  On Coumadin, INR is therapeutic  Difficult situation    7/28  Seen and examined   Palliative care following   Tracheostomy done on 7/26, Coumadin restarted   Bridging with lovenox  General surgery on board   Has H/o  recurrent PE  last PE  in 2022  Goal inr - 2.5-3.5, per Heamatology Notes in 8/22   Urine growing gram-negative rods, 
reviewed  Abx per ID, tygacil stopped ,  Bactrim now thru 8/4/24  On IV diuretics, negative balance of around , has heart failure with preserved ejection fraction  Overall prognosis very guarded  Meds changed via NG   Speech on board, possible swallow study      Consultations:   IP CONSULT TO PULMONOLOGY  IP CONSULT TO SPIRITUAL SERVICES  IP CONSULT TO RESPIRATORY CARE  IP CONSULT TO SPIRITUAL SERVICES  IP CONSULT TO PALLIATIVE CARE  IP CONSULT TO GENERAL SURGERY  IP CONSULT TO INFECTIOUS DISEASES  IP CONSULT TO DIETITIAN  PHARMACY TO DOSE WARFARIN    Patient is admitted as inpatient status because of co-morbidities listed above, severity of signs and symptoms as outlined, requirement for current medical therapies and most importantly because of direct risk to patient if care not provided in a hospital setting.    Radha Calles MD  7/30/2024  3:15 PM    Copy sent to Jyoti Dennis, APRN - CNP    Please note that this chart was generated using voice recognition Dragon dictation software.  Although every effort was made to ensure the accuracy of this automated transcription, some errors in transcription may have occurred.  
PALLIATIVE CARE  IP CONSULT TO GENERAL SURGERY  IP CONSULT TO INFECTIOUS DISEASES  IP CONSULT TO DIETITIAN  PHARMACY TO DOSE WARFARIN    Patient is admitted as inpatient status because of co-morbidities listed above, severity of signs and symptoms as outlined, requirement for current medical therapies and most importantly because of direct risk to patient if care not provided in a hospital setting.    Radha Calles MD  7/29/2024  4:29 PM    Copy sent to Jyoti Dennis, APRN - CNP    Please note that this chart was generated using voice recognition Dragon dictation software.  Although every effort was made to ensure the accuracy of this automated transcription, some errors in transcription may have occurred.  
code  On cefepime urine culture growing gram-negative rods, final sensitivity pending  Diet advanced to regular diet  On IV diuretics, negative balance around 2 L  On Coumadin, INR is therapeutic  Difficult situation    7/26  Patient, changed her CODE STATUS to full code  Palliative care following   Tracheostomy done on 7/26, Coumadin kept on hold, INR was more than 3  General surgery on board   After Vit K, FFP, down to 1.7   Has H/o  recurrent PE  last PE  in 2022  Goal inr - 2.5-3.5, per Heamatology Notes in 8/22   Urine growing gram-negative rods, final cultures pending  On IV diuretics, negative balance of around 6 L, has heart failure with preserved ejection fraction  Overall prognosis very guarded      Consultations:   IP CONSULT TO PULMONOLOGY  IP CONSULT TO SPIRITUAL SERVICES  IP CONSULT TO RESPIRATORY CARE  IP CONSULT TO SPIRITUAL SERVICES  IP CONSULT TO PALLIATIVE CARE  IP CONSULT TO GENERAL SURGERY  IP CONSULT TO INFECTIOUS DISEASES    Patient is admitted as inpatient status because of co-morbidities listed above, severity of signs and symptoms as outlined, requirement for current medical therapies and most importantly because of direct risk to patient if care not provided in a hospital setting.    Radha Calles MD  7/26/2024  7:58 PM    Copy sent to Jyoti Dennis, APRN - CNP    Please note that this chart was generated using voice recognition Dragon dictation software.  Although every effort was made to ensure the accuracy of this automated transcription, some errors in transcription may have occurred.  
prognosis very guarded high chances of clinical deterioration  CC time 35-minute  7/24  Patient, change CODE STATUS back to full code  She is not full sentences to make decision for herself  Patient is alert oriented time place person  Patient told me that her brother is coming and they will be having a meeting with palliative care at 130 today  Until that point she will be full code  On cefepime urine culture growing gram-negative rods, final sensitivity pending  Diet advanced to regular diet  On IV diuretics, negative balance around 2 L  On Coumadin, INR is therapeutic  Difficult situation    8/1  Seen and examined   Morbid obesity/SHWETA/OHS   Tracheostomy done on 7/26, Coumadin restarted   Bridging with lovenox   INR 1.7  General surgery on board   Has H/o  recurrent PE  last PE  in 2022  Goal inr - 2.5-3.5, per Heamatology Notes in 8/22   Urine growing gram-negative rods, final cultures reviewed  Abx per ID, tygacil stopped ,  Bactrim now thru 8/4/24  On IV diuretics, negative balance of around , has heart failure with preserved ejection fraction  Overall prognosis very guarded  Meds changed via NG   Speech on board,  swallow study  NG removed   Diet started , passed swallow   Possible SNF soon    8/2/2024  Patient continues to have mild amount of blood in the trach.  Is on anticoagulation given history of recurrent PE, being bridged with Lovenox.  As patient not seen yesterday, unable to say if this is worsening.  Will follow-up on pulmonary recommendations.  Hemoglobin is currently stable, platelet count within normal limits  RACHAEL noted on blood work today, Lasix held.  Good urine lites, urine eosinophils requested.  Nephrology consulted.  Have reached out to ID to discuss Bactrim continuation.  Reviewed urine cultures, ESBL Klebsiella pneumonia.  May still be a candidate to transfer to ECF once cleared by ID and nephrology.  Continue diet, has passed swallow evaluation  HF PEF, currently not in exacerbation  Of 
findings concerns for possible aspiration.  On speaking with nursing staff, swallowing with no difficulty.  Will encourage raising head end of bed, continue PPI    8/4  Lovenox is still on hold, inr 2.0. resumption per pulmonary.   Suspect xray finding from atelectasis, monitor off abs. No fevers/ wbc stable   Marco, concerning for pre renal azotemia. Improving. Renal on board. Lasix on hold, await recs on possible resumption at a lower dose  Blood sugars fairly controlled, increase lantus to 16 units.   Vital s otherwise stable  Continue trach collar, bipap as tolerated  Constipation, passing gas. Intensify bowel protocol  Full code per pt request  Prognosis very poor    8/5/2024  MARCO, likely prerenal acidemia.  Improved.  Nephrology on board.  Follow-up on nephrology recommendations on reinitiating diuretics as tolerated  MDRO ESBL Carbapenem resistant Klebsiella pneumoniae UTI, Bactrim discontinued 8/2/2024.  This was for worsening renal function, continue to monitor off antibiotics  Acute on chronic respiratory failure s/p tracheostomy tube placement 7/26/2024  INR 1.8, await pulmonology recommendations on reinitiating Lovenox.  Currently on hold given bloody tracheal secretions, noted mild blood tinged secretions   Can be discharged to ECF once cleared by nephrology and pulmonology    Consultations:   IP CONSULT TO PULMONOLOGY  IP CONSULT TO SPIRITUAL SERVICES  IP CONSULT TO RESPIRATORY CARE  IP CONSULT TO SPIRITUAL SERVICES  IP CONSULT TO PALLIATIVE CARE  IP CONSULT TO GENERAL SURGERY  IP CONSULT TO INFECTIOUS DISEASES  IP CONSULT TO DIETITIAN  PHARMACY TO DOSE WARFARIN  IP CONSULT TO NEPHROLOGY    Patient is admitted as inpatient status because of co-morbidities listed above, severity of signs and symptoms as outlined, requirement for current medical therapies and most importantly because of direct risk to patient if care not provided in a hospital setting.    Mervin Gill MD  8/5/2024  7:03 AM    Copy sent

## 2024-08-06 ENCOUNTER — HOSPITAL ENCOUNTER (OUTPATIENT)
Age: 69
Setting detail: SPECIMEN
Discharge: HOME | End: 2024-08-06

## 2024-08-06 LAB
ERYTHROCYTE [DISTWIDTH] IN BLOOD BY AUTOMATED COUNT: 17.6 % (ref 11.8–14.4)
HCT VFR BLD AUTO: 36.3 % (ref 36.3–47.1)
HGB BLD-MCNC: 10.1 G/DL (ref 11.9–15.1)
INR PPP: 1.8
MCH RBC QN AUTO: 25.6 PG (ref 25.2–33.5)
MCHC RBC AUTO-ENTMCNC: 27.8 G/DL (ref 28.4–34.8)
MCV RBC AUTO: 91.9 FL (ref 82.6–102.9)
NRBC BLD-RTO: 0 PER 100 WBC
PARTIAL THROMBOPLASTIN TIME: 25 SEC (ref 23–36.5)
PLATELET # BLD AUTO: 187 K/UL (ref 138–453)
PMV BLD AUTO: 10.1 FL (ref 8.1–13.5)
PROTHROMBIN TIME: 20.4 SEC (ref 11.7–14.9)
RBC # BLD AUTO: 3.95 M/UL (ref 3.95–5.11)
WBC OTHER # BLD: 7.5 K/UL (ref 3.5–11.3)

## 2024-08-06 PROCEDURE — 85610 PROTHROMBIN TIME: CPT

## 2024-08-06 PROCEDURE — 36415 COLL VENOUS BLD VENIPUNCTURE: CPT

## 2024-08-06 PROCEDURE — P9603 ONE-WAY ALLOW PRORATED MILES: HCPCS

## 2024-08-06 PROCEDURE — 85027 COMPLETE CBC AUTOMATED: CPT

## 2024-08-06 PROCEDURE — 85730 THROMBOPLASTIN TIME PARTIAL: CPT

## 2024-08-09 ENCOUNTER — HOSPITAL ENCOUNTER (OUTPATIENT)
Age: 69
Setting detail: SPECIMEN
Discharge: HOME | End: 2024-08-09

## 2024-08-09 PROCEDURE — 87070 CULTURE OTHR SPECIMN AEROBIC: CPT

## 2024-08-09 PROCEDURE — 87205 SMEAR GRAM STAIN: CPT

## 2024-08-11 LAB
MICROORGANISM SPEC CULT: NORMAL
MICROORGANISM/AGENT SPEC: NORMAL
MICROORGANISM/AGENT SPEC: NORMAL
SERVICE CMNT-IMP: NORMAL
SPECIMEN DESCRIPTION: NORMAL

## 2024-08-12 ENCOUNTER — HOSPITAL ENCOUNTER (OUTPATIENT)
Age: 69
Setting detail: SPECIMEN
Discharge: HOME | End: 2024-08-12

## 2024-08-12 LAB
INR PPP: 2.5
PROTHROMBIN TIME: 26.7 SEC (ref 11.7–14.9)

## 2024-08-12 PROCEDURE — 85610 PROTHROMBIN TIME: CPT

## 2024-08-12 PROCEDURE — P9603 ONE-WAY ALLOW PRORATED MILES: HCPCS

## 2024-08-12 PROCEDURE — 36415 COLL VENOUS BLD VENIPUNCTURE: CPT

## 2024-08-13 LAB
MICROORGANISM SPEC CULT: ABNORMAL
MICROORGANISM SPEC CULT: ABNORMAL
MICROORGANISM/AGENT SPEC: ABNORMAL
MICROORGANISM/AGENT SPEC: ABNORMAL
SERVICE CMNT-IMP: ABNORMAL
SPECIMEN DESCRIPTION: ABNORMAL

## 2024-08-15 ENCOUNTER — HOSPITAL ENCOUNTER (OUTPATIENT)
Age: 69
Setting detail: SPECIMEN
Discharge: HOME | End: 2024-08-15

## 2024-08-15 LAB
INR PPP: 4
PROTHROMBIN TIME: 37.4 SEC (ref 11.7–14.9)

## 2024-08-15 PROCEDURE — P9603 ONE-WAY ALLOW PRORATED MILES: HCPCS

## 2024-08-15 PROCEDURE — 36415 COLL VENOUS BLD VENIPUNCTURE: CPT

## 2024-08-15 PROCEDURE — 85610 PROTHROMBIN TIME: CPT

## 2024-08-16 PROBLEM — J95.02: Status: ACTIVE | Noted: 2024-08-16

## 2024-08-17 ENCOUNTER — HOSPITAL ENCOUNTER (OUTPATIENT)
Age: 69
Setting detail: SPECIMEN
Discharge: HOME | End: 2024-08-17
Payer: MEDICARE

## 2024-08-17 LAB
INR PPP: 3
PROTHROMBIN TIME: 31 SEC (ref 11.5–14.2)

## 2024-08-17 PROCEDURE — 36415 COLL VENOUS BLD VENIPUNCTURE: CPT

## 2024-08-17 PROCEDURE — 85610 PROTHROMBIN TIME: CPT

## 2024-08-18 ENCOUNTER — HOSPITAL ENCOUNTER (EMERGENCY)
Age: 69
Discharge: HOME | End: 2024-08-18
Attending: EMERGENCY MEDICINE
Payer: MEDICARE

## 2024-08-18 ENCOUNTER — APPOINTMENT (OUTPATIENT)
Dept: GENERAL RADIOLOGY | Age: 69
End: 2024-08-18
Payer: MEDICARE

## 2024-08-18 VITALS
OXYGEN SATURATION: 100 % | DIASTOLIC BLOOD PRESSURE: 54 MMHG | TEMPERATURE: 98.5 F | RESPIRATION RATE: 21 BRPM | HEART RATE: 76 BPM | SYSTOLIC BLOOD PRESSURE: 101 MMHG

## 2024-08-18 DIAGNOSIS — Z43.0 ENCOUNTER FOR TRACHEOSTOMY TUBE CHANGE (HCC): Primary | ICD-10-CM

## 2024-08-18 LAB
BODY TEMPERATURE: 37
COHGB MFR BLD: 3.1 % (ref 0–5)
FIO2 ON VENT: ABNORMAL %
HCO3 VENOUS: 36.7 MMOL/L (ref 24–30)
O2 SAT, VEN: 73.8 % (ref 60–85)
PCO2 VENOUS: 60.8 MM HG (ref 39–55)
PH VENOUS: 7.4 (ref 7.32–7.42)
PO2 VENOUS: 42.6 MM HG (ref 30–50)
POSITIVE BASE EXCESS, VEN: 10.6 MMOL/L (ref 0–2)

## 2024-08-18 PROCEDURE — 6360000002 HC RX W HCPCS

## 2024-08-18 PROCEDURE — 2700000000 HC OXYGEN THERAPY PER DAY

## 2024-08-18 PROCEDURE — 36415 COLL VENOUS BLD VENIPUNCTURE: CPT

## 2024-08-18 PROCEDURE — 31502 CHANGE OF WINDPIPE AIRWAY: CPT

## 2024-08-18 PROCEDURE — 71045 X-RAY EXAM CHEST 1 VIEW: CPT

## 2024-08-18 PROCEDURE — 99284 EMERGENCY DEPT VISIT MOD MDM: CPT

## 2024-08-18 PROCEDURE — 82805 BLOOD GASES W/O2 SATURATION: CPT

## 2024-08-18 PROCEDURE — 94761 N-INVAS EAR/PLS OXIMETRY MLT: CPT

## 2024-08-18 RX ORDER — LORAZEPAM 2 MG/ML
1 INJECTION INTRAMUSCULAR ONCE
Status: DISCONTINUED | OUTPATIENT
Start: 2024-08-18 | End: 2024-08-18 | Stop reason: HOSPADM

## 2024-08-18 ASSESSMENT — PULMONARY FUNCTION TESTS: PIF_VALUE: 15

## 2024-08-18 NOTE — ED PROVIDER NOTES
[facility]     Emergency Department     Faculty Attestation    I performed a history and physical examination of the patient and discussed management with the resident. I have reviewed and agree with the resident’s findings including all diagnostic interpretations, and treatment plans as written at the time of my review. Any areas of disagreement are noted on the chart. I was personally present for the key portions of any procedures. I have documented in the chart those procedures where I was not present during the key portions. For Physician Assistant/ Nurse Practitioner cases/documentation I have personally evaluated this patient and have completed at least one if not all key elements of the E/M (history, physical exam, and MDM). Additional findings are as noted.    PtName: Shazia ROGERS  MRN: [id number]  Birthdate 1955  Date of evaluation: 8/18/24  Note Started: 9:56 AM EDT    Primary Care Physician: Jyoti Mauricio, APRN - CNP        History: This is a 68 y.o. female who presents to the Emergency Department with complaint of Jacqueline trach change.  Patient had recent trach placed on July 26.    Physical:   oral temperature is 98.5 °F (36.9 °C). Her blood pressure is 111/59 (abnormal) and her pulse is 96. Her respiration is 19 and oxygen saturation is 100%.  Patient is trach in place, occasional wheezing auscultated,    Impression: Trach change    Plan: X-ray, ENT consultation    Trach was changed good seal obtained.  Will discharge the patient back to the nursing home.        Medical Decision Making  Problems Addressed:  Encounter for tracheostomy tube change (HCC): acute illness or injury    Amount and/or Complexity of Data Reviewed  Labs: ordered.  Radiology: ordered.  Discussion of management or test interpretation with external provider(s): ENT    Risk  Prescription drug management.            (Please note that portions of this note were  completed with a voice recognition program.  Efforts were made to edit the dictations but occasionally words are mis-transcribed.)    Emery Magaña MD, FACEP  Attending Emergency Medicine Physician        Emery Magaña MD  08/18/24 [id number]

## 2024-08-18 NOTE — CONSULTS
Phone consult performed on recommendation to change trach due to balloon leak/not holding air.  No further assistance requested by ED.

## 2024-08-18 NOTE — ED NOTES
Transportation arranged for [facility] to [facility], ETA 1600. Paperwork faxed.

## 2024-08-18 NOTE — DISCHARGE INSTRUCTIONS
You were seen today in the emergency department for your tracheostomy issue.  We have evaluated you and placed her tracheostomy.  We now feel you are safe for discharge home.    Please return to the emergency department immediately if develop any new or worsening concerns including chest pain, shortness of breath, abdominal pain, nausea, vomiting, diarrhea, weakness, loss consciousness, fever, chills, or any other concerns.    Please call your PCP and schedule appointment within the next 24 to 48 hours for follow-up.

## 2024-08-18 NOTE — ED PROVIDER NOTES
[facility] ED  Emergency Department Encounter  Emergency Medicine Resident     Pt Name:Shazia ROGERS  MRN: [id number]  Birthdate 1955  Date of evaluation: 8/18/24  PCP:  Jyoti Mauricio, ARACELIS - CNP  Note Started: 9:46 AM EDT      CHIEF COMPLAINT       Chief Complaint   Patient presents with    trach leaking       HISTORY OF PRESENT ILLNESS  (Location/Symptom, Timing/Onset, Context/Setting, Quality, Duration, Modifying Factors, Severity.)      Shazia ROGERS is a 68 y.o. female who presents with tracheostomy leak.  Patient had a tracheostomy placed approximately a month ago at [facility] due to acute on chronic respiratory failure secondary to obesity.  At nursing home they were concerned that the tracheostomy is leaking air and she was sent to [facility] for ear nose and throat evaluation.  Patient states that her breathing currently feels okay not in any distress, denies any pain including chest pain, abdominal pain, nausea, vomiting, diarrhea, fever, chills.    PAST MEDICAL / SURGICAL / SOCIAL / FAMILY HISTORY      has a past medical history of Anxiety, Asthma, Atrial fibrillation (HCC), Bronchitis, DDD (degenerative disc disease), lumbar, Depression, Diabetes mellitus (HCC), Elevated glucose, Headache(784.0), Hernia of abdominal cavity, HH (hiatus hernia), Hyperlipemia, mixed, Hypertension, Osteoarthritis, Right femoral vein DVT ([facility]), and Uterine hyperplasia.     has a past surgical history that includes Dilation and curettage of uterus (10/08 and 09/07); Breast reduction surgery (12/1997); Breast reduction surgery (12/1997); Tympanostomy tube placement (03/1967); Tonsillectomy and adenoidectomy (1962); Hysterectomy (7/17/15); and tracheostomy (N/A, 7/26/2024).      Social History     Socioeconomic History    Marital status: Single     Spouse name: Not on file    Number of children: Not on file    Years of education: Not on file    Highest education level: Not on file  Palpations: Abdomen is soft.      Tenderness: There is no abdominal tenderness.   Skin:     General: Skin is warm and dry.      Capillary Refill: Capillary refill takes less than 2 seconds.   Neurological:      General: No focal deficit present.      Mental Status: She is alert.           DDX/DIAGNOSTIC RESULTS / EMERGENCY DEPARTMENT COURSE / MDM     Medical Decision Making  Shazia ROGERS is a 68 y.o. female who presents with tracheostomy air leak.  Patient is GCS 15, nontoxic appearing, not in acute distress, speaking full sentences.  On the ventilator patient does have significant loss of volumes returning to the ventilator.  With discussion with RT believe that her trach may be too short for her habitus.  Patient is stable at this time, saturations are 100% on the ventilator, 50% FiO2.  Will plan on consultation with otolaryngology for possible trach replacement.      Amount and/or Complexity of Data Reviewed  Radiology: ordered.        EKG    All EKG's are interpreted by the Emergency Department Physician who either signs or Co-signs this chart in the absence of a cardiologist.    EMERGENCY DEPARTMENT COURSE:    ED Course as of 08/18/24 1131   Sun Aug 18, 2024   1045 Spoke to ENT, images sent of large stoma at the tracheostomy site.  ENT states that this is likely pressure injury.  Plan on exchanging the 7 proximal XLT trach for a 8 distal XLT trach.  RT to perform. [AK]   1131 Tracheostomy exchange,, 8 XLT did not fit, 7 proximal XLT was replaced with significant improvement in air leak.  Believe that the original trach was not in properly.  Will plan for discharge home with return precautions, follow-up with PCP. [AK]      ED Course User Index  [AK] Rai Saravia MD       PROCEDURES:      CONSULTS:  IP CONSULT TO OTOLARYNGOLOGY    CRITICAL CARE:  There was significant risk of life threatening deterioration of patient's condition requiring my direct management. Critical care time  minutes, excluding any

## 2024-08-18 NOTE — ED NOTES
Pt to ED via EMS due to needing her trach changed out by ENT due to it leaking. Pt spo2 is 98% on the vent and she states no complaints. Pt trach was placed a little less than a month ago so [facility] wanted the patient moved here to change it. Pt is on monitor, on stretcher, with call light.

## 2024-08-18 NOTE — PROGRESS NOTES
Tracheostomy tube currently in place:  Brand: Shiley   Size 7.0 XLT proximal      New Tracheostomy Tube Placed  Brand: Shiley  Size 7.0 XLT proximal    *Shiley 8.0 XLT distal was attempted per ENT recommendation, unable to pass trach, so same size as original trach was placed successfully.     Reason Trach tube changed:  trach leaking   Patient Tolerance: well  Breath Sounds after change: clear    Dr. Magaña and Dr. Saravia at bedside during procedure.     [x]EtCO2 verified   [x]Spare trach tube left at bedside      Mary Nam RCP   11:31 AM

## 2024-08-19 ENCOUNTER — TELEPHONE (OUTPATIENT)
Dept: ADMINISTRATIVE | Age: 69
End: 2024-08-19

## 2024-08-19 ENCOUNTER — HOSPITAL ENCOUNTER (OUTPATIENT)
Age: 69
Setting detail: SPECIMEN
Discharge: HOME | End: 2024-08-19

## 2024-08-19 LAB
INR PPP: 3.3
PROTHROMBIN TIME: 32.8 SEC (ref 11.7–14.9)

## 2024-08-19 PROCEDURE — 85610 PROTHROMBIN TIME: CPT

## 2024-08-19 PROCEDURE — P9603 ONE-WAY ALLOW PRORATED MILES: HCPCS

## 2024-08-19 PROCEDURE — 36415 COLL VENOUS BLD VENIPUNCTURE: CPT

## 2024-08-19 NOTE — TELEPHONE ENCOUNTER
[facility] calling to sched new adult pat appt for trach change. Please review and call to sched or inform them if we are unable to see this pat for this. Ask for Faby the  [phone]  .

## 2024-08-20 NOTE — TELEPHONE ENCOUNTER
Per Dr. Andrade and policy - patient to f/u with general surgery who placed patients trach. This writer relayed this information to Faby at [facility] and provided general surgeries contact information who voiced understanding. No additional needs.

## 2024-08-21 NOTE — PROGRESS NOTES
At the time of Shazia ROGERS visit to [facility] Emergency Room on 08/16/2024 a respiratory culture was obtained.    It was positive for ESBL-producing klebsiella pneumoniae. Spoke to patient's nurse at [facility] at 1410 on 08/21/2024 regarding need to start taking Levofloxacin.    Levofloxacin 750mg PO QD for 10 days #10 called into [facility] pharmacy [facility] ([phone]) on behalf of Dr. aMgaña.      Signed Jessie Martinez, PharmD Candidate

## 2024-08-22 ENCOUNTER — HOSPITAL ENCOUNTER (OUTPATIENT)
Age: 69
Setting detail: SPECIMEN
Discharge: HOME | End: 2024-08-22

## 2024-08-22 LAB
INR PPP: 3.1
PROTHROMBIN TIME: 30.9 SEC (ref 11.7–14.9)

## 2024-08-22 PROCEDURE — P9603 ONE-WAY ALLOW PRORATED MILES: HCPCS

## 2024-08-22 PROCEDURE — 85610 PROTHROMBIN TIME: CPT

## 2024-08-22 PROCEDURE — 36415 COLL VENOUS BLD VENIPUNCTURE: CPT

## 2024-08-23 ENCOUNTER — HOSPITAL ENCOUNTER (OUTPATIENT)
Age: 69
Setting detail: SPECIMEN
Discharge: HOME | End: 2024-08-23

## 2024-08-23 LAB
ANION GAP SERPL CALCULATED.3IONS-SCNC: 10 MMOL/L (ref 9–17)
BUN SERPL-MCNC: 15 MG/DL (ref 8–23)
BUN/CREAT SERPL: 17 (ref 9–20)
CALCIUM SERPL-MCNC: 9.3 MG/DL (ref 8.6–10.4)
CHLORIDE SERPL-SCNC: 97 MMOL/L (ref 98–107)
CO2 SERPL-SCNC: 32 MMOL/L (ref 20–31)
CREAT SERPL-MCNC: 0.9 MG/DL (ref 0.5–0.9)
ERYTHROCYTE [DISTWIDTH] IN BLOOD BY AUTOMATED COUNT: 19.3 % (ref 11.8–14.4)
GFR, ESTIMATED: 70 ML/MIN/1.73M2
GLUCOSE SERPL-MCNC: 151 MG/DL (ref 70–99)
HCT VFR BLD AUTO: 32.5 % (ref 36.3–47.1)
HGB BLD-MCNC: 9 G/DL (ref 11.9–15.1)
MCH RBC QN AUTO: 25.9 PG (ref 25.2–33.5)
MCHC RBC AUTO-ENTMCNC: 27.7 G/DL (ref 28.4–34.8)
MCV RBC AUTO: 93.7 FL (ref 82.6–102.9)
NRBC BLD-RTO: 0 PER 100 WBC
PLATELET # BLD AUTO: 346 K/UL (ref 138–453)
PMV BLD AUTO: 9.4 FL (ref 8.1–13.5)
POTASSIUM SERPL-SCNC: 3.6 MMOL/L (ref 3.7–5.3)
RBC # BLD AUTO: 3.47 M/UL (ref 3.95–5.11)
SODIUM SERPL-SCNC: 139 MMOL/L (ref 135–144)
WBC OTHER # BLD: 4.5 K/UL (ref 3.5–11.3)

## 2024-08-23 PROCEDURE — P9603 ONE-WAY ALLOW PRORATED MILES: HCPCS

## 2024-08-23 PROCEDURE — 85027 COMPLETE CBC AUTOMATED: CPT

## 2024-08-23 PROCEDURE — 36415 COLL VENOUS BLD VENIPUNCTURE: CPT

## 2024-08-23 PROCEDURE — 80048 BASIC METABOLIC PNL TOTAL CA: CPT

## 2024-08-26 ENCOUNTER — HOSPITAL ENCOUNTER (OUTPATIENT)
Age: 69
Setting detail: SPECIMEN
Discharge: HOME | End: 2024-08-26

## 2024-08-26 LAB
INR PPP: 2.8
PROTHROMBIN TIME: 28.9 SEC (ref 11.7–14.9)

## 2024-08-26 PROCEDURE — 85610 PROTHROMBIN TIME: CPT

## 2024-08-26 PROCEDURE — 36415 COLL VENOUS BLD VENIPUNCTURE: CPT

## 2024-08-26 PROCEDURE — P9603 ONE-WAY ALLOW PRORATED MILES: HCPCS

## 2024-08-29 ENCOUNTER — HOSPITAL ENCOUNTER (OUTPATIENT)
Age: 69
Setting detail: SPECIMEN
Discharge: HOME | End: 2024-08-29

## 2024-08-29 LAB
INR PPP: 3.7
PROTHROMBIN TIME: 35.7 SEC (ref 11.7–14.9)

## 2024-08-29 PROCEDURE — 85610 PROTHROMBIN TIME: CPT

## 2024-08-29 PROCEDURE — P9603 ONE-WAY ALLOW PRORATED MILES: HCPCS

## 2024-08-29 PROCEDURE — 36415 COLL VENOUS BLD VENIPUNCTURE: CPT

## 2024-09-03 ENCOUNTER — HOSPITAL ENCOUNTER (OUTPATIENT)
Age: 69
Setting detail: SPECIMEN
Discharge: HOME | End: 2024-09-03

## 2024-09-03 LAB
INR PPP: 3.7
PROTHROMBIN TIME: 35.5 SEC (ref 11.7–14.9)

## 2024-09-03 PROCEDURE — 85610 PROTHROMBIN TIME: CPT

## 2024-09-03 PROCEDURE — 36415 COLL VENOUS BLD VENIPUNCTURE: CPT

## 2024-09-03 PROCEDURE — P9603 ONE-WAY ALLOW PRORATED MILES: HCPCS

## 2024-09-05 ENCOUNTER — HOSPITAL ENCOUNTER (OUTPATIENT)
Age: 69
Setting detail: SPECIMEN
Discharge: HOME | End: 2024-09-05

## 2024-09-05 LAB
INR PPP: 3.1
PROTHROMBIN TIME: 31.1 SEC (ref 11.7–14.9)

## 2024-09-05 PROCEDURE — P9603 ONE-WAY ALLOW PRORATED MILES: HCPCS

## 2024-09-05 PROCEDURE — 85610 PROTHROMBIN TIME: CPT

## 2024-09-05 PROCEDURE — 36415 COLL VENOUS BLD VENIPUNCTURE: CPT

## 2024-09-09 ENCOUNTER — HOSPITAL ENCOUNTER (OUTPATIENT)
Age: 69
Setting detail: SPECIMEN
Discharge: HOME | End: 2024-09-09

## 2024-09-09 LAB
INR PPP: 2.4
PROTHROMBIN TIME: 25.5 SEC (ref 11.7–14.9)

## 2024-09-09 PROCEDURE — 85610 PROTHROMBIN TIME: CPT

## 2024-09-09 PROCEDURE — P9603 ONE-WAY ALLOW PRORATED MILES: HCPCS

## 2024-09-09 PROCEDURE — 36415 COLL VENOUS BLD VENIPUNCTURE: CPT

## 2024-09-11 ENCOUNTER — HOSPITAL ENCOUNTER (INPATIENT)
Age: 69
LOS: 3 days | Discharge: ANOTHER HOSPITAL | End: 2024-09-14
Attending: EMERGENCY MEDICINE | Admitting: INTERNAL MEDICINE
Payer: MEDICARE

## 2024-09-11 ENCOUNTER — APPOINTMENT (OUTPATIENT)
Dept: GENERAL RADIOLOGY | Age: 69
End: 2024-09-11
Payer: MEDICARE

## 2024-09-11 ENCOUNTER — APPOINTMENT (OUTPATIENT)
Dept: GENERAL RADIOLOGY | Age: 69
End: 2024-09-11
Attending: EMERGENCY MEDICINE
Payer: MEDICARE

## 2024-09-11 DIAGNOSIS — J96.21 ACUTE ON CHRONIC RESPIRATORY FAILURE WITH HYPOXIA (HCC): Primary | ICD-10-CM

## 2024-09-11 PROBLEM — J96.90 RESPIRATORY FAILURE: Status: ACTIVE | Noted: 2024-09-11

## 2024-09-11 PROBLEM — J96.20 ACUTE AND CHR RESP FAILURE, UNSP W HYPOXIA OR HYPERCAPNIA (HCC): Status: ACTIVE | Noted: 2024-09-11

## 2024-09-11 LAB
ANION GAP SERPL CALCULATED.3IONS-SCNC: 7 MMOL/L (ref 9–17)
ARTERIAL PATENCY WRIST A: ABNORMAL
BASOPHILS # BLD: 0.05 K/UL (ref 0–0.2)
BASOPHILS NFR BLD: 1 % (ref 0–2)
BDY SITE: ABNORMAL
BODY TEMPERATURE: 37
BODY TEMPERATURE: 37
BUN SERPL-MCNC: 11 MG/DL (ref 8–23)
CALCIUM SERPL-MCNC: 9.4 MG/DL (ref 8.6–10.4)
CHLORIDE SERPL-SCNC: 98 MMOL/L (ref 98–107)
CO2 SERPL-SCNC: 36 MMOL/L (ref 20–31)
COHGB MFR BLD: 0.3 % (ref 0–5)
COHGB MFR BLD: 3.1 % (ref 0–5)
CREAT SERPL-MCNC: 0.8 MG/DL (ref 0.5–0.9)
EKG Q-T INTERVAL: 344 MS
EKG QRS DURATION: 90 MS
EKG QTC CALCULATION (BAZETT): 478 MS
EKG R AXIS: -40 DEGREES
EKG T AXIS: 10 DEGREES
EKG VENTRICULAR RATE: 116 BPM
EOSINOPHIL # BLD: 0.22 K/UL (ref 0–0.4)
EOSINOPHILS RELATIVE PERCENT: 4 % (ref 0–4)
ERYTHROCYTE [DISTWIDTH] IN BLOOD BY AUTOMATED COUNT: 21 % (ref 11.5–14.9)
FIO2 ON VENT: 40 %
GAS FLOW.O2 O2 DELIVERY SYS: ABNORMAL L/MIN
GAS FLOW.O2 SETTING OXYMISER: 12 L/MIN
GFR, ESTIMATED: 81 ML/MIN/1.73M2
GLUCOSE BLD-MCNC: 147 MG/DL (ref 65–105)
GLUCOSE BLD-MCNC: 151 MG/DL (ref 65–105)
GLUCOSE SERPL-MCNC: 181 MG/DL (ref 70–99)
HCO3 ARTERIAL: 38.8 MMOL/L (ref 22–26)
HCO3 VENOUS: 37.6 MMOL/L (ref 24–30)
HCT VFR BLD AUTO: 31.2 % (ref 36–46)
HGB BLD-MCNC: 9.9 G/DL (ref 12–16)
INR PPP: 2.1
LYMPHOCYTES NFR BLD: 0.86 K/UL (ref 1–4.8)
LYMPHOCYTES RELATIVE PERCENT: 16 % (ref 24–44)
MAGNESIUM SERPL-MCNC: 1.7 MG/DL (ref 1.6–2.6)
MCH RBC QN AUTO: 27.5 PG (ref 26–34)
MCHC RBC AUTO-ENTMCNC: 31.8 G/DL (ref 31–37)
MCV RBC AUTO: 86.4 FL (ref 80–100)
METHEMOGLOBIN: 0.3 % (ref 0–1.9)
METHEMOGLOBIN: 0.3 % (ref 0–1.9)
MONOCYTES NFR BLD: 0.43 K/UL (ref 0.1–1.3)
MONOCYTES NFR BLD: 8 % (ref 1–7)
MORPHOLOGY: ABNORMAL
NEUTROPHILS NFR BLD: 71 % (ref 36–66)
NEUTS SEG NFR BLD: 3.84 K/UL (ref 1.3–9.1)
O2 SAT, ARTERIAL: 94.5 % (ref 95–98)
O2 SAT, VEN: 99.1 % (ref 60–85)
PARTIAL THROMBOPLASTIN TIME: 35.6 SEC (ref 24–36)
PCO2 ARTERIAL: 77.9 MMHG (ref 35–45)
PCO2 VENOUS: 58.1 MM HG (ref 39–55)
PEEP RESPIRATORY: 8 CM[H2O]
PH ARTERIAL: 7.32 (ref 7.35–7.45)
PH VENOUS: 7.43 (ref 7.32–7.42)
PHOSPHATE SERPL-MCNC: 3.6 MG/DL (ref 2.6–4.5)
PLATELET # BLD AUTO: 231 K/UL (ref 150–450)
PMV BLD AUTO: 7.1 FL (ref 6–12)
PO2 ARTERIAL: 85 MMHG (ref 80–100)
PO2 VENOUS: 184.8 MM HG (ref 30–50)
POSITIVE BASE EXCESS, ART: 10.3 MMOL/L (ref 0–2)
POSITIVE BASE EXCESS, VEN: 11.4 MMOL/L (ref 0–2)
POTASSIUM SERPL-SCNC: 3.9 MMOL/L (ref 3.7–5.3)
PROTHROMBIN TIME: 23.9 SEC (ref 11.8–14.6)
PT. POSITION: ABNORMAL
RBC # BLD AUTO: 3.61 M/UL (ref 4–5.2)
RESPIRATORY RATE: 12
SODIUM SERPL-SCNC: 141 MMOL/L (ref 135–144)
TEXT FOR RESPIRATORY: ABNORMAL
TEXT FOR RESPIRATORY: ABNORMAL
TOTAL RATE: 12
TRIGL SERPL-MCNC: 126 MG/DL
TROPONIN I SERPL HS-MCNC: 30 NG/L (ref 0–14)
TROPONIN I SERPL HS-MCNC: 34 NG/L (ref 0–14)
VENTILATION MODE VENT: ABNORMAL
VT: 450
WBC OTHER # BLD: 5.4 K/UL (ref 3.5–11)

## 2024-09-11 PROCEDURE — 36415 COLL VENOUS BLD VENIPUNCTURE: CPT

## 2024-09-11 PROCEDURE — 74018 RADEX ABDOMEN 1 VIEW: CPT

## 2024-09-11 PROCEDURE — 6370000000 HC RX 637 (ALT 250 FOR IP): Performed by: INTERNAL MEDICINE

## 2024-09-11 PROCEDURE — 2580000003 HC RX 258: Performed by: INTERNAL MEDICINE

## 2024-09-11 PROCEDURE — 99291 CRITICAL CARE FIRST HOUR: CPT | Performed by: INTERNAL MEDICINE

## 2024-09-11 PROCEDURE — 93010 ELECTROCARDIOGRAM REPORT: CPT | Performed by: INTERNAL MEDICINE

## 2024-09-11 PROCEDURE — 84478 ASSAY OF TRIGLYCERIDES: CPT

## 2024-09-11 PROCEDURE — 82805 BLOOD GASES W/O2 SATURATION: CPT

## 2024-09-11 PROCEDURE — 2000000000 HC ICU R&B

## 2024-09-11 PROCEDURE — 71045 X-RAY EXAM CHEST 1 VIEW: CPT

## 2024-09-11 PROCEDURE — 96374 THER/PROPH/DIAG INJ IV PUSH: CPT

## 2024-09-11 PROCEDURE — 6360000002 HC RX W HCPCS: Performed by: INTERNAL MEDICINE

## 2024-09-11 PROCEDURE — 99285 EMERGENCY DEPT VISIT HI MDM: CPT

## 2024-09-11 PROCEDURE — 94002 VENT MGMT INPAT INIT DAY: CPT

## 2024-09-11 PROCEDURE — 80048 BASIC METABOLIC PNL TOTAL CA: CPT

## 2024-09-11 PROCEDURE — 99223 1ST HOSP IP/OBS HIGH 75: CPT | Performed by: SURGERY

## 2024-09-11 PROCEDURE — 94640 AIRWAY INHALATION TREATMENT: CPT

## 2024-09-11 PROCEDURE — 2700000000 HC OXYGEN THERAPY PER DAY

## 2024-09-11 PROCEDURE — 0BH17EZ INSERTION OF ENDOTRACHEAL AIRWAY INTO TRACHEA, VIA NATURAL OR ARTIFICIAL OPENING: ICD-10-PCS | Performed by: INTERNAL MEDICINE

## 2024-09-11 PROCEDURE — 84100 ASSAY OF PHOSPHORUS: CPT

## 2024-09-11 PROCEDURE — 85610 PROTHROMBIN TIME: CPT

## 2024-09-11 PROCEDURE — 83735 ASSAY OF MAGNESIUM: CPT

## 2024-09-11 PROCEDURE — 02HV33Z INSERTION OF INFUSION DEVICE INTO SUPERIOR VENA CAVA, PERCUTANEOUS APPROACH: ICD-10-PCS | Performed by: INTERNAL MEDICINE

## 2024-09-11 PROCEDURE — 5A1945Z RESPIRATORY VENTILATION, 24-96 CONSECUTIVE HOURS: ICD-10-PCS | Performed by: INTERNAL MEDICINE

## 2024-09-11 PROCEDURE — 36600 WITHDRAWAL OF ARTERIAL BLOOD: CPT

## 2024-09-11 PROCEDURE — 84484 ASSAY OF TROPONIN QUANT: CPT

## 2024-09-11 PROCEDURE — 85025 COMPLETE CBC W/AUTO DIFF WBC: CPT

## 2024-09-11 PROCEDURE — 82947 ASSAY GLUCOSE BLOOD QUANT: CPT

## 2024-09-11 PROCEDURE — 31500 INSERT EMERGENCY AIRWAY: CPT

## 2024-09-11 PROCEDURE — 85730 THROMBOPLASTIN TIME PARTIAL: CPT

## 2024-09-11 PROCEDURE — 2500000003 HC RX 250 WO HCPCS: Performed by: EMERGENCY MEDICINE

## 2024-09-11 PROCEDURE — 6360000002 HC RX W HCPCS

## 2024-09-11 PROCEDURE — 94761 N-INVAS EAR/PLS OXIMETRY MLT: CPT

## 2024-09-11 PROCEDURE — 93005 ELECTROCARDIOGRAM TRACING: CPT | Performed by: EMERGENCY MEDICINE

## 2024-09-11 RX ORDER — DEXTROSE MONOHYDRATE 100 MG/ML
INJECTION, SOLUTION INTRAVENOUS CONTINUOUS PRN
Status: DISCONTINUED | OUTPATIENT
Start: 2024-09-11 | End: 2024-09-14 | Stop reason: HOSPADM

## 2024-09-11 RX ORDER — LACTULOSE 10 G/15ML
20 SOLUTION ORAL 3 TIMES DAILY PRN
Status: DISCONTINUED | OUTPATIENT
Start: 2024-09-11 | End: 2024-09-14 | Stop reason: HOSPADM

## 2024-09-11 RX ORDER — CIPROFLOXACIN 2 MG/ML
400 INJECTION, SOLUTION INTRAVENOUS EVERY 12 HOURS
Status: DISCONTINUED | OUTPATIENT
Start: 2024-09-11 | End: 2024-09-14 | Stop reason: HOSPADM

## 2024-09-11 RX ORDER — PROPOFOL 10 MG/ML
5-50 INJECTION, EMULSION INTRAVENOUS CONTINUOUS
Status: DISCONTINUED | OUTPATIENT
Start: 2024-09-11 | End: 2024-09-12

## 2024-09-11 RX ORDER — ACETAMINOPHEN 650 MG/1
650 SUPPOSITORY RECTAL EVERY 6 HOURS PRN
Status: DISCONTINUED | OUTPATIENT
Start: 2024-09-11 | End: 2024-09-14 | Stop reason: HOSPADM

## 2024-09-11 RX ORDER — PROPOFOL 10 MG/ML
INJECTION, EMULSION INTRAVENOUS
Status: COMPLETED
Start: 2024-09-11 | End: 2024-09-11

## 2024-09-11 RX ORDER — ALBUTEROL SULFATE 0.83 MG/ML
2.5 SOLUTION RESPIRATORY (INHALATION)
Status: DISCONTINUED | OUTPATIENT
Start: 2024-09-11 | End: 2024-09-14 | Stop reason: HOSPADM

## 2024-09-11 RX ORDER — ETOMIDATE 2 MG/ML
20 INJECTION INTRAVENOUS ONCE
Status: COMPLETED | OUTPATIENT
Start: 2024-09-11 | End: 2024-09-11

## 2024-09-11 RX ORDER — PROPOFOL 10 MG/ML
5-50 INJECTION, EMULSION INTRAVENOUS ONCE
Status: COMPLETED | OUTPATIENT
Start: 2024-09-11 | End: 2024-09-11

## 2024-09-11 RX ORDER — INSULIN GLARGINE 100 [IU]/ML
16 INJECTION, SOLUTION SUBCUTANEOUS NIGHTLY
Status: DISCONTINUED | OUTPATIENT
Start: 2024-09-11 | End: 2024-09-14 | Stop reason: HOSPADM

## 2024-09-11 RX ORDER — CARVEDILOL 6.25 MG/1
6.25 TABLET ORAL 2 TIMES DAILY WITH MEALS
Status: DISCONTINUED | OUTPATIENT
Start: 2024-09-11 | End: 2024-09-14 | Stop reason: HOSPADM

## 2024-09-11 RX ORDER — SODIUM CHLORIDE 9 MG/ML
INJECTION, SOLUTION INTRAVENOUS PRN
Status: DISCONTINUED | OUTPATIENT
Start: 2024-09-11 | End: 2024-09-14 | Stop reason: HOSPADM

## 2024-09-11 RX ORDER — CHLORHEXIDINE GLUCONATE ORAL RINSE 1.2 MG/ML
15 SOLUTION DENTAL 2 TIMES DAILY
Status: DISCONTINUED | OUTPATIENT
Start: 2024-09-11 | End: 2024-09-14 | Stop reason: HOSPADM

## 2024-09-11 RX ORDER — ONDANSETRON 4 MG/1
4 TABLET, ORALLY DISINTEGRATING ORAL EVERY 8 HOURS PRN
Status: DISCONTINUED | OUTPATIENT
Start: 2024-09-11 | End: 2024-09-14 | Stop reason: HOSPADM

## 2024-09-11 RX ORDER — CLINDAMYCIN PHOSPHATE 900 MG/50ML
900 INJECTION, SOLUTION INTRAVENOUS EVERY 8 HOURS
Status: DISCONTINUED | OUTPATIENT
Start: 2024-09-11 | End: 2024-09-14 | Stop reason: HOSPADM

## 2024-09-11 RX ORDER — POLYETHYLENE GLYCOL 3350 17 G/17G
17 POWDER, FOR SOLUTION ORAL DAILY PRN
Status: DISCONTINUED | OUTPATIENT
Start: 2024-09-11 | End: 2024-09-14 | Stop reason: HOSPADM

## 2024-09-11 RX ORDER — DILTIAZEM HYDROCHLORIDE 30 MG/1
30 TABLET, FILM COATED ORAL EVERY 6 HOURS SCHEDULED
Status: DISCONTINUED | OUTPATIENT
Start: 2024-09-12 | End: 2024-09-14 | Stop reason: HOSPADM

## 2024-09-11 RX ORDER — MIDODRINE HYDROCHLORIDE 5 MG/1
5 TABLET ORAL 3 TIMES DAILY PRN
Status: DISCONTINUED | OUTPATIENT
Start: 2024-09-11 | End: 2024-09-14 | Stop reason: HOSPADM

## 2024-09-11 RX ORDER — MAGNESIUM SULFATE HEPTAHYDRATE 40 MG/ML
2000 INJECTION, SOLUTION INTRAVENOUS PRN
Status: DISCONTINUED | OUTPATIENT
Start: 2024-09-11 | End: 2024-09-14 | Stop reason: HOSPADM

## 2024-09-11 RX ORDER — LEVALBUTEROL INHALATION SOLUTION 1.25 MG/3ML
1.25 SOLUTION RESPIRATORY (INHALATION) EVERY 6 HOURS
Status: DISCONTINUED | OUTPATIENT
Start: 2024-09-11 | End: 2024-09-11

## 2024-09-11 RX ORDER — ONDANSETRON 2 MG/ML
4 INJECTION INTRAMUSCULAR; INTRAVENOUS EVERY 6 HOURS PRN
Status: DISCONTINUED | OUTPATIENT
Start: 2024-09-11 | End: 2024-09-14 | Stop reason: HOSPADM

## 2024-09-11 RX ORDER — ACETAMINOPHEN 325 MG/1
650 TABLET ORAL EVERY 6 HOURS PRN
Status: DISCONTINUED | OUTPATIENT
Start: 2024-09-11 | End: 2024-09-14 | Stop reason: HOSPADM

## 2024-09-11 RX ORDER — PRAVASTATIN SODIUM 40 MG
80 TABLET ORAL EVERY EVENING
Status: DISCONTINUED | OUTPATIENT
Start: 2024-09-11 | End: 2024-09-14 | Stop reason: HOSPADM

## 2024-09-11 RX ORDER — DILTIAZEM HYDROCHLORIDE 120 MG/1
120 CAPSULE, COATED, EXTENDED RELEASE ORAL DAILY
Status: DISCONTINUED | OUTPATIENT
Start: 2024-09-11 | End: 2024-09-11

## 2024-09-11 RX ORDER — SODIUM CHLORIDE 0.9 % (FLUSH) 0.9 %
5-40 SYRINGE (ML) INJECTION EVERY 12 HOURS SCHEDULED
Status: DISCONTINUED | OUTPATIENT
Start: 2024-09-11 | End: 2024-09-14 | Stop reason: HOSPADM

## 2024-09-11 RX ORDER — POTASSIUM CHLORIDE 7.45 MG/ML
10 INJECTION INTRAVENOUS PRN
Status: DISCONTINUED | OUTPATIENT
Start: 2024-09-11 | End: 2024-09-14 | Stop reason: HOSPADM

## 2024-09-11 RX ORDER — DILTIAZEM HCL 60 MG
120 TABLET ORAL DAILY
Status: DISCONTINUED | OUTPATIENT
Start: 2024-09-11 | End: 2024-09-11

## 2024-09-11 RX ORDER — SODIUM CHLORIDE 0.9 % (FLUSH) 0.9 %
5-40 SYRINGE (ML) INJECTION PRN
Status: DISCONTINUED | OUTPATIENT
Start: 2024-09-11 | End: 2024-09-14 | Stop reason: HOSPADM

## 2024-09-11 RX ORDER — POTASSIUM CHLORIDE 1500 MG/1
40 TABLET, EXTENDED RELEASE ORAL PRN
Status: DISCONTINUED | OUTPATIENT
Start: 2024-09-11 | End: 2024-09-14 | Stop reason: HOSPADM

## 2024-09-11 RX ORDER — MONTELUKAST SODIUM 10 MG/1
10 TABLET ORAL NIGHTLY
Status: DISCONTINUED | OUTPATIENT
Start: 2024-09-11 | End: 2024-09-14 | Stop reason: HOSPADM

## 2024-09-11 RX ORDER — ROCURONIUM BROMIDE 10 MG/ML
100 INJECTION, SOLUTION INTRAVENOUS ONCE
Status: COMPLETED | OUTPATIENT
Start: 2024-09-11 | End: 2024-09-11

## 2024-09-11 RX ORDER — FENTANYL CITRATE 0.05 MG/ML
50 INJECTION, SOLUTION INTRAMUSCULAR; INTRAVENOUS
Status: DISCONTINUED | OUTPATIENT
Start: 2024-09-11 | End: 2024-09-14 | Stop reason: HOSPADM

## 2024-09-11 RX ADMIN — PROPOFOL 35 MCG/KG/MIN: 10 INJECTION, EMULSION INTRAVENOUS at 17:10

## 2024-09-11 RX ADMIN — CIPROFLOXACIN 400 MG: 2 INJECTION, SOLUTION INTRAVENOUS at 14:23

## 2024-09-11 RX ADMIN — PROPOFOL 25 MCG/KG/MIN: 10 INJECTION, EMULSION INTRAVENOUS at 22:37

## 2024-09-11 RX ADMIN — CHLORHEXIDINE GLUCONATE 0.12% ORAL RINSE 15 ML: 1.2 LIQUID ORAL at 14:18

## 2024-09-11 RX ADMIN — SODIUM CHLORIDE, PRESERVATIVE FREE 10 ML: 5 INJECTION INTRAVENOUS at 20:08

## 2024-09-11 RX ADMIN — CARVEDILOL 6.25 MG: 6.25 TABLET, FILM COATED ORAL at 15:19

## 2024-09-11 RX ADMIN — CHLORHEXIDINE GLUCONATE 0.12% ORAL RINSE 15 ML: 1.2 LIQUID ORAL at 20:05

## 2024-09-11 RX ADMIN — CLINDAMYCIN PHOSPHATE 900 MG: 900 INJECTION, SOLUTION INTRAVENOUS at 15:24

## 2024-09-11 RX ADMIN — CLINDAMYCIN PHOSPHATE 900 MG: 900 INJECTION, SOLUTION INTRAVENOUS at 23:21

## 2024-09-11 RX ADMIN — SODIUM CHLORIDE, PRESERVATIVE FREE 40 MG: 5 INJECTION INTRAVENOUS at 14:18

## 2024-09-11 RX ADMIN — ALBUTEROL SULFATE 2.5 MG: 2.5 SOLUTION RESPIRATORY (INHALATION) at 14:43

## 2024-09-11 RX ADMIN — ENOXAPARIN SODIUM 180 MG: 100 INJECTION SUBCUTANEOUS at 14:27

## 2024-09-11 RX ADMIN — PROPOFOL 20 MCG/KG/MIN: 10 INJECTION, EMULSION INTRAVENOUS at 11:30

## 2024-09-11 RX ADMIN — ALBUTEROL SULFATE 2.5 MG: 2.5 SOLUTION RESPIRATORY (INHALATION) at 19:20

## 2024-09-11 RX ADMIN — ETOMIDATE INJECTION 20 MG: 2 SOLUTION INTRAVENOUS at 11:21

## 2024-09-11 RX ADMIN — MONTELUKAST 10 MG: 10 TABLET, FILM COATED ORAL at 20:09

## 2024-09-11 RX ADMIN — INSULIN GLARGINE 16 UNITS: 100 INJECTION, SOLUTION SUBCUTANEOUS at 20:19

## 2024-09-11 RX ADMIN — PROPOFOL 20 MCG/KG/MIN: 10 INJECTION, EMULSION INTRAVENOUS at 14:00

## 2024-09-11 RX ADMIN — ROCURONIUM BROMIDE 100 MG: 10 INJECTION, SOLUTION INTRAVENOUS at 11:21

## 2024-09-11 RX ADMIN — PROPOFOL 35 MCG/KG/MIN: 10 INJECTION, EMULSION INTRAVENOUS at 20:23

## 2024-09-11 RX ADMIN — PRAVASTATIN SODIUM 80 MG: 40 TABLET ORAL at 17:37

## 2024-09-11 ASSESSMENT — PULMONARY FUNCTION TESTS
PIF_VALUE: 24
PIF_VALUE: 22
PIF_VALUE: 20
PIF_VALUE: 24
PIF_VALUE: 25
PIF_VALUE: 25
PIF_VALUE: 22
PIF_VALUE: 24
PIF_VALUE: 24
PIF_VALUE: 37
PIF_VALUE: 23
PIF_VALUE: 22
PIF_VALUE: 26
PIF_VALUE: 22
PIF_VALUE: 22
PIF_VALUE: 21
PIF_VALUE: 36
PIF_VALUE: 22
PIF_VALUE: 24
PIF_VALUE: 21
PIF_VALUE: 24
PIF_VALUE: 22

## 2024-09-11 ASSESSMENT — PAIN - FUNCTIONAL ASSESSMENT: PAIN_FUNCTIONAL_ASSESSMENT: NONE - DENIES PAIN

## 2024-09-11 ASSESSMENT — ENCOUNTER SYMPTOMS: SHORTNESS OF BREATH: 1

## 2024-09-11 ASSESSMENT — LIFESTYLE VARIABLES: HOW OFTEN DO YOU HAVE A DRINK CONTAINING ALCOHOL: NEVER

## 2024-09-12 ENCOUNTER — APPOINTMENT (OUTPATIENT)
Dept: GENERAL RADIOLOGY | Age: 69
End: 2024-09-12
Payer: MEDICARE

## 2024-09-12 PROBLEM — J95.03 MALFUNCTION OF TRACHEOSTOMY (HCC): Status: ACTIVE | Noted: 2024-09-12

## 2024-09-12 LAB
ANION GAP SERPL CALCULATED.3IONS-SCNC: 5 MMOL/L (ref 9–17)
ARTERIAL PATENCY WRIST A: ABNORMAL
BASOPHILS # BLD: 0 K/UL (ref 0–0.2)
BASOPHILS NFR BLD: 1 % (ref 0–2)
BDY SITE: ABNORMAL
BODY TEMPERATURE: 37
BUN SERPL-MCNC: 9 MG/DL (ref 8–23)
CALCIUM SERPL-MCNC: 8.8 MG/DL (ref 8.6–10.4)
CHLORIDE SERPL-SCNC: 96 MMOL/L (ref 98–107)
CO2 SERPL-SCNC: 35 MMOL/L (ref 20–31)
COHGB MFR BLD: 1.3 % (ref 0–5)
CREAT SERPL-MCNC: 0.7 MG/DL (ref 0.5–0.9)
EOSINOPHIL # BLD: 0.2 K/UL (ref 0–0.4)
EOSINOPHILS RELATIVE PERCENT: 5 % (ref 0–4)
ERYTHROCYTE [DISTWIDTH] IN BLOOD BY AUTOMATED COUNT: 21.1 % (ref 11.5–14.9)
FIO2 ON VENT: 40 %
GAS FLOW.O2 O2 DELIVERY SYS: ABNORMAL L/MIN
GAS FLOW.O2 SETTING OXYMISER: 16 L/MIN
GFR, ESTIMATED: >90 ML/MIN/1.73M2
GLUCOSE BLD-MCNC: 102 MG/DL (ref 65–105)
GLUCOSE BLD-MCNC: 107 MG/DL (ref 65–105)
GLUCOSE SERPL-MCNC: 145 MG/DL (ref 70–99)
HCO3 ARTERIAL: 39.5 MMOL/L (ref 22–26)
HCT VFR BLD AUTO: 27.7 % (ref 36–46)
HGB BLD-MCNC: 9 G/DL (ref 12–16)
INR PPP: 2.7
LACTATE BLDV-SCNC: 1 MMOL/L (ref 0.5–2.2)
LYMPHOCYTES NFR BLD: 0.7 K/UL (ref 1–4.8)
LYMPHOCYTES RELATIVE PERCENT: 15 % (ref 24–44)
MCH RBC QN AUTO: 28.2 PG (ref 26–34)
MCHC RBC AUTO-ENTMCNC: 32.4 G/DL (ref 31–37)
MCV RBC AUTO: 86.8 FL (ref 80–100)
METHEMOGLOBIN: 0.5 % (ref 0–1.9)
MONOCYTES NFR BLD: 0.5 K/UL (ref 0.1–1.3)
MONOCYTES NFR BLD: 10 % (ref 1–7)
NEUTROPHILS NFR BLD: 69 % (ref 36–66)
NEUTS SEG NFR BLD: 3.3 K/UL (ref 1.3–9.1)
O2 SAT, ARTERIAL: 97.1 % (ref 95–98)
PCO2 ARTERIAL: 58.5 MMHG (ref 35–45)
PEEP RESPIRATORY: 8 CM[H2O]
PH ARTERIAL: 7.44 (ref 7.35–7.45)
PLATELET # BLD AUTO: 202 K/UL (ref 150–450)
PMV BLD AUTO: 7 FL (ref 6–12)
PO2 ARTERIAL: 110 MMHG (ref 80–100)
POSITIVE BASE EXCESS, ART: 15.3 MMOL/L (ref 0–2)
POTASSIUM SERPL-SCNC: 3.9 MMOL/L (ref 3.7–5.3)
PROTHROMBIN TIME: 29.1 SEC (ref 11.8–14.6)
PT. POSITION: ABNORMAL
RBC # BLD AUTO: 3.19 M/UL (ref 4–5.2)
RESPIRATORY RATE: 16
SODIUM SERPL-SCNC: 136 MMOL/L (ref 135–144)
TEXT FOR RESPIRATORY: ABNORMAL
TOTAL RATE: 18
VENTILATION MODE VENT: ABNORMAL
VT: 450
WBC OTHER # BLD: 4.8 K/UL (ref 3.5–11)

## 2024-09-12 PROCEDURE — 2500000003 HC RX 250 WO HCPCS: Performed by: INTERNAL MEDICINE

## 2024-09-12 PROCEDURE — 71045 X-RAY EXAM CHEST 1 VIEW: CPT

## 2024-09-12 PROCEDURE — 82805 BLOOD GASES W/O2 SATURATION: CPT

## 2024-09-12 PROCEDURE — 87205 SMEAR GRAM STAIN: CPT

## 2024-09-12 PROCEDURE — 87070 CULTURE OTHR SPECIMN AEROBIC: CPT

## 2024-09-12 PROCEDURE — 83605 ASSAY OF LACTIC ACID: CPT

## 2024-09-12 PROCEDURE — 94640 AIRWAY INHALATION TREATMENT: CPT

## 2024-09-12 PROCEDURE — 6370000000 HC RX 637 (ALT 250 FOR IP): Performed by: INTERNAL MEDICINE

## 2024-09-12 PROCEDURE — 82947 ASSAY GLUCOSE BLOOD QUANT: CPT

## 2024-09-12 PROCEDURE — 94003 VENT MGMT INPAT SUBQ DAY: CPT

## 2024-09-12 PROCEDURE — 99232 SBSQ HOSP IP/OBS MODERATE 35: CPT | Performed by: SURGERY

## 2024-09-12 PROCEDURE — 2580000003 HC RX 258: Performed by: INTERNAL MEDICINE

## 2024-09-12 PROCEDURE — 94761 N-INVAS EAR/PLS OXIMETRY MLT: CPT

## 2024-09-12 PROCEDURE — 80048 BASIC METABOLIC PNL TOTAL CA: CPT

## 2024-09-12 PROCEDURE — 2700000000 HC OXYGEN THERAPY PER DAY

## 2024-09-12 PROCEDURE — 85610 PROTHROMBIN TIME: CPT

## 2024-09-12 PROCEDURE — 2580000003 HC RX 258

## 2024-09-12 PROCEDURE — 36600 WITHDRAWAL OF ARTERIAL BLOOD: CPT

## 2024-09-12 PROCEDURE — 2000000000 HC ICU R&B

## 2024-09-12 PROCEDURE — 6360000002 HC RX W HCPCS: Performed by: INTERNAL MEDICINE

## 2024-09-12 PROCEDURE — 99291 CRITICAL CARE FIRST HOUR: CPT | Performed by: INTERNAL MEDICINE

## 2024-09-12 PROCEDURE — 36415 COLL VENOUS BLD VENIPUNCTURE: CPT

## 2024-09-12 PROCEDURE — 85025 COMPLETE CBC W/AUTO DIFF WBC: CPT

## 2024-09-12 PROCEDURE — 2500000003 HC RX 250 WO HCPCS

## 2024-09-12 PROCEDURE — 82533 TOTAL CORTISOL: CPT

## 2024-09-12 RX ORDER — 0.9 % SODIUM CHLORIDE 0.9 %
500 INTRAVENOUS SOLUTION INTRAVENOUS ONCE
Status: COMPLETED | OUTPATIENT
Start: 2024-09-12 | End: 2024-09-12

## 2024-09-12 RX ORDER — 0.9 % SODIUM CHLORIDE 0.9 %
1000 INTRAVENOUS SOLUTION INTRAVENOUS ONCE
Status: COMPLETED | OUTPATIENT
Start: 2024-09-12 | End: 2024-09-12

## 2024-09-12 RX ORDER — PROPOFOL 10 MG/ML
5-50 INJECTION, EMULSION INTRAVENOUS CONTINUOUS
Status: DISCONTINUED | OUTPATIENT
Start: 2024-09-12 | End: 2024-09-14 | Stop reason: HOSPADM

## 2024-09-12 RX ORDER — NOREPINEPHRINE BITARTRATE 0.06 MG/ML
1-100 INJECTION, SOLUTION INTRAVENOUS CONTINUOUS
Status: DISCONTINUED | OUTPATIENT
Start: 2024-09-12 | End: 2024-09-14 | Stop reason: HOSPADM

## 2024-09-12 RX ADMIN — PROPOFOL 25 MCG/KG/MIN: 10 INJECTION, EMULSION INTRAVENOUS at 22:28

## 2024-09-12 RX ADMIN — ALBUTEROL SULFATE 2.5 MG: 2.5 SOLUTION RESPIRATORY (INHALATION) at 14:51

## 2024-09-12 RX ADMIN — PROPOFOL 25 MCG/KG/MIN: 10 INJECTION, EMULSION INTRAVENOUS at 10:02

## 2024-09-12 RX ADMIN — CIPROFLOXACIN 400 MG: 2 INJECTION, SOLUTION INTRAVENOUS at 00:31

## 2024-09-12 RX ADMIN — SODIUM CHLORIDE 1000 ML: 9 INJECTION, SOLUTION INTRAVENOUS at 20:48

## 2024-09-12 RX ADMIN — CHLORHEXIDINE GLUCONATE 0.12% ORAL RINSE 15 ML: 1.2 LIQUID ORAL at 19:15

## 2024-09-12 RX ADMIN — ALBUTEROL SULFATE 2.5 MG: 2.5 SOLUTION RESPIRATORY (INHALATION) at 07:03

## 2024-09-12 RX ADMIN — ALBUTEROL SULFATE 2.5 MG: 2.5 SOLUTION RESPIRATORY (INHALATION) at 10:49

## 2024-09-12 RX ADMIN — PROPOFOL 20 MCG/KG/MIN: 10 INJECTION, EMULSION INTRAVENOUS at 18:19

## 2024-09-12 RX ADMIN — SODIUM CHLORIDE 500 ML: 9 INJECTION, SOLUTION INTRAVENOUS at 11:30

## 2024-09-12 RX ADMIN — MIDODRINE HYDROCHLORIDE 5 MG: 5 TABLET ORAL at 16:15

## 2024-09-12 RX ADMIN — SODIUM CHLORIDE, PRESERVATIVE FREE 40 MG: 5 INJECTION INTRAVENOUS at 09:57

## 2024-09-12 RX ADMIN — CHLORHEXIDINE GLUCONATE 0.12% ORAL RINSE 15 ML: 1.2 LIQUID ORAL at 08:00

## 2024-09-12 RX ADMIN — PRAVASTATIN SODIUM 80 MG: 40 TABLET ORAL at 16:15

## 2024-09-12 RX ADMIN — CIPROFLOXACIN 400 MG: 2 INJECTION, SOLUTION INTRAVENOUS at 13:39

## 2024-09-12 RX ADMIN — MIDODRINE HYDROCHLORIDE 5 MG: 5 TABLET ORAL at 10:59

## 2024-09-12 RX ADMIN — ALBUTEROL SULFATE 2.5 MG: 2.5 SOLUTION RESPIRATORY (INHALATION) at 19:18

## 2024-09-12 RX ADMIN — CLINDAMYCIN PHOSPHATE 900 MG: 900 INJECTION, SOLUTION INTRAVENOUS at 10:07

## 2024-09-12 RX ADMIN — SODIUM CHLORIDE, PRESERVATIVE FREE 10 ML: 5 INJECTION INTRAVENOUS at 08:00

## 2024-09-12 RX ADMIN — CLINDAMYCIN PHOSPHATE 900 MG: 900 INJECTION, SOLUTION INTRAVENOUS at 16:14

## 2024-09-12 RX ADMIN — PROPOFOL 25 MCG/KG/MIN: 10 INJECTION, EMULSION INTRAVENOUS at 06:23

## 2024-09-12 RX ADMIN — INSULIN GLARGINE 16 UNITS: 100 INJECTION, SOLUTION SUBCUTANEOUS at 20:03

## 2024-09-12 RX ADMIN — CLINDAMYCIN PHOSPHATE 900 MG: 900 INJECTION, SOLUTION INTRAVENOUS at 23:04

## 2024-09-12 RX ADMIN — SODIUM CHLORIDE, PRESERVATIVE FREE 10 ML: 5 INJECTION INTRAVENOUS at 19:16

## 2024-09-12 RX ADMIN — MONTELUKAST 10 MG: 10 TABLET, FILM COATED ORAL at 19:14

## 2024-09-12 RX ADMIN — PROPOFOL 20 MCG/KG/MIN: 10 INJECTION, EMULSION INTRAVENOUS at 13:35

## 2024-09-12 RX ADMIN — Medication 2 MCG/MIN: at 20:53

## 2024-09-12 RX ADMIN — ANTI-FUNGAL POWDER MICONAZOLE NITRATE TALC FREE: 1.42 POWDER TOPICAL at 19:15

## 2024-09-12 RX ADMIN — PROPOFOL 20 MCG/KG/MIN: 10 INJECTION, EMULSION INTRAVENOUS at 03:00

## 2024-09-12 ASSESSMENT — PULMONARY FUNCTION TESTS
PIF_VALUE: 23
PIF_VALUE: 23
PIF_VALUE: 21
PIF_VALUE: 23
PIF_VALUE: 23
PIF_VALUE: 22
PIF_VALUE: 21
PIF_VALUE: 21
PIF_VALUE: 22
PIF_VALUE: 24
PIF_VALUE: 25
PIF_VALUE: 27
PIF_VALUE: 23
PIF_VALUE: 21
PIF_VALUE: 28
PIF_VALUE: 31
PIF_VALUE: 21
PIF_VALUE: 15
PIF_VALUE: 21
PIF_VALUE: 20
PIF_VALUE: 18
PIF_VALUE: 23
PIF_VALUE: 20
PIF_VALUE: 21
PIF_VALUE: 19
PIF_VALUE: 22
PIF_VALUE: 21
PIF_VALUE: 23
PIF_VALUE: 20
PIF_VALUE: 23
PIF_VALUE: 21
PIF_VALUE: 20
PIF_VALUE: 19
PIF_VALUE: 21
PIF_VALUE: 20
PIF_VALUE: 21
PIF_VALUE: 20
PIF_VALUE: 21
PIF_VALUE: 24
PIF_VALUE: 22
PIF_VALUE: 22
PIF_VALUE: 30
PIF_VALUE: 21
PIF_VALUE: 24
PIF_VALUE: 25
PIF_VALUE: 21
PIF_VALUE: 19
PIF_VALUE: 17
PIF_VALUE: 23
PIF_VALUE: 23
PIF_VALUE: 19
PIF_VALUE: 21
PIF_VALUE: 22
PIF_VALUE: 23
PIF_VALUE: 21
PIF_VALUE: 20
PIF_VALUE: 22
PIF_VALUE: 22
PIF_VALUE: 20
PIF_VALUE: 20
PIF_VALUE: 44
PIF_VALUE: 22
PIF_VALUE: 23
PIF_VALUE: 21
PIF_VALUE: 21
PIF_VALUE: 23
PIF_VALUE: 20
PIF_VALUE: 22
PIF_VALUE: 23
PIF_VALUE: 19
PIF_VALUE: 23
PIF_VALUE: 25
PIF_VALUE: 19
PIF_VALUE: 21

## 2024-09-12 ASSESSMENT — ENCOUNTER SYMPTOMS
RHINORRHEA: 0
DIARRHEA: 0
WHEEZING: 0
VOMITING: 0
ABDOMINAL PAIN: 0
SORE THROAT: 0
BLOOD IN STOOL: 0
SHORTNESS OF BREATH: 0
NAUSEA: 0
COUGH: 0
CONSTIPATION: 0

## 2024-09-12 ASSESSMENT — PAIN SCALES - GENERAL
PAINLEVEL_OUTOF10: 0

## 2024-09-13 ENCOUNTER — APPOINTMENT (OUTPATIENT)
Dept: GENERAL RADIOLOGY | Age: 69
End: 2024-09-13
Payer: MEDICARE

## 2024-09-13 LAB
ANION GAP SERPL CALCULATED.3IONS-SCNC: 6 MMOL/L (ref 9–17)
ANTI-XA UNFRAC HEPARIN: 0.48 IU/L (ref 0.3–0.7)
ARTERIAL PATENCY WRIST A: ABNORMAL
BASOPHILS # BLD: 0.07 K/UL (ref 0–0.2)
BASOPHILS NFR BLD: 1 % (ref 0–2)
BDY SITE: ABNORMAL
BODY TEMPERATURE: 37
BUN SERPL-MCNC: 8 MG/DL (ref 8–23)
CALCIUM SERPL-MCNC: 8.8 MG/DL (ref 8.6–10.4)
CHLORIDE SERPL-SCNC: 97 MMOL/L (ref 98–107)
CO2 SERPL-SCNC: 34 MMOL/L (ref 20–31)
COHGB MFR BLD: 1.6 % (ref 0–5)
CORTIS SERPL-MCNC: 8 UG/DL (ref 2.5–19.5)
CORTISOL COLLECTION INFO: NORMAL
CREAT SERPL-MCNC: 0.8 MG/DL (ref 0.5–0.9)
EOSINOPHIL # BLD: 0.42 K/UL (ref 0–0.4)
EOSINOPHILS RELATIVE PERCENT: 6 % (ref 0–4)
ERYTHROCYTE [DISTWIDTH] IN BLOOD BY AUTOMATED COUNT: 21.3 % (ref 11.5–14.9)
FIO2 ON VENT: 30 %
GAS FLOW.O2 O2 DELIVERY SYS: ABNORMAL L/MIN
GAS FLOW.O2 SETTING OXYMISER: 16 L/MIN
GFR, ESTIMATED: 81 ML/MIN/1.73M2
GLUCOSE BLD-MCNC: 121 MG/DL (ref 65–105)
GLUCOSE BLD-MCNC: 130 MG/DL (ref 65–105)
GLUCOSE BLD-MCNC: 159 MG/DL (ref 65–105)
GLUCOSE SERPL-MCNC: 153 MG/DL (ref 70–99)
HCO3 ARTERIAL: 38.7 MMOL/L (ref 22–26)
HCT VFR BLD AUTO: 29.3 % (ref 36–46)
HGB BLD-MCNC: 9.4 G/DL (ref 12–16)
INR PPP: 3.4
LYMPHOCYTES NFR BLD: 0.84 K/UL (ref 1–4.8)
LYMPHOCYTES RELATIVE PERCENT: 12 % (ref 24–44)
MCH RBC QN AUTO: 27.4 PG (ref 26–34)
MCHC RBC AUTO-ENTMCNC: 32 G/DL (ref 31–37)
MCV RBC AUTO: 85.6 FL (ref 80–100)
METHEMOGLOBIN: 0.9 % (ref 0–1.9)
MICROORGANISM SPEC CULT: NORMAL
MICROORGANISM/AGENT SPEC: NORMAL
MONOCYTES NFR BLD: 0.63 K/UL (ref 0.1–1.3)
MONOCYTES NFR BLD: 9 % (ref 1–7)
MORPHOLOGY: ABNORMAL
MORPHOLOGY: ABNORMAL
NEUTROPHILS NFR BLD: 72 % (ref 36–66)
NEUTS SEG NFR BLD: 5.04 K/UL (ref 1.3–9.1)
O2 SAT, ARTERIAL: 92.8 % (ref 95–98)
PARTIAL THROMBOPLASTIN TIME: 76 SEC (ref 24–36)
PARTIAL THROMBOPLASTIN TIME: >150 SEC (ref 24–36)
PCO2 ARTERIAL: 58 MMHG (ref 35–45)
PEEP RESPIRATORY: 8 CM[H2O]
PH ARTERIAL: 7.43 (ref 7.35–7.45)
PLATELET # BLD AUTO: 278 K/UL (ref 150–450)
PMV BLD AUTO: 6.9 FL (ref 6–12)
PO2 ARTERIAL: 76.1 MMHG (ref 80–100)
POSITIVE BASE EXCESS, ART: 14.4 MMOL/L (ref 0–2)
POTASSIUM SERPL-SCNC: 3.7 MMOL/L (ref 3.7–5.3)
PROTHROMBIN TIME: 34 SEC (ref 11.8–14.6)
PT. POSITION: ABNORMAL
RBC # BLD AUTO: 3.42 M/UL (ref 4–5.2)
RESPIRATORY RATE: 16
SODIUM SERPL-SCNC: 137 MMOL/L (ref 135–144)
SPECIMEN DESCRIPTION: NORMAL
TEXT FOR RESPIRATORY: ABNORMAL
TOTAL RATE: 18
TRIGL SERPL-MCNC: 106 MG/DL
VENTILATION MODE VENT: ABNORMAL
VT: 450
WBC OTHER # BLD: 7 K/UL (ref 3.5–11)

## 2024-09-13 PROCEDURE — 6360000002 HC RX W HCPCS: Performed by: SURGERY

## 2024-09-13 PROCEDURE — 6360000002 HC RX W HCPCS: Performed by: INTERNAL MEDICINE

## 2024-09-13 PROCEDURE — 85610 PROTHROMBIN TIME: CPT

## 2024-09-13 PROCEDURE — 6360000002 HC RX W HCPCS

## 2024-09-13 PROCEDURE — 2580000003 HC RX 258: Performed by: INTERNAL MEDICINE

## 2024-09-13 PROCEDURE — 94640 AIRWAY INHALATION TREATMENT: CPT

## 2024-09-13 PROCEDURE — 82805 BLOOD GASES W/O2 SATURATION: CPT

## 2024-09-13 PROCEDURE — 6370000000 HC RX 637 (ALT 250 FOR IP): Performed by: INTERNAL MEDICINE

## 2024-09-13 PROCEDURE — 71045 X-RAY EXAM CHEST 1 VIEW: CPT

## 2024-09-13 PROCEDURE — 85520 HEPARIN ASSAY: CPT

## 2024-09-13 PROCEDURE — 36600 WITHDRAWAL OF ARTERIAL BLOOD: CPT

## 2024-09-13 PROCEDURE — 94003 VENT MGMT INPAT SUBQ DAY: CPT

## 2024-09-13 PROCEDURE — 84478 ASSAY OF TRIGLYCERIDES: CPT

## 2024-09-13 PROCEDURE — 36415 COLL VENOUS BLD VENIPUNCTURE: CPT

## 2024-09-13 PROCEDURE — 85730 THROMBOPLASTIN TIME PARTIAL: CPT

## 2024-09-13 PROCEDURE — 94761 N-INVAS EAR/PLS OXIMETRY MLT: CPT

## 2024-09-13 PROCEDURE — 2700000000 HC OXYGEN THERAPY PER DAY

## 2024-09-13 PROCEDURE — 82947 ASSAY GLUCOSE BLOOD QUANT: CPT

## 2024-09-13 PROCEDURE — 99291 CRITICAL CARE FIRST HOUR: CPT | Performed by: INTERNAL MEDICINE

## 2024-09-13 PROCEDURE — 85025 COMPLETE CBC W/AUTO DIFF WBC: CPT

## 2024-09-13 PROCEDURE — 80048 BASIC METABOLIC PNL TOTAL CA: CPT

## 2024-09-13 PROCEDURE — 2000000000 HC ICU R&B

## 2024-09-13 RX ORDER — HEPARIN SODIUM 1000 [USP'U]/ML
10000 INJECTION, SOLUTION INTRAVENOUS; SUBCUTANEOUS ONCE
Status: DISCONTINUED | OUTPATIENT
Start: 2024-09-13 | End: 2024-09-13

## 2024-09-13 RX ORDER — 0.9 % SODIUM CHLORIDE 0.9 %
500 INTRAVENOUS SOLUTION INTRAVENOUS ONCE
Status: COMPLETED | OUTPATIENT
Start: 2024-09-13 | End: 2024-09-13

## 2024-09-13 RX ORDER — HEPARIN SODIUM 1000 [USP'U]/ML
10000 INJECTION, SOLUTION INTRAVENOUS; SUBCUTANEOUS PRN
Status: DISCONTINUED | OUTPATIENT
Start: 2024-09-13 | End: 2024-09-13

## 2024-09-13 RX ORDER — HEPARIN SODIUM 1000 [USP'U]/ML
10000 INJECTION, SOLUTION INTRAVENOUS; SUBCUTANEOUS PRN
Status: DISCONTINUED | OUTPATIENT
Start: 2024-09-13 | End: 2024-09-14 | Stop reason: HOSPADM

## 2024-09-13 RX ORDER — HEPARIN SODIUM 1000 [USP'U]/ML
10000 INJECTION, SOLUTION INTRAVENOUS; SUBCUTANEOUS ONCE
Status: COMPLETED | OUTPATIENT
Start: 2024-09-13 | End: 2024-09-13

## 2024-09-13 RX ORDER — HEPARIN SODIUM 10000 [USP'U]/100ML
5-30 INJECTION, SOLUTION INTRAVENOUS CONTINUOUS
Status: DISCONTINUED | OUTPATIENT
Start: 2024-09-13 | End: 2024-09-13

## 2024-09-13 RX ORDER — HEPARIN SODIUM 10000 [USP'U]/100ML
5-30 INJECTION, SOLUTION INTRAVENOUS CONTINUOUS
Status: DISCONTINUED | OUTPATIENT
Start: 2024-09-13 | End: 2024-09-14 | Stop reason: HOSPADM

## 2024-09-13 RX ORDER — HEPARIN SODIUM 1000 [USP'U]/ML
5000 INJECTION, SOLUTION INTRAVENOUS; SUBCUTANEOUS PRN
Status: DISCONTINUED | OUTPATIENT
Start: 2024-09-13 | End: 2024-09-13

## 2024-09-13 RX ORDER — HEPARIN SODIUM 1000 [USP'U]/ML
5000 INJECTION, SOLUTION INTRAVENOUS; SUBCUTANEOUS PRN
Status: DISCONTINUED | OUTPATIENT
Start: 2024-09-13 | End: 2024-09-14 | Stop reason: HOSPADM

## 2024-09-13 RX ADMIN — PROPOFOL 20 MCG/KG/MIN: 10 INJECTION, EMULSION INTRAVENOUS at 17:13

## 2024-09-13 RX ADMIN — ENOXAPARIN SODIUM 180 MG: 100 INJECTION SUBCUTANEOUS at 01:40

## 2024-09-13 RX ADMIN — PROPOFOL 20 MCG/KG/MIN: 10 INJECTION, EMULSION INTRAVENOUS at 05:47

## 2024-09-13 RX ADMIN — SODIUM CHLORIDE, PRESERVATIVE FREE 10 ML: 5 INJECTION INTRAVENOUS at 08:25

## 2024-09-13 RX ADMIN — CIPROFLOXACIN 400 MG: 2 INJECTION, SOLUTION INTRAVENOUS at 12:47

## 2024-09-13 RX ADMIN — MONTELUKAST 10 MG: 10 TABLET, FILM COATED ORAL at 22:03

## 2024-09-13 RX ADMIN — CLINDAMYCIN PHOSPHATE 900 MG: 900 INJECTION, SOLUTION INTRAVENOUS at 15:59

## 2024-09-13 RX ADMIN — CLINDAMYCIN PHOSPHATE 900 MG: 900 INJECTION, SOLUTION INTRAVENOUS at 08:23

## 2024-09-13 RX ADMIN — MIDODRINE HYDROCHLORIDE 5 MG: 5 TABLET ORAL at 13:48

## 2024-09-13 RX ADMIN — ANTI-FUNGAL POWDER MICONAZOLE NITRATE TALC FREE: 1.42 POWDER TOPICAL at 21:58

## 2024-09-13 RX ADMIN — ALBUTEROL SULFATE 2.5 MG: 2.5 SOLUTION RESPIRATORY (INHALATION) at 10:45

## 2024-09-13 RX ADMIN — CLINDAMYCIN PHOSPHATE 900 MG: 900 INJECTION, SOLUTION INTRAVENOUS at 23:02

## 2024-09-13 RX ADMIN — CIPROFLOXACIN 400 MG: 2 INJECTION, SOLUTION INTRAVENOUS at 01:40

## 2024-09-13 RX ADMIN — PROPOFOL 20 MCG/KG/MIN: 10 INJECTION, EMULSION INTRAVENOUS at 21:55

## 2024-09-13 RX ADMIN — PROPOFOL 20 MCG/KG/MIN: 10 INJECTION, EMULSION INTRAVENOUS at 01:42

## 2024-09-13 RX ADMIN — HEPARIN SODIUM 10 UNITS/KG/HR: 10000 INJECTION, SOLUTION INTRAVENOUS at 12:50

## 2024-09-13 RX ADMIN — PRAVASTATIN SODIUM 80 MG: 40 TABLET ORAL at 17:12

## 2024-09-13 RX ADMIN — HEPARIN SODIUM 10000 UNITS: 1000 INJECTION INTRAVENOUS; SUBCUTANEOUS at 12:47

## 2024-09-13 RX ADMIN — ANTI-FUNGAL POWDER MICONAZOLE NITRATE TALC FREE: 1.42 POWDER TOPICAL at 08:24

## 2024-09-13 RX ADMIN — SODIUM CHLORIDE, PRESERVATIVE FREE 10 ML: 5 INJECTION INTRAVENOUS at 22:04

## 2024-09-13 RX ADMIN — ALBUTEROL SULFATE 2.5 MG: 2.5 SOLUTION RESPIRATORY (INHALATION) at 06:58

## 2024-09-13 RX ADMIN — PROPOFOL 20 MCG/KG/MIN: 10 INJECTION, EMULSION INTRAVENOUS at 14:22

## 2024-09-13 RX ADMIN — INSULIN GLARGINE 16 UNITS: 100 INJECTION, SOLUTION SUBCUTANEOUS at 22:02

## 2024-09-13 RX ADMIN — SODIUM CHLORIDE 500 ML: 9 INJECTION, SOLUTION INTRAVENOUS at 10:19

## 2024-09-13 RX ADMIN — FENTANYL CITRATE 50 MCG: 0.05 INJECTION, SOLUTION INTRAMUSCULAR; INTRAVENOUS at 13:40

## 2024-09-13 RX ADMIN — PROPOFOL 20 MCG/KG/MIN: 10 INJECTION, EMULSION INTRAVENOUS at 09:59

## 2024-09-13 RX ADMIN — SODIUM CHLORIDE, PRESERVATIVE FREE 40 MG: 5 INJECTION INTRAVENOUS at 08:37

## 2024-09-13 RX ADMIN — CHLORHEXIDINE GLUCONATE 0.12% ORAL RINSE 15 ML: 1.2 LIQUID ORAL at 08:25

## 2024-09-13 RX ADMIN — CHLORHEXIDINE GLUCONATE 0.12% ORAL RINSE 15 ML: 1.2 LIQUID ORAL at 21:58

## 2024-09-13 RX ADMIN — ALBUTEROL SULFATE 2.5 MG: 2.5 SOLUTION RESPIRATORY (INHALATION) at 19:16

## 2024-09-13 RX ADMIN — ALBUTEROL SULFATE 2.5 MG: 2.5 SOLUTION RESPIRATORY (INHALATION) at 14:48

## 2024-09-13 ASSESSMENT — PULMONARY FUNCTION TESTS
PIF_VALUE: 18
PIF_VALUE: 20
PIF_VALUE: 19
PIF_VALUE: 24
PIF_VALUE: 21
PIF_VALUE: 22
PIF_VALUE: 26
PIF_VALUE: 20
PIF_VALUE: 18
PIF_VALUE: 23
PIF_VALUE: 20
PIF_VALUE: 47
PIF_VALUE: 25
PIF_VALUE: 24
PIF_VALUE: 21
PIF_VALUE: 20
PIF_VALUE: 24
PIF_VALUE: 22
PIF_VALUE: 21
PIF_VALUE: 20
PIF_VALUE: 24
PIF_VALUE: 20
PIF_VALUE: 24
PIF_VALUE: 25
PIF_VALUE: 24
PIF_VALUE: 21
PIF_VALUE: 22
PIF_VALUE: 24
PIF_VALUE: 33
PIF_VALUE: 22
PIF_VALUE: 24
PIF_VALUE: 19
PIF_VALUE: 18
PIF_VALUE: 21
PIF_VALUE: 18
PIF_VALUE: 19
PIF_VALUE: 21
PIF_VALUE: 34
PIF_VALUE: 23
PIF_VALUE: 23
PIF_VALUE: 22
PIF_VALUE: 21
PIF_VALUE: 26
PIF_VALUE: 19
PIF_VALUE: 24
PIF_VALUE: 22

## 2024-09-13 ASSESSMENT — PAIN SCALES - GENERAL: PAINLEVEL_OUTOF10: 0

## 2024-09-13 ASSESSMENT — PAIN SCALES - WONG BAKER: WONGBAKER_NUMERICALRESPONSE: NO HURT

## 2024-09-14 ENCOUNTER — APPOINTMENT (OUTPATIENT)
Dept: GENERAL RADIOLOGY | Age: 69
DRG: 166 | End: 2024-09-14
Attending: INTERNAL MEDICINE
Payer: MEDICARE

## 2024-09-14 ENCOUNTER — APPOINTMENT (OUTPATIENT)
Dept: GENERAL RADIOLOGY | Age: 69
End: 2024-09-14
Payer: MEDICARE

## 2024-09-14 ENCOUNTER — HOSPITAL ENCOUNTER (INPATIENT)
Age: 69
LOS: 12 days | Discharge: NURSING, REHAB OR LONG-TERM CARE | DRG: 166 | End: 2024-09-26
Attending: INTERNAL MEDICINE | Admitting: STUDENT IN AN ORGANIZED HEALTH CARE EDUCATION/TRAINING PROGRAM
Payer: MEDICARE

## 2024-09-14 VITALS
HEIGHT: 65 IN | RESPIRATION RATE: 17 BRPM | TEMPERATURE: 99.2 F | WEIGHT: 293 LBS | BODY MASS INDEX: 48.82 KG/M2 | HEART RATE: 87 BPM | DIASTOLIC BLOOD PRESSURE: 46 MMHG | OXYGEN SATURATION: 97 % | SYSTOLIC BLOOD PRESSURE: 111 MMHG

## 2024-09-14 DIAGNOSIS — I48.0 PAROXYSMAL ATRIAL FIBRILLATION (HCC): ICD-10-CM

## 2024-09-14 DIAGNOSIS — M79.89 LEFT UPPER EXTREMITY SWELLING: Primary | ICD-10-CM

## 2024-09-14 DIAGNOSIS — I48.0 PAROXYSMAL ATRIAL FIBRILLATION WITH RVR (HCC): ICD-10-CM

## 2024-09-14 PROBLEM — J96.21 ACUTE ON CHRONIC HYPOXIC RESPIRATORY FAILURE (HCC): Status: ACTIVE | Noted: 2024-09-14

## 2024-09-14 LAB
ALLEN TEST: POSITIVE
ANION GAP SERPL CALCULATED.3IONS-SCNC: 10 MMOL/L (ref 9–16)
ANION GAP SERPL CALCULATED.3IONS-SCNC: 6 MMOL/L (ref 9–17)
ANTI-XA UNFRAC HEPARIN: 0.25 IU/L
ANTI-XA UNFRAC HEPARIN: 0.3 IU/L
ARTERIAL PATENCY WRIST A: ABNORMAL
BASOPHILS # BLD: 0.03 K/UL (ref 0–0.2)
BASOPHILS NFR BLD: 1 % (ref 0–2)
BDY SITE: ABNORMAL
BODY TEMPERATURE: 37
BUN SERPL-MCNC: 11 MG/DL (ref 8–23)
BUN SERPL-MCNC: 12 MG/DL (ref 8–23)
CALCIUM SERPL-MCNC: 8.4 MG/DL (ref 8.6–10.4)
CALCIUM SERPL-MCNC: 8.6 MG/DL (ref 8.6–10.4)
CHLORIDE SERPL-SCNC: 97 MMOL/L (ref 98–107)
CHLORIDE SERPL-SCNC: 98 MMOL/L (ref 98–107)
CO2 SERPL-SCNC: 31 MMOL/L (ref 20–31)
CO2 SERPL-SCNC: 34 MMOL/L (ref 20–31)
COHGB MFR BLD: 1.6 % (ref 0–5)
CREAT SERPL-MCNC: 0.8 MG/DL (ref 0.5–0.9)
CREAT SERPL-MCNC: 0.9 MG/DL (ref 0.5–0.9)
EOSINOPHIL # BLD: 0.26 K/UL (ref 0–0.44)
EOSINOPHILS RELATIVE PERCENT: 4 % (ref 1–4)
ERYTHROCYTE [DISTWIDTH] IN BLOOD BY AUTOMATED COUNT: 20 % (ref 11.8–14.4)
FIO2 ON VENT: 30 %
FIO2: 40
GAS FLOW.O2 O2 DELIVERY SYS: ABNORMAL L/MIN
GAS FLOW.O2 SETTING OXYMISER: 16 L/MIN
GFR, ESTIMATED: 72 ML/MIN/1.73M2
GFR, ESTIMATED: 81 ML/MIN/1.73M2
GLUCOSE BLD-MCNC: 118 MG/DL (ref 65–105)
GLUCOSE BLD-MCNC: 151 MG/DL (ref 65–105)
GLUCOSE SERPL-MCNC: 153 MG/DL (ref 70–99)
GLUCOSE SERPL-MCNC: 159 MG/DL (ref 74–99)
HCO3 ARTERIAL: 38.1 MMOL/L (ref 22–26)
HCT VFR BLD AUTO: 30.5 % (ref 36.3–47.1)
HGB BLD-MCNC: 9.1 G/DL (ref 11.9–15.1)
IMM GRANULOCYTES # BLD AUTO: 0.05 K/UL (ref 0–0.3)
IMM GRANULOCYTES NFR BLD: 1 %
INR PPP: 2.4
INR PPP: 2.8
LYMPHOCYTES NFR BLD: 1.04 K/UL (ref 1.1–3.7)
LYMPHOCYTES RELATIVE PERCENT: 17 % (ref 24–43)
MCH RBC QN AUTO: 26.5 PG (ref 25.2–33.5)
MCHC RBC AUTO-ENTMCNC: 29.8 G/DL (ref 28.4–34.8)
MCV RBC AUTO: 88.9 FL (ref 82.6–102.9)
METHEMOGLOBIN: 1 % (ref 0–1.9)
MODE: ABNORMAL
MONOCYTES NFR BLD: 0.71 K/UL (ref 0.1–1.2)
MONOCYTES NFR BLD: 12 % (ref 3–12)
NEUTROPHILS NFR BLD: 65 % (ref 36–65)
NEUTS SEG NFR BLD: 4.09 K/UL (ref 1.5–8.1)
NRBC BLD-RTO: 0.5 PER 100 WBC
O2 DELIVERY DEVICE: ABNORMAL
O2 SAT, ARTERIAL: 89.8 % (ref 95–98)
PARTIAL THROMBOPLASTIN TIME: 60.5 SEC (ref 23–36.5)
PARTIAL THROMBOPLASTIN TIME: >150 SEC (ref 24–36)
PCO2 ARTERIAL: 64 MMHG (ref 35–45)
PEEP RESPIRATORY: 8 CM[H2O]
PH ARTERIAL: 7.38 (ref 7.35–7.45)
PLATELET # BLD AUTO: 204 K/UL (ref 138–453)
PMV BLD AUTO: 9.2 FL (ref 8.1–13.5)
PO2 ARTERIAL: 67.4 MMHG (ref 80–100)
POC HCO3: 37.3 MMOL/L (ref 21–28)
POC O2 SATURATION: 97.5 % (ref 94–98)
POC PCO2: 61.4 MM HG (ref 35–48)
POC PH: 7.39 (ref 7.35–7.45)
POC PO2: 100.7 MM HG (ref 83–108)
POSITIVE BASE EXCESS, ART: 10.3 MMOL/L (ref 0–3)
POSITIVE BASE EXCESS, ART: 13.1 MMOL/L (ref 0–2)
POTASSIUM SERPL-SCNC: 3.9 MMOL/L (ref 3.7–5.3)
POTASSIUM SERPL-SCNC: 4.2 MMOL/L (ref 3.7–5.3)
PROCALCITONIN SERPL-MCNC: 0.08 NG/ML
PROTHROMBIN TIME: 25.7 SEC (ref 11.7–14.9)
PROTHROMBIN TIME: 29.4 SEC (ref 11.8–14.6)
PT. POSITION: ABNORMAL
RBC # BLD AUTO: 3.43 M/UL (ref 3.95–5.11)
RBC # BLD: ABNORMAL 10*6/UL
RESPIRATORY RATE: 16
SAMPLE SITE: ABNORMAL
SODIUM SERPL-SCNC: 137 MMOL/L (ref 135–144)
SODIUM SERPL-SCNC: 139 MMOL/L (ref 136–145)
TEXT FOR RESPIRATORY: ABNORMAL
TOTAL RATE: 16
TRIGL SERPL-MCNC: 104 MG/DL
VENTILATION MODE VENT: ABNORMAL
VT: 450
WBC OTHER # BLD: 6.2 K/UL (ref 3.5–11.3)

## 2024-09-14 PROCEDURE — 84145 PROCALCITONIN (PCT): CPT

## 2024-09-14 PROCEDURE — 94640 AIRWAY INHALATION TREATMENT: CPT

## 2024-09-14 PROCEDURE — 82947 ASSAY GLUCOSE BLOOD QUANT: CPT

## 2024-09-14 PROCEDURE — 87205 SMEAR GRAM STAIN: CPT

## 2024-09-14 PROCEDURE — 2580000003 HC RX 258

## 2024-09-14 PROCEDURE — 87070 CULTURE OTHR SPECIMN AEROBIC: CPT

## 2024-09-14 PROCEDURE — 6370000000 HC RX 637 (ALT 250 FOR IP): Performed by: INTERNAL MEDICINE

## 2024-09-14 PROCEDURE — 82805 BLOOD GASES W/O2 SATURATION: CPT

## 2024-09-14 PROCEDURE — 6360000002 HC RX W HCPCS

## 2024-09-14 PROCEDURE — 51798 US URINE CAPACITY MEASURE: CPT

## 2024-09-14 PROCEDURE — 36600 WITHDRAWAL OF ARTERIAL BLOOD: CPT

## 2024-09-14 PROCEDURE — 5A1955Z RESPIRATORY VENTILATION, GREATER THAN 96 CONSECUTIVE HOURS: ICD-10-PCS | Performed by: INTERNAL MEDICINE

## 2024-09-14 PROCEDURE — 85610 PROTHROMBIN TIME: CPT

## 2024-09-14 PROCEDURE — 87040 BLOOD CULTURE FOR BACTERIA: CPT

## 2024-09-14 PROCEDURE — 2700000000 HC OXYGEN THERAPY PER DAY

## 2024-09-14 PROCEDURE — 2580000003 HC RX 258: Performed by: INTERNAL MEDICINE

## 2024-09-14 PROCEDURE — 99291 CRITICAL CARE FIRST HOUR: CPT | Performed by: INTERNAL MEDICINE

## 2024-09-14 PROCEDURE — 2000000000 HC ICU R&B

## 2024-09-14 PROCEDURE — 6360000002 HC RX W HCPCS: Performed by: INTERNAL MEDICINE

## 2024-09-14 PROCEDURE — 84478 ASSAY OF TRIGLYCERIDES: CPT

## 2024-09-14 PROCEDURE — 36415 COLL VENOUS BLD VENIPUNCTURE: CPT

## 2024-09-14 PROCEDURE — 85520 HEPARIN ASSAY: CPT

## 2024-09-14 PROCEDURE — 94002 VENT MGMT INPAT INIT DAY: CPT

## 2024-09-14 PROCEDURE — 99291 CRITICAL CARE FIRST HOUR: CPT | Performed by: STUDENT IN AN ORGANIZED HEALTH CARE EDUCATION/TRAINING PROGRAM

## 2024-09-14 PROCEDURE — 71045 X-RAY EXAM CHEST 1 VIEW: CPT

## 2024-09-14 PROCEDURE — 85730 THROMBOPLASTIN TIME PARTIAL: CPT

## 2024-09-14 PROCEDURE — 85025 COMPLETE CBC W/AUTO DIFF WBC: CPT

## 2024-09-14 PROCEDURE — 6370000000 HC RX 637 (ALT 250 FOR IP)

## 2024-09-14 PROCEDURE — 80048 BASIC METABOLIC PNL TOTAL CA: CPT

## 2024-09-14 PROCEDURE — 2500000003 HC RX 250 WO HCPCS: Performed by: INTERNAL MEDICINE

## 2024-09-14 PROCEDURE — 82803 BLOOD GASES ANY COMBINATION: CPT

## 2024-09-14 PROCEDURE — 94761 N-INVAS EAR/PLS OXIMETRY MLT: CPT

## 2024-09-14 PROCEDURE — 94003 VENT MGMT INPAT SUBQ DAY: CPT

## 2024-09-14 RX ORDER — GLUCAGON 1 MG/ML
1 KIT INJECTION PRN
Status: DISCONTINUED | OUTPATIENT
Start: 2024-09-14 | End: 2024-09-26 | Stop reason: HOSPADM

## 2024-09-14 RX ORDER — ALBUTEROL SULFATE 0.83 MG/ML
2.5 SOLUTION RESPIRATORY (INHALATION)
Status: DISCONTINUED | OUTPATIENT
Start: 2024-09-14 | End: 2024-09-26 | Stop reason: HOSPADM

## 2024-09-14 RX ORDER — POTASSIUM CHLORIDE 7.45 MG/ML
10 INJECTION INTRAVENOUS PRN
Status: DISCONTINUED | OUTPATIENT
Start: 2024-09-14 | End: 2024-09-26 | Stop reason: HOSPADM

## 2024-09-14 RX ORDER — ONDANSETRON 2 MG/ML
4 INJECTION INTRAMUSCULAR; INTRAVENOUS EVERY 6 HOURS PRN
Status: DISCONTINUED | OUTPATIENT
Start: 2024-09-14 | End: 2024-09-26 | Stop reason: HOSPADM

## 2024-09-14 RX ORDER — INSULIN LISPRO 100 [IU]/ML
0-8 INJECTION, SOLUTION INTRAVENOUS; SUBCUTANEOUS
Status: DISCONTINUED | OUTPATIENT
Start: 2024-09-14 | End: 2024-09-17

## 2024-09-14 RX ORDER — DEXTROSE MONOHYDRATE 100 MG/ML
INJECTION, SOLUTION INTRAVENOUS CONTINUOUS PRN
Status: DISCONTINUED | OUTPATIENT
Start: 2024-09-14 | End: 2024-09-26 | Stop reason: HOSPADM

## 2024-09-14 RX ORDER — MIDODRINE HYDROCHLORIDE 5 MG/1
5 TABLET ORAL
Status: DISCONTINUED | OUTPATIENT
Start: 2024-09-14 | End: 2024-09-17

## 2024-09-14 RX ORDER — SODIUM CHLORIDE 0.9 % (FLUSH) 0.9 %
5-40 SYRINGE (ML) INJECTION PRN
Status: DISCONTINUED | OUTPATIENT
Start: 2024-09-14 | End: 2024-09-26 | Stop reason: HOSPADM

## 2024-09-14 RX ORDER — SODIUM CHLORIDE 9 MG/ML
INJECTION, SOLUTION INTRAVENOUS PRN
Status: DISCONTINUED | OUTPATIENT
Start: 2024-09-14 | End: 2024-09-26 | Stop reason: HOSPADM

## 2024-09-14 RX ORDER — HEPARIN SODIUM 1000 [USP'U]/ML
2000 INJECTION, SOLUTION INTRAVENOUS; SUBCUTANEOUS PRN
Status: DISCONTINUED | OUTPATIENT
Start: 2024-09-14 | End: 2024-09-26 | Stop reason: ALTCHOICE

## 2024-09-14 RX ORDER — POLYETHYLENE GLYCOL 3350 17 G/17G
17 POWDER, FOR SOLUTION ORAL DAILY PRN
Status: DISCONTINUED | OUTPATIENT
Start: 2024-09-14 | End: 2024-09-26 | Stop reason: HOSPADM

## 2024-09-14 RX ORDER — SODIUM CHLORIDE 0.9 % (FLUSH) 0.9 %
5-40 SYRINGE (ML) INJECTION EVERY 12 HOURS SCHEDULED
Status: DISCONTINUED | OUTPATIENT
Start: 2024-09-14 | End: 2024-09-26 | Stop reason: HOSPADM

## 2024-09-14 RX ORDER — CLINDAMYCIN PHOSPHATE 900 MG/50ML
900 INJECTION, SOLUTION INTRAVENOUS EVERY 8 HOURS
Status: COMPLETED | OUTPATIENT
Start: 2024-09-14 | End: 2024-09-19

## 2024-09-14 RX ORDER — HEPARIN SODIUM 1000 [USP'U]/ML
4000 INJECTION, SOLUTION INTRAVENOUS; SUBCUTANEOUS PRN
Status: DISCONTINUED | OUTPATIENT
Start: 2024-09-14 | End: 2024-09-26 | Stop reason: ALTCHOICE

## 2024-09-14 RX ORDER — INSULIN LISPRO 100 [IU]/ML
0-4 INJECTION, SOLUTION INTRAVENOUS; SUBCUTANEOUS NIGHTLY
Status: DISCONTINUED | OUTPATIENT
Start: 2024-09-14 | End: 2024-09-26 | Stop reason: HOSPADM

## 2024-09-14 RX ORDER — CIPROFLOXACIN 2 MG/ML
400 INJECTION, SOLUTION INTRAVENOUS EVERY 12 HOURS
Status: DISCONTINUED | OUTPATIENT
Start: 2024-09-14 | End: 2024-09-17 | Stop reason: RX

## 2024-09-14 RX ORDER — HEPARIN SODIUM 10000 [USP'U]/100ML
5-30 INJECTION, SOLUTION INTRAVENOUS CONTINUOUS
Status: DISCONTINUED | OUTPATIENT
Start: 2024-09-14 | End: 2024-09-26

## 2024-09-14 RX ORDER — POTASSIUM CHLORIDE 29.8 MG/ML
20 INJECTION INTRAVENOUS PRN
Status: DISCONTINUED | OUTPATIENT
Start: 2024-09-14 | End: 2024-09-26 | Stop reason: HOSPADM

## 2024-09-14 RX ORDER — INSULIN GLARGINE 100 [IU]/ML
8 INJECTION, SOLUTION SUBCUTANEOUS NIGHTLY
Status: DISCONTINUED | OUTPATIENT
Start: 2024-09-14 | End: 2024-09-26 | Stop reason: HOSPADM

## 2024-09-14 RX ORDER — PROPOFOL 10 MG/ML
5-50 INJECTION, EMULSION INTRAVENOUS CONTINUOUS
Status: DISCONTINUED | OUTPATIENT
Start: 2024-09-14 | End: 2024-09-17

## 2024-09-14 RX ORDER — IPRATROPIUM BROMIDE AND ALBUTEROL SULFATE 2.5; .5 MG/3ML; MG/3ML
1 SOLUTION RESPIRATORY (INHALATION)
Status: DISCONTINUED | OUTPATIENT
Start: 2024-09-14 | End: 2024-09-26 | Stop reason: HOSPADM

## 2024-09-14 RX ORDER — MAGNESIUM SULFATE IN WATER 40 MG/ML
2000 INJECTION, SOLUTION INTRAVENOUS PRN
Status: DISCONTINUED | OUTPATIENT
Start: 2024-09-14 | End: 2024-09-26 | Stop reason: HOSPADM

## 2024-09-14 RX ORDER — ONDANSETRON 4 MG/1
4 TABLET, ORALLY DISINTEGRATING ORAL EVERY 8 HOURS PRN
Status: DISCONTINUED | OUTPATIENT
Start: 2024-09-14 | End: 2024-09-26 | Stop reason: HOSPADM

## 2024-09-14 RX ORDER — ACETAMINOPHEN 325 MG/1
650 TABLET ORAL EVERY 6 HOURS PRN
Status: DISCONTINUED | OUTPATIENT
Start: 2024-09-14 | End: 2024-09-26 | Stop reason: HOSPADM

## 2024-09-14 RX ORDER — ACETAMINOPHEN 650 MG/1
650 SUPPOSITORY RECTAL EVERY 6 HOURS PRN
Status: DISCONTINUED | OUTPATIENT
Start: 2024-09-14 | End: 2024-09-26 | Stop reason: HOSPADM

## 2024-09-14 RX ORDER — DEXTROSE MONOHYDRATE AND SODIUM CHLORIDE 5; .45 G/100ML; G/100ML
INJECTION, SOLUTION INTRAVENOUS CONTINUOUS
Status: DISCONTINUED | OUTPATIENT
Start: 2024-09-14 | End: 2024-09-18

## 2024-09-14 RX ADMIN — PROPOFOL 20 MCG/KG/MIN: 10 INJECTION, EMULSION INTRAVENOUS at 16:13

## 2024-09-14 RX ADMIN — SODIUM CHLORIDE, PRESERVATIVE FREE 10 ML: 5 INJECTION INTRAVENOUS at 08:06

## 2024-09-14 RX ADMIN — ALBUTEROL SULFATE 2.5 MG: 2.5 SOLUTION RESPIRATORY (INHALATION) at 07:22

## 2024-09-14 RX ADMIN — PROPOFOL 25 MCG/KG/MIN: 10 INJECTION, EMULSION INTRAVENOUS at 23:59

## 2024-09-14 RX ADMIN — HEPARIN SODIUM 6 UNITS/KG/HR: 10000 INJECTION, SOLUTION INTRAVENOUS at 23:12

## 2024-09-14 RX ADMIN — SODIUM CHLORIDE, PRESERVATIVE FREE 40 MG: 5 INJECTION INTRAVENOUS at 16:15

## 2024-09-14 RX ADMIN — ANTI-FUNGAL POWDER MICONAZOLE NITRATE TALC FREE: 1.42 POWDER TOPICAL at 08:06

## 2024-09-14 RX ADMIN — CLINDAMYCIN PHOSPHATE 900 MG: 900 INJECTION, SOLUTION INTRAVENOUS at 08:10

## 2024-09-14 RX ADMIN — CHLORHEXIDINE GLUCONATE 0.12% ORAL RINSE 15 ML: 1.2 LIQUID ORAL at 08:06

## 2024-09-14 RX ADMIN — PROPOFOL 20 MCG/KG/MIN: 10 INJECTION, EMULSION INTRAVENOUS at 14:13

## 2024-09-14 RX ADMIN — CIPROFLOXACIN 400 MG: 2 INJECTION, SOLUTION INTRAVENOUS at 12:39

## 2024-09-14 RX ADMIN — IPRATROPIUM BROMIDE AND ALBUTEROL SULFATE 1 DOSE: 2.5; .5 SOLUTION RESPIRATORY (INHALATION) at 22:45

## 2024-09-14 RX ADMIN — CLINDAMYCIN PHOSPHATE 900 MG: 900 INJECTION, SOLUTION INTRAVENOUS at 22:05

## 2024-09-14 RX ADMIN — IPRATROPIUM BROMIDE AND ALBUTEROL SULFATE 1 DOSE: 2.5; .5 SOLUTION RESPIRATORY (INHALATION) at 19:43

## 2024-09-14 RX ADMIN — DEXTROSE AND SODIUM CHLORIDE: 5; 450 INJECTION, SOLUTION INTRAVENOUS at 14:21

## 2024-09-14 RX ADMIN — ALBUTEROL SULFATE 2.5 MG: 2.5 SOLUTION RESPIRATORY (INHALATION) at 10:53

## 2024-09-14 RX ADMIN — PROPOFOL 25 MCG/KG/MIN: 10 INJECTION, EMULSION INTRAVENOUS at 01:07

## 2024-09-14 RX ADMIN — HEPARIN SODIUM 2000 UNITS: 1000 INJECTION INTRAVENOUS; SUBCUTANEOUS at 16:42

## 2024-09-14 RX ADMIN — SODIUM CHLORIDE: 9 INJECTION, SOLUTION INTRAVENOUS at 14:32

## 2024-09-14 RX ADMIN — HEPARIN SODIUM 7 UNITS/KG/HR: 10000 INJECTION, SOLUTION INTRAVENOUS at 01:07

## 2024-09-14 RX ADMIN — FENTANYL CITRATE 50 MCG: 0.05 INJECTION, SOLUTION INTRAMUSCULAR; INTRAVENOUS at 13:10

## 2024-09-14 RX ADMIN — CLINDAMYCIN PHOSPHATE 900 MG: 900 INJECTION, SOLUTION INTRAVENOUS at 14:40

## 2024-09-14 RX ADMIN — SODIUM CHLORIDE, PRESERVATIVE FREE 40 MG: 5 INJECTION INTRAVENOUS at 08:05

## 2024-09-14 RX ADMIN — PROPOFOL 20 MCG/KG/MIN: 10 INJECTION, EMULSION INTRAVENOUS at 12:38

## 2024-09-14 RX ADMIN — Medication 5 MCG/MIN: at 00:52

## 2024-09-14 RX ADMIN — MIDODRINE HYDROCHLORIDE 5 MG: 5 TABLET ORAL at 16:19

## 2024-09-14 RX ADMIN — CIPROFLOXACIN 400 MG: 2 INJECTION, SOLUTION INTRAVENOUS at 01:10

## 2024-09-14 RX ADMIN — CIPROFLOXACIN 400 MG: 2 INJECTION, SOLUTION INTRAVENOUS at 17:01

## 2024-09-14 RX ADMIN — PROPOFOL 15 MCG/KG/MIN: 10 INJECTION, EMULSION INTRAVENOUS at 20:26

## 2024-09-14 RX ADMIN — PROPOFOL 20 MCG/KG/MIN: 10 INJECTION, EMULSION INTRAVENOUS at 08:55

## 2024-09-14 ASSESSMENT — PULMONARY FUNCTION TESTS
PIF_VALUE: 21
PIF_VALUE: 20
PIF_VALUE: 20
PIF_VALUE: 26
PIF_VALUE: 22
PIF_VALUE: 20
PIF_VALUE: 27
PIF_VALUE: 20
PIF_VALUE: 27
PIF_VALUE: 20
PIF_VALUE: 19
PIF_VALUE: 28
PIF_VALUE: 22
PIF_VALUE: 19
PIF_VALUE: 26
PIF_VALUE: 19
PIF_VALUE: 23
PIF_VALUE: 26
PIF_VALUE: 23
PIF_VALUE: 27
PIF_VALUE: 26
PIF_VALUE: 23
PIF_VALUE: 18
PIF_VALUE: 22
PIF_VALUE: 24
PIF_VALUE: 24
PIF_VALUE: 19
PIF_VALUE: 21
PIF_VALUE: 23
PIF_VALUE: 30
PIF_VALUE: 27
PIF_VALUE: 18
PIF_VALUE: 24
PIF_VALUE: 21
PIF_VALUE: 19
PIF_VALUE: 23

## 2024-09-14 NOTE — H&P
Critical Care - History and Physical Examination    Patient's name:  Shazia ROGERS  Medical Record Number: [id number]  Patient's account/billing number: [id number]  Patient's YOB: 1955  Age: 68 y.o.  Date of Admission: 9/14/2024  1:39 PM  Date of History and Physical Examination: 9/14/2024      Primary Care Physician: Jyoti Mauricio, ARACELIS - CNP  Attending Physician: Dr. Concepcion    Code Status: Full Code    Chief complaint: No chief complaint on file.        HISTORY OF PRESENT ILLNESS:      History was obtained from chart review and unobtainable from patient due to sedation and intubation.      Shazia ROGERS is a 68 y.o. with PMH of obstructive sleep apnea and obesity hypoventilation syndrome, chronic hypoxemic respiratory failure, tracheostomy placement in July 2024, prior PE/DVT in 2/20/2022 on Coumadin, atrial fibrillation, heart failure with preserved ejection fraction who presented to [place] ED for acute hypoxic respiratory failure.     Per ED note, patient was at [facility] where she began to experience decreased oxygen saturations in the 80s on ventilator.  Patient was then brought to the emergency department by EMS where there were difficulties maintaining oxygen saturation of the patient.  Patient was intubated in the emergency department due to worsening respiratory status resulting in improvement.  Upon examination of tracheostomy cannula by emergency department physician, it was noted that there was a distal obstruction, general surgery was consulted for further evaluation for possible replacement of tracheostomy.     Tracheostomy was placed in July 2024 for chronic hypoxic respiratory failure and noncompliance with BiPAP with worsening oxygenation status.     Chest x-ray significant for opacities at the left lung base.  ABG showing pH of 7.31, CO2 77.9, HCO3 38.8.  Troponin 34 on admission, repeat pending.  2.1.  Patient afebrile no leukocytosis.  Oxygen saturation at

## 2024-09-14 NOTE — PLAN OF CARE
Problem: Respiratory - Adult  Goal: Achieves optimal ventilation and oxygenation  Outcome: Progressing     Problem: OXYGENATION/RESPIRATORY FUNCTION  Goal: Patient will maintain patent airway  Outcome: Ongoing  Goal: Patient will achieve/maintain normal respiratory rate/effort  Respiratory rate and effort will be within normal limits for the patient  Outcome: Ongoing    Problem: MECHANICAL VENTILATION  Goal: Patient will maintain patent airway  Outcome: Ongoing  Goal: Oral health is maintained or improved  Outcome: Ongoing  Goal: ET tube will be managed safely  Outcome: Ongoing  Goal: Ability to express needs and understand communication  Outcome: Ongoing  Goal: Mobility/activity is maintained at optimum level for patient  Outcome: Ongoing    Problem: ASPIRATION PRECAUTIONS  Goal: Patient’s risk of aspiration is minimized  Outcome: Ongoing    Problem: SKIN INTEGRITY  Goal: Skin integrity is maintained or improved  Outcome: Ongoing                  BRONCHOSPASM/BRONCHOCONSTRICTION     [x]         IMPROVE AERATION/BREATH SOUNDS  [x]   ADMINISTER BRONCHODILATOR THERAPY AS APPROPRIATE  [x]   ASSESS BREATH SOUNDS  [x]   IMPLEMENT AEROSOL/MDI PROTOCOL  [x]   PATIENT EDUCATION AS NEEDED  PROVIDE ADEQUATE OXYGENATION WITH ACCEPTABLE SP02/ABG'S    [x]  IDENTIFY APPROPRIATE OXYGEN THERAPY  [x]   MONITOR SP02/ABG'S AS NEEDED   [x]   PATIENT EDUCATION AS NEEDED

## 2024-09-14 NOTE — CARE COORDINATION
Case Management Assessment  Initial Evaluation    Date/Time of Evaluation: 9/14/2024 5:49 PM  Assessment Completed by: Dasia Golden RN    If patient is discharged prior to next notation, then this note serves as note for discharge by case management.    Patient Name: Shazia NUÑEZ                   YOB: 1955  Diagnosis: Respiratory failure (HCC) [J96.90]  Acute on chronic hypoxic respiratory failure (HCC) [J96.21]                   Date / Time: 9/14/2024  1:39 PM    Patient Admission Status: Inpatient   Readmission Risk (Low < 19, Mod (19-27), High > 27): Readmission Risk Score: 32.5    Current PCP: Jyoti Mauricio, APRN - CNP  PCP verified by CM? (P) Yes    Chart Reviewed: Yes      History Provided by: (P) Child/Family  Patient Orientation: Sedated    Patient Cognition: Other (see comment)    Hospitalization in the last 30 days (Readmission):  Yes    If yes, Readmission Assessment in CM Navigator will be completed.    Advance Directives:      Code Status: Full Code   Patient's Primary Decision Maker is: (P) Named in Scanned ACP Document    Primary Decision Maker: Álvaro Nuñez - Brother/Sister - [phone]    Discharge Planning:    Patient lives with: (P) Other (Comment) Type of Home: (P) Skilled Nursing Facility  Primary Care Giver: (P) Other (Comment) (SNF)  Patient Support Systems include: (P) Family Members   Current Financial resources: (P) Medicare  Current community resources:    Current services prior to admission: (P) Skilled Nursing Facility            Current DME:              Type of Home Care services:  (P) None    ADLS  Prior functional level: (P) Assistance with the following:, Bathing, Dressing, Toileting, Cooking, Housework, Shopping, Mobility  Current functional level: (P) Assistance with the following:, Bathing, Dressing, Toileting, Cooking, Housework, Shopping, Mobility    PT AM-PAC:   /24  OT AM-PAC:   /24    Family can provide assistance at DC: (P) No  Would you like  Case Management to discuss the discharge plan with any other family members/significant others, and if so, who? (P) Yes (brother, Álvaro)  Plans to Return to Present Housing: (P) Unknown at present  Other Identified Issues/Barriers to RETURNING to current housing: intubated  Potential Assistance needed at discharge: (P) Skilled Nursing Facility            Potential DME:    Patient expects to discharge to: (P) Skilled nursing facility  Plan for transportation at discharge: (P) Other (see comment) (stretcher)    Financial    Payor: [employer] MEDICARE / Plan: [employer] MEDICARE-ADVANTAGE PPO / Product Type: Medicare /     Does insurance require precert for SNF: Yes    Potential assistance Purchasing Medications:    Meds-to-Beds request:        [id number] - [place], [place] - [address] -  [phone] - F [phone]  [address] [address]  Phone: [phone] Fax: [phone]    [facility] [address] - [facility] [phone] - F [phone]  [address] [address] [address]  Phone: [phone] Fax: [phone]    [id number] - [place], [place] - [address] -  [phone] - F [phone]  [facility] [facility] [address]  Phone: [phone] Fax: [phone]      Notes:    Factors facilitating achievement of predicted outcomes: Family support and Caregiver support    Barriers to discharge: Medical complications    Additional Case Management Notes: patient returning to [facility]. Referral sent    The Plan for Transition of Care is related to the following treatment goals of Respiratory failure (HCC) [J96.90]  Acute on chronic hypoxic respiratory failure (HCC) [J96.21]    IF APPLICABLE: The Patient and/or patient representative Shazia and her family were provided with a choice of provider and agrees with the discharge plan. Freedom of choice list with basic dialogue that supports the patient's individualized plan of

## 2024-09-14 NOTE — PLAN OF CARE
Problem: Safety - Medical Restraint  Goal: Remains free of injury from restraints (Restraint for Interference with Medical Device)  Description: INTERVENTIONS:  1. Determine that other, less restrictive measures have been tried or would not be effective before applying the restraint  2. Evaluate the patient's condition at the time of restraint application  3. Inform patient/family regarding the reason for restraint  4. Q2H: Monitor safety, psychosocial status, comfort, nutrition and hydration  Outcome: Progressing     Problem: Chronic Conditions and Co-morbidities  Goal: Patient's chronic conditions and co-morbidity symptoms are monitored and maintained or improved  Outcome: Progressing  Flowsheets (Taken 9/14/2024 1340)  Care Plan - Patient's Chronic Conditions and Co-Morbidity Symptoms are Monitored and Maintained or Improved:   Monitor and assess patient's chronic conditions and comorbid symptoms for stability, deterioration, or improvement   Collaborate with multidisciplinary team to address chronic and comorbid conditions and prevent exacerbation or deterioration     Problem: Discharge Planning  Goal: Discharge to home or other facility with appropriate resources  Outcome: Progressing  Flowsheets (Taken 9/14/2024 3488)  Discharge to home or other facility with appropriate resources:   Identify barriers to discharge with patient and caregiver   Arrange for needed discharge resources and transportation as appropriate   Identify discharge learning needs (meds, wound care, etc)

## 2024-09-15 PROBLEM — E66.2 OBESITY HYPOVENTILATION SYNDROME (HCC): Status: ACTIVE | Noted: 2024-09-15

## 2024-09-15 PROBLEM — J39.8 TRACHEAL STENOSIS: Status: ACTIVE | Noted: 2024-09-15

## 2024-09-15 PROBLEM — G47.33 OSA (OBSTRUCTIVE SLEEP APNEA): Status: ACTIVE | Noted: 2024-09-15

## 2024-09-15 PROBLEM — J95.851 VENTILATOR-ACQUIRED PNEUMONIA (HCC): Status: ACTIVE | Noted: 2024-09-15

## 2024-09-15 LAB
ANION GAP SERPL CALCULATED.3IONS-SCNC: 9 MMOL/L (ref 9–16)
ANTI-XA UNFRAC HEPARIN: 0.68 IU/L
ANTI-XA UNFRAC HEPARIN: 0.78 IU/L
ANTI-XA UNFRAC HEPARIN: <0.1 IU/L
BASOPHILS # BLD: <0.03 K/UL (ref 0–0.2)
BASOPHILS NFR BLD: 0 % (ref 0–2)
BUN SERPL-MCNC: 11 MG/DL (ref 8–23)
CALCIUM SERPL-MCNC: 8.4 MG/DL (ref 8.6–10.4)
CHLORIDE SERPL-SCNC: 98 MMOL/L (ref 98–107)
CO2 SERPL-SCNC: 31 MMOL/L (ref 20–31)
CREAT SERPL-MCNC: 0.8 MG/DL (ref 0.5–0.9)
EOSINOPHIL # BLD: 0.28 K/UL (ref 0–0.44)
EOSINOPHILS RELATIVE PERCENT: 6 % (ref 1–4)
ERYTHROCYTE [DISTWIDTH] IN BLOOD BY AUTOMATED COUNT: 19.8 % (ref 11.8–14.4)
FIO2: 30
GFR, ESTIMATED: 77 ML/MIN/1.73M2
GLUCOSE BLD-MCNC: 142 MG/DL (ref 65–105)
GLUCOSE BLD-MCNC: 147 MG/DL (ref 65–105)
GLUCOSE BLD-MCNC: 160 MG/DL (ref 74–100)
GLUCOSE BLD-MCNC: 198 MG/DL (ref 65–105)
GLUCOSE BLD-MCNC: 198 MG/DL (ref 65–105)
GLUCOSE SERPL-MCNC: 152 MG/DL (ref 74–99)
HCT VFR BLD AUTO: 29.2 % (ref 36.3–47.1)
HGB BLD-MCNC: 8.4 G/DL (ref 11.9–15.1)
IMM GRANULOCYTES # BLD AUTO: 0.03 K/UL (ref 0–0.3)
IMM GRANULOCYTES NFR BLD: 1 %
INR PPP: 2.3
LYMPHOCYTES NFR BLD: 0.81 K/UL (ref 1.1–3.7)
LYMPHOCYTES RELATIVE PERCENT: 16 % (ref 24–43)
MCH RBC QN AUTO: 26.7 PG (ref 25.2–33.5)
MCHC RBC AUTO-ENTMCNC: 28.8 G/DL (ref 28.4–34.8)
MCV RBC AUTO: 92.7 FL (ref 82.6–102.9)
MONOCYTES NFR BLD: 0.58 K/UL (ref 0.1–1.2)
MONOCYTES NFR BLD: 12 % (ref 3–12)
NEUTROPHILS NFR BLD: 65 % (ref 36–65)
NEUTS SEG NFR BLD: 3.28 K/UL (ref 1.5–8.1)
NRBC BLD-RTO: 0 PER 100 WBC
PHOSPHATE SERPL-MCNC: 3.3 MG/DL (ref 2.5–4.5)
PLATELET # BLD AUTO: 195 K/UL (ref 138–453)
PMV BLD AUTO: 9.1 FL (ref 8.1–13.5)
POC HCO3: 39.4 MMOL/L (ref 21–28)
POC O2 SATURATION: 94.5 % (ref 94–98)
POC PCO2: 54.8 MM HG (ref 35–48)
POC PH: 7.46 (ref 7.35–7.45)
POC PO2: 70.9 MM HG (ref 83–108)
POSITIVE BASE EXCESS, ART: 14 MMOL/L (ref 0–3)
POTASSIUM SERPL-SCNC: 4.1 MMOL/L (ref 3.7–5.3)
PROTHROMBIN TIME: 24.4 SEC (ref 11.7–14.9)
RBC # BLD AUTO: 3.15 M/UL (ref 3.95–5.11)
RBC # BLD: ABNORMAL 10*6/UL
SODIUM SERPL-SCNC: 138 MMOL/L (ref 136–145)
WBC OTHER # BLD: 5 K/UL (ref 3.5–11.3)

## 2024-09-15 PROCEDURE — 82947 ASSAY GLUCOSE BLOOD QUANT: CPT

## 2024-09-15 PROCEDURE — 2500000003 HC RX 250 WO HCPCS

## 2024-09-15 PROCEDURE — 94640 AIRWAY INHALATION TREATMENT: CPT

## 2024-09-15 PROCEDURE — 2580000003 HC RX 258

## 2024-09-15 PROCEDURE — 82803 BLOOD GASES ANY COMBINATION: CPT

## 2024-09-15 PROCEDURE — 94003 VENT MGMT INPAT SUBQ DAY: CPT

## 2024-09-15 PROCEDURE — 2000000000 HC ICU R&B

## 2024-09-15 PROCEDURE — 80048 BASIC METABOLIC PNL TOTAL CA: CPT

## 2024-09-15 PROCEDURE — 6360000002 HC RX W HCPCS

## 2024-09-15 PROCEDURE — 99222 1ST HOSP IP/OBS MODERATE 55: CPT | Performed by: STUDENT IN AN ORGANIZED HEALTH CARE EDUCATION/TRAINING PROGRAM

## 2024-09-15 PROCEDURE — 85610 PROTHROMBIN TIME: CPT

## 2024-09-15 PROCEDURE — 6370000000 HC RX 637 (ALT 250 FOR IP)

## 2024-09-15 PROCEDURE — 2700000000 HC OXYGEN THERAPY PER DAY

## 2024-09-15 PROCEDURE — 84100 ASSAY OF PHOSPHORUS: CPT

## 2024-09-15 PROCEDURE — 36600 WITHDRAWAL OF ARTERIAL BLOOD: CPT

## 2024-09-15 PROCEDURE — 94761 N-INVAS EAR/PLS OXIMETRY MLT: CPT

## 2024-09-15 PROCEDURE — 85025 COMPLETE CBC W/AUTO DIFF WBC: CPT

## 2024-09-15 PROCEDURE — 85520 HEPARIN ASSAY: CPT

## 2024-09-15 PROCEDURE — 99291 CRITICAL CARE FIRST HOUR: CPT | Performed by: STUDENT IN AN ORGANIZED HEALTH CARE EDUCATION/TRAINING PROGRAM

## 2024-09-15 PROCEDURE — 36415 COLL VENOUS BLD VENIPUNCTURE: CPT

## 2024-09-15 RX ORDER — DEXMEDETOMIDINE HYDROCHLORIDE 4 UG/ML
.1-1.5 INJECTION, SOLUTION INTRAVENOUS CONTINUOUS
Status: DISCONTINUED | OUTPATIENT
Start: 2024-09-15 | End: 2024-09-19

## 2024-09-15 RX ADMIN — MIDODRINE HYDROCHLORIDE 5 MG: 5 TABLET ORAL at 08:12

## 2024-09-15 RX ADMIN — CIPROFLOXACIN 400 MG: 2 INJECTION, SOLUTION INTRAVENOUS at 05:22

## 2024-09-15 RX ADMIN — SODIUM CHLORIDE, PRESERVATIVE FREE 10 ML: 5 INJECTION INTRAVENOUS at 08:07

## 2024-09-15 RX ADMIN — HEPARIN SODIUM 4000 UNITS: 1000 INJECTION INTRAVENOUS; SUBCUTANEOUS at 11:15

## 2024-09-15 RX ADMIN — HEPARIN SODIUM 10 UNITS/KG/HR: 10000 INJECTION, SOLUTION INTRAVENOUS at 19:39

## 2024-09-15 RX ADMIN — IPRATROPIUM BROMIDE AND ALBUTEROL SULFATE 1 DOSE: 2.5; .5 SOLUTION RESPIRATORY (INHALATION) at 15:15

## 2024-09-15 RX ADMIN — SODIUM CHLORIDE, PRESERVATIVE FREE 40 MG: 5 INJECTION INTRAVENOUS at 08:07

## 2024-09-15 RX ADMIN — DEXMEDETOMIDINE HYDROCHLORIDE 0.6 MCG/KG/HR: 400 INJECTION, SOLUTION INTRAVENOUS at 21:41

## 2024-09-15 RX ADMIN — CLINDAMYCIN PHOSPHATE 900 MG: 900 INJECTION, SOLUTION INTRAVENOUS at 22:06

## 2024-09-15 RX ADMIN — CLINDAMYCIN PHOSPHATE 900 MG: 900 INJECTION, SOLUTION INTRAVENOUS at 14:44

## 2024-09-15 RX ADMIN — MIDODRINE HYDROCHLORIDE 5 MG: 5 TABLET ORAL at 12:26

## 2024-09-15 RX ADMIN — IPRATROPIUM BROMIDE AND ALBUTEROL SULFATE 1 DOSE: 2.5; .5 SOLUTION RESPIRATORY (INHALATION) at 19:40

## 2024-09-15 RX ADMIN — IPRATROPIUM BROMIDE AND ALBUTEROL SULFATE 1 DOSE: 2.5; .5 SOLUTION RESPIRATORY (INHALATION) at 23:11

## 2024-09-15 RX ADMIN — PROPOFOL 5 MCG/KG/MIN: 10 INJECTION, EMULSION INTRAVENOUS at 16:10

## 2024-09-15 RX ADMIN — CIPROFLOXACIN 400 MG: 2 INJECTION, SOLUTION INTRAVENOUS at 17:55

## 2024-09-15 RX ADMIN — IPRATROPIUM BROMIDE AND ALBUTEROL SULFATE 1 DOSE: 2.5; .5 SOLUTION RESPIRATORY (INHALATION) at 07:42

## 2024-09-15 RX ADMIN — PROPOFOL 25 MCG/KG/MIN: 10 INJECTION, EMULSION INTRAVENOUS at 08:35

## 2024-09-15 RX ADMIN — DEXMEDETOMIDINE HYDROCHLORIDE 0.2 MCG/KG/HR: 400 INJECTION, SOLUTION INTRAVENOUS at 11:51

## 2024-09-15 RX ADMIN — DEXMEDETOMIDINE HYDROCHLORIDE 0.4 MCG/KG/HR: 400 INJECTION, SOLUTION INTRAVENOUS at 17:13

## 2024-09-15 RX ADMIN — DEXTROSE AND SODIUM CHLORIDE: 5; 450 INJECTION, SOLUTION INTRAVENOUS at 02:30

## 2024-09-15 RX ADMIN — PROPOFOL 25 MCG/KG/MIN: 10 INJECTION, EMULSION INTRAVENOUS at 05:19

## 2024-09-15 RX ADMIN — PROPOFOL 25 MCG/KG/MIN: 10 INJECTION, EMULSION INTRAVENOUS at 02:22

## 2024-09-15 RX ADMIN — IPRATROPIUM BROMIDE AND ALBUTEROL SULFATE 1 DOSE: 2.5; .5 SOLUTION RESPIRATORY (INHALATION) at 11:28

## 2024-09-15 RX ADMIN — DEXTROSE AND SODIUM CHLORIDE: 5; 450 INJECTION, SOLUTION INTRAVENOUS at 13:56

## 2024-09-15 RX ADMIN — PROPOFOL 25 MCG/KG/MIN: 10 INJECTION, EMULSION INTRAVENOUS at 11:47

## 2024-09-15 RX ADMIN — CLINDAMYCIN PHOSPHATE 900 MG: 900 INJECTION, SOLUTION INTRAVENOUS at 06:40

## 2024-09-15 RX ADMIN — SODIUM CHLORIDE: 9 INJECTION, SOLUTION INTRAVENOUS at 22:05

## 2024-09-15 RX ADMIN — INSULIN GLARGINE 8 UNITS: 100 INJECTION, SOLUTION SUBCUTANEOUS at 21:00

## 2024-09-15 RX ADMIN — IPRATROPIUM BROMIDE AND ALBUTEROL SULFATE 1 DOSE: 2.5; .5 SOLUTION RESPIRATORY (INHALATION) at 03:06

## 2024-09-15 ASSESSMENT — PULMONARY FUNCTION TESTS
PIF_VALUE: 24
PIF_VALUE: 35
PIF_VALUE: 23
PIF_VALUE: 29
PIF_VALUE: 27
PIF_VALUE: 26
PIF_VALUE: 24
PIF_VALUE: 25
PIF_VALUE: 28
PIF_VALUE: 28
PIF_VALUE: 36
PIF_VALUE: 44
PIF_VALUE: 28
PIF_VALUE: 30
PIF_VALUE: 31
PIF_VALUE: 24
PIF_VALUE: 27
PIF_VALUE: 27
PIF_VALUE: 28
PIF_VALUE: 27
PIF_VALUE: 31
PIF_VALUE: 30
PIF_VALUE: 26
PIF_VALUE: 30
PIF_VALUE: 28
PIF_VALUE: 24

## 2024-09-15 ASSESSMENT — PAIN SCALES - GENERAL: PAINLEVEL_OUTOF10: 0

## 2024-09-15 NOTE — DISCHARGE SUMMARY
[facility]   IN-PATIENT SERVICE   [facility]    Discharge Summary     Patient ID: Shazia ROGERS  :  1955   MRN: [id number]     ACCOUNT:  [id number]   Patient's PCP: Jyoti Mauricio, ARACELIS - CNP  Admit Date: 2024 at [facility]  Discharge Date: 2024 to [facility] from [facility]  Length of Stay: 1  Code Status:  Full Code  Admitting Physician: Rafael Concepcion MD  Discharge Physician: Efren Jaffe MD     Active Discharge Diagnoses:       Primary Problem  Respiratory failure (HCC)      Hospital Problems  Active Hospital Problems    Diagnosis Date Noted    Acute on chronic hypoxic respiratory failure (HCC) [J96.21] 2024    Respiratory failure (HCC) [J96.90] 2024       Admission Condition:  fair     Discharged Condition: fair (Transferred to [facility])    Hospital Stay:       Hospital Course:  The patient is a 68-year-old female with a history of obstructive sleep apnea and obesity hypoventilation syndrome, chronic hypoxemic respiratory failure, tracheostomy placement in 2024, prior PE/DVT in 2022 on Coumadin, atrial fibrillation, heart failure with preserved ejection fraction who presented to the ED for acute hypoxic respiratory failure.     Per ED note, patient was at [facility] where she began to experience decreased oxygen saturations in the 80s on ventilator.  Patient was then brought to the emergency department by EMS where there were difficulties maintaining oxygen saturation of the patient.  Patient was intubated in the emergency department due to worsening respiratory status resulting in improvement.  Upon examination of tracheostomy cannula by emergency department physician, it was noted that there was a distal obstruction, general surgery was consulted for further evaluation for possible replacement of tracheostomy.     Tracheostomy was placed in 2024 for chronic hypoxic respiratory failure and noncompliance

## 2024-09-15 NOTE — PLAN OF CARE
Problem: Safety - Medical Restraint  Goal: Remains free of injury from restraints (Restraint for Interference with Medical Device)  Description: INTERVENTIONS:  1. Determine that other, less restrictive measures have been tried or would not be effective before applying the restraint  2. Evaluate the patient's condition at the time of restraint application  3. Inform patient/family regarding the reason for restraint  4. Q2H: Monitor safety, psychosocial status, comfort, nutrition and hydration  9/15/2024 0104 by Radha Zamudio RN  Outcome: Progressing  Flowsheets  Taken 9/15/2024 0000 by Radha Zamudio RN  Remains free of injury from restraints (restraint for interference with medical device): Determine that other, less restrictive measures have been tried or would not be effective before applying the restraint  Taken 9/14/2024 2200 by Radha Zamudio RN  Remains free of injury from restraints (restraint for interference with medical device): Determine that other, less restrictive measures have been tried or would not be effective before applying the restraint  Taken 9/14/2024 2000 by Radha Zamudio RN  Remains free of injury from restraints (restraint for interference with medical device): Determine that other, less restrictive measures have been tried or would not be effective before applying the restraint  Taken 9/14/2024 1800 by Rogelio Salvador RN  Remains free of injury from restraints (restraint for interference with medical device):   Determine that other, less restrictive measures have been tried or would not be effective before applying the restraint   Every 2 hours: Monitor safety, psychosocial status, comfort, nutrition and hydration  9/14/2024 1600 by Rogelio Salvador RN  Outcome: Progressing  Flowsheets  Taken 9/14/2024 1600  Remains free of injury from restraints (restraint for interference with medical device):   Determine that other, less restrictive measures have been tried or  would not be effective before applying the restraint   Every 2 hours: Monitor safety, psychosocial status, comfort, nutrition and hydration  Taken 9/14/2024 1556  Remains free of injury from restraints (restraint for interference with medical device):   Determine that other, less restrictive measures have been tried or would not be effective before applying the restraint   Every 2 hours: Monitor safety, psychosocial status, comfort, nutrition and hydration     Problem: Chronic Conditions and Co-morbidities  Goal: Patient's chronic conditions and co-morbidity symptoms are monitored and maintained or improved  9/15/2024 0104 by Radha Zamudio RN  Outcome: Progressing  9/14/2024 1600 by Rogelio Salvador RN  Outcome: Progressing  Flowsheets (Taken 9/14/2024 1349)  Care Plan - Patient's Chronic Conditions and Co-Morbidity Symptoms are Monitored and Maintained or Improved:   Monitor and assess patient's chronic conditions and comorbid symptoms for stability, deterioration, or improvement   Collaborate with multidisciplinary team to address chronic and comorbid conditions and prevent exacerbation or deterioration     Problem: Discharge Planning  Goal: Discharge to home or other facility with appropriate resources  9/15/2024 0104 by Radha Zamudio RN  Outcome: Progressing  9/14/2024 1600 by Rogelio Salvador RN  Outcome: Progressing  Flowsheets (Taken 9/14/2024 1349)  Discharge to home or other facility with appropriate resources:   Identify barriers to discharge with patient and caregiver   Arrange for needed discharge resources and transportation as appropriate   Identify discharge learning needs (meds, wound care, etc)     Problem: Pain  Goal: Verbalizes/displays adequate comfort level or baseline comfort level  Outcome: Progressing  Flowsheets  Taken 9/14/2024 2000 by Radha Zamudio RN  Verbalizes/displays adequate comfort level or baseline comfort level: Assess pain using appropriate pain scale  Taken

## 2024-09-15 NOTE — PROGRESS NOTES
Critical Care Team - Daily Progress Note      Date and time: 9/15/2024 12:39 PM  Patient's name:  Shazia ROGERS  Medical Record Number: [id number]  Patient's account/billing number: [id number]  Patient's YOB: 1955  Age: 68 y.o.  Date of Admission: 9/14/2024  1:39 PM  Length of stay during current admission: 1      Primary Care Physician: Jyoti Mauricio, APRN - CNP  ICU Attending Physician:      Code Status: Full Code    Reason for ICU admission: No chief complaint on file.      Subjective:     OVERNIGHT EVENTS:           No acute events overnight    TODAY:     Patient remains sedated and intubated.  ENT planning for tracheostomy revision tomorrow.  Will hold tube feeds at midnight.  Hold heparin tomorrow at noon for procedure.      AWAKE & FOLLOWING COMMANDS:  [x] No   [] Yes    CURRENT VENTILATION STATUS:     [x] Ventilator  [] BIPAP  [] Nasal Cannula [] Room Air      IF INTUBATED, ET TUBE MARKING AT LOWER LIP:       cms    SECRETIONS Amount:  [] Small [] Moderate  [] Large  [x] None  Color:     [] White [] Colored  [] Bloody    SEDATION:  RAAS Score:  [x] Propofol gtt  [] Versed gtt  [] Ativan gtt   [] No Sedation    PARALYZED:  [x] No    [] Yes    DIARRHEA:                [x] No                [] Yes  (C. Difficile status: [] positive                                                                                                                       [] negative                                                                                                                     [] pending)    VASOPRESSORS:  [x] No    [] Yes    If yes -   [] Levophed       [] Dopamine     [] Vasopressin       [] Dobutamine  [] Phenylephrine         [] Epinephrine    CENTRAL LINES:     [x] No   [] Yes   (Date of Insertion:   )           If yes -     [] Right IJ     [] Left IJ [] Right Femoral [] Left Femoral                   [] Right Subclavian [] Left Subclavian       CASTRO CATHETER:     [x] No   [] Yes
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
## 2024-09-15 NOTE — PLAN OF CARE
Problem: Safety - Medical Restraint  Goal: Remains free of injury from restraints (Restraint for Interference with Medical Device)  Description: INTERVENTIONS:  1. Determine that other, less restrictive measures have been tried or would not be effective before applying the restraint  2. Evaluate the patient's condition at the time of restraint application  3. Inform patient/family regarding the reason for restraint  4. Q2H: Monitor safety, psychosocial status, comfort, nutrition and hydration  9/15/2024 1211 by Rogelio Salvador RN  Outcome: Progressing  Flowsheets  Taken 9/15/2024 1200 by Rogelio Salvador RN  Remains free of injury from restraints (restraint for interference with medical device):   Determine that other, less restrictive measures have been tried or would not be effective before applying the restraint   Every 2 hours: Monitor safety, psychosocial status, comfort, nutrition and hydration  Taken 9/15/2024 1000 by Rogelio Salvador RN  Remains free of injury from restraints (restraint for interference with medical device):   Determine that other, less restrictive measures have been tried or would not be effective before applying the restraint   Every 2 hours: Monitor safety, psychosocial status, comfort, nutrition and hydration  Taken 9/15/2024 0800 by Rogelio Salvador RN  Remains free of injury from restraints (restraint for interference with medical device):   Determine that other, less restrictive measures have been tried or would not be effective before applying the restraint   Every 2 hours: Monitor safety, psychosocial status, comfort, nutrition and hydration  Taken 9/15/2024 0600 by Radha Zamudio RN  Remains free of injury from restraints (restraint for interference with medical device): Determine that other, less restrictive measures have been tried or would not be effective before applying the restraint  Taken 9/15/2024 0400 by Radha Zamudio RN  Remains free of injury from

## 2024-09-15 NOTE — PLAN OF CARE
Problem: Respiratory - Adult  Goal: Achieves optimal ventilation and oxygenation  9/15/2024 0802 by Sabine Montaño, RCANEESH  Outcome: Progressing

## 2024-09-15 NOTE — PROGRESS NOTES
Comprehensive Nutrition Assessment    Type and Reason for Visit:  Initial (Vent check)    Nutrition Recommendations/Plan:   Recommend modify TF order r/t risk for overfeeding when current order reaches goal rate.  Suggest decrease or discontinue dextrose in IVF while tube feeding.  Recommend Peptide Based at goal rate 15 mL/hr with 3 protein modulars per day to provide 744 kcal (+kcal from propofol), 105 gm protein.   Monitor TF, weight, labs, GI status, fluid status, labs, POC.     Malnutrition Assessment:  Malnutrition Status:  Insufficient data (09/15/24 1345)    Context:  Acute Illness     Findings of the 6 clinical characteristics of malnutrition:  Energy Intake:  Unable to assess  Weight Loss:  No significant weight loss     Body Fat Loss:  No significant body fat loss     Muscle Mass Loss:  Unable to assess    Fluid Accumulation:  Moderate to Severe Extremities   Strength:  Not Performed    Nutrition Assessment:    Adm for respiratory failure, pt is vented on trach and sedated. Propofol at 25 mL/hr at visit (660 kcal/d). +NGT. Per notes, \"ENT planning for tracheostomy revision tomorrow.  Will hold tube feeds at midnight.\" Noted Diabetic TF ordered- started at 10 mL/hr. LBM 9/12. Pt previously tolerating Peptide Based HP at recent adm at [facility]. PS to resident to consider RD recommendations for TF.    Nutrition Related Findings:    Meds/labs reviewed. D5% IVF at 75 mL/hr. Glu 142-147 mg/dL. Wound Type: Pressure Injury, Surgical Incision (PI to sacrum, incision to throat, traumatic injury to abd)       Current Nutrition Intake & Therapies:    Average Meal Intake: NPO  Average Supplements Intake: NPO  Diet NPO  ADULT TUBE FEEDING; Nasogastric; Diabetic; Continuous; 10; Yes; 10; Q 6 hours; 50; 30; Q 3 hours  Diet NPO  Current Tube Feeding (TF) Orders:  Feeding Route: Nasogastric (TF ordered by MD)  Formula: Diabetic  Schedule: Continuous  Feeding Regimen: 10 mL/hr  Additives/Modulars: None  Water Flushes: 30 mL  q 3 hours  Current TF & Flush Orders Provides: Diabetic TF at 10 mL/hr = 360 kcal, 20 gm protein  Goal TF & Flush Orders Provides: Diabetic TF at 50 mL/hr = 1800 kcal, 100 gm protein  Additional Calorie Sources:  Propofol at 25 mL/hr = 660 kcal, D5% and 1/2 NS at 75 mL/hr = 306 kcal/d    Anthropometric Measures:  Height: 165.1 cm (5' 5\")  Ideal Body Weight (IBW): 125 lbs (57 kg)    Current Body Weight: 208.2 kg (459 lb), 367.2 % IBW. Weight Source: Bed Scale  Current BMI (kg/m2): 76.4    Estimated Daily Nutrient Needs:  Energy Requirements Based On: Kcal/kg  Weight Used for Energy Requirements: Ideal  Energy (kcal/day): 9239-4108 kcal/d  Weight Used for Protein Requirements: Ideal  Protein (g/day): 140-155 gm/d  Method Used for Fluid Requirements: Other (Comment)  Fluid (ml/day): per MD    Nutrition Diagnosis:   Inadequate oral intake related to impaired respiratory function as evidenced by NPO or clear liquid status due to medical condition, intubation    Nutrition Interventions:   Food and/or Nutrient Delivery: Modify Tube Feeding  Nutrition Education/Counseling: No recommendation at this time  Coordination of Nutrition Care: Continue to monitor while inpatient  Plan of Care discussed with: RN    Goals:  Previous Goal Met:  (goal set)  Goals: Meet at least 75% of estimated needs, prior to discharge    Nutrition Monitoring and Evaluation:   Behavioral-Environmental Outcomes: None Identified  Food/Nutrient Intake Outcomes: Enteral Nutrition Intake/Tolerance, IVF Intake  Physical Signs/Symptoms Outcomes: Biochemical Data, Nutrition Focused Physical Findings, Weight, GI Status, Fluid Status or Edema    Discharge Planning:    Too soon to determine     Janina Meyer RD  Contact: [phone]

## 2024-09-15 NOTE — PLAN OF CARE
Tube feeds to be held at midnight tonight per ENT recommendations prior to tracheostomy revision planned for tomorrow afternoon.    Heparin infusion to be held at noon on 9/16 for tracheostomy revision.

## 2024-09-16 ENCOUNTER — APPOINTMENT (OUTPATIENT)
Dept: GENERAL RADIOLOGY | Age: 69
DRG: 166 | End: 2024-09-16
Attending: INTERNAL MEDICINE
Payer: MEDICARE

## 2024-09-16 ENCOUNTER — ANESTHESIA (OUTPATIENT)
Dept: OPERATING ROOM | Age: 69
End: 2024-09-16
Payer: MEDICARE

## 2024-09-16 ENCOUNTER — ANESTHESIA EVENT (OUTPATIENT)
Dept: OPERATING ROOM | Age: 69
End: 2024-09-16
Payer: MEDICARE

## 2024-09-16 LAB
ALLEN TEST: ABNORMAL
ANION GAP SERPL CALCULATED.3IONS-SCNC: 8 MMOL/L (ref 9–16)
ANTI-XA UNFRAC HEPARIN: 0.68 IU/L
ANTI-XA UNFRAC HEPARIN: <0.1 IU/L
BASOPHILS # BLD: 0 K/UL (ref 0–0.2)
BASOPHILS NFR BLD: 0 % (ref 0–2)
BUN SERPL-MCNC: 10 MG/DL (ref 8–23)
CALCIUM SERPL-MCNC: 8.7 MG/DL (ref 8.6–10.4)
CHLORIDE SERPL-SCNC: 98 MMOL/L (ref 98–107)
CO2 SERPL-SCNC: 28 MMOL/L (ref 20–31)
CREAT SERPL-MCNC: 0.7 MG/DL (ref 0.5–0.9)
EOSINOPHIL # BLD: 0.29 K/UL (ref 0–0.4)
EOSINOPHILS RELATIVE PERCENT: 5 % (ref 1–4)
ERYTHROCYTE [DISTWIDTH] IN BLOOD BY AUTOMATED COUNT: 19.6 % (ref 11.8–14.4)
FIO2: 30
GFR, ESTIMATED: >90 ML/MIN/1.73M2
GLUCOSE BLD-MCNC: 199 MG/DL (ref 65–105)
GLUCOSE BLD-MCNC: 218 MG/DL (ref 65–105)
GLUCOSE BLD-MCNC: 230 MG/DL (ref 65–105)
GLUCOSE BLD-MCNC: 234 MG/DL (ref 65–105)
GLUCOSE BLD-MCNC: 247 MG/DL (ref 65–105)
GLUCOSE BLD-MCNC: 249 MG/DL (ref 65–105)
GLUCOSE BLD-MCNC: 253 MG/DL (ref 74–100)
GLUCOSE SERPL-MCNC: 258 MG/DL (ref 74–99)
HCT VFR BLD AUTO: 32.6 % (ref 36.3–47.1)
HGB BLD-MCNC: 9.3 G/DL (ref 11.9–15.1)
IMM GRANULOCYTES # BLD AUTO: 0 K/UL (ref 0–0.3)
IMM GRANULOCYTES NFR BLD: 0 %
INR PPP: 1.9
LYMPHOCYTES NFR BLD: 0.63 K/UL (ref 1–4.8)
LYMPHOCYTES RELATIVE PERCENT: 11 % (ref 24–44)
MCH RBC QN AUTO: 26.3 PG (ref 25.2–33.5)
MCHC RBC AUTO-ENTMCNC: 28.5 G/DL (ref 28.4–34.8)
MCV RBC AUTO: 92.1 FL (ref 82.6–102.9)
MODE: ABNORMAL
MONOCYTES NFR BLD: 0.17 K/UL (ref 0.1–0.8)
MONOCYTES NFR BLD: 3 % (ref 1–7)
MORPHOLOGY: ABNORMAL
NEUTROPHILS NFR BLD: 81 % (ref 36–66)
NEUTS SEG NFR BLD: 4.61 K/UL (ref 1.8–7.7)
NRBC BLD-RTO: 0 PER 100 WBC
O2 DELIVERY DEVICE: ABNORMAL
PLATELET # BLD AUTO: 210 K/UL (ref 138–453)
PMV BLD AUTO: 9.6 FL (ref 8.1–13.5)
POC HCO3: 37.3 MMOL/L (ref 21–28)
POC O2 SATURATION: 94.7 % (ref 94–98)
POC PCO2: 50.1 MM HG (ref 35–48)
POC PH: 7.48 (ref 7.35–7.45)
POC PO2: 70.4 MM HG (ref 83–108)
POSITIVE BASE EXCESS, ART: 12.3 MMOL/L (ref 0–3)
POTASSIUM SERPL-SCNC: 4 MMOL/L (ref 3.7–5.3)
PROTHROMBIN TIME: 21.3 SEC (ref 11.7–14.9)
RBC # BLD AUTO: 3.54 M/UL (ref 3.95–5.11)
SAMPLE SITE: ABNORMAL
SODIUM SERPL-SCNC: 134 MMOL/L (ref 136–145)
WBC OTHER # BLD: 5.7 K/UL (ref 3.5–11.3)

## 2024-09-16 PROCEDURE — 3700000000 HC ANESTHESIA ATTENDED CARE: Performed by: STUDENT IN AN ORGANIZED HEALTH CARE EDUCATION/TRAINING PROGRAM

## 2024-09-16 PROCEDURE — 3700000001 HC ADD 15 MINUTES (ANESTHESIA): Performed by: STUDENT IN AN ORGANIZED HEALTH CARE EDUCATION/TRAINING PROGRAM

## 2024-09-16 PROCEDURE — 2500000003 HC RX 250 WO HCPCS: Performed by: NURSE ANESTHETIST, CERTIFIED REGISTERED

## 2024-09-16 PROCEDURE — 6370000000 HC RX 637 (ALT 250 FOR IP)

## 2024-09-16 PROCEDURE — 99213 OFFICE O/P EST LOW 20 MIN: CPT

## 2024-09-16 PROCEDURE — 94761 N-INVAS EAR/PLS OXIMETRY MLT: CPT

## 2024-09-16 PROCEDURE — 6360000002 HC RX W HCPCS

## 2024-09-16 PROCEDURE — 31613 TRACHEOSTOMA REVJ SIMPLE: CPT | Performed by: STUDENT IN AN ORGANIZED HEALTH CARE EDUCATION/TRAINING PROGRAM

## 2024-09-16 PROCEDURE — 3600000004 HC SURGERY LEVEL 4 BASE: Performed by: STUDENT IN AN ORGANIZED HEALTH CARE EDUCATION/TRAINING PROGRAM

## 2024-09-16 PROCEDURE — 94640 AIRWAY INHALATION TREATMENT: CPT

## 2024-09-16 PROCEDURE — 71045 X-RAY EXAM CHEST 1 VIEW: CPT

## 2024-09-16 PROCEDURE — 6360000002 HC RX W HCPCS: Performed by: NURSE ANESTHETIST, CERTIFIED REGISTERED

## 2024-09-16 PROCEDURE — 2000000000 HC ICU R&B

## 2024-09-16 PROCEDURE — 36415 COLL VENOUS BLD VENIPUNCTURE: CPT

## 2024-09-16 PROCEDURE — 3600000014 HC SURGERY LEVEL 4 ADDTL 15MIN: Performed by: STUDENT IN AN ORGANIZED HEALTH CARE EDUCATION/TRAINING PROGRAM

## 2024-09-16 PROCEDURE — 36600 WITHDRAWAL OF ARTERIAL BLOOD: CPT

## 2024-09-16 PROCEDURE — 2500000003 HC RX 250 WO HCPCS

## 2024-09-16 PROCEDURE — 2580000003 HC RX 258

## 2024-09-16 PROCEDURE — 94003 VENT MGMT INPAT SUBQ DAY: CPT

## 2024-09-16 PROCEDURE — 74018 RADEX ABDOMEN 1 VIEW: CPT

## 2024-09-16 PROCEDURE — 2500000003 HC RX 250 WO HCPCS: Performed by: STUDENT IN AN ORGANIZED HEALTH CARE EDUCATION/TRAINING PROGRAM

## 2024-09-16 PROCEDURE — 99291 CRITICAL CARE FIRST HOUR: CPT | Performed by: INTERNAL MEDICINE

## 2024-09-16 PROCEDURE — 2709999900 HC NON-CHARGEABLE SUPPLY: Performed by: STUDENT IN AN ORGANIZED HEALTH CARE EDUCATION/TRAINING PROGRAM

## 2024-09-16 PROCEDURE — 80048 BASIC METABOLIC PNL TOTAL CA: CPT

## 2024-09-16 PROCEDURE — 2700000000 HC OXYGEN THERAPY PER DAY

## 2024-09-16 PROCEDURE — 85610 PROTHROMBIN TIME: CPT

## 2024-09-16 PROCEDURE — 2580000003 HC RX 258: Performed by: NURSE ANESTHETIST, CERTIFIED REGISTERED

## 2024-09-16 PROCEDURE — 2580000003 HC RX 258: Performed by: STUDENT IN AN ORGANIZED HEALTH CARE EDUCATION/TRAINING PROGRAM

## 2024-09-16 PROCEDURE — 0BW18FZ REVISION OF TRACHEOSTOMY DEVICE IN TRACHEA, VIA NATURAL OR ARTIFICIAL OPENING ENDOSCOPIC: ICD-10-PCS | Performed by: STUDENT IN AN ORGANIZED HEALTH CARE EDUCATION/TRAINING PROGRAM

## 2024-09-16 PROCEDURE — 85520 HEPARIN ASSAY: CPT

## 2024-09-16 PROCEDURE — 85025 COMPLETE CBC W/AUTO DIFF WBC: CPT

## 2024-09-16 PROCEDURE — 82947 ASSAY GLUCOSE BLOOD QUANT: CPT

## 2024-09-16 PROCEDURE — 82803 BLOOD GASES ANY COMBINATION: CPT

## 2024-09-16 PROCEDURE — 51798 US URINE CAPACITY MEASURE: CPT

## 2024-09-16 PROCEDURE — 99231 SBSQ HOSP IP/OBS SF/LOW 25: CPT | Performed by: STUDENT IN AN ORGANIZED HEALTH CARE EDUCATION/TRAINING PROGRAM

## 2024-09-16 RX ORDER — FENTANYL CITRATE 50 UG/ML
INJECTION, SOLUTION INTRAMUSCULAR; INTRAVENOUS
Status: DISCONTINUED | OUTPATIENT
Start: 2024-09-16 | End: 2024-09-16 | Stop reason: SDUPTHER

## 2024-09-16 RX ORDER — LIDOCAINE HYDROCHLORIDE AND EPINEPHRINE 10; 10 MG/ML; UG/ML
INJECTION, SOLUTION INFILTRATION; PERINEURAL PRN
Status: DISCONTINUED | OUTPATIENT
Start: 2024-09-16 | End: 2024-09-16 | Stop reason: ALTCHOICE

## 2024-09-16 RX ORDER — SODIUM CHLORIDE, SODIUM LACTATE, POTASSIUM CHLORIDE, CALCIUM CHLORIDE 600; 310; 30; 20 MG/100ML; MG/100ML; MG/100ML; MG/100ML
INJECTION, SOLUTION INTRAVENOUS
Status: DISCONTINUED | OUTPATIENT
Start: 2024-09-16 | End: 2024-09-16 | Stop reason: SDUPTHER

## 2024-09-16 RX ORDER — MAGNESIUM HYDROXIDE 1200 MG/15ML
LIQUID ORAL CONTINUOUS PRN
Status: COMPLETED | OUTPATIENT
Start: 2024-09-16 | End: 2024-09-16

## 2024-09-16 RX ORDER — ROCURONIUM BROMIDE 10 MG/ML
INJECTION, SOLUTION INTRAVENOUS
Status: DISCONTINUED | OUTPATIENT
Start: 2024-09-16 | End: 2024-09-16 | Stop reason: SDUPTHER

## 2024-09-16 RX ADMIN — DEXMEDETOMIDINE HYDROCHLORIDE 0.6 MCG/KG/HR: 400 INJECTION, SOLUTION INTRAVENOUS at 03:38

## 2024-09-16 RX ADMIN — ROCURONIUM BROMIDE 20 MG: 10 INJECTION, SOLUTION INTRAVENOUS at 11:58

## 2024-09-16 RX ADMIN — FENTANYL CITRATE 50 MCG: 50 INJECTION, SOLUTION INTRAMUSCULAR; INTRAVENOUS at 11:58

## 2024-09-16 RX ADMIN — SODIUM CHLORIDE, PRESERVATIVE FREE 10 ML: 5 INJECTION INTRAVENOUS at 09:39

## 2024-09-16 RX ADMIN — DEXMEDETOMIDINE HYDROCHLORIDE 0.6 MCG/KG/HR: 400 INJECTION, SOLUTION INTRAVENOUS at 10:43

## 2024-09-16 RX ADMIN — MIDODRINE HYDROCHLORIDE 5 MG: 5 TABLET ORAL at 18:20

## 2024-09-16 RX ADMIN — DEXMEDETOMIDINE HYDROCHLORIDE 0.6 MCG/KG/HR: 400 INJECTION, SOLUTION INTRAVENOUS at 13:30

## 2024-09-16 RX ADMIN — SODIUM CHLORIDE, POTASSIUM CHLORIDE, SODIUM LACTATE AND CALCIUM CHLORIDE: 600; 310; 30; 20 INJECTION, SOLUTION INTRAVENOUS at 11:31

## 2024-09-16 RX ADMIN — INSULIN LISPRO 2 UNITS: 100 INJECTION, SOLUTION INTRAVENOUS; SUBCUTANEOUS at 18:16

## 2024-09-16 RX ADMIN — SODIUM CHLORIDE, PRESERVATIVE FREE 40 MG: 5 INJECTION INTRAVENOUS at 09:38

## 2024-09-16 RX ADMIN — FENTANYL CITRATE 50 MCG: 50 INJECTION, SOLUTION INTRAMUSCULAR; INTRAVENOUS at 12:13

## 2024-09-16 RX ADMIN — SODIUM CHLORIDE, PRESERVATIVE FREE 10 ML: 5 INJECTION INTRAVENOUS at 21:29

## 2024-09-16 RX ADMIN — HEPARIN SODIUM 9 UNITS/KG/HR: 10000 INJECTION, SOLUTION INTRAVENOUS at 09:55

## 2024-09-16 RX ADMIN — INSULIN LISPRO 4 UNITS: 100 INJECTION, SOLUTION INTRAVENOUS; SUBCUTANEOUS at 09:16

## 2024-09-16 RX ADMIN — IPRATROPIUM BROMIDE AND ALBUTEROL SULFATE 1 DOSE: 2.5; .5 SOLUTION RESPIRATORY (INHALATION) at 07:52

## 2024-09-16 RX ADMIN — ROCURONIUM BROMIDE 50 MG: 10 INJECTION, SOLUTION INTRAVENOUS at 11:33

## 2024-09-16 RX ADMIN — CLINDAMYCIN PHOSPHATE 900 MG: 900 INJECTION, SOLUTION INTRAVENOUS at 22:22

## 2024-09-16 RX ADMIN — CIPROFLOXACIN 400 MG: 2 INJECTION, SOLUTION INTRAVENOUS at 05:43

## 2024-09-16 RX ADMIN — DEXMEDETOMIDINE HYDROCHLORIDE 0.4 MCG/KG/HR: 400 INJECTION, SOLUTION INTRAVENOUS at 21:29

## 2024-09-16 RX ADMIN — DEXMEDETOMIDINE HYDROCHLORIDE 0.6 MCG/KG/HR: 400 INJECTION, SOLUTION INTRAVENOUS at 06:53

## 2024-09-16 RX ADMIN — INSULIN GLARGINE 8 UNITS: 100 INJECTION, SOLUTION SUBCUTANEOUS at 20:42

## 2024-09-16 RX ADMIN — IPRATROPIUM BROMIDE AND ALBUTEROL SULFATE 1 DOSE: 2.5; .5 SOLUTION RESPIRATORY (INHALATION) at 19:29

## 2024-09-16 RX ADMIN — IPRATROPIUM BROMIDE AND ALBUTEROL SULFATE 1 DOSE: 2.5; .5 SOLUTION RESPIRATORY (INHALATION) at 15:48

## 2024-09-16 RX ADMIN — CIPROFLOXACIN 400 MG: 2 INJECTION, SOLUTION INTRAVENOUS at 17:59

## 2024-09-16 RX ADMIN — MIDODRINE HYDROCHLORIDE 5 MG: 5 TABLET ORAL at 09:17

## 2024-09-16 RX ADMIN — IPRATROPIUM BROMIDE AND ALBUTEROL SULFATE 1 DOSE: 2.5; .5 SOLUTION RESPIRATORY (INHALATION) at 03:05

## 2024-09-16 RX ADMIN — CLINDAMYCIN PHOSPHATE 900 MG: 900 INJECTION, SOLUTION INTRAVENOUS at 06:50

## 2024-09-16 RX ADMIN — DEXMEDETOMIDINE HYDROCHLORIDE 0.6 MCG/KG/HR: 400 INJECTION, SOLUTION INTRAVENOUS at 01:10

## 2024-09-16 RX ADMIN — CLINDAMYCIN PHOSPHATE 900 MG: 900 INJECTION, SOLUTION INTRAVENOUS at 11:55

## 2024-09-16 RX ADMIN — DEXTROSE AND SODIUM CHLORIDE: 5; 450 INJECTION, SOLUTION INTRAVENOUS at 03:31

## 2024-09-16 ASSESSMENT — PULMONARY FUNCTION TESTS
PIF_VALUE: 31
PIF_VALUE: 28
PIF_VALUE: 29
PIF_VALUE: 29
PIF_VALUE: 11
PIF_VALUE: 29
PIF_VALUE: 31
PIF_VALUE: 28
PIF_VALUE: 31
PIF_VALUE: 29
PIF_VALUE: 29
PIF_VALUE: 28
PIF_VALUE: 30
PIF_VALUE: 28
PIF_VALUE: 30
PIF_VALUE: 29
PIF_VALUE: 30

## 2024-09-16 ASSESSMENT — ENCOUNTER SYMPTOMS: SHORTNESS OF BREATH: 1

## 2024-09-16 NOTE — PLAN OF CARE
response  Taken 9/16/2024 0800  Verbalizes/displays adequate comfort level or baseline comfort level:   Assess pain using appropriate pain scale   Administer analgesics based on type and severity of pain and evaluate response  9/16/2024 0655 by Radha Zamudio RN  Outcome: Progressing  Flowsheets (Taken 9/15/2024 2000)  Verbalizes/displays adequate comfort level or baseline comfort level: Assess pain using appropriate pain scale     Problem: Skin/Tissue Integrity  Goal: Absence of new skin breakdown  Description: 1.  Monitor for areas of redness and/or skin breakdown  2.  Assess vascular access sites hourly  3.  Every 4-6 hours minimum:  Change oxygen saturation probe site  4.  Every 4-6 hours:  If on nasal continuous positive airway pressure, respiratory therapy assess nares and determine need for appliance change or resting period.  9/16/2024 1859 by Jessica Estrada RN  Outcome: Progressing  9/16/2024 0655 by Radha Zamudio RN  Outcome: Progressing     Problem: ABCDS Injury Assessment  Goal: Absence of physical injury  9/16/2024 1859 by Jessica Estrada RN  Outcome: Progressing  9/16/2024 0655 by Radha Zamudio RN  Outcome: Progressing  Flowsheets (Taken 9/15/2024 2000)  Absence of Physical Injury: Implement safety measures based on patient assessment     Problem: Safety - Adult  Goal: Free from fall injury  9/16/2024 1859 by Jessica Estrada RN  Outcome: Progressing  9/16/2024 0655 by Radha Zamudio RN  Outcome: Progressing     Problem: Respiratory - Adult  Goal: Achieves optimal ventilation and oxygenation  9/16/2024 1859 by Jessica Estrada RN  Outcome: Progressing  9/16/2024 0807 by Thi Oseguera RCP  Outcome: Progressing  Flowsheets (Taken 9/16/2024 0800 by Jessica Estrada RN)  Achieves optimal ventilation and oxygenation:   Assess for changes in respiratory status   Position to facilitate oxygenation and minimize respiratory effort   Respiratory therapy support as indicated  9/16/2024 0758  by Thi Oseguera RCP  Outcome: Progressing  9/16/2024 0655 by Radha Zamudio, RN  Outcome: Progressing     Problem: Nutrition Deficit:  Goal: Optimize nutritional status  9/16/2024 1859 by Jessica Estrada, RN  Outcome: Progressing  9/16/2024 0655 by Radha Zaumdio, RN  Outcome: Progressing

## 2024-09-16 NOTE — PLAN OF CARE
Problem: Respiratory - Adult  Goal: Achieves optimal ventilation and oxygenation  9/15/2024 2022 by Aretha Simpson RCP  Outcome: Progressing   BRONCHOSPASM/BRONCHOCONSTRICTION     [x]         IMPROVE AERATION/BREATH SOUNDS  [x]   ADMINISTER BRONCHODILATOR THERAPY AS APPROPRIATE  [x]   ASSESS BREATH SOUNDS  [x]   IMPLEMENT AEROSOL/MDI PROTOCOL  [x]   PATIENT EDUCATION AS NEEDED  Problem: OXYGENATION/RESPIRATORY FUNCTION  Goal: Patient will maintain patent airway  Outcome: Ongoing  Goal: Patient will achieve/maintain normal respiratory rate/effort  Respiratory rate and effort will be within normal limits for the patient  Outcome: Ongoing    Problem: MECHANICAL VENTILATION  Goal: Patient will maintain patent airway  Outcome: Ongoing  Goal: Oral health is maintained or improved  Outcome: Ongoing  Goal: ET tube will be managed safely  Outcome: Ongoing  Goal: Ability to express needs and understand communication  Outcome: Ongoing  Goal: Mobility/activity is maintained at optimum level for patient  Outcome: Ongoing    Problem: ASPIRATION PRECAUTIONS  Goal: Patient’s risk of aspiration is minimized  Outcome: Ongoing    Problem: SKIN INTEGRITY  Goal: Skin integrity is maintained or improved  Outcome: Ongoing

## 2024-09-16 NOTE — PROGRESS NOTES
09/16/24 0801   Vent Information   Vent Mode (S)  CPAP/PS   Ventilator Settings   Pressure Support (cm H2O) (S)  6 cm H2O

## 2024-09-16 NOTE — PROGRESS NOTES
[facility] Nurse  Consult Note       NAME:  Shazia ROGERS  MEDICAL RECORD NUMBER:  [id number]  AGE: 68 y.o.   GENDER: female  : 1955  TODAY'S DATE:  2024    Subjective:     Reason for WOCN Evaluation and Assessment: \"wound on abdomen\"      Shazia ROGERS is a 68 y.o. female referred by:   [x] Physician  [] Nursing  [] Other:     Wound Identification:  Wound Type: non-healing surgical  Contributing Factors: diabetes, decreased mobility, underlying ventral hernia and obesity     Wound History: Patient previously had a hysterectomy due to endometrial cancer.  She has a large ventral hernia and occasionally gets open areas at the site of the hysterectomy scar  Current Wound Care Treatment: Foam    Previously followed at [facility] until 2024. Now resides in an [facility].     She was admitted to [facility] 2024 and transferred to [facility] for a tracheostomy revision on 2024.         Objective:      /73   Pulse 89   Temp (!) 95.8 °F (35.4 °C) (Temporal)   Resp 18   Ht 1.651 m (5' 5\")   Wt (!) 208.2 kg (459 lb)   LMP 2014   SpO2 93%   BMI 76.38 kg/m²   Emmanuel Risk Score: Emmanuel Scale Score: 12    LABS    CBC:   Lab Results   Component Value Date/Time    WBC 5.7 2024 08:04 AM    RBC 3.54 2024 08:04 AM    RBC 4.44 2012 09:38 AM    HGB 9.3 2024 08:04 AM    HCT 32.6 2024 08:04 AM     CMP:  Albumin:  No results found for: \"LABALBU\"  PT/INR:    Lab Results   Component Value Date/Time    PROTIME 24.4 09/15/2024 11:32 AM    PROTIME 29.7 2024 10:30 AM    INR 2.3 09/15/2024 11:32 AM     HgBA1c:    Lab Results   Component Value Date/Time    LABA1C 6.8 2024 01:55 PM     PTT: No components found for: \"LABPTT\"      Assessment:       Measurements:       24 1448   Wound 24 Abdomen Lower;Medial   Date First Assessed/Time First Assessed: 24   Present on Original Admission: Yes  Primary Wound  Savannah has just returned from surgery and is unresponsive at this time.   Level of patient/caregiver understanding:    [] Indicates understanding       [] Needs reinforcement  [] Unsuccessful      [] Verbal Understanding  [] Demonstrated understanding       [x] No evidence of learning  [] Refused teaching         [] N/A    Contact the Wound Ostomy RN on-call during working hours Monday-Friday [phone] via [employer] by searching \"wound\" under \"groups\" and selecting the on-call clinician. Sending messages via individual names will not reach a clinician.        Electronically signed by Lynette Gonzalez RN, CWON on 9/16/2024 at 3:31 PM

## 2024-09-16 NOTE — PLAN OF CARE
Problem: Respiratory - Adult  Goal: Achieves optimal ventilation and oxygenation  9/16/2024 0807 by Thi Oseguera RCP  Outcome: Progressing

## 2024-09-16 NOTE — BRIEF OP NOTE
Brief Postoperative Note      Patient: Shazia ROGERS  YOB: 1955  MRN: [id number]    Date of Procedure: 9/16/2024    Pre-Op Diagnosis Codes:      * Respiratory failure, unspecified chronicity, unspecified whether with hypoxia or hypercapnia (HCC) [J96.90]    Post-Op Diagnosis: Same       Procedure(s):  REVISION TRACHEOTOMY, BRONCHOSCOPY    Surgeon(s):  Riky Andrade MD    Assistant:  Felipe Hutchins MD    Anesthesia: General    Estimated Blood Loss (mL): Minimal    Complications: None    Specimens:   * No specimens in log *    Implants:  * No implants in log *      Drains:   NG/OG/NJ/NE Tube Orogastric 16 fr Center mouth (Active)   Surrounding Skin Clean, dry & intact 09/16/24 0400   Securement device Other (comment) 09/16/24 0400   Status Clamped 09/16/24 0400   Placement Verified External Catheter Length 09/16/24 0400   NG/OG/NJ/NE External Measurement (cm) 60 cm 09/16/24 0400   Drainage Appearance Bile 09/14/24 0400   Tube Feeding Peptide Based High Protein 09/15/24 2355   Tube feeding/verify rate (mL/hr) 15 mL/hr 09/15/24 2355   Tube Feeding Supplement (Product) Protein Modular 09/14/24 1000   Tube Feeding Intake (mL) 114 ml 09/15/24 2355   Free Water/Flush (mL) 30 mL 09/15/24 1234   Output (mL) 150 ml 09/13/24 0421   Action Taken Other (comment) 09/15/24 1234   Residual Volume (ml) 50 ml 09/14/24 0800       External Urinary Catheter (Active)   Site Assessment Clean,dry & intact 09/16/24 0800   Placement Repositioned 09/16/24 0600   Securement Method Other (Comment) 09/16/24 0600   Catheter Care Catheter/Wick replaced;Suction Canister/Tubing changed 09/15/24 1830   Perineal Care Yes 09/15/24 0000   Suction 40 mmgHg continuous 09/16/24 0400   Urine Color Yellow 09/16/24 0400   Urine Appearance Clear 09/16/24 0400   Output (mL) 50 mL 09/16/24 0600       Findings:  Infection Present At Time Of Surgery (PATOS) (choose all levels that have infection present):  No infection present  Other  Findings: Revision tracheostomy successful w/ placement of a Shiley 7 proximal XLT, cuffed. Distal tracheomalacia     Electronically signed by Riky Andrade MD on 9/16/2024 at 12:16 PM

## 2024-09-16 NOTE — CARE COORDINATION
Transitional planning.   Spoke to Annika, with [facility], she confirmed that pt is a long term bed hold at [facility]. Pt will need a new Pre-cert to return. She will have the RT for Vents reach out to CM concerning pt returning on vent Trach revision today, vented FiO2 30%, PEEP of 5, follows commands, OG for TF- held,      1527 Spoke to June, RT with [facility], pt is a vent pt, long term bed hold, no pre-cert/HENS/COVID test needed to return.    Confirmed w/ pt's brother, Álvaro, pt's [facility], that plans are for pt to return to [facility].

## 2024-09-16 NOTE — PLAN OF CARE
Problem: Respiratory - Adult  Goal: Achieves optimal ventilation and oxygenation  9/16/2024 1935 by Janina Gonzales RCP  Outcome: Progressing     Problem: OXYGENATION/RESPIRATORY FUNCTION  Goal: Patient will maintain patent airway  Outcome: Ongoing  Goal: Patient will achieve/maintain normal respiratory rate/effort  Respiratory rate and effort will be within normal limits for the patient  Outcome: Ongoing    Problem: MECHANICAL VENTILATION  Goal: Patient will maintain patent airway  Outcome: Ongoing  Goal: Oral health is maintained or improved  Outcome: Ongoing  Goal: ET tube will be managed safely  Outcome: Ongoing  Goal: Ability to express needs and understand communication  Outcome: Ongoing  Goal: Mobility/activity is maintained at optimum level for patient  Outcome: Ongoing    Problem: ASPIRATION PRECAUTIONS  Goal: Patient’s risk of aspiration is minimized  Outcome: Ongoing    Problem: SKIN INTEGRITY  Goal: Skin integrity is maintained or improved  Outcome: Ongoing

## 2024-09-16 NOTE — ACP (ADVANCE CARE PLANNING)
Advance Care Planning     Advance Care Planning Activator (Inpatient)  Conversation Note      Date of ACP Conversation: 9/16/2024     Conversation Conducted with: Healthcare Decision Maker, pt's brother and POAHDat BRENNAN    ACP Activator: BALDEMAR RIVERA RN        Health Care Decision Maker:     Current Designated Health Care Decision Maker:     Primary Decision Maker: SavannahÁlvaro - Brother/Sister - [phone]  Click here to complete Healthcare Decision Makers including section of the Healthcare Decision Maker Relationship (ie \"Primary\")      Care Preferences    Ventilation:  \"If you were in your present state of health and suddenly became very ill and were unable to breathe on your own, what would your preference be about the use of a ventilator (breathing machine) if it were available to you?\"      Would the patient desire the use of ventilator (breathing machine)?: yes    \"If your health worsens and it becomes clear that your chance of recovery is unlikely, what would your preference be about the use of a ventilator (breathing machine) if it were available to you?\"     Would the patient desire the use of ventilator (breathing machine)?: Yes      Resuscitation  \"CPR works best to restart the heart when there is a sudden event, like a heart attack, in someone who is otherwise healthy. Unfortunately, CPR does not typically restart the heart for people who have serious health conditions or who are very sick.\"    \"In the event your heart stopped as a result of an underlying serious health condition, would you want attempts to be made to restart your heart (answer \"yes\" for attempt to resuscitate) or would you prefer a natural death (answer \"no\" for do not attempt to resuscitate)?\" yes       [] Yes   [] No   Educated Patient / Decision Maker regarding differences between Advance Directives and portable DNR orders.    Length of ACP Conversation in minutes:      Conversation Outcomes:  ACP discussion

## 2024-09-16 NOTE — PLAN OF CARE
Problem: Respiratory - Adult  Goal: Achieves optimal ventilation and oxygenation  9/16/2024 0754 by Thi Oseguera RCP  Outcome: Progressing

## 2024-09-16 NOTE — PROGRESS NOTES
Critical Care Team - Daily Progress Note      Date and time: 9/16/2024 9:06 AM  Patient's name:  Shazia ROGERS  Medical Record Number: [id number]  Patient's account/billing number: [id number]  Patient's YOB: 1955  Age: 68 y.o.  Date of Admission: 9/14/2024  1:39 PM  Length of stay during current admission: 2      Primary Care Physician: Jyoti Mauricio, APRN - CNP  ICU Attending Physician:      Code Status: Full Code    Reason for ICU admission: No chief complaint on file.      Subjective:     OVERNIGHT EVENTS:           No acute events overnight.  TF held at midnight for procedure today.    TODAY:     Plan for revision of tracheostomy with ENT today.  TF held overnight.  Plan to hold heparin gtt at noon today for revision in PM.      Remains sedated and intubated.     Vent: PRVC/R 18, Vt 460, PEEP 5, 30% FiO2    Sed: Precedex, propofol      AWAKE & FOLLOWING COMMANDS:  [x] No   [] Yes    CURRENT VENTILATION STATUS:     [x] Ventilator  [] BIPAP  [] Nasal Cannula [] Room Air      IF INTUBATED, ET TUBE MARKING AT LOWER LIP:       cms    SECRETIONS Amount:  [] Small [] Moderate  [] Large  [x] None  Color:     [] White [] Colored  [] Bloody    SEDATION:  RAAS Score:  [x] Propofol gtt  [] Versed gtt  [] Ativan gtt   [] No Sedation    PARALYZED:  [x] No    [] Yes    DIARRHEA:                [x] No                [] Yes  (C. Difficile status: [] positive                                                                                                                       [] negative                                                                                                                     [] pending)    VASOPRESSORS:  [x] No    [] Yes    If yes -   [] Levophed       [] Dopamine     [] Vasopressin       [] Dobutamine  [] Phenylephrine         [] Epinephrine    CENTRAL LINES:     [x] No   [] Yes   (Date of Insertion:   )           If yes -     [] Right IJ     [] Left IJ [] Right Femoral [] Left  history of renal issues  - Fluids: D5.5NS 75/h  - BMP daily  - replace electrolytes prn  - Strict I/Os  - avoid nephrotoxic agents        ID  C/f aspiration PNA     - continue IV clindamycin, ciprofloxacin  - f/u cultures  - CBC daily        Heme  Hx DVT/PE  - holding home coumadin  - continue heparin gtt  - hold heparin gtt at noon 9/16 for procedure  - CBC daily  - monitor for s/s of bleeding        Endo  ID-T2DM  - continue lantus nightly  - MDSS  - POC glucose checks q6h  - hypoglycemia protocol        Prophylaxis  DVT Ppx: heparin gtt  GI: IV protonix      FAMILY UPDATED:                [x] No  [] Yes     HOME MEDICATIONS RECONCILED: [] No  [x] Yes    CONSULTATION NEEDED:                 [] No  [x] Yes       FAST HUGS BID        Nicola Zaman MD  Department of Internal Medicine/ Critical care  [place], [place]  9/16/2024 9:06 AM        The critical care team assigned to the patient will be following up the patient in the intensive care unit. I have discussed the current plan with the critical care attending.The above mentioned assessment and plan will be reviewed again in detail by the critical care attending at bedside, and can be further changed or modified accordingly by the attending physician.        Attending Physician Statement  I have discussed the care of Shazia ROGERS, including pertinent history and exam findings with the resident. I have reviewed the key elements of all parts of the encounter with the resident. I have seen and examined the patient with the resident.  I agree with the assessment and plan and status of the problem list as documented.      I saw the patient during the round today, chart reviewed overnight events noted.  I have reviewed the imaging studies arterial blood gases ventilator support seen.  Patient is getting tracheostomy revision through the stoma by ENT this morning as she has chronic tracheostomy and tracheostomy dependence.  Ventilator

## 2024-09-16 NOTE — PROGRESS NOTES
bedside.  She will need to be transferred to an operating room bed and this will not be able to be performed in her ICU bed.   -Consent to be completed at OR  with anesthesia     Special Equipment: Pannus retractor, bipolar open, surgicel  --8 cuffed proximal XLT, 7 cuffed Proximal XLT,   --Will see what sizes of Bivona flextend tracheostomy tubes we have    This note was created using voice-to-text dictation.  Although the note was reviewed by the author, please excuse any inaccurate transcriptions that may have been overlooked prior to the signature. Please contact the author of this note with any questions regarding the transcription.      Riky Andrade MD  Otolaryngology - Head and Neck Surgery  [facility]

## 2024-09-17 PROBLEM — J95.09 OTHER TRACHEOSTOMY COMPLICATION (HCC): Status: ACTIVE | Noted: 2024-09-17

## 2024-09-17 LAB
ANION GAP SERPL CALCULATED.3IONS-SCNC: 10 MMOL/L (ref 9–16)
ANTI-XA UNFRAC HEPARIN: <0.1 IU/L
BASOPHILS # BLD: 0 K/UL (ref 0–0.2)
BASOPHILS NFR BLD: 0 % (ref 0–2)
BUN SERPL-MCNC: 10 MG/DL (ref 8–23)
CALCIUM SERPL-MCNC: 9.1 MG/DL (ref 8.6–10.4)
CHLORIDE SERPL-SCNC: 101 MMOL/L (ref 98–107)
CO2 SERPL-SCNC: 29 MMOL/L (ref 20–31)
CREAT SERPL-MCNC: 0.7 MG/DL (ref 0.5–0.9)
EOSINOPHIL # BLD: 0 K/UL (ref 0–0.4)
EOSINOPHILS RELATIVE PERCENT: 0 % (ref 1–4)
ERYTHROCYTE [DISTWIDTH] IN BLOOD BY AUTOMATED COUNT: 19.9 % (ref 11.8–14.4)
FIO2: 40
GFR, ESTIMATED: >90 ML/MIN/1.73M2
GLUCOSE BLD-MCNC: 144 MG/DL (ref 65–105)
GLUCOSE BLD-MCNC: 148 MG/DL (ref 65–105)
GLUCOSE BLD-MCNC: 160 MG/DL (ref 65–105)
GLUCOSE BLD-MCNC: 170 MG/DL (ref 65–105)
GLUCOSE BLD-MCNC: 202 MG/DL (ref 74–100)
GLUCOSE SERPL-MCNC: 178 MG/DL (ref 74–99)
HCT VFR BLD AUTO: 32.6 % (ref 36.3–47.1)
HGB BLD-MCNC: 9.2 G/DL (ref 11.9–15.1)
IMM GRANULOCYTES # BLD AUTO: 0 K/UL (ref 0–0.3)
IMM GRANULOCYTES NFR BLD: 0 %
LYMPHOCYTES NFR BLD: 0.36 K/UL (ref 1–4.8)
LYMPHOCYTES RELATIVE PERCENT: 6 % (ref 24–44)
MCH RBC QN AUTO: 26.5 PG (ref 25.2–33.5)
MCHC RBC AUTO-ENTMCNC: 28.2 G/DL (ref 28.4–34.8)
MCV RBC AUTO: 93.9 FL (ref 82.6–102.9)
MONOCYTES NFR BLD: 0.3 K/UL (ref 0.1–0.8)
MONOCYTES NFR BLD: 5 % (ref 1–7)
MORPHOLOGY: ABNORMAL
MORPHOLOGY: ABNORMAL
NEUTROPHILS NFR BLD: 89 % (ref 36–66)
NEUTS SEG NFR BLD: 5.34 K/UL (ref 1.8–7.7)
NRBC BLD-RTO: 0 PER 100 WBC
PLATELET # BLD AUTO: 220 K/UL (ref 138–453)
PMV BLD AUTO: 9.5 FL (ref 8.1–13.5)
POC HCO3: 36.8 MMOL/L (ref 21–28)
POC O2 SATURATION: 96 % (ref 94–98)
POC PCO2: 51.2 MM HG (ref 35–48)
POC PH: 7.46 (ref 7.35–7.45)
POC PO2: 79.1 MM HG (ref 83–108)
POSITIVE BASE EXCESS, ART: 11 MMOL/L (ref 0–3)
POTASSIUM SERPL-SCNC: 4.2 MMOL/L (ref 3.7–5.3)
RBC # BLD AUTO: 3.47 M/UL (ref 3.95–5.11)
SODIUM SERPL-SCNC: 140 MMOL/L (ref 136–145)
WBC OTHER # BLD: 6 K/UL (ref 3.5–11.3)

## 2024-09-17 PROCEDURE — 99024 POSTOP FOLLOW-UP VISIT: CPT | Performed by: STUDENT IN AN ORGANIZED HEALTH CARE EDUCATION/TRAINING PROGRAM

## 2024-09-17 PROCEDURE — 87186 SC STD MICRODIL/AGAR DIL: CPT

## 2024-09-17 PROCEDURE — 2000000000 HC ICU R&B

## 2024-09-17 PROCEDURE — 80048 BASIC METABOLIC PNL TOTAL CA: CPT

## 2024-09-17 PROCEDURE — 94640 AIRWAY INHALATION TREATMENT: CPT

## 2024-09-17 PROCEDURE — 2500000003 HC RX 250 WO HCPCS

## 2024-09-17 PROCEDURE — 2580000003 HC RX 258

## 2024-09-17 PROCEDURE — 99291 CRITICAL CARE FIRST HOUR: CPT | Performed by: INTERNAL MEDICINE

## 2024-09-17 PROCEDURE — 94761 N-INVAS EAR/PLS OXIMETRY MLT: CPT

## 2024-09-17 PROCEDURE — 86403 PARTICLE AGGLUT ANTBDY SCRN: CPT

## 2024-09-17 PROCEDURE — 94003 VENT MGMT INPAT SUBQ DAY: CPT

## 2024-09-17 PROCEDURE — 6360000002 HC RX W HCPCS

## 2024-09-17 PROCEDURE — 85520 HEPARIN ASSAY: CPT

## 2024-09-17 PROCEDURE — 2700000000 HC OXYGEN THERAPY PER DAY

## 2024-09-17 PROCEDURE — 82947 ASSAY GLUCOSE BLOOD QUANT: CPT

## 2024-09-17 PROCEDURE — 85025 COMPLETE CBC W/AUTO DIFF WBC: CPT

## 2024-09-17 PROCEDURE — 36600 WITHDRAWAL OF ARTERIAL BLOOD: CPT

## 2024-09-17 PROCEDURE — 6370000000 HC RX 637 (ALT 250 FOR IP)

## 2024-09-17 PROCEDURE — 82803 BLOOD GASES ANY COMBINATION: CPT

## 2024-09-17 PROCEDURE — 99231 SBSQ HOSP IP/OBS SF/LOW 25: CPT | Performed by: STUDENT IN AN ORGANIZED HEALTH CARE EDUCATION/TRAINING PROGRAM

## 2024-09-17 PROCEDURE — 36415 COLL VENOUS BLD VENIPUNCTURE: CPT

## 2024-09-17 RX ORDER — CIPROFLOXACIN 2 MG/ML
200 INJECTION, SOLUTION INTRAVENOUS EVERY 12 HOURS
Status: COMPLETED | OUTPATIENT
Start: 2024-09-17 | End: 2024-09-19

## 2024-09-17 RX ORDER — MIDODRINE HYDROCHLORIDE 5 MG/1
10 TABLET ORAL
Status: DISCONTINUED | OUTPATIENT
Start: 2024-09-17 | End: 2024-09-19

## 2024-09-17 RX ORDER — INSULIN LISPRO 100 [IU]/ML
0-8 INJECTION, SOLUTION INTRAVENOUS; SUBCUTANEOUS EVERY 6 HOURS
Status: DISCONTINUED | OUTPATIENT
Start: 2024-09-17 | End: 2024-09-26 | Stop reason: HOSPADM

## 2024-09-17 RX ADMIN — IPRATROPIUM BROMIDE AND ALBUTEROL SULFATE 1 DOSE: 2.5; .5 SOLUTION RESPIRATORY (INHALATION) at 15:35

## 2024-09-17 RX ADMIN — DEXMEDETOMIDINE HYDROCHLORIDE 0.3 MCG/KG/HR: 400 INJECTION, SOLUTION INTRAVENOUS at 07:37

## 2024-09-17 RX ADMIN — DEXMEDETOMIDINE HYDROCHLORIDE 0.4 MCG/KG/HR: 400 INJECTION, SOLUTION INTRAVENOUS at 23:59

## 2024-09-17 RX ADMIN — MIDODRINE HYDROCHLORIDE 10 MG: 5 TABLET ORAL at 15:42

## 2024-09-17 RX ADMIN — CLINDAMYCIN PHOSPHATE 900 MG: 900 INJECTION, SOLUTION INTRAVENOUS at 23:02

## 2024-09-17 RX ADMIN — DEXMEDETOMIDINE HYDROCHLORIDE 0.5 MCG/KG/HR: 400 INJECTION, SOLUTION INTRAVENOUS at 12:26

## 2024-09-17 RX ADMIN — CIPROFLOXACIN 200 MG: 2 INJECTION, SOLUTION INTRAVENOUS at 18:52

## 2024-09-17 RX ADMIN — DEXMEDETOMIDINE HYDROCHLORIDE 0.4 MCG/KG/HR: 400 INJECTION, SOLUTION INTRAVENOUS at 19:04

## 2024-09-17 RX ADMIN — IPRATROPIUM BROMIDE AND ALBUTEROL SULFATE 1 DOSE: 2.5; .5 SOLUTION RESPIRATORY (INHALATION) at 23:23

## 2024-09-17 RX ADMIN — IPRATROPIUM BROMIDE AND ALBUTEROL SULFATE 1 DOSE: 2.5; .5 SOLUTION RESPIRATORY (INHALATION) at 19:45

## 2024-09-17 RX ADMIN — HEPARIN SODIUM 4000 UNITS: 1000 INJECTION INTRAVENOUS; SUBCUTANEOUS at 22:28

## 2024-09-17 RX ADMIN — IPRATROPIUM BROMIDE AND ALBUTEROL SULFATE 1 DOSE: 2.5; .5 SOLUTION RESPIRATORY (INHALATION) at 12:14

## 2024-09-17 RX ADMIN — CIPROFLOXACIN 200 MG: 2 INJECTION, SOLUTION INTRAVENOUS at 05:40

## 2024-09-17 RX ADMIN — SODIUM CHLORIDE, PRESERVATIVE FREE 40 MG: 5 INJECTION INTRAVENOUS at 08:42

## 2024-09-17 RX ADMIN — CIPROFLOXACIN 200 MG: 2 INJECTION, SOLUTION INTRAVENOUS at 17:48

## 2024-09-17 RX ADMIN — MIDODRINE HYDROCHLORIDE 5 MG: 5 TABLET ORAL at 12:26

## 2024-09-17 RX ADMIN — SODIUM CHLORIDE, PRESERVATIVE FREE 10 ML: 5 INJECTION INTRAVENOUS at 20:28

## 2024-09-17 RX ADMIN — DEXMEDETOMIDINE HYDROCHLORIDE 0.3 MCG/KG/HR: 400 INJECTION, SOLUTION INTRAVENOUS at 08:54

## 2024-09-17 RX ADMIN — SODIUM CHLORIDE, PRESERVATIVE FREE 10 ML: 5 INJECTION INTRAVENOUS at 08:42

## 2024-09-17 RX ADMIN — IPRATROPIUM BROMIDE AND ALBUTEROL SULFATE 1 DOSE: 2.5; .5 SOLUTION RESPIRATORY (INHALATION) at 03:26

## 2024-09-17 RX ADMIN — IPRATROPIUM BROMIDE AND ALBUTEROL SULFATE 1 DOSE: 2.5; .5 SOLUTION RESPIRATORY (INHALATION) at 08:35

## 2024-09-17 RX ADMIN — INSULIN GLARGINE 8 UNITS: 100 INJECTION, SOLUTION SUBCUTANEOUS at 20:32

## 2024-09-17 RX ADMIN — CLINDAMYCIN PHOSPHATE 900 MG: 900 INJECTION, SOLUTION INTRAVENOUS at 14:47

## 2024-09-17 RX ADMIN — HEPARIN SODIUM 4 UNITS/KG/HR: 10000 INJECTION, SOLUTION INTRAVENOUS at 13:28

## 2024-09-17 RX ADMIN — MIDODRINE HYDROCHLORIDE 5 MG: 5 TABLET ORAL at 08:42

## 2024-09-17 RX ADMIN — CLINDAMYCIN PHOSPHATE 900 MG: 900 INJECTION, SOLUTION INTRAVENOUS at 08:42

## 2024-09-17 ASSESSMENT — PULMONARY FUNCTION TESTS
PIF_VALUE: 34
PIF_VALUE: 36
PIF_VALUE: 28
PIF_VALUE: 25

## 2024-09-17 NOTE — PROGRESS NOTES
Critical Care Team - Daily Progress Note      Date and time: 9/17/2024 8:58 AM  Patient's name:  Shazia ROGERS  Medical Record Number: [id number]  Patient's account/billing number: [id number]  Patient's YOB: 1955  Age: 68 y.o.  Date of Admission: 9/14/2024  1:39 PM  Length of stay during current admission: 3      Primary Care Physician: Jyoti Mauricio, APRN - CNP  ICU Attending Physician: Dr. Gilbert    Code Status: Full Code    Reason for ICU admission: No chief complaint on file.      Subjective:     OVERNIGHT EVENTS:           No acute events overnight.  Underwent tracheostomy revision with ENT yesterday.    TODAY:     S/p revision of tracheostomy with ENT yesterday.  Hemoglobin stable post-op.  More awake and alert today, following commands.  Will wean sedation and vent, patient on trach collar at home.  No precert needed for return home to [facility].  Medically stable for transfer out of ICU once vent is weaned off.     Remains sedated and [id number]    Vent: PRVC, R 18, Vt 460, PEEP 5, 30% FiO2    Sed: Precedex 0.3    AWAKE & FOLLOWING COMMANDS:  [] No   [x] Yes    CURRENT VENTILATION STATUS:     [x] Ventilator  [] BIPAP  [] Nasal Cannula [] Room Air      IF INTUBATED, ET TUBE MARKING AT LOWER LIP:       cms    SECRETIONS Amount:  [] Small [] Moderate  [] Large  [x] None  Color:     [] White [] Colored  [] Bloody    SEDATION:  RAAS Score:  [x] Propofol gtt  [] Versed gtt  [] Ativan gtt   [] No Sedation    PARALYZED:  [x] No    [] Yes    DIARRHEA:                [x] No                [] Yes  (C. Difficile status: [] positive                                                                                                                       [] negative                                                                                                                     [] pending)    VASOPRESSORS:  [x] No    [] Yes    If yes -   [] Levophed       [] Dopamine     [] Vasopressin        tracheostomy revision done yesterday she is on Precedex drip 0.5 mcg currently.  She is arousable.  She remained on ventilator overnight and her blood gas was 7.4 6/51/79/37.  She is on trach collar collar right now she does have some abdominal breathing we will try for 4 to 6 hours.  Tracheostomy site look okay without bleeding.  Will restart her on NG tube feeding.  Will increase midodrine.  Lasix is on hold currently.  Her urine output is 2.1 L in last 24 hours BUN and creatinine is normal sodium is 140.  Will try to wean down and wean off Precedex drip.      Discussed with nursing staff, treatment and plan discussed.  Discussed with respiratory therapist.    Total critical care time caring for this patient with life threatening, unstable organ failure, including direct patient contact, management of life support systems, review of data including imaging and labs, discussions with other team members and physicians at least 35  Min so far today, excluding procedures.       Please note that this chart was generated using voice recognition Dragon dictation software. Although every effort was made to ensure the accuracy of this automated transcription, some errors in transcription may have occurred.     Coiln Gilbert MD  9/17/2024 11:29 AM

## 2024-09-17 NOTE — PLAN OF CARE
Problem: Safety - Medical Restraint  Goal: Remains free of injury from restraints (Restraint for Interference with Medical Device)  Description: INTERVENTIONS:  1. Determine that other, less restrictive measures have been tried or would not be effective before applying the restraint  2. Evaluate the patient's condition at the time of restraint application  3. Inform patient/family regarding the reason for restraint  4. Q2H: Monitor safety, psychosocial status, comfort, nutrition and hydration  9/17/2024 1103 by Brook De Leon RN  Outcome: Progressing  Flowsheets  Taken 9/17/2024 1000 by Brook De Leon RN  Remains free of injury from restraints (restraint for interference with medical device): Every 2 hours: Monitor safety, psychosocial status, comfort, nutrition and hydration  Taken 9/17/2024 0800 by Brook De Leon RN  Remains free of injury from restraints (restraint for interference with medical device): Every 2 hours: Monitor safety, psychosocial status, comfort, nutrition and hydration    Problem: Chronic Conditions and Co-morbidities  Goal: Patient's chronic conditions and co-morbidity symptoms are monitored and maintained or improved  9/17/2024 1103 by Brook De Leon RN  Outcome: Progressing  Flowsheets (Taken 9/17/2024 0800)  Care Plan - Patient's Chronic Conditions and Co-Morbidity Symptoms are Monitored and Maintained or Improved:   Monitor and assess patient's chronic conditions and comorbid symptoms for stability, deterioration, or improvement   Collaborate with multidisciplinary team to address chronic and comorbid conditions and prevent exacerbation or deterioration   Update acute care plan with appropriate goals if chronic or comorbid symptoms are exacerbated and prevent overall improvement and discharge     Problem: Discharge Planning  Goal: Discharge to home or other facility with appropriate resources  9/17/2024 1103 by Brook De Leon RN  Outcome: Progressing  Flowsheets (Taken 9/17/2024

## 2024-09-17 NOTE — PLAN OF CARE
Problem: Safety - Medical Restraint  Goal: Remains free of injury from restraints (Restraint for Interference with Medical Device)  Description: INTERVENTIONS:  1. Determine that other, less restrictive measures have been tried or would not be effective before applying the restraint  2. Evaluate the patient's condition at the time of restraint application  3. Inform patient/family regarding the reason for restraint  4. Q2H: Monitor safety, psychosocial status, comfort, nutrition and hydration  9/17/2024 0049 by Staci Gentile, RN  Outcome: Progressing  Flowsheets  Taken 9/17/2024 0000  Remains free of injury from restraints (restraint for interference with medical device): Every 2 hours: Monitor safety, psychosocial status, comfort, nutrition and hydration  Taken 9/16/2024 2200  Remains free of injury from restraints (restraint for interference with medical device): Every 2 hours: Monitor safety, psychosocial status, comfort, nutrition and hydration  Taken 9/16/2024 2000  Remains free of injury from restraints (restraint for interference with medical device): Every 2 hours: Monitor safety, psychosocial status, comfort, nutrition and hydration  9/16/2024 1859 by Jessica Estrada, RN  Outcome: Progressing  Flowsheets  Taken 9/16/2024 1800  Remains free of injury from restraints (restraint for interference with medical device):   Determine that other, less restrictive measures have been tried or would not be effective before applying the restraint   Every 2 hours: Monitor safety, psychosocial status, comfort, nutrition and hydration  Taken 9/16/2024 1600  Remains free of injury from restraints (restraint for interference with medical device):   Determine that other, less restrictive measures have been tried or would not be effective before applying the restraint   Every 2 hours: Monitor safety, psychosocial status, comfort, nutrition and hydration  Taken 9/16/2024 1400  Remains free of injury from restraints

## 2024-09-17 NOTE — PROGRESS NOTES
Comprehensive Nutrition Assessment    Type and Reason for Visit:  Reassess    Nutrition Recommendations/Plan:   Continue TF of Peptide Based formula advancing to goal 50 mL/hr + 2 Protein modulars daily.  Will provide 1648 kcals, 142 gm protein per day.  Monitor TF tolerance, labs, weights, and plan of care.     Malnutrition Assessment:  Malnutrition Status:  At risk for malnutrition (09/17/24 1521)    Context:  Acute Illness     Findings of the 6 clinical characteristics of malnutrition:  Energy Intake:  Mild decrease in energy intake  Weight Loss:  No significant weight loss     Body Fat Loss:  No significant body fat loss   Muscle Mass Loss:  No significant muscle mass loss  Fluid Accumulation:  Moderate to Severe Generalized, Extremities   Strength:  Not Performed    Nutrition Assessment:    Pt s/p trach revision yesterday.  Remains on vent via trach and sedated on Precedex.  Resident reports TF ordered to be restarted.  Discussed restart of feedings with RN.  Last BM 9/12 per chart review.  Labs reviewed: Glucose 144-202 mg/dL.  Meds include: Lantus, Humalog SS, Antibiotics.    Nutrition Related Findings:    Meds/Labs reviewed.  Last BM 9/12.  +2 generalized, +2 non-pitting b/l UE, and +3 pitting b/l LE edema.   Wound Type: Pressure Injury, Surgical Incision (PI to sacrum, incision to throat, traumatic injury to abd)       Current Nutrition Intake & Therapies:    Average Meal Intake: NPO  Average Supplements Intake: NPO  Diet NPO  ADULT TUBE FEEDING; Nasogastric; Peptide Based; Continuous; 15; Yes; 10; Q 4 hours; 35; 30; Q 4 hours; Protein; 3 Doses; Daily  Current Tube Feeding (TF) Orders:  Feeding Route: Nasogastric  Formula: Peptide Based  Schedule: Continuous  Feeding Regimen: Restarted today  Additives/Modulars: Protein (x3 per day)  Water Flushes: 30 mL q 4 hrs  Current TF & Flush Orders Provides: 0 kcals, 0 gm protein  Goal TF & Flush Orders Provides: Peptide Based goal 50 mL/hr + 2 Protein modulars =

## 2024-09-17 NOTE — PROGRESS NOTES
ENT/OTOLARYNGOLOGY SUBSEQUENT CARE PROGRESS NOTE     REASON FOR CARE: s/p revision tracheostomy 9/16     HISTORY OF PRESENT ILLNESS:   Shazia ROGERS is a 68 y.o. who is being seen for follow-up revision tracheostomy on 9/16      Pertinent Examination:   GENERAL:  Lightly sedated, opens eyes to stimulus  HEAD: normocephalic and atraumatic  EYES: deferred  EXTERNAL EARS: normal  EAR EXAM: deferred  NOSE: nares patent, normal mucosa  MOUTH/THROAT: deferred  NECK:  Shiley 7 cuffed proximal XLT in place, secured with 7 silk sutures and Velcro strap.  Expected peristomal serosanguineous secretions.  RESPIRATORY: Sedated ventilated     RELEVANT LABS/STUDIES:   Additional data reviewed:    None    Procedures:    None    Surgical risk factors:  Morbidly obese, history of accidental decannulation, history of revision tracheostomy     IMPRESSION AND RECOMMENDATIONS:   Shazia ROGERS is a 68 y.o. female who is s/p revision tracheostomy on 9/16     Plan:    Fresh Trach Precautions    -Mild bloody oozing from trach tube and around trach stoma is expected  -Do not cut trach sutures for any reason without first contacting ENT  -Do not place more than 1 drain sponge under the tracheostomy tube at a time  -Keep an extra tracheostomy tube of the same size and one size smaller at bedside at all times  -Please have a suture removal kit, empty syringe, and flexible suction with suction set up at bedside at all times  -ENT will perform first tracheostomy tube change     This note was created using voice-to-text dictation.  Although the note was reviewed by the author, please excuse any inaccurate transcriptions that may have been overlooked prior to the signature. Please contact the author of this note with any questions regarding the transcription.      Riky Andrade MD  Otolaryngology - Head and Neck Surgery  [facility]

## 2024-09-17 NOTE — OP NOTE
OPERATIVE REPORT    PATIENT NAME: Shazia ROGERS    MRN#: [id number]    : 1955    DATE OF SURGERY: 2024    Service: Otolaryngology    Surgeons and Role:     * Riky Andrade MD - Primary     Assistant:     * Felipe Hutchins MD - Assisting      Preoperative Diagnosis:   Respiratory failure, unspecified chronicity, unspecified whether with hypoxia or hypercapnia (HCC) [J96.90]     Postoperative Diagnosis:   same    Procedure:   REVISION TRACHEOTOMY, BRONCHOSCOPY, N/A     Anesthesia Type:   General Endotracheal    Complications:  None    Estimated Blood Loss:   Minimal    Pathologic Specimen:   * No specimens in log *    Operative Findings:  Infection Present At Time Of Surgery (PATOS) (choose all levels that have infection present):  No infection present    Other Findings: Revision tracheostomy successful w/ placement of a Shiley 7 proximal XLT, cuffed. Distal tracheomalacia     INDICATIONS AND CONSENT  The patient was seen and evaluated by the Otolaryngology practice.  After history and physical examination, recommendations were made to proceed to the operating room for the above listed procedures.  Indications, risks and benefits were discussed with the patient or their guardian, who agreed to proceed and signed proper informed consent    DESCRIPTION OF PROCEDURE:  The patient was taken to the operating room and laid supine on the operating room table.  General anesthesia was administered by the anesthesia team. Proper surgeon-initiated time-out was performed.  The patient was positioned appropriately with the neck in gentle extension.    Using the previous tracheostomy stoma, blunt dissection in the midline was performed to allow for dilation of the previous tracheostomy path.  We identified the cricoid cartilage and the fascia was incised.  The thyroid was encountered and found to be divided.  Bipolar cautery was used to control additional thyroid bleeding.      The previous tracheotomy

## 2024-09-17 NOTE — PROGRESS NOTES
Occupational Therapy    [facility]  Occupational Therapy Not Seen Note    DATE: 2024    NAME: Shazia ROGERS  MRN: [id number]   : 1955      Patient not seen this date for Occupational Therapy due to:    Other: Per RN, pt currently on trach collar, getting fatigued. Becoming easily agitated when trying to convey needs. On Precedex. Will continue to follow for active participation for completion of an OT evaluation.    Next Scheduled Treatment: 2024    Electronically signed by MARYAN AMIN/L on 2024 at 10:46 AM

## 2024-09-18 ENCOUNTER — APPOINTMENT (OUTPATIENT)
Dept: GENERAL RADIOLOGY | Age: 69
DRG: 166 | End: 2024-09-18
Attending: INTERNAL MEDICINE
Payer: MEDICARE

## 2024-09-18 LAB
ANION GAP SERPL CALCULATED.3IONS-SCNC: 10 MMOL/L (ref 9–16)
ANTI-XA UNFRAC HEPARIN: 0.17 IU/L
ANTI-XA UNFRAC HEPARIN: 0.54 IU/L
ANTI-XA UNFRAC HEPARIN: 0.72 IU/L
BASOPHILS # BLD: 0.06 K/UL (ref 0–0.2)
BASOPHILS NFR BLD: 1 % (ref 0–2)
BUN SERPL-MCNC: 13 MG/DL (ref 8–23)
CALCIUM SERPL-MCNC: 9.2 MG/DL (ref 8.6–10.4)
CHLORIDE SERPL-SCNC: 101 MMOL/L (ref 98–107)
CO2 SERPL-SCNC: 28 MMOL/L (ref 20–31)
CREAT SERPL-MCNC: 0.9 MG/DL (ref 0.5–0.9)
EOSINOPHIL # BLD: 0.12 K/UL (ref 0–0.4)
EOSINOPHILS RELATIVE PERCENT: 2 % (ref 1–4)
ERYTHROCYTE [DISTWIDTH] IN BLOOD BY AUTOMATED COUNT: 20.8 % (ref 11.8–14.4)
FIO2: 40
GFR, ESTIMATED: 73 ML/MIN/1.73M2
GLUCOSE BLD-MCNC: 130 MG/DL (ref 65–105)
GLUCOSE BLD-MCNC: 140 MG/DL (ref 65–105)
GLUCOSE BLD-MCNC: 149 MG/DL (ref 65–105)
GLUCOSE BLD-MCNC: 155 MG/DL (ref 65–105)
GLUCOSE BLD-MCNC: 160 MG/DL (ref 65–105)
GLUCOSE BLD-MCNC: 161 MG/DL (ref 65–105)
GLUCOSE BLD-MCNC: 178 MG/DL (ref 65–105)
GLUCOSE BLD-MCNC: 197 MG/DL (ref 74–100)
GLUCOSE SERPL-MCNC: 177 MG/DL (ref 74–99)
HCT VFR BLD AUTO: 28.7 % (ref 36.3–47.1)
HCT VFR BLD AUTO: 32.8 % (ref 36.3–47.1)
HGB BLD-MCNC: 8.5 G/DL (ref 11.9–15.1)
HGB BLD-MCNC: 9.3 G/DL (ref 11.9–15.1)
IMM GRANULOCYTES # BLD AUTO: 0 K/UL (ref 0–0.3)
IMM GRANULOCYTES NFR BLD: 0 %
INR PPP: 1.3
LYMPHOCYTES NFR BLD: 0.84 K/UL (ref 1–4.8)
LYMPHOCYTES RELATIVE PERCENT: 14 % (ref 24–44)
MCH RBC QN AUTO: 26.4 PG (ref 25.2–33.5)
MCHC RBC AUTO-ENTMCNC: 28.4 G/DL (ref 28.4–34.8)
MCV RBC AUTO: 93.2 FL (ref 82.6–102.9)
MONOCYTES NFR BLD: 0.72 K/UL (ref 0.1–0.8)
MONOCYTES NFR BLD: 12 % (ref 1–7)
MORPHOLOGY: ABNORMAL
NEUTROPHILS NFR BLD: 71 % (ref 36–66)
NEUTS SEG NFR BLD: 4.26 K/UL (ref 1.8–7.7)
NRBC BLD-RTO: 0.3 PER 100 WBC
PLATELET # BLD AUTO: 226 K/UL (ref 138–453)
PMV BLD AUTO: 9.7 FL (ref 8.1–13.5)
POC HCO3: 36 MMOL/L (ref 21–28)
POC O2 SATURATION: 94.1 % (ref 94–98)
POC PCO2: 51.5 MM HG (ref 35–48)
POC PH: 7.45 (ref 7.35–7.45)
POC PO2: 69.6 MM HG (ref 83–108)
POSITIVE BASE EXCESS, ART: 10.7 MMOL/L (ref 0–3)
POTASSIUM SERPL-SCNC: 4 MMOL/L (ref 3.7–5.3)
PROTHROMBIN TIME: 16.4 SEC (ref 11.7–14.9)
RBC # BLD AUTO: 3.52 M/UL (ref 3.95–5.11)
SODIUM SERPL-SCNC: 139 MMOL/L (ref 136–145)
WBC OTHER # BLD: 6 K/UL (ref 3.5–11.3)

## 2024-09-18 PROCEDURE — 6370000000 HC RX 637 (ALT 250 FOR IP): Performed by: STUDENT IN AN ORGANIZED HEALTH CARE EDUCATION/TRAINING PROGRAM

## 2024-09-18 PROCEDURE — 6370000000 HC RX 637 (ALT 250 FOR IP)

## 2024-09-18 PROCEDURE — 2580000003 HC RX 258

## 2024-09-18 PROCEDURE — 2000000000 HC ICU R&B

## 2024-09-18 PROCEDURE — 2700000000 HC OXYGEN THERAPY PER DAY

## 2024-09-18 PROCEDURE — 99213 OFFICE O/P EST LOW 20 MIN: CPT

## 2024-09-18 PROCEDURE — 85610 PROTHROMBIN TIME: CPT

## 2024-09-18 PROCEDURE — 94003 VENT MGMT INPAT SUBQ DAY: CPT

## 2024-09-18 PROCEDURE — 2580000003 HC RX 258: Performed by: STUDENT IN AN ORGANIZED HEALTH CARE EDUCATION/TRAINING PROGRAM

## 2024-09-18 PROCEDURE — 94640 AIRWAY INHALATION TREATMENT: CPT

## 2024-09-18 PROCEDURE — 36415 COLL VENOUS BLD VENIPUNCTURE: CPT

## 2024-09-18 PROCEDURE — 99291 CRITICAL CARE FIRST HOUR: CPT | Performed by: INTERNAL MEDICINE

## 2024-09-18 PROCEDURE — 6360000002 HC RX W HCPCS

## 2024-09-18 PROCEDURE — 2500000003 HC RX 250 WO HCPCS

## 2024-09-18 PROCEDURE — 85520 HEPARIN ASSAY: CPT

## 2024-09-18 PROCEDURE — 87641 MR-STAPH DNA AMP PROBE: CPT

## 2024-09-18 PROCEDURE — 80048 BASIC METABOLIC PNL TOTAL CA: CPT

## 2024-09-18 PROCEDURE — 85018 HEMOGLOBIN: CPT

## 2024-09-18 PROCEDURE — 85014 HEMATOCRIT: CPT

## 2024-09-18 PROCEDURE — 82803 BLOOD GASES ANY COMBINATION: CPT

## 2024-09-18 PROCEDURE — 71045 X-RAY EXAM CHEST 1 VIEW: CPT

## 2024-09-18 PROCEDURE — 82947 ASSAY GLUCOSE BLOOD QUANT: CPT

## 2024-09-18 PROCEDURE — 94761 N-INVAS EAR/PLS OXIMETRY MLT: CPT

## 2024-09-18 PROCEDURE — 36600 WITHDRAWAL OF ARTERIAL BLOOD: CPT

## 2024-09-18 PROCEDURE — 85025 COMPLETE CBC W/AUTO DIFF WBC: CPT

## 2024-09-18 RX ORDER — LIDOCAINE HYDROCHLORIDE 20 MG/ML
10 SOLUTION OROPHARYNGEAL
Status: DISCONTINUED | OUTPATIENT
Start: 2024-09-18 | End: 2024-09-18

## 2024-09-18 RX ORDER — METOPROLOL TARTRATE 25 MG/1
25 TABLET, FILM COATED ORAL 2 TIMES DAILY
Status: DISCONTINUED | OUTPATIENT
Start: 2024-09-18 | End: 2024-09-19

## 2024-09-18 RX ORDER — 0.9 % SODIUM CHLORIDE 0.9 %
1000 INTRAVENOUS SOLUTION INTRAVENOUS ONCE
Status: COMPLETED | OUTPATIENT
Start: 2024-09-18 | End: 2024-09-19

## 2024-09-18 RX ORDER — METOPROLOL TARTRATE 25 MG/1
12.5 TABLET, FILM COATED ORAL 2 TIMES DAILY
Status: DISCONTINUED | OUTPATIENT
Start: 2024-09-18 | End: 2024-09-18

## 2024-09-18 RX ORDER — LIDOCAINE 40 MG/G
CREAM TOPICAL ONCE
Status: COMPLETED | OUTPATIENT
Start: 2024-09-18 | End: 2024-09-18

## 2024-09-18 RX ORDER — WARFARIN SODIUM 2 MG/1
4 TABLET ORAL
Status: COMPLETED | OUTPATIENT
Start: 2024-09-18 | End: 2024-09-18

## 2024-09-18 RX ORDER — WARFARIN SODIUM 5 MG/1
5 TABLET ORAL
Status: DISCONTINUED | OUTPATIENT
Start: 2024-09-18 | End: 2024-09-18

## 2024-09-18 RX ADMIN — HEPARIN SODIUM 2000 UNITS: 1000 INJECTION INTRAVENOUS; SUBCUTANEOUS at 13:26

## 2024-09-18 RX ADMIN — IPRATROPIUM BROMIDE AND ALBUTEROL SULFATE 1 DOSE: 2.5; .5 SOLUTION RESPIRATORY (INHALATION) at 15:50

## 2024-09-18 RX ADMIN — IPRATROPIUM BROMIDE AND ALBUTEROL SULFATE 1 DOSE: 2.5; .5 SOLUTION RESPIRATORY (INHALATION) at 20:34

## 2024-09-18 RX ADMIN — CIPROFLOXACIN 200 MG: 2 INJECTION, SOLUTION INTRAVENOUS at 05:45

## 2024-09-18 RX ADMIN — IPRATROPIUM BROMIDE AND ALBUTEROL SULFATE 1 DOSE: 2.5; .5 SOLUTION RESPIRATORY (INHALATION) at 11:12

## 2024-09-18 RX ADMIN — ACETAMINOPHEN 650 MG: 325 TABLET ORAL at 23:16

## 2024-09-18 RX ADMIN — CIPROFLOXACIN 200 MG: 2 INJECTION, SOLUTION INTRAVENOUS at 06:51

## 2024-09-18 RX ADMIN — CLINDAMYCIN PHOSPHATE 900 MG: 900 INJECTION, SOLUTION INTRAVENOUS at 16:42

## 2024-09-18 RX ADMIN — CLINDAMYCIN PHOSPHATE 900 MG: 900 INJECTION, SOLUTION INTRAVENOUS at 08:46

## 2024-09-18 RX ADMIN — HEPARIN SODIUM 8 UNITS/KG/HR: 10000 INJECTION, SOLUTION INTRAVENOUS at 07:43

## 2024-09-18 RX ADMIN — IPRATROPIUM BROMIDE AND ALBUTEROL SULFATE 1 DOSE: 2.5; .5 SOLUTION RESPIRATORY (INHALATION) at 03:14

## 2024-09-18 RX ADMIN — MIDODRINE HYDROCHLORIDE 10 MG: 5 TABLET ORAL at 12:38

## 2024-09-18 RX ADMIN — CIPROFLOXACIN 200 MG: 2 INJECTION, SOLUTION INTRAVENOUS at 19:35

## 2024-09-18 RX ADMIN — SODIUM CHLORIDE, PRESERVATIVE FREE 10 ML: 5 INJECTION INTRAVENOUS at 09:16

## 2024-09-18 RX ADMIN — MIDODRINE HYDROCHLORIDE 10 MG: 5 TABLET ORAL at 16:50

## 2024-09-18 RX ADMIN — LIDOCAINE: 40 CREAM TOPICAL at 23:16

## 2024-09-18 RX ADMIN — SODIUM CHLORIDE, PRESERVATIVE FREE 40 MG: 5 INJECTION INTRAVENOUS at 09:16

## 2024-09-18 RX ADMIN — METOPROLOL TARTRATE 25 MG: 25 TABLET, FILM COATED ORAL at 12:38

## 2024-09-18 RX ADMIN — DEXMEDETOMIDINE HYDROCHLORIDE 0.5 MCG/KG/HR: 400 INJECTION, SOLUTION INTRAVENOUS at 05:17

## 2024-09-18 RX ADMIN — WARFARIN SODIUM 4 MG: 2 TABLET ORAL at 17:54

## 2024-09-18 RX ADMIN — SODIUM CHLORIDE 1000 ML: 9 INJECTION, SOLUTION INTRAVENOUS at 23:08

## 2024-09-18 RX ADMIN — IPRATROPIUM BROMIDE AND ALBUTEROL SULFATE 1 DOSE: 2.5; .5 SOLUTION RESPIRATORY (INHALATION) at 08:08

## 2024-09-18 RX ADMIN — MIDODRINE HYDROCHLORIDE 10 MG: 5 TABLET ORAL at 09:03

## 2024-09-18 RX ADMIN — HEPARIN SODIUM 9 UNITS/KG/HR: 10000 INJECTION, SOLUTION INTRAVENOUS at 22:29

## 2024-09-18 RX ADMIN — INSULIN GLARGINE 8 UNITS: 100 INJECTION, SOLUTION SUBCUTANEOUS at 21:17

## 2024-09-18 RX ADMIN — CIPROFLOXACIN 200 MG: 2 INJECTION, SOLUTION INTRAVENOUS at 17:53

## 2024-09-18 ASSESSMENT — PULMONARY FUNCTION TESTS
PIF_VALUE: 21
PIF_VALUE: 11
PIF_VALUE: 20
PIF_VALUE: 18
PIF_VALUE: 17
PIF_VALUE: 17
PIF_VALUE: 39
PIF_VALUE: 16
PIF_VALUE: 53
PIF_VALUE: 25
PIF_VALUE: 18
PIF_VALUE: 19
PIF_VALUE: 18
PIF_VALUE: 27
PIF_VALUE: 21
PIF_VALUE: 17
PIF_VALUE: 15
PIF_VALUE: 18
PIF_VALUE: 19
PIF_VALUE: 15
PIF_VALUE: 20
PIF_VALUE: 14
PIF_VALUE: 15
PIF_VALUE: 19

## 2024-09-18 ASSESSMENT — PAIN SCALES - GENERAL: PAINLEVEL_OUTOF10: 4

## 2024-09-18 ASSESSMENT — PAIN DESCRIPTION - LOCATION: LOCATION: NOSE

## 2024-09-18 NOTE — PROGRESS NOTES
Critical Care Team - Daily Progress Note      Date and time: 9/18/2024 9:39 AM  Patient's name:  Shazia ROGERS  Medical Record Number: [id number]  Patient's account/billing number: [id number]  Patient's YOB: 1955  Age: 68 y.o.  Date of Admission: 9/14/2024  1:39 PM  Length of stay during current admission: 4      Primary Care Physician: Jyoti Mauricio, APRN - CNP  ICU Attending Physician: Dr. Gilbert    Code Status: Full Code    Reason for ICU admission: No chief complaint on file.      Subjective:     OVERNIGHT EVENTS:           No acute events overnight. Remained [id number]-wz-wzty overnight.    TODAY:     S/p revision of tracheostomy with ENT.  9/14 respiratory culture positive for GPC in clusters.  Continues on ciprofloxacin, clindamycin thorugh 9/19 for likely aspiration PNA.      On trach mask this morning, maintaining 96% sat on 10L O2.      Remains sedated    Vent: PRVC, R 18, Vt 460, PEEP 5, 30% FiO2    Sed: Precedex 0.5    AWAKE & FOLLOWING COMMANDS:  [] No   [x] Yes    CURRENT VENTILATION STATUS:     [x] Ventilator  [] BIPAP  [x] Trach mask  [] Room Air      IF INTUBATED, ET TUBE MARKING AT LOWER LIP:       cms    SECRETIONS Amount:  [] Small [] Moderate  [] Large  [x] None  Color:     [] White [] Colored  [] Bloody    SEDATION:  RAAS Score:  [x] Precedex gtt  [] Versed gtt  [] Ativan gtt  [] No Sedation    PARALYZED:  [x] No    [] Yes    DIARRHEA:                [x] No                [] Yes  (C. Difficile status: [] positive                                                                                                                       [] negative                                                                                                                     [] pending)    VASOPRESSORS:  [x] No    [] Yes    If yes -   [] Levophed       [] Dopamine     [] Vasopressin       [] Dobutamine  [] Phenylephrine         [] Epinephrine    CENTRAL LINES:     [x] No   [] Yes   (Date of  agree with the assessment and plan and status of the problem list as documented.    I saw the patient patient is much better she is on tracheostomy collar she was on ventilator at night ventilator setting at night was PRVC 18 tidal volume 460 PEEP of 5 and FiO2 is 40% currently she is on tracheostomy collar she is much more alert today ABG was 7.4 5/51/69/36.  Will decrease respiratory rate to 14 continue with nocturnal ventilation chest x-ray looks mildly congested.  Patient is borderline blood pressure with MAP of 60-65.  She is also atrial fibrillation with ventricular rate of between 100 and 120.  She is on heparin drip will start patient on Coumadin with pharmacy to dose.  Will start her on low-dose of Lopressor 25 twice daily and see if the blood pressure will tolerate to control the heart rate.  She is on tube feeding will continue with tube feeding.    She is on Cipro and Flagyl started initially so far culture negative.  Last day of antibiotic tomorrow.     Discussed with nursing staff, treatment and plan discussed.      Total critical care time caring for this patient with life threatening, unstable organ failure, including direct patient contact, management of life support systems, review of data including imaging and labs, discussions with other team members and physicians at least 35  Min so far today, excluding procedures.       Please note that this chart was generated using voice recognition Dragon dictation software. Although every effort was made to ensure the accuracy of this automated transcription, some errors in transcription may have occurred.     Colin Gilbert MD  9/18/2024 12:04 PM

## 2024-09-18 NOTE — PROGRESS NOTES
[facility] Nurse  Follow Up      NAME:  Shazia ROGERS  MEDICAL RECORD NUMBER:  [id number]  AGE: 68 y.o.   GENDER: female  : 1955  TODAY'S DATE:  2024    Subjective:     Reason for WOCN Evaluation and Assessment: \"wound on abdomen\"        Subjective  Shazia ROGERS is a 68 y.o. female referred by:   [x] Physician  [] Nursing  [] Other:      Wound Identification:  Wound Type: non-healing surgical  Contributing Factors: diabetes, decreased mobility, underlying ventral hernia and obesity     Wound History: Patient previously had a hysterectomy due to endometrial cancer.  She has a large ventral hernia and occasionally gets open areas at the site of the hysterectomy scar  Current Wound Care Treatment: Foam     Previously followed at [facility] until 2024. Now resides in an [facility].      She was admitted to [facility] 2024 and transferred to [facility] for a tracheostomy revision on 2024.         Documentation noted for St 1 pressure injury sacrum  RT raises concern of pressure injury right anterior neck due to trach plate.           Objective:      BP (!) 110/52   Pulse (!) 113   Temp 100.2 °F (37.9 °C) (Oral)   Resp 18   Ht 1.651 m (5' 5\")   Wt (!) 208.2 kg (459 lb)   LMP 2014   SpO2 94%   BMI 76.38 kg/m²   Emmanuel Risk Score: Emmanuel Scale Score: 14    LABS    CBC:   Lab Results   Component Value Date/Time    WBC 6.0 2024 03:25 AM    RBC 3.52 2024 03:25 AM    RBC 4.44 2012 09:38 AM    HGB 9.3 2024 03:25 AM    HCT 32.8 2024 03:25 AM     CMP:  Albumin:  No results found for: \"LABALBU\"  PT/INR:    Lab Results   Component Value Date/Time    PROTIME 16.4 2024 11:48 AM    PROTIME 29.7 2024 10:30 AM    INR 1.3 2024 11:48 AM     HgBA1c:    Lab Results   Component Value Date/Time    LABA1C 6.8 2024 01:55 PM     PTT: No components found for: \"LABPTT\"      Assessment:       Measurements:     24

## 2024-09-18 NOTE — PLAN OF CARE
Problem: Discharge Planning  Goal: Discharge to home or other facility with appropriate resources  9/18/2024 1014 by Maria Luisa Arias RN  Outcome: Not Progressing     Problem: Chronic Conditions and Co-morbidities  Goal: Patient's chronic conditions and co-morbidity symptoms are monitored and maintained or improved  9/18/2024 1014 by Maria Luisa Arias RN  Outcome: Progressing     Problem: Pain  Goal: Verbalizes/displays adequate comfort level or baseline comfort level  9/18/2024 1014 by Maria Luisa Arias RN  Outcome: Progressing     Problem: Skin/Tissue Integrity  Goal: Absence of new skin breakdown  Description: 1.  Monitor for areas of redness and/or skin breakdown  2.  Assess vascular access sites hourly  3.  Every 4-6 hours minimum:  Change oxygen saturation probe site  4.  Every 4-6 hours:  If on nasal continuous positive airway pressure, respiratory therapy assess nares and determine need for appliance change or resting period.  9/18/2024 1014 by Maria Luisa Arias RN  Outcome: Progressing     Problem: ABCDS Injury Assessment  Goal: Absence of physical injury  9/18/2024 1014 by Maria Luisa Arias RN  Outcome: Progressing     Problem: Safety - Adult  Goal: Free from fall injury  9/18/2024 1014 by Maria Luisa Arias RN  Outcome: Progressing     Problem: Nutrition Deficit:  Goal: Optimize nutritional status  9/18/2024 1014 by Maria Luisa Arias RN  Outcome: Progressing     Problem: Safety - Medical Restraint  Goal: Remains free of injury from restraints (Restraint for Interference with Medical Device)  Description: INTERVENTIONS:  1. Determine that other, less restrictive measures have been tried or would not be effective before applying the restraint  2. Evaluate the patient's condition at the time of restraint application  3. Inform patient/family regarding the reason for restraint  4. Q2H: Monitor safety, psychosocial status, comfort, nutrition and hydration  9/18/2024 1014 by Hugo

## 2024-09-18 NOTE — PLAN OF CARE
Problem: Respiratory - Adult  Goal: Achieves optimal ventilation and oxygenation  9/18/2024 0824 by Thi Oseguera RCP  Outcome: Progressing

## 2024-09-18 NOTE — PLAN OF CARE
Problem: Safety - Medical Restraint  Goal: Remains free of injury from restraints (Restraint for Interference with Medical Device)  Description: INTERVENTIONS:  1. Determine that other, less restrictive measures have been tried or would not be effective before applying the restraint  2. Evaluate the patient's condition at the time of restraint application  3. Inform patient/family regarding the reason for restraint  4. Q2H: Monitor safety, psychosocial status, comfort, nutrition and hydration  9/17/2024 2114 by Staci Gentile RN  Outcome: Progressing  Flowsheets (Taken 9/17/2024 2000)  Remains free of injury from restraints (restraint for interference with medical device): Every 2 hours: Monitor safety, psychosocial status, comfort, nutrition and hydration  9/17/2024 1103 by Brook De Leon RN  Outcome: Progressing  Flowsheets  Taken 9/17/2024 1000 by Brook De Leon RN  Remains free of injury from restraints (restraint for interference with medical device): Every 2 hours: Monitor safety, psychosocial status, comfort, nutrition and hydration  Taken 9/17/2024 0800 by Brook De Leon RN  Remains free of injury from restraints (restraint for interference with medical device): Every 2 hours: Monitor safety, psychosocial status, comfort, nutrition and hydration  Taken 9/17/2024 0600 by Staci Gentile RN  Remains free of injury from restraints (restraint for interference with medical device): Every 2 hours: Monitor safety, psychosocial status, comfort, nutrition and hydration  Taken 9/17/2024 0400 by Staci Gentile RN  Remains free of injury from restraints (restraint for interference with medical device): Every 2 hours: Monitor safety, psychosocial status, comfort, nutrition and hydration  Taken 9/17/2024 0200 by Staci Gentile RN  Remains free of injury from restraints (restraint for interference with medical device): Every 2 hours: Monitor safety, psychosocial status, comfort, nutrition and hydration     Problem:

## 2024-09-18 NOTE — PLAN OF CARE
Problem: Respiratory - Adult  Goal: Achieves optimal ventilation and oxygenation  9/17/2024 2045 by Leisa Yost RCP  Outcome: Progressing  Flowsheets (Taken 9/17/2024 0800 by Brook De Leon RN)  Achieves optimal ventilation and oxygenation:   Assess for changes in mentation and behavior   Assess for changes in respiratory status   Position to facilitate oxygenation and minimize respiratory effort   Assess the need for suctioning and aspirate as needed   Respiratory therapy support as indicated   Oxygen supplementation based on oxygen saturation or arterial blood gases  9/17/2024 1103 by Brook De Leon, RN  Outcome: Progressing  Flowsheets (Taken 9/17/2024 0800)  Achieves optimal ventilation and oxygenation:   Assess for changes in mentation and behavior   Assess for changes in respiratory status   Position to facilitate oxygenation and minimize respiratory effort   Assess the need for suctioning and aspirate as needed   Respiratory therapy support as indicated   Oxygen supplementation based on oxygen saturation or arterial blood gases

## 2024-09-18 NOTE — PROGRESS NOTES
09/18/24 0808   Surgical Airway  09/16/24 Shiley Cuffed   Placement Date/Time: 09/16/24 1219   Present on Admission/Arrival: No  Placed By: In surgery  Placement Verified By: Direct visualization;Esophageal detection device  Surgical Airway Type: Tracheostomy  Brand: Brittani  Style: Cuffed  Size: 7   Status Secured  (reached out to Dr. Andrade about skin breakdown under trach flange.  He stated sutures where placed so nothing could be put under the trach.  topical lidocain can be ordered for pain by critical care.  Critical Care notified.)

## 2024-09-18 NOTE — PROGRESS NOTES
Pharmacy Note  Warfarin Consult    Shazia ROGERS is a 68 y.o. female for whom pharmacy has been consulted to manage warfarin therapy.     Consulting Physician: Dr Zaman  Reason for Admission: respiratory failure    Warfarin dose prior to admission: 4mg on Monday, Wednesday, Thursday, Saturday, Sunday; 2mg on Tuesday, Friday  Warfarin indication: AF, hx PE, hx DVT  Target INR range: 3-3.5     Past Medical History:   Diagnosis Date    Anxiety     Asthma     Atrial fibrillation (HCC)     A-fib noted on 05/25/2024 visit to ER    Bronchitis     COPD (chronic obstructive pulmonary disease) (HCC)     DDD (degenerative disc disease), lumbar     Depression     Diabetes mellitus (HCC)     Elevated glucose     Headache(784.0)     Hernia of abdominal cavity     HH (hiatus hernia)     Hyperlipemia, mixed     Hypertension     Osteoarthritis     Right femoral vein DVT (HCC) 05/2009    Dr. Elizabeth    Uterine hyperplasia         Recent Labs     09/18/24  1148   INR 1.3     Recent Labs     09/16/24  0804 09/17/24  0537 09/18/24  0325   HGB 9.3* 9.2* 9.3*   HCT 32.6* 32.6* 32.8*    220 226     Current warfarin drug-drug interactions: ciprofloxacin    Date             INR        Dose   9/18/2024        1.3     4mg    -Previous notes indicate target INR of 3-3.5 however unclear as to why  -Currently taking ciprofloxacin which can interact and increase INR however that is scheduled to end tomorrow  -Will start patient's home dose today and monitor closely  -Daily PT/INR while inpatient.    Will continue to follow.  Thank you for the consult.      Eloise Garza, NettieD [facility]  9/18/2024 2:44 PM

## 2024-09-19 PROBLEM — I48.0 PAROXYSMAL ATRIAL FIBRILLATION WITH RVR (HCC): Status: ACTIVE | Noted: 2024-09-19

## 2024-09-19 LAB
ALBUMIN SERPL-MCNC: 2.8 G/DL (ref 3.5–5.2)
ALBUMIN/GLOB SERPL: 1 {RATIO} (ref 1–2.5)
ALP SERPL-CCNC: 62 U/L (ref 35–104)
ALT SERPL-CCNC: 13 U/L (ref 10–35)
ANION GAP SERPL CALCULATED.3IONS-SCNC: 10 MMOL/L (ref 9–16)
ANTI-XA UNFRAC HEPARIN: 0.4 IU/L
ANTI-XA UNFRAC HEPARIN: 0.59 IU/L
AST SERPL-CCNC: 17 U/L (ref 10–35)
ATYPICAL LYMPHOCYTE ABSOLUTE COUNT: 0.09 K/UL
ATYPICAL LYMPHOCYTES: 1 %
BASOPHILS # BLD: 0 K/UL (ref 0–0.2)
BASOPHILS NFR BLD: 0 % (ref 0–2)
BILIRUB DIRECT SERPL-MCNC: 0.3 MG/DL (ref 0–0.2)
BILIRUB INDIRECT SERPL-MCNC: 0.2 MG/DL (ref 0–1)
BILIRUB SERPL-MCNC: 0.4 MG/DL (ref 0–1.2)
BUN SERPL-MCNC: 17 MG/DL (ref 8–23)
CALCIUM SERPL-MCNC: 9 MG/DL (ref 8.6–10.4)
CHLORIDE SERPL-SCNC: 102 MMOL/L (ref 98–107)
CO2 SERPL-SCNC: 29 MMOL/L (ref 20–31)
CREAT SERPL-MCNC: 0.8 MG/DL (ref 0.5–0.9)
EOSINOPHIL # BLD: 0.45 K/UL (ref 0–0.4)
EOSINOPHILS RELATIVE PERCENT: 5 % (ref 1–4)
ERYTHROCYTE [DISTWIDTH] IN BLOOD BY AUTOMATED COUNT: 20.6 % (ref 11.8–14.4)
GFR, ESTIMATED: 81 ML/MIN/1.73M2
GLOBULIN SER CALC-MCNC: 3.3 G/DL
GLUCOSE BLD-MCNC: 123 MG/DL (ref 65–105)
GLUCOSE BLD-MCNC: 157 MG/DL (ref 65–105)
GLUCOSE BLD-MCNC: 169 MG/DL (ref 65–105)
GLUCOSE BLD-MCNC: 169 MG/DL (ref 65–105)
GLUCOSE BLD-MCNC: 178 MG/DL (ref 65–105)
GLUCOSE BLD-MCNC: 179 MG/DL (ref 65–105)
GLUCOSE SERPL-MCNC: 149 MG/DL (ref 74–99)
HCT VFR BLD AUTO: 30.1 % (ref 36.3–47.1)
HGB BLD-MCNC: 8.6 G/DL (ref 11.9–15.1)
IMM GRANULOCYTES # BLD AUTO: 0.09 K/UL (ref 0–0.3)
IMM GRANULOCYTES NFR BLD: 1 %
INR PPP: 1.4
LYMPHOCYTES NFR BLD: 1.53 K/UL (ref 1–4.8)
LYMPHOCYTES RELATIVE PERCENT: 17 % (ref 24–44)
MCH RBC QN AUTO: 26.6 PG (ref 25.2–33.5)
MCHC RBC AUTO-ENTMCNC: 28.6 G/DL (ref 28.4–34.8)
MCV RBC AUTO: 93.2 FL (ref 82.6–102.9)
MICROORGANISM SPEC CULT: NORMAL
MICROORGANISM SPEC CULT: NORMAL
MONOCYTES NFR BLD: 1.08 K/UL (ref 0.1–0.8)
MONOCYTES NFR BLD: 12 % (ref 1–7)
MORPHOLOGY: ABNORMAL
MRSA, DNA, NASAL: ABNORMAL
NEUTROPHILS NFR BLD: 64 % (ref 36–66)
NEUTS SEG NFR BLD: 5.76 K/UL (ref 1.8–7.7)
NRBC BLD-RTO: 0.2 PER 100 WBC
PLATELET # BLD AUTO: 236 K/UL (ref 138–453)
PMV BLD AUTO: 9.6 FL (ref 8.1–13.5)
POTASSIUM SERPL-SCNC: 3.8 MMOL/L (ref 3.7–5.3)
PROT SERPL-MCNC: 6.1 G/DL (ref 6.6–8.7)
PROTHROMBIN TIME: 16.9 SEC (ref 11.7–14.9)
RBC # BLD AUTO: 3.23 M/UL (ref 3.95–5.11)
SERVICE CMNT-IMP: NORMAL
SERVICE CMNT-IMP: NORMAL
SODIUM SERPL-SCNC: 141 MMOL/L (ref 136–145)
SPECIMEN DESCRIPTION: ABNORMAL
SPECIMEN DESCRIPTION: NORMAL
SPECIMEN DESCRIPTION: NORMAL
WBC OTHER # BLD: 9 K/UL (ref 3.5–11.3)

## 2024-09-19 PROCEDURE — 2000000000 HC ICU R&B

## 2024-09-19 PROCEDURE — 80048 BASIC METABOLIC PNL TOTAL CA: CPT

## 2024-09-19 PROCEDURE — 6360000002 HC RX W HCPCS

## 2024-09-19 PROCEDURE — 36415 COLL VENOUS BLD VENIPUNCTURE: CPT

## 2024-09-19 PROCEDURE — 2580000003 HC RX 258

## 2024-09-19 PROCEDURE — 6370000000 HC RX 637 (ALT 250 FOR IP)

## 2024-09-19 PROCEDURE — 82947 ASSAY GLUCOSE BLOOD QUANT: CPT

## 2024-09-19 PROCEDURE — 94003 VENT MGMT INPAT SUBQ DAY: CPT

## 2024-09-19 PROCEDURE — 94761 N-INVAS EAR/PLS OXIMETRY MLT: CPT

## 2024-09-19 PROCEDURE — 85025 COMPLETE CBC W/AUTO DIFF WBC: CPT

## 2024-09-19 PROCEDURE — 99291 CRITICAL CARE FIRST HOUR: CPT | Performed by: INTERNAL MEDICINE

## 2024-09-19 PROCEDURE — 2700000000 HC OXYGEN THERAPY PER DAY

## 2024-09-19 PROCEDURE — 80076 HEPATIC FUNCTION PANEL: CPT

## 2024-09-19 PROCEDURE — 99223 1ST HOSP IP/OBS HIGH 75: CPT | Performed by: INTERNAL MEDICINE

## 2024-09-19 PROCEDURE — 85610 PROTHROMBIN TIME: CPT

## 2024-09-19 PROCEDURE — 85520 HEPARIN ASSAY: CPT

## 2024-09-19 PROCEDURE — 94640 AIRWAY INHALATION TREATMENT: CPT

## 2024-09-19 PROCEDURE — 2500000003 HC RX 250 WO HCPCS

## 2024-09-19 RX ORDER — WARFARIN SODIUM 2 MG/1
4 TABLET ORAL
Status: COMPLETED | OUTPATIENT
Start: 2024-09-19 | End: 2024-09-19

## 2024-09-19 RX ORDER — MIDODRINE HYDROCHLORIDE 5 MG/1
15 TABLET ORAL
Status: DISCONTINUED | OUTPATIENT
Start: 2024-09-19 | End: 2024-09-26 | Stop reason: HOSPADM

## 2024-09-19 RX ORDER — DIGOXIN 0.25 MG/ML
250 INJECTION INTRAMUSCULAR; INTRAVENOUS EVERY 6 HOURS
Status: COMPLETED | OUTPATIENT
Start: 2024-09-19 | End: 2024-09-20

## 2024-09-19 RX ORDER — METOPROLOL TARTRATE 25 MG/1
12.5 TABLET, FILM COATED ORAL 2 TIMES DAILY
Status: DISCONTINUED | OUTPATIENT
Start: 2024-09-19 | End: 2024-09-20

## 2024-09-19 RX ORDER — 0.9 % SODIUM CHLORIDE 0.9 %
500 INTRAVENOUS SOLUTION INTRAVENOUS ONCE
Status: COMPLETED | OUTPATIENT
Start: 2024-09-19 | End: 2024-09-19

## 2024-09-19 RX ADMIN — CIPROFLOXACIN 200 MG: 2 INJECTION, SOLUTION INTRAVENOUS at 06:05

## 2024-09-19 RX ADMIN — IPRATROPIUM BROMIDE AND ALBUTEROL SULFATE 1 DOSE: 2.5; .5 SOLUTION RESPIRATORY (INHALATION) at 09:25

## 2024-09-19 RX ADMIN — CLINDAMYCIN PHOSPHATE 900 MG: 900 INJECTION, SOLUTION INTRAVENOUS at 00:56

## 2024-09-19 RX ADMIN — SODIUM CHLORIDE, PRESERVATIVE FREE 10 ML: 5 INJECTION INTRAVENOUS at 08:38

## 2024-09-19 RX ADMIN — MIDODRINE HYDROCHLORIDE 15 MG: 5 TABLET ORAL at 16:07

## 2024-09-19 RX ADMIN — CIPROFLOXACIN 200 MG: 2 INJECTION, SOLUTION INTRAVENOUS at 07:34

## 2024-09-19 RX ADMIN — METOPROLOL TARTRATE 12.5 MG: 25 TABLET, FILM COATED ORAL at 08:41

## 2024-09-19 RX ADMIN — SODIUM CHLORIDE: 9 INJECTION, SOLUTION INTRAVENOUS at 04:47

## 2024-09-19 RX ADMIN — IPRATROPIUM BROMIDE AND ALBUTEROL SULFATE 1 DOSE: 2.5; .5 SOLUTION RESPIRATORY (INHALATION) at 19:40

## 2024-09-19 RX ADMIN — DIGOXIN 250 MCG: 0.25 INJECTION INTRAMUSCULAR; INTRAVENOUS at 15:23

## 2024-09-19 RX ADMIN — DIGOXIN 250 MCG: 0.25 INJECTION INTRAMUSCULAR; INTRAVENOUS at 20:40

## 2024-09-19 RX ADMIN — VANCOMYCIN HYDROCHLORIDE 2000 MG: 1 INJECTION, POWDER, LYOPHILIZED, FOR SOLUTION INTRAVENOUS at 14:26

## 2024-09-19 RX ADMIN — SODIUM CHLORIDE, PRESERVATIVE FREE 40 MG: 5 INJECTION INTRAVENOUS at 08:38

## 2024-09-19 RX ADMIN — SODIUM CHLORIDE, PRESERVATIVE FREE 10 ML: 5 INJECTION INTRAVENOUS at 20:34

## 2024-09-19 RX ADMIN — Medication 1 MG/MIN: at 11:26

## 2024-09-19 RX ADMIN — METOPROLOL TARTRATE 12.5 MG: 25 TABLET, FILM COATED ORAL at 21:04

## 2024-09-19 RX ADMIN — INSULIN GLARGINE 8 UNITS: 100 INJECTION, SOLUTION SUBCUTANEOUS at 20:42

## 2024-09-19 RX ADMIN — HEPARIN SODIUM 9 UNITS/KG/HR: 10000 INJECTION, SOLUTION INTRAVENOUS at 12:50

## 2024-09-19 RX ADMIN — MIDODRINE HYDROCHLORIDE 15 MG: 5 TABLET ORAL at 08:41

## 2024-09-19 RX ADMIN — IPRATROPIUM BROMIDE AND ALBUTEROL SULFATE 1 DOSE: 2.5; .5 SOLUTION RESPIRATORY (INHALATION) at 23:24

## 2024-09-19 RX ADMIN — MIDODRINE HYDROCHLORIDE 15 MG: 5 TABLET ORAL at 12:09

## 2024-09-19 RX ADMIN — IPRATROPIUM BROMIDE AND ALBUTEROL SULFATE 1 DOSE: 2.5; .5 SOLUTION RESPIRATORY (INHALATION) at 03:46

## 2024-09-19 RX ADMIN — IPRATROPIUM BROMIDE AND ALBUTEROL SULFATE 1 DOSE: 2.5; .5 SOLUTION RESPIRATORY (INHALATION) at 12:28

## 2024-09-19 RX ADMIN — IPRATROPIUM BROMIDE AND ALBUTEROL SULFATE 1 DOSE: 2.5; .5 SOLUTION RESPIRATORY (INHALATION) at 16:26

## 2024-09-19 RX ADMIN — Medication 150 MG: at 11:15

## 2024-09-19 RX ADMIN — Medication 0.5 MG/MIN: at 20:47

## 2024-09-19 RX ADMIN — IPRATROPIUM BROMIDE AND ALBUTEROL SULFATE 1 DOSE: 2.5; .5 SOLUTION RESPIRATORY (INHALATION) at 00:41

## 2024-09-19 RX ADMIN — SODIUM CHLORIDE 500 ML: 9 INJECTION, SOLUTION INTRAVENOUS at 08:37

## 2024-09-19 RX ADMIN — WARFARIN SODIUM 4 MG: 2 TABLET ORAL at 17:39

## 2024-09-19 RX ADMIN — CLINDAMYCIN PHOSPHATE 900 MG: 900 INJECTION, SOLUTION INTRAVENOUS at 08:36

## 2024-09-19 ASSESSMENT — PULMONARY FUNCTION TESTS
PIF_VALUE: 37
PIF_VALUE: 20
PIF_VALUE: 11
PIF_VALUE: 21
PIF_VALUE: 24
PIF_VALUE: 20
PIF_VALUE: 23
PIF_VALUE: 25
PIF_VALUE: 27
PIF_VALUE: 18
PIF_VALUE: 16
PIF_VALUE: 21
PIF_VALUE: 9
PIF_VALUE: 17
PIF_VALUE: 16
PIF_VALUE: 20
PIF_VALUE: 15
PIF_VALUE: 19
PIF_VALUE: 12
PIF_VALUE: 19
PIF_VALUE: 16
PIF_VALUE: 22
PIF_VALUE: 14
PIF_VALUE: 20
PIF_VALUE: 22
PIF_VALUE: 15
PIF_VALUE: 23
PIF_VALUE: 23
PIF_VALUE: 16
PIF_VALUE: 26
PIF_VALUE: 14
PIF_VALUE: 16
PIF_VALUE: 11
PIF_VALUE: 18
PIF_VALUE: 15
PIF_VALUE: 16
PIF_VALUE: 28
PIF_VALUE: 32
PIF_VALUE: 29
PIF_VALUE: 23
PIF_VALUE: 23
PIF_VALUE: 0
PIF_VALUE: 23
PIF_VALUE: 19
PIF_VALUE: 22
PIF_VALUE: 21
PIF_VALUE: 18
PIF_VALUE: 18
PIF_VALUE: 19
PIF_VALUE: 21
PIF_VALUE: 23
PIF_VALUE: 18
PIF_VALUE: 17
PIF_VALUE: 20

## 2024-09-19 NOTE — PLAN OF CARE
Problem: Chronic Conditions and Co-morbidities  Goal: Patient's chronic conditions and co-morbidity symptoms are monitored and maintained or improved  Outcome: Not Progressing     Problem: Discharge Planning  Goal: Discharge to home or other facility with appropriate resources  Outcome: Not Progressing     Problem: Pain  Goal: Verbalizes/displays adequate comfort level or baseline comfort level  Outcome: Not Progressing     Problem: Skin/Tissue Integrity  Goal: Absence of new skin breakdown  Description: 1.  Monitor for areas of redness and/or skin breakdown  2.  Assess vascular access sites hourly  3.  Every 4-6 hours minimum:  Change oxygen saturation probe site  4.  Every 4-6 hours:  If on nasal continuous positive airway pressure, respiratory therapy assess nares and determine need for appliance change or resting period.  Outcome: Not Progressing     Problem: ABCDS Injury Assessment  Goal: Absence of physical injury  Outcome: Not Progressing     Problem: Safety - Adult  Goal: Free from fall injury  Outcome: Not Progressing     Problem: Respiratory - Adult  Goal: Achieves optimal ventilation and oxygenation  9/19/2024 0134 by Sidney Kee RN  Outcome: Not Progressing  9/18/2024 2042 by Jaycee Cottrell RCP  Outcome: Progressing     Problem: Nutrition Deficit:  Goal: Optimize nutritional status  Outcome: Not Progressing     Problem: Chronic Conditions and Co-morbidities  Goal: Patient's chronic conditions and co-morbidity symptoms are monitored and maintained or improved  Outcome: Not Progressing     Problem: Discharge Planning  Goal: Discharge to home or other facility with appropriate resources  Outcome: Not Progressing     Problem: Pain  Goal: Verbalizes/displays adequate comfort level or baseline comfort level  Outcome: Not Progressing     Problem: Skin/Tissue Integrity  Goal: Absence of new skin breakdown  Description: 1.  Monitor for areas of redness and/or skin breakdown  2.  Assess vascular

## 2024-09-19 NOTE — PROGRESS NOTES
Pharmacy Note  Warfarin Consult follow-up      Recent Labs     09/19/24  0207   INR 1.4     Recent Labs     09/17/24  0537 09/18/24  0325 09/18/24  2325 09/19/24  0207   HGB 9.2* 9.3* 8.5* 8.6*   HCT 32.6* 32.8* 28.7* 30.1*    226  --  236     Significant Drug-Drug Interactions:  New warfarin drug-drug interactions: amiodarone  Discontinued drug-drug interactions: ciprofloxacin    Date INR Dose   09.19 1.4 4mg               Notes:                   -Ciprofloxacin ended today however amiodarone was initiated for AFRVR  -Will continue home dose for today and monitor closely, will likely need to decrease future doses due to interactions  -Daily PT/INR while inpatient.     Eloise Garza, PharmD BCPS [facility]  9/19/2024 1:15 PM

## 2024-09-19 NOTE — PROGRESS NOTES
[facility]   Pharmacy Pharmacokinetic Monitoring Service - Vancomycin     Shazia ROGERS is a 68 y.o. female starting on vancomycin therapy for CAP. Pharmacy consulted by Dr Bermeo for monitoring and adjustment.    Target Concentration: Goal AUC/MEENA 400-600 mg*hr/L    Additional Antimicrobials: recently completed clindamycin and ciprofloxacin    Pertinent Laboratory Values:   Wt Readings from Last 1 Encounters:   09/16/24 (!) 208.2 kg (459 lb)     Temp Readings from Last 1 Encounters:   09/19/24 99.3 °F (37.4 °C) (Oral)     Estimated Creatinine Clearance: 127 mL/min (based on SCr of 0.8 mg/dL).  Recent Labs     09/18/24  0325 09/19/24  0207   CREATININE 0.9 0.8   BUN 13 17   WBC 6.0 9.0     Pertinent Cultures:  Culture Date Source Results   09.17 Sputum S Aureus - pending   MRSA Nasal Swab: positive    Plan:  Will give Vancomycin 2000mg IV x1 today  Start vancomycin 1250mg IV Q12H  Anticipated AUC of 560 and trough concentration of 18 at steady state  Renal labs as indicated   Pharmacy will continue to monitor patient and adjust therapy as indicated    Thank you for the consult,  Eloise Garza, NettieD BCPS [facility]  9/19/2024 1:41 PM

## 2024-09-19 NOTE — PROGRESS NOTES
[]  Positive (Details:  )              ASSESSMENT:     Principal Problem:    Respiratory failure (HCC)  Active Problems:    Acute on chronic hypoxic respiratory failure (HCC)    Ventilator-acquired pneumonia (HCC)    Tracheal stenosis    SHWETA (obstructive sleep apnea)    Obesity hypoventilation syndrome (HCC)    Other tracheostomy complication (HCC)  Resolved Problems:    * No resolved hospital problems. *      Shazia ROGERS is a 68 y.o. with PMH of obstructive sleep apnea and obesity hypoventilation syndrome, chronic hypoxemic respiratory failure, tracheostomy placement in July 2024, prior PE/DVT in 2/20/2022 on Coumadin, atrial fibrillation, heart failure with preserved ejection fraction who presented to [place] ED for acute hypoxic respiratory failure.     Per ED note, patient was at [facility] where she began to experience decreased oxygen saturations in the 80s on ventilator.  Patient was then brought to the emergency department by EMS where there were difficulties maintaining oxygen saturation of the patient.  Patient was intubated in the emergency department due to worsening respiratory status resulting in improvement.  Upon examination of tracheostomy cannula by emergency department physician, it was noted that there was a distal obstruction, general surgery was consulted for further evaluation for possible replacement of tracheostomy.     Tracheostomy was placed in July 2024 for chronic hypoxic respiratory failure and noncompliance with BiPAP with worsening oxygenation status.     Chest x-ray significant for opacities at the left lung base.  ABG showing pH of 7.31, CO2 77.9, HCO3 38.8.  Troponin 34 on admission, repeat pending.  2.1.  Patient afebrile no leukocytosis.  Oxygen saturation at 98% on 40 FiO2.     Patient was admitted to [facility] ICU for further evaluation and treatment.  Patient placed on propofol infusion, IV clindamycin and ciprofloxacin initiated.     9/12  patient.    She is off Cipro and off clindamycin currently.  She does have MRSA and she is growing Staph aureus from the tracheostomy.  Tracheostomy site look erythematous and slightly swollen she does have secretions coming out of the tracheostomy site.  Local care for tracheostomy site and will have ENT to see as she has suture present.  Will start patient on vancomycin and follow-up cultures her respiratory culture growing Staph aureus and other organism MRSA positive from the nares.    Discussed with nursing staff, treatment and plan discussed.  Discussed with respiratory therapist.    Total critical care time caring for this patient with life threatening, unstable organ failure, including direct patient contact, management of life support systems, review of data including imaging and labs, discussions with other team members and physicians at least 35  Min so far today, excluding procedures.       Please note that this chart was generated using voice recognition Dragon dictation software. Although every effort was made to ensure the accuracy of this automated transcription, some errors in transcription may have occurred.     Colin Gilbert MD  9/19/2024 1:14 PM

## 2024-09-19 NOTE — PLAN OF CARE
Problem: Respiratory - Adult  Goal: Achieves optimal ventilation and oxygenation  9/18/2024 2042 by Jaycee Cottrell RCP  Outcome: Progressing     Problem: OXYGENATION/RESPIRATORY FUNCTION  Goal: Patient will maintain patent airway  Outcome: Ongoing  Goal: Patient will achieve/maintain normal respiratory rate/effort  Respiratory rate and effort will be within normal limits for the patient  Outcome: Ongoing    Problem: MECHANICAL VENTILATION  Goal: Patient will maintain patent airway  Outcome: Ongoing  Goal: Oral health is maintained or improved  Outcome: Ongoing  Goal: ET tube will be managed safely  Outcome: Ongoing  Goal: Ability to express needs and understand communication  Outcome: Ongoing  Goal: Mobility/activity is maintained at optimum level for patient  Outcome: Ongoing    Problem: ASPIRATION PRECAUTIONS  Goal: Patient’s risk of aspiration is minimized  Outcome: Ongoing    Problem: SKIN INTEGRITY  Goal: Skin integrity is maintained or improved  Outcome: Ongoing                  BRONCHOSPASM/BRONCHOCONSTRICTION     [x]         IMPROVE AERATION/BREATH SOUNDS  [x]   ADMINISTER BRONCHODILATOR THERAPY AS APPROPRIATE  [x]   ASSESS BREATH SOUNDS  [x]   IMPLEMENT AEROSOL/MDI PROTOCOL  [x]   PATIENT EDUCATION AS NEEDED  PROVIDE ADEQUATE OXYGENATION WITH ACCEPTABLE SP02/ABG'S    [x]  IDENTIFY APPROPRIATE OXYGEN THERAPY  [x]   MONITOR SP02/ABG'S AS NEEDED   [x]   PATIENT EDUCATION AS NEEDED

## 2024-09-19 NOTE — CONSULTS
[facility]   Consult Note         Today's Date: 9/19/2024  Patient Name: Shazia ROGERS  Date of admission: 9/14/2024  1:39 PM  Patient's age: 68 y.o., 1955  Admission Dx: Respiratory failure (HCC) [J96.90]  Acute on chronic hypoxic respiratory failure (HCC) [J96.21]    Reason for Consult:  Cardiac evaluation    Requesting Physician: Rafael Concepcion MD    REASON FOR CONSULT:  A-fib with RVR     History Obtained From:  Patient, chart, staff, records    HISTORY OF PRESENT ILLNESS:      Shazia ROGERS is a 68 y.o. with PMH of obstructive sleep apnea and obesity hypoventilation syndrome, chronic hypoxemic respiratory failure, tracheostomy placement in July 2024, prior PE/DVT in 2/20/2022 on Coumadin, atrial fibrillation, heart failure with preserved ejection fraction who presented to [place] [facility] ED for acute hypoxic respiratory failure.     Per ED note, patient was at [facility] where she began to experience decreased oxygen saturations in the 80s on ventilator.  Patient was then brought to the emergency department by EMS where there were difficulties maintaining oxygen saturation of the patient.  Patient was intubated in the emergency department due to worsening respiratory status resulting in improvement.  Upon examination of tracheostomy cannula by emergency department physician, it was noted that there was a distal obstruction, general surgery was consulted for further evaluation for possible replacement of tracheostomy.     Tracheostomy was placed in July 2024 for chronic hypoxic respiratory failure and noncompliance with BiPAP with worsening oxygenation status.     Chest x-ray significant for opacities at the left lung base.  ABG showing pH of 7.31, CO2 77.9, HCO3 38.8.  Troponin 34 on admission, repeat pending.  2.1.  Patient afebrile no leukocytosis.  Oxygen saturation at 98% on 40 FiO2.     Patient was admitted to [facility] ICU for further evaluation and treatment.   [facility]  [facility]  [phone]

## 2024-09-19 NOTE — PLAN OF CARE
Problem: Discharge Planning  Goal: Discharge to home or other facility with appropriate resources  9/19/2024 1035 by Maria Luisa Arias, RN  Outcome: Not Progressing     Problem: Chronic Conditions and Co-morbidities  Goal: Patient's chronic conditions and co-morbidity symptoms are monitored and maintained or improved  9/19/2024 1035 by Maria Luisa Arias, RN  Outcome: Progressing     Problem: Pain  Goal: Verbalizes/displays adequate comfort level or baseline comfort level  9/19/2024 1035 by Maria Luisa Arias, RN  Outcome: Progressing     Problem: Skin/Tissue Integrity  Goal: Absence of new skin breakdown  Description: 1.  Monitor for areas of redness and/or skin breakdown  2.  Assess vascular access sites hourly  3.  Every 4-6 hours minimum:  Change oxygen saturation probe site  4.  Every 4-6 hours:  If on nasal continuous positive airway pressure, respiratory therapy assess nares and determine need for appliance change or resting period.  9/19/2024 1035 by Maria Luisa Arias, RN  Outcome: Progressing     Problem: ABCDS Injury Assessment  Goal: Absence of physical injury  9/19/2024 1035 by Maria Luisa Arias, RN  Outcome: Progressing     Problem: Safety - Adult  Goal: Free from fall injury  9/19/2024 1035 by Maria Luisa Arias, RN  Outcome: Progressing     Problem: Nutrition Deficit:  Goal: Optimize nutritional status  9/19/2024 1035 by Maria Luisa Arias, RN  Outcome: Progressing

## 2024-09-19 NOTE — PROGRESS NOTES
[facility]  Occupational Therapy Not Seen Note    DATE: 2024    NAME: Shazia ROGERS  MRN: [id number]   : 1955      Patient not seen this date for Occupational Therapy due to:    Patient Declined: Pt declines to participate in therapy assessment this date. Pt states bed bound at baseline and does not sit EOB, however does engage in rolling in bed. OT will check back tomorrow as able.    Next Scheduled Treatment:       Electronically signed by JAVIER Brasher on 2024 at 4:08 PM

## 2024-09-20 LAB
ANION GAP SERPL CALCULATED.3IONS-SCNC: 10 MMOL/L (ref 9–16)
ANTI-XA UNFRAC HEPARIN: 0.32 IU/L
BASOPHILS # BLD: 0.09 K/UL (ref 0–0.2)
BASOPHILS NFR BLD: 1 % (ref 0–2)
BUN SERPL-MCNC: 15 MG/DL (ref 8–23)
CALCIUM SERPL-MCNC: 9.2 MG/DL (ref 8.6–10.4)
CHLORIDE SERPL-SCNC: 102 MMOL/L (ref 98–107)
CO2 SERPL-SCNC: 27 MMOL/L (ref 20–31)
CREAT SERPL-MCNC: 0.7 MG/DL (ref 0.5–0.9)
EOSINOPHIL # BLD: 0.09 K/UL (ref 0–0.4)
EOSINOPHILS RELATIVE PERCENT: 1 % (ref 1–4)
ERYTHROCYTE [DISTWIDTH] IN BLOOD BY AUTOMATED COUNT: 20.6 % (ref 11.8–14.4)
GFR, ESTIMATED: >90 ML/MIN/1.73M2
GLUCOSE BLD-MCNC: 146 MG/DL (ref 65–105)
GLUCOSE BLD-MCNC: 153 MG/DL (ref 65–105)
GLUCOSE BLD-MCNC: 157 MG/DL (ref 65–105)
GLUCOSE BLD-MCNC: 168 MG/DL (ref 65–105)
GLUCOSE BLD-MCNC: 171 MG/DL (ref 65–105)
GLUCOSE SERPL-MCNC: 178 MG/DL (ref 74–99)
HCT VFR BLD AUTO: 31.6 % (ref 36.3–47.1)
HGB BLD-MCNC: 8.6 G/DL (ref 11.9–15.1)
IMM GRANULOCYTES # BLD AUTO: 0.09 K/UL (ref 0–0.3)
IMM GRANULOCYTES NFR BLD: 1 %
INR PPP: 1.3
LYMPHOCYTES NFR BLD: 0.85 K/UL (ref 1–4.8)
LYMPHOCYTES RELATIVE PERCENT: 9 % (ref 24–44)
MCH RBC QN AUTO: 26.5 PG (ref 25.2–33.5)
MCHC RBC AUTO-ENTMCNC: 27.2 G/DL (ref 28.4–34.8)
MCV RBC AUTO: 97.2 FL (ref 82.6–102.9)
MICROORGANISM SPEC CULT: ABNORMAL
MICROORGANISM SPEC CULT: ABNORMAL
MICROORGANISM/AGENT SPEC: ABNORMAL
MONOCYTES NFR BLD: 0.56 K/UL (ref 0.1–0.8)
MONOCYTES NFR BLD: 6 % (ref 1–7)
MORPHOLOGY: ABNORMAL
NEUTROPHILS NFR BLD: 82 % (ref 36–66)
NEUTS SEG NFR BLD: 7.72 K/UL (ref 1.8–7.7)
NRBC BLD-RTO: 0.3 PER 100 WBC
PLATELET # BLD AUTO: 252 K/UL (ref 138–453)
PMV BLD AUTO: 9.6 FL (ref 8.1–13.5)
POTASSIUM SERPL-SCNC: 4.1 MMOL/L (ref 3.7–5.3)
PROTHROMBIN TIME: 16.2 SEC (ref 11.7–14.9)
RBC # BLD AUTO: 3.25 M/UL (ref 3.95–5.11)
SERVICE CMNT-IMP: ABNORMAL
SODIUM SERPL-SCNC: 139 MMOL/L (ref 136–145)
SPECIMEN DESCRIPTION: ABNORMAL
WBC OTHER # BLD: 9.4 K/UL (ref 3.5–11.3)

## 2024-09-20 PROCEDURE — 6360000002 HC RX W HCPCS

## 2024-09-20 PROCEDURE — 85520 HEPARIN ASSAY: CPT

## 2024-09-20 PROCEDURE — 2580000003 HC RX 258

## 2024-09-20 PROCEDURE — 2700000000 HC OXYGEN THERAPY PER DAY

## 2024-09-20 PROCEDURE — 6370000000 HC RX 637 (ALT 250 FOR IP)

## 2024-09-20 PROCEDURE — 85610 PROTHROMBIN TIME: CPT

## 2024-09-20 PROCEDURE — 36415 COLL VENOUS BLD VENIPUNCTURE: CPT

## 2024-09-20 PROCEDURE — 2000000000 HC ICU R&B

## 2024-09-20 PROCEDURE — 94761 N-INVAS EAR/PLS OXIMETRY MLT: CPT

## 2024-09-20 PROCEDURE — 99233 SBSQ HOSP IP/OBS HIGH 50: CPT | Performed by: INTERNAL MEDICINE

## 2024-09-20 PROCEDURE — 94003 VENT MGMT INPAT SUBQ DAY: CPT

## 2024-09-20 PROCEDURE — 2500000003 HC RX 250 WO HCPCS

## 2024-09-20 PROCEDURE — 94640 AIRWAY INHALATION TREATMENT: CPT

## 2024-09-20 PROCEDURE — 99291 CRITICAL CARE FIRST HOUR: CPT | Performed by: INTERNAL MEDICINE

## 2024-09-20 PROCEDURE — 85025 COMPLETE CBC W/AUTO DIFF WBC: CPT

## 2024-09-20 PROCEDURE — 80048 BASIC METABOLIC PNL TOTAL CA: CPT

## 2024-09-20 PROCEDURE — 99221 1ST HOSP IP/OBS SF/LOW 40: CPT | Performed by: OTOLARYNGOLOGY

## 2024-09-20 PROCEDURE — 82947 ASSAY GLUCOSE BLOOD QUANT: CPT

## 2024-09-20 RX ORDER — FUROSEMIDE 10 MG/ML
20 INJECTION INTRAMUSCULAR; INTRAVENOUS ONCE
Status: COMPLETED | OUTPATIENT
Start: 2024-09-20 | End: 2024-09-20

## 2024-09-20 RX ORDER — METOPROLOL TARTRATE 25 MG/1
25 TABLET, FILM COATED ORAL 2 TIMES DAILY
Status: DISCONTINUED | OUTPATIENT
Start: 2024-09-20 | End: 2024-09-21

## 2024-09-20 RX ORDER — WARFARIN SODIUM 5 MG/1
5 TABLET ORAL
Status: COMPLETED | OUTPATIENT
Start: 2024-09-20 | End: 2024-09-20

## 2024-09-20 RX ORDER — DIGOXIN 125 MCG
125 TABLET ORAL DAILY
Status: DISCONTINUED | OUTPATIENT
Start: 2024-09-20 | End: 2024-09-21

## 2024-09-20 RX ADMIN — MIDODRINE HYDROCHLORIDE 15 MG: 5 TABLET ORAL at 11:55

## 2024-09-20 RX ADMIN — DIGOXIN 125 MCG: 125 TABLET ORAL at 12:53

## 2024-09-20 RX ADMIN — FUROSEMIDE 20 MG: 10 INJECTION, SOLUTION INTRAMUSCULAR; INTRAVENOUS at 15:02

## 2024-09-20 RX ADMIN — IPRATROPIUM BROMIDE AND ALBUTEROL SULFATE 1 DOSE: 2.5; .5 SOLUTION RESPIRATORY (INHALATION) at 08:09

## 2024-09-20 RX ADMIN — SODIUM CHLORIDE, PRESERVATIVE FREE 40 MG: 5 INJECTION INTRAVENOUS at 08:03

## 2024-09-20 RX ADMIN — SODIUM CHLORIDE 1250 MG: 9 INJECTION, SOLUTION INTRAVENOUS at 02:58

## 2024-09-20 RX ADMIN — MIDODRINE HYDROCHLORIDE 15 MG: 5 TABLET ORAL at 17:30

## 2024-09-20 RX ADMIN — SODIUM CHLORIDE, PRESERVATIVE FREE 10 ML: 5 INJECTION INTRAVENOUS at 21:42

## 2024-09-20 RX ADMIN — METOPROLOL TARTRATE 12.5 MG: 25 TABLET, FILM COATED ORAL at 08:02

## 2024-09-20 RX ADMIN — IPRATROPIUM BROMIDE AND ALBUTEROL SULFATE 1 DOSE: 2.5; .5 SOLUTION RESPIRATORY (INHALATION) at 23:35

## 2024-09-20 RX ADMIN — HEPARIN SODIUM 9 UNITS/KG/HR: 10000 INJECTION, SOLUTION INTRAVENOUS at 17:02

## 2024-09-20 RX ADMIN — IPRATROPIUM BROMIDE AND ALBUTEROL SULFATE 1 DOSE: 2.5; .5 SOLUTION RESPIRATORY (INHALATION) at 20:34

## 2024-09-20 RX ADMIN — SODIUM CHLORIDE 1250 MG: 9 INJECTION, SOLUTION INTRAVENOUS at 15:07

## 2024-09-20 RX ADMIN — MIDODRINE HYDROCHLORIDE 15 MG: 5 TABLET ORAL at 08:03

## 2024-09-20 RX ADMIN — Medication 0.5 MG/MIN: at 14:24

## 2024-09-20 RX ADMIN — WARFARIN SODIUM 5 MG: 5 TABLET ORAL at 17:30

## 2024-09-20 RX ADMIN — SODIUM CHLORIDE: 9 INJECTION, SOLUTION INTRAVENOUS at 02:53

## 2024-09-20 RX ADMIN — DIGOXIN 250 MCG: 0.25 INJECTION INTRAMUSCULAR; INTRAVENOUS at 02:54

## 2024-09-20 RX ADMIN — IPRATROPIUM BROMIDE AND ALBUTEROL SULFATE 1 DOSE: 2.5; .5 SOLUTION RESPIRATORY (INHALATION) at 15:59

## 2024-09-20 RX ADMIN — METOPROLOL TARTRATE 25 MG: 25 TABLET, FILM COATED ORAL at 21:42

## 2024-09-20 RX ADMIN — SODIUM CHLORIDE, PRESERVATIVE FREE 10 ML: 5 INJECTION INTRAVENOUS at 08:03

## 2024-09-20 RX ADMIN — IPRATROPIUM BROMIDE AND ALBUTEROL SULFATE 1 DOSE: 2.5; .5 SOLUTION RESPIRATORY (INHALATION) at 12:12

## 2024-09-20 RX ADMIN — IPRATROPIUM BROMIDE AND ALBUTEROL SULFATE 1 DOSE: 2.5; .5 SOLUTION RESPIRATORY (INHALATION) at 03:25

## 2024-09-20 RX ADMIN — INSULIN GLARGINE 8 UNITS: 100 INJECTION, SOLUTION SUBCUTANEOUS at 21:15

## 2024-09-20 RX ADMIN — HEPARIN SODIUM 9 UNITS/KG/HR: 10000 INJECTION, SOLUTION INTRAVENOUS at 02:48

## 2024-09-20 ASSESSMENT — PAIN SCALES - GENERAL: PAINLEVEL_OUTOF10: 0

## 2024-09-20 ASSESSMENT — PULMONARY FUNCTION TESTS
PIF_VALUE: 21
PIF_VALUE: 19
PIF_VALUE: 19
PIF_VALUE: 14
PIF_VALUE: 23
PIF_VALUE: 18
PIF_VALUE: 22
PIF_VALUE: 18
PIF_VALUE: 34
PIF_VALUE: 18
PIF_VALUE: 20
PIF_VALUE: 19
PIF_VALUE: 18
PIF_VALUE: 23
PIF_VALUE: 16
PIF_VALUE: 20
PIF_VALUE: 23
PIF_VALUE: 20
PIF_VALUE: 20
PIF_VALUE: 19
PIF_VALUE: 22
PIF_VALUE: 24
PIF_VALUE: 17
PIF_VALUE: 34
PIF_VALUE: 19
PIF_VALUE: 10
PIF_VALUE: 19
PIF_VALUE: 20
PIF_VALUE: 20
PIF_VALUE: 19
PIF_VALUE: 25

## 2024-09-20 NOTE — PROGRESS NOTES
Pharmacy Note  Warfarin Consult follow-up      Recent Labs     09/20/24  0720   INR 1.3     Recent Labs     09/18/24  0325 09/18/24  2325 09/19/24  0207 09/20/24  0720   HGB 9.3* 8.5* 8.6* 8.6*   HCT 32.8* 28.7* 30.1* 31.6*     --  236 252       Significant Drug-Drug Interactions:  New warfarin drug-drug interactions: n/a  Discontinued drug-drug interactions: n/a      Notes: INR is 1.3 today. Decreased from prior day. Will give 5 mg today.                 Dianne Loera Pharm D.  9/20/2024  10:41 AM

## 2024-09-20 NOTE — PLAN OF CARE
Problem: Skin/Tissue Integrity  Goal: Absence of new skin breakdown  Description: 1.  Monitor for areas of redness and/or skin breakdown  2.  Assess vascular access sites hourly  3.  Every 4-6 hours minimum:  Change oxygen saturation probe site  4.  Every 4-6 hours:  If on nasal continuous positive airway pressure, respiratory therapy assess nares and determine need for appliance change or resting period.  9/20/2024 0820 by Maria Luisa Ramires RCP  Outcome: Progressing     Problem: Respiratory - Adult  Goal: Achieves optimal ventilation and oxygenation  9/20/2024 0820 by Maria Luisa Ramires, MATT  Outcome: Progressing

## 2024-09-20 NOTE — PLAN OF CARE
Problem: Chronic Conditions and Co-morbidities  Goal: Patient's chronic conditions and co-morbidity symptoms are monitored and maintained or improved  Outcome: Progressing     Problem: Discharge Planning  Goal: Discharge to home or other facility with appropriate resources  Outcome: Progressing     Problem: Pain  Goal: Verbalizes/displays adequate comfort level or baseline comfort level  Outcome: Progressing     Problem: Skin/Tissue Integrity  Goal: Absence of new skin breakdown  Description: 1.  Monitor for areas of redness and/or skin breakdown  2.  Assess vascular access sites hourly  3.  Every 4-6 hours minimum:  Change oxygen saturation probe site  4.  Every 4-6 hours:  If on nasal continuous positive airway pressure, respiratory therapy assess nares and determine need for appliance change or resting period.  9/20/2024 1332 by Dia Garcia, RN  Outcome: Progressing     Problem: ABCDS Injury Assessment  Goal: Absence of physical injury  Outcome: Progressing     Problem: Safety - Adult  Goal: Free from fall injury  Outcome: Progressing     Problem: Respiratory - Adult  Goal: Achieves optimal ventilation and oxygenation  9/20/2024 1332 by Dia Garcia, RN  Outcome: Progressing     Problem: Nutrition Deficit:  Goal: Optimize nutritional status  Outcome: Progressing     Electronically signed by Dia Garcia, RN on 9/20/2024 at 1:32 PM

## 2024-09-20 NOTE — PROGRESS NOTES
[facility]  Occupational Therapy Not Seen Note    DATE: 2024    NAME: Shazia ROGERS  MRN: [id number]   : 1955      Patient not seen this date for Occupational Therapy due to:    Patient Declined: OT will continue to attempt as able.    Next Scheduled Treatment:       Electronically signed by JAVIER Brasher on 2024 at 10:33 AM

## 2024-09-20 NOTE — PROGRESS NOTES
Tracheostomy in place and patent  Mild skin irritation with secretions and surrounding sutures  A/P: S/P Tracheostomy revision  Will leave sutures in place until tracheostomy change on Monday    Felipe Hutchins MD  Otolaryngology

## 2024-09-20 NOTE — PLAN OF CARE
Problem: Chronic Conditions and Co-morbidities  Goal: Patient's chronic conditions and co-morbidity symptoms are monitored and maintained or improved  9/19/2024 2254 by Sidney Kee RN  Outcome: Progressing  9/19/2024 1035 by Maria Luisa Arias RN  Outcome: Progressing     Problem: Discharge Planning  Goal: Discharge to home or other facility with appropriate resources  9/19/2024 2254 by Sidney Kee RN  Outcome: Progressing  9/19/2024 1035 by Maria Luisa Arias RN  Outcome: Not Progressing     Problem: Pain  Goal: Verbalizes/displays adequate comfort level or baseline comfort level  9/19/2024 2254 by Sidney Kee RN  Outcome: Progressing  9/19/2024 1035 by Maria Luisa Arias RN  Outcome: Progressing     Problem: Skin/Tissue Integrity  Goal: Absence of new skin breakdown  Description: 1.  Monitor for areas of redness and/or skin breakdown  2.  Assess vascular access sites hourly  3.  Every 4-6 hours minimum:  Change oxygen saturation probe site  4.  Every 4-6 hours:  If on nasal continuous positive airway pressure, respiratory therapy assess nares and determine need for appliance change or resting period.  9/19/2024 2254 by Sidney Kee RN  Outcome: Progressing  9/19/2024 1035 by Maria Luisa Arias RN  Outcome: Progressing     Problem: ABCDS Injury Assessment  Goal: Absence of physical injury  9/19/2024 2254 by Sidney Kee RN  Outcome: Progressing  9/19/2024 1035 by Maria Luisa Arias RN  Outcome: Progressing     Problem: Safety - Adult  Goal: Free from fall injury  9/19/2024 2254 by Sidney Kee RN  Outcome: Progressing  9/19/2024 1035 by Maria Luisa Arias RN  Outcome: Progressing     Problem: Respiratory - Adult  Goal: Achieves optimal ventilation and oxygenation  9/19/2024 2254 by Sidney Kee RN  Outcome: Progressing  9/19/2024 1944 by Jaycee Cottrell RCP  Outcome: Progressing     Problem: Nutrition Deficit:  Goal:

## 2024-09-20 NOTE — PROGRESS NOTES
Comprehensive Nutrition Assessment    Type and Reason for Visit:  Reassess    Nutrition Recommendations/Plan:   Continue TF of Peptide Based formula at 50 mL/hr + 2 Protein modulars daily.  Monitor tolerance/adequacy of intakes.  Will monitor labs, weights, and plan of care.     Malnutrition Assessment:  Malnutrition Status:  At risk for malnutrition (09/17/24 1521)    Context:  Acute Illness     Findings of the 6 clinical characteristics of malnutrition:  Energy Intake:  Mild decrease in energy intake  Weight Loss:  No significant weight loss     Body Fat Loss:  No significant body fat loss   Muscle Mass Loss:  No significant muscle mass loss   Fluid Accumulation:  Moderate to Severe Generalized, Extremities   Strength:  Not Performed    Nutrition Assessment:    Pt remains on vent via trach.  Tolerating Peptide Based TF at goal 50 mL/hr + 2 Protein modulars daily per RN.  +rectal tube with output.  Labs reviewed: Glucose 153-178 mg/dL.  Meds include: Lantus.    Nutrition Related Findings:    Meds/Labs reviewed. +Rectal tube with output. +2 generalized, +1 non-pitting b/l UE, and +2 pitting b/l LE edema.   Wound Type: Pressure Injury, Surgical Incision (PI to sacrum, incision to throat, traumatic injury to abd)       Current Nutrition Intake & Therapies:    Average Meal Intake: NPO  Average Supplements Intake: NPO  Diet NPO  ADULT TUBE FEEDING; Nasogastric; Peptide Based; Continuous; 15; Yes; 10; Q 4 hours; 50; 30; Q 4 hours; Protein; 1 Dose; BID  Current Tube Feeding (TF) Orders:  Feeding Route: Nasogastric  Formula: Peptide Based  Schedule: Continuous  Feeding Regimen: 50 mL/hr  Additives/Modulars: Protein (x 2 per day)  Water Flushes: 30 mL q 4 hrs  Current TF & Flush Orders Provides: At 50 mL/hr + 2 Protein modulars = 1648 kcals, 142 gm protein  Goal TF & Flush Orders Provides: Peptide Based goal 50 mL/hr + 2 Protein modulars = 1648 kcals, 142 gm protein    Additional Calorie Sources:  None    Anthropometric  Measures:  Height: 165.1 cm (5' 5\")  Ideal Body Weight (IBW): 125 lbs (57 kg)       Current Body Weight: 208.2 kg (459 lb), 367.2 % IBW. Weight Source: Bed Scale  Current BMI (kg/m2): 76.4        Weight Adjustment For: No Adjustment                 BMI Categories: Obese Class 3 (BMI 40.0 or greater)    Estimated Daily Nutrient Needs:  Energy Requirements Based On: Kcal/kg  Weight Used for Energy Requirements: Ideal  Energy (kcal/day): 9583-3741 kcals/day  Weight Used for Protein Requirements: Ideal  Protein (g/day): 110-140 gm pro/day  Method Used for Fluid Requirements: Other (Comment)  Fluid (ml/day): per MD    Nutrition Diagnosis:   Inadequate oral intake related to impaired respiratory function (s/p trach revision) as evidenced by NPO or clear liquid status due to medical condition, nutrition support - enteral nutrition    Nutrition Interventions:   Food and/or Nutrient Delivery: Continue Current Tube Feeding  Nutrition Education/Counseling: No recommendation at this time  Coordination of Nutrition Care: Continue to monitor while inpatient  Plan of Care discussed with: RN    Goals:  Previous Goal Met: Progressing toward Goal(s)  Goals: Meet at least 75% of estimated needs, prior to discharge       Nutrition Monitoring and Evaluation:   Behavioral-Environmental Outcomes: None Identified  Food/Nutrient Intake Outcomes: Enteral Nutrition Intake/Tolerance  Physical Signs/Symptoms Outcomes: Biochemical Data, Chewing or Swallowing, GI Status, Fluid Status or Edema, Hemodynamic Status, Weight, Skin, Nutrition Focused Physical Findings    Discharge Planning:    Too soon to determine     Shazia Bright RD, LD  Contact: [phone]

## 2024-09-20 NOTE — PROGRESS NOTES
[facility]   Consult Note         Today's Date: 9/20/2024  Patient Name: Shazia ROGERS  Date of admission: 9/14/2024  1:39 PM  Patient's age: 68 y.o., 1955  Admission Dx: Respiratory failure (HCC) [J96.90]  Acute on chronic hypoxic respiratory failure (HCC) [J96.21]    Reason for Consult:  Cardiac evaluation    Requesting Physician: Rafael Concepcion MD    REASON FOR CONSULT:  A-fib with RVR     Subjective:    Patient examined and seen at bedside.  No acute overnight events.  Heart rate 90- 100.  Patient has a tracheostomy tube attached to vent      HISTORY OF PRESENT ILLNESS:      Shazia ROGERS is a 68 y.o. with PMH of obstructive sleep apnea and obesity hypoventilation syndrome, chronic hypoxemic respiratory failure, tracheostomy placement in July 2024, prior PE/DVT in 2/20/2022 on Coumadin, atrial fibrillation, heart failure with preserved ejection fraction who presented to [place] ED for acute hypoxic respiratory failure.     Per ED note, patient was at [facility] where she began to experience decreased oxygen saturations in the 80s on ventilator.  Patient was then brought to the emergency department by EMS where there were difficulties maintaining oxygen saturation of the patient.  Patient was intubated in the emergency department due to worsening respiratory status resulting in improvement.  Upon examination of tracheostomy cannula by emergency department physician, it was noted that there was a distal obstruction, general surgery was consulted for further evaluation for possible replacement of tracheostomy.     Tracheostomy was placed in July 2024 for chronic hypoxic respiratory failure and noncompliance with BiPAP with worsening oxygenation status.     Chest x-ray significant for opacities at the left lung base.  ABG showing pH of 7.31, CO2 77.9, HCO3 38.8.  Troponin 34 on admission, repeat pending.  2.1.  Patient afebrile no leukocytosis.  Oxygen saturation at 98% on  40 FiO2.     Patient was admitted to [facility] ICU for further evaluation and treatment.  Patient placed on propofol infusion, IV clindamycin and ciprofloxacin initiated.     9/12 patient was hypotensive with pressure 80s/30s, started on IV pressor support, diltiazem and Coreg held.  Levophed stopped 9/13.  PICC line placed, INR supratherapeutic after heparin drip.  Per [facility] general surgery, they are unable to replace tracheostomy, recommended transfer to [place] for ENT evaluation and recommendations.     On arrival to [place] MICU, patient is intubated, sedated with propofol at 25.  Heparin infusion ongoing.  Current ventilator settings: PRVC, RR 16, Vt 460, PEEP 5, FiO2 30%.  Continuing on IV clindamycin, ciprofloxacin for possible aspiration pneumonia.  ENT consulted.     9/16: danielle owen ENT    Past Medical History:   has a past medical history of Anxiety, Asthma, Atrial fibrillation (HCC), Bronchitis, COPD (chronic obstructive pulmonary disease) ([facility]), DDD (degenerative disc disease), lumbar, Depression, Diabetes mellitus ([facility]), Elevated glucose, Headache(784.0), Hernia of abdominal cavity, HH (hiatus hernia), Hyperlipemia, mixed, Hypertension, Osteoarthritis, Right femoral vein DVT ([facility]), and Uterine hyperplasia.    Past Surgical History:   has a past surgical history that includes Dilation and curettage of uterus (10/08 and 09/07); Breast reduction surgery (12/1997); Breast reduction surgery (12/1997); Tympanostomy tube placement (03/1967); Tonsillectomy and adenoidectomy (1962); Hysterectomy (7/17/15); tracheostomy (N/A, 7/26/2024); and tracheostomy (N/A, 9/16/2024).     Home Medications:    Prior to Admission medications    Medication Sig Start Date End Date Taking? Authorizing Provider   enoxaparin 80 MG/0.8ML SOSY 80 mg, enoxaparin 100 MG/ML SOSY 100 mg Inject 180 mg into the skin 2 times daily 8/5/24   Mervin Gill MD   insulin glargine (LANTUS) 100 UNIT/ML

## 2024-09-20 NOTE — PROGRESS NOTES
Physical Therapy        Physical Therapy Cancel Note      DATE: 2024    NAME: Shazia ROGERS  MRN: [id number]   : 1955      Patient not seen this date for Physical Therapy due to:    Patient Declined: Pt politely declined, will check back on .       Electronically signed by Blair Lopez PT on 2024 at 12:03 PM

## 2024-09-20 NOTE — PROGRESS NOTES
04:14 AM    [id number] 38.1 09/14/2024 04:14 AM    VGZ6GEC 44 05/05/2018 03:12 PM    R2IWIPES 89.8 09/14/2024 04:14 AM    FIO2 40.0 09/18/2024 04:13 AM     Lactic Acid:   Lab Results   Component Value Date/Time    LACTA 1.0 09/12/2024 08:46 PM    LACTA 1.1 07/22/2024 11:40 AM    LACTA 1.9 07/04/2024 09:38 AM         DATA:  Complete Blood Count:   Recent Labs     09/18/24  0325 09/18/24 2325 09/19/24 0207   WBC 6.0  --  9.0   HGB 9.3* 8.5* 8.6*   MCV 93.2  --  93.2     --  236   RBC 3.52*  --  3.23*   HCT 32.8* 28.7* 30.1*   MCH 26.4  --  26.6   MCHC 28.4  --  28.6   RDW 20.8*  --  20.6*   MPV 9.7  --  9.6        PT/INR:    Lab Results   Component Value Date/Time    PROTIME 16.9 09/19/2024 02:07 AM    PROTIME 29.7 03/28/2024 10:30 AM    INR 1.4 09/19/2024 02:07 AM     PTT:    Lab Results   Component Value Date/Time    APTT 60.5 09/14/2024 02:40 PM       Basal Metabolic Profile:   Recent Labs     09/18/24 0325 09/19/24 0207    141   K 4.0 3.8   BUN 13 17   CREATININE 0.9 0.8    102   CO2 28 29      Magnesium:   Lab Results   Component Value Date/Time    MG 1.7 09/11/2024 11:04 AM    MG 1.9 08/05/2024 03:50 AM    MG 2.1 08/04/2024 04:20 AM     Phosphorus:   Lab Results   Component Value Date/Time    PHOS 3.3 09/15/2024 06:50 AM    PHOS 3.6 09/11/2024 11:04 AM    PHOS 4.2 05/21/2018 04:49 AM     S. Calcium:  Recent Labs     09/19/24 0207   CALCIUM 9.0     S. Ionized Calcium:Invalid input(s): \"IONCA\"      Urinalysis:   Lab Results   Component Value Date/Time    NITRU NEGATIVE 08/02/2024 06:00 PM    COLORU Yellow 08/02/2024 06:00 PM    PHUR 7.5 08/02/2024 06:00 PM    PHUR 7.0 06/13/2023 01:50 PM    WBCUA 0 TO 2 08/02/2024 06:00 PM    RBCUA 0 TO 2 08/02/2024 06:00 PM    MUCUS NOT REPORTED 07/27/2014 05:27 PM    TRICHOMONAS NOT REPORTED 07/27/2014 05:27 PM    YEAST NOT REPORTED 07/27/2014 05:27 PM    BACTERIA None 08/02/2024 06:00 PM    CLARITYU clear 03/26/2012 05:31 PM    SPECGRAV 1.015 03/26/2012  evaluation and recommendations.     On arrival to [place] MICU, patient is intubated, sedated with propofol at 25.  Heparin infusion ongoing.  Current ventilator settings: PRVC, RR 16, Vt 460, PEEP 5, FiO2 30%.  Continuing on IV clindamycin, ciprofloxacin for possible aspiration pneumonia.  ENT consulted.    9/16: trach revision elxie ENT    PLAN:       PLAN/MEDICAL DECISION MAKING:    Neuro  Sedation: off  Awake, alert, following commands  - neuro checks per protocol  - wean sedation as tolerated     CV  HFpEF (EF 55-60%)  Hypotension  Paroxysmal Afib  - start home coumadin  - continue heparin gtt  - continue midodrine 15 mg TID  -Lopressor 12.5 twice daily  - On amio infusion  - off IV pressors  - maintain MAP >65  - goal K >4, Mg >2  - telemetry     Resp  Acute on chronic hypoxic/hypercapnic respiratory failure s/p tracheostomy 7/2024  HTN  Tracheostomy placed 7/2024 for chronic hypercapnic respiratory failure  Desat at [facility] to 80s while on [id number]  Intubated at [place] ED after exam showed occlusion distal to trach  S/p trach revision 9/16  - ENT consulted, appreciate recs       - S/p tracheostomy revision with ENT 9/16  - continue lung-protective mechanical ventilation       - wean to trach collar as tolerated  - SBT daily  - continue albuterol neb q2h prn  - RCx positive for GPC in clusters  - continue IV antibiotics  - ABG daily        GI  - NPO  - Start TF today, advance as tolerated  - IV protonix 40 mg daily     Renal//Fluids  No history of renal issues  - Fluids: Off  - BMP daily  - replace electrolytes prn  - Strict I/Os  - avoid nephrotoxic agents     ID  C/f aspiration PNA  9/14 respiratory culture positive for GPC in clusters, mixed bacterial growth   Infectious workup showing gram-positive cocci in clusters on respiratory culture, S.aureus also MRSA DNA positive currently on Vancomycin     Heme  Hx DVT/PE  -  home coumadin  - heparin gtt  - CBC daily  - monitor for s/s of bleeding

## 2024-09-21 LAB
ANION GAP SERPL CALCULATED.3IONS-SCNC: 10 MMOL/L (ref 9–16)
ANTI-XA UNFRAC HEPARIN: 0.29 IU/L
ANTI-XA UNFRAC HEPARIN: 0.44 IU/L
ANTI-XA UNFRAC HEPARIN: 0.55 IU/L
BASOPHILS # BLD: 0 K/UL (ref 0–0.2)
BASOPHILS NFR BLD: 0 % (ref 0–2)
BUN SERPL-MCNC: 15 MG/DL (ref 8–23)
CALCIUM SERPL-MCNC: 9.1 MG/DL (ref 8.6–10.4)
CHLORIDE SERPL-SCNC: 102 MMOL/L (ref 98–107)
CO2 SERPL-SCNC: 28 MMOL/L (ref 20–31)
CREAT SERPL-MCNC: 0.7 MG/DL (ref 0.5–0.9)
EOSINOPHIL # BLD: 0.32 K/UL (ref 0–0.4)
EOSINOPHILS RELATIVE PERCENT: 3 % (ref 1–4)
ERYTHROCYTE [DISTWIDTH] IN BLOOD BY AUTOMATED COUNT: 20.3 % (ref 11.8–14.4)
GFR, ESTIMATED: >90 ML/MIN/1.73M2
GLUCOSE BLD-MCNC: 148 MG/DL (ref 65–105)
GLUCOSE BLD-MCNC: 151 MG/DL (ref 65–105)
GLUCOSE BLD-MCNC: 154 MG/DL (ref 65–105)
GLUCOSE BLD-MCNC: 156 MG/DL (ref 65–105)
GLUCOSE BLD-MCNC: 172 MG/DL (ref 65–105)
GLUCOSE BLD-MCNC: 173 MG/DL (ref 65–105)
GLUCOSE SERPL-MCNC: 178 MG/DL (ref 74–99)
HCT VFR BLD AUTO: 32.1 % (ref 36.3–47.1)
HGB BLD-MCNC: 8.4 G/DL (ref 11.9–15.1)
IMM GRANULOCYTES # BLD AUTO: 0 K/UL (ref 0–0.3)
IMM GRANULOCYTES NFR BLD: 0 %
INR PPP: 1.3
LYMPHOCYTES NFR BLD: 1.51 K/UL (ref 1–4.8)
LYMPHOCYTES RELATIVE PERCENT: 14 % (ref 24–44)
MCH RBC QN AUTO: 26.9 PG (ref 25.2–33.5)
MCHC RBC AUTO-ENTMCNC: 26.2 G/DL (ref 28.4–34.8)
MCV RBC AUTO: 102.9 FL (ref 82.6–102.9)
MONOCYTES NFR BLD: 0.97 K/UL (ref 0.1–0.8)
MONOCYTES NFR BLD: 9 % (ref 1–7)
MORPHOLOGY: ABNORMAL
NEUTROPHILS NFR BLD: 74 % (ref 36–66)
NEUTS SEG NFR BLD: 8 K/UL (ref 1.8–7.7)
NRBC BLD-RTO: 0.6 PER 100 WBC
NUCLEATED RED BLOOD CELLS: 1 PER 100 WBC
PLATELET # BLD AUTO: 273 K/UL (ref 138–453)
PMV BLD AUTO: 9.9 FL (ref 8.1–13.5)
POTASSIUM SERPL-SCNC: 3.9 MMOL/L (ref 3.7–5.3)
PROTHROMBIN TIME: 15.7 SEC (ref 11.7–14.9)
RBC # BLD AUTO: 3.12 M/UL (ref 3.95–5.11)
SODIUM SERPL-SCNC: 140 MMOL/L (ref 136–145)
VANCOMYCIN SERPL-MCNC: 24.2 UG/ML (ref 5–40)
WBC OTHER # BLD: 10.8 K/UL (ref 3.5–11.3)

## 2024-09-21 PROCEDURE — 6370000000 HC RX 637 (ALT 250 FOR IP): Performed by: STUDENT IN AN ORGANIZED HEALTH CARE EDUCATION/TRAINING PROGRAM

## 2024-09-21 PROCEDURE — 6370000000 HC RX 637 (ALT 250 FOR IP)

## 2024-09-21 PROCEDURE — 2700000000 HC OXYGEN THERAPY PER DAY

## 2024-09-21 PROCEDURE — 85520 HEPARIN ASSAY: CPT

## 2024-09-21 PROCEDURE — 2580000003 HC RX 258

## 2024-09-21 PROCEDURE — 6360000002 HC RX W HCPCS

## 2024-09-21 PROCEDURE — 2000000000 HC ICU R&B

## 2024-09-21 PROCEDURE — 36415 COLL VENOUS BLD VENIPUNCTURE: CPT

## 2024-09-21 PROCEDURE — 80202 ASSAY OF VANCOMYCIN: CPT

## 2024-09-21 PROCEDURE — 94761 N-INVAS EAR/PLS OXIMETRY MLT: CPT

## 2024-09-21 PROCEDURE — 80048 BASIC METABOLIC PNL TOTAL CA: CPT

## 2024-09-21 PROCEDURE — 85610 PROTHROMBIN TIME: CPT

## 2024-09-21 PROCEDURE — 99291 CRITICAL CARE FIRST HOUR: CPT | Performed by: INTERNAL MEDICINE

## 2024-09-21 PROCEDURE — 94640 AIRWAY INHALATION TREATMENT: CPT

## 2024-09-21 PROCEDURE — 85025 COMPLETE CBC W/AUTO DIFF WBC: CPT

## 2024-09-21 PROCEDURE — 2500000003 HC RX 250 WO HCPCS

## 2024-09-21 PROCEDURE — 94003 VENT MGMT INPAT SUBQ DAY: CPT

## 2024-09-21 PROCEDURE — 82947 ASSAY GLUCOSE BLOOD QUANT: CPT

## 2024-09-21 RX ORDER — FENTANYL CITRATE 50 UG/ML
25 INJECTION, SOLUTION INTRAMUSCULAR; INTRAVENOUS ONCE
Status: COMPLETED | OUTPATIENT
Start: 2024-09-21 | End: 2024-09-21

## 2024-09-21 RX ORDER — WARFARIN SODIUM 7.5 MG/1
7.5 TABLET ORAL
Status: COMPLETED | OUTPATIENT
Start: 2024-09-21 | End: 2024-09-21

## 2024-09-21 RX ORDER — METOPROLOL TARTRATE 25 MG/1
37.5 TABLET, FILM COATED ORAL 2 TIMES DAILY
Status: DISCONTINUED | OUTPATIENT
Start: 2024-09-21 | End: 2024-09-26 | Stop reason: HOSPADM

## 2024-09-21 RX ORDER — FENTANYL CITRATE 50 UG/ML
25 INJECTION, SOLUTION INTRAMUSCULAR; INTRAVENOUS
Status: DISCONTINUED | OUTPATIENT
Start: 2024-09-21 | End: 2024-09-23

## 2024-09-21 RX ADMIN — HEPARIN SODIUM 11 UNITS/KG/HR: 10000 INJECTION, SOLUTION INTRAVENOUS at 16:32

## 2024-09-21 RX ADMIN — METOPROLOL TARTRATE 25 MG: 25 TABLET, FILM COATED ORAL at 08:02

## 2024-09-21 RX ADMIN — FENTANYL CITRATE 25 MCG: 50 INJECTION, SOLUTION INTRAMUSCULAR; INTRAVENOUS at 12:38

## 2024-09-21 RX ADMIN — HEPARIN SODIUM 2000 UNITS: 1000 INJECTION INTRAVENOUS; SUBCUTANEOUS at 07:06

## 2024-09-21 RX ADMIN — IPRATROPIUM BROMIDE AND ALBUTEROL SULFATE 1 DOSE: 2.5; .5 SOLUTION RESPIRATORY (INHALATION) at 12:22

## 2024-09-21 RX ADMIN — SODIUM CHLORIDE: 9 INJECTION, SOLUTION INTRAVENOUS at 03:23

## 2024-09-21 RX ADMIN — Medication 0.5 MG/MIN: at 03:33

## 2024-09-21 RX ADMIN — IPRATROPIUM BROMIDE AND ALBUTEROL SULFATE 1 DOSE: 2.5; .5 SOLUTION RESPIRATORY (INHALATION) at 08:31

## 2024-09-21 RX ADMIN — IPRATROPIUM BROMIDE AND ALBUTEROL SULFATE 1 DOSE: 2.5; .5 SOLUTION RESPIRATORY (INHALATION) at 03:35

## 2024-09-21 RX ADMIN — Medication 0.5 MG/MIN: at 21:32

## 2024-09-21 RX ADMIN — METOPROLOL TARTRATE 37.5 MG: 25 TABLET, FILM COATED ORAL at 21:30

## 2024-09-21 RX ADMIN — SODIUM CHLORIDE, PRESERVATIVE FREE 10 ML: 5 INJECTION INTRAVENOUS at 21:31

## 2024-09-21 RX ADMIN — INSULIN GLARGINE 8 UNITS: 100 INJECTION, SOLUTION SUBCUTANEOUS at 21:30

## 2024-09-21 RX ADMIN — MIDODRINE HYDROCHLORIDE 15 MG: 5 TABLET ORAL at 17:05

## 2024-09-21 RX ADMIN — FENTANYL CITRATE 25 MCG: 50 INJECTION, SOLUTION INTRAMUSCULAR; INTRAVENOUS at 17:01

## 2024-09-21 RX ADMIN — IPRATROPIUM BROMIDE AND ALBUTEROL SULFATE 1 DOSE: 2.5; .5 SOLUTION RESPIRATORY (INHALATION) at 15:16

## 2024-09-21 RX ADMIN — MIDODRINE HYDROCHLORIDE 15 MG: 5 TABLET ORAL at 08:02

## 2024-09-21 RX ADMIN — SODIUM CHLORIDE 1250 MG: 9 INJECTION, SOLUTION INTRAVENOUS at 14:46

## 2024-09-21 RX ADMIN — MIDODRINE HYDROCHLORIDE 15 MG: 5 TABLET ORAL at 12:22

## 2024-09-21 RX ADMIN — SODIUM CHLORIDE 1250 MG: 9 INJECTION, SOLUTION INTRAVENOUS at 03:23

## 2024-09-21 RX ADMIN — IPRATROPIUM BROMIDE AND ALBUTEROL SULFATE 1 DOSE: 2.5; .5 SOLUTION RESPIRATORY (INHALATION) at 19:55

## 2024-09-21 RX ADMIN — SODIUM CHLORIDE, PRESERVATIVE FREE 10 ML: 5 INJECTION INTRAVENOUS at 08:56

## 2024-09-21 RX ADMIN — WARFARIN SODIUM 7.5 MG: 7.5 TABLET ORAL at 17:30

## 2024-09-21 RX ADMIN — HEPARIN SODIUM 9 UNITS/KG/HR: 10000 INJECTION, SOLUTION INTRAVENOUS at 03:48

## 2024-09-21 RX ADMIN — DIGOXIN 125 MCG: 125 TABLET ORAL at 08:02

## 2024-09-21 RX ADMIN — SODIUM CHLORIDE, PRESERVATIVE FREE 40 MG: 5 INJECTION INTRAVENOUS at 08:02

## 2024-09-21 ASSESSMENT — PULMONARY FUNCTION TESTS
PIF_VALUE: 17
PIF_VALUE: 17
PIF_VALUE: 27
PIF_VALUE: 17
PIF_VALUE: 12

## 2024-09-21 ASSESSMENT — PAIN DESCRIPTION - LOCATION: LOCATION: NECK

## 2024-09-21 ASSESSMENT — PAIN SCALES - GENERAL: PAINLEVEL_OUTOF10: 0

## 2024-09-21 NOTE — PLAN OF CARE
Problem: Respiratory - Adult  Goal: Achieves optimal ventilation and oxygenation  9/20/2024 2131 by Leisa Yost, MATT  Outcome: Progressing  Flowsheets (Taken 9/20/2024 2131)  Achieves optimal ventilation and oxygenation:   Assess for changes in respiratory status   Assess the need for suctioning and aspirate as needed   Respiratory therapy support as indicated   Oxygen supplementation based on oxygen saturation or arterial blood gases  9/20/2024 1332 by Dia Garcia, RN  Outcome: Progressing  9/20/2024 0820 by Maria Luisa Ramires RCP  Outcome: Progressing

## 2024-09-21 NOTE — PLAN OF CARE
Problem: Respiratory - Adult  Goal: Achieves optimal ventilation and oxygenation  9/21/2024 1347 by Apoorva Verdugo RCP  Outcome: Progressing   BRONCHOSPASM/BRONCHOCONSTRICTION     [x]         IMPROVE AERATION/BREATH SOUNDS  [x]   ADMINISTER BRONCHODILATOR THERAPY AS APPROPRIATE  [x]   ASSESS BREATH SOUNDS  []   IMPLEMENT AEROSOL/MDI PROTOCOL  [x]   PATIENT EDUCATION AS NEEDED  MOBILIZE SECRETIONS    [x]   ASSESS BREATH SOUNDS  [x]   ASSESS SPUTUM PRODUCTION  [x]   COUGH AND DEEP BREATHING  []  IMPLEMENT SECRETION MANAGEMENT PROTOCOL  [x]   PATIENT EDUCATION AS NEEDED

## 2024-09-21 NOTE — PROGRESS NOTES
[facility]   Pharmacy Pharmacokinetic Monitoring Service - Vancomycin    Consulting Provider: Dr Bermeo   Indication: CAP  Target Concentration: Goal AUC/MEENA 400-600 mg*hr/L  Day of Therapy: 3  Additional Antimicrobials:     Pertinent Laboratory Values:   Wt Readings from Last 1 Encounters:   09/16/24 (!) 208.2 kg (459 lb)     Temp Readings from Last 1 Encounters:   09/21/24 99.2 °F (37.3 °C) (Axillary)     Estimated Creatinine Clearance: 145 mL/min (based on SCr of 0.7 mg/dL).  Recent Labs     09/20/24  0720 09/21/24  0640   CREATININE 0.7 0.7   BUN 15 15   WBC 9.4 10.8     Procalcitonin:     Pertinent Cultures:  Culture Date Source Results        MRSA Nasal Swab:     Recent vancomycin administrations                     vancomycin (VANCOCIN) 1,250 mg in sodium chloride 0.9 % 250 mL IVPB ([id number]) (mg) 1,250 mg New Bag 09/21/24 1446     1,250 mg New Bag  0323     1,250 mg New Bag 09/20/24 1507     1,250 mg New Bag  0258    vancomycin (VANCOCIN) 2,000 mg in sodium chloride 0.9 % 500 mL IVPB (mg) 2,000 mg New Bag 09/19/24 1426                    Assessment:  Date/Time Current Dose Concentration Timing of Concentration (h) AUC   9/21 1250 MG Q 12 HRS  24.2 9 HRS POST DOSE  707   Note: Serum concentrations collected for AUC dosing may appear elevated if collected in close proximity to the dose administered, this is not necessarily an indication of toxicity    Plan:  Current dosing regimen is supra-therapeutic  \"Decrease dose to 1250 MG Q 18 HRS   Repeat vancomycin concentration ordered for TBD @ TBD   Pharmacy will continue to monitor patient and adjust therapy as indicated    Thank you for the consult,  Alen Nicole RPH  9/21/2024 3:09 PM

## 2024-09-21 NOTE — PROGRESS NOTES
Critical Care Team - Daily Progress Note      Date and time: 9/21/2024 8:49 AM  Patient's name:  Shazia ROGERS  Medical Record Number: [id number]  Patient's account/billing number: [id number]  Patient's YOB: 1955  Age: 68 y.o.  Date of Admission: 9/14/2024  1:39 PM  Length of stay during current admission: 7      Primary Care Physician: Jyoti Mauricio, APRN - CNP  ICU Attending Physician: Dr. Gilbert    Code Status: Full Code    Reason for ICU admission: No chief complaint on file.      Subjective:     OVERNIGHT EVENTS:           No acute events overnight.   Afebrile  HR is better controlled but still in afib.    TODAY:     Patient examined at bedside today.  Labs and chart reviewed.  Vitals reviewed.  BMP stable, WBC 10.8, hemoglobin 8.4 and platelet 273  INR 1.3.      Rate 12, tidal volume 460, PEEP of 8 and FiO2 40    Infectious workup showing gram-positive cocci in clusters on respiratory culture, S.aureus also MRSA DNA positive currently on Vancomycin     Total urine output 1.6 L    S/p revision of tracheostomy with ENT.      Currently off sedation    Cardiology: Following for A-fib with RVR, continue with IV amiodarone plan to change to oral tomorrow.  Continue with IV heparin, once INR is stable we will change to warfarin, continue Lopressor 25 twice daily along with oral dig 125 daily.  Midodrine 15 3 times daily.    AWAKE & FOLLOWING COMMANDS:  [] No   [x] Yes    CURRENT VENTILATION STATUS:     [x] Ventilator  [] BIPAP  [x] Trach mask  [] Room Air      IF INTUBATED, ET TUBE MARKING AT LOWER LIP:       cms    SECRETIONS Amount:  [] Small [] Moderate  [] Large  [x] None  Color:     [] White [] Colored  [] Bloody    SEDATION:  RAAS Score:  [x] Precedex gtt  [] Versed gtt  [] Ativan gtt  [] No Sedation    PARALYZED:  [x] No    [] Yes    DIARRHEA:                [x] No                [] Yes  (C. Difficile status: [] positive                                                                       the content of the visit, no guarantees can be provided that every mistake has been identified and corrected by editing.      Attending Physician Statement  I have discussed the care of Shazia ROGERS, including pertinent history and exam findings with the resident. I have reviewed the key elements of all parts of the encounter with the resident. I have seen and examined the patient with the resident.  I agree with the assessment and plan and status of the problem list as documented.    I saw the patient during the round today, chart reviewed ventilator setting reviewed.  Patient is off the sedation.  BUN is 15 creatinine 0.7 and bicarb is 28.  WBC count is 10.8 hemoglobin is 8.4 INR is 1.3.    Tracheostomy is present tracheostomy site looks slightly better  Patient is MRSA we will continue with vancomycin.  Patient is an NG tube will continue with tube feeding.  Continue ventilatory support CPAP/pressure support trial during the daytime.  Patient likely will need LTAC rather than extended care facility.    Discussed with nursing staff, treatment and plan discussed.  Discussed with respiratory therapist.    Total critical care time caring for this patient with life threatening, unstable organ failure, including direct patient contact, management of life support systems, review of data including imaging and labs, discussions with other team members and physicians at least 35  Min so far today, excluding procedures.       Please note that this chart was generated using voice recognition Dragon dictation software. Although every effort was made to ensure the accuracy of this automated transcription, some errors in transcription may have occurred.     Colin Gilbert MD  9/21/2024 2:13 PM

## 2024-09-21 NOTE — PROGRESS NOTES
Pharmacy Note  Warfarin Consult follow-up      Recent Labs     09/21/24  0640   INR 1.3     Recent Labs     09/19/24  0207 09/20/24  0720 09/21/24  0640   HGB 8.6* 8.6* 8.4*   HCT 30.1* 31.6* 32.1*    252 273       Significant Drug-Drug Interactions:  New warfarin drug-drug interactions: none  Discontinued drug-drug interactions: none        Notes:                   Current Drug interactions: Heparin bridge, Amiodarone  INR Goal: 3-3.5  Home Dose 2mg T/F and 4 mg AOD  Will give a dose of 7.5 mg today. Cont heparin bridge    Daily PT/INR while inpatient.      Thank you  Alen Nicole, PharmD, [facility]  Inpatient Clinical Pharmacist  [phone]

## 2024-09-21 NOTE — PROGRESS NOTES
ENT/OTOLARYNGOLOGY SUBSEQUENT CARE PROGRESS NOTE       HISTORY OF PRESENT ILLNESS:   Shazia ROGERS is a 68 y.o. who is being seen for follow-up after tracheostomy revision September 16, 2024. She remains ventilated and stable.       Pertinent Examination:   GENERAL:  sleeping through exam  MOUTH/THROAT: mucous membranes moist  NECK: Shiley 7 cuffed proximal XLT in place, secured with 7 silk sutures and Velcro strap.  Neck skin is intact with mild surrounding erythema. Expected peristomal serosanguineous secretions.      IMPRESSION AND RECOMMENDATIONS:   Shazia ROGERS is a 68 y.o. female POD 5 after tracheostomy revision.       Plan:  Trachesotomy tube should not be manipulated until first tracheostomy change.  Plan for first trach change Monday September 23.  Please call with questions/ change in patient condition.       --------------------------------------------------  Christy Oliveira MD  Pediatric Otolaryngology-Head and Neck Surgery  [facility] Otolaryngology group    Office  ph# [phone]    Also available in [employer]

## 2024-09-21 NOTE — PLAN OF CARE
Problem: Chronic Conditions and Co-morbidities  Goal: Patient's chronic conditions and co-morbidity symptoms are monitored and maintained or improved  Outcome: Progressing     Problem: Discharge Planning  Goal: Discharge to home or other facility with appropriate resources  Outcome: Progressing     Problem: Pain  Goal: Verbalizes/displays adequate comfort level or baseline comfort level  Outcome: Progressing     Problem: Skin/Tissue Integrity  Goal: Absence of new skin breakdown  Description: 1.  Monitor for areas of redness and/or skin breakdown  2.  Assess vascular access sites hourly  3.  Every 4-6 hours minimum:  Change oxygen saturation probe site  4.  Every 4-6 hours:  If on nasal continuous positive airway pressure, respiratory therapy assess nares and determine need for appliance change or resting period.  Outcome: Progressing     Problem: ABCDS Injury Assessment  Goal: Absence of physical injury  Outcome: Progressing     Problem: Safety - Adult  Goal: Free from fall injury  Outcome: Progressing     Problem: Respiratory - Adult  Goal: Achieves optimal ventilation and oxygenation  9/21/2024 0554 by Primo Reyes, RN  Outcome: Progressing  9/20/2024 2131 by Leisa Yost RCP  Outcome: Progressing  Flowsheets (Taken 9/20/2024 2131)  Achieves optimal ventilation and oxygenation:   Assess for changes in respiratory status   Assess the need for suctioning and aspirate as needed   Respiratory therapy support as indicated   Oxygen supplementation based on oxygen saturation or arterial blood gases     Problem: Nutrition Deficit:  Goal: Optimize nutritional status  Outcome: Progressing

## 2024-09-21 NOTE — PLAN OF CARE
Problem: Chronic Conditions and Co-morbidities  Goal: Patient's chronic conditions and co-morbidity symptoms are monitored and maintained or improved  9/21/2024 1033 by Dia Garcia RN  Outcome: Progressing     Problem: Discharge Planning  Goal: Discharge to home or other facility with appropriate resources  9/21/2024 1033 by Dia Garcia RN  Outcome: Progressing     Problem: Pain  Goal: Verbalizes/displays adequate comfort level or baseline comfort level  9/21/2024 1033 by Dia Garcia RN  Outcome: Progressing     Problem: Skin/Tissue Integrity  Goal: Absence of new skin breakdown  Description: 1.  Monitor for areas of redness and/or skin breakdown  2.  Assess vascular access sites hourly  3.  Every 4-6 hours minimum:  Change oxygen saturation probe site  4.  Every 4-6 hours:  If on nasal continuous positive airway pressure, respiratory therapy assess nares and determine need for appliance change or resting period.  9/21/2024 1033 by Dia Garcia RN  Outcome: Progressing     Problem: ABCDS Injury Assessment  Goal: Absence of physical injury  9/21/2024 1033 by Dia Garcia RN  Outcome: Progressing     Problem: Safety - Adult  Goal: Free from fall injury  9/21/2024 1033 by Dia Garcia RN  Outcome: Progressing     Problem: Respiratory - Adult  Goal: Achieves optimal ventilation and oxygenation  9/21/2024 1033 by Dia Garcia RN  Outcome: Progressing     Problem: Nutrition Deficit:  Goal: Optimize nutritional status  9/21/2024 1033 by Dia Garcia RN  Outcome: Progressing     Electronically signed by Dia Garcia RN on 9/21/2024 at 10:33 AM

## 2024-09-21 NOTE — PROGRESS NOTES
home  Chronic HFpEF    Plan:   Continue IV amiodarone for today and will change to oral amiodarone likely from tomorrow.   Continue IV heparin, pharmacy to dose warfarin.  Stop heparin once INR is therapeutic.  Cont Lopressor 25 mg BID along with oral digoxin 125 mcg daily.  Midodrine 15 TID for rate control .  Will continue to follow    Please wait for final attestation from rounding attending     Joaquin Moore MD.  Fellow, cardiovascular disease   [facility], [place], [place]    I performed a history and physical examination of the patient and discussed management with the resident. I reviewed the resident’s note and agree with the documented findings and plan of care. Any areas of disagreement are noted on the chart. I was personally present for the key portions of any procedures. I have documented in the chart those procedures where I was not present during the key portions. I have personally evaluated this patient and have completed at least one if not all key elements of the E/M (history, physical exam, and MDM). Additional findings are as noted.    Afib with RVR- better rate controlled. Continue IV amiodarone drip for now. Increase metoprolol to 37.5mg po BID. DC digoxin -future concern of bradycardia due to polypharmacy.    Med Dillard MD MD

## 2024-09-22 LAB
ANION GAP SERPL CALCULATED.3IONS-SCNC: 6 MMOL/L (ref 9–16)
ANTI-XA UNFRAC HEPARIN: 0.51 IU/L
BASOPHILS # BLD: 0.08 K/UL (ref 0–0.2)
BASOPHILS NFR BLD: 1 % (ref 0–2)
BUN SERPL-MCNC: 15 MG/DL (ref 8–23)
CALCIUM SERPL-MCNC: 9.2 MG/DL (ref 8.6–10.4)
CHLORIDE SERPL-SCNC: 100 MMOL/L (ref 98–107)
CO2 SERPL-SCNC: 34 MMOL/L (ref 20–31)
CREAT SERPL-MCNC: 0.7 MG/DL (ref 0.5–0.9)
EOSINOPHIL # BLD: 0.41 K/UL (ref 0–0.44)
EOSINOPHILS RELATIVE PERCENT: 5 % (ref 1–4)
ERYTHROCYTE [DISTWIDTH] IN BLOOD BY AUTOMATED COUNT: 20.2 % (ref 11.8–14.4)
GFR, ESTIMATED: >90 ML/MIN/1.73M2
GLUCOSE BLD-MCNC: 143 MG/DL (ref 65–105)
GLUCOSE BLD-MCNC: 145 MG/DL (ref 65–105)
GLUCOSE BLD-MCNC: 152 MG/DL (ref 65–105)
GLUCOSE BLD-MCNC: 154 MG/DL (ref 65–105)
GLUCOSE BLD-MCNC: 161 MG/DL (ref 65–105)
GLUCOSE BLD-MCNC: 170 MG/DL (ref 65–105)
GLUCOSE SERPL-MCNC: 166 MG/DL (ref 74–99)
HCT VFR BLD AUTO: 27.1 % (ref 36.3–47.1)
HGB BLD-MCNC: 7.7 G/DL (ref 11.9–15.1)
IMM GRANULOCYTES # BLD AUTO: 0.08 K/UL (ref 0–0.3)
IMM GRANULOCYTES NFR BLD: 1 %
INR PPP: 1.4
LYMPHOCYTES NFR BLD: 1.23 K/UL (ref 1.1–3.7)
LYMPHOCYTES RELATIVE PERCENT: 15 % (ref 24–43)
MCH RBC QN AUTO: 26.3 PG (ref 25.2–33.5)
MCHC RBC AUTO-ENTMCNC: 28.4 G/DL (ref 28.4–34.8)
MCV RBC AUTO: 92.5 FL (ref 82.6–102.9)
MONOCYTES NFR BLD: 0.74 K/UL (ref 0.1–1.2)
MONOCYTES NFR BLD: 9 % (ref 3–12)
MORPHOLOGY: ABNORMAL
MORPHOLOGY: ABNORMAL
NEUTROPHILS NFR BLD: 69 % (ref 36–65)
NEUTS SEG NFR BLD: 5.66 K/UL (ref 1.5–8.1)
NRBC BLD-RTO: 0.2 PER 100 WBC
PLATELET # BLD AUTO: 253 K/UL (ref 138–453)
PMV BLD AUTO: 10 FL (ref 8.1–13.5)
POTASSIUM SERPL-SCNC: 3.8 MMOL/L (ref 3.7–5.3)
PROTHROMBIN TIME: 16.5 SEC (ref 11.7–14.9)
RBC # BLD AUTO: 2.93 M/UL (ref 3.95–5.11)
SODIUM SERPL-SCNC: 140 MMOL/L (ref 136–145)
WBC OTHER # BLD: 8.2 K/UL (ref 3.5–11.3)

## 2024-09-22 PROCEDURE — 6370000000 HC RX 637 (ALT 250 FOR IP)

## 2024-09-22 PROCEDURE — 2700000000 HC OXYGEN THERAPY PER DAY

## 2024-09-22 PROCEDURE — 99233 SBSQ HOSP IP/OBS HIGH 50: CPT | Performed by: INTERNAL MEDICINE

## 2024-09-22 PROCEDURE — 82947 ASSAY GLUCOSE BLOOD QUANT: CPT

## 2024-09-22 PROCEDURE — 94761 N-INVAS EAR/PLS OXIMETRY MLT: CPT

## 2024-09-22 PROCEDURE — 6360000002 HC RX W HCPCS

## 2024-09-22 PROCEDURE — 6360000002 HC RX W HCPCS: Performed by: STUDENT IN AN ORGANIZED HEALTH CARE EDUCATION/TRAINING PROGRAM

## 2024-09-22 PROCEDURE — 94640 AIRWAY INHALATION TREATMENT: CPT

## 2024-09-22 PROCEDURE — 2580000003 HC RX 258

## 2024-09-22 PROCEDURE — 85025 COMPLETE CBC W/AUTO DIFF WBC: CPT

## 2024-09-22 PROCEDURE — 99291 CRITICAL CARE FIRST HOUR: CPT | Performed by: INTERNAL MEDICINE

## 2024-09-22 PROCEDURE — 6370000000 HC RX 637 (ALT 250 FOR IP): Performed by: STUDENT IN AN ORGANIZED HEALTH CARE EDUCATION/TRAINING PROGRAM

## 2024-09-22 PROCEDURE — 2580000003 HC RX 258: Performed by: STUDENT IN AN ORGANIZED HEALTH CARE EDUCATION/TRAINING PROGRAM

## 2024-09-22 PROCEDURE — 80048 BASIC METABOLIC PNL TOTAL CA: CPT

## 2024-09-22 PROCEDURE — 85610 PROTHROMBIN TIME: CPT

## 2024-09-22 PROCEDURE — 2060000000 HC ICU INTERMEDIATE R&B

## 2024-09-22 PROCEDURE — 85520 HEPARIN ASSAY: CPT

## 2024-09-22 PROCEDURE — 36415 COLL VENOUS BLD VENIPUNCTURE: CPT

## 2024-09-22 PROCEDURE — 94003 VENT MGMT INPAT SUBQ DAY: CPT

## 2024-09-22 RX ORDER — AMIODARONE HYDROCHLORIDE 200 MG/1
200 TABLET ORAL 2 TIMES DAILY
Status: DISCONTINUED | OUTPATIENT
Start: 2024-09-27 | End: 2024-09-26 | Stop reason: HOSPADM

## 2024-09-22 RX ORDER — LACTOBACILLUS RHAMNOSUS GG 10B CELL
1 CAPSULE ORAL
Status: DISCONTINUED | OUTPATIENT
Start: 2024-09-23 | End: 2024-09-26 | Stop reason: HOSPADM

## 2024-09-22 RX ORDER — AMIODARONE HYDROCHLORIDE 200 MG/1
400 TABLET ORAL 2 TIMES DAILY
Status: DISCONTINUED | OUTPATIENT
Start: 2024-09-22 | End: 2024-09-26 | Stop reason: HOSPADM

## 2024-09-22 RX ORDER — WARFARIN SODIUM 7.5 MG/1
7.5 TABLET ORAL
Status: COMPLETED | OUTPATIENT
Start: 2024-09-22 | End: 2024-09-22

## 2024-09-22 RX ADMIN — IPRATROPIUM BROMIDE AND ALBUTEROL SULFATE 1 DOSE: 2.5; .5 SOLUTION RESPIRATORY (INHALATION) at 07:54

## 2024-09-22 RX ADMIN — IPRATROPIUM BROMIDE AND ALBUTEROL SULFATE 1 DOSE: 2.5; .5 SOLUTION RESPIRATORY (INHALATION) at 20:19

## 2024-09-22 RX ADMIN — HEPARIN SODIUM 11 UNITS/KG/HR: 10000 INJECTION, SOLUTION INTRAVENOUS at 03:42

## 2024-09-22 RX ADMIN — INSULIN GLARGINE 8 UNITS: 100 INJECTION, SOLUTION SUBCUTANEOUS at 19:44

## 2024-09-22 RX ADMIN — IPRATROPIUM BROMIDE AND ALBUTEROL SULFATE 1 DOSE: 2.5; .5 SOLUTION RESPIRATORY (INHALATION) at 23:25

## 2024-09-22 RX ADMIN — METOPROLOL TARTRATE 37.5 MG: 25 TABLET, FILM COATED ORAL at 07:50

## 2024-09-22 RX ADMIN — FENTANYL CITRATE 25 MCG: 50 INJECTION, SOLUTION INTRAMUSCULAR; INTRAVENOUS at 23:16

## 2024-09-22 RX ADMIN — SODIUM CHLORIDE: 9 INJECTION, SOLUTION INTRAVENOUS at 03:45

## 2024-09-22 RX ADMIN — MIDODRINE HYDROCHLORIDE 15 MG: 5 TABLET ORAL at 07:50

## 2024-09-22 RX ADMIN — AMIODARONE HYDROCHLORIDE 400 MG: 200 TABLET ORAL at 19:45

## 2024-09-22 RX ADMIN — MIDODRINE HYDROCHLORIDE 15 MG: 5 TABLET ORAL at 11:53

## 2024-09-22 RX ADMIN — SODIUM CHLORIDE 1250 MG: 9 INJECTION, SOLUTION INTRAVENOUS at 08:56

## 2024-09-22 RX ADMIN — FENTANYL CITRATE 25 MCG: 50 INJECTION, SOLUTION INTRAMUSCULAR; INTRAVENOUS at 21:01

## 2024-09-22 RX ADMIN — METOPROLOL TARTRATE 37.5 MG: 25 TABLET, FILM COATED ORAL at 19:44

## 2024-09-22 RX ADMIN — WARFARIN SODIUM 7.5 MG: 7.5 TABLET ORAL at 18:17

## 2024-09-22 RX ADMIN — IPRATROPIUM BROMIDE AND ALBUTEROL SULFATE 1 DOSE: 2.5; .5 SOLUTION RESPIRATORY (INHALATION) at 00:38

## 2024-09-22 RX ADMIN — AMIODARONE HYDROCHLORIDE 400 MG: 200 TABLET ORAL at 13:30

## 2024-09-22 RX ADMIN — HEPARIN SODIUM 11 UNITS/KG/HR: 10000 INJECTION, SOLUTION INTRAVENOUS at 16:35

## 2024-09-22 RX ADMIN — SODIUM CHLORIDE, PRESERVATIVE FREE 10 ML: 5 INJECTION INTRAVENOUS at 07:51

## 2024-09-22 RX ADMIN — IPRATROPIUM BROMIDE AND ALBUTEROL SULFATE 1 DOSE: 2.5; .5 SOLUTION RESPIRATORY (INHALATION) at 15:26

## 2024-09-22 RX ADMIN — SODIUM CHLORIDE, PRESERVATIVE FREE 40 MG: 5 INJECTION INTRAVENOUS at 07:50

## 2024-09-22 RX ADMIN — FENTANYL CITRATE 25 MCG: 50 INJECTION, SOLUTION INTRAMUSCULAR; INTRAVENOUS at 11:48

## 2024-09-22 RX ADMIN — ACETAMINOPHEN 650 MG: 325 TABLET ORAL at 07:50

## 2024-09-22 RX ADMIN — SODIUM CHLORIDE, PRESERVATIVE FREE 10 ML: 5 INJECTION INTRAVENOUS at 19:45

## 2024-09-22 RX ADMIN — IPRATROPIUM BROMIDE AND ALBUTEROL SULFATE 1 DOSE: 2.5; .5 SOLUTION RESPIRATORY (INHALATION) at 11:47

## 2024-09-22 ASSESSMENT — PAIN SCALES - GENERAL
PAINLEVEL_OUTOF10: 1
PAINLEVEL_OUTOF10: 0
PAINLEVEL_OUTOF10: 6
PAINLEVEL_OUTOF10: 0

## 2024-09-22 ASSESSMENT — PULMONARY FUNCTION TESTS
PIF_VALUE: 23
PIF_VALUE: 20
PIF_VALUE: 17
PIF_VALUE: 21

## 2024-09-22 ASSESSMENT — PAIN DESCRIPTION - LOCATION
LOCATION: NECK

## 2024-09-22 NOTE — PLAN OF CARE
Problem: Chronic Conditions and Co-morbidities  Goal: Patient's chronic conditions and co-morbidity symptoms are monitored and maintained or improved  9/22/2024 1652 by Isabel Tsai RN  Outcome: Progressing    Problem: Discharge Planning  Goal: Discharge to home or other facility with appropriate resources  9/22/2024 1652 by Isabel Tsai RN  Outcome: Progressing    Problem: Pain  Goal: Verbalizes/displays adequate comfort level or baseline comfort level  9/22/2024 1652 by Isabel Tsai RN  Outcome: Progressing     Problem: Skin/Tissue Integrity  Goal: Absence of new skin breakdown  Description: 1.  Monitor for areas of redness and/or skin breakdown  2.  Assess vascular access sites hourly  3.  Every 4-6 hours minimum:  Change oxygen saturation probe site  4.  Every 4-6 hours:  If on nasal continuous positive airway pressure, respiratory therapy assess nares and determine need for appliance change or resting period.  9/22/2024 1652 by Isabel Tsai RN  Outcome: Progressing     Problem: ABCDS Injury Assessment  Goal: Absence of physical injury  9/22/2024 1652 by Isabel Tsai RN  Outcome: Progressing       Problem: Safety - Adult  Goal: Free from fall injury  9/22/2024 1652 by Isabel Tsai RN  Outcome: Progressing     Problem: Respiratory - Adult  Goal: Achieves optimal ventilation and oxygenation  9/22/2024 1652 by Isabel Tsai RN  Outcome: Progressing  9/22/2024 1130 by Apoorva Verdugo RCP  Outcome: Progressing    Problem: Nutrition Deficit:  Goal: Optimize nutritional status  9/22/2024 1652 by Isabel Tsai RN  Outcome: Progressing

## 2024-09-22 NOTE — PROGRESS NOTES
Critical Care Team - Daily Progress Note      Date and time: 9/22/2024 8:20 AM  Patient's name:  Shazia ROGERS  Medical Record Number: [id number]  Patient's account/billing number: [id number]  Patient's YOB: 1955  Age: 68 y.o.  Date of Admission: 9/14/2024  1:39 PM  Length of stay during current admission: 8      Primary Care Physician: Jyoti Mauricio, APRN - CNP  ICU Attending Physician: Dr. Gilbert    Code Status: Full Code    Reason for ICU admission: No chief complaint on file.      Subjective:     OVERNIGHT EVENTS:           No acute events overnight.   Afebrile  HR is better controlled but still in afib.    TODAY:     Patient examined at bedside today.  Labs and chart reviewed.  Vitals reviewed.  BMP stable, bicarb 34, possible due to Lasix or loose bowel movements.  INR 1.4     Rate 12, tidal volume 460, PEEP of 8 and FiO2 40    Infectious workup showing gram-positive cocci in clusters on respiratory culture, S.aureus which is methicillin-resistant also MRSA DNA positive currently on Vancomycin --infectious disease consulted    Total urine output 1 L    S/p revision of tracheostomy with ENT.  Will hold off heparin in a.m. tomorrow    Currently off sedation    Cardiology: Following for A-fib with RVR which is controlled    AWAKE & FOLLOWING COMMANDS:  [] No   [x] Yes    CURRENT VENTILATION STATUS:     [x] Ventilator  [] BIPAP  [x] Trach mask  [] Room Air      IF INTUBATED, ET TUBE MARKING AT LOWER LIP:       cms    SECRETIONS Amount:  [] Small [] Moderate  [] Large  [x] None  Color:     [] White [] Colored  [] Bloody    SEDATION:  RAAS Score:  [x] Precedex gtt  [] Versed gtt  [] Ativan gtt  [] No Sedation    PARALYZED:  [x] No    [] Yes    DIARRHEA:                [x] No                [] Yes  (C. Difficile status: [] positive                                                                                                                       [] negative

## 2024-09-22 NOTE — TRANSFER CENTER NOTE
Critical care team - Resident sign-out to medicine service      Date and time: 9/22/2024 12:38 PM  Patient's name:  Shazia ROGERS  Medical Record Number: [id number]  Patient's account/billing number: [id number]  Patient's YOB: 1955  Age: 68 y.o.  Date of Admission: 9/14/2024  1:39 PM  Length of stay during current admission: 8    Primary Care Physician: Jyoti Mauricio APRN - CNP    Code Status: Full Code    Mode of physician to physician communication:        [] Via telephone   [] In person     Date and time of sign-out: 9/22/2024 12:38 PM    Accepting Internal Medicine resident: Dr. Osuna    Accepting Medicine team: IM Team 2    Accepting team's attending: Dr. Messina    Patient's current ICU Bed:  3004     Patient's assigned bed on floor:  408        [] Med-Surg Monitored [] Step-down       [] Psychiatry ICU       [] Psych floor     Reason for ICU admission:     Acute hypoxic respiratory failure    ICU course summary:     Patient, 68 years old female, with past medical history of.  - SHWETA/OHS  - Chronic hypoxic respiratory failure status post tracheostomy placement in 2024 (Tracheostomy was placed in July 2024 for chronic hypoxic respiratory failure and noncompliance with BiPAP with worsening oxygenation status. )  - On warfarin at home secondary to A-fib and DVT PE history    Who initially came to [facility] ED.  She had acute hypoxic respiratory failure on her went via her trach tube.  EMS was called, she was unable to maintain her oxygen saturation afterward required orotracheal intubation.  Afterwards it was noted that she has a distal obstruction in her tracheostomy cannula.    She was initially admitted to [facility] MICU.  Afterwards she became hypotensive and was started on IV pressors via her PICC line.  General surgery saw the patient at [facility] for her trach revision but they were unable to do so and considering that she will need ENT evaluation she was transferred to [facility]  05/04/2018    BMI 60.0-69.9, adult (HCC) 10/04/2017    Pain and swelling of right lower leg 03/21/2017    Chronic deep vein thrombosis (DVT) of femoral vein of right lower extremity (HCC) 06/08/2016    Stage 3a chronic kidney disease (HCC) 02/03/2016    Blood loss anemia 11/10/2015    BPPV (benign paroxysmal positional vertigo) 11/10/2015    Essential hypertension 08/30/2015    S/P FABBY-BSO (total abdominal hysterectomy and bilateral salpingo-oophorectomy) 07/30/2015    Endometrial cancer (HCC) 06/02/2015    Normocytic anemia 06/02/2015    Anticoagulated on Coumadin 01/14/2014    Family history of breast cancer 01/14/2014    Hyperlipemia, mixed     Seasonal allergies 04/11/2012    DDD (degenerative disc disease), lumbar     Anxiety     Depression     Asthma        Recommended Follow-up:     Follow-up with ENT, trach revision on Monday a.m.  ENT is okay with continuing heparin.  Amiodarone has been changed, follow-up with cardio.  Continue vancomycin for MRSA respiratory culture.  If needed can consult ID        Above mentioned assessment and plan was discussed by me with the admitting medicine resident. The medicine team assigned to the patient by medicine admitting resident will be following up the patient from now onwards on the floor.     Electronically signed by Luigi Bermeo MD on 9/22/2024 at 12:38 PM    Luigi Bermeo MD  Internal Medicine Resident, PGY- 3  [facility],  [place], [place].  12:38 PM     This note is created with the assistance of a speech-recognition program. While intending to generate a document that actually reflects the content of the visit, no guarantees can be provided that every mistake has been identified and corrected by editing.

## 2024-09-22 NOTE — PLAN OF CARE
Problem: Respiratory - Adult  Goal: Achieves optimal ventilation and oxygenation  9/22/2024 1130 by Apoorva Verdugo RCP  Outcome: Progressing   BRONCHOSPASM/BRONCHOCONSTRICTION     [x]         IMPROVE AERATION/BREATH SOUNDS  [x]   ADMINISTER BRONCHODILATOR THERAPY AS APPROPRIATE  [x]   ASSESS BREATH SOUNDS  []   IMPLEMENT AEROSOL/MDI PROTOCOL  [x]   PATIENT EDUCATION AS NEEDED  PROVIDE ADEQUATE OXYGENATION WITH ACCEPTABLE SP02/ABG'S    [x]  IDENTIFY APPROPRIATE OXYGEN THERAPY  [x]   MONITOR SP02/ABG'S AS NEEDED   [x]   PATIENT EDUCATION AS NEEDED

## 2024-09-22 NOTE — CARE COORDINATION
Trach revision scheduled for Monday. Patient remains on IV vancomycin for MRSA. Plan is return to [facility] - long term patient. Bed is on hold.

## 2024-09-22 NOTE — PLAN OF CARE
Problem: Chronic Conditions and Co-morbidities  Goal: Patient's chronic conditions and co-morbidity symptoms are monitored and maintained or improved  Outcome: Progressing     Problem: Discharge Planning  Goal: Discharge to home or other facility with appropriate resources  Outcome: Progressing     Problem: Pain  Goal: Verbalizes/displays adequate comfort level or baseline comfort level  Outcome: Progressing     Problem: Skin/Tissue Integrity  Goal: Absence of new skin breakdown  Description: 1.  Monitor for areas of redness and/or skin breakdown  2.  Assess vascular access sites hourly  3.  Every 4-6 hours minimum:  Change oxygen saturation probe site  4.  Every 4-6 hours:  If on nasal continuous positive airway pressure, respiratory therapy assess nares and determine need for appliance change or resting period.  Outcome: Progressing     Problem: ABCDS Injury Assessment  Goal: Absence of physical injury  Outcome: Progressing     Problem: Safety - Adult  Goal: Free from fall injury  Outcome: Progressing     Problem: Respiratory - Adult  Goal: Achieves optimal ventilation and oxygenation  9/22/2024 0427 by Primo Reyes RN  Outcome: Progressing  9/21/2024 2003 by Aretha Simpson RCP  Outcome: Progressing  9/21/2024 1853 by Apoorva Verdugo RCP  Outcome: Progressing     Problem: Nutrition Deficit:  Goal: Optimize nutritional status  Outcome: Progressing

## 2024-09-22 NOTE — PLAN OF CARE
Problem: Respiratory - Adult  Goal: Achieves optimal ventilation and oxygenation  9/21/2024 2003 by Aretha Simpson RCP  Outcome: Progressing   BRONCHOSPASM/BRONCHOCONSTRICTION     [x]         IMPROVE AERATION/BREATH SOUNDS  [x]   ADMINISTER BRONCHODILATOR THERAPY AS APPROPRIATE  [x]   ASSESS BREATH SOUNDS  [x]   IMPLEMENT AEROSOL/MDI PROTOCOL  Problem: OXYGENATION/RESPIRATORY FUNCTION  Goal: Patient will maintain patent airway  Outcome: Ongoing  Goal: Patient will achieve/maintain normal respiratory rate/effort  Respiratory rate and effort will be within normal limits for the patient  Outcome: Ongoing    Problem: MECHANICAL VENTILATION  Goal: Patient will maintain patent airway  Outcome: Ongoing  Goal: Oral health is maintained or improved  Outcome: Ongoing  Goal: ET tube will be managed safely  Outcome: Ongoing  Goal: Ability to express needs and understand communication  Outcome: Ongoing  Goal: Mobility/activity is maintained at optimum level for patient  Outcome: Ongoing    Problem: ASPIRATION PRECAUTIONS  Goal: Patient’s risk of aspiration is minimized  Outcome: Ongoing    Problem: SKIN INTEGRITY  Goal: Skin integrity is maintained or improved  Outcome: Ongoing                     PATIENT EDUCATION AS NEEDED

## 2024-09-22 NOTE — PROGRESS NOTES
Pharmacy Note  Warfarin Consult follow-up      Recent Labs     09/22/24  0656   INR 1.4     Recent Labs     09/20/24  0720 09/21/24  0640 09/22/24  0656   HGB 8.6* 8.4* 7.7*   HCT 31.6* 32.1* 27.1*    273 253       Significant Drug-Drug Interactions:  New warfarin drug-drug interactions: NONE  Discontinued drug-drug interactions: NONE      Notes:                   Will give a dose of 7.5 mg.     Daily PT/INR while inpatient.    Thank you  Alen Nicole, PharmD, [facility]  Inpatient Clinical Pharmacist  [phone]

## 2024-09-22 NOTE — PROGRESS NOTES
INTERNAL MEDICINE                                                                             ICU PATIENT TRANSFER NOTE        Patient: Shazia ROGERS  YOB: 1955  MRN: [id number]     Acct: [id number]     Admit date: 9/14/2024    Code Status: Full Code      Reason for ICU Admission: Acute hypoxic respiratory failure    SUPPORT DEVICES: [] Ventilator  [] BIPAP  [] Nasal Cannula [] Room Air    Consultations: [x] Cardiology [] Nephrology  [] Hemo onco  [] GI                               [] ID [x] ENT  [] Rheum [] Endo   []Physiotherapy       NUTRITION: [] NPO [x] Tube Feeding  [] TPN [] PO    Central Lines:  [] No  [] Yes           If yes - Days/Date of Insertion.      Pt seen and examined at bedside. Chart and results reviewed.      ICU COURSE:     Patient, 68 years old female, with past medical history of.  - SHWETA/OHS  - Chronic hypoxic respiratory failure status post tracheostomy placement in 2024 (Tracheostomy was placed in July 2024 for chronic hypoxic respiratory failure and noncompliance with BiPAP with worsening oxygenation status. )  - On warfarin at home secondary to A-fib and DVT PE history     Who initially came to [facility] ED.  She had acute hypoxic respiratory failure on her went via her trach tube.  EMS was called, she was unable to maintain her oxygen saturation afterward required orotracheal intubation.  Afterwards it was noted that she has a distal obstruction in her tracheostomy cannula.     She was initially admitted to [facility].  Afterwards she became hypotensive and was started on IV pressors via her PICC line.  General surgery saw the patient at [facility] for her trach revision but they were unable to do so and considering that she will need ENT evaluation she was transferred to [facility] ICU.     On 9/16,

## 2024-09-22 NOTE — PLAN OF CARE
Problem: Chronic Conditions and Co-morbidities  Goal: Patient's chronic conditions and co-morbidity symptoms are monitored and maintained or improved  9/22/2024 0928 by Dia Garcia RN  Outcome: Progressing     Problem: Discharge Planning  Goal: Discharge to home or other facility with appropriate resources  9/22/2024 0928 by Dia Garcia RN  Outcome: Progressing     Problem: Pain  Goal: Verbalizes/displays adequate comfort level or baseline comfort level  9/22/2024 0928 by Dia Garcia RN  Outcome: Progressing     Problem: Skin/Tissue Integrity  Goal: Absence of new skin breakdown  Description: 1.  Monitor for areas of redness and/or skin breakdown  2.  Assess vascular access sites hourly  3.  Every 4-6 hours minimum:  Change oxygen saturation probe site  4.  Every 4-6 hours:  If on nasal continuous positive airway pressure, respiratory therapy assess nares and determine need for appliance change or resting period.  9/22/2024 0928 by Dia Garcia RN  Outcome: Progressing     Problem: ABCDS Injury Assessment  Goal: Absence of physical injury  9/22/2024 0928 by Dia Garcia RN  Outcome: Progressing     Problem: Safety - Adult  Goal: Free from fall injury  9/22/2024 0928 by Dia Garcia RN  Outcome: Progressing     Problem: Respiratory - Adult  Goal: Achieves optimal ventilation and oxygenation  9/22/2024 0928 by Dia Garcia RN  Outcome: Progressing     Problem: Nutrition Deficit:  Goal: Optimize nutritional status  9/22/2024 0928 by Dia Garcia RN  Outcome: Progressing     Electronically signed by Dia Garcia RN on 9/22/2024 at 9:29 AM

## 2024-09-22 NOTE — PROGRESS NOTES
[facility]   Progress Note                   Date:   9/22/2024  Patient name: Shazia ROGERS  Date of admission:  9/14/2024  1:39 PM  MRN:   [id number]  YOB: 1955  PCP: Jyoti Mauricio, APRN - CNP    Reason for Admission:     Subjective:       Patient seen and examined at the bedside: No acute issues overnight, currently intubated, in A-fib with heart rate around 100, on IV heparin and IV amiodarone 0.5    Medications:   Scheduled Meds:   metoprolol tartrate  37.5 mg Oral BID    vancomycin  1,250 mg IntraVENous Q18H    midodrine  15 mg Oral TID WC    vancomycin (VANCOCIN) intermittent dosing (placeholder)   Other RX Placeholder    warfarin placeholder: dosing by pharmacy   Other RX Placeholder    insulin lispro  0-8 Units SubCUTAneous Q6H    sodium chloride flush  5-40 mL IntraVENous 2 times per day    ipratropium 0.5 mg-albuterol 2.5 mg  1 Dose Inhalation Q4H RT    insulin lispro  0-4 Units SubCUTAneous Nightly    pantoprazole (PROTONIX) 40 mg in sodium chloride (PF) 0.9 % 10 mL injection  40 mg IntraVENous Daily    insulin glargine  8 Units SubCUTAneous Nightly     Continuous Infusions:   amiodarone Stopped (09/22/24 0354)    sodium chloride Stopped (09/22/24 0354)    dextrose      heparin (PORCINE) Infusion 11 Units/kg/hr (09/22/24 0400)     CBC:   Recent Labs     09/20/24  0720 09/21/24  0640   WBC 9.4 10.8   HGB 8.6* 8.4*    273     BMP:    Recent Labs     09/20/24  0720 09/21/24  0640    140   K 4.1 3.9    102   CO2 27 28   BUN 15 15   CREATININE 0.7 0.7   GLUCOSE 178* 178*     Hepatic:   No results for input(s): \"AST\", \"ALT\", \"BILITOT\", \"ALKPHOS\" in the last 72 hours.    Invalid input(s): \"ALB\"    Troponin: No results for input(s): \"TROPONINI\" in the last 72 hours.  BNP: No results for input(s): \"BNP\" in the last 72 hours.  Lipids: No results for input(s): \"CHOL\", \"HDL\" in the last 72 hours.    Invalid input(s): \"LDLCALCU\"  INR:   Recent Labs

## 2024-09-23 ENCOUNTER — APPOINTMENT (OUTPATIENT)
Dept: VASCULAR LAB | Age: 69
DRG: 166 | End: 2024-09-23
Attending: INTERNAL MEDICINE
Payer: MEDICARE

## 2024-09-23 ENCOUNTER — APPOINTMENT (OUTPATIENT)
Dept: GENERAL RADIOLOGY | Age: 69
DRG: 166 | End: 2024-09-23
Attending: INTERNAL MEDICINE
Payer: MEDICARE

## 2024-09-23 LAB
ANION GAP SERPL CALCULATED.3IONS-SCNC: 10 MMOL/L (ref 9–16)
ANTI-XA UNFRAC HEPARIN: 0.56 IU/L
BASOPHILS # BLD: 0.07 K/UL (ref 0–0.2)
BASOPHILS NFR BLD: 1 % (ref 0–2)
BUN SERPL-MCNC: 13 MG/DL (ref 8–23)
CALCIUM SERPL-MCNC: 9.3 MG/DL (ref 8.6–10.4)
CHLORIDE SERPL-SCNC: 100 MMOL/L (ref 98–107)
CO2 SERPL-SCNC: 28 MMOL/L (ref 20–31)
CREAT SERPL-MCNC: 0.6 MG/DL (ref 0.5–0.9)
EOSINOPHIL # BLD: 0.36 K/UL (ref 0–0.44)
EOSINOPHILS RELATIVE PERCENT: 5 % (ref 1–4)
ERYTHROCYTE [DISTWIDTH] IN BLOOD BY AUTOMATED COUNT: 20.5 % (ref 11.8–14.4)
GFR, ESTIMATED: >90 ML/MIN/1.73M2
GLUCOSE BLD-MCNC: 149 MG/DL (ref 65–105)
GLUCOSE BLD-MCNC: 164 MG/DL (ref 65–105)
GLUCOSE BLD-MCNC: 169 MG/DL (ref 65–105)
GLUCOSE BLD-MCNC: 175 MG/DL (ref 65–105)
GLUCOSE SERPL-MCNC: 158 MG/DL (ref 74–99)
HCT VFR BLD AUTO: 27.1 % (ref 36.3–47.1)
HGB BLD-MCNC: 7.7 G/DL (ref 11.9–15.1)
IMM GRANULOCYTES # BLD AUTO: 0.07 K/UL (ref 0–0.3)
IMM GRANULOCYTES NFR BLD: 1 %
INR PPP: 1.6
LYMPHOCYTES NFR BLD: 1.14 K/UL (ref 1.1–3.7)
LYMPHOCYTES RELATIVE PERCENT: 16 % (ref 24–43)
MCH RBC QN AUTO: 26.9 PG (ref 25.2–33.5)
MCHC RBC AUTO-ENTMCNC: 28.4 G/DL (ref 28.4–34.8)
MCV RBC AUTO: 94.8 FL (ref 82.6–102.9)
MONOCYTES NFR BLD: 0.64 K/UL (ref 0.1–1.2)
MONOCYTES NFR BLD: 9 % (ref 3–12)
MORPHOLOGY: ABNORMAL
MORPHOLOGY: ABNORMAL
NEUTROPHILS NFR BLD: 68 % (ref 36–65)
NEUTS SEG NFR BLD: 4.82 K/UL (ref 1.5–8.1)
NRBC BLD-RTO: 0.4 PER 100 WBC
PLATELET # BLD AUTO: 293 K/UL (ref 138–453)
PMV BLD AUTO: 10.7 FL (ref 8.1–13.5)
POTASSIUM SERPL-SCNC: 4 MMOL/L (ref 3.7–5.3)
PROTHROMBIN TIME: 18.5 SEC (ref 11.7–14.9)
RBC # BLD AUTO: 2.86 M/UL (ref 3.95–5.11)
SODIUM SERPL-SCNC: 138 MMOL/L (ref 136–145)
WBC OTHER # BLD: 7.1 K/UL (ref 3.5–11.3)

## 2024-09-23 PROCEDURE — 36415 COLL VENOUS BLD VENIPUNCTURE: CPT

## 2024-09-23 PROCEDURE — 80048 BASIC METABOLIC PNL TOTAL CA: CPT

## 2024-09-23 PROCEDURE — 74230 X-RAY XM SWLNG FUNCJ C+: CPT

## 2024-09-23 PROCEDURE — 6370000000 HC RX 637 (ALT 250 FOR IP): Performed by: STUDENT IN AN ORGANIZED HEALTH CARE EDUCATION/TRAINING PROGRAM

## 2024-09-23 PROCEDURE — 2580000003 HC RX 258

## 2024-09-23 PROCEDURE — 6360000002 HC RX W HCPCS: Performed by: STUDENT IN AN ORGANIZED HEALTH CARE EDUCATION/TRAINING PROGRAM

## 2024-09-23 PROCEDURE — 6360000002 HC RX W HCPCS

## 2024-09-23 PROCEDURE — 6370000000 HC RX 637 (ALT 250 FOR IP)

## 2024-09-23 PROCEDURE — 82947 ASSAY GLUCOSE BLOOD QUANT: CPT

## 2024-09-23 PROCEDURE — 94640 AIRWAY INHALATION TREATMENT: CPT

## 2024-09-23 PROCEDURE — 2700000000 HC OXYGEN THERAPY PER DAY

## 2024-09-23 PROCEDURE — 94003 VENT MGMT INPAT SUBQ DAY: CPT

## 2024-09-23 PROCEDURE — 85520 HEPARIN ASSAY: CPT

## 2024-09-23 PROCEDURE — 93971 EXTREMITY STUDY: CPT

## 2024-09-23 PROCEDURE — 85025 COMPLETE CBC W/AUTO DIFF WBC: CPT

## 2024-09-23 PROCEDURE — 99233 SBSQ HOSP IP/OBS HIGH 50: CPT | Performed by: INTERNAL MEDICINE

## 2024-09-23 PROCEDURE — 99232 SBSQ HOSP IP/OBS MODERATE 35: CPT | Performed by: INTERNAL MEDICINE

## 2024-09-23 PROCEDURE — 2060000000 HC ICU INTERMEDIATE R&B

## 2024-09-23 PROCEDURE — 94761 N-INVAS EAR/PLS OXIMETRY MLT: CPT

## 2024-09-23 PROCEDURE — 99233 SBSQ HOSP IP/OBS HIGH 50: CPT | Performed by: NURSE PRACTITIONER

## 2024-09-23 PROCEDURE — 2580000003 HC RX 258: Performed by: STUDENT IN AN ORGANIZED HEALTH CARE EDUCATION/TRAINING PROGRAM

## 2024-09-23 PROCEDURE — 85610 PROTHROMBIN TIME: CPT

## 2024-09-23 PROCEDURE — 92611 MOTION FLUOROSCOPY/SWALLOW: CPT

## 2024-09-23 PROCEDURE — 0B21XFZ CHANGE TRACHEOSTOMY DEVICE IN TRACHEA, EXTERNAL APPROACH: ICD-10-PCS | Performed by: OTOLARYNGOLOGY

## 2024-09-23 RX ORDER — FENTANYL CITRATE 50 UG/ML
25 INJECTION, SOLUTION INTRAMUSCULAR; INTRAVENOUS
Status: DISCONTINUED | OUTPATIENT
Start: 2024-09-23 | End: 2024-09-23

## 2024-09-23 RX ORDER — OXYCODONE HYDROCHLORIDE 5 MG/1
5 TABLET ORAL EVERY 4 HOURS PRN
Status: DISCONTINUED | OUTPATIENT
Start: 2024-09-23 | End: 2024-09-26 | Stop reason: HOSPADM

## 2024-09-23 RX ORDER — FENTANYL CITRATE 50 UG/ML
25 INJECTION, SOLUTION INTRAMUSCULAR; INTRAVENOUS EVERY 4 HOURS PRN
Status: DISCONTINUED | OUTPATIENT
Start: 2024-09-23 | End: 2024-09-26 | Stop reason: HOSPADM

## 2024-09-23 RX ADMIN — Medication 1 CAPSULE: at 07:54

## 2024-09-23 RX ADMIN — AMIODARONE HYDROCHLORIDE 400 MG: 200 TABLET ORAL at 21:32

## 2024-09-23 RX ADMIN — IPRATROPIUM BROMIDE AND ALBUTEROL SULFATE 1 DOSE: 2.5; .5 SOLUTION RESPIRATORY (INHALATION) at 20:33

## 2024-09-23 RX ADMIN — IPRATROPIUM BROMIDE AND ALBUTEROL SULFATE 1 DOSE: 2.5; .5 SOLUTION RESPIRATORY (INHALATION) at 07:51

## 2024-09-23 RX ADMIN — HEPARIN SODIUM 11 UNITS/KG/HR: 10000 INJECTION, SOLUTION INTRAVENOUS at 02:46

## 2024-09-23 RX ADMIN — INSULIN GLARGINE 8 UNITS: 100 INJECTION, SOLUTION SUBCUTANEOUS at 21:31

## 2024-09-23 RX ADMIN — SODIUM CHLORIDE, PRESERVATIVE FREE 40 MG: 5 INJECTION INTRAVENOUS at 07:55

## 2024-09-23 RX ADMIN — IPRATROPIUM BROMIDE AND ALBUTEROL SULFATE 1 DOSE: 2.5; .5 SOLUTION RESPIRATORY (INHALATION) at 03:43

## 2024-09-23 RX ADMIN — MIDODRINE HYDROCHLORIDE 15 MG: 5 TABLET ORAL at 07:54

## 2024-09-23 RX ADMIN — METOPROLOL TARTRATE 37.5 MG: 25 TABLET, FILM COATED ORAL at 07:54

## 2024-09-23 RX ADMIN — SODIUM CHLORIDE 1250 MG: 9 INJECTION, SOLUTION INTRAVENOUS at 21:50

## 2024-09-23 RX ADMIN — FENTANYL CITRATE 25 MCG: 50 INJECTION, SOLUTION INTRAMUSCULAR; INTRAVENOUS at 10:13

## 2024-09-23 RX ADMIN — SODIUM CHLORIDE, PRESERVATIVE FREE 10 ML: 5 INJECTION INTRAVENOUS at 21:37

## 2024-09-23 RX ADMIN — HEPARIN SODIUM 11 UNITS/KG/HR: 10000 INJECTION, SOLUTION INTRAVENOUS at 13:09

## 2024-09-23 RX ADMIN — AMIODARONE HYDROCHLORIDE 400 MG: 200 TABLET ORAL at 07:54

## 2024-09-23 RX ADMIN — SODIUM CHLORIDE, PRESERVATIVE FREE 10 ML: 5 INJECTION INTRAVENOUS at 07:55

## 2024-09-23 RX ADMIN — SODIUM CHLORIDE 1250 MG: 9 INJECTION, SOLUTION INTRAVENOUS at 02:45

## 2024-09-23 RX ADMIN — OXYCODONE HYDROCHLORIDE 5 MG: 5 TABLET ORAL at 13:08

## 2024-09-23 RX ADMIN — METOPROLOL TARTRATE 37.5 MG: 25 TABLET, FILM COATED ORAL at 21:32

## 2024-09-23 RX ADMIN — IPRATROPIUM BROMIDE AND ALBUTEROL SULFATE 1 DOSE: 2.5; .5 SOLUTION RESPIRATORY (INHALATION) at 16:13

## 2024-09-23 RX ADMIN — IPRATROPIUM BROMIDE AND ALBUTEROL SULFATE 1 DOSE: 2.5; .5 SOLUTION RESPIRATORY (INHALATION) at 11:26

## 2024-09-23 RX ADMIN — Medication 8 MG: at 21:31

## 2024-09-23 ASSESSMENT — PULMONARY FUNCTION TESTS
PIF_VALUE: 17
PIF_VALUE: 17
PIF_VALUE: 53
PIF_VALUE: 17
PIF_VALUE: 17

## 2024-09-23 ASSESSMENT — PAIN SCALES - GENERAL
PAINLEVEL_OUTOF10: 0
PAINLEVEL_OUTOF10: 10
PAINLEVEL_OUTOF10: 0
PAINLEVEL_OUTOF10: 0
PAINLEVEL_OUTOF10: 10

## 2024-09-23 NOTE — PROGRESS NOTES
[facility]  Internal Medicine Teaching Residency Program  Inpatient Daily Progress Note  ______________________________________________________________________________    Patient: Shazia ROGERS  YOB: 1955   MRN:[id number]    Acct: [id number]     Room: 0408/0408-01  Admit date: 9/14/2024  Today's date: 09/23/24  Number of days in the hospital: 9    SUBJECTIVE   CC: No chief complaint on file.    Pt examined at bedside. Chart & results reviewed.   -Vitals stable  -Patient to undergo tracheostomy revision today      ROS:  General ROS: Completed and except as mentioned above were negative   HEENT ROS: Completed and except as mentioned above were negative   Allergy and Immunology ROS:  Completed and except as mentioned above were negative  Hematological and Lymphatic ROS:  Completed and except as mentioned above were negative  Respiratory ROS:  Completed and except as mentioned above were negative  Cardiovascular ROS:  Completed and except as mentioned above were negative  Gastrointestinal ROS: Completed and except as mentioned above were negative  Genito-Urinary ROS:  Completed and except as mentioned above were negative  Musculoskeletal ROS:  Completed and except as mentioned above were negative  Neurological ROS:  Completed and except as mentioned above were negative  Skin & Dermatological ROS:  Completed and except as mentioned above were negative  Psychological ROS:  Completed and except as mentioned above were negative  BRIEF HISTORY   Per chart, Patient, 68 years old female, with past medical history of.  - SHWETA/OHS  - Chronic hypoxic respiratory failure status post tracheostomy placement in 2024 (Tracheostomy was placed in July 2024 for chronic hypoxic respiratory failure and noncompliance with BiPAP with worsening oxygenation status. )  - On warfarin at home secondary to A-fib and DVT PE history     Who initially came to [facility] ED.  She had  acute hypoxic respiratory failure on her went via her trach tube.  EMS was called, she was unable to maintain her oxygen saturation afterward required orotracheal intubation.  Afterwards it was noted that she has a distal obstruction in her tracheostomy cannula.     She was initially admitted to [facility].  Afterwards she became hypotensive and was started on IV pressors via her PICC line.  General surgery saw the patient at [facility] for her trach revision but they were unable to do so and considering that she will need ENT evaluation she was transferred to [facility] ICU.     On , patient underwent revision tracheotomy via bronchoscopy.  Afterwards patient remained stable but she was in A-fib with RVR.  Although she was taking her home medications but it was not rate controlled and she was started on amiodarone infusion which controlled her rate but rhythm was still A-fib.     Cardiology is also following the patient for her A-fib with RVR, and a new will be changed to 400 twice daily for 5 days and then 200 mg twice daily.  Pharmacy is following the patient for changing heparin to warfarin.  Patient was also found to have MRSA through her respiratory culture currently on vancomycin    Vital Signs:  BP (!) 119/54   Pulse (!) 119   Temp 99.2 °F (37.3 °C) (Axillary)   Resp 14   Ht 1.651 m (5' 5\")   Wt (!) 208.2 kg (459 lb)   LMP 2014   SpO2 96%   BMI 76.38 kg/m²     Temp (24hrs), Av.6 °F (37 °C), Min:97.5 °F (36.4 °C), Max:99.2 °F (37.3 °C)    In: 1545.6   Out: 550 [Urine:500]    Physical Exam:  Physical Exam      Medications:  Scheduled Medications:    amiodarone  400 mg Oral BID    Followed by    [START ON 2024] amiodarone  200 mg Oral BID    lactobacillus  1 capsule Per NG tube Daily with breakfast    metoprolol tartrate  37.5 mg Oral BID    vancomycin  1,250 mg IntraVENous Q18H    midodrine  15 mg Oral TID     vancomycin (VANCOCIN) intermittent dosing

## 2024-09-23 NOTE — PROGRESS NOTES
Patient placed on CPAP trial with pressure support of 8, tolerating well at this time. RN made aware.       09/23/24 0754   Vent Information   Vent Mode (S)  CPAP/PS   $Ventilation $Subsequent Day   Ventilator Settings   Vt (Set, mL) 460 mL   Resp Rate (Set) 12 bpm   PEEP/CPAP (cmH2O) 8   FiO2  40 %   Pressure Support (cm H2O) 8 cm H2O   Vent Patient Data (Readings)   Vt (Measured) 455 mL   Peak Inspiratory Pressure (cmH2O) 17 cmH2O   Rate Measured 14 br/min   Minute Volume (L/min) 7.5 Liters   Mean Airway Pressure (cmH2O) 9.8 cmH20

## 2024-09-23 NOTE — PLAN OF CARE
Problem: Chronic Conditions and Co-morbidities  Goal: Patient's chronic conditions and co-morbidity symptoms are monitored and maintained or improved  9/23/2024 1045 by Gabby Rogers RN  Outcome: Progressing  9/23/2024 0606 by Sheri Topete, RN  Outcome: Progressing     Problem: Discharge Planning  Goal: Discharge to home or other facility with appropriate resources  9/23/2024 1045 by Gabby Rogers, RN  Outcome: Progressing  9/23/2024 0606 by Sheri Topete, RN  Outcome: Progressing

## 2024-09-23 NOTE — PROGRESS NOTES
09/23/24 1132   Care Plan - Respiratory Goals   Achieves optimal ventilation and oxygenation Assess for changes in respiratory status;Assess for changes in mentation and behavior;Position to facilitate oxygenation and minimize respiratory effort;Respiratory therapy support as indicated;Assess the need for suctioning and aspirate as needed;Assess and instruct to report shortness of breath or any respiratory difficulty;Encourage broncho-pulmonary hygiene including cough, deep breathe, incentive spirometry;Oxygen supplementation based on oxygen saturation or arterial blood gases

## 2024-09-23 NOTE — PROGRESS NOTES
PULMONARY & CRITICAL CARE MEDICINE PROGRESS NOTE     Patient:  Shazia ROGERS  MRN: [id number]  Admit date: 9/14/2024  Primary Care Physician: Jyoti Mauricio, APRN - CNP  Consulting Physician: Jimi Messina MD  CODE Status: Full Code  LOS: 9     SUBJECTIVE     CHIEF COMPLAINT/REASON FOR INITIAL CONSULT:    Patient is a 68-year-old female with past medical history of SHWETA/OHS, chronic hypoxic respiratory failure status post tracheostomy placement in July 2024, history of A-fib and DVT PE history on warfarin.    Patient initially presented to [facility] emergency department and found to have acute hypoxic respiratory failure.  EMS was unable to maintain her obtain her oxygen saturations after an orotracheal intubation.  Afterwards she was noted to have an obstruction in her tracheostomy cannula.  She is admitted to the medical ICU at [facility] and became hypotensive and was started on IV pressor support.  General surgery saw the patient for her trach revision but were unable to do so and stated she would need an ENT evaluation and she was further transferred to [facility] ICU.    On 9/16 patient underwent revision of her tracheostomy via bronchoscopy.  Afterwards she went into A-fib with RVR while on her home medications.  Amiodarone infusion was started.  Cardiology was consulted due to her A-fib with RVR and has started her on an increased dose of her oral amiodarone.  Patient was also found to have MRSA positive respiratory culture and is currently on vancomycin.    Patient was transferred out of the medical ICU on 9/22.    BRIEF HOSPITAL COURSE:  The patient is a 68 y.o. female     INTERVAL HISTORY:  09/23/24  Patient is currently saturating well on ventilator.  Vent settings: Respiration rate: 12, tidal volume: 460, PEEP 8, FiO2 50%  White count is   Recent Labs     09/22/24  0656   WBC 8.2     In: 1545.6   Out: 550 [Urine:500]  ENT exchanged trach today  Patient is continuing on vancomycin  even/negative fluid balance  ENT to manage trach  Cardiology to manage amiodarone  Rest of management per primary team    It was my pleasure to evaluate Shazia ROGERS today. I would like to thank you for allowing me to participate in the care of this patient.  Please feel free to call with any further questions or concerns. We will continue to follow.     Yahir Reynoso MD  Pulmonary and Critical Care Medicine  9/23/2024, 9:10 AM         This note is created with the assistance of a speech recognition program.  While intending to generate a document that actually reflects the content of the visit, the document can still have some errors including those of syntax and sound-alike substitutions which may escape proof reading.  It such instances, actual meaning can be extrapolated by contextual diversion.      Attending Physician Statement  I have discussed the care of Shazia ROGERS, including pertinent history and exam findings with the resident. I have reviewed the key elements of all parts of the encounter with the resident. I have seen and examined the patient with the resident.  I agree with the assessment and plan and status of the problem list as documented.    I saw the patient during around today, chart reviewed.  I have reviewed the ICU events.  Ventilator setting seen and last blood gases seen.  When I saw the patient patient was lying flat on ventilator arousable easily follows command slightly uncomfortable but not in distress.  She is hemodynamically stable heart rate in last 24 hours between 70s and 90s.  Tmax of 99.2.  On ventilator she is currently on CPAP/pressure support 8/8/40% during the daytime she is on CPAP/pressure support as tolerated.  Her ventilator setting is PRVC/12/460/8/50%.  Will wean FiO2 down to 40% as she is saturating above 90%.  She is on oral amiodarone of IV amiodarone she is on heparin drip and she is on Coumadin.  Tracheostomy changed today by ENT.  She has 7 cuffed

## 2024-09-23 NOTE — PROGRESS NOTES
ENT/OTOLARYNGOLOGY SUBSEQUENT CARE PROGRESS NOTE       HISTORY OF PRESENT ILLNESS:   Shazia ROGERS is a 68 y.o. who is being seen for follow-up after tracheostomy revision September 16, 2024. She is still on CPAP and stable.       Pertinent Examination:   GENERAL:  sleeping through exam  MOUTH/THROAT: mucous membranes moist  NECK: Shiley 7 cuffed proximal XLT in place, secured with 7 silk sutures and Velcro tie. Trach changed today.   Neck skin is intact with mild surrounding erythema. No bleeding     PROCEDURE: Tracheostomy tube change     PREOPERATIVE DIAGNOSIS: Tracheostomy tube dependence  POSTOPERATIVE DIAGNOSIS: same     INDICATION: Tracheostomy tube dependence     TIME OUT: A time out was conducted immediately before starting the procedure that confirmed a final verification of the correct patient, correct procedure, correct patient position, correct site and availability of special equipment.     SITE: Anterior neck     ANESTHESIA:  None  COMPLICATIONS: None  EBL: None  DURATION:  5 min  TOLERANCE: Good  PROCEDURAL IMMOBILIZATION: RN / caregiver hold     DESCRIPTION OF PROCEDURE:  The patient was laid supine in the bed. A shoulder roll was placed for neck extension. The patient was preoxygenated by the RT staff and the arms immobilized. Tracheostomy suctioning was performed.      A new Shiley 7 cuffed proximal XLT tracheostomy tube was opened and the cuff tested if a cuffed tracheostomy and the tube was lubricated. The sutures holding the tracheostomy were cut and removed. The tracheostomy ties were undone and the existing tracheostomy tube removed. The stoma appeared well healed . The new tracheostomy tube was then placed into the stoma, the obturator was removed, and the ventilator circuit reattached. The tracheostomy ties were then fastened at an appropriate tightness. The patient tolerated the procedure well without immediate complication.    FINDINGS: Tracheostomy tube changed without difficulty.

## 2024-09-23 NOTE — PLAN OF CARE
Problem: Chronic Conditions and Co-morbidities  Goal: Patient's chronic conditions and co-morbidity symptoms are monitored and maintained or improved  9/23/2024 0606 by Sheri Topete RN  Outcome: Progressing  9/22/2024 1652 by Isabel Tsai RN  Outcome: Progressing     Problem: Discharge Planning  Goal: Discharge to home or other facility with appropriate resources  9/23/2024 0606 by Sheri Topete RN  Outcome: Progressing  9/22/2024 1652 by Isabel Tsai RN  Outcome: Progressing     Problem: Pain  Goal: Verbalizes/displays adequate comfort level or baseline comfort level  9/23/2024 0606 by Sheri Topete RN  Outcome: Progressing  9/22/2024 1652 by Isabel Tsai RN  Outcome: Progressing     Problem: Skin/Tissue Integrity  Goal: Absence of new skin breakdown  Description: 1.  Monitor for areas of redness and/or skin breakdown  2.  Assess vascular access sites hourly  3.  Every 4-6 hours minimum:  Change oxygen saturation probe site  4.  Every 4-6 hours:  If on nasal continuous positive airway pressure, respiratory therapy assess nares and determine need for appliance change or resting period.  9/23/2024 0606 by Sheri Topete RN  Outcome: Progressing  9/22/2024 1652 by Isabel Tsai RN  Outcome: Progressing     Problem: ABCDS Injury Assessment  Goal: Absence of physical injury  9/23/2024 0606 by Sheri Topete RN  Outcome: Progressing  9/22/2024 1652 by Isabel Tsai RN  Outcome: Progressing     Problem: Safety - Adult  Goal: Free from fall injury  9/23/2024 0606 by Sheri Topete RN  Outcome: Progressing  9/22/2024 1652 by Isabel Tsai RN  Outcome: Progressing     Problem: Respiratory - Adult  Goal: Achieves optimal ventilation and oxygenation  9/23/2024 0606 by Sheri Topete RN  Outcome: Progressing  9/22/2024 1652 by Isabel Tsai RN  Outcome: Progressing     Problem: Nutrition Deficit:  Goal: Optimize nutritional status  9/23/2024 0606 by Sheri Topete RN  Outcome:

## 2024-09-23 NOTE — PROGRESS NOTES
Pharmacy Note  Warfarin Consult follow-up      Recent Labs     09/23/24  1706   INR 1.6     Recent Labs     09/21/24  0640 09/22/24  0656 09/23/24  1003   HGB 8.4* 7.7* 7.7*   HCT 32.1* 27.1* 27.1*    253 293       Significant Drug-Drug Interactions:  New warfarin drug-drug interactions: n/a  Discontinued drug-drug interactions: n/a      Notes:                   Will give 8mg today.   Daily PT/INR while inpatient.     Sue Eason PharmD  9/23/2024  6:44 PM

## 2024-09-23 NOTE — PROGRESS NOTES
09/22/24 2026   Vent Information   Vent Mode (S)  AC/PRVC     PLACED PT ON FULL SUPPORT FOR THE NIGHT

## 2024-09-23 NOTE — PROGRESS NOTES
[facility]   Progress Note                   Date:   9/23/2024  Patient name: Shazia ROGERS  Date of admission:  9/14/2024  1:39 PM  MRN:   [id number]  YOB: 1955  PCP: Jyoti Mauricio, APRN - CNP    Reason for Admission:     Subjective:       Patient seen and examined at the bedside: No acute issues overnight, currently intubated, in A-fib with heart rate ~ 90's    Medications:   Scheduled Meds:   amiodarone  400 mg Oral BID    Followed by    [START ON 9/27/2024] amiodarone  200 mg Oral BID    lactobacillus  1 capsule Per NG tube Daily with breakfast    metoprolol tartrate  37.5 mg Oral BID    vancomycin  1,250 mg IntraVENous Q18H    midodrine  15 mg Oral TID WC    vancomycin (VANCOCIN) intermittent dosing (placeholder)   Other RX Placeholder    warfarin placeholder: dosing by pharmacy   Other RX Placeholder    insulin lispro  0-8 Units SubCUTAneous Q6H    sodium chloride flush  5-40 mL IntraVENous 2 times per day    ipratropium 0.5 mg-albuterol 2.5 mg  1 Dose Inhalation Q4H RT    insulin lispro  0-4 Units SubCUTAneous Nightly    pantoprazole (PROTONIX) 40 mg in sodium chloride (PF) 0.9 % 10 mL injection  40 mg IntraVENous Daily    insulin glargine  8 Units SubCUTAneous Nightly     Continuous Infusions:   sodium chloride 10 mL/hr at 09/22/24 1415    dextrose      heparin (PORCINE) Infusion 11 Units/kg/hr (09/23/24 0246)     CBC:   Recent Labs     09/21/24  0640 09/22/24  0656   WBC 10.8 8.2   HGB 8.4* 7.7*    253     BMP:    Recent Labs     09/21/24  0640 09/22/24  0656    140   K 3.9 3.8    100   CO2 28 34*   BUN 15 15   CREATININE 0.7 0.7   GLUCOSE 178* 166*     Hepatic:   No results for input(s): \"AST\", \"ALT\", \"BILITOT\", \"ALKPHOS\" in the last 72 hours.    Invalid input(s): \"ALB\"    Troponin: No results for input(s): \"TROPONINI\" in the last 72 hours.  BNP: No results for input(s): \"BNP\" in the last 72 hours.  Lipids: No results for input(s): \"CHOL\", \"HDL\"  in the last 72 hours.    Invalid input(s): \"LDLCALCU\"  INR:   Recent Labs     09/21/24  0640 09/22/24  0656   INR 1.3 1.4       Objective:   Vitals: BP (!) 119/54   Pulse (!) 119   Temp 99.2 °F (37.3 °C) (Axillary)   Resp 14   Ht 1.651 m (5' 5\")   Wt (!) 208.2 kg (459 lb)   LMP 07/16/2014   SpO2 96%   BMI 76.38 kg/m²   General appearance: alert and cooperative with exam  HEENT: Normocephalic, atraumatic + trach   Neck: no carotid bruit, no JVD, trachea midline and thyroid not enlarged  Lungs: clear to auscultation bilaterally  Heart: irregular rate and rhythm, S1, S2 normal, no murmur  Abdomen: soft, bowel sounds normal  Extremities: no edema or swelling       EKG:    Date: 09/20/24  Reading: No acute ischemia        LAST ECHO: 2015  Date:  Findings Summary:Impression: Abnormal study.     Large fixed perfusion defect in the LAD territory consistent with infarct.  Small fixed perfusion defect in the inferobasal segment suggesting diaphragmatic attenuation artifact.  LVEF 60%.        LAST Stress Test:  4/2024:     Date of last ST:  Major Findings:    Left Ventricle: Normal left ventricular systolic function with a visually estimated EF of 55 - 60%. Left ventricle size is normal. Mildly increased wall thickness. Normal wall motion. Normal diastolic function.    Right Ventricle: Right ventricle is dilated. Normal systolic function.    Aortic Valve: No regurgitation. No stenosis.    Mitral Valve: Mild to moderate regurgitation.    Right Atrium: Right atrium is dilated.    Pericardium: No pericardial effusion.    Image quality is poor. Technically difficult study due to patient's body habitus and procedure performed with the patient in a supine position.        LAST Cardiac Angiography:.   None  Assessment:   Atrial fibrillation with RVR  Chronic hypoxic hypercapnic respiratory failure due to obesity hypoventilation syndrome s/p Trach revision 09/16/2024  Morbid obesity  Pulmonary Hypertension  H/o DVT/PE on

## 2024-09-23 NOTE — PROCEDURES
INSTRUMENTAL SWALLOW REPORT  MODIFIED BARIUM SWALLOW    NAME: Shazia ROGERS   : 1955  MRN: [id number]       Date of Eval: 2024        Radiologist: Dr. Salazar    Past Medical History:  has a past medical history of Anxiety, Asthma, Atrial fibrillation (HCC), Bronchitis, COPD (chronic obstructive pulmonary disease) (HCC), DDD (degenerative disc disease), lumbar, Depression, Diabetes mellitus (HCC), Elevated glucose, Headache(784.0), Hernia of abdominal cavity, HH (hiatus hernia), Hyperlipemia, mixed, Hypertension, Osteoarthritis, Right femoral vein DVT (HCC), and Uterine hyperplasia.  Past Surgical History:  has a past surgical history that includes Dilation and curettage of uterus (10/08 and ); Breast reduction surgery (1997); Breast reduction surgery (1997); Tympanostomy tube placement (1967); Tonsillectomy and adenoidectomy (); Hysterectomy (7/17/15); tracheostomy (N/A, 2024); and tracheostomy (N/A, 2024).    Type of Study: Initial MBS    Patient Complaints/Reason for Referral:  Shazia ROGERS was referred for a MBS to assess the efficiency of his/her swallow function, assess for aspiration, and to make recommendations regarding safe dietary consistencies, effective compensatory strategies, and safe eating environment.    Behavior/Cognition/Vision/Hearing:  Behavior/Cognition: Alert;Cooperative  Vision: Impaired  Hearing: Within functional limits    Impressions:  Pt. With trace penetration, no aspiration with puree.  Mild vallecula residual noted. + trace penetration, no aspiration with soft solid.  Min residual noted.  + trace penetration, no aspiration with regular solid.  Mild vallecula and pyriform residual noted.  + penetration, no aspiration with nectar thick liquid.  Min residual noted.  + penetration, no aspiration with thin liquid.  Min residual noted.  Extended oral manipulation for all consistencies.  Premature spill noted with puree, soft and regular solid

## 2024-09-24 PROBLEM — M79.89 LEFT UPPER EXTREMITY SWELLING: Status: ACTIVE | Noted: 2024-09-24

## 2024-09-24 LAB
ANTI-XA UNFRAC HEPARIN: 0.47 IU/L
GLUCOSE BLD-MCNC: 151 MG/DL (ref 65–105)
GLUCOSE BLD-MCNC: 152 MG/DL (ref 65–105)
GLUCOSE BLD-MCNC: 153 MG/DL (ref 65–105)
GLUCOSE BLD-MCNC: 158 MG/DL (ref 65–105)
GLUCOSE BLD-MCNC: 166 MG/DL (ref 65–105)
GLUCOSE BLD-MCNC: 172 MG/DL (ref 65–105)
GLUCOSE BLD-MCNC: 175 MG/DL (ref 65–105)
INR PPP: 1.4
VANCOMYCIN SERPL-MCNC: 19.5 UG/ML (ref 5–40)

## 2024-09-24 PROCEDURE — 6370000000 HC RX 637 (ALT 250 FOR IP)

## 2024-09-24 PROCEDURE — 6370000000 HC RX 637 (ALT 250 FOR IP): Performed by: STUDENT IN AN ORGANIZED HEALTH CARE EDUCATION/TRAINING PROGRAM

## 2024-09-24 PROCEDURE — 6360000002 HC RX W HCPCS

## 2024-09-24 PROCEDURE — 80202 ASSAY OF VANCOMYCIN: CPT

## 2024-09-24 PROCEDURE — 36415 COLL VENOUS BLD VENIPUNCTURE: CPT

## 2024-09-24 PROCEDURE — 85610 PROTHROMBIN TIME: CPT

## 2024-09-24 PROCEDURE — 2580000003 HC RX 258

## 2024-09-24 PROCEDURE — 6370000000 HC RX 637 (ALT 250 FOR IP): Performed by: INTERNAL MEDICINE

## 2024-09-24 PROCEDURE — 6360000002 HC RX W HCPCS: Performed by: STUDENT IN AN ORGANIZED HEALTH CARE EDUCATION/TRAINING PROGRAM

## 2024-09-24 PROCEDURE — 94003 VENT MGMT INPAT SUBQ DAY: CPT

## 2024-09-24 PROCEDURE — 2060000000 HC ICU INTERMEDIATE R&B

## 2024-09-24 PROCEDURE — 94640 AIRWAY INHALATION TREATMENT: CPT

## 2024-09-24 PROCEDURE — 99233 SBSQ HOSP IP/OBS HIGH 50: CPT | Performed by: NURSE PRACTITIONER

## 2024-09-24 PROCEDURE — 93971 EXTREMITY STUDY: CPT | Performed by: STUDENT IN AN ORGANIZED HEALTH CARE EDUCATION/TRAINING PROGRAM

## 2024-09-24 PROCEDURE — 85520 HEPARIN ASSAY: CPT

## 2024-09-24 PROCEDURE — 99233 SBSQ HOSP IP/OBS HIGH 50: CPT | Performed by: INTERNAL MEDICINE

## 2024-09-24 PROCEDURE — 2580000003 HC RX 258: Performed by: STUDENT IN AN ORGANIZED HEALTH CARE EDUCATION/TRAINING PROGRAM

## 2024-09-24 PROCEDURE — 99231 SBSQ HOSP IP/OBS SF/LOW 25: CPT

## 2024-09-24 PROCEDURE — 99232 SBSQ HOSP IP/OBS MODERATE 35: CPT | Performed by: INTERNAL MEDICINE

## 2024-09-24 RX ORDER — WARFARIN SODIUM 10 MG/1
10 TABLET ORAL
Status: COMPLETED | OUTPATIENT
Start: 2024-09-24 | End: 2024-09-24

## 2024-09-24 RX ADMIN — SODIUM CHLORIDE, PRESERVATIVE FREE 10 ML: 5 INJECTION INTRAVENOUS at 20:43

## 2024-09-24 RX ADMIN — WARFARIN SODIUM 10 MG: 10 TABLET ORAL at 18:40

## 2024-09-24 RX ADMIN — METOPROLOL TARTRATE 37.5 MG: 25 TABLET, FILM COATED ORAL at 20:33

## 2024-09-24 RX ADMIN — IPRATROPIUM BROMIDE AND ALBUTEROL SULFATE 1 DOSE: 2.5; .5 SOLUTION RESPIRATORY (INHALATION) at 09:03

## 2024-09-24 RX ADMIN — INSULIN GLARGINE 8 UNITS: 100 INJECTION, SOLUTION SUBCUTANEOUS at 20:33

## 2024-09-24 RX ADMIN — IPRATROPIUM BROMIDE AND ALBUTEROL SULFATE 1 DOSE: 2.5; .5 SOLUTION RESPIRATORY (INHALATION) at 19:29

## 2024-09-24 RX ADMIN — AMIODARONE HYDROCHLORIDE 400 MG: 200 TABLET ORAL at 20:33

## 2024-09-24 RX ADMIN — SODIUM CHLORIDE, PRESERVATIVE FREE 40 MG: 5 INJECTION INTRAVENOUS at 08:10

## 2024-09-24 RX ADMIN — SODIUM CHLORIDE 1250 MG: 9 INJECTION, SOLUTION INTRAVENOUS at 15:05

## 2024-09-24 RX ADMIN — IPRATROPIUM BROMIDE AND ALBUTEROL SULFATE 1 DOSE: 2.5; .5 SOLUTION RESPIRATORY (INHALATION) at 23:27

## 2024-09-24 RX ADMIN — SODIUM CHLORIDE, PRESERVATIVE FREE 10 ML: 5 INJECTION INTRAVENOUS at 08:10

## 2024-09-24 RX ADMIN — Medication 1 CAPSULE: at 08:09

## 2024-09-24 RX ADMIN — HEPARIN SODIUM 11 UNITS/KG/HR: 10000 INJECTION, SOLUTION INTRAVENOUS at 14:52

## 2024-09-24 RX ADMIN — METOPROLOL TARTRATE 37.5 MG: 25 TABLET, FILM COATED ORAL at 08:09

## 2024-09-24 RX ADMIN — IPRATROPIUM BROMIDE AND ALBUTEROL SULFATE 1 DOSE: 2.5; .5 SOLUTION RESPIRATORY (INHALATION) at 16:03

## 2024-09-24 RX ADMIN — OXYCODONE HYDROCHLORIDE 5 MG: 5 TABLET ORAL at 22:20

## 2024-09-24 RX ADMIN — AMIODARONE HYDROCHLORIDE 400 MG: 200 TABLET ORAL at 08:09

## 2024-09-24 RX ADMIN — IPRATROPIUM BROMIDE AND ALBUTEROL SULFATE 1 DOSE: 2.5; .5 SOLUTION RESPIRATORY (INHALATION) at 12:11

## 2024-09-24 ASSESSMENT — PAIN SCALES - GENERAL
PAINLEVEL_OUTOF10: 2
PAINLEVEL_OUTOF10: 6

## 2024-09-24 ASSESSMENT — PULMONARY FUNCTION TESTS
PIF_VALUE: 30
PIF_VALUE: 25
PIF_VALUE: 12
PIF_VALUE: 21
PIF_VALUE: 24
PIF_VALUE: 17
PIF_VALUE: 17
PIF_VALUE: 35
PIF_VALUE: 17
PIF_VALUE: 27
PIF_VALUE: 25

## 2024-09-24 NOTE — PLAN OF CARE
Problem: Chronic Conditions and Co-morbidities  Goal: Patient's chronic conditions and co-morbidity symptoms are monitored and maintained or improved  9/24/2024 0853 by Brittny Ocampo RN  Outcome: Progressing  9/23/2024 2322 by Joyce Taylor RN  Outcome: Progressing     Problem: Discharge Planning  Goal: Discharge to home or other facility with appropriate resources  9/24/2024 0853 by Brittny Ocampo RN  Outcome: Progressing  9/23/2024 2322 by Joyce Taylor RN  Outcome: Progressing     Problem: Pain  Goal: Verbalizes/displays adequate comfort level or baseline comfort level  9/24/2024 0853 by Brittny Ocampo RN  Outcome: Progressing  9/23/2024 2322 by Joyce Taylor RN  Outcome: Progressing     Problem: Skin/Tissue Integrity  Goal: Absence of new skin breakdown  Description: 1.  Monitor for areas of redness and/or skin breakdown  2.  Assess vascular access sites hourly  3.  Every 4-6 hours minimum:  Change oxygen saturation probe site  4.  Every 4-6 hours:  If on nasal continuous positive airway pressure, respiratory therapy assess nares and determine need for appliance change or resting period.  9/24/2024 0853 by Brittyn Ocampo RN  Outcome: Progressing  9/23/2024 2322 by Joyce Taylor RN  Outcome: Progressing     Problem: ABCDS Injury Assessment  Goal: Absence of physical injury  9/24/2024 0853 by Brittny Ocampo RN  Outcome: Progressing  9/23/2024 2322 by Joyce Taylor RN  Outcome: Progressing     Problem: Safety - Adult  Goal: Free from fall injury  9/24/2024 0853 by Brittny Ocampo RN  Outcome: Progressing  9/23/2024 2322 by Joyce Taylor RN  Outcome: Progressing     Problem: Respiratory - Adult  Goal: Achieves optimal ventilation and oxygenation  9/24/2024 0853 by Brittny Ocampo RN  Outcome: Progressing  9/23/2024 2322 by Joyce Taylor RN  Outcome: Progressing  Flowsheets (Taken 9/23/2024 1132 by Linh Titus ANEESH)  Achieves optimal ventilation and oxygenation:   Assess for changes in respiratory status

## 2024-09-24 NOTE — PLAN OF CARE
Problem: Respiratory - Adult  Goal: Achieves optimal ventilation and oxygenation  9/24/2024 1957 by Sydney Caceres RCP  Outcome: Progressing

## 2024-09-24 NOTE — PROGRESS NOTES
[facility]   Progress Note                   Date:   9/24/2024  Patient name: Shazia ROGERS  Date of admission:  9/14/2024  1:39 PM  MRN:   [id number]  YOB: 1955  PCP: Jyoti Mauricio, APRN - CNP    Reason for Admission:     Subjective:       Pt seen and examined in the room.  Patient resting in bed. Pt denies any CP. Patient with trach attached to vent. Patient reports slight sob.  Labs, vitals and tele reviewed- A-fib 79  Heparin drip infusing    Medications:   Scheduled Meds:   amiodarone  400 mg Oral BID    Followed by    [START ON 9/27/2024] amiodarone  200 mg Oral BID    lactobacillus  1 capsule Per NG tube Daily with breakfast    metoprolol tartrate  37.5 mg Oral BID    vancomycin  1,250 mg IntraVENous Q18H    midodrine  15 mg Oral TID WC    vancomycin (VANCOCIN) intermittent dosing (placeholder)   Other RX Placeholder    warfarin placeholder: dosing by pharmacy   Other RX Placeholder    insulin lispro  0-8 Units SubCUTAneous Q6H    sodium chloride flush  5-40 mL IntraVENous 2 times per day    ipratropium 0.5 mg-albuterol 2.5 mg  1 Dose Inhalation Q4H RT    insulin lispro  0-4 Units SubCUTAneous Nightly    pantoprazole (PROTONIX) 40 mg in sodium chloride (PF) 0.9 % 10 mL injection  40 mg IntraVENous Daily    insulin glargine  8 Units SubCUTAneous Nightly     Continuous Infusions:   sodium chloride 10 mL/hr at 09/22/24 1415    dextrose      heparin (PORCINE) Infusion 11 Units/kg/hr (09/23/24 1309)     CBC:   Recent Labs     09/22/24  0656 09/23/24  1003   WBC 8.2 7.1   HGB 7.7* 7.7*    293     BMP:    Recent Labs     09/22/24  0656 09/23/24  1003    138   K 3.8 4.0    100   CO2 34* 28   BUN 15 13   CREATININE 0.7 0.6   GLUCOSE 166* 158*     Hepatic:   No results for input(s): \"AST\", \"ALT\", \"BILITOT\", \"ALKPHOS\" in the last 72 hours.    Invalid input(s): \"ALB\"    Troponin: No results for input(s): \"TROPONINI\" in the last 72 hours.  BNP: No results for  revision 09/16/2024  Morbid obesity  Pulmonary Hypertension  H/o DVT/PE on coumadin at home  Chronic HFpEF    Plan:   Continue Afib with PO amiodarone and BB.  Continue IV heparin, with pharmacy to dose warfarin.  Stop heparin once INR is therapeutic. INR 1.4 this AM  Continue Lopressor 37.5 mg twice daily, uptitrate as tolerated.  Digoxin discontinued 09/21/2024 due to concern of bradycardia with polypharmacy.  BP stable. Continue Midodrine 15 TID.  Will continue to follow    Electronically signed by ARACELIS Mcgregor CNP on 9/24/2024 at 11:01 AM    [facility]  [phone]

## 2024-09-24 NOTE — PLAN OF CARE
Problem: Chronic Conditions and Co-morbidities  Goal: Patient's chronic conditions and co-morbidity symptoms are monitored and maintained or improved  9/23/2024 2322 by Joyce Taylor RN  Outcome: Progressing  9/23/2024 1045 by Gabby Rogers RN  Outcome: Progressing     Problem: Discharge Planning  Goal: Discharge to home or other facility with appropriate resources  9/23/2024 2322 by Joyce Taylor RN  Outcome: Progressing  9/23/2024 1045 by Gabby Rogers RN  Outcome: Progressing     Problem: Pain  Goal: Verbalizes/displays adequate comfort level or baseline comfort level  Outcome: Progressing     Problem: Skin/Tissue Integrity  Goal: Absence of new skin breakdown  Description: 1.  Monitor for areas of redness and/or skin breakdown  2.  Assess vascular access sites hourly  3.  Every 4-6 hours minimum:  Change oxygen saturation probe site  4.  Every 4-6 hours:  If on nasal continuous positive airway pressure, respiratory therapy assess nares and determine need for appliance change or resting period.  Outcome: Progressing     Problem: ABCDS Injury Assessment  Goal: Absence of physical injury  Outcome: Progressing     Problem: Safety - Adult  Goal: Free from fall injury  Outcome: Progressing     Problem: Respiratory - Adult  Goal: Achieves optimal ventilation and oxygenation  Outcome: Progressing  Flowsheets (Taken 9/23/2024 1132 by Linh Titus, MATT)  Achieves optimal ventilation and oxygenation:   Assess for changes in respiratory status   Assess for changes in mentation and behavior   Position to facilitate oxygenation and minimize respiratory effort   Respiratory therapy support as indicated   Assess the need for suctioning and aspirate as needed   Assess and instruct to report shortness of breath or any respiratory difficulty   Encourage broncho-pulmonary hygiene including cough, deep breathe, incentive spirometry   Oxygen supplementation based on oxygen saturation or arterial blood gases     Problem:

## 2024-09-24 NOTE — PROGRESS NOTES
09/23/24 2246   Vent Information   Vent Mode (S)  CPAP/PS   Ventilator Settings   PEEP/CPAP (cmH2O) (S)  8   FiO2  (S)  40 %   Pressure Support (cm H2O) (S)  8 cm H2O     Patient placed on CPAP/PS d/t dyssynchrony and discomfort/ high peak pressures that did not resolve with suction. Pt resting comfortably at this time/ communicating that she is more comfortable on CPAP/PS.

## 2024-09-24 NOTE — CARE COORDINATION
Plan is to return to [facility] and are holding bed. Just waiting on discharge order.

## 2024-09-24 NOTE — PROGRESS NOTES
Pharmacy Note  Warfarin Consult follow-up      Recent Labs     09/24/24  0536   INR 1.4     Recent Labs     09/22/24  0656 09/23/24  1003   HGB 7.7* 7.7*   HCT 27.1* 27.1*    293       Significant Drug-Drug Interactions:  New warfarin drug-drug interactions: none  Discontinued drug-drug interactions: none      Notes:                   Will give a dose of 10 mg today    Daily PT/INR while inpatient.

## 2024-09-24 NOTE — PROGRESS NOTES
[facility]  Internal Medicine Teaching Residency Program  Inpatient Daily Progress Note  ______________________________________________________________________________    Patient: Shazia ROGERS  YOB: 1955   MRN:[id number]    Acct: [id number]     Room: 0408/0408-01  Admit date: 9/14/2024  Today's date: 09/24/24  Number of days in the hospital: 10    SUBJECTIVE   CC: No chief complaint on file.    Pt examined at bedside. Chart & results reviewed.   -Vitals stable  -Patient underwent tracheostomy revision yesterday  -Patient passed the barium swallow yesterday but reported to be having difficulty taking medications at night        ROS:  General ROS: Completed and except as mentioned above were negative   HEENT ROS: Completed and except as mentioned above were negative   Allergy and Immunology ROS:  Completed and except as mentioned above were negative  Hematological and Lymphatic ROS:  Completed and except as mentioned above were negative  Respiratory ROS:  Completed and except as mentioned above were negative  Cardiovascular ROS:  Completed and except as mentioned above were negative  Gastrointestinal ROS: Completed and except as mentioned above were negative  Genito-Urinary ROS:  Completed and except as mentioned above were negative  Musculoskeletal ROS:  Completed and except as mentioned above were negative  Neurological ROS:  Completed and except as mentioned above were negative  Skin & Dermatological ROS:  Completed and except as mentioned above were negative  Psychological ROS:  Completed and except as mentioned above were negative  BRIEF HISTORY   Per chart, Patient, 68 years old female, with past medical history of.  - SHWETA/OHS  - Chronic hypoxic respiratory failure status post tracheostomy placement in 2024 (Tracheostomy was placed in July 2024 for chronic hypoxic respiratory failure and noncompliance with BiPAP with worsening oxygenation status.

## 2024-09-24 NOTE — PROGRESS NOTES
respiratory failure with hypoxia and hypercapnia (HCC)    Diabetes mellitus type 2, noninsulin dependent (HCC)    Urinary tract infection due to ESBL Klebsiella    Pulmonary HTN (HCC)    Urinary retention    History of DVT (deep vein thrombosis)    Bilateral leg edema    CKD (chronic kidney disease) stage 3, GFR 30-59 ml/min (HCC)    Class 3 severe obesity due to excess calories with serious comorbidity and body mass index (BMI) of 60.0 to 69.9 in adult (HCC)    Osteopenia    Balance problems    Endometrial adenocarcinoma (HCC)    Dyspnea    Mild asthma    Open abdominal wall wound    Ventral hernia without obstruction or gangrene    Abdominal wall skin ulcer, with fat layer exposed (HCC)    Supratherapeutic INR    COVID    Bleeding from wound    Traumatic hematoma of left knee    Cellulitis of abdominal wall    Cytopenia    Increased oxygen demand    Pancytopenia (HCC)    Thrombocytopenia (HCC)    Shortness of breath    SOB (shortness of breath)    COPD exacerbation (HCC)    Acute on chronic diastolic heart failure (HCC)    Acute cystitis without hematuria    Metabolic encephalopathy    (HFpEF) heart failure with preserved ejection fraction (HCC)    A-fib (HCC)    Acute respiratory failure with hypoxia and hypercarbia (HCC)    Type 2 respiratory failure (HCC)    Multiple drug resistant organism (MDRO) culture positive    Allergy to multiple antibiotics    MRSA carrier    Infection of tracheostomy stoma (HCC)    Respiratory failure (HCC)    Acute and chr resp failure, unsp w hypoxia or hypercapnia (HCC)    Malfunction of tracheostomy (HCC)    Acute on chronic hypoxic respiratory failure (HCC)    Ventilator-acquired pneumonia (HCC)    Tracheal stenosis    SHWETA (obstructive sleep apnea)    Obesity hypoventilation syndrome (HCC)    Other tracheostomy complication (HCC)    Paroxysmal atrial fibrillation with RVR (HCC)       ASSESSMENT:    Acute on chronic hypoxic respiratory failure, trach since July 2024  SHWETA  OHS  MRSA  today, chart reviewed I have reviewed the ventilator setting labs reviewed and medications seen.  Patient was arousable when I saw her she was comfortable.  According to available review patient last night was placed back on CPAP/pressure support as apparently patient was having problem on assist-control.  Currently she is on CPAP/pressure support of 8/8/40%.  She is supposed to be on nocturnal ventilation and ventilator support of rate of 12 tidal volume 460 PEEP is 8 and FiO2 40% I have placed her for 5 minutes and she did well at that time.  Tracheostomy site look okay there is not much drainage redness at the inferior portion continue to improve.  Continue vancomycin for 7 days.  Continue CPAP during the daytime and patient to be placed on nocturnal ventilation at night.  Continue tracheostomy care monitor tracheostomy secretions.  Okay for swallow evaluation and speech therapy follow-up.  She is on heparin drip and on Coumadin follow-up INR currently 1.4.    Discussed with nursing staff, treatment and plan discussed.      Please note that this chart was generated using voice recognition Dragon dictation software. Although every effort was made to ensure the accuracy of this automated transcription, some errors in transcription may have occurred.     Colin Gilbert MD  9/24/2024 3:26 PM

## 2024-09-25 LAB
ANION GAP SERPL CALCULATED.3IONS-SCNC: 8 MMOL/L (ref 9–16)
ANTI-XA UNFRAC HEPARIN: 0.44 IU/L
BASOPHILS # BLD: 0.07 K/UL (ref 0–0.2)
BASOPHILS NFR BLD: 1 % (ref 0–2)
BUN SERPL-MCNC: 12 MG/DL (ref 8–23)
CALCIUM SERPL-MCNC: 9.4 MG/DL (ref 8.6–10.4)
CHLORIDE SERPL-SCNC: 99 MMOL/L (ref 98–107)
CO2 SERPL-SCNC: 34 MMOL/L (ref 20–31)
CREAT SERPL-MCNC: 0.7 MG/DL (ref 0.5–0.9)
EOSINOPHIL # BLD: 0.35 K/UL (ref 0–0.44)
EOSINOPHILS RELATIVE PERCENT: 5 % (ref 1–4)
ERYTHROCYTE [DISTWIDTH] IN BLOOD BY AUTOMATED COUNT: 20.6 % (ref 11.8–14.4)
GFR, ESTIMATED: >90 ML/MIN/1.73M2
GLUCOSE BLD-MCNC: 146 MG/DL (ref 65–105)
GLUCOSE BLD-MCNC: 148 MG/DL (ref 65–105)
GLUCOSE BLD-MCNC: 151 MG/DL (ref 65–105)
GLUCOSE BLD-MCNC: 161 MG/DL (ref 65–105)
GLUCOSE BLD-MCNC: 174 MG/DL (ref 65–105)
GLUCOSE SERPL-MCNC: 156 MG/DL (ref 74–99)
HCT VFR BLD AUTO: 28.1 % (ref 36.3–47.1)
HGB BLD-MCNC: 8.1 G/DL (ref 11.9–15.1)
IMM GRANULOCYTES # BLD AUTO: 0.07 K/UL (ref 0–0.3)
IMM GRANULOCYTES NFR BLD: 1 %
INR PPP: 1.7
LYMPHOCYTES NFR BLD: 1.17 K/UL (ref 1.1–3.7)
LYMPHOCYTES RELATIVE PERCENT: 17 % (ref 24–43)
MAGNESIUM SERPL-MCNC: 1.5 MG/DL (ref 1.6–2.4)
MCH RBC QN AUTO: 26.4 PG (ref 25.2–33.5)
MCHC RBC AUTO-ENTMCNC: 28.8 G/DL (ref 28.4–34.8)
MCV RBC AUTO: 91.5 FL (ref 82.6–102.9)
MONOCYTES NFR BLD: 0.55 K/UL (ref 0.1–1.2)
MONOCYTES NFR BLD: 8 % (ref 3–12)
MORPHOLOGY: ABNORMAL
MORPHOLOGY: ABNORMAL
NEUTROPHILS NFR BLD: 68 % (ref 36–65)
NEUTS SEG NFR BLD: 4.69 K/UL (ref 1.5–8.1)
NRBC BLD-RTO: 0.4 PER 100 WBC
PLATELET # BLD AUTO: 214 K/UL (ref 138–453)
PMV BLD AUTO: 10 FL (ref 8.1–13.5)
POTASSIUM SERPL-SCNC: 3.5 MMOL/L (ref 3.7–5.3)
PROTHROMBIN TIME: 19.8 SEC (ref 11.7–14.9)
RBC # BLD AUTO: 3.07 M/UL (ref 3.95–5.11)
SODIUM SERPL-SCNC: 141 MMOL/L (ref 136–145)
WBC OTHER # BLD: 6.9 K/UL (ref 3.5–11.3)

## 2024-09-25 PROCEDURE — 6370000000 HC RX 637 (ALT 250 FOR IP)

## 2024-09-25 PROCEDURE — 85520 HEPARIN ASSAY: CPT

## 2024-09-25 PROCEDURE — 6360000002 HC RX W HCPCS

## 2024-09-25 PROCEDURE — 80048 BASIC METABOLIC PNL TOTAL CA: CPT

## 2024-09-25 PROCEDURE — 94003 VENT MGMT INPAT SUBQ DAY: CPT

## 2024-09-25 PROCEDURE — 2580000003 HC RX 258

## 2024-09-25 PROCEDURE — 85610 PROTHROMBIN TIME: CPT

## 2024-09-25 PROCEDURE — 2580000003 HC RX 258: Performed by: STUDENT IN AN ORGANIZED HEALTH CARE EDUCATION/TRAINING PROGRAM

## 2024-09-25 PROCEDURE — 6370000000 HC RX 637 (ALT 250 FOR IP): Performed by: STUDENT IN AN ORGANIZED HEALTH CARE EDUCATION/TRAINING PROGRAM

## 2024-09-25 PROCEDURE — 36415 COLL VENOUS BLD VENIPUNCTURE: CPT

## 2024-09-25 PROCEDURE — 6360000002 HC RX W HCPCS: Performed by: STUDENT IN AN ORGANIZED HEALTH CARE EDUCATION/TRAINING PROGRAM

## 2024-09-25 PROCEDURE — 99239 HOSP IP/OBS DSCHRG MGMT >30: CPT | Performed by: INTERNAL MEDICINE

## 2024-09-25 PROCEDURE — 99233 SBSQ HOSP IP/OBS HIGH 50: CPT | Performed by: INTERNAL MEDICINE

## 2024-09-25 PROCEDURE — 85025 COMPLETE CBC W/AUTO DIFF WBC: CPT

## 2024-09-25 PROCEDURE — 99233 SBSQ HOSP IP/OBS HIGH 50: CPT | Performed by: NURSE PRACTITIONER

## 2024-09-25 PROCEDURE — 97162 PT EVAL MOD COMPLEX 30 MIN: CPT

## 2024-09-25 PROCEDURE — 2060000000 HC ICU INTERMEDIATE R&B

## 2024-09-25 PROCEDURE — 92610 EVALUATE SWALLOWING FUNCTION: CPT

## 2024-09-25 PROCEDURE — 99232 SBSQ HOSP IP/OBS MODERATE 35: CPT | Performed by: INTERNAL MEDICINE

## 2024-09-25 PROCEDURE — 82947 ASSAY GLUCOSE BLOOD QUANT: CPT

## 2024-09-25 PROCEDURE — 97535 SELF CARE MNGMENT TRAINING: CPT

## 2024-09-25 PROCEDURE — 97530 THERAPEUTIC ACTIVITIES: CPT

## 2024-09-25 PROCEDURE — 97166 OT EVAL MOD COMPLEX 45 MIN: CPT

## 2024-09-25 PROCEDURE — NBSRV NON-BILLABLE SERVICE: Performed by: INTERNAL MEDICINE

## 2024-09-25 PROCEDURE — 94640 AIRWAY INHALATION TREATMENT: CPT

## 2024-09-25 PROCEDURE — 83735 ASSAY OF MAGNESIUM: CPT

## 2024-09-25 RX ORDER — MIDODRINE HYDROCHLORIDE 5 MG/1
15 TABLET ORAL
Qty: 90 TABLET | Refills: 3 | Status: SHIPPED | OUTPATIENT
Start: 2024-09-26

## 2024-09-25 RX ORDER — METOPROLOL TARTRATE 37.5 MG/1
37.5 TABLET, FILM COATED ORAL 2 TIMES DAILY
Qty: 60 TABLET | Refills: 3 | Status: SHIPPED | OUTPATIENT
Start: 2024-09-25

## 2024-09-25 RX ORDER — INSULIN GLARGINE 100 [IU]/ML
8 INJECTION, SOLUTION SUBCUTANEOUS NIGHTLY
Qty: 10 ML | Refills: 3 | Status: SHIPPED | OUTPATIENT
Start: 2024-09-25

## 2024-09-25 RX ORDER — ENOXAPARIN SODIUM 100 MG/ML
1 INJECTION SUBCUTANEOUS 2 TIMES DAILY
Qty: 9.6 ML | Refills: 0 | Status: SHIPPED | OUTPATIENT
Start: 2024-09-25 | End: 2024-09-26

## 2024-09-25 RX ORDER — LACTOBACILLUS RHAMNOSUS GG 10B CELL
1 CAPSULE ORAL
Qty: 30 CAPSULE | Refills: 0 | Status: CANCELLED | OUTPATIENT
Start: 2024-09-26 | End: 2024-10-26

## 2024-09-25 RX ORDER — IPRATROPIUM BROMIDE AND ALBUTEROL SULFATE 2.5; .5 MG/3ML; MG/3ML
3 SOLUTION RESPIRATORY (INHALATION) EVERY 4 HOURS
Qty: 360 ML | Refills: 0 | Status: CANCELLED | OUTPATIENT
Start: 2024-09-25 | End: 2024-10-25

## 2024-09-25 RX ORDER — ENOXAPARIN SODIUM 100 MG/ML
1 INJECTION SUBCUTANEOUS 2 TIMES DAILY
Qty: 9.6 ML | Refills: 0 | Status: CANCELLED | OUTPATIENT
Start: 2024-09-25 | End: 2024-09-27

## 2024-09-25 RX ORDER — MIDODRINE HYDROCHLORIDE 5 MG/1
15 TABLET ORAL
Qty: 90 TABLET | Refills: 3 | Status: CANCELLED | OUTPATIENT
Start: 2024-09-25

## 2024-09-25 RX ORDER — AMIODARONE HYDROCHLORIDE 200 MG/1
TABLET ORAL
Qty: 68 TABLET | Refills: 0 | Status: CANCELLED | OUTPATIENT
Start: 2024-09-25 | End: 2024-10-27

## 2024-09-25 RX ORDER — LACTOBACILLUS RHAMNOSUS GG 10B CELL
1 CAPSULE ORAL
Qty: 14 CAPSULE | Refills: 0 | Status: SHIPPED | OUTPATIENT
Start: 2024-09-26 | End: 2024-10-10

## 2024-09-25 RX ORDER — MAGNESIUM SULFATE IN WATER 40 MG/ML
2000 INJECTION, SOLUTION INTRAVENOUS ONCE
Status: COMPLETED | OUTPATIENT
Start: 2024-09-25 | End: 2024-09-25

## 2024-09-25 RX ORDER — AMIODARONE HYDROCHLORIDE 200 MG/1
TABLET ORAL
Qty: 68 TABLET | Refills: 0 | Status: SHIPPED | OUTPATIENT
Start: 2024-09-25 | End: 2024-10-27

## 2024-09-25 RX ADMIN — SODIUM CHLORIDE, PRESERVATIVE FREE 40 MG: 5 INJECTION INTRAVENOUS at 07:57

## 2024-09-25 RX ADMIN — POLYETHYLENE GLYCOL 3350 17 G: 17 POWDER, FOR SOLUTION ORAL at 07:57

## 2024-09-25 RX ADMIN — IPRATROPIUM BROMIDE AND ALBUTEROL SULFATE 1 DOSE: 2.5; .5 SOLUTION RESPIRATORY (INHALATION) at 11:49

## 2024-09-25 RX ADMIN — IPRATROPIUM BROMIDE AND ALBUTEROL SULFATE 1 DOSE: 2.5; .5 SOLUTION RESPIRATORY (INHALATION) at 08:48

## 2024-09-25 RX ADMIN — IPRATROPIUM BROMIDE AND ALBUTEROL SULFATE 1 DOSE: 2.5; .5 SOLUTION RESPIRATORY (INHALATION) at 15:44

## 2024-09-25 RX ADMIN — HEPARIN SODIUM 11 UNITS/KG/HR: 10000 INJECTION, SOLUTION INTRAVENOUS at 01:51

## 2024-09-25 RX ADMIN — HEPARIN SODIUM 11 UNITS/KG/HR: 10000 INJECTION, SOLUTION INTRAVENOUS at 14:32

## 2024-09-25 RX ADMIN — SODIUM CHLORIDE, PRESERVATIVE FREE 10 ML: 5 INJECTION INTRAVENOUS at 07:57

## 2024-09-25 RX ADMIN — MIDODRINE HYDROCHLORIDE 15 MG: 5 TABLET ORAL at 15:51

## 2024-09-25 RX ADMIN — OXYCODONE HYDROCHLORIDE 5 MG: 5 TABLET ORAL at 07:57

## 2024-09-25 RX ADMIN — AMIODARONE HYDROCHLORIDE 400 MG: 200 TABLET ORAL at 21:04

## 2024-09-25 RX ADMIN — METOPROLOL TARTRATE 37.5 MG: 25 TABLET, FILM COATED ORAL at 07:56

## 2024-09-25 RX ADMIN — MIDODRINE HYDROCHLORIDE 15 MG: 5 TABLET ORAL at 12:11

## 2024-09-25 RX ADMIN — ACETAMINOPHEN 650 MG: 325 TABLET ORAL at 02:22

## 2024-09-25 RX ADMIN — INSULIN GLARGINE 8 UNITS: 100 INJECTION, SOLUTION SUBCUTANEOUS at 21:04

## 2024-09-25 RX ADMIN — IPRATROPIUM BROMIDE AND ALBUTEROL SULFATE 1 DOSE: 2.5; .5 SOLUTION RESPIRATORY (INHALATION) at 23:33

## 2024-09-25 RX ADMIN — METOPROLOL TARTRATE 37.5 MG: 25 TABLET, FILM COATED ORAL at 21:04

## 2024-09-25 RX ADMIN — OXYCODONE HYDROCHLORIDE 5 MG: 5 TABLET ORAL at 15:51

## 2024-09-25 RX ADMIN — SODIUM CHLORIDE 1250 MG: 9 INJECTION, SOLUTION INTRAVENOUS at 08:10

## 2024-09-25 RX ADMIN — IPRATROPIUM BROMIDE AND ALBUTEROL SULFATE 1 DOSE: 2.5; .5 SOLUTION RESPIRATORY (INHALATION) at 19:30

## 2024-09-25 RX ADMIN — AMIODARONE HYDROCHLORIDE 400 MG: 200 TABLET ORAL at 07:57

## 2024-09-25 RX ADMIN — OXYCODONE HYDROCHLORIDE 5 MG: 5 TABLET ORAL at 21:04

## 2024-09-25 RX ADMIN — OXYCODONE HYDROCHLORIDE 5 MG: 5 TABLET ORAL at 02:22

## 2024-09-25 RX ADMIN — SODIUM CHLORIDE, PRESERVATIVE FREE 10 ML: 5 INJECTION INTRAVENOUS at 21:13

## 2024-09-25 RX ADMIN — IPRATROPIUM BROMIDE AND ALBUTEROL SULFATE 1 DOSE: 2.5; .5 SOLUTION RESPIRATORY (INHALATION) at 02:56

## 2024-09-25 RX ADMIN — Medication 1 CAPSULE: at 07:57

## 2024-09-25 RX ADMIN — POTASSIUM BICARBONATE 40 MEQ: 782 TABLET, EFFERVESCENT ORAL at 14:31

## 2024-09-25 RX ADMIN — MAGNESIUM SULFATE HEPTAHYDRATE 2000 MG: 40 INJECTION, SOLUTION INTRAVENOUS at 15:55

## 2024-09-25 ASSESSMENT — PULMONARY FUNCTION TESTS
PIF_VALUE: 20
PIF_VALUE: 32
PIF_VALUE: 26
PIF_VALUE: 16
PIF_VALUE: 29
PIF_VALUE: 21
PIF_VALUE: 17
PIF_VALUE: 16
PIF_VALUE: 17
PIF_VALUE: 24
PIF_VALUE: 25
PIF_VALUE: 17
PIF_VALUE: 16

## 2024-09-25 ASSESSMENT — PAIN SCALES - GENERAL
PAINLEVEL_OUTOF10: 6
PAINLEVEL_OUTOF10: 6
PAINLEVEL_OUTOF10: 5
PAINLEVEL_OUTOF10: 7
PAINLEVEL_OUTOF10: 8
PAINLEVEL_OUTOF10: 2

## 2024-09-25 ASSESSMENT — PAIN DESCRIPTION - LOCATION: LOCATION: BACK

## 2024-09-25 ASSESSMENT — PAIN SCALES - WONG BAKER
WONGBAKER_NUMERICALRESPONSE: NO HURT
WONGBAKER_NUMERICALRESPONSE: NO HURT

## 2024-09-25 NOTE — PROGRESS NOTES
[facility]  Internal Medicine Teaching Residency Program  Inpatient Daily Progress Note  ______________________________________________________________________________    Patient: Shazia ROGERS  YOB: 1955   MRN:[id number]    Acct: [id number]     Room: 0408/0408-01  Admit date: 9/14/2024  Today's date: 09/25/24  Number of days in the hospital: 11    SUBJECTIVE   CC: No chief complaint on file.    Pt examined at bedside. Chart & results reviewed.   -Vitals stable  -INR is 1.7, being bridged to warfarin   -Patient had coughing spells while taking pills overnight, patient denied speech evaluation today  -POCG 174 this AM, on 8 u of Lantus      ROS:  General ROS: Completed and except as mentioned above were negative   HEENT ROS: Completed and except as mentioned above were negative   Allergy and Immunology ROS:  Completed and except as mentioned above were negative  Hematological and Lymphatic ROS:  Completed and except as mentioned above were negative  Respiratory ROS:  Completed and except as mentioned above were negative  Cardiovascular ROS:  Completed and except as mentioned above were negative  Gastrointestinal ROS: Completed and except as mentioned above were negative  Genito-Urinary ROS:  Completed and except as mentioned above were negative  Musculoskeletal ROS:  Completed and except as mentioned above were negative  Neurological ROS:  Completed and except as mentioned above were negative  Skin & Dermatological ROS:  Completed and except as mentioned above were negative  Psychological ROS:  Completed and except as mentioned above were negative  BRIEF HISTORY   Per chart, Patient, 68 years old female, with past medical history of.  - SHWETA/OHS  - Chronic hypoxic respiratory failure status post tracheostomy placement in 2024 (Tracheostomy was placed in July 2024 for chronic hypoxic respiratory failure and noncompliance with BiPAP with worsening oxygenation

## 2024-09-25 NOTE — PROGRESS NOTES
Pt refusing RN to turn her to assess skin. Educated on importance of turns and preventative skin dressing application. Patient still refuses at this time

## 2024-09-25 NOTE — PROGRESS NOTES
Occupational Therapy    [facility] [address] [facility]   Occupational Therapy Initial Evaluation    Patient Name: Shazia ROGERS        MRN: [id number]    : 1955    Date of Service: 2024    Initially presented to [facility] ED acute hypoxic respiratory failure on her went via her trach tube.  EMS was called, she was unable to maintain her oxygen saturation afterward required orotracheal intubation.  Afterwards it was noted that she has a distal obstruction in her tracheostomy cannula.     She was initially admitted to [facility] MICU.  Afterwards she became hypotensive and was started on IV pressors via her PICC line.  General surgery saw the patient at [facility] for her trach revision but they were unable to do so and considering that she will need ENT evaluation she was transferred to [facility] ICU.     On , patient underwent revision tracheotomy via bronchoscopy.  Afterwards patient remained stable but she was in A-fib with RVR.  Although she was taking her home medications but it was not rate controlled and she was started on amiodarone infusion which controlled her rate but rhythm was still A-fib.     Cardiology is also following the patient for her A-fib with RVR.    Discharge Recommendations  Discharge Recommendations: Patient would benefit from continued therapy after discharge    Assessment  Performance deficits / Impairments: Decreased endurance;Decreased coordination;Decreased ADL status;Decreased functional mobility ;Decreased sensation;Decreased posture;Decreased ROM;Decreased balance;Decreased strength;Decreased safe awareness;Decreased cognition  Assessment: Patient is normaly ablet to asssit with bed mobility and some UB self care, at this time demonstrates need for Max to Total assist. Demonstrates need for 24/7 skilled assistance to safely perform personal ADLs and mobility.  Prognosis: Poor  Decision Making: Medium Complexity  REQUIRES OT FOLLOW-UP:

## 2024-09-25 NOTE — PROGRESS NOTES
ENT/OTOLARYNGOLOGY SUBSEQUENT CARE PROGRESS NOTE     REASON FOR CARE: follow-up after tracheostomy revision September 16, 2024. She is still on CPAP and stable.      HISTORY OF PRESENT ILLNESS:   Shazia ROGERS is a 68 y.o. who is being seen for follow-up  after tracheostomy revision September 16, 2024. She is still on CPAP and stable.     Subjective: Awake, denies discomfort or respiratory distress at this time.    Pertinent Examination:   GENERAL: in no acute distress and obese, awake  HEAD: normocephalic and atraumatic  EYES: no eyelid swelling, no conjunctival injection or exudate, pupils equal round and reactive to light  MOUTH/THROAT: mucous membranes moist, no focal lesions, no tonsillar enlargement or exudate  NECK: Shiley 7 cuffed proximal XLT in place, Velcro tie in place and secure. Neck skin is intact with mild surrounding erythema. No bleeding   RESPIRATORY: Vent settings: Respiration rate: 12, tidal volume: 460, PEEP 8, FiO2 40%  Currently CPAP, tolerating CPAP trials.       RELEVANT LABS/STUDIES:   Additional data reviewed:    None    Procedures:    None    Surgical risk factors:  None     IMPRESSION AND RECOMMENDATIONS:   Shazia ROGERS is a 68 y.o. female with POD 8 after tracheostomy revision.           .       Plan:  --Keep an extra tracheostomy tube of the same size and one size smaller at bedside at all times (smaller size can be Shiley 6 cuffed)  -Please have empty syringe, and flexible suction with suction set up at bedside at all times  -maintain tracheostomy ties fastened at an appropriate tightness.   Please call with questions/ change in patient condition.     --------------------------------------------------  KERI Dawson  Pediatric Otolaryngology-Head and Neck Surgery  [facility]    Office  ph# [phone]    Also available in [employer]

## 2024-09-25 NOTE — PROGRESS NOTES
PULMONARY & CRITICAL CARE MEDICINE PROGRESS NOTE     Patient:  Shazia ROGERS  MRN: [id number]  Admit date: 9/14/2024  Primary Care Physician: Jyoti Mauricio, APRN - CNP  Consulting Physician: Kevin Cowan MD  CODE Status: Full Code  LOS: 11     SUBJECTIVE     CHIEF COMPLAINT/REASON FOR INITIAL CONSULT:    Patient is a 68-year-old female with past medical history of SHWETA/OHS, chronic hypoxic respiratory failure status post tracheostomy placement in July 2024, history of A-fib and DVT PE history on warfarin.    Patient initially presented to [facility] emergency department and found to have acute hypoxic respiratory failure.  EMS was unable to maintain her obtain her oxygen saturations after an orotracheal intubation.  Afterwards she was noted to have an obstruction in her tracheostomy cannula.  She is admitted to the medical ICU at [facility] and became hypotensive and was started on IV pressor support.  General surgery saw the patient for her trach revision but were unable to do so and stated she would need an ENT evaluation and she was further transferred to [facility] ICU.    On 9/16 patient underwent revision of her tracheostomy via bronchoscopy.  Afterwards she went into A-fib with RVR while on her home medications.  Amiodarone infusion was started.  Cardiology was consulted due to her A-fib with RVR and has started her on an increased dose of her oral amiodarone.  Patient was also found to have MRSA positive respiratory culture and is currently on vancomycin.    Patient was transferred out of the medical ICU on 9/22.    BRIEF HOSPITAL COURSE:  The patient is a 68 y.o. female     INTERVAL HISTORY:  09/25/24  Overnight events noted chart reviewed ventilator setting and labs seen medications reviewed.  Patient remained on ventilator at night she was on nocturnal vent with ventilator setting pressure assist-control rate of 12 tidal volume 460 PEEP was 8 and FiO2 40%.  During the daytime she has  of a speech recognition program.  While intending to generate a document that actually reflects the content of the visit, the document can still have some errors including those of syntax and sound-alike substitutions which may escape proof reading.  It such instances, actual meaning can be extrapolated by contextual diversion.

## 2024-09-25 NOTE — PROGRESS NOTES
[facility]   Progress Note                   Date:   9/25/2024  Patient name: Shazia ROGERS  Date of admission:  9/14/2024  1:39 PM  MRN:   [id number]  YOB: 1955  PCP: Jyoti Mauricio, APRN - CNP    Reason for Admission:     Subjective:       Pt seen and examined in the room.  Patient resting in bed. Pt denies any CP. Patient with trach attached to vent. Patient reports slight sob.  Labs, vitals and tele reviewed- A-fib 79  Heparin drip infusing    Medications:   Scheduled Meds:   amiodarone  400 mg Oral BID    Followed by    [START ON 9/27/2024] amiodarone  200 mg Oral BID    lactobacillus  1 capsule Per NG tube Daily with breakfast    metoprolol tartrate  37.5 mg Oral BID    vancomycin  1,250 mg IntraVENous Q18H    midodrine  15 mg Oral TID WC    vancomycin (VANCOCIN) intermittent dosing (placeholder)   Other RX Placeholder    warfarin placeholder: dosing by pharmacy   Other RX Placeholder    insulin lispro  0-8 Units SubCUTAneous Q6H    sodium chloride flush  5-40 mL IntraVENous 2 times per day    ipratropium 0.5 mg-albuterol 2.5 mg  1 Dose Inhalation Q4H RT    insulin lispro  0-4 Units SubCUTAneous Nightly    pantoprazole (PROTONIX) 40 mg in sodium chloride (PF) 0.9 % 10 mL injection  40 mg IntraVENous Daily    insulin glargine  8 Units SubCUTAneous Nightly     Continuous Infusions:   sodium chloride 10 mL/hr at 09/22/24 1415    dextrose      heparin (PORCINE) Infusion 11 Units/kg/hr (09/25/24 0151)     CBC:   Recent Labs     09/23/24  1003 09/25/24  0845   WBC 7.1 6.9   HGB 7.7* 8.1*    214     BMP:    Recent Labs     09/23/24  1003 09/25/24  0845    141   K 4.0 3.5*    99   CO2 28 34*   BUN 13 12   CREATININE 0.6 0.7   GLUCOSE 158* 156*     Hepatic:   No results for input(s): \"AST\", \"ALT\", \"BILITOT\", \"ALKPHOS\" in the last 72 hours.    Invalid input(s): \"ALB\"    Troponin: No results for input(s): \"TROPONINI\" in the last 72 hours.  BNP: No results for

## 2024-09-25 NOTE — PROGRESS NOTES
[facility]  Speech Language Pathology    Date: 9/25/2024  Patient Name: Shazia ROGERS  YOB: 1955   AGE: 68 y.o.  MRN: [id number]        Patient Not Available for Speech Therapy     Due to:  [] Testing  [] Hemodialysis  [] Cancelled by RN  [] Surgery   [] Intubation/Sedation/Pain Medication  [] Medical instability  [x] Other: Spoke with RN, reports concerns with coughing with crushed medications in puree. SLP attempting BSSE while pt sitting edge of bed with PT/OT. Pt refusal of all solids/liquid consistencies despite multiple attempts. SLP educated pt and RN on NPO status, will check back as able.     Next scheduled treatment: afternoon 9/25, 9/26  Completed by: Linh Elizalde M.A., CCC-SLP

## 2024-09-25 NOTE — PROGRESS NOTES
Comprehensive Nutrition Assessment    Type and Reason for Visit:  Reassess    Nutrition Recommendations/Plan:   Monitor plan for nutrition. Recommend continue TF of Peptide Based formula at 50 mL/hr + 2 Protein modulars daily.  Monitor tolerance/adequacy of intakes.  Will monitor labs, weights, and plan of care.     Malnutrition Assessment:  Malnutrition Status:  At risk for malnutrition (Comment) (09/17/24 1521)    Context:  Acute Illness     Findings of the 6 clinical characteristics of malnutrition:  Energy Intake:  Mild decrease in energy intake (Comment)  Weight Loss:  No significant weight loss     Body Fat Loss:  No significant body fat loss     Muscle Mass Loss:  No significant muscle mass loss    Fluid Accumulation:  Moderate to Severe Generalized, Extremities   Strength:  Not Performed    Nutrition Assessment:    Continues vented on trach. Previously tolerating Peptide Based TF at goal rate 50 mL/hr +2  protein modulars. NPO today for swallow eval. Per SLP note, pt refusing trials. RN reports SLP may re-attempt swallow eval. +NGT. No new weights.    Nutrition Related Findings:    Meds/labs reviewed. +rectal tube. LBM 9/24. +2 generalized/extremity edema. +1 perineal edema Wound Type: Pressure Injury, Surgical Incision (PI to sacrum, incision to throat, traumatic injury to abd)       Current Nutrition Intake & Therapies:    Average Meal Intake: NPO  Average Supplements Intake: NPO  Diet NPO  Current Tube Feeding (TF) Orders:  Feeding Route: Nasogastric  Formula: Peptide Based  Schedule: Continuous  Feeding Regimen: 0 mL/hr  Additives/Modulars: Protein (x 2 per day)  Water Flushes: 30 mL q 4 hrs  Current TF & Flush Orders Provides: 0 kcal, 0 gm protein  Goal TF & Flush Orders Provides: Peptide Based goal 50 mL/hr + 2 Protein modulars = 1648 kcals, 142 gm protein  Additional Calorie Sources:  None    Anthropometric Measures:  Height: 165.1 cm (5' 5\")  Ideal Body Weight (IBW): 125 lbs (57 kg)    Current  Body Weight: 208.2 kg (459 lb), 367.2 % IBW. Weight Source: Bed Scale  Current BMI (kg/m2): 76.4  Weight Adjustment For: No Adjustment  BMI Categories: Obese Class 3 (BMI 40.0 or greater)    Estimated Daily Nutrient Needs:  Energy Requirements Based On: Kcal/kg  Weight Used for Energy Requirements: Ideal  Energy (kcal/day): 9606-1942 kcals/day  Weight Used for Protein Requirements: Ideal  Protein (g/day): 110-140 gm pro/day  Method Used for Fluid Requirements: Other (Comment)  Fluid (ml/day): per MD    Nutrition Diagnosis:   Inadequate oral intake related to impaired respiratory function (s/p trach revision) as evidenced by NPO or clear liquid status due to medical condition    Nutrition Interventions:   Food and/or Nutrient Delivery: Continue Current Diet  Nutrition Education/Counseling: No recommendation at this time  Coordination of Nutrition Care: Continue to monitor while inpatient  Plan of Care discussed with: RN    Goals:  Previous Goal Met: Progressing toward Goal(s)  Goals: Meet at least 75% of estimated needs, prior to discharge    Nutrition Monitoring and Evaluation:   Behavioral-Environmental Outcomes: None Identified  Food/Nutrient Intake Outcomes: Diet Advancement/Tolerance, Enteral Nutrition Intake/Tolerance  Physical Signs/Symptoms Outcomes: Biochemical Data, Chewing or Swallowing, GI Status, Fluid Status or Edema, Hemodynamic Status, Weight, Skin, Nutrition Focused Physical Findings    Discharge Planning:    Too soon to determine     Janina Meyer RD  Contact: [phone]

## 2024-09-25 NOTE — DISCHARGE INSTR - COC
Continuity of Care Form    Patient Name: Shazia NUÑEZ   :  1955  MRN:  [id number]    Admit date:  2024  Discharge date:  24      Code Status Order: Full Code   Advance Directives:   Advance Care Flowsheet Documentation             Admitting Physician:  Rafael Concepcion MD  PCP: Jyoti Mauricio, APRBHAVIK - CNP    Discharging Nurse:   Discharging Hospital Unit/Room#: 0408/0408-01  Discharging Unit Phone Number: [phone]      Emergency Contact:   Extended Emergency Contact Information  Primary Emergency Contact: Álvaro Nuñez   [facility]  Home Phone: [phone]  Mobile Phone: [phone]  Relation: Brother/Sister  Secondary Emergency Contact: Edita Cruz  Home Phone: [phone]  Mobile Phone: [phone]  Relation: Other  Preferred language: English   needed? No    Past Surgical History:  Past Surgical History:   Procedure Laterality Date    BREAST REDUCTION SURGERY  1997    BREAST REDUCTION SURGERY  1997    DILATION AND CURETTAGE OF UTERUS  10/08 and     HYSTERECTOMY (CERVIX STATUS UNKNOWN)  7/17/15    Dr Kaitlynn Jaime, endometial cancer, FIGO grade 1    TONSILLECTOMY AND ADENOIDECTOMY      TRACHEOSTOMY N/A 2024    TRACHEOSTOMY INSERTION performed by Fernando Lentz MD at [facility] OR    TRACHEOSTOMY N/A 2024    REVISION TRACHEOTOMY, BRONCHOSCOPY performed by Riky Andrade MD at [facility] OR    TYMPANOSTOMY TUBE PLACEMENT  1967       Immunization History:   Immunization History   Administered Date(s) Administered    COVID-19, MODERNA Bivalent, (age 12y+), IM, 50 mcg/0.5 mL 2023    COVID-19, MODERNA Booster BLUE border, (age 18y+), IM, 50mcg/0.25mL 2022, 10/07/2022    FLUARIX 3 YEARS AND OVER 10/26/2015    Hepatitis B 1991    Influenza A ([id number]) Vaccine IM 2009    Influenza Virus Vaccine 2014, 2014, 10/08/2018, 10/17/2019    Influenza Whole 2009    Influenza, AFLURIA (age 3 y+), FLUZONE, (age    Colostomy/Ileostomy/Ileal Conduit:   Rectal Tube-Stool Appearance: Loose  Rectal Tube-Stool Color: Brown  Rectal Tube-Stool Amount: Large    Date of Last BM: 09/25/2024      Intake/Output Summary (Last 24 hours) at 9/25/2024 1251  Last data filed at 9/25/2024 0800  Gross per 24 hour   Intake 1358 ml   Output 1050 ml   Net 308 ml     I/O last 3 completed shifts:  In: 1898 [NG/GT:1898]  Out: 1500 [Urine:1450; Stool:50]    Safety Concerns:     None    Impairments/Disabilities:      Speech    Nutrition Therapy:  Current Nutrition Therapy:   - Tube Feedings:  none  Soft and bite sized, Nectar thick liquids    Routes of Feeding: Oral  Liquids: Nectar Thick Liquids  Daily Fluid Restriction: no  Last Modified Barium Swallow with Video (Video Swallowing Test): done on September/23, 2024    Treatments at the Time of Hospital Discharge:   Respiratory Treatments: ***  Oxygen Therapy:  {Therapy; copd oxygen:74758}  Ventilator:    - Ventilator Settings:    Vt (Set, mL): 460 mL  Resp Rate (Set): 12 bpm  FiO2 : 40 %    PEEP/CPAP (cmH2O): 8       Rehab Therapies: Physical Therapy, Occupational Therapy, and Speech/Language Therapy  Weight Bearing Status/Restrictions: No weight bearing restrictions  Other Medical Equipment (for information only, NOT a DME order):  hospital bed  Other Treatments: ***    Patient's personal belongings (please select all that are sent with patient):  Glasses    RN SIGNATURE:  Electronically signed by Sandie Portillo RN on 9/26/24 at 2:55 PM EDT    CASE MANAGEMENT/SOCIAL WORK SECTION    Inpatient Status Date: 9/14/24    Readmission Risk Assessment Score:  Readmission Risk              Risk of Unplanned Readmission:  54           Discharging to Facility/ Agency   Name: [facility]  Address:  Phone:  Fax:    Dialysis Facility (if applicable)   Name:  Address:  Dialysis Schedule:  Phone:  Fax:    / signature: Electronically signed by ANJUM BOUDREAUX on 9/26/24 at 3:59 PM

## 2024-09-25 NOTE — PROGRESS NOTES
Facility/Department: [address] STEPDOWN   CLINICAL BEDSIDE SWALLOW EVALUATION    NAME: Shazia ROGERS  : 1955  MRN: [id number]    ADMISSION DATE: 2024  ADMITTING DIAGNOSIS: has Anxiety; Depression; Asthma; DDD (degenerative disc disease), lumbar; Seasonal allergies; Hyperlipemia, mixed; Anticoagulated on Coumadin; Family history of breast cancer; Endometrial cancer ([facility]); Normocytic anemia; S/P FABBY-BSO (total abdominal hysterectomy and bilateral salpingo-oophorectomy); Essential hypertension; Blood loss anemia; BPPV (benign paroxysmal positional vertigo); Stage 3a chronic kidney disease ([facility]); Chronic deep vein thrombosis (DVT) of femoral vein of right lower extremity ([facility]); Pain and swelling of right lower leg; BMI 60.0-69.9, adult ([facility]); Pulmonary embolism on right ([facility]); Acute on chronic respiratory failure with hypoxia and hypercapnia ([facility]); Diabetes mellitus type 2, noninsulin dependent ([facility]); Urinary tract infection due to ESBL Klebsiella; Pulmonary HTN ([facility]); Urinary retention; History of DVT (deep vein thrombosis); Bilateral leg edema; CKD (chronic kidney disease) stage 3, GFR 30-59 ml/min ([facility]); Class 3 severe obesity due to excess calories with serious comorbidity and body mass index (BMI) of 60.0 to 69.9 in adult ([facility]); Osteopenia; Balance problems; Endometrial adenocarcinoma ([facility]); Dyspnea; Mild asthma; Open abdominal wall wound; Ventral hernia without obstruction or gangrene; Abdominal wall skin ulcer, with fat layer exposed ([facility]); Supratherapeutic INR; COVID; Bleeding from wound; Traumatic hematoma of left knee; Cellulitis of abdominal wall; Cytopenia; Increased oxygen demand; Pancytopenia ([facility]); Thrombocytopenia ([facility]); Shortness of breath; SOB (shortness of breath); COPD exacerbation ([facility]); Acute on chronic diastolic heart failure ([facility]); Acute cystitis without hematuria; Metabolic encephalopathy; (HFpEF) heart failure with preserved ejection fraction ([facility]); A-fib ([facility]); Acute respiratory  treatment;Therapeutic feeds with SLP only  Therapeutic Interventions: Diet tolerance monitoring;Patient/Family education;Oral motor exercises      Treatment/Goals  Dysphagia Goals: The patient will tolerate recommended diet without observed clinical signs of aspiration;The patient will tolerate instrumental swallowing procedure;The patient will improve oral motor function via therapeutic oral motor exercises to 5/5 each trial.    General  Chart Reviewed: Yes  Behavior/Cognition: Alert;Uncooperative  Respiratory Status: Trach cuff  O2 Device: CPAP  Communication Observation: Functional  Follows Directions: Simple  Dentition: Some missing teeth  Patient Positioning: Upright in bed  Baseline Vocal Quality: Aphonic  Consistencies Administered: Pureed    Vision/Hearing  Vision: Wears glasses at all times  Hearing: WFL     Oral Motor Deficits  Labial: Decreased rate;Decreased seal  Lingual: Decreased rate;Decreased strength  Consistencies Administered: Pureed    Oral Phase Dysfunction  Oral Phase  Oral Phase: Exceptions       Prognosis  Prognosis: Guarded  Prognosis Considerations: Participation Level;Previous Level of Function;Medical Diagnosis  Consulted and agree with results and recommendations: Patient;RN  RN Name: Jody    Education  Patient Education: yes  Patient Education Response: Verbalizes understanding             Therapy Time  [id number]      PETE Villa  9/25/2024 3:52 PM

## 2024-09-25 NOTE — CARE COORDINATION
09/25/24 0856   Readmission Assessment   Number of Days since last admission? 1-7 days  ([facility] NA transfer)   Previous Disposition Other (comment)  ([facility] NA transfer)   Who is being Interviewed Unable to Complete  ([facility] NA transfer)   What was the patient's/caregiver's perception as to why they think they needed to return back to the hospital? Other (Comment)  ([facility] NA transfer)   Did you visit your Primary Care Physician after you left the hospital, before you returned this time? No  ([facility] NA transfer)   Why weren't you able to visit your PCP? Other (Comment)  ([facility] NA transfer)   Did you see a specialist, such as Cardiac, Pulmonary, Orthopedic Physician, etc. after you left the hospital? Other (Comment)  ([facility] NA transfer)   Who advised the patient to return to the hospital? Other (Comment)  ([facility] NA transfer)   Does the patient report anything that got in the way of taking their medications? No  ([facility] NA transfer)   In our efforts to provide the best possible care to you and others like you, can you think of anything that we could have done to help you after you left the hospital the first time, so that you might not have needed to return so soon? Other (Comment)  ([facility] NA transfer)

## 2024-09-25 NOTE — PROGRESS NOTES
Physical Therapy  Facility/Department: [address] STE[address]OWN  Physical Therapy Initial Assessment    Name: Shazia ROGERS  : 1955  MRN: [id number]  Date of Service: 2024    \"Initially presented to [facility] ED acute hypoxic respiratory failure on her went via her trach tube.  EMS was called, she was unable to maintain her oxygen saturation afterward required orotracheal intubation.  Afterwards it was noted that she has a distal obstruction in her tracheostomy cannula.     She was initially admitted to [facility] MICU.  Afterwards she became hypotensive and was started on IV pressors via her PICC line.  General surgery saw the patient at [facility] for her trach revision but they were unable to do so and considering that she will need ENT evaluation she was transferred to [facility] ICU.     On , patient underwent revision tracheotomy via bronchoscopy.  Afterwards patient remained stable but she was in A-fib with RVR.  Although she was taking her home medications but it was not rate controlled and she was started on amiodarone infusion which controlled her rate but rhythm was still A-fib.     Cardiology is also following the patient for her A-fib with RVR.\"    Discharge Recommendations:   Further therapy recommended at discharge.     PT Equipment Recommendations  Equipment Needed: No      Patient Diagnosis(es): The encounter diagnosis was Left upper extremity swelling.  Past Medical History:  has a past medical history of Anxiety, Asthma, Atrial fibrillation (HCC), Bronchitis, COPD (chronic obstructive pulmonary disease) ([facility]), DDD (degenerative disc disease), lumbar, Depression, Diabetes mellitus (HCC), Elevated glucose, Headache(784.0), Hernia of abdominal cavity, HH (hiatus hernia), Hyperlipemia, mixed, Hypertension, Osteoarthritis, Right femoral vein DVT ([facility]), and Uterine hyperplasia.  Past Surgical History:  has a past surgical history that includes Dilation and curettage of  visits  Short Term Goal 1: Pt will be modA+2 in supine <-> sit  Short Term Goal 2: Pt will be Margarita+1 in rolling bilaterally  Short Term Goal 3: Pt will be SBA in EOB sitting balance  Short Term Goal 4: Pt will perform STS w/ RW maxA+2       Education  Patient Education  Education Given To: Patient  Education Provided: Role of Therapy;Plan of Care  Education Method: Demonstration;Verbal  Barriers to Learning: Cognition  Education Outcome: Continued education needed;Verbalized understanding      Therapy Time   Individual Concurrent Group Co-treatment   Time In 0849         Time Out 0925         Minutes 36         Timed Code Treatment Minutes: 23 Minutes     Co- treatment with OT warranted secondary to decreased patient safety and independence with functional mobility requiring skilled physical assistance of two professionals to simultaneously address individualized discipline goals. PT is addressing bed mobility, sitting balance, core strengthening, while OT is addressing their individualized functional mobility/self-care task.      Chilango Cochran, PT

## 2024-09-25 NOTE — DISCHARGE INSTRUCTIONS
You were admitted for acute hypoxic respiratory due to malfunctioning tracheostomy.  You are intubated and seen by the ENT and underwent revision of your tracheostomy on 9/16.  You or also found to have aspiration pneumonia, respiratory cultures grew MRSA and were treated with antibiotics.  You are also seen by the cardiologist due to A-fib, not rate controlled.    Please START taking Culturelle daily for diarrhea  Please START taking amiodarone 400mg twice daily until 9/26 and then start taking amiodarone 200 mg twice daily on 9/27   Please STOP taking digoxin  Please bridge Coumadin with Lovenox twice daily for 2 days until INR greater than 2.  Continue to follow-up with the Coumadin clinic outpatient    Please follow-up with your primary care provider within 5 to 7 days for continued care.   Please call and schedule a follow-up appointment with cardiology  Please call and schedule follow-up follow-up appointment with ENT  Please consider a follow-up appointment with cardiology    If you have been given medication please take them as prescribed. Do not take more medication than recommended at any given time.   If you begin to experience any symptoms such as chest pain shortness of breath nausea vomiting dizziness drowsiness abdominal pain loss of consciousness or any other symptoms you find concerning please return to the ED for follow-up evaluation.  Please feel free return to the hospital if your symptoms worsen or any new concerning symptoms develop.  Follow-up with your primary care physician as needed for all other the concerns.

## 2024-09-25 NOTE — PROGRESS NOTES
09/25/24 0851   Vent Information   Vent Mode (S)  CPAP/PS   Ventilator Settings   PEEP/CPAP (cmH2O) 8   FiO2  40 %   Pressure Support (cm H2O) 8 cm H2O   Vent Patient Data (Readings)   Vt (Measured) 318 mL   Peak Inspiratory Pressure (cmH2O) 17 cmH2O   Rate Measured 11 br/min   Minute Volume (L/min) 4.7 Liters     Pt tolerating CPAP.  Will continue to monitor

## 2024-09-25 NOTE — PLAN OF CARE
Problem: Chronic Conditions and Co-morbidities  Goal: Patient's chronic conditions and co-morbidity symptoms are monitored and maintained or improved  Outcome: Progressing  Flowsheets (Taken 9/24/2024 2000)  Care Plan - Patient's Chronic Conditions and Co-Morbidity Symptoms are Monitored and Maintained or Improved: Monitor and assess patient's chronic conditions and comorbid symptoms for stability, deterioration, or improvement     Problem: Discharge Planning  Goal: Discharge to home or other facility with appropriate resources  Outcome: Progressing  Flowsheets (Taken 9/24/2024 2000)  Discharge to home or other facility with appropriate resources: Identify barriers to discharge with patient and caregiver     Problem: Pain  Goal: Verbalizes/displays adequate comfort level or baseline comfort level  Outcome: Progressing  Flowsheets (Taken 9/24/2024 1934)  Verbalizes/displays adequate comfort level or baseline comfort level:   Encourage patient to monitor pain and request assistance   Assess pain using appropriate pain scale     Problem: Skin/Tissue Integrity  Goal: Absence of new skin breakdown  Description: 1.  Monitor for areas of redness and/or skin breakdown  2.  Assess vascular access sites hourly  3.  Every 4-6 hours minimum:  Change oxygen saturation probe site  4.  Every 4-6 hours:  If on nasal continuous positive airway pressure, respiratory therapy assess nares and determine need for appliance change or resting period.  Outcome: Progressing     Problem: ABCDS Injury Assessment  Goal: Absence of physical injury  Outcome: Progressing     Problem: Safety - Adult  Goal: Free from fall injury  Outcome: Progressing     Problem: Respiratory - Adult  Goal: Achieves optimal ventilation and oxygenation  9/25/2024 0122 by Jonathan Szymanski, RN  Outcome: Progressing  9/24/2024 1957 by Sydney Caceres RCP  Outcome: Progressing     Problem: Nutrition Deficit:  Goal: Optimize nutritional status  Outcome: Progressing

## 2024-09-25 NOTE — PROGRESS NOTES
Patient refused to be turned two times today for writer to remove patients fms per order. Primary team was notified of her not willing to turn.

## 2024-09-25 NOTE — PLAN OF CARE
Problem: Chronic Conditions and Co-morbidities  Goal: Patient's chronic conditions and co-morbidity symptoms are monitored and maintained or improved  9/25/2024 0947 by Brittny Ocampo RN  Outcome: Progressing  9/25/2024 0122 by Jonathan Szymanski RN  Outcome: Progressing  Flowsheets (Taken 9/24/2024 2000)  Care Plan - Patient's Chronic Conditions and Co-Morbidity Symptoms are Monitored and Maintained or Improved: Monitor and assess patient's chronic conditions and comorbid symptoms for stability, deterioration, or improvement     Problem: Discharge Planning  Goal: Discharge to home or other facility with appropriate resources  9/25/2024 0947 by Brittny Ocampo RN  Outcome: Progressing  9/25/2024 0122 by Jonathan Szymanski RN  Outcome: Progressing  Flowsheets (Taken 9/24/2024 2000)  Discharge to home or other facility with appropriate resources: Identify barriers to discharge with patient and caregiver     Problem: Pain  Goal: Verbalizes/displays adequate comfort level or baseline comfort level  9/25/2024 0947 by Brittny Ocampo RN  Outcome: Progressing  9/25/2024 0122 by Jonathan Szymanski RN  Outcome: Progressing  Flowsheets (Taken 9/24/2024 1934)  Verbalizes/displays adequate comfort level or baseline comfort level:   Encourage patient to monitor pain and request assistance   Assess pain using appropriate pain scale     Problem: Skin/Tissue Integrity  Goal: Absence of new skin breakdown  Description: 1.  Monitor for areas of redness and/or skin breakdown  2.  Assess vascular access sites hourly  3.  Every 4-6 hours minimum:  Change oxygen saturation probe site  4.  Every 4-6 hours:  If on nasal continuous positive airway pressure, respiratory therapy assess nares and determine need for appliance change or resting period.  9/25/2024 0947 by Brittny Ocampo RN  Outcome: Progressing  9/25/2024 0122 by Jonathan Szymanski RN  Outcome: Progressing     Problem: ABCDS Injury Assessment  Goal: Absence of physical  injury  9/25/2024 0947 by Brittny Ocampo RN  Outcome: Progressing  9/25/2024 0122 by Jonathan Szymanski RN  Outcome: Progressing     Problem: Safety - Adult  Goal: Free from fall injury  9/25/2024 0947 by Brittny Ocampo RN  Outcome: Progressing  9/25/2024 0122 by Jonathan Szymanski RN  Outcome: Progressing     Problem: Respiratory - Adult  Goal: Achieves optimal ventilation and oxygenation  9/25/2024 0947 by Brittny Ocampo RN  Outcome: Progressing  9/25/2024 0122 by Jonathan Szymanski RN  Outcome: Progressing  9/24/2024 1957 by Sydney Caceres RCP  Outcome: Progressing     Problem: Nutrition Deficit:  Goal: Optimize nutritional status  9/25/2024 0947 by Brittny Ocampo RN  Outcome: Progressing  9/25/2024 0122 by Jonathan Szymanski RN  Outcome: Progressing

## 2024-09-26 VITALS
HEIGHT: 65 IN | SYSTOLIC BLOOD PRESSURE: 127 MMHG | WEIGHT: 293 LBS | HEART RATE: 94 BPM | RESPIRATION RATE: 16 BRPM | DIASTOLIC BLOOD PRESSURE: 68 MMHG | OXYGEN SATURATION: 94 % | BODY MASS INDEX: 48.82 KG/M2 | TEMPERATURE: 98.4 F

## 2024-09-26 LAB
ANION GAP SERPL CALCULATED.3IONS-SCNC: 6 MMOL/L (ref 9–16)
ANTI-XA UNFRAC HEPARIN: 0.59 IU/L
BASOPHILS # BLD: 0.07 K/UL (ref 0–0.2)
BASOPHILS NFR BLD: 1 % (ref 0–2)
BUN SERPL-MCNC: 12 MG/DL (ref 8–23)
CALCIUM SERPL-MCNC: 9.3 MG/DL (ref 8.6–10.4)
CHLORIDE SERPL-SCNC: 100 MMOL/L (ref 98–107)
CO2 SERPL-SCNC: 36 MMOL/L (ref 20–31)
CREAT SERPL-MCNC: 0.7 MG/DL (ref 0.5–0.9)
EOSINOPHIL # BLD: 0.37 K/UL (ref 0–0.44)
EOSINOPHILS RELATIVE PERCENT: 5 % (ref 1–4)
ERYTHROCYTE [DISTWIDTH] IN BLOOD BY AUTOMATED COUNT: 20.3 % (ref 11.8–14.4)
GFR, ESTIMATED: >90 ML/MIN/1.73M2
GLUCOSE BLD-MCNC: 136 MG/DL (ref 65–105)
GLUCOSE BLD-MCNC: 145 MG/DL (ref 65–105)
GLUCOSE BLD-MCNC: 146 MG/DL (ref 65–105)
GLUCOSE SERPL-MCNC: 160 MG/DL (ref 74–99)
HCT VFR BLD AUTO: 26.4 % (ref 36.3–47.1)
HGB BLD-MCNC: 7.4 G/DL (ref 11.9–15.1)
IMM GRANULOCYTES # BLD AUTO: 0.07 K/UL (ref 0–0.3)
IMM GRANULOCYTES NFR BLD: 1 %
INR PPP: 1.8
LYMPHOCYTES NFR BLD: 0.96 K/UL (ref 1.1–3.7)
LYMPHOCYTES RELATIVE PERCENT: 13 % (ref 24–43)
MCH RBC QN AUTO: 26.3 PG (ref 25.2–33.5)
MCHC RBC AUTO-ENTMCNC: 28 G/DL (ref 28.4–34.8)
MCV RBC AUTO: 94 FL (ref 82.6–102.9)
MONOCYTES NFR BLD: 0.44 K/UL (ref 0.1–1.2)
MONOCYTES NFR BLD: 6 % (ref 3–12)
MORPHOLOGY: ABNORMAL
MORPHOLOGY: ABNORMAL
NEUTROPHILS NFR BLD: 74 % (ref 36–65)
NEUTS SEG NFR BLD: 5.49 K/UL (ref 1.5–8.1)
NRBC BLD-RTO: 0.3 PER 100 WBC
PLATELET # BLD AUTO: 219 K/UL (ref 138–453)
PMV BLD AUTO: 9.8 FL (ref 8.1–13.5)
POTASSIUM SERPL-SCNC: 3.8 MMOL/L (ref 3.7–5.3)
PROTHROMBIN TIME: 20.7 SEC (ref 11.7–14.9)
RBC # BLD AUTO: 2.81 M/UL (ref 3.95–5.11)
RBC # BLD: ABNORMAL 10*6/UL
RBC # BLD: ABNORMAL 10*6/UL
SODIUM SERPL-SCNC: 142 MMOL/L (ref 136–145)
WBC OTHER # BLD: 7.4 K/UL (ref 3.5–11.3)

## 2024-09-26 PROCEDURE — 6370000000 HC RX 637 (ALT 250 FOR IP)

## 2024-09-26 PROCEDURE — 99233 SBSQ HOSP IP/OBS HIGH 50: CPT | Performed by: NURSE PRACTITIONER

## 2024-09-26 PROCEDURE — 82947 ASSAY GLUCOSE BLOOD QUANT: CPT

## 2024-09-26 PROCEDURE — 6360000002 HC RX W HCPCS: Performed by: STUDENT IN AN ORGANIZED HEALTH CARE EDUCATION/TRAINING PROGRAM

## 2024-09-26 PROCEDURE — 6360000002 HC RX W HCPCS

## 2024-09-26 PROCEDURE — 85610 PROTHROMBIN TIME: CPT

## 2024-09-26 PROCEDURE — 2580000003 HC RX 258

## 2024-09-26 PROCEDURE — 80048 BASIC METABOLIC PNL TOTAL CA: CPT

## 2024-09-26 PROCEDURE — 99239 HOSP IP/OBS DSCHRG MGMT >30: CPT | Performed by: INTERNAL MEDICINE

## 2024-09-26 PROCEDURE — 6370000000 HC RX 637 (ALT 250 FOR IP): Performed by: INTERNAL MEDICINE

## 2024-09-26 PROCEDURE — 6370000000 HC RX 637 (ALT 250 FOR IP): Performed by: STUDENT IN AN ORGANIZED HEALTH CARE EDUCATION/TRAINING PROGRAM

## 2024-09-26 PROCEDURE — 2580000003 HC RX 258: Performed by: STUDENT IN AN ORGANIZED HEALTH CARE EDUCATION/TRAINING PROGRAM

## 2024-09-26 PROCEDURE — 85025 COMPLETE CBC W/AUTO DIFF WBC: CPT

## 2024-09-26 PROCEDURE — 99232 SBSQ HOSP IP/OBS MODERATE 35: CPT | Performed by: INTERNAL MEDICINE

## 2024-09-26 PROCEDURE — 94640 AIRWAY INHALATION TREATMENT: CPT

## 2024-09-26 PROCEDURE — 85520 HEPARIN ASSAY: CPT

## 2024-09-26 RX ORDER — LOPERAMIDE HCL 2 MG
2 CAPSULE ORAL 4 TIMES DAILY PRN
Qty: 40 CAPSULE | Refills: 0 | Status: SHIPPED | OUTPATIENT
Start: 2024-09-26 | End: 2024-10-06

## 2024-09-26 RX ORDER — LOPERAMIDE HCL 2 MG
2 CAPSULE ORAL 4 TIMES DAILY PRN
Status: DISCONTINUED | OUTPATIENT
Start: 2024-09-26 | End: 2024-09-26 | Stop reason: HOSPADM

## 2024-09-26 RX ORDER — PANTOPRAZOLE SODIUM 40 MG/1
40 TABLET, DELAYED RELEASE ORAL
Status: DISCONTINUED | OUTPATIENT
Start: 2024-09-27 | End: 2024-09-26 | Stop reason: HOSPADM

## 2024-09-26 RX ORDER — WARFARIN SODIUM 10 MG/1
TABLET ORAL
Qty: 30 TABLET | Refills: 3 | Status: SHIPPED | OUTPATIENT
Start: 2024-09-26

## 2024-09-26 RX ORDER — ENOXAPARIN SODIUM 100 MG/ML
1 INJECTION SUBCUTANEOUS 2 TIMES DAILY
Qty: 4.8 ML | Refills: 0 | Status: SHIPPED | OUTPATIENT
Start: 2024-09-26 | End: 2024-09-27

## 2024-09-26 RX ORDER — WARFARIN SODIUM 10 MG/1
10 TABLET ORAL
Status: COMPLETED | OUTPATIENT
Start: 2024-09-26 | End: 2024-09-26

## 2024-09-26 RX ADMIN — MIDODRINE HYDROCHLORIDE 15 MG: 5 TABLET ORAL at 09:13

## 2024-09-26 RX ADMIN — IPRATROPIUM BROMIDE AND ALBUTEROL SULFATE 1 DOSE: 2.5; .5 SOLUTION RESPIRATORY (INHALATION) at 08:33

## 2024-09-26 RX ADMIN — IPRATROPIUM BROMIDE AND ALBUTEROL SULFATE 1 DOSE: 2.5; .5 SOLUTION RESPIRATORY (INHALATION) at 15:59

## 2024-09-26 RX ADMIN — LOPERAMIDE HYDROCHLORIDE 2 MG: 2 CAPSULE ORAL at 09:13

## 2024-09-26 RX ADMIN — WARFARIN SODIUM 10 MG: 10 TABLET ORAL at 17:26

## 2024-09-26 RX ADMIN — OXYCODONE HYDROCHLORIDE 5 MG: 5 TABLET ORAL at 09:13

## 2024-09-26 RX ADMIN — IPRATROPIUM BROMIDE AND ALBUTEROL SULFATE 1 DOSE: 2.5; .5 SOLUTION RESPIRATORY (INHALATION) at 12:07

## 2024-09-26 RX ADMIN — METOPROLOL TARTRATE 37.5 MG: 25 TABLET, FILM COATED ORAL at 09:14

## 2024-09-26 RX ADMIN — SODIUM CHLORIDE, PRESERVATIVE FREE 40 MG: 5 INJECTION INTRAVENOUS at 09:12

## 2024-09-26 RX ADMIN — ACETAMINOPHEN 650 MG: 325 TABLET ORAL at 02:30

## 2024-09-26 RX ADMIN — AMIODARONE HYDROCHLORIDE 400 MG: 200 TABLET ORAL at 09:13

## 2024-09-26 RX ADMIN — ACETAMINOPHEN 650 MG: 325 TABLET ORAL at 09:13

## 2024-09-26 RX ADMIN — SODIUM CHLORIDE 1250 MG: 9 INJECTION, SOLUTION INTRAVENOUS at 02:33

## 2024-09-26 RX ADMIN — MIDODRINE HYDROCHLORIDE 15 MG: 5 TABLET ORAL at 12:21

## 2024-09-26 RX ADMIN — Medication 1 CAPSULE: at 09:13

## 2024-09-26 RX ADMIN — IPRATROPIUM BROMIDE AND ALBUTEROL SULFATE 1 DOSE: 2.5; .5 SOLUTION RESPIRATORY (INHALATION) at 03:07

## 2024-09-26 RX ADMIN — SODIUM CHLORIDE, PRESERVATIVE FREE 40 ML: 5 INJECTION INTRAVENOUS at 09:13

## 2024-09-26 RX ADMIN — HEPARIN SODIUM 11 UNITS/KG/HR: 10000 INJECTION, SOLUTION INTRAVENOUS at 01:32

## 2024-09-26 RX ADMIN — OXYCODONE HYDROCHLORIDE 5 MG: 5 TABLET ORAL at 02:30

## 2024-09-26 ASSESSMENT — PAIN DESCRIPTION - LOCATION: LOCATION: GENERALIZED

## 2024-09-26 ASSESSMENT — PULMONARY FUNCTION TESTS
PIF_VALUE: 17
PIF_VALUE: 17
PIF_VALUE: 27
PIF_VALUE: 17

## 2024-09-26 ASSESSMENT — PAIN SCALES - GENERAL
PAINLEVEL_OUTOF10: 0
PAINLEVEL_OUTOF10: 2
PAINLEVEL_OUTOF10: 0
PAINLEVEL_OUTOF10: 8

## 2024-09-26 ASSESSMENT — PAIN DESCRIPTION - ORIENTATION: ORIENTATION: RIGHT;LEFT

## 2024-09-26 ASSESSMENT — PAIN DESCRIPTION - DESCRIPTORS: DESCRIPTORS: DISCOMFORT;PRESSURE

## 2024-09-26 NOTE — PROGRESS NOTES
[facility]   Progress Note                   Date:   9/26/2024  Patient name: Shazia ROGERS  Date of admission:  9/14/2024  1:39 PM  MRN:   [id number]  YOB: 1955  PCP: Jyoti Mauricio, APRN - CNP    Reason for Admission:     Subjective:       Pt seen and examined in the room.  Patient resting in bed. Pt denies any CP. Patient with trach attached to vent. Patient reports slight sob.  Labs, vitals and tele reviewed- A-fib rate stable. PT continues to have NG in       Medications:   Scheduled Meds:   warfarin  10 mg Oral Once    [START ON 9/27/2024] pantoprazole  40 mg Oral QAM AC    amiodarone  400 mg Oral BID    Followed by    [START ON 9/27/2024] amiodarone  200 mg Oral BID    lactobacillus  1 capsule Per NG tube Daily with breakfast    metoprolol tartrate  37.5 mg Oral BID    midodrine  15 mg Oral TID WC    warfarin placeholder: dosing by pharmacy   Other RX Placeholder    insulin lispro  0-8 Units SubCUTAneous Q6H    sodium chloride flush  5-40 mL IntraVENous 2 times per day    ipratropium 0.5 mg-albuterol 2.5 mg  1 Dose Inhalation Q4H RT    insulin lispro  0-4 Units SubCUTAneous Nightly    insulin glargine  8 Units SubCUTAneous Nightly     Continuous Infusions:   sodium chloride 10 mL/hr at 09/22/24 1415    dextrose      heparin (PORCINE) Infusion 11 Units/kg/hr (09/26/24 0132)     CBC:   Recent Labs     09/25/24  0845 09/26/24  1046   WBC 6.9 7.4   HGB 8.1* 7.4*    219     BMP:    Recent Labs     09/25/24  0845      K 3.5*   CL 99   CO2 34*   BUN 12   CREATININE 0.7   GLUCOSE 156*     Hepatic:   No results for input(s): \"AST\", \"ALT\", \"BILITOT\", \"ALKPHOS\" in the last 72 hours.    Invalid input(s): \"ALB\"    Troponin: No results for input(s): \"TROPONINI\" in the last 72 hours.  BNP: No results for input(s): \"BNP\" in the last 72 hours.  Lipids: No results for input(s): \"CHOL\", \"HDL\" in the last 72 hours.    Invalid input(s): \"LDLCALCU\"  INR:   Recent Labs

## 2024-09-26 NOTE — PLAN OF CARE
Problem: Chronic Conditions and Co-morbidities  Goal: Patient's chronic conditions and co-morbidity symptoms are monitored and maintained or improved  Outcome: Progressing  Flowsheets (Taken 9/25/2024 2000)  Care Plan - Patient's Chronic Conditions and Co-Morbidity Symptoms are Monitored and Maintained or Improved: Monitor and assess patient's chronic conditions and comorbid symptoms for stability, deterioration, or improvement     Problem: Discharge Planning  Goal: Discharge to home or other facility with appropriate resources  Outcome: Progressing  Flowsheets (Taken 9/25/2024 2000)  Discharge to home or other facility with appropriate resources: Identify barriers to discharge with patient and caregiver     Problem: Pain  Goal: Verbalizes/displays adequate comfort level or baseline comfort level  Outcome: Progressing     Problem: Skin/Tissue Integrity  Goal: Absence of new skin breakdown  Description: 1.  Monitor for areas of redness and/or skin breakdown  2.  Assess vascular access sites hourly  3.  Every 4-6 hours minimum:  Change oxygen saturation probe site  4.  Every 4-6 hours:  If on nasal continuous positive airway pressure, respiratory therapy assess nares and determine need for appliance change or resting period.  Outcome: Progressing     Problem: ABCDS Injury Assessment  Goal: Absence of physical injury  Outcome: Progressing     Problem: Safety - Adult  Goal: Free from fall injury  Outcome: Progressing     Problem: Respiratory - Adult  Goal: Achieves optimal ventilation and oxygenation  9/26/2024 0016 by Jonathan Szymanski RN  Outcome: Progressing  9/25/2024 2001 by Sydney Caceres RCP  Outcome: Progressing  Flowsheets (Taken 9/25/2024 2000 by Jonathan Szymanski, RN)  Achieves optimal ventilation and oxygenation: Assess for changes in respiratory status     Problem: Nutrition Deficit:  Goal: Optimize nutritional status  9/26/2024 0016 by Jonathan Szymanski, RN  Outcome: Progressing  9/25/2024 1506 by Betsy

## 2024-09-26 NOTE — PROGRESS NOTES
Per phlebotomist, patient refused bloodwork this AM. Patient educated on importance of lab studies. Refused again. On call NP notified and aware.

## 2024-09-26 NOTE — PROGRESS NOTES
Writing nurse called for bariatric transport for trach vent patient. Their bariatric rig is down, they will make calls to secure a different transport. Also called [facility] main line to let them know the patient is returning today and that our  was awaiting a call back from their admissions.

## 2024-09-26 NOTE — PROGRESS NOTES
Attending Physician Statement  I have discussed the case, including pertinent history and exam findings with the resident and the team.  I have seen and examined the patient and the key elements of the encounter have been performed by me.  I agree with the assessment, plan and orders as documented by the resident.      Review of Systems:   In addition to the pertinent positives and negatives as stated within HPI and the review of systems as documented in their notes, all other systems were reviewed when able to and are reported negative.    Hemoglobin is low but stable. No bleeding.  Will need hemoglobin monitoring outpatient by nursing home physician.  Abx completed  Cont vent per pulm  Patient refused to remove her NG tube.  Discussed by the team this morning and she has agreed.  Diet per speech therapy  INR management by pharmacy.  Hopefully can be therapeutic by tomorrow  Discharge once arrangements are made    Discharge time: 45 minutes         Kevin Cowan MD, FACP  Attending Physician, Internal Medicine Service    Internal Medicine Residency Program  9/26/2024, 10:39 PM

## 2024-09-26 NOTE — PROGRESS NOTES
Attending Physician Statement  I have discussed the care of Shazia ROGERS, including pertinent history and exam findings with the resident. I have reviewed the key elements of all parts of the encounter with the resident. I have seen and examined the patient with the resident.  I agree with the assessment and plan and status of the problem list as documented.    I saw the patient during around today, chart reviewed overnight events noted.  Please see full pulmonary note by Dr. Reynoso  Overnight she is hemodynamically stable systolic blood pressure between 110s and 120 she is afebrile.  Patient remained on nocturnal ventilator nocturnal ventilation rate of 12 tidal volume 460 PEEP is 8 and FiO2 40%.  During the daytime she is on CPAP/pressure support 8/8/40% which she is tolerating.  Tracheostomy site look okay no drainage is present and her tracheostomy secretions are small to moderate without much change.  Her NG tube was removed today and she is going to be started on oral feeding.  Finish 7 days of vancomycin.  On amiodarone and on heparin drip.  Discharge planning okay to discharge from pulmonary standpoint.    Please note that this chart was generated using voice recognition Dragon dictation software. Although every effort was made to ensure the accuracy of this automated transcription, some errors in transcription may have occurred.     Colin Gilbert MD  9/26/2024 12:17 PM

## 2024-09-26 NOTE — PLAN OF CARE
Problem: Respiratory - Adult  Goal: Achieves optimal ventilation and oxygenation  9/25/2024 2001 by Sydney Caceres RCP  Outcome: Progressing      Attempted to call Oncology. Unable to leave voicemail.     Call placed with [facility] oncology. Advised that MRI's of abdomen/pelvis are not covered. They will need to do peer to peer if wanting. Advised about liver US instead.   They will discuss with RN and return call to clinic

## 2024-09-26 NOTE — CARE COORDINATION
Transitional Planning:   Called [facility] central intake and LM to see if they are able to accept today as she has a d/c order , awaiting return call   1000 per  IDR's pt has FMS and NG, working on removing prior to d/c   1345- notified by JOSUÉ Fox pt FMS/ NG removed  Called [facility] and spoke w Annika who states they are able to accept back, no precert/ HENS needed. Spoke w JOSUÉ Fox who states 5-6 works. [facility] faxed and requested bariatric trach/ vent transport. Spoke w pt and updated, pt agreeable and denies questions ,asks that brother Niurka be updated as well.   Called brother niurka and left message to return call re: d/c plan   1600- Message from Annika at [facility] who states pt is HD and updated concerta review and hep labs and CXR needed, and they will not be able to accept pt today.Call to Katherine at [facility] and put pt on will call.    Called MANSI Suarez who is questioning HD status, nephro not on and no dialysis since admission. Call to dominique MOSES who states pt has not rec'd dialysis Cr 0.7  Call to Annika at [facility] and asked if she had the correct pt as we don't have records of her being dialysis pt, she called back and stated pt marked HD by mistake and they can accept her today and she will pull KJ from [facility]. Dominique spoke w Katherine at [facility] who confirms 6-6:30p    Call from Reddy stating pt brother returned call.  1635-Called brothjazmyn Rea and LM to return call     Discharge Report    [facility]  Clinical Case Management Department  Written by: ANJUM BOUDREAUX    Patient Name: Shazia ROGERS  Attending Provider: Kevin Cowan MD  Admit Date: 2024  1:39 PM  MRN: [id number]  Account: [id number]                     : 1955  Discharge Date:       Disposition: JOIE BOUDREAUX

## 2024-09-26 NOTE — PROGRESS NOTES
Trace effusions with scattered infiltrates.      Support tubes as described above.         XR ABDOMEN FOR NG/OG/NE TUBE PLACEMENT   Final Result   Enteric tube terminates below the left hemidiaphragm with side port beyond   the gastroesophageal junction.         XR CHEST PORTABLE   Final Result   Pulmonary congestion with bibasilar infiltrates.      Support tubes as described above.         XR CHEST (SINGLE VIEW FRONTAL)   Final Result   Cardiomegaly with pulmonary venous congestion and small left pleural effusion   with associated left basilar airspace disease.      Stable position of the endotracheal tube compared with prior examination from   4:59 a.m..              ECHOCARDIOGRAM:   Results for orders placed during the hospital encounter of 04/04/24    Echo (TTE) complete (PRN contrast/bubble/strain/3D)    Interpretation Summary    Left Ventricle: Normal left ventricular systolic function with a visually estimated EF of 55 - 60%. Left ventricle size is normal. Mildly increased wall thickness. Normal wall motion. Normal diastolic function.    Right Ventricle: Right ventricle is dilated. Normal systolic function.    Aortic Valve: No regurgitation. No stenosis.    Mitral Valve: Mild to moderate regurgitation.    Right Atrium: Right atrium is dilated.    Pericardium: No pericardial effusion.    Image quality is poor. Technically difficult study due to patient's body habitus and procedure performed with the patient in a supine position.       ASSESSMENT AND PLAN     PROBLEM LIST:    Patient Active Problem List   Diagnosis    Anxiety    Depression    Asthma    DDD (degenerative disc disease), lumbar    Seasonal allergies    Hyperlipemia, mixed    Anticoagulated on Coumadin    Family history of breast cancer    Endometrial cancer (HCC)    Normocytic anemia    S/P FABBY-BSO (total abdominal hysterectomy and bilateral salpingo-oophorectomy)    Essential hypertension    Blood loss anemia    BPPV (benign paroxysmal  SHWETA (obstructive sleep apnea)    Obesity hypoventilation syndrome    Other tracheostomy complication (HCC)    Paroxysmal atrial fibrillation with RVR (HCC)    Left upper extremity swelling       ASSESSMENT:    Acute on chronic hypoxic respiratory failure, trach since July 2024  Obstructive sleep apnea  Obesity hypoventilation syndrome  MRSA positive respiratory culture, and trach site culture on vancomycin  A-fib on amiodarone and Coumadin  Morbid obesity    PLAN:    I personally interviewed/examined the patient; reviewed interval history, interpreted all available radiographic and laboratory data at the time of service.    Patient is hemodynamically stable and maintaining saturation on 40% FiO2.  Ventilator setting reviewed and PRVC/12/460/8/40% to continue.  Currently vented, continue CPAP trials during the daytime and nocturnal vent.  Anticoagulation: Heparin drip and Coumadin (pharmacy to dose) 1.8  Antimicrobials reviewed; completed vancomycin for MRSA  Maintain bronchopulmonary hygiene  Continue aspiration precautions  Diet with tube feeding or oral need aspiration precaution  Continue bronchodilators  Monitor I/O, electrolytes with a goal of even/negative fluid balance  ENT to manage trach if any problem, 7 trach cuffed proximal XLT currently  On amiodarone and on Coumadin/heparin  Follow-up with speech therapy/Occupational Therapy and physical therapy  Primary team planning to discharge today.  Cleared from pulmonology standpoint for discharge.  Patient will need to be on nocturnal ventilator at facility and okay for daily CPAP.      Discussed with primary team.      It was my pleasure to evaluate Shazia ROGERS today. I would like to thank you for allowing me to participate in the care of this patient.  Please feel free to call with any further questions or concerns. We will continue to follow.     Yahir Reynoso MD  Pulmonary and Critical Care Medicine  9/26/2024, 2:58 PM         This note is created

## 2024-09-26 NOTE — CARE COORDINATION
[facility] is in need of updated dialysis documentation to be sent to [facility]. MANSI reviewed pt's chart and nephrology has not been involved nor has pt received dialysis recently. MANSI contacted [facility] to confirm if pt is a dialysis pt and they denied. MANSI contacted JOSUÉ Edwards to inform her of update.

## 2024-09-26 NOTE — PROGRESS NOTES
Nutrition Note    RN reached out to RD about putting in previous Tube Feed order. RD ordered Peptide Based @ 50 ml/hr. Will reassess on f/u date.     Electronically signed by Astrid Barker RD on 9/26/24 at 9:28 AM EDT    Contact: [phone]

## 2024-09-27 ENCOUNTER — HOSPITAL ENCOUNTER (OUTPATIENT)
Age: 69
Setting detail: SPECIMEN
Discharge: HOME | End: 2024-09-27

## 2024-09-27 LAB
INR PPP: 1.9
PROTHROMBIN TIME: 21.6 SEC (ref 11.7–14.9)

## 2024-09-27 PROCEDURE — P9603 ONE-WAY ALLOW PRORATED MILES: HCPCS

## 2024-09-27 PROCEDURE — 36415 COLL VENOUS BLD VENIPUNCTURE: CPT

## 2024-09-27 PROCEDURE — 85610 PROTHROMBIN TIME: CPT

## 2024-09-27 NOTE — PROGRESS NOTES
[facility] [place]  Internal Medicine Teaching Residency Program  Inpatient Daily Progress Note  ______________________________________________________________________________    Patient: Shazia ROGERS  YOB: 1955   MRN:[id number]    Acct: [id number]     Room: 0408/0408-01  Admit date: 9/14/2024  Today's date: 09/26/24  Number of days in the hospital: 12    SUBJECTIVE   CC: No chief complaint on file.    Pt examined at bedside. Chart & results reviewed.   -Vitals stable  -Patient refused NG tube removal overnight  -Patient is complaining of throat pain while swallowing food  -Speech therapy evaluated patient and cleared for pureed diet  -No fresh complaints today      ROS:  General ROS: Completed and except as mentioned above were negative   HEENT ROS: Completed and except as mentioned above were negative   Allergy and Immunology ROS:  Completed and except as mentioned above were negative  Hematological and Lymphatic ROS:  Completed and except as mentioned above were negative  Respiratory ROS:  Completed and except as mentioned above were negative  Cardiovascular ROS:  Completed and except as mentioned above were negative  Gastrointestinal ROS: Completed and except as mentioned above were negative  Genito-Urinary ROS:  Completed and except as mentioned above were negative  Musculoskeletal ROS:  Completed and except as mentioned above were negative  Neurological ROS:  Completed and except as mentioned above were negative  Skin & Dermatological ROS:  Completed and except as mentioned above were negative  Psychological ROS:  Completed and except as mentioned above were negative  BRIEF HISTORY   Per chart, Patient, 68 years old female, with past medical history of.  - SHWETA/OHS  - Chronic hypoxic respiratory failure status post tracheostomy placement in 2024 (Tracheostomy was placed in July 2024 for chronic hypoxic respiratory failure and noncompliance with BiPAP with

## 2024-09-30 ENCOUNTER — HOSPITAL ENCOUNTER (OUTPATIENT)
Age: 69
Setting detail: SPECIMEN
Discharge: HOME | End: 2024-09-30

## 2024-09-30 LAB
ANION GAP SERPL CALCULATED.3IONS-SCNC: 11 MMOL/L (ref 9–16)
BASOPHILS # BLD: 0.03 K/UL (ref 0–0.2)
BASOPHILS NFR BLD: 1 % (ref 0–2)
BUN SERPL-MCNC: 10 MG/DL (ref 8–23)
CALCIUM SERPL-MCNC: 9.4 MG/DL (ref 8.6–10.4)
CHLORIDE SERPL-SCNC: 94 MMOL/L (ref 98–107)
CO2 SERPL-SCNC: 35 MMOL/L (ref 20–31)
CREAT SERPL-MCNC: 0.9 MG/DL (ref 0.5–0.9)
EOSINOPHIL # BLD: 0.24 K/UL (ref 0–0.44)
EOSINOPHILS RELATIVE PERCENT: 5 % (ref 1–4)
ERYTHROCYTE [DISTWIDTH] IN BLOOD BY AUTOMATED COUNT: 19.9 % (ref 11.8–14.4)
GFR, ESTIMATED: 69 ML/MIN/1.73M2
GLUCOSE SERPL-MCNC: 113 MG/DL (ref 74–99)
HCT VFR BLD AUTO: 27.4 % (ref 36.3–47.1)
HGB BLD-MCNC: 8 G/DL (ref 11.9–15.1)
IMM GRANULOCYTES # BLD AUTO: 0.05 K/UL (ref 0–0.3)
IMM GRANULOCYTES NFR BLD: 1 %
INR PPP: 3.5
LYMPHOCYTES NFR BLD: 1.06 K/UL (ref 1.1–3.7)
LYMPHOCYTES RELATIVE PERCENT: 23 % (ref 24–43)
MCH RBC QN AUTO: 26.1 PG (ref 25.2–33.5)
MCHC RBC AUTO-ENTMCNC: 29.2 G/DL (ref 28.4–34.8)
MCV RBC AUTO: 89.3 FL (ref 82.6–102.9)
MONOCYTES NFR BLD: 0.46 K/UL (ref 0.1–1.2)
MONOCYTES NFR BLD: 10 % (ref 3–12)
NEUTROPHILS NFR BLD: 60 % (ref 36–65)
NEUTS SEG NFR BLD: 2.83 K/UL (ref 1.5–8.1)
NRBC BLD-RTO: 0 PER 100 WBC
PLATELET # BLD AUTO: 288 K/UL (ref 138–453)
PMV BLD AUTO: 9.8 FL (ref 8.1–13.5)
POTASSIUM SERPL-SCNC: 3.7 MMOL/L (ref 3.7–5.3)
PROTHROMBIN TIME: 34 SEC (ref 11.7–14.9)
RBC # BLD AUTO: 3.07 M/UL (ref 3.95–5.11)
RBC # BLD: ABNORMAL 10*6/UL
SODIUM SERPL-SCNC: 140 MMOL/L (ref 136–145)
WBC OTHER # BLD: 4.7 K/UL (ref 3.5–11.3)

## 2024-09-30 PROCEDURE — 85610 PROTHROMBIN TIME: CPT

## 2024-09-30 PROCEDURE — 80048 BASIC METABOLIC PNL TOTAL CA: CPT

## 2024-09-30 PROCEDURE — 85025 COMPLETE CBC W/AUTO DIFF WBC: CPT

## 2024-09-30 PROCEDURE — 36415 COLL VENOUS BLD VENIPUNCTURE: CPT

## 2024-09-30 PROCEDURE — P9603 ONE-WAY ALLOW PRORATED MILES: HCPCS

## 2024-10-02 ENCOUNTER — OFFICE VISIT (OUTPATIENT)
Dept: INFECTIOUS DISEASES | Age: 69
End: 2024-10-02
Payer: MEDICARE

## 2024-10-02 VITALS
HEIGHT: 65 IN | BODY MASS INDEX: 48.82 KG/M2 | DIASTOLIC BLOOD PRESSURE: 64 MMHG | HEART RATE: 102 BPM | WEIGHT: 293 LBS | SYSTOLIC BLOOD PRESSURE: 100 MMHG

## 2024-10-02 DIAGNOSIS — N39.0 URINARY TRACT INFECTION DUE TO ESBL KLEBSIELLA: Primary | ICD-10-CM

## 2024-10-02 DIAGNOSIS — B96.89 URINARY TRACT INFECTION DUE TO ESBL KLEBSIELLA: Primary | ICD-10-CM

## 2024-10-02 PROCEDURE — 99215 OFFICE O/P EST HI 40 MIN: CPT | Performed by: INTERNAL MEDICINE

## 2024-10-02 PROCEDURE — 3078F DIAST BP <80 MM HG: CPT | Performed by: INTERNAL MEDICINE

## 2024-10-02 PROCEDURE — 3074F SYST BP LT 130 MM HG: CPT | Performed by: INTERNAL MEDICINE

## 2024-10-02 PROCEDURE — 1123F ACP DISCUSS/DSCN MKR DOCD: CPT | Performed by: INTERNAL MEDICINE

## 2024-10-02 NOTE — PROGRESS NOTES
[facility] -   Infectious diseases evaluation      Impression :     Recent MDRO ESBL Carbapenem resistant Klebsiella Pneumoniae UTI treated.  Acute/chronic respiratory failure s/p tracheostomy tube placement 7/26/24  Abdominal wall wound  Allergies to multiple antibiotics includes amoxicillin and penicillin, tolerates/cefepime.  Diabetes mellitus  MRSA Carrier.  Morbid Obesity      HENCE:      Bactrim was discontinued 8/2   Monitor off antibiotics   Wound care  Follow-up as needed        History of Present Illness:   Initial history:  Shazia ROGERS is a 68 y.o.-year-old female with a history of chronic hypoxemic respiratory failure, on 2 L nasal cannula, chronic hypercapnic respiratory failure with pCO2 in the 80s to mid 90s, predominantly secondary to obesity hypoventilation syndrome, SHWETA, noncompliant with CPAP, asthma, pulmonary embolism/chronic DVT with goal INR of 3-3 0.5, atrial fibrillation, hypertension, morbid obesity, hyperlipidemia, depression, who presented to the emergency department on 7/22/24 for acute on chronic hypoxemic respiratory failure and somnolence. Patient underwent tracheostomy tube placement on 7/26/24.  Urine cx from 7/22/24 showing ESBL Klebsiella Pneumoniae 10-50 K.  The patient was treated with IV antibiotics followed by p.o. Bactrim course that was completed.      Interval changes  10/2/2024   She is here for follow-up on recent hospital stay, does have chronic cough and shortness of breath, tracheostomy in place, denied fever or chills, no vomiting, tolerating oral.  He does have abdominal wound.        Patient Vitals for the past 8 hrs:   BP Pulse Height Weight   10/02/24 0957 100/64 (!) 102 1.651 m (5' 5\") (!) 208.2 kg (459 lb)         I have personally reviewed the past medical history, past surgical history, medications, social history, and family history, and I haveupdated the database accordingly.      Allergies:   Amoxil

## 2024-10-04 ENCOUNTER — HOSPITAL ENCOUNTER (OUTPATIENT)
Age: 69
Setting detail: SPECIMEN
Discharge: HOME | End: 2024-10-04

## 2024-10-04 LAB
INR PPP: 5.6
PROTHROMBIN TIME: 48.9 SEC (ref 11.7–14.9)

## 2024-10-04 PROCEDURE — 85610 PROTHROMBIN TIME: CPT

## 2024-10-04 PROCEDURE — 36415 COLL VENOUS BLD VENIPUNCTURE: CPT

## 2024-10-04 PROCEDURE — P9603 ONE-WAY ALLOW PRORATED MILES: HCPCS

## 2024-10-07 ENCOUNTER — HOSPITAL ENCOUNTER (OUTPATIENT)
Age: 69
Setting detail: SPECIMEN
Discharge: HOME | End: 2024-10-07

## 2024-10-07 LAB
INR PPP: 3.6
PROTHROMBIN TIME: 35 SEC (ref 11.7–14.9)

## 2024-10-07 PROCEDURE — 36415 COLL VENOUS BLD VENIPUNCTURE: CPT

## 2024-10-07 PROCEDURE — P9603 ONE-WAY ALLOW PRORATED MILES: HCPCS

## 2024-10-07 PROCEDURE — 85610 PROTHROMBIN TIME: CPT

## 2024-10-11 ENCOUNTER — HOSPITAL ENCOUNTER (OUTPATIENT)
Age: 69
Setting detail: SPECIMEN
Discharge: HOME | End: 2024-10-11

## 2024-10-11 LAB
INR PPP: 2.6
PROTHROMBIN TIME: 27.4 SEC (ref 11.5–14.2)

## 2024-10-11 PROCEDURE — P9603 ONE-WAY ALLOW PRORATED MILES: HCPCS

## 2024-10-11 PROCEDURE — 85610 PROTHROMBIN TIME: CPT

## 2024-10-11 PROCEDURE — 36415 COLL VENOUS BLD VENIPUNCTURE: CPT

## 2024-10-14 ENCOUNTER — HOSPITAL ENCOUNTER (OUTPATIENT)
Age: 69
Setting detail: SPECIMEN
Discharge: HOME | End: 2024-10-14

## 2024-10-14 LAB
INR PPP: 3
PROTHROMBIN TIME: 30 SEC (ref 11.7–14.9)

## 2024-10-14 PROCEDURE — 85610 PROTHROMBIN TIME: CPT

## 2024-10-14 PROCEDURE — P9603 ONE-WAY ALLOW PRORATED MILES: HCPCS

## 2024-10-14 PROCEDURE — 36415 COLL VENOUS BLD VENIPUNCTURE: CPT

## 2024-10-15 ENCOUNTER — HOSPITAL ENCOUNTER (OUTPATIENT)
Age: 69
Setting detail: SPECIMEN
Discharge: HOME | End: 2024-10-15

## 2024-10-17 ENCOUNTER — HOSPITAL ENCOUNTER (OUTPATIENT)
Age: 69
Setting detail: SPECIMEN
Discharge: HOME | End: 2024-10-17

## 2024-10-17 LAB
INR PPP: 3.6
PROTHROMBIN TIME: 34.8 SEC (ref 11.7–14.9)

## 2024-10-17 PROCEDURE — P9603 ONE-WAY ALLOW PRORATED MILES: HCPCS

## 2024-10-17 PROCEDURE — 85610 PROTHROMBIN TIME: CPT

## 2024-10-17 PROCEDURE — 36415 COLL VENOUS BLD VENIPUNCTURE: CPT

## 2024-10-24 ENCOUNTER — HOSPITAL ENCOUNTER (OUTPATIENT)
Age: 69
Setting detail: SPECIMEN
Discharge: HOME | End: 2024-10-24

## 2024-10-24 LAB
INR PPP: 3.5
PROTHROMBIN TIME: 33.9 SEC (ref 11.7–14.9)

## 2024-10-24 PROCEDURE — 36415 COLL VENOUS BLD VENIPUNCTURE: CPT

## 2024-10-24 PROCEDURE — P9603 ONE-WAY ALLOW PRORATED MILES: HCPCS

## 2024-10-24 PROCEDURE — 85610 PROTHROMBIN TIME: CPT

## 2024-10-28 ENCOUNTER — HOSPITAL ENCOUNTER (OUTPATIENT)
Age: 69
Setting detail: SPECIMEN
Discharge: HOME | End: 2024-10-28
Payer: MEDICARE

## 2024-10-28 LAB
INR PPP: 3.2
PROTHROMBIN TIME: 31.5 SEC (ref 11.7–14.9)

## 2024-10-28 PROCEDURE — P9603 ONE-WAY ALLOW PRORATED MILES: HCPCS

## 2024-10-28 PROCEDURE — 85610 PROTHROMBIN TIME: CPT

## 2024-10-28 PROCEDURE — 36415 COLL VENOUS BLD VENIPUNCTURE: CPT

## 2024-11-04 ENCOUNTER — HOSPITAL ENCOUNTER (OUTPATIENT)
Age: 69
Setting detail: SPECIMEN
Discharge: HOME | End: 2024-11-04
Payer: MEDICARE

## 2024-11-04 LAB
INR PPP: 2.3
PROTHROMBIN TIME: 24.5 SEC (ref 11.7–14.9)

## 2024-11-04 PROCEDURE — 36415 COLL VENOUS BLD VENIPUNCTURE: CPT

## 2024-11-04 PROCEDURE — 85610 PROTHROMBIN TIME: CPT

## 2024-11-04 PROCEDURE — P9603 ONE-WAY ALLOW PRORATED MILES: HCPCS

## 2024-11-06 ENCOUNTER — HOSPITAL ENCOUNTER (OUTPATIENT)
Age: 69
Setting detail: SPECIMEN
Discharge: HOME | End: 2024-11-06

## 2024-11-07 ENCOUNTER — HOSPITAL ENCOUNTER (OUTPATIENT)
Age: 69
Setting detail: SPECIMEN
Discharge: HOME | End: 2024-11-07

## 2024-11-07 LAB
INR PPP: 2.3
PROTHROMBIN TIME: 24.3 SEC (ref 11.7–14.9)

## 2024-11-07 PROCEDURE — 85610 PROTHROMBIN TIME: CPT

## 2024-11-07 PROCEDURE — 36415 COLL VENOUS BLD VENIPUNCTURE: CPT

## 2024-11-07 PROCEDURE — P9603 ONE-WAY ALLOW PRORATED MILES: HCPCS

## 2024-11-08 ENCOUNTER — HOSPITAL ENCOUNTER (OUTPATIENT)
Age: 69
Setting detail: SPECIMEN
Discharge: HOME | End: 2024-11-08

## 2024-11-08 PROCEDURE — 87077 CULTURE AEROBIC IDENTIFY: CPT

## 2024-11-08 PROCEDURE — 87205 SMEAR GRAM STAIN: CPT

## 2024-11-08 PROCEDURE — 87070 CULTURE OTHR SPECIMN AEROBIC: CPT

## 2024-11-09 LAB
MICROORGANISM SPEC CULT: NORMAL
MICROORGANISM/AGENT SPEC: NORMAL
SERVICE CMNT-IMP: NORMAL
SPECIMEN DESCRIPTION: NORMAL

## 2024-11-11 ENCOUNTER — TELEPHONE (OUTPATIENT)
Dept: ADMINISTRATIVE | Age: 69
End: 2024-11-11

## 2024-11-11 ENCOUNTER — HOSPITAL ENCOUNTER (OUTPATIENT)
Age: 69
Setting detail: SPECIMEN
Discharge: HOME | End: 2024-11-11

## 2024-11-11 LAB
INR PPP: 2.4
PROTHROMBIN TIME: 25.7 SEC (ref 11.7–14.9)

## 2024-11-11 PROCEDURE — 85610 PROTHROMBIN TIME: CPT

## 2024-11-11 PROCEDURE — P9603 ONE-WAY ALLOW PRORATED MILES: HCPCS

## 2024-11-11 PROCEDURE — 36415 COLL VENOUS BLD VENIPUNCTURE: CPT

## 2024-11-11 NOTE — TELEPHONE ENCOUNTER
Writer called Faby back at [facility]. Writer stated to Faby patient to follow up with general surgery-Dr. Lentz for trach concerns per previous message from Dr. Andrade from last attempt at scheduling patient appointment. Faby voiced understanding, no additional needs.

## 2024-11-11 NOTE — TELEPHONE ENCOUNTER
Faby with [facility] [place] calling regarding scheduling a FU due to pat trach bleeding and soreness. Pat seems to be a new pat, past New pat appt was cancelled. Please call to sched. [phone]

## 2024-11-13 ENCOUNTER — TELEPHONE (OUTPATIENT)
Age: 69
End: 2024-11-13

## 2024-11-13 NOTE — TELEPHONE ENCOUNTER
[facility]. The pt is experiencing trach soreness and bleeding. The pt had a tracheostomy insertion done on 07/26/2024 by Dr. Lentz. I scheduled the pt on 01/02/2025 with MARCI Donald. Please let me know if you would like to see the pt in the office sooner. You and Dr. Lentz are currently booked out till January.

## 2024-11-13 NOTE — TELEPHONE ENCOUNTER
Called and spoke to james she states as of now nothing seems to be urgent she is aware that if it becomes urgent to take her to to [place].

## 2024-11-14 ENCOUNTER — HOSPITAL ENCOUNTER (OUTPATIENT)
Age: 69
Setting detail: SPECIMEN
Discharge: HOME | End: 2024-11-14

## 2024-11-14 LAB
25(OH)D3 SERPL-MCNC: 29.1 NG/ML (ref 30–100)
ALBUMIN SERPL-MCNC: 3 G/DL (ref 3.5–5.2)
ALBUMIN/GLOB SERPL: 0.9 {RATIO} (ref 1–2.5)
ALP SERPL-CCNC: 63 U/L (ref 35–104)
ALT SERPL-CCNC: 11 U/L (ref 10–35)
AST SERPL-CCNC: 16 U/L (ref 10–35)
BILIRUB DIRECT SERPL-MCNC: 0.2 MG/DL (ref 0–0.2)
BILIRUB INDIRECT SERPL-MCNC: 0.1 MG/DL (ref 0–1)
BILIRUB SERPL-MCNC: 0.3 MG/DL (ref 0–1.2)
CHOLEST SERPL-MCNC: 131 MG/DL (ref 0–199)
CHOLESTEROL/HDL RATIO: 3.4
GLOBULIN SER CALC-MCNC: 3.3 G/DL
HDLC SERPL-MCNC: 38 MG/DL
INR PPP: 2.5
LDLC SERPL CALC-MCNC: 69 MG/DL (ref 0–100)
PROT SERPL-MCNC: 6.3 G/DL (ref 6.6–8.7)
PROTHROMBIN TIME: 26.3 SEC (ref 11.7–14.9)
TRIGL SERPL-MCNC: 120 MG/DL
VLDLC SERPL CALC-MCNC: 24 MG/DL (ref 1–30)

## 2024-11-14 PROCEDURE — 85610 PROTHROMBIN TIME: CPT

## 2024-11-14 PROCEDURE — 36415 COLL VENOUS BLD VENIPUNCTURE: CPT

## 2024-11-14 PROCEDURE — P9603 ONE-WAY ALLOW PRORATED MILES: HCPCS

## 2024-11-14 PROCEDURE — 82306 VITAMIN D 25 HYDROXY: CPT

## 2024-11-14 PROCEDURE — 80061 LIPID PANEL: CPT

## 2024-11-14 PROCEDURE — 80076 HEPATIC FUNCTION PANEL: CPT

## 2024-11-18 ENCOUNTER — HOSPITAL ENCOUNTER (OUTPATIENT)
Age: 69
Setting detail: SPECIMEN
Discharge: HOME | End: 2024-11-18
Payer: MEDICARE

## 2024-11-18 LAB
INR PPP: 2.2
PROTHROMBIN TIME: 24.2 SEC (ref 11.7–14.9)

## 2024-11-18 PROCEDURE — P9603 ONE-WAY ALLOW PRORATED MILES: HCPCS

## 2024-11-18 PROCEDURE — 85610 PROTHROMBIN TIME: CPT

## 2024-11-18 PROCEDURE — 36415 COLL VENOUS BLD VENIPUNCTURE: CPT

## 2024-11-21 ENCOUNTER — HOSPITAL ENCOUNTER (OUTPATIENT)
Age: 69
Setting detail: SPECIMEN
Discharge: HOME | End: 2024-11-21

## 2024-11-21 LAB
INR PPP: 2.3
PROTHROMBIN TIME: 24.6 SEC (ref 11.7–14.9)

## 2024-11-21 PROCEDURE — P9603 ONE-WAY ALLOW PRORATED MILES: HCPCS

## 2024-11-21 PROCEDURE — 85610 PROTHROMBIN TIME: CPT

## 2024-11-21 PROCEDURE — 36415 COLL VENOUS BLD VENIPUNCTURE: CPT

## 2024-11-27 ENCOUNTER — HOSPITAL ENCOUNTER (OUTPATIENT)
Age: 69
Setting detail: SPECIMEN
Discharge: HOME | End: 2024-11-27

## 2024-11-27 LAB
INR PPP: 2.4
PROTHROMBIN TIME: 25.1 SEC (ref 11.7–14.9)

## 2024-11-27 PROCEDURE — P9603 ONE-WAY ALLOW PRORATED MILES: HCPCS

## 2024-11-27 PROCEDURE — 36415 COLL VENOUS BLD VENIPUNCTURE: CPT

## 2024-11-27 PROCEDURE — 85610 PROTHROMBIN TIME: CPT

## 2024-12-03 ENCOUNTER — HOSPITAL ENCOUNTER (OUTPATIENT)
Age: 69
Setting detail: SPECIMEN
Discharge: HOME | End: 2024-12-03
Payer: MEDICARE

## 2024-12-03 LAB
INR PPP: 2.6
PROTHROMBIN TIME: 27.3 SEC (ref 11.5–14.2)

## 2024-12-03 PROCEDURE — 36415 COLL VENOUS BLD VENIPUNCTURE: CPT

## 2024-12-03 PROCEDURE — 85610 PROTHROMBIN TIME: CPT

## 2024-12-03 PROCEDURE — P9603 ONE-WAY ALLOW PRORATED MILES: HCPCS

## 2024-12-06 ENCOUNTER — HOSPITAL ENCOUNTER (OUTPATIENT)
Age: 69
Setting detail: SPECIMEN
Discharge: HOME | End: 2024-12-06

## 2024-12-06 LAB
INR PPP: 2.5
PROTHROMBIN TIME: 27 SEC (ref 11.5–14.2)

## 2024-12-06 PROCEDURE — 36415 COLL VENOUS BLD VENIPUNCTURE: CPT

## 2024-12-06 PROCEDURE — P9603 ONE-WAY ALLOW PRORATED MILES: HCPCS

## 2024-12-06 PROCEDURE — 85610 PROTHROMBIN TIME: CPT

## 2024-12-09 ENCOUNTER — HOSPITAL ENCOUNTER (OUTPATIENT)
Age: 69
Setting detail: SPECIMEN
Discharge: HOME | End: 2024-12-09
Payer: MEDICARE

## 2024-12-09 LAB
INR PPP: 2.9
PROTHROMBIN TIME: 29.6 SEC (ref 11.7–14.9)

## 2024-12-09 PROCEDURE — P9603 ONE-WAY ALLOW PRORATED MILES: HCPCS

## 2024-12-09 PROCEDURE — 36415 COLL VENOUS BLD VENIPUNCTURE: CPT

## 2024-12-09 PROCEDURE — 85610 PROTHROMBIN TIME: CPT

## 2024-12-12 ENCOUNTER — HOSPITAL ENCOUNTER (OUTPATIENT)
Age: 69
Setting detail: SPECIMEN
Discharge: HOME | End: 2024-12-12

## 2024-12-12 LAB
INR PPP: 3.3
PROTHROMBIN TIME: 32.6 SEC (ref 11.7–14.9)

## 2024-12-12 PROCEDURE — 36415 COLL VENOUS BLD VENIPUNCTURE: CPT

## 2024-12-12 PROCEDURE — P9603 ONE-WAY ALLOW PRORATED MILES: HCPCS

## 2024-12-12 PROCEDURE — 85610 PROTHROMBIN TIME: CPT

## 2024-12-16 ENCOUNTER — HOSPITAL ENCOUNTER (OUTPATIENT)
Age: 69
Setting detail: SPECIMEN
Discharge: HOME | End: 2024-12-16
Payer: MEDICARE

## 2024-12-16 LAB
INR PPP: 3.9
PROTHROMBIN TIME: 37 SEC (ref 11.7–14.9)

## 2024-12-16 PROCEDURE — P9603 ONE-WAY ALLOW PRORATED MILES: HCPCS

## 2024-12-16 PROCEDURE — 36415 COLL VENOUS BLD VENIPUNCTURE: CPT

## 2024-12-16 PROCEDURE — 85610 PROTHROMBIN TIME: CPT

## 2024-12-19 ENCOUNTER — HOSPITAL ENCOUNTER (OUTPATIENT)
Age: 69
Setting detail: SPECIMEN
Discharge: HOME | End: 2024-12-19
Payer: MEDICARE

## 2024-12-19 LAB
INR PPP: 2.9
PROTHROMBIN TIME: 29.5 SEC (ref 11.7–14.9)

## 2024-12-19 PROCEDURE — 36415 COLL VENOUS BLD VENIPUNCTURE: CPT

## 2024-12-19 PROCEDURE — P9603 ONE-WAY ALLOW PRORATED MILES: HCPCS

## 2024-12-19 PROCEDURE — 85610 PROTHROMBIN TIME: CPT

## 2024-12-23 ENCOUNTER — HOSPITAL ENCOUNTER (OUTPATIENT)
Age: 69
Setting detail: SPECIMEN
Discharge: HOME | End: 2024-12-23

## 2024-12-24 ENCOUNTER — HOSPITAL ENCOUNTER (OUTPATIENT)
Age: 69
Setting detail: SPECIMEN
Discharge: HOME | End: 2024-12-24
Payer: MEDICARE

## 2024-12-24 LAB
INR PPP: 2
PROTHROMBIN TIME: 22.2 SEC (ref 11.7–14.9)

## 2024-12-24 PROCEDURE — 36415 COLL VENOUS BLD VENIPUNCTURE: CPT

## 2024-12-24 PROCEDURE — P9603 ONE-WAY ALLOW PRORATED MILES: HCPCS

## 2024-12-24 PROCEDURE — 85610 PROTHROMBIN TIME: CPT

## 2024-12-26 ENCOUNTER — HOSPITAL ENCOUNTER (OUTPATIENT)
Age: 69
Setting detail: SPECIMEN
Discharge: HOME | End: 2024-12-26
Payer: MEDICARE

## 2024-12-26 LAB
INR PPP: 1.9
PROTHROMBIN TIME: 21.4 SEC (ref 11.7–14.9)

## 2024-12-26 PROCEDURE — 36415 COLL VENOUS BLD VENIPUNCTURE: CPT

## 2024-12-26 PROCEDURE — P9603 ONE-WAY ALLOW PRORATED MILES: HCPCS

## 2024-12-26 PROCEDURE — 85610 PROTHROMBIN TIME: CPT

## 2024-12-30 ENCOUNTER — HOSPITAL ENCOUNTER (OUTPATIENT)
Age: 69
Setting detail: SPECIMEN
Discharge: HOME | End: 2024-12-30

## 2024-12-30 ENCOUNTER — TELEPHONE (OUTPATIENT)
Age: 69
End: 2024-12-30

## 2024-12-30 LAB
INR PPP: 1.7
PROTHROMBIN TIME: 19.4 SEC (ref 11.7–14.9)

## 2024-12-30 PROCEDURE — 36415 COLL VENOUS BLD VENIPUNCTURE: CPT

## 2024-12-30 PROCEDURE — 85610 PROTHROMBIN TIME: CPT

## 2024-12-30 PROCEDURE — P9603 ONE-WAY ALLOW PRORATED MILES: HCPCS

## 2024-12-30 NOTE — TELEPHONE ENCOUNTER
Faby [phone] from [facility] [place] called and   Rescheduled patient office visit , due to transportations issues.

## 2025-01-02 ENCOUNTER — HOSPITAL ENCOUNTER (OUTPATIENT)
Age: 70
Setting detail: SPECIMEN
Discharge: HOME | End: 2025-01-02

## 2025-01-02 LAB
INR PPP: 1.6
PROTHROMBIN TIME: 19 SEC (ref 11.7–14.9)

## 2025-01-02 PROCEDURE — 36415 COLL VENOUS BLD VENIPUNCTURE: CPT

## 2025-01-02 PROCEDURE — P9603 ONE-WAY ALLOW PRORATED MILES: HCPCS

## 2025-01-02 PROCEDURE — 85610 PROTHROMBIN TIME: CPT

## 2025-01-06 ENCOUNTER — HOSPITAL ENCOUNTER (OUTPATIENT)
Age: 70
Setting detail: SPECIMEN
Discharge: HOME | End: 2025-01-06
Payer: MEDICARE

## 2025-01-06 LAB
INR PPP: 1.8
PROTHROMBIN TIME: 20.6 SEC (ref 11.7–14.9)

## 2025-01-06 PROCEDURE — P9603 ONE-WAY ALLOW PRORATED MILES: HCPCS

## 2025-01-06 PROCEDURE — 36415 COLL VENOUS BLD VENIPUNCTURE: CPT

## 2025-01-06 PROCEDURE — 85610 PROTHROMBIN TIME: CPT

## 2025-01-09 ENCOUNTER — HOSPITAL ENCOUNTER (OUTPATIENT)
Age: 70
Setting detail: SPECIMEN
Discharge: HOME | End: 2025-01-09

## 2025-01-09 LAB
INR PPP: 1.7
PROTHROMBIN TIME: 19.6 SEC (ref 11.7–14.9)

## 2025-01-09 PROCEDURE — 85610 PROTHROMBIN TIME: CPT

## 2025-01-09 PROCEDURE — 36415 COLL VENOUS BLD VENIPUNCTURE: CPT

## 2025-01-09 PROCEDURE — P9603 ONE-WAY ALLOW PRORATED MILES: HCPCS

## 2025-01-12 ENCOUNTER — HOSPITAL ENCOUNTER (OUTPATIENT)
Age: 70
Setting detail: SPECIMEN
Discharge: HOME | End: 2025-01-12

## 2025-01-13 ENCOUNTER — HOSPITAL ENCOUNTER (OUTPATIENT)
Age: 70
Setting detail: SPECIMEN
Discharge: HOME | End: 2025-01-13
Payer: MEDICARE

## 2025-01-13 LAB
ANION GAP SERPL CALCULATED.3IONS-SCNC: 8 MMOL/L (ref 9–16)
BUN SERPL-MCNC: 22 MG/DL (ref 8–23)
CALCIUM SERPL-MCNC: 9.5 MG/DL (ref 8.6–10.4)
CHLORIDE SERPL-SCNC: 99 MMOL/L (ref 98–107)
CO2 SERPL-SCNC: 32 MMOL/L (ref 20–31)
CREAT SERPL-MCNC: 1.3 MG/DL (ref 0.6–0.9)
ERYTHROCYTE [DISTWIDTH] IN BLOOD BY AUTOMATED COUNT: 17.6 % (ref 11.8–14.4)
GFR, ESTIMATED: 45 ML/MIN/1.73M2
GLUCOSE SERPL-MCNC: 128 MG/DL (ref 74–99)
HCT VFR BLD AUTO: 29.1 % (ref 36.3–47.1)
HGB BLD-MCNC: 8.5 G/DL (ref 11.9–15.1)
INR PPP: 1.9
MCH RBC QN AUTO: 26.3 PG (ref 25.2–33.5)
MCHC RBC AUTO-ENTMCNC: 29.2 G/DL (ref 28.4–34.8)
MCV RBC AUTO: 90.1 FL (ref 82.6–102.9)
NRBC BLD-RTO: 0 PER 100 WBC
PLATELET # BLD AUTO: 315 K/UL (ref 138–453)
PMV BLD AUTO: 9.1 FL (ref 8.1–13.5)
POTASSIUM SERPL-SCNC: 3.9 MMOL/L (ref 3.7–5.3)
PROTHROMBIN TIME: 20.9 SEC (ref 11.7–14.9)
RBC # BLD AUTO: 3.23 M/UL (ref 3.95–5.11)
SODIUM SERPL-SCNC: 139 MMOL/L (ref 136–145)
WBC OTHER # BLD: 5.9 K/UL (ref 3.5–11.3)

## 2025-01-13 PROCEDURE — 36415 COLL VENOUS BLD VENIPUNCTURE: CPT

## 2025-01-13 PROCEDURE — 85027 COMPLETE CBC AUTOMATED: CPT

## 2025-01-13 PROCEDURE — 80048 BASIC METABOLIC PNL TOTAL CA: CPT

## 2025-01-13 PROCEDURE — 85610 PROTHROMBIN TIME: CPT

## 2025-01-13 PROCEDURE — P9603 ONE-WAY ALLOW PRORATED MILES: HCPCS

## 2025-01-14 ENCOUNTER — HOSPITAL ENCOUNTER (OUTPATIENT)
Age: 70
Setting detail: SPECIMEN
Discharge: HOME | End: 2025-01-14

## 2025-01-14 PROCEDURE — 87205 SMEAR GRAM STAIN: CPT

## 2025-01-14 PROCEDURE — 87186 SC STD MICRODIL/AGAR DIL: CPT

## 2025-01-14 PROCEDURE — 87070 CULTURE OTHR SPECIMN AEROBIC: CPT

## 2025-01-16 ENCOUNTER — HOSPITAL ENCOUNTER (OUTPATIENT)
Age: 70
Setting detail: SPECIMEN
Discharge: HOME | End: 2025-01-16

## 2025-01-16 LAB
INR PPP: 2
PROTHROMBIN TIME: 22.4 SEC (ref 11.7–14.9)

## 2025-01-16 PROCEDURE — 36415 COLL VENOUS BLD VENIPUNCTURE: CPT

## 2025-01-16 PROCEDURE — 85610 PROTHROMBIN TIME: CPT

## 2025-01-16 PROCEDURE — P9603 ONE-WAY ALLOW PRORATED MILES: HCPCS

## 2025-01-19 LAB
MICROORGANISM SPEC CULT: ABNORMAL
MICROORGANISM/AGENT SPEC: ABNORMAL
SERVICE CMNT-IMP: ABNORMAL
SPECIMEN DESCRIPTION: ABNORMAL

## 2025-01-20 ENCOUNTER — HOSPITAL ENCOUNTER (OUTPATIENT)
Age: 70
Setting detail: SPECIMEN
Discharge: HOME | End: 2025-01-20

## 2025-01-20 LAB
INR PPP: 2.2
MICROORGANISM SPEC CULT: ABNORMAL
MICROORGANISM/AGENT SPEC: ABNORMAL
PROTHROMBIN TIME: 24 SEC (ref 11.7–14.9)
SERVICE CMNT-IMP: ABNORMAL
SPECIMEN DESCRIPTION: ABNORMAL

## 2025-01-20 PROCEDURE — P9603 ONE-WAY ALLOW PRORATED MILES: HCPCS

## 2025-01-20 PROCEDURE — 36415 COLL VENOUS BLD VENIPUNCTURE: CPT

## 2025-01-20 PROCEDURE — 85610 PROTHROMBIN TIME: CPT

## 2025-01-23 ENCOUNTER — HOSPITAL ENCOUNTER (OUTPATIENT)
Age: 70
Setting detail: SPECIMEN
Discharge: HOME | End: 2025-01-23

## 2025-01-23 LAB
INR PPP: 2.5
PROTHROMBIN TIME: 26.7 SEC (ref 11.7–14.9)

## 2025-01-23 PROCEDURE — 36415 COLL VENOUS BLD VENIPUNCTURE: CPT

## 2025-01-23 PROCEDURE — P9603 ONE-WAY ALLOW PRORATED MILES: HCPCS

## 2025-01-23 PROCEDURE — 85610 PROTHROMBIN TIME: CPT

## 2025-02-04 ENCOUNTER — HOSPITAL ENCOUNTER (OUTPATIENT)
Age: 70
Setting detail: SPECIMEN
Discharge: HOME | End: 2025-02-04
Payer: MEDICARE

## 2025-02-04 LAB
INR PPP: 3.8
PROTHROMBIN TIME: 36.5 SEC (ref 11.5–14.2)

## 2025-02-04 PROCEDURE — 85610 PROTHROMBIN TIME: CPT

## 2025-02-04 PROCEDURE — 36415 COLL VENOUS BLD VENIPUNCTURE: CPT

## 2025-02-06 ENCOUNTER — HOSPITAL ENCOUNTER (OUTPATIENT)
Age: 70
Setting detail: SPECIMEN
Discharge: HOME | End: 2025-02-06

## 2025-02-06 LAB
INR PPP: 3.3
PROTHROMBIN TIME: 32.6 SEC (ref 11.7–14.9)

## 2025-02-06 PROCEDURE — 85610 PROTHROMBIN TIME: CPT

## 2025-02-06 PROCEDURE — 36415 COLL VENOUS BLD VENIPUNCTURE: CPT

## 2025-02-10 ENCOUNTER — HOSPITAL ENCOUNTER (OUTPATIENT)
Age: 70
Setting detail: SPECIMEN
Discharge: HOME | End: 2025-02-10
Payer: MEDICARE

## 2025-02-10 LAB
INR PPP: 3.2
PROTHROMBIN TIME: 31.9 SEC (ref 11.7–14.9)

## 2025-02-10 PROCEDURE — 85610 PROTHROMBIN TIME: CPT

## 2025-02-10 PROCEDURE — 36415 COLL VENOUS BLD VENIPUNCTURE: CPT

## 2025-02-13 ENCOUNTER — HOSPITAL ENCOUNTER (OUTPATIENT)
Age: 70
Setting detail: SPECIMEN
Discharge: HOME | End: 2025-02-13

## 2025-02-13 LAB
INR PPP: 3.2
PROTHROMBIN TIME: 31.7 SEC (ref 11.7–14.9)

## 2025-02-13 PROCEDURE — 85610 PROTHROMBIN TIME: CPT

## 2025-02-13 PROCEDURE — 36415 COLL VENOUS BLD VENIPUNCTURE: CPT

## 2025-02-17 ENCOUNTER — HOSPITAL ENCOUNTER (OUTPATIENT)
Age: 70
Setting detail: SPECIMEN
Discharge: HOME | End: 2025-02-17
Payer: MEDICARE

## 2025-02-17 LAB
INR PPP: 2.5
PROTHROMBIN TIME: 26.7 SEC (ref 11.7–14.9)

## 2025-02-17 PROCEDURE — 85610 PROTHROMBIN TIME: CPT

## 2025-02-17 PROCEDURE — 36415 COLL VENOUS BLD VENIPUNCTURE: CPT

## 2025-02-20 ENCOUNTER — HOSPITAL ENCOUNTER (OUTPATIENT)
Age: 70
Setting detail: SPECIMEN
Discharge: HOME | End: 2025-02-20

## 2025-02-20 LAB
INR PPP: 3
PROTHROMBIN TIME: 29.9 SEC (ref 11.7–14.9)

## 2025-02-20 PROCEDURE — 36415 COLL VENOUS BLD VENIPUNCTURE: CPT

## 2025-02-20 PROCEDURE — 85610 PROTHROMBIN TIME: CPT

## 2025-02-24 ENCOUNTER — HOSPITAL ENCOUNTER (OUTPATIENT)
Age: 70
Setting detail: SPECIMEN
Discharge: HOME | End: 2025-02-24
Payer: MEDICARE

## 2025-02-24 LAB
ALBUMIN SERPL-MCNC: 3 G/DL (ref 3.5–5.2)
ALBUMIN/GLOB SERPL: 0.7 {RATIO} (ref 1–2.5)
ALP SERPL-CCNC: 88 U/L (ref 35–104)
ALT SERPL-CCNC: 15 U/L (ref 10–35)
ANION GAP SERPL CALCULATED.3IONS-SCNC: 8 MMOL/L (ref 9–16)
AST SERPL-CCNC: 20 U/L (ref 10–35)
BILIRUB SERPL-MCNC: 0.4 MG/DL (ref 0–1.2)
BUN SERPL-MCNC: 16 MG/DL (ref 8–23)
CALCIUM SERPL-MCNC: 9.7 MG/DL (ref 8.6–10.4)
CHLORIDE SERPL-SCNC: 98 MMOL/L (ref 98–107)
CHOLEST SERPL-MCNC: 133 MG/DL (ref 0–199)
CHOLESTEROL/HDL RATIO: 3.7
CO2 SERPL-SCNC: 32 MMOL/L (ref 20–31)
CREAT SERPL-MCNC: 1.4 MG/DL (ref 0.6–0.9)
EST. AVERAGE GLUCOSE BLD GHB EST-MCNC: 126 MG/DL
GFR, ESTIMATED: 41 ML/MIN/1.73M2
GLUCOSE SERPL-MCNC: 136 MG/DL (ref 74–99)
HBA1C MFR BLD: 6 % (ref 4–6)
HDLC SERPL-MCNC: 36 MG/DL
INR PPP: 3.2
LDLC SERPL CALC-MCNC: 80 MG/DL (ref 0–100)
POTASSIUM SERPL-SCNC: 4.6 MMOL/L (ref 3.7–5.3)
PROT SERPL-MCNC: 7.3 G/DL (ref 6.6–8.7)
PROTHROMBIN TIME: 31.4 SEC (ref 11.7–14.9)
SODIUM SERPL-SCNC: 138 MMOL/L (ref 136–145)
TRIGL SERPL-MCNC: 83 MG/DL (ref 0–149)
VLDLC SERPL CALC-MCNC: 17 MG/DL (ref 1–30)

## 2025-02-24 PROCEDURE — 80061 LIPID PANEL: CPT

## 2025-02-24 PROCEDURE — 85610 PROTHROMBIN TIME: CPT

## 2025-02-24 PROCEDURE — 83036 HEMOGLOBIN GLYCOSYLATED A1C: CPT

## 2025-02-24 PROCEDURE — 36415 COLL VENOUS BLD VENIPUNCTURE: CPT

## 2025-02-24 PROCEDURE — 80053 COMPREHEN METABOLIC PANEL: CPT

## 2025-02-25 ENCOUNTER — HOSPITAL ENCOUNTER (OUTPATIENT)
Age: 70
Setting detail: SPECIMEN
Discharge: HOME | End: 2025-02-25
Payer: MEDICARE

## 2025-02-25 LAB
ANION GAP SERPL CALCULATED.3IONS-SCNC: 9 MMOL/L (ref 9–16)
BUN SERPL-MCNC: 29 MG/DL (ref 8–23)
CALCIUM SERPL-MCNC: 8.9 MG/DL (ref 8.6–10.4)
CHLORIDE SERPL-SCNC: 98 MMOL/L (ref 98–107)
CO2 SERPL-SCNC: 30 MMOL/L (ref 20–31)
CREAT SERPL-MCNC: 1.8 MG/DL (ref 0.6–0.9)
ERYTHROCYTE [DISTWIDTH] IN BLOOD BY AUTOMATED COUNT: 17.2 % (ref 11.8–14.4)
GFR, ESTIMATED: 30 ML/MIN/1.73M2
GLUCOSE SERPL-MCNC: 128 MG/DL (ref 74–99)
HCT VFR BLD AUTO: 27.4 % (ref 36.3–47.1)
HGB BLD-MCNC: 8.2 G/DL (ref 11.9–15.1)
MCH RBC QN AUTO: 25.9 PG (ref 25.2–33.5)
MCHC RBC AUTO-ENTMCNC: 29.9 G/DL (ref 28.4–34.8)
MCV RBC AUTO: 86.7 FL (ref 82.6–102.9)
NRBC BLD-RTO: 0 PER 100 WBC
PLATELET # BLD AUTO: 234 K/UL (ref 138–453)
PMV BLD AUTO: 8.9 FL (ref 8.1–13.5)
POTASSIUM SERPL-SCNC: 3.7 MMOL/L (ref 3.7–5.3)
RBC # BLD AUTO: 3.16 M/UL (ref 3.95–5.11)
SODIUM SERPL-SCNC: 137 MMOL/L (ref 136–145)
WBC OTHER # BLD: 11.1 K/UL (ref 3.5–11.3)

## 2025-02-25 PROCEDURE — 85027 COMPLETE CBC AUTOMATED: CPT

## 2025-02-25 PROCEDURE — 80048 BASIC METABOLIC PNL TOTAL CA: CPT

## 2025-02-25 PROCEDURE — 36415 COLL VENOUS BLD VENIPUNCTURE: CPT

## 2025-02-28 ENCOUNTER — HOSPITAL ENCOUNTER (OUTPATIENT)
Age: 70
Setting detail: SPECIMEN
Discharge: HOME | End: 2025-02-28

## 2025-02-28 LAB
ANION GAP SERPL CALCULATED.3IONS-SCNC: 10 MMOL/L (ref 9–16)
BUN SERPL-MCNC: 40 MG/DL (ref 8–23)
CALCIUM SERPL-MCNC: 8.4 MG/DL (ref 8.8–10.2)
CHLORIDE SERPL-SCNC: 95 MMOL/L (ref 98–107)
CO2 SERPL-SCNC: 28 MMOL/L (ref 20–31)
CREAT SERPL-MCNC: 2.1 MG/DL (ref 0.5–0.9)
ERYTHROCYTE [DISTWIDTH] IN BLOOD BY AUTOMATED COUNT: 17.7 % (ref 11.8–14.4)
GFR, ESTIMATED: 26 ML/MIN/1.73M2
GLUCOSE SERPL-MCNC: 156 MG/DL (ref 82–115)
HCT VFR BLD AUTO: 24.3 % (ref 36.3–47.1)
HGB BLD-MCNC: 7.3 G/DL (ref 11.9–15.1)
MCH RBC QN AUTO: 25.8 PG (ref 25.2–33.5)
MCHC RBC AUTO-ENTMCNC: 30 G/DL (ref 28.4–34.8)
MCV RBC AUTO: 85.9 FL (ref 82.6–102.9)
NRBC BLD-RTO: 0 PER 100 WBC
PLATELET # BLD AUTO: 196 K/UL (ref 138–453)
PMV BLD AUTO: 9.6 FL (ref 8.1–13.5)
POTASSIUM SERPL-SCNC: 4 MMOL/L (ref 3.7–5.3)
RBC # BLD AUTO: 2.83 M/UL (ref 3.95–5.11)
SODIUM SERPL-SCNC: 133 MMOL/L (ref 136–145)
WBC OTHER # BLD: 5.3 K/UL (ref 3.5–11.3)

## 2025-02-28 PROCEDURE — 85027 COMPLETE CBC AUTOMATED: CPT

## 2025-02-28 PROCEDURE — 80048 BASIC METABOLIC PNL TOTAL CA: CPT

## 2025-02-28 PROCEDURE — 36415 COLL VENOUS BLD VENIPUNCTURE: CPT

## 2025-03-03 ENCOUNTER — HOSPITAL ENCOUNTER (OUTPATIENT)
Age: 70
Setting detail: SPECIMEN
Discharge: HOME | End: 2025-03-03

## 2025-03-03 LAB
INR PPP: 4.9
PROTHROMBIN TIME: 48.3 SEC (ref 11.5–14.2)

## 2025-03-03 PROCEDURE — 36415 COLL VENOUS BLD VENIPUNCTURE: CPT

## 2025-03-03 PROCEDURE — 85610 PROTHROMBIN TIME: CPT

## 2025-03-05 ENCOUNTER — HOSPITAL ENCOUNTER (OUTPATIENT)
Age: 70
Setting detail: SPECIMEN
Discharge: HOME | End: 2025-03-05

## 2025-03-05 ENCOUNTER — TELEPHONE (OUTPATIENT)
Age: 70
End: 2025-03-05

## 2025-03-05 LAB
INR PPP: 5.1
PROTHROMBIN TIME: 49.4 SEC (ref 11.5–14.2)

## 2025-03-05 PROCEDURE — 85610 PROTHROMBIN TIME: CPT

## 2025-03-05 PROCEDURE — 36415 COLL VENOUS BLD VENIPUNCTURE: CPT

## 2025-03-05 NOTE — TELEPHONE ENCOUNTER
Spoke with Lorraine at [facility] regarding office visit scheduled for 3/5/25 at 9:45am. Office visit is canceled per Dr Lentz, patient needs to be evaluated at their facility. Lorraine (patient's care coordinator) stated she will contact Dr Cuevas as well as their Pulmonary team.

## 2025-03-06 ENCOUNTER — HOSPITAL ENCOUNTER (EMERGENCY)
Age: 70
Discharge: HOME | End: 2025-03-06
Attending: STUDENT IN AN ORGANIZED HEALTH CARE EDUCATION/TRAINING PROGRAM
Payer: MEDICARE

## 2025-03-06 VITALS
SYSTOLIC BLOOD PRESSURE: 156 MMHG | TEMPERATURE: 98 F | DIASTOLIC BLOOD PRESSURE: 49 MMHG | RESPIRATION RATE: 15 BRPM | OXYGEN SATURATION: 100 % | WEIGHT: 293 LBS | HEIGHT: 65 IN | HEART RATE: 67 BPM | BODY MASS INDEX: 48.82 KG/M2

## 2025-03-06 DIAGNOSIS — Z43.0 TRACHEOSTOMY CARE: Primary | ICD-10-CM

## 2025-03-06 DIAGNOSIS — R79.1 ELEVATED INR: ICD-10-CM

## 2025-03-06 LAB
INR PPP: 4.9
PROTHROMBIN TIME: 48 SEC (ref 11.7–14.9)

## 2025-03-06 PROCEDURE — 5A1955Z RESPIRATORY VENTILATION, GREATER THAN 96 CONSECUTIVE HOURS: ICD-10-PCS | Performed by: STUDENT IN AN ORGANIZED HEALTH CARE EDUCATION/TRAINING PROGRAM

## 2025-03-06 PROCEDURE — 2700000000 HC OXYGEN THERAPY PER DAY

## 2025-03-06 PROCEDURE — 85610 PROTHROMBIN TIME: CPT

## 2025-03-06 PROCEDURE — 94761 N-INVAS EAR/PLS OXIMETRY MLT: CPT

## 2025-03-06 ASSESSMENT — ENCOUNTER SYMPTOMS
RESPIRATORY NEGATIVE: 1
EYES NEGATIVE: 1
GASTROINTESTINAL NEGATIVE: 1
ALLERGIC/IMMUNOLOGIC NEGATIVE: 1

## 2025-03-06 ASSESSMENT — PULMONARY FUNCTION TESTS: PIF_VALUE: 17

## 2025-03-06 NOTE — ED NOTES
Received call from Katherine at [facility], they can transport patient at 5-5:30 pm.

## 2025-03-06 NOTE — ED NOTES
Pt arrives to ED 12 via EMS.   Per report pt lives in a facility and is tracheostomy dependent.   Pt states that she feels as though she has a mucous plug and needs her tracheostomy tube replaced.   Pt states that she recently got blood drawn yesterday and her PT-INR was elevated.   Pt states that she did not take her blood thinner last night.   Pt denies any chest pain or SOB.   Pt denies any abdominal pain.   Pt denies any fevers.   Pt states that she just has a dry mouth.   Pt respirations are even and unlabored, pt is alert and oriented X 4, speaking in complete sentences, bed is in the lowest position, call light is within reach, NAD noted.   Will continue to follow plan of care.

## 2025-03-06 NOTE — DISCHARGE INSTRUCTIONS
You were seen and evaluated for an elevated INR that now seems to be downtrending.  Continue to hold your warfarin and check your INR levels periodically.  You any cannula to your tracheostomy was exchanged with a brand-new cannula.  You may consider asking respiratory therapy at your facility to use saline for lubrication when you are suctioned periodically.  Please return to the ER for any sudden chest pain, shortness of breath, or any other questions or concerns.

## 2025-03-06 NOTE — ED NOTES
Informed by RN patient is ready to return to [facility] [place]. Contacted [facility] and spoke with Lore, informed her patient ready for return.   Transpo request faxed over to [facility]. Contacted [facility] and they stated they are working on it and will call  back.

## 2025-03-06 NOTE — ED PROVIDER NOTES
[facility]     Emergency Department     Faculty Attestation    I performed a history and physical examination of the patient and discussed management with the resident. I have reviewed and agree with the resident’s findings including all diagnostic interpretations, and treatment plans as written at the time of my review. Any areas of disagreement are noted on the chart. I was personally present for the key portions of any procedures. I have documented in the chart those procedures where I was not present during the key portions. For Physician Assistant/ Nurse Practitioner cases/documentation I have personally evaluated this patient and have completed at least one if not all key elements of the E/M (history, physical exam, and MDM). Additional findings are as noted.    PtName: Shazia Nuñez  MRN: [id number]  Birthdate 1955  Date of evaluation: 3/6/25  Note Started: 12:31 PM EST    Primary Care Physician: [facility] [place] [facility]    Brief HPI:  61-year-old female presents to the emergency department for trach exchange.  The patient has a tracheostomy in place and is vent dependent.  She denies any difficulty breathing, shortness of breath, or increased secretions.  She reports that her INR has been abnormally elevated.  She is currently anticoagulated on Coumadin for history of PE.  She held her last dose due to an INR of greater than 5.    Pertinent Physical Exam Findings:  Vitals:    03/06/25 1222   BP: (!) 151/59   Pulse: 69   Resp: 18   Temp: 98 °F (36.7 °C)   SpO2: 100%   Appears well, resting comfortably, no acute distress, tracheostomy tube is midline, no surrounding erythema or drainage, air entry is normal, breathing is comfortable.    Medical Decision Making: Patient is a 69 y.o. female presenting to the emergency department for tracheostomy tube evaluation and elevated INR. The chart was reviewed for pertinent history relating to the chief  complaint.  The patient appears well, comfortable, no acute distress, she reports breathing is comfortable, breathing appears comfortable on exam, and the patient is on her baseline vent setting.  The inner cannula of her tracheostomy tube was exchanged which appeared clean.  Plan to check INR after held dose of Coumadin.    All results, including labs (if ordered), imaging (if ordered), and EKGs (if ordered) were independently interpreted by me.  See radiologist report for additional details on imaging studies.      (Please note that portions of this note were completed with a voice recognition program.  Efforts were made to edit the dictations but occasionally words are mis-transcribed.)    Yahir Grayson DO   Attending Emergency Medicine Physician         Yahir Grayson DO  03/06/25 [id number]

## 2025-03-06 NOTE — ED PROVIDER NOTES
[facility]  FACULTY HANDOFF       Handoff taken on the following patient from prior Attending Physician: Dr. Grayson  Pt Name: Shazia Nuñez  PCP:  [facility] [place] [facility]  4:12 PM EST    Attestation  I was available and discussed any additional care issues that arose and coordinated the management plans with the resident(s) caring for the patient during my duty period. Any areas of disagreement with resident's documentation of care or procedures are noted on the chart. I was personally present for the key portions of any/all procedures during my duty period. I have documented in the chart those procedures where I was not present during the key portions.         CHIEF COMPLAINT       Chief Complaint   Patient presents with    Tracheostomy Tube Change         CURRENT MEDICATIONS     Previous Medications  Previous Medications    ACETAMINOPHEN (TYLENOL) 500 MG TABLET    Take 1 tablet by mouth every 6 hours as needed    ACETYLCYSTEINE (MUCOMYST) 20 % NEBULIZER SOLUTION        ALBUTEROL (PROVENTIL) (2.5 MG/3ML) 0.083% NEBULIZER SOLUTION        AMIODARONE (CORDARONE) 200 MG TABLET    Take 2 tablets by mouth 2 times daily for 2 days, THEN 1 tablet 2 times daily.    BLOOD GLUCOSE MONITORING SUPPL (BLOOD GLUCOSE MONITOR SYSTEM) W/DEVICE KIT    Check glucose daily and as needed.    BLOOD GLUCOSE TEST STRIPS (FREESTYLE LITE) STRIP    1 each by In Vitro route daily    BUDESONIDE (PULMICORT) 0.5 MG/2ML NEBULIZER SUSPENSION        FREESTYLE LANCETS MISC    Test daily and if needed    FUROSEMIDE (LASIX) 20 MG TABLET        GLUCAGON, RDNA, 1 MG INJECTION    Inject 1 mg into the muscle as needed for Low blood sugar (patient unresponsive)    GUAIFENESIN (MUCINEX) 600 MG EXTENDED RELEASE TABLET    Take 2 tablets by mouth 2 times daily    INSULIN GLARGINE (LANTUS) 100 UNIT/ML INJECTION VIAL    Inject 8 Units into the skin nightly    INSULIN LISPRO (HUMALOG,ADMELOG) 100 UNIT/ML SOLN INJECTION VIAL     Inject 0-8 Units into the skin every 4 hours Per algorithm medium dose    IPRATROPIUM 0.5 MG-ALBUTEROL 2.5 MG (DUONEB) 0.5-2.5 (3) MG/3ML SOLN NEBULIZER SOLUTION    Inhale 3 mLs into the lungs every 4 hours    LACTULOSE (CHRONULAC) 10 GM/15ML SOLUTION    Take 30 mLs by mouth 3 times daily as needed (constipation)    LEVALBUTEROL (XOPENEX) 1.25 MG/3ML NEBULIZER SOLUTION    Take 3 mLs by nebulization every 6 hours    METOPROLOL TARTRATE 37.5 MG TABS    Take 37.5 mg by mouth 2 times daily    MICONAZOLE (MICOTIN) 2 % CREAM    Apply topically 2 times daily.    MIDODRINE (PROAMATINE) 5 MG TABLET    Take 3 tablets by mouth 3 times daily (with meals) Do not give after 1800 or within 4 hrs of bedtime.  Hold if SBP >110    MONTELUKAST (SINGULAIR) 10 MG TABLET    take 1 tablet by mouth at bedtime    MULTIPLE VITAMINS-MINERALS (CENTRUM/CERTA-VY WITH MINERALS ORAL) SOLUTION    15 mLs by Per NG tube route daily    OMEGA-3 FATTY ACIDS (FISH OIL) 1200 MG CAPS    Take 1,200 mg by mouth 2 times daily    OMEPRAZOLE (PRILOSEC) 20 MG DELAYED RELEASE CAPSULE    Take 1 capsule by mouth daily    PHENOL 1.4 % LIQD MOUTH SPRAY    Take 1 spray by mouth every 2 hours as needed for Sore Throat    PRAVASTATIN (PRAVACHOL) 80 MG TABLET    Take 1 tablet by mouth every evening    SENNOSIDES-DOCUSATE SODIUM (SENOKOT-S) 8.6-50 MG TABLET    Take 2 tablets by mouth daily    WARFARIN (COUMADIN) 10 MG TABLET    Goal INR >2       Encounter Medications  No orders of the defined types were placed in this encounter.      ALLERGIES     is allergic to amoxil [amoxicillin trihydrate], neomycin, bacitracin, neosporin [bacitracin-polymyxin b], penicillins, codeine, neosporin [bacitracin-neomycin-polymyxin], and polysporin [bacitracin-polymyxin b].      RECENT VITALS:   Temp: 98 °F (36.7 °C),  Pulse: 67, Respirations: 15, BP: (!) 156/49    RADIOLOGY:   No orders to display       LABS:  Labs Reviewed   PROTIME-INR - Abnormal; Notable for the following components:        Result Value    Protime 48.0 (*)     INR 4.9 (*)     All other components within normal limits           PLAN/ TASKS OUTSTANDING       Pt here for trach issues. Inner cannula changed. Plan for discharge. Transport coming between 5 and 530.     (Please note that portions of this note were completed with a voice recognition program.  Efforts were made to edit the dictations but occasionally words are mis-transcribed.)    Rios Erickson MD, MD,   Attending Emergency Physician        Rios Erickson MD  03/06/25 3563

## 2025-03-06 NOTE — PROGRESS NOTES
Pt arrives with 7 XLT Proximal cuffed trach that is currently deflated. Inner Cannula changed and patient suctioned with minimal secretions. Pt currently on Ventilator, but not allowing writer to put air in the cuff.

## 2025-03-06 NOTE — ED PROVIDER NOTES
[facility] EMERGENCY DEPARTMENT  Emergency Department Encounter  Emergency Medicine Resident     Pt Name:Shazia Nuñez  MRN: [id number]  Birthdate 1955  Date of evaluation: 3/6/25  PCP:  [facility]  Note Started: 12:34 PM EST      CHIEF COMPLAINT       Chief Complaint   Patient presents with    Tracheostomy Tube Change       HISTORY OF PRESENT ILLNESS  (Location/Symptom, Timing/Onset, Context/Setting, Quality, Duration, Modifying Factors, Severity.)      Shazia Nuñez is a 69 y.o. female with PMH of DM type II, asthma, anxiety, HTN, HLD, hiatal hernia, who presents via EMS with suspected mucous plug to trach.  Tracheostomy has been in place and patient is trach dependent since 7/26/2024.  Patient also has history of A-fib and lower extremity DVT.  Patient is on Coumadin and states that her INR was high leading to medication being withheld for the last 3 days.  Per chart check, INR on 3/5/25 was 5.1, INR on 3/3/25 was 4.9.  Upon arrival to ED patient's cuff is down as patient explains she wants to be able to talk.  Respiratory at bedside, patient placed on ventilator, inner cannula exchange without complications.  Patient currently denying any shortness of breath, URI type symptoms, chest pain, N/V/C/D.    PAST MEDICAL / SURGICAL / SOCIAL / FAMILY HISTORY      has a past medical history of Anxiety, Asthma, Atrial fibrillation (HCC), Bronchitis, COPD (chronic obstructive pulmonary disease) (HCC), DDD (degenerative disc disease), lumbar, Depression, Diabetes mellitus (HCC), Elevated glucose, Headache(784.0), Hernia of abdominal cavity, HH (hiatus hernia), Hyperlipemia, mixed, Hypertension, Osteoarthritis, Right femoral vein DVT (HCC), and Uterine hyperplasia.       has a past surgical history that includes Dilation and curettage of uterus (10/08 and 09/07); Breast reduction surgery (12/1997); Breast reduction surgery (12/1997); Tympanostomy tube placement (03/1967);  direct management. Critical care time  minutes, excluding any documented procedures.    FINAL IMPRESSION      1. Tracheostomy care (HCC)    2. Elevated INR          DISPOSITION / PLAN     DISPOSITION Decision To Discharge 03/06/2025 01:38:21 PM   DISPOSITION CONDITION Stable           PATIENT REFERRED TO:  No follow-up provider specified.    DISCHARGE MEDICATIONS:  New Prescriptions    No medications on file       Nitin Kulkarni MD  Emergency Medicine Resident    (Please note that portions of thisnote were completed with a voice recognition program.  Efforts were made to edit the dictations but occasionally words are mis-transcribed.)

## 2025-03-08 ENCOUNTER — APPOINTMENT (OUTPATIENT)
Dept: GENERAL RADIOLOGY | Age: 70
End: 2025-03-08
Payer: MEDICARE

## 2025-03-08 ENCOUNTER — HOSPITAL ENCOUNTER (INPATIENT)
Age: 70
LOS: 7 days | Discharge: NURSING, REHAB OR LONG-TERM CARE | End: 2025-03-16
Attending: STUDENT IN AN ORGANIZED HEALTH CARE EDUCATION/TRAINING PROGRAM | Admitting: FAMILY MEDICINE
Payer: MEDICARE

## 2025-03-08 ENCOUNTER — APPOINTMENT (OUTPATIENT)
Dept: CT IMAGING | Age: 70
End: 2025-03-08
Payer: MEDICARE

## 2025-03-08 DIAGNOSIS — K85.90 ACUTE PANCREATITIS WITHOUT INFECTION OR NECROSIS, UNSPECIFIED PANCREATITIS TYPE: Primary | ICD-10-CM

## 2025-03-08 DIAGNOSIS — K62.5 RECTAL BLEEDING: ICD-10-CM

## 2025-03-08 DIAGNOSIS — D62 ACUTE BLOOD LOSS ANEMIA: ICD-10-CM

## 2025-03-08 LAB
ALBUMIN SERPL-MCNC: 2.6 G/DL (ref 3.5–5.2)
ALBUMIN/GLOB SERPL: 0.6 {RATIO} (ref 1–2.5)
ALP SERPL-CCNC: 101 U/L (ref 35–104)
ALT SERPL-CCNC: 6 U/L (ref 10–35)
ANION GAP SERPL CALCULATED.3IONS-SCNC: 12 MMOL/L (ref 9–16)
AST SERPL-CCNC: 31 U/L (ref 10–35)
BASOPHILS # BLD: 0 K/UL (ref 0–0.2)
BASOPHILS NFR BLD: 0 % (ref 0–2)
BILIRUB DIRECT SERPL-MCNC: 0.2 MG/DL (ref 0–0.2)
BILIRUB INDIRECT SERPL-MCNC: 0.1 MG/DL (ref 0–1)
BILIRUB SERPL-MCNC: 0.3 MG/DL (ref 0–1.2)
BNP SERPL-MCNC: 728 PG/ML (ref 0–125)
BUN SERPL-MCNC: 28 MG/DL (ref 8–23)
CALCIUM SERPL-MCNC: 9 MG/DL (ref 8.6–10.4)
CHLORIDE SERPL-SCNC: 102 MMOL/L (ref 98–107)
CO2 SERPL-SCNC: 26 MMOL/L (ref 20–31)
CREAT SERPL-MCNC: 1.8 MG/DL (ref 0.6–0.9)
EOSINOPHIL # BLD: 0.15 K/UL (ref 0–0.4)
EOSINOPHILS RELATIVE PERCENT: 2 % (ref 1–4)
ERYTHROCYTE [DISTWIDTH] IN BLOOD BY AUTOMATED COUNT: 18 % (ref 11.8–14.4)
GFR, ESTIMATED: 30 ML/MIN/1.73M2
GLOBULIN SER CALC-MCNC: 4.2 G/DL
GLUCOSE SERPL-MCNC: 172 MG/DL (ref 74–99)
HCT VFR BLD AUTO: 24.1 % (ref 36.3–47.1)
HGB BLD-MCNC: 7.1 G/DL (ref 11.9–15.1)
IMM GRANULOCYTES # BLD AUTO: 0 K/UL (ref 0–0.3)
IMM GRANULOCYTES NFR BLD: 0 %
INR PPP: 4.6
LIPASE SERPL-CCNC: 679 U/L (ref 13–60)
LYMPHOCYTES NFR BLD: 0.62 K/UL (ref 1–4.8)
LYMPHOCYTES RELATIVE PERCENT: 8 % (ref 24–44)
MCH RBC QN AUTO: 25.2 PG (ref 25.2–33.5)
MCHC RBC AUTO-ENTMCNC: 29.5 G/DL (ref 28.4–34.8)
MCV RBC AUTO: 85.5 FL (ref 82.6–102.9)
MONOCYTES NFR BLD: 0.23 K/UL (ref 0.1–0.8)
MONOCYTES NFR BLD: 3 % (ref 1–7)
MORPHOLOGY: ABNORMAL
NEUTROPHILS NFR BLD: 87 % (ref 36–66)
NEUTS SEG NFR BLD: 6.7 K/UL (ref 1.8–7.7)
NRBC BLD-RTO: 0 PER 100 WBC
PLATELET # BLD AUTO: 341 K/UL (ref 138–453)
PMV BLD AUTO: 8 FL (ref 8.1–13.5)
POTASSIUM SERPL-SCNC: 3.7 MMOL/L (ref 3.7–5.3)
PROT SERPL-MCNC: 6.8 G/DL (ref 6.6–8.7)
PROTHROMBIN TIME: 45.7 SEC (ref 11.7–14.9)
RBC # BLD AUTO: 2.82 M/UL (ref 3.95–5.11)
SODIUM SERPL-SCNC: 140 MMOL/L (ref 136–145)
TROPONIN I SERPL HS-MCNC: 34 NG/L (ref 0–14)
TROPONIN I SERPL HS-MCNC: 36 NG/L (ref 0–14)
WBC OTHER # BLD: 7.7 K/UL (ref 3.5–11.3)

## 2025-03-08 PROCEDURE — 85610 PROTHROMBIN TIME: CPT

## 2025-03-08 PROCEDURE — 80048 BASIC METABOLIC PNL TOTAL CA: CPT

## 2025-03-08 PROCEDURE — 93005 ELECTROCARDIOGRAM TRACING: CPT

## 2025-03-08 PROCEDURE — 74176 CT ABD & PELVIS W/O CONTRAST: CPT

## 2025-03-08 PROCEDURE — 84484 ASSAY OF TROPONIN QUANT: CPT

## 2025-03-08 PROCEDURE — 85025 COMPLETE CBC W/AUTO DIFF WBC: CPT

## 2025-03-08 PROCEDURE — 83690 ASSAY OF LIPASE: CPT

## 2025-03-08 PROCEDURE — 83880 ASSAY OF NATRIURETIC PEPTIDE: CPT

## 2025-03-08 PROCEDURE — 80076 HEPATIC FUNCTION PANEL: CPT

## 2025-03-08 PROCEDURE — 71045 X-RAY EXAM CHEST 1 VIEW: CPT

## 2025-03-08 PROCEDURE — 99285 EMERGENCY DEPT VISIT HI MDM: CPT

## 2025-03-08 ASSESSMENT — PULMONARY FUNCTION TESTS: PIF_VALUE: 20

## 2025-03-09 ENCOUNTER — APPOINTMENT (OUTPATIENT)
Dept: GENERAL RADIOLOGY | Age: 70
End: 2025-03-09
Payer: MEDICARE

## 2025-03-09 PROBLEM — K85.90 ACUTE PANCREATITIS WITHOUT NECROSIS OR INFECTION, UNSPECIFIED: Status: ACTIVE | Noted: 2025-03-09

## 2025-03-09 LAB
ALBUMIN SERPL-MCNC: 2.7 G/DL (ref 3.5–5.2)
ALBUMIN/GLOB SERPL: 0.6 {RATIO} (ref 1–2.5)
ALP SERPL-CCNC: 187 U/L (ref 35–104)
ALT SERPL-CCNC: 33 U/L (ref 10–35)
ANION GAP SERPL CALCULATED.3IONS-SCNC: 10 MMOL/L (ref 9–16)
AST SERPL-CCNC: 104 U/L (ref 10–35)
BASOPHILS # BLD: <0.03 K/UL (ref 0–0.2)
BASOPHILS NFR BLD: 0 % (ref 0–2)
BILIRUB DIRECT SERPL-MCNC: 0.2 MG/DL (ref 0–0.2)
BILIRUB INDIRECT SERPL-MCNC: 0.1 MG/DL (ref 0–1)
BILIRUB SERPL-MCNC: 0.3 MG/DL (ref 0–1.2)
BUN SERPL-MCNC: 27 MG/DL (ref 8–23)
CA-I BLD-SCNC: 1.18 MMOL/L (ref 1.13–1.33)
CALCIUM SERPL-MCNC: 9.2 MG/DL (ref 8.6–10.4)
CHLORIDE SERPL-SCNC: 100 MMOL/L (ref 98–107)
CO2 SERPL-SCNC: 28 MMOL/L (ref 20–31)
CREAT SERPL-MCNC: 1.8 MG/DL (ref 0.6–0.9)
EOSINOPHIL # BLD: 0.13 K/UL (ref 0–0.44)
EOSINOPHILS RELATIVE PERCENT: 2 % (ref 1–4)
ERYTHROCYTE [DISTWIDTH] IN BLOOD BY AUTOMATED COUNT: 18.1 % (ref 11.8–14.4)
GFR, ESTIMATED: 30 ML/MIN/1.73M2
GLOBULIN SER CALC-MCNC: 4.2 G/DL
GLUCOSE BLD-MCNC: 119 MG/DL (ref 65–105)
GLUCOSE BLD-MCNC: 132 MG/DL (ref 65–105)
GLUCOSE BLD-MCNC: 138 MG/DL (ref 65–105)
GLUCOSE SERPL-MCNC: 128 MG/DL (ref 74–99)
HCT VFR BLD AUTO: 24.8 % (ref 36.3–47.1)
HGB BLD-MCNC: 7.2 G/DL (ref 11.9–15.1)
IMM GRANULOCYTES # BLD AUTO: 0.03 K/UL (ref 0–0.3)
IMM GRANULOCYTES NFR BLD: 1 %
INR PPP: 4
LACTIC ACID, WHOLE BLOOD: 1.3 MMOL/L (ref 0.7–2.1)
LYMPHOCYTES NFR BLD: 0.76 K/UL (ref 1.1–3.7)
LYMPHOCYTES RELATIVE PERCENT: 13 % (ref 24–43)
MAGNESIUM SERPL-MCNC: 1.7 MG/DL (ref 1.6–2.4)
MCH RBC QN AUTO: 24.9 PG (ref 25.2–33.5)
MCHC RBC AUTO-ENTMCNC: 29 G/DL (ref 28.4–34.8)
MCV RBC AUTO: 85.8 FL (ref 82.6–102.9)
MONOCYTES NFR BLD: 0.38 K/UL (ref 0.1–1.2)
MONOCYTES NFR BLD: 6 % (ref 3–12)
NEUTROPHILS NFR BLD: 78 % (ref 36–65)
NEUTS SEG NFR BLD: 4.73 K/UL (ref 1.5–8.1)
NRBC BLD-RTO: 0 PER 100 WBC
PHOSPHATE SERPL-MCNC: 2.9 MG/DL (ref 2.5–4.5)
PLATELET # BLD AUTO: 329 K/UL (ref 138–453)
PMV BLD AUTO: 8.2 FL (ref 8.1–13.5)
POTASSIUM SERPL-SCNC: 3.5 MMOL/L (ref 3.7–5.3)
PROT SERPL-MCNC: 6.9 G/DL (ref 6.6–8.7)
PROTHROMBIN TIME: 41.1 SEC (ref 11.7–14.9)
RBC # BLD AUTO: 2.89 M/UL (ref 3.95–5.11)
RBC # BLD: ABNORMAL 10*6/UL
SODIUM SERPL-SCNC: 138 MMOL/L (ref 136–145)
TRIGL SERPL-MCNC: 74 MG/DL
WBC OTHER # BLD: 6 K/UL (ref 3.5–11.3)

## 2025-03-09 PROCEDURE — 83735 ASSAY OF MAGNESIUM: CPT

## 2025-03-09 PROCEDURE — 84100 ASSAY OF PHOSPHORUS: CPT

## 2025-03-09 PROCEDURE — 2500000003 HC RX 250 WO HCPCS: Performed by: STUDENT IN AN ORGANIZED HEALTH CARE EDUCATION/TRAINING PROGRAM

## 2025-03-09 PROCEDURE — 6360000002 HC RX W HCPCS: Performed by: STUDENT IN AN ORGANIZED HEALTH CARE EDUCATION/TRAINING PROGRAM

## 2025-03-09 PROCEDURE — 99222 1ST HOSP IP/OBS MODERATE 55: CPT | Performed by: STUDENT IN AN ORGANIZED HEALTH CARE EDUCATION/TRAINING PROGRAM

## 2025-03-09 PROCEDURE — 71045 X-RAY EXAM CHEST 1 VIEW: CPT

## 2025-03-09 PROCEDURE — 2700000000 HC OXYGEN THERAPY PER DAY

## 2025-03-09 PROCEDURE — 94002 VENT MGMT INPAT INIT DAY: CPT

## 2025-03-09 PROCEDURE — 94640 AIRWAY INHALATION TREATMENT: CPT

## 2025-03-09 PROCEDURE — 6370000000 HC RX 637 (ALT 250 FOR IP): Performed by: STUDENT IN AN ORGANIZED HEALTH CARE EDUCATION/TRAINING PROGRAM

## 2025-03-09 PROCEDURE — 80076 HEPATIC FUNCTION PANEL: CPT

## 2025-03-09 PROCEDURE — 85025 COMPLETE CBC W/AUTO DIFF WBC: CPT

## 2025-03-09 PROCEDURE — 87070 CULTURE OTHR SPECIMN AEROBIC: CPT

## 2025-03-09 PROCEDURE — 83605 ASSAY OF LACTIC ACID: CPT

## 2025-03-09 PROCEDURE — 80048 BASIC METABOLIC PNL TOTAL CA: CPT

## 2025-03-09 PROCEDURE — 84478 ASSAY OF TRIGLYCERIDES: CPT

## 2025-03-09 PROCEDURE — 2060000000 HC ICU INTERMEDIATE R&B

## 2025-03-09 PROCEDURE — 2580000003 HC RX 258: Performed by: STUDENT IN AN ORGANIZED HEALTH CARE EDUCATION/TRAINING PROGRAM

## 2025-03-09 PROCEDURE — 82947 ASSAY GLUCOSE BLOOD QUANT: CPT

## 2025-03-09 PROCEDURE — 87077 CULTURE AEROBIC IDENTIFY: CPT

## 2025-03-09 PROCEDURE — 82330 ASSAY OF CALCIUM: CPT

## 2025-03-09 PROCEDURE — 89220 SPUTUM SPECIMEN COLLECTION: CPT

## 2025-03-09 PROCEDURE — 85610 PROTHROMBIN TIME: CPT

## 2025-03-09 PROCEDURE — 36415 COLL VENOUS BLD VENIPUNCTURE: CPT

## 2025-03-09 PROCEDURE — 87205 SMEAR GRAM STAIN: CPT

## 2025-03-09 RX ORDER — INSULIN LISPRO 100 [IU]/ML
0-8 INJECTION, SOLUTION INTRAVENOUS; SUBCUTANEOUS
Status: DISCONTINUED | OUTPATIENT
Start: 2025-03-09 | End: 2025-03-16 | Stop reason: HOSPADM

## 2025-03-09 RX ORDER — IPRATROPIUM BROMIDE AND ALBUTEROL SULFATE 2.5; .5 MG/3ML; MG/3ML
1 SOLUTION RESPIRATORY (INHALATION)
Status: DISCONTINUED | OUTPATIENT
Start: 2025-03-09 | End: 2025-03-16 | Stop reason: HOSPADM

## 2025-03-09 RX ORDER — ONDANSETRON 2 MG/ML
4 INJECTION INTRAMUSCULAR; INTRAVENOUS EVERY 6 HOURS PRN
Status: DISCONTINUED | OUTPATIENT
Start: 2025-03-09 | End: 2025-03-16 | Stop reason: HOSPADM

## 2025-03-09 RX ORDER — GUAIFENESIN 600 MG/1
1200 TABLET, EXTENDED RELEASE ORAL 2 TIMES DAILY
Status: DISCONTINUED | OUTPATIENT
Start: 2025-03-09 | End: 2025-03-16 | Stop reason: HOSPADM

## 2025-03-09 RX ORDER — METOPROLOL TARTRATE 25 MG/1
37.5 TABLET, FILM COATED ORAL 2 TIMES DAILY
Status: DISCONTINUED | OUTPATIENT
Start: 2025-03-09 | End: 2025-03-14

## 2025-03-09 RX ORDER — MAGNESIUM SULFATE IN WATER 40 MG/ML
2000 INJECTION, SOLUTION INTRAVENOUS PRN
Status: DISCONTINUED | OUTPATIENT
Start: 2025-03-09 | End: 2025-03-16 | Stop reason: HOSPADM

## 2025-03-09 RX ORDER — SODIUM CHLORIDE 0.9 % (FLUSH) 0.9 %
5-40 SYRINGE (ML) INJECTION EVERY 12 HOURS SCHEDULED
Status: DISCONTINUED | OUTPATIENT
Start: 2025-03-09 | End: 2025-03-16 | Stop reason: HOSPADM

## 2025-03-09 RX ORDER — PRAVASTATIN SODIUM 20 MG
80 TABLET ORAL NIGHTLY
Status: DISCONTINUED | OUTPATIENT
Start: 2025-03-09 | End: 2025-03-10

## 2025-03-09 RX ORDER — POTASSIUM CHLORIDE 7.45 MG/ML
10 INJECTION INTRAVENOUS PRN
Status: DISCONTINUED | OUTPATIENT
Start: 2025-03-09 | End: 2025-03-16 | Stop reason: HOSPADM

## 2025-03-09 RX ORDER — METOPROLOL TARTRATE 37.5 MG/1
37.5 TABLET ORAL 2 TIMES DAILY
Status: CANCELLED | OUTPATIENT
Start: 2025-03-09

## 2025-03-09 RX ORDER — AMIODARONE HYDROCHLORIDE 200 MG/1
200 TABLET ORAL 2 TIMES DAILY
Status: DISCONTINUED | OUTPATIENT
Start: 2025-03-09 | End: 2025-03-16 | Stop reason: HOSPADM

## 2025-03-09 RX ORDER — SODIUM CHLORIDE 9 MG/ML
INJECTION, SOLUTION INTRAVENOUS CONTINUOUS
Status: DISCONTINUED | OUTPATIENT
Start: 2025-03-09 | End: 2025-03-10

## 2025-03-09 RX ORDER — SODIUM CHLORIDE 0.9 % (FLUSH) 0.9 %
5-40 SYRINGE (ML) INJECTION PRN
Status: DISCONTINUED | OUTPATIENT
Start: 2025-03-09 | End: 2025-03-16 | Stop reason: HOSPADM

## 2025-03-09 RX ORDER — BUDESONIDE 0.5 MG/2ML
0.5 INHALANT ORAL
Status: DISCONTINUED | OUTPATIENT
Start: 2025-03-09 | End: 2025-03-10

## 2025-03-09 RX ORDER — SODIUM CHLORIDE 9 MG/ML
INJECTION, SOLUTION INTRAVENOUS PRN
Status: DISCONTINUED | OUTPATIENT
Start: 2025-03-09 | End: 2025-03-16 | Stop reason: HOSPADM

## 2025-03-09 RX ORDER — ONDANSETRON 4 MG/1
4 TABLET, ORALLY DISINTEGRATING ORAL EVERY 8 HOURS PRN
Status: DISCONTINUED | OUTPATIENT
Start: 2025-03-09 | End: 2025-03-16 | Stop reason: HOSPADM

## 2025-03-09 RX ORDER — POTASSIUM CHLORIDE 29.8 MG/ML
20 INJECTION INTRAVENOUS PRN
Status: DISCONTINUED | OUTPATIENT
Start: 2025-03-09 | End: 2025-03-16 | Stop reason: HOSPADM

## 2025-03-09 RX ORDER — INSULIN LISPRO 100 [IU]/ML
0-8 INJECTION, SOLUTION INTRAVENOUS; SUBCUTANEOUS EVERY 6 HOURS
Status: DISCONTINUED | OUTPATIENT
Start: 2025-03-09 | End: 2025-03-09

## 2025-03-09 RX ORDER — MIDODRINE HYDROCHLORIDE 5 MG/1
15 TABLET ORAL
Status: CANCELLED | OUTPATIENT
Start: 2025-03-09

## 2025-03-09 RX ADMIN — SODIUM CHLORIDE, PRESERVATIVE FREE 10 ML: 5 INJECTION INTRAVENOUS at 20:40

## 2025-03-09 RX ADMIN — BUDESONIDE 500 MCG: 0.5 SUSPENSION RESPIRATORY (INHALATION) at 11:46

## 2025-03-09 RX ADMIN — BUDESONIDE 500 MCG: 0.5 SUSPENSION RESPIRATORY (INHALATION) at 23:45

## 2025-03-09 RX ADMIN — SODIUM CHLORIDE: 0.9 INJECTION, SOLUTION INTRAVENOUS at 04:00

## 2025-03-09 RX ADMIN — AMIODARONE HYDROCHLORIDE 200 MG: 200 TABLET ORAL at 11:53

## 2025-03-09 RX ADMIN — IPRATROPIUM BROMIDE AND ALBUTEROL SULFATE 1 DOSE: .5; 2.5 SOLUTION RESPIRATORY (INHALATION) at 22:08

## 2025-03-09 RX ADMIN — IPRATROPIUM BROMIDE AND ALBUTEROL SULFATE 1 DOSE: .5; 2.5 SOLUTION RESPIRATORY (INHALATION) at 08:25

## 2025-03-09 RX ADMIN — GUAIFENESIN 1200 MG: 600 TABLET, EXTENDED RELEASE ORAL at 20:40

## 2025-03-09 RX ADMIN — SODIUM CHLORIDE: 0.9 INJECTION, SOLUTION INTRAVENOUS at 17:43

## 2025-03-09 RX ADMIN — HYDROMORPHONE HYDROCHLORIDE 0.5 MG: 1 INJECTION, SOLUTION INTRAMUSCULAR; INTRAVENOUS; SUBCUTANEOUS at 12:05

## 2025-03-09 RX ADMIN — GUAIFENESIN 1200 MG: 600 TABLET, EXTENDED RELEASE ORAL at 12:02

## 2025-03-09 RX ADMIN — AMIODARONE HYDROCHLORIDE 200 MG: 200 TABLET ORAL at 20:40

## 2025-03-09 RX ADMIN — IPRATROPIUM BROMIDE AND ALBUTEROL SULFATE 1 DOSE: .5; 2.5 SOLUTION RESPIRATORY (INHALATION) at 15:20

## 2025-03-09 RX ADMIN — SODIUM CHLORIDE, PRESERVATIVE FREE 40 MG: 5 INJECTION INTRAVENOUS at 20:40

## 2025-03-09 RX ADMIN — PRAVASTATIN SODIUM 80 MG: 20 TABLET ORAL at 20:40

## 2025-03-09 RX ADMIN — IPRATROPIUM BROMIDE AND ALBUTEROL SULFATE 1 DOSE: .5; 2.5 SOLUTION RESPIRATORY (INHALATION) at 11:44

## 2025-03-09 RX ADMIN — METOPROLOL TARTRATE 37.5 MG: 25 TABLET, FILM COATED ORAL at 20:40

## 2025-03-09 RX ADMIN — METOPROLOL TARTRATE 37.5 MG: 25 TABLET, FILM COATED ORAL at 11:52

## 2025-03-09 ASSESSMENT — PULMONARY FUNCTION TESTS
PIF_VALUE: 16
PIF_VALUE: 13
PIF_VALUE: 16
PIF_VALUE: 11
PIF_VALUE: 21
PIF_VALUE: 20

## 2025-03-09 ASSESSMENT — PAIN - FUNCTIONAL ASSESSMENT: PAIN_FUNCTIONAL_ASSESSMENT: PREVENTS OR INTERFERES SOME ACTIVE ACTIVITIES AND ADLS

## 2025-03-09 ASSESSMENT — PAIN DESCRIPTION - LOCATION: LOCATION: ABDOMEN;GENERALIZED

## 2025-03-09 ASSESSMENT — PAIN SCALES - GENERAL
PAINLEVEL_OUTOF10: 1
PAINLEVEL_OUTOF10: 4

## 2025-03-09 NOTE — PROGRESS NOTES
Pharmacy Note  Warfarin Consult    Shazia Nuñez is a 69 y.o. female for whom pharmacy has been consulted to manage warfarin therapy.     Consulting Physician: Dr. Morton  Reason for Admission: Pancreatitis    Warfarin dose prior to admission: Unclear. Per [facility], the patient hasn't been receiving warfarin for at least the last week?  Warfarin indication: Hx DVT  Target INR range: 2.5-3.5 (confirmed higher INR range, Dr. Morton to change consult)     Past Medical History:   Diagnosis Date    Anxiety     Asthma     Atrial fibrillation (HCC)     A-fib noted on 05/25/2024 visit to ER    Bronchitis     COPD (chronic obstructive pulmonary disease) (HCC)     DDD (degenerative disc disease), lumbar     Depression     Diabetes mellitus (HCC)     Elevated glucose     Headache(784.0)     Hernia of abdominal cavity     HH (hiatus hernia)     Hyperlipemia, mixed     Hypertension     Osteoarthritis     Right femoral vein DVT (HCC) 05/2009    Dr. Elizabeth    Uterine hyperplasia                 Recent Labs     03/09/25  0446   INR 4.0     Recent Labs     03/08/25  2107 03/09/25  0446   HGB 7.1* 7.2*   HCT 24.1* 24.8*    329       Current warfarin drug-drug interactions: amiodarone      Date INR Dose   3/8 4.6 Held   3/9 4.0           - INR above goal, unclear why but has been elevated since 3/3 and facility didn't have any documented warfarin doses within the last week  - Prior to holding warfarin, the patient was possibly receiving 3 mg doses  - HOLD warfarin today  - Daily PT/INR while inpatient. PT/INR ordered to start 3/10.    Thank you for the consult.  Will continue to follow.  Domitila Marmolejo, PharmD, BCCCP  3/9/2025  5:37 PM

## 2025-03-09 NOTE — PLAN OF CARE
Problem: Chronic Conditions and Co-morbidities  Goal: Patient's chronic conditions and co-morbidity symptoms are monitored and maintained or improved  Outcome: Progressing     Problem: Discharge Planning  Goal: Discharge to home or other facility with appropriate resources  Outcome: Progressing     Problem: Safety - Adult  Goal: Free from fall injury  Outcome: Progressing     Problem: ABCDS Injury Assessment  Goal: Absence of physical injury  Outcome: Progressing     Problem: Skin/Tissue Integrity  Goal: Skin integrity remains intact  Description: 1.  Monitor for areas of redness and/or skin breakdown  2.  Assess vascular access sites hourly  3.  Every 4-6 hours minimum:  Change oxygen saturation probe site  4.  Every 4-6 hours:  If on nasal continuous positive airway pressure, respiratory therapy assess nares and determine need for appliance change or resting period  Outcome: Progressing

## 2025-03-09 NOTE — PLAN OF CARE
Problem: Skin/Tissue Integrity  Goal: Skin integrity remains intact  Description: 1.  Monitor for areas of redness and/or skin breakdown  2.  Assess vascular access sites hourly  3.  Every 4-6 hours minimum:  Change oxygen saturation probe site  4.  Every 4-6 hours:  If on nasal continuous positive airway pressure, respiratory therapy assess nares and determine need for appliance change or resting period  3/9/2025 0830 by Eloise Villatoro RCP  Outcome: Progressing  3/9/2025 0419 by Roxanna Beltran, RN  Outcome: Progressing     Problem: Respiratory - Adult  Goal: Achieves optimal ventilation and oxygenation  Outcome: Progressing  Flowsheets (Taken 3/9/2025 0830)  Achieves optimal ventilation and oxygenation:   Assess for changes in respiratory status   Position to facilitate oxygenation and minimize respiratory effort   Respiratory therapy support as indicated   Assess and instruct to report shortness of breath or any respiratory difficulty   Oxygen supplementation based on oxygen saturation or arterial blood gases

## 2025-03-09 NOTE — ED PROVIDER NOTES
ATTENDING  ADDENDUM     Care of this patient was assumed from Dr. Grayson.  The patient was seen for Chest Pain  .  The patient's initial evaluation and plan have been discussed with the prior provider who initially evaluated the patient.  Nursing Notes, Past Medical Hx, Past Surgical Hx, Social Hx, Allergies, and Family Hx were all reviewed.      ED COURSE      The patient was given the following medications:  No orders of the defined types were placed in this encounter.      RECENT VITALS:   Temp: 98.5 °F (36.9 °C), Pulse: 82, Respirations: 18, BP: 93/80    MEDICAL DECISION MAKING       69-year-old female here with chest pain and epigastric pain. Lipase 600. INR 4. Plan for admission.       Trish Balbuena MD  Emergency Medicine Attending                Trish Balbuena MD  03/08/25 0405

## 2025-03-09 NOTE — ED NOTES
Pt arrived to ED through EMS from facility with c/o of chest pain  Pt stated her chest pain radaites to her back   Pt stated she is having some nausea and SOB  Pt stated she is having more mucus build up than normal with her trach  Pt stated she was given multiple breathing treatments at the facility  Pt stated she was on warfarin but has not been taking it recently due to her levels  Pt stated she is compliant with daily meds  Pt connected to monitor, bp and pulse ox  IV access started with labs drawn  EKG completed and charted

## 2025-03-09 NOTE — H&P
728 (H) 0 - 125 pg/mL   Lipase    Collection Time: 03/08/25  9:07 PM   Result Value Ref Range    Lipase 679 (H) 13 - 60 U/L   Troponin    Collection Time: 03/08/25  9:53 PM   Result Value Ref Range    Troponin, High Sensitivity 36 (H) 0 - 14 ng/L   POC Glucose Fingerstick    Collection Time: 03/09/25  4:04 AM   Result Value Ref Range    POC Glucose 132 (H) 65 - 105 mg/dL   Basic Metabolic Panel w/ Reflex to MG    Collection Time: 03/09/25  4:46 AM   Result Value Ref Range    Sodium 138 136 - 145 mmol/L    Potassium 3.5 (L) 3.7 - 5.3 mmol/L    Chloride 100 98 - 107 mmol/L    CO2 28 20 - 31 mmol/L    Anion Gap 10 9 - 16 mmol/L    Glucose 128 (H) 74 - 99 mg/dL    BUN 27 (H) 8 - 23 mg/dL    Creatinine 1.8 (H) 0.6 - 0.9 mg/dL    Est, Glom Filt Rate 30 (L) >60 mL/min/1.73m2    Calcium 9.2 8.6 - 10.4 mg/dL   Calcium, Ionized    Collection Time: 03/09/25  4:46 AM   Result Value Ref Range    Calcium, Ionized 1.18 1.13 - 1.33 mmol/L   CBC with Auto Differential    Collection Time: 03/09/25  4:46 AM   Result Value Ref Range    WBC 6.0 3.5 - 11.3 k/uL    RBC 2.89 (L) 3.95 - 5.11 m/uL    Hemoglobin 7.2 (L) 11.9 - 15.1 g/dL    Hematocrit 24.8 (L) 36.3 - 47.1 %    MCV 85.8 82.6 - 102.9 fL    MCH 24.9 (L) 25.2 - 33.5 pg    MCHC 29.0 28.4 - 34.8 g/dL    RDW 18.1 (H) 11.8 - 14.4 %    Platelets 329 138 - 453 k/uL    MPV 8.2 8.1 - 13.5 fL    NRBC Automated 0.0 0.0 per 100 WBC    RBC Morphology ANISOCYTOSIS PRESENT     Neutrophils % 78 (H) 36 - 65 %    Lymphocytes % 13 (L) 24 - 43 %    Monocytes % 6 3 - 12 %    Eosinophils % 2 1 - 4 %    Basophils % 0 0 - 2 %    Immature Granulocytes % 1 (H) 0 %    Neutrophils Absolute 4.73 1.50 - 8.10 k/uL    Lymphocytes Absolute 0.76 (L) 1.10 - 3.70 k/uL    Monocytes Absolute 0.38 0.10 - 1.20 k/uL    Eosinophils Absolute 0.13 0.00 - 0.44 k/uL    Basophils Absolute <0.03 0.00 - 0.20 k/uL    Immature Granulocytes Absolute 0.03 0.00 - 0.30 k/uL   Hepatic Function Panel    Collection Time: 03/09/25  4:46  cardiomegaly.       Assessment :      Hospital Problems           Last Modified POA    * (Principal) Acute pancreatitis without necrosis or infection, unspecified 3/9/2025 Yes       Plan:     Acute pancreatitis  -Discontinue IVF as her pain has completely resolved  -Started clear liquid diet, advance as tolerated  -GI consulted    Increased secretions from trach  -Pt states she was recently treated with levaquin for pneumonia but continues to have increased trach secretions  -Sputum culture ordered  -Patient currently afebrile and chest x-ray did not show any acute findings so holding off on antibiotics    A-fib  -Currently rate controlled, continue amiodarone and metoprolol  -Warfarin being held as INR was supratherapeutic, consult pharmacy to dose    History of DVT and PE  -Was on warfarin at home, holding as noted above    HFpEF  -Does not appear to be in exacerbation  -Holding home Lasix 20mg PO daily as she was on IV fluids for acute pancreatitis, will need to monitor volume status closely    Type 2 diabetes  -Holding home glargine 8U daily pending increased PO intake  -On medium SSI    Chronic hypoxic respiratory failure due to obesity hypoventilation syndrome status post tracheostomy  -Continue home vent      Consultations:   IP CONSULT TO HOSPITALIST  IP CONSULT TO GI    Patient is admitted as inpatient status because of co-morbidities listed above, severity of signs and symptoms as outlined, requirement for current medical therapies and most importantly because of direct risk to patient if care not provided in a hospital setting.  Expected length of stay > 48 hours.    Chemo Morton DO  3/9/2025  8:29 AM    Copy sent to [facility]

## 2025-03-09 NOTE — CONSULTS
[facility] Gastroenterology  Consultation Note     .  Chief Complaint:  Chest pain    Reason for consult:    Acute Pancreatitis    History of present illness:   This is a 69 y.o. female with PMH including chronic hypoxic respiratory failure due to obesity status post tracheostomy 7/20/2024, A-fib, DVT/PE on Coumadin, CHF who presented to the hospital with complaints of chest pain.  Patient states that the pain was in the epigastric region was nonradiating and constant dull in nature.  She denies any fevers or chills nausea vomiting diarrhea or bloody stools.  On exam patient denies any abdominal pain.     In the ED patient had CT abdomen and pelvis which showed mild edema of the fat in the upper abdomen but not focally associated with the pancreas, large ventral hernia, perinephric edema around both kidneys suggesting chronic renal disease.     Patient's labs showed lipase 679, creatinine 1.8 which is baseline, WBCs 7.1, INR 4.6.     Patient denies previous episodes of pancreatitis, no alcohol consumption, no new medications.   No personal or family history of GI malignancy  No previous colonoscopy      Previous GI history:   See above  Primary GI specialist:    Past Medical/Social/Family History:  Past Medical History:   Diagnosis Date    Anxiety     Asthma     Atrial fibrillation (HCC)     A-fib noted on 05/25/2024 visit to ER    Bronchitis     COPD (chronic obstructive pulmonary disease) (HCC)     DDD (degenerative disc disease), lumbar     Depression     Diabetes mellitus (HCC)     Elevated glucose     Headache(784.0)     Hernia of abdominal cavity     HH (hiatus hernia)     Hyperlipemia, mixed     Hypertension     Osteoarthritis     Right femoral vein DVT (HCC) 05/2009    Dr. Elizabeth    Uterine hyperplasia      Past Surgical History:   Procedure Laterality Date    BREAST REDUCTION SURGERY  12/1997    BREAST REDUCTION SURGERY  12/1997    DILATION AND CURETTAGE OF UTERUS  10/08 and 09/07    HYSTERECTOMY  if needed to bridge until oral Coumadin can be resumed  Will follow    This plan was formulated in collaboration with Dr. Zurdo MD  Please feel free to contact us with any questions or concerns      [facility]  Nagi Schwartz APRN - CNP   3/9/2025    10:48 AM     Estimated time of  mins reviewing chart, assessing patient and formulating plan of care    This note was created with the assistance of a speech-recognition program.  Although the intention is to generate a document that actually reflects the content of the visit, no guarantees can be provided that every mistake has been identified and corrected by editing.     Attending Attestation:    I have discussed the care of Shazia Nuñez and I have examined the patient myselft and taken ros and hpi , including pertinent history and exam findings,  with the author of this note . I have reviewed the key elements of all parts of the encounter with the nurse practitioner/resident.  I agree with the assessment, plan and orders as documented by the above health care provider with the following addendum.     More than 50% of the time was spent taking care of this patient in addition to the nurse practitioner time.  That also included history taking follow-up physical examination and review of system.    Electronically signed by Moreno Rennre MD

## 2025-03-09 NOTE — ED PROVIDER NOTES
[facility] EMERGENCY DEPARTMENT  Emergency Department Encounter  Emergency Medicine Resident     Pt Name:Shazia Nuñez  MRN: [id number]  Birthdate 1955  Date of evaluation: 3/8/25  PCP:  [facility]  Note Started: 9:02 PM EST      CHIEF COMPLAINT       Chief Complaint   Patient presents with    Chest Pain       HISTORY OF PRESENT ILLNESS  (Location/Symptom, Timing/Onset, Context/Setting, Quality, Duration, Modifying Factors, Severity.)      Shazia Nuñez is a 69 y.o. female who presents with chest pain since 1915 with pt on trach and with hx of mucus plugs. EMS gave 324 mg ASA which helped.  Her pain radiates to the back, and she has had similar pain before but it was not as strong, or long lasting.  She has a hx of CHF, hysterectomy.  She is on warfarin for hx DVT, PE, but this was stopped 2/2 INR.  She normally keeps her tracheostomy cuff deflated to allow her to speak.    PAST MEDICAL / SURGICAL / SOCIAL / FAMILY HISTORY      has a past medical history of Anxiety, Asthma, Atrial fibrillation (HCC), Bronchitis, COPD (chronic obstructive pulmonary disease) (HCC), DDD (degenerative disc disease), lumbar, Depression, Diabetes mellitus (HCC), Elevated glucose, Headache(784.0), Hernia of abdominal cavity, HH (hiatus hernia), Hyperlipemia, mixed, Hypertension, Osteoarthritis, Right femoral vein DVT (HCC), and Uterine hyperplasia.       has a past surgical history that includes Dilation and curettage of uterus (10/08 and 09/07); Breast reduction surgery (12/1997); Breast reduction surgery (12/1997); Tympanostomy tube placement (03/1967); Tonsillectomy and adenoidectomy (1962); Hysterectomy (7/17/15); tracheostomy (N/A, 7/26/2024); and tracheostomy (N/A, 9/16/2024).      Social History     Socioeconomic History    Marital status: Single     Spouse name: Not on file    Number of children: Not on file    Years of education: Not on file    Highest education level: Not on file  range of motion.      Cervical back: No rigidity.      Comments: Patient with adequate bilateral upper and lower extremity motor function without acute deficits or deviation from patient's baseline.   Skin:     General: Skin is warm and dry.      Capillary Refill: Capillary refill takes less than 2 seconds.      Findings: No rash.   Neurological:      General: No focal deficit present.      Mental Status: She is alert and oriented to person, place, and time.      Comments: Patient with adequate motor, and sensation to bilateral upper and lower distal extremities without acute deficits or deviation from patient's baseline.           DDX/DIAGNOSTIC RESULTS / EMERGENCY DEPARTMENT COURSE / MDM     Medical Decision Making  This is a 69-year-old female with history of tracheostomy presenting with chest pain radiating to the back and epigastrium.  Obtaining cardiac and abdominal workups.  Per respiratory, patient maintaining baseline vent settings with cuff deflated at this time.    Amount and/or Complexity of Data Reviewed  Independent Historian: EMS  Labs: ordered. Decision-making details documented in ED Course.  Radiology: ordered. Decision-making details documented in ED Course.  ECG/medicine tests: ordered.    Risk  Decision regarding hospitalization.        EKG  EKG Interpretation    Interpreted by emergency department physician    Rhythm: normal sinus   Rate: normal  Axis: left  Ectopy: none  Conduction: right bundle branch block (incomplete)  ST Segments: no acute change  T Waves: no acute change  Q Waves: none    Clinical Impression: no acute changes    Dilip Riddle MD      All EKG's are interpreted by the Emergency Department Physician who either signs or Co-signs this chart in the absence of a cardiologist.    EMERGENCY DEPARTMENT COURSE:  ED Course as of 03/09/25 0050   Sat Mar 08, 2025   2128 WBC: 7.7 [KM]   2128 Hemoglobin Quant(!): 7.1  Baseline. [KM]   2139 Protime-INR(!!):    Prothrombin Time

## 2025-03-09 NOTE — ED PROVIDER NOTES
[facility]     Emergency Department     Faculty Attestation    I performed a history and physical examination of the patient and discussed management with the resident. I have reviewed and agree with the resident’s findings including all diagnostic interpretations, and treatment plans as written at the time of my review. Any areas of disagreement are noted on the chart. I was personally present for the key portions of any procedures. I have documented in the chart those procedures where I was not present during the key portions. For Physician Assistant/ Nurse Practitioner cases/documentation I have personally evaluated this patient and have completed at least one if not all key elements of the E/M (history, physical exam, and MDM). Additional findings are as noted.    PtName: Shazia Nuñez  MRN: [id number]  Birthdate 1955  Date of evaluation: 3/8/25  Note Started: 9:00 PM EST    Primary Care Physician: [facility]    Brief HPI:  Patient is a 69-year-old female presents emergency department chest pain.  The patient has a history of tracheostomy with vent dependence, history of DVT/PE on Coumadin.  She reports that symptoms started around 7:00 PM.  She received a breathing treatment at that time.  She reports having some improvement of her symptoms however pain is persistent.  The patient has not been taking her anticoagulation because she had a supratherapeutic INR and the Coumadin was being held until INR normalized into therapeutic range.    Pertinent Physical Exam Findings:  Vitals:    03/08/25 2058   Pulse: 82   Resp: 18   Temp: 98.5 °F (36.9 °C)   SpO2: 100%   Appears chronically ill, no acute distress, tracheostomy is intact, breathing is even and unlabored, edematous lower extremities.    Medical Decision Making: Patient is a 69 y.o. female presenting to the emergency department with chest pain. The chart was reviewed for pertinent

## 2025-03-09 NOTE — PROGRESS NOTES
Patient vent alarming. Writer went into room. Patient was holding inner cannula in her hand. Writer replaced the cannula. Dr. Gordon notified . Stat CXR ordered. RT to room.

## 2025-03-09 NOTE — ED NOTES
ED to inpatient nurses report      Chief Complaint:  Chief Complaint   Patient presents with    Chest Pain     Present to ED from: facility     MOA:     LOC: alert and orientated to name, place, date  Mobility: Requires assistance * 2  Oxygen Baseline: trach    Current needs required: trach   Pending ED orders: none  Present condition: stable, alert    Why did the patient come to the ED? Chest pain  What is the plan? Admission   Any procedures or intervention occur? EKG, labs, meds, CT, consults  Any safety concerns??    Mental Status:  Level of Consciousness: Alert (0)    Psych Assessment:   Psychosocial  Psychosocial (WDL): Within Defined Limits  Vital signs   Vitals:    03/08/25 2058 03/08/25 2101 03/08/25 2101   BP:  93/80    Pulse: 82  82   Resp: 18     Temp: 98.5 °F (36.9 °C)     TempSrc: Oral     SpO2: 100%          Vitals:  Patient Vitals for the past 24 hrs:   BP Temp Temp src Pulse Resp SpO2   03/08/25 2101 -- -- -- 82 -- --   03/08/25 2101 93/80 -- -- -- -- --   03/08/25 2058 -- 98.5 °F (36.9 °C) Oral 82 18 100 %      Visit Vitals  BP 93/80   Pulse 82   Temp 98.5 °F (36.9 °C) (Oral)   Resp 18   SpO2 100%        LDAs:   Peripheral IV 03/08/25 Right Forearm (Active)       Peripheral IV 03/08/25 Left Hand (Active)       Ambulatory Status:  Presents to emergency department  because of falls (Syncope, seizure, or loss of consciousness): No, Age > 70: No, Altered Mental Status, Intoxication with alcohol or substance confusion (Disorientation, impaired judgment, poor safety awaremess, or inability to follow instructions): No, Impaired Mobility: Ambulates or transfers with assistive devices or assistance; Unable to ambulate or transer.: Yes    Diagnosis:  DISPOSITION Decision To Admit 03/09/2025 12:11:18 AM   Final diagnoses:   Acute pancreatitis without infection or necrosis, unspecified pancreatitis type        Consults:  IP CONSULT TO HOSPITALIST     Treatment Team:   Treatment Team:   Trish Balbuena

## 2025-03-10 ENCOUNTER — APPOINTMENT (OUTPATIENT)
Dept: MRI IMAGING | Age: 70
End: 2025-03-10
Payer: MEDICARE

## 2025-03-10 LAB
ALBUMIN SERPL-MCNC: 2.5 G/DL (ref 3.5–5.2)
ALBUMIN/GLOB SERPL: 0.6 {RATIO} (ref 1–2.5)
ALP SERPL-CCNC: 135 U/L (ref 35–104)
ALT SERPL-CCNC: 14 U/L (ref 10–35)
ANION GAP SERPL CALCULATED.3IONS-SCNC: 10 MMOL/L (ref 9–16)
AST SERPL-CCNC: 29 U/L (ref 10–35)
BASOPHILS # BLD: 0 K/UL (ref 0–0.2)
BASOPHILS NFR BLD: 0 % (ref 0–2)
BILIRUB DIRECT SERPL-MCNC: 0.2 MG/DL (ref 0–0.2)
BILIRUB INDIRECT SERPL-MCNC: 0.1 MG/DL (ref 0–1)
BILIRUB SERPL-MCNC: 0.3 MG/DL (ref 0–1.2)
BUN SERPL-MCNC: 21 MG/DL (ref 8–23)
CALCIUM SERPL-MCNC: 8.5 MG/DL (ref 8.6–10.4)
CHLORIDE SERPL-SCNC: 105 MMOL/L (ref 98–107)
CO2 SERPL-SCNC: 25 MMOL/L (ref 20–31)
CREAT SERPL-MCNC: 1.5 MG/DL (ref 0.6–0.9)
EKG ATRIAL RATE: 80 BPM
EKG P AXIS: 44 DEGREES
EKG P-R INTERVAL: 196 MS
EKG Q-T INTERVAL: 394 MS
EKG QRS DURATION: 110 MS
EKG QTC CALCULATION (BAZETT): 454 MS
EKG R AXIS: -65 DEGREES
EKG T AXIS: 65 DEGREES
EKG VENTRICULAR RATE: 80 BPM
EOSINOPHIL # BLD: 0.06 K/UL (ref 0–0.4)
EOSINOPHILS RELATIVE PERCENT: 1 % (ref 1–4)
ERYTHROCYTE [DISTWIDTH] IN BLOOD BY AUTOMATED COUNT: 18.2 % (ref 11.8–14.4)
GFR, ESTIMATED: 38 ML/MIN/1.73M2
GLOBULIN SER CALC-MCNC: 4.1 G/DL
GLUCOSE BLD-MCNC: 109 MG/DL (ref 65–105)
GLUCOSE BLD-MCNC: 120 MG/DL (ref 65–105)
GLUCOSE BLD-MCNC: 127 MG/DL (ref 65–105)
GLUCOSE SERPL-MCNC: 124 MG/DL (ref 74–99)
HCT VFR BLD AUTO: 22.7 % (ref 36.3–47.1)
HCT VFR BLD AUTO: 23.4 % (ref 36.3–47.1)
HCT VFR BLD AUTO: 27.9 % (ref 36.3–47.1)
HGB BLD-MCNC: 6.6 G/DL (ref 11.9–15.1)
HGB BLD-MCNC: 6.8 G/DL (ref 11.9–15.1)
HGB BLD-MCNC: 7.9 G/DL (ref 11.9–15.1)
IMM GRANULOCYTES # BLD AUTO: 0 K/UL (ref 0–0.3)
IMM GRANULOCYTES NFR BLD: 0 %
INR PPP: 3.6
LYMPHOCYTES NFR BLD: 0.31 K/UL (ref 1–4.8)
LYMPHOCYTES RELATIVE PERCENT: 5 % (ref 24–44)
MCH RBC QN AUTO: 25 PG (ref 25.2–33.5)
MCHC RBC AUTO-ENTMCNC: 29.1 G/DL (ref 28.4–34.8)
MCV RBC AUTO: 86 FL (ref 82.6–102.9)
MONOCYTES NFR BLD: 0.25 K/UL (ref 0.1–0.8)
MONOCYTES NFR BLD: 4 % (ref 1–7)
MORPHOLOGY: ABNORMAL
NEUTROPHILS NFR BLD: 90 % (ref 36–66)
NEUTS SEG NFR BLD: 5.58 K/UL (ref 1.8–7.7)
NRBC BLD-RTO: 0 PER 100 WBC
PLATELET # BLD AUTO: 331 K/UL (ref 138–453)
PMV BLD AUTO: 8.4 FL (ref 8.1–13.5)
POTASSIUM SERPL-SCNC: 3.6 MMOL/L (ref 3.7–5.3)
PROT SERPL-MCNC: 6.6 G/DL (ref 6.6–8.7)
PROTHROMBIN TIME: 37.3 SEC (ref 11.7–14.9)
RBC # BLD AUTO: 2.72 M/UL (ref 3.95–5.11)
SODIUM SERPL-SCNC: 140 MMOL/L (ref 136–145)
WBC OTHER # BLD: 6.2 K/UL (ref 3.5–11.3)

## 2025-03-10 PROCEDURE — 85610 PROTHROMBIN TIME: CPT

## 2025-03-10 PROCEDURE — 2580000003 HC RX 258: Performed by: STUDENT IN AN ORGANIZED HEALTH CARE EDUCATION/TRAINING PROGRAM

## 2025-03-10 PROCEDURE — 36415 COLL VENOUS BLD VENIPUNCTURE: CPT

## 2025-03-10 PROCEDURE — 6370000000 HC RX 637 (ALT 250 FOR IP): Performed by: STUDENT IN AN ORGANIZED HEALTH CARE EDUCATION/TRAINING PROGRAM

## 2025-03-10 PROCEDURE — 86850 RBC ANTIBODY SCREEN: CPT

## 2025-03-10 PROCEDURE — 97530 THERAPEUTIC ACTIVITIES: CPT

## 2025-03-10 PROCEDURE — 6360000002 HC RX W HCPCS: Performed by: STUDENT IN AN ORGANIZED HEALTH CARE EDUCATION/TRAINING PROGRAM

## 2025-03-10 PROCEDURE — 99232 SBSQ HOSP IP/OBS MODERATE 35: CPT | Performed by: STUDENT IN AN ORGANIZED HEALTH CARE EDUCATION/TRAINING PROGRAM

## 2025-03-10 PROCEDURE — 99233 SBSQ HOSP IP/OBS HIGH 50: CPT | Performed by: FAMILY MEDICINE

## 2025-03-10 PROCEDURE — 93010 ELECTROCARDIOGRAM REPORT: CPT | Performed by: INTERNAL MEDICINE

## 2025-03-10 PROCEDURE — 6370000000 HC RX 637 (ALT 250 FOR IP): Performed by: FAMILY MEDICINE

## 2025-03-10 PROCEDURE — 94640 AIRWAY INHALATION TREATMENT: CPT

## 2025-03-10 PROCEDURE — 86900 BLOOD TYPING SEROLOGIC ABO: CPT

## 2025-03-10 PROCEDURE — P9016 RBC LEUKOCYTES REDUCED: HCPCS

## 2025-03-10 PROCEDURE — 97162 PT EVAL MOD COMPLEX 30 MIN: CPT

## 2025-03-10 PROCEDURE — 97166 OT EVAL MOD COMPLEX 45 MIN: CPT

## 2025-03-10 PROCEDURE — 85014 HEMATOCRIT: CPT

## 2025-03-10 PROCEDURE — 94003 VENT MGMT INPAT SUBQ DAY: CPT

## 2025-03-10 PROCEDURE — 99213 OFFICE O/P EST LOW 20 MIN: CPT

## 2025-03-10 PROCEDURE — 80076 HEPATIC FUNCTION PANEL: CPT

## 2025-03-10 PROCEDURE — 82947 ASSAY GLUCOSE BLOOD QUANT: CPT

## 2025-03-10 PROCEDURE — 36430 TRANSFUSION BLD/BLD COMPNT: CPT

## 2025-03-10 PROCEDURE — 6370000000 HC RX 637 (ALT 250 FOR IP): Performed by: NURSE PRACTITIONER

## 2025-03-10 PROCEDURE — 2500000003 HC RX 250 WO HCPCS: Performed by: STUDENT IN AN ORGANIZED HEALTH CARE EDUCATION/TRAINING PROGRAM

## 2025-03-10 PROCEDURE — 30233N1 TRANSFUSION OF NONAUTOLOGOUS RED BLOOD CELLS INTO PERIPHERAL VEIN, PERCUTANEOUS APPROACH: ICD-10-PCS | Performed by: FAMILY MEDICINE

## 2025-03-10 PROCEDURE — 86901 BLOOD TYPING SEROLOGIC RH(D): CPT

## 2025-03-10 PROCEDURE — 80048 BASIC METABOLIC PNL TOTAL CA: CPT

## 2025-03-10 PROCEDURE — 2060000000 HC ICU INTERMEDIATE R&B

## 2025-03-10 PROCEDURE — 86920 COMPATIBILITY TEST SPIN: CPT

## 2025-03-10 PROCEDURE — 99223 1ST HOSP IP/OBS HIGH 75: CPT | Performed by: INTERNAL MEDICINE

## 2025-03-10 PROCEDURE — 85018 HEMOGLOBIN: CPT

## 2025-03-10 PROCEDURE — 6360000002 HC RX W HCPCS: Performed by: FAMILY MEDICINE

## 2025-03-10 PROCEDURE — 85025 COMPLETE CBC W/AUTO DIFF WBC: CPT

## 2025-03-10 RX ORDER — GLUCAGON 1 MG/ML
1 KIT INJECTION PRN
Status: DISCONTINUED | OUTPATIENT
Start: 2025-03-10 | End: 2025-03-16 | Stop reason: HOSPADM

## 2025-03-10 RX ORDER — ACETYLCYSTEINE 200 MG/ML
600 SOLUTION ORAL; RESPIRATORY (INHALATION) 2 TIMES DAILY
Status: DISCONTINUED | OUTPATIENT
Start: 2025-03-10 | End: 2025-03-16 | Stop reason: HOSPADM

## 2025-03-10 RX ORDER — CETIRIZINE HYDROCHLORIDE 10 MG/1
10 TABLET ORAL NIGHTLY
COMMUNITY

## 2025-03-10 RX ORDER — LACTULOSE 10 G/15ML
20 SOLUTION ORAL 3 TIMES DAILY PRN
Status: DISCONTINUED | OUTPATIENT
Start: 2025-03-10 | End: 2025-03-16 | Stop reason: HOSPADM

## 2025-03-10 RX ORDER — SODIUM CHLORIDE 9 MG/ML
INJECTION, SOLUTION INTRAVENOUS PRN
Status: DISCONTINUED | OUTPATIENT
Start: 2025-03-10 | End: 2025-03-16 | Stop reason: HOSPADM

## 2025-03-10 RX ORDER — PRAVASTATIN SODIUM 20 MG
80 TABLET ORAL EVERY EVENING
Status: DISCONTINUED | OUTPATIENT
Start: 2025-03-10 | End: 2025-03-16 | Stop reason: HOSPADM

## 2025-03-10 RX ORDER — ONDANSETRON 4 MG/1
4 TABLET, FILM COATED ORAL EVERY 6 HOURS
COMMUNITY

## 2025-03-10 RX ORDER — MONTELUKAST SODIUM 10 MG/1
10 TABLET ORAL NIGHTLY
Status: DISCONTINUED | OUTPATIENT
Start: 2025-03-10 | End: 2025-03-16 | Stop reason: HOSPADM

## 2025-03-10 RX ORDER — FUROSEMIDE 20 MG/1
20 TABLET ORAL 2 TIMES DAILY
Status: DISCONTINUED | OUTPATIENT
Start: 2025-03-10 | End: 2025-03-16 | Stop reason: HOSPADM

## 2025-03-10 RX ORDER — BUDESONIDE 0.5 MG/2ML
0.5 INHALANT ORAL
Status: DISCONTINUED | OUTPATIENT
Start: 2025-03-10 | End: 2025-03-16 | Stop reason: HOSPADM

## 2025-03-10 RX ORDER — CALCIUM CARBONATE 500 MG/1
500 TABLET, CHEWABLE ORAL 3 TIMES DAILY PRN
Status: DISCONTINUED | OUTPATIENT
Start: 2025-03-10 | End: 2025-03-16 | Stop reason: HOSPADM

## 2025-03-10 RX ORDER — DEXTROSE MONOHYDRATE 100 MG/ML
INJECTION, SOLUTION INTRAVENOUS CONTINUOUS PRN
Status: DISCONTINUED | OUTPATIENT
Start: 2025-03-10 | End: 2025-03-16 | Stop reason: HOSPADM

## 2025-03-10 RX ORDER — LEVOFLOXACIN 500 MG/1
500 TABLET, FILM COATED ORAL DAILY
Status: ON HOLD | COMMUNITY
End: 2025-03-16 | Stop reason: HOSPADM

## 2025-03-10 RX ADMIN — IPRATROPIUM BROMIDE AND ALBUTEROL SULFATE 1 DOSE: .5; 2.5 SOLUTION RESPIRATORY (INHALATION) at 19:52

## 2025-03-10 RX ADMIN — GUAIFENESIN 1200 MG: 600 TABLET, EXTENDED RELEASE ORAL at 21:14

## 2025-03-10 RX ADMIN — SODIUM CHLORIDE, PRESERVATIVE FREE 40 MG: 5 INJECTION INTRAVENOUS at 21:14

## 2025-03-10 RX ADMIN — IPRATROPIUM BROMIDE AND ALBUTEROL SULFATE 1 DOSE: .5; 2.5 SOLUTION RESPIRATORY (INHALATION) at 15:59

## 2025-03-10 RX ADMIN — SODIUM CHLORIDE, PRESERVATIVE FREE 10 ML: 5 INJECTION INTRAVENOUS at 21:14

## 2025-03-10 RX ADMIN — IPRATROPIUM BROMIDE AND ALBUTEROL SULFATE 1 DOSE: .5; 2.5 SOLUTION RESPIRATORY (INHALATION) at 10:16

## 2025-03-10 RX ADMIN — MONTELUKAST 10 MG: 10 TABLET, FILM COATED ORAL at 21:14

## 2025-03-10 RX ADMIN — ANTACID TABLETS 500 MG: 500 TABLET, CHEWABLE ORAL at 03:01

## 2025-03-10 RX ADMIN — METOPROLOL TARTRATE 37.5 MG: 25 TABLET, FILM COATED ORAL at 21:15

## 2025-03-10 RX ADMIN — SODIUM CHLORIDE, PRESERVATIVE FREE 40 MG: 5 INJECTION INTRAVENOUS at 09:45

## 2025-03-10 RX ADMIN — FUROSEMIDE 20 MG: 20 TABLET ORAL at 21:15

## 2025-03-10 RX ADMIN — FUROSEMIDE 20 MG: 20 TABLET ORAL at 13:30

## 2025-03-10 RX ADMIN — ACETYLCYSTEINE 600 MG: 200 INHALANT RESPIRATORY (INHALATION) at 19:52

## 2025-03-10 RX ADMIN — BUDESONIDE INHALATION 500 MCG: 0.5 SUSPENSION RESPIRATORY (INHALATION) at 19:52

## 2025-03-10 RX ADMIN — AMIODARONE HYDROCHLORIDE 200 MG: 200 TABLET ORAL at 21:15

## 2025-03-10 RX ADMIN — SODIUM CHLORIDE, PRESERVATIVE FREE 10 ML: 5 INJECTION INTRAVENOUS at 09:45

## 2025-03-10 RX ADMIN — BUDESONIDE 500 MCG: 0.5 SUSPENSION RESPIRATORY (INHALATION) at 10:16

## 2025-03-10 RX ADMIN — ACETYLCYSTEINE 600 MG: 200 INHALANT RESPIRATORY (INHALATION) at 15:59

## 2025-03-10 RX ADMIN — SODIUM CHLORIDE: 0.9 INJECTION, SOLUTION INTRAVENOUS at 03:03

## 2025-03-10 RX ADMIN — IPRATROPIUM BROMIDE AND ALBUTEROL SULFATE 1 DOSE: .5; 2.5 SOLUTION RESPIRATORY (INHALATION) at 12:04

## 2025-03-10 ASSESSMENT — PULMONARY FUNCTION TESTS
PIF_VALUE: 13
PIF_VALUE: 14
PIF_VALUE: 17
PIF_VALUE: 8
PIF_VALUE: 11
PIF_VALUE: 14

## 2025-03-10 NOTE — PROGRESS NOTES
[facility]  Office: [phone]  Carlos Caro DO, Felipe Drew DO, Jerman Mack DO, Leonel Thomas, DO, Larry Pisano MD, Monserrat Rosado MD, Sharon Perez MD, Beverley Curtis MD,  Jimi Messina MD, Kevin Cowan MD, Theresa Chavira MD,  Luis Coffman DO, Florencia Hernandez MD, Giorgi Salas MD, Ramone Caro DO, Mariel Bradshaw MD,  Bandar Anderson DO, Michelle Helm MD, Mireille Hermosillo MD, Yamileth Chong MD, Zo Montgomery MD,  Jordan Hernandez MD, Pilar Salcedo MD, Kevin Greenwood MD, Kevin Landon MD, Ramon Ellsworth MD, Ken Luna MD, Chemo Morton DO, Alvaro Reid MD, Luis Caicedo MD, Mohsin Reza, MD, Shirley Waterhouse, CNP,  Octavia Coreas CNP, Chemo Pelayo, CNP,  Юлия Calero, DNP, Kaci Rodgers, CNP, Princess Mandujano, CNP, Madelaine Burns, CNP, Ivone Mancia, CNP, Val Hanson, PA-C, Isabel Bustos, CNP, Karma Yost, CNP,  Nasrin Pritchard, CNP, Bebe Mckeon, CNP, Rosio Resendez, CNP,  Sandie Palmer, CNS, Jessi Montiel CNP, Dolly Tamez CNP,   Lea Johnson, CNP         [facility]   IN-PATIENT SERVICE   [facility]    Progress Note    3/10/2025    12:49 PM    Name:   Shazia Nuñez  MRN:     [id number]     Acct:      [id number]   Room:   [facility]30-01   Day:  1  Admit Date:  3/8/2025  8:54 PM    PCP:   [facility]  Code Status:  Full Code    Subjective:     C/C:   Chief Complaint   Patient presents with    Chest Pain     Interval History Status: not changed.         Brief History:     Per chart  69-year-old female with past medical history of chronic hypoxic respiratory failure due to obesity hypoventilation syndrome status post tracheostomy July 2024, A-fib, DVT and PE on warfarin, HFpEF presented to the emergency department endorsing lower chest pain.  She states that the chest pain started at about 7 PM on 3/8.  She states that the pain was nonradiating and stayed in her lower chest/epigastric region.  She  home nebulized treatment including Mucomyst and Pulmicort  -Continue mucolytics  -Respiratory care       Heart failure with preserved ejection fraction :  - Continue home medications      A-fib: On Amio, beta-blockers and on chronic anticoagulation    History of DVT/PE: Anticoagulated on Coumadin, currently supratherapeutic INR, pharmacy to dose, holding warfarin       History of endometrial cancer undergone modified radical hysterectomy with BSO and regional lymphadenectomy on 7/17/2015.          Acute on chronic anemia likely due to chronic blood loss from wound: Continue to monitor hemoglobin and transfuse if drop below 7       Diabetes mellitus type 2, noninsulin dependent: Takes 8 units of Lantus at nighttime, basal was not started on admission, blood sugars controlled, will continue to monitor and reassess .-Continue with glycemic control, sliding scale hypoglycemia protocol      CKD: At baseline, continue to monitor daily avoid nephrotoxic agents      Bleeding from wound: Wound care    Hyperlipemia, mixed: Pravastatin    Anxiety      Beverley Curtis MD  3/10/2025  12:49 PM

## 2025-03-10 NOTE — CONSULTS
Pulmonary/Critical Care consultation    Patient's name:  Shazia Nuñez  Medical Record Number: [id number]  Patient's account/billing number: [id number]  Patient's YOB: 1955  Age: 69 y.o.  Date of Admission: 3/8/2025  8:54 PM  Date of Consult: 3/10/2025      Primary Care Physician: [facility]      Code Status: Full Code    Reason for consult: Increased Trach Secretions       HISTORY OF PRESENT ILLNESS:   History was obtained from the patient.  Shazia Nuñez is a 69 y.o. female with past medical history of chronic hypoxic respiratory failure due to obesity hypoventilation syndrome status post tracheostomy July 2024, A-fib, DVT and PE on warfarin, HFpEF presented to the emergency department endorsing lower chest pain.  She states that the chest pain started at about 7 PM on 3/8.  She states that the pain was nonradiating and stayed in her lower chest/epigastric region.  She denied any associated diaphoresis, nausea, vomiting.           Past Medical History:        Diagnosis Date    Anxiety     Asthma     Atrial fibrillation (HCC)     A-fib noted on 05/25/2024 visit to ER    Bronchitis     COPD (chronic obstructive pulmonary disease) (HCC)     DDD (degenerative disc disease), lumbar     Depression     Diabetes mellitus (HCC)     Elevated glucose     Headache(784.0)     Hernia of abdominal cavity     HH (hiatus hernia)     Hyperlipemia, mixed     Hypertension     Osteoarthritis     Right femoral vein DVT (HCC) 05/2009    Dr. Elizabeth    Uterine hyperplasia        Past Surgical History:        Procedure Laterality Date    BREAST REDUCTION SURGERY  12/1997    BREAST REDUCTION SURGERY  12/1997    DILATION AND CURETTAGE OF UTERUS  10/08 and 09/07    HYSTERECTOMY (CERVIX STATUS UNKNOWN)  7/17/15    Dr Kaitlynn Jaime, endometial cancer, FIGO grade 1    TONSILLECTOMY AND ADENOIDECTOMY  1962    TRACHEOSTOMY N/A 7/26/2024     Completed and except as mentioned above were negative  Cardiovascular ROS:  Completed and except as mentioned above were negative  Gastrointestinal ROS: Completed and except as mentioned above were negative  Genito-Urinary ROS:  Completed and except as mentioned above were negative  Musculoskeletal ROS:  Completed and except as mentioned above were negative  Neurological ROS:  Completed and except as mentioned above were negative  Dermatological ROS:  Completed and except as mentioned above were negative          Physical Exam:    Vitals: BP (!) 132/58   Pulse 72   Temp 98.3 °F (36.8 °C) (Oral)   Resp 24   Ht 1.651 m (5' 5\")   Wt (!) 148.4 kg (327 lb 2.6 oz)   LMP 07/16/2014   SpO2 99%   BMI 54.44 kg/m²     Last Body weight:   Wt Readings from Last 3 Encounters:   03/09/25 (!) 148.4 kg (327 lb 2.6 oz)   03/06/25 (!) 204.1 kg (450 lb)   11/06/24 (!) 208.2 kg (459 lb)       Body Mass Index : Body mass index is 54.44 kg/m².      Intake and Output summary:   Intake/Output Summary (Last 24 hours) at 3/10/2025 0944  Last data filed at 3/10/2025 0000  Gross per 24 hour   Intake 1536.96 ml   Output 1550 ml   Net -13.04 ml       Physical Examination:   PHYSICAL EXAMINATION:  Vitals:    03/09/25 2345 03/10/25 0236 03/10/25 0323 03/10/25 0753   BP:  (!) 135/57  (!) 132/58   Pulse: 67 79 68 72   Resp: 24 21 16 24   Temp:  98.1 °F (36.7 °C)  98.3 °F (36.8 °C)   TempSrc:  Temporal  Oral   SpO2: 100% 100% 100% 99%   Weight:       Height:         Constitutional: This is a well developed, well nourished, Greater than 40 - Morbid Obesity / Extreme Obesity / Grade III 69 y.o. year old female who is alert, oriented, cooperative and in no apparent distress.    Head:normocephalic and atraumatic.    EENT:   LEI.  No conjunctival injections.    Septum was midline, mucosa was without erythema, exudates or cobblestoning.  No thrush was noted.   Neck: Supple without thyromegaly. No elevated JVP. Trachea was midline. Tracheostomy

## 2025-03-10 NOTE — PROGRESS NOTES
[facility] Wound Ostomy  Nurse  Consult Note       NAME:  Shazia Nuñez  MEDICAL RECORD NUMBER:  [id number]  AGE: 69 y.o.   GENDER: female  : 1955  TODAY'S DATE:  3/10/2025    Subjective   Reason for [facility] Nurse Evaluation and Assessment: \"wounds to coccyx and abdomen\"    Copied from H&P: \"69-year-old female with past medical history of chronic hypoxic respiratory failure due to obesity hypoventilation syndrome status post tracheostomy 2024, A-fib, DVT and PE on warfarin, HFpEF presented to the emergency department endorsing lower chest pain....She was admitted for the management of acute pancreatitis \"  Chronic hypoxic respiratory failure due to obesity hypoventilation syndrome status post tracheostomy       Shazia Nuñez is a 69 y.o. female referred by:   [x] Physician  [] Nursing  [] Other:     Wound Identification:  Wound Type: pressure, skin tear, and friction and moisture   Contributing Factors: poor glucose control, chronic pressure, decreased mobility, shear force, obesity, and ventral hernia with loss of domain    Wound History:   Resides in [facility]    Patient previously had a hysterectomy due to endometrial cancer. She has a large ventral hernia and occasionally gets open areas at the site of the hysterectomy scar   Dual incontinence problematic; superficial denudation noted tot he right buttock and left posterior proximal thigh    Current Wound Care Treatment:    Previously followed at [facility] until 2024. Now resides in an [facility].             PAST MEDICAL HISTORY        Diagnosis Date    Anxiety     Asthma     Atrial fibrillation (HCC)     A-fib noted on 2024 visit to ER    Bronchitis     COPD (chronic obstructive pulmonary disease) (HCC)     DDD (degenerative disc disease), lumbar     Depression     Diabetes mellitus (HCC)     Elevated glucose     Headache(784.0)     Hernia of abdominal cavity     HH (hiatus hernia)     Hyperlipemia, mixed

## 2025-03-10 NOTE — CONSENT
Informed Consent for Blood Component Transfusion Note    I have discussed with the patient the rationale for blood component transfusion; its benefits in treating or preventing fatigue, organ damage, or death; and its risk which includes mild transfusion reactions, rare risk of blood borne infection, or more serious but rare reactions. I have discussed the alternatives to transfusion, including the risk and consequences of not receiving transfusion. The patient had an opportunity to ask questions and had agreed to proceed with transfusion of blood components.    Electronically signed by ARACELIS Mccann NP on 3/10/25 at 1:34 PM EDT

## 2025-03-10 NOTE — PROGRESS NOTES
[facility]   Gastroenterology Progress Note    Shazia Nuñez is a 69 y.o. female patient.  Hospitalization Day:1      Chief consult reason:   Acute pancreatitis    Subjective:  Patient seen and examined, no acute events overnight  Tolerating diet with minimal abdominal discomfort  Awaiting MRI to be completed today  Renal status improved from 1.8-1.5 creatinine today  Hemoglobin dropped slightly from 7.2 to 6.8 g/dL, platelets normal  INR still significantly elevated 3.6  No signs of overt GI bleeding    VITALS:  BP (!) 133/49   Pulse 82   Temp 98.5 °F (36.9 °C) (Oral)   Resp 20   Ht 1.651 m (5' 5\")   Wt (!) 148.4 kg (327 lb 2.6 oz)   LMP 2014   SpO2 100%   BMI 54.44 kg/m²   TEMPERATURE:  Current - Temp: 98.5 °F (36.9 °C); Max - Temp  Av.1 °F (36.7 °C)  Min: 97.3 °F (36.3 °C)  Max: 98.5 °F (36.9 °C)    Physical Assessment:  General appearance:  alert, cooperative and no distress  Mental Status:  oriented to person, place and time and normal affect  Lungs:  clear to auscultation bilaterally, normal effort  Heart:  regular rate and rhythm, no murmur  Abdomen:  soft, nontender, nondistended, normal bowel sounds, no masses, hepatomegaly, splenomegaly  Extremities:  no edema, redness, tenderness in the calves  Skin:  no gross lesions, rashes, induration    Data Review:    Labs and Imaging:       CBC:  Recent Labs     03/08/25  2107 03/09/25  0446 03/10/25  0944   WBC 7.7 6.0 6.2   HGB 7.1* 7.2* 6.8*   MCV 85.5 85.8 86.0   RDW 18.0* 18.1* 18.2*    329 331       ANEMIA STUDIES:  No results for input(s): \"TIBC\", \"FERRITIN\", \"VTHWJQYD77\", \"FOLATE\", \"OCCULTBLD\" in the last 72 hours.    Invalid input(s): \"LABIRON\"    BMP:  Recent Labs     03/08/25  2107 03/09/25  0446 03/10/25  0944    138 140   K 3.7 3.5* 3.6*    100 105   CO2 26 28 25   BUN 28* 27* 21   CREATININE 1.8* 1.8* 1.5*   GLUCOSE 172* 128* 124*   CALCIUM 9.0 9.2 8.5*   MG  --  1.7  --        LFTS:  Recent

## 2025-03-10 NOTE — PROGRESS NOTES
Physical Therapy  Facility/Department: [address] STEPDOWN   Physical Therapy Initial Evaluation    Patient Name: Shazia Nuñez        MRN: [id number]    : 1955    Date of Service: 3/10/2025    Chief Complaint   Patient presents with    Chest Pain     Past Medical History:  has a past medical history of Anxiety, Asthma, Atrial fibrillation (HCC), Bronchitis, COPD (chronic obstructive pulmonary disease) (HCC), DDD (degenerative disc disease), lumbar, Depression, Diabetes mellitus (HCC), Elevated glucose, Headache(784.0), Hernia of abdominal cavity, HH (hiatus hernia), Hyperlipemia, mixed, Hypertension, Osteoarthritis, Right femoral vein DVT (HCC), and Uterine hyperplasia.  Past Surgical History:  has a past surgical history that includes Dilation and curettage of uterus (10/08 and ); Breast reduction surgery (1997); Breast reduction surgery (1997); Tympanostomy tube placement (1967); Tonsillectomy and adenoidectomy (); Hysterectomy (7/17/15); tracheostomy (N/A, 2024); and tracheostomy (N/A, 2024).    Discharge Recommendations  Discharge Recommendations: Patient would benefit from continued therapy after discharge  PT Equipment Recommendations  Equipment Needed: No    Assessment  Body Structures, Functions, Activity Limitations Requiring Skilled Therapeutic Intervention: Decreased functional mobility , Decreased strength, Decreased endurance  Assessment: The pt was able to roll R and L with mod assist x 2. She was limited in LE exercises due to a c/o arthritic pain in both of her LE's. She could benefit from a continuation of PT for transfers , strengthening and functional mobility prior to her DC  Therapy Prognosis: Good  Decision Making: Medium Complexity  Requires PT Follow-Up: Yes  Activity Tolerance  Activity Tolerance: Patient limited by fatigue, Patient limited by pain  Safety Devices  Type of Devices: Call light within reach, Left in bed, Nurse  Verbal  Barriers to Learning: None  Education Outcome: Verbalized understanding    Plan  Physical Therapy Plan  General Plan: 2-3 times per week  Current Treatment Recommendations: Strengthening, Functional mobility training, Transfer training, Endurance training, Safety education & training, Positioning, Therapeutic activities    Goals  Short Term Goals  Time Frame for Short Term Goals: 10 visits  Short Term Goal 1: Roll R and L with min assist  Short Term Goal 2: min assist with positioning  Short Term Goal 3: perform 20 min stengthening exercises    Minutes  PT Individual Minutes  Time In: 0910  Time Out: 0933  Minutes: 23    Electronically signed by Anthony Galan PT on 3/10/25 at 12:29 PM EDT

## 2025-03-10 NOTE — PLAN OF CARE
Problem: Chronic Conditions and Co-morbidities  Goal: Patient's chronic conditions and co-morbidity symptoms are monitored and maintained or improved  Outcome: Progressing  Flowsheets (Taken 3/9/2025 0800 by Mami Maxwell RN)  Care Plan - Patient's Chronic Conditions and Co-Morbidity Symptoms are Monitored and Maintained or Improved: Monitor and assess patient's chronic conditions and comorbid symptoms for stability, deterioration, or improvement     Problem: Discharge Planning  Goal: Discharge to home or other facility with appropriate resources  Outcome: Progressing     Problem: Safety - Adult  Goal: Free from fall injury  Outcome: Progressing     Problem: ABCDS Injury Assessment  Goal: Absence of physical injury  Outcome: Progressing     Problem: Skin/Tissue Integrity  Goal: Skin integrity remains intact  Description: 1.  Monitor for areas of redness and/or skin breakdown  2.  Assess vascular access sites hourly  3.  Every 4-6 hours minimum:  Change oxygen saturation probe site  4.  Every 4-6 hours:  If on nasal continuous positive airway pressure, respiratory therapy assess nares and determine need for appliance change or resting period  3/9/2025 2115 by Roxanna Beltran RN  Outcome: Progressing  Flowsheets (Taken 3/9/2025 1347 by Mami Maxwell RN)  Skin Integrity Remains Intact: Monitor for areas of redness and/or skin breakdown     Problem: Respiratory - Adult  Goal: Achieves optimal ventilation and oxygenation  3/9/2025 2115 by Roxanna Beltran RN  Outcome: Progressing     Problem: Neurosensory - Adult  Goal: Achieves stable or improved neurological status  Outcome: Progressing  Flowsheets (Taken 3/9/2025 0800 by Mami Maxwell RN)  Achieves stable or improved neurological status: Assess for and report changes in neurological status     Problem: Cardiovascular - Adult  Goal: Maintains optimal cardiac output and hemodynamic stability  Outcome: Progressing  Flowsheets (Taken 3/9/2025 0800 by Alissa

## 2025-03-10 NOTE — PROGRESS NOTES
Occupational Therapy  Occupational Therapy Initial Evaluation  Facility/Department: [facility] 4B STEPDOWN   Patient Name: Shazia Nuñez        MRN: [id number]    : 1955    Date of Service: 3/10/2025    Chief Complaint   Patient presents with    Chest Pain     Past Medical History:  has a past medical history of Anxiety, Asthma, Atrial fibrillation (HCC), Bronchitis, COPD (chronic obstructive pulmonary disease) (HCC), DDD (degenerative disc disease), lumbar, Depression, Diabetes mellitus (HCC), Elevated glucose, Headache(784.0), Hernia of abdominal cavity, HH (hiatus hernia), Hyperlipemia, mixed, Hypertension, Osteoarthritis, Right femoral vein DVT (HCC), and Uterine hyperplasia.  Past Surgical History:  has a past surgical history that includes Dilation and curettage of uterus (10/08 and ); Breast reduction surgery (1997); Breast reduction surgery (1997); Tympanostomy tube placement (1967); Tonsillectomy and adenoidectomy (); Hysterectomy (7/17/15); tracheostomy (N/A, 2024); and tracheostomy (N/A, 2024).    Discharge Recommendations  Discharge Recommendations: Patient would benefit from continued therapy after discharge  OT Equipment Recommendations  Equipment Needed:  (CTA)    Assessment  Performance deficits / Impairments: Decreased functional mobility ;Decreased ADL status;Decreased ROM;Decreased strength;Decreased high-level IADLs  Assessment: Pt agreeable and pleasant for eval on this date. Pt supine in bed upon entry. Pt completed bed mobility, including rolling L and R with Mod A X2 with use of bed rails. Pt demonstrated more difficulty rolling to the R than the L. Pt did not sit EOB d/t not sitting at baseline. Pt completed ROM, MMT, and facial hygiene while laying supine in bed. Pt was left supine in bed with call light and phone in reach, and nurse notified. Pt would benefit from continued OT services to address decreased bed mobility, ADL/IADL performance, strength and  ROM, in order to promote a return back to prior level of functioning.  Prognosis: Fair  Decision Making: Medium Complexity  REQUIRES OT FOLLOW-UP: Yes  Activity Tolerance  Activity Tolerance: Patient Tolerated treatment well  Safety Devices  Type of Devices: Call light within reach;Left in bed;Nurse notified  Restraints  Restraints Initially in Place: No    AM-PAC  AM-PAC Daily Activity - Inpatient   How much help is needed for putting on and taking off regular lower body clothing?: Total  How much help is needed for bathing (which includes washing, rinsing, drying)?: Total  How much help is needed for toileting (which includes using toilet, bedpan, or urinal)?: Total  How much help is needed for putting on and taking off regular upper body clothing?: A Lot  How much help is needed for taking care of personal grooming?: A Little  How much help for eating meals?: None  AM-PAC Inpatient Daily Activity Raw Score: 12  AM-PAC Inpatient ADL T-Scale Score : 30.6  ADL Inpatient CMS 0-100% Score: 66.57  ADL Inpatient CMS G-Code Modifier : CL    Restrictions/Precautions  Restrictions/Precautions  Restrictions/Precautions: NPO;Isolation;Contact Precautions  Activity Level: Up with Assist  Position Activity Restriction  Other Position/Activity Restrictions: the patient has a history of tracheostomy with vent dependence  O2 Device: Ventilator    Subjective  General  Patient assessed for rehabilitation services?: Yes  Family / Caregiver Present: No  Diagnosis: acute pancreatitis without necrosis or infection  General Comment  Comments: RN ok'd laura on this date. Pt agreeable and pleasant.  Pain  Pre-Pain: 0  Post-Pain: 3  Pain Location: Abdomen  Pain Descriptor: Aching  Pain Interventions: Nurse notified;Repositioning;Other (Comment)       Home Setup/Prior Level of Function  Social/Functional History  Lives With: Other (Comment)  Type of Home: Facility  Home Layout: One level  Home Access: Level entry  Bathroom Shower/Tub:

## 2025-03-11 ENCOUNTER — APPOINTMENT (OUTPATIENT)
Dept: ULTRASOUND IMAGING | Age: 70
End: 2025-03-11
Payer: MEDICARE

## 2025-03-11 LAB
ALBUMIN SERPL-MCNC: 2.4 G/DL (ref 3.5–5.2)
ALBUMIN/GLOB SERPL: 0.6 {RATIO} (ref 1–2.5)
ALP SERPL-CCNC: 107 U/L (ref 35–104)
ALT SERPL-CCNC: 6 U/L (ref 10–35)
ANION GAP SERPL CALCULATED.3IONS-SCNC: 10 MMOL/L (ref 9–16)
AST SERPL-CCNC: 17 U/L (ref 10–35)
BASOPHILS # BLD: 0.06 K/UL (ref 0–0.2)
BASOPHILS NFR BLD: 1 % (ref 0–2)
BILIRUB DIRECT SERPL-MCNC: 0.2 MG/DL (ref 0–0.2)
BILIRUB INDIRECT SERPL-MCNC: 0.2 MG/DL (ref 0–1)
BILIRUB SERPL-MCNC: 0.4 MG/DL (ref 0–1.2)
BUN SERPL-MCNC: 18 MG/DL (ref 8–23)
CALCIUM SERPL-MCNC: 8.7 MG/DL (ref 8.6–10.4)
CHLORIDE SERPL-SCNC: 103 MMOL/L (ref 98–107)
CO2 SERPL-SCNC: 27 MMOL/L (ref 20–31)
CREAT SERPL-MCNC: 1.5 MG/DL (ref 0.6–0.9)
EOSINOPHIL # BLD: 0.06 K/UL (ref 0–0.44)
EOSINOPHILS RELATIVE PERCENT: 1 % (ref 1–4)
ERYTHROCYTE [DISTWIDTH] IN BLOOD BY AUTOMATED COUNT: 17.7 % (ref 11.8–14.4)
GFR, ESTIMATED: 38 ML/MIN/1.73M2
GLOBULIN SER CALC-MCNC: 3.7 G/DL
GLUCOSE BLD-MCNC: 100 MG/DL (ref 65–105)
GLUCOSE BLD-MCNC: 100 MG/DL (ref 65–105)
GLUCOSE BLD-MCNC: 102 MG/DL (ref 65–105)
GLUCOSE BLD-MCNC: 119 MG/DL (ref 65–105)
GLUCOSE BLD-MCNC: 91 MG/DL (ref 65–105)
GLUCOSE SERPL-MCNC: 112 MG/DL (ref 74–99)
HCT VFR BLD AUTO: 22.4 % (ref 36.3–47.1)
HCT VFR BLD AUTO: 27.7 % (ref 36.3–47.1)
HGB BLD-MCNC: 6.7 G/DL (ref 11.9–15.1)
HGB BLD-MCNC: 8 G/DL (ref 11.9–15.1)
IMM GRANULOCYTES # BLD AUTO: 0 K/UL (ref 0–0.3)
IMM GRANULOCYTES NFR BLD: 0 %
INR PPP: 3.9
LIPASE SERPL-CCNC: 28 U/L (ref 13–60)
LYMPHOCYTES NFR BLD: 0.69 K/UL (ref 1.1–3.7)
LYMPHOCYTES RELATIVE PERCENT: 11 % (ref 24–43)
MAGNESIUM SERPL-MCNC: 1.6 MG/DL (ref 1.6–2.4)
MCH RBC QN AUTO: 25.3 PG (ref 25.2–33.5)
MCHC RBC AUTO-ENTMCNC: 29.9 G/DL (ref 28.4–34.8)
MCV RBC AUTO: 84.5 FL (ref 82.6–102.9)
MICROORGANISM SPEC CULT: ABNORMAL
MICROORGANISM SPEC CULT: ABNORMAL
MICROORGANISM/AGENT SPEC: ABNORMAL
MONOCYTES NFR BLD: 0.44 K/UL (ref 0.1–1.2)
MONOCYTES NFR BLD: 7 % (ref 3–12)
MORPHOLOGY: ABNORMAL
NEUTROPHILS NFR BLD: 80 % (ref 36–65)
NEUTS SEG NFR BLD: 5.05 K/UL (ref 1.5–8.1)
NRBC BLD-RTO: 0 PER 100 WBC
PLATELET # BLD AUTO: 286 K/UL (ref 138–453)
PMV BLD AUTO: 8.4 FL (ref 8.1–13.5)
POTASSIUM SERPL-SCNC: 3.4 MMOL/L (ref 3.7–5.3)
PROT SERPL-MCNC: 6.1 G/DL (ref 6.6–8.7)
PROTHROMBIN TIME: 39.9 SEC (ref 11.7–14.9)
RBC # BLD AUTO: 2.65 M/UL (ref 3.95–5.11)
SODIUM SERPL-SCNC: 140 MMOL/L (ref 136–145)
SPECIMEN DESCRIPTION: ABNORMAL
WBC OTHER # BLD: 6.3 K/UL (ref 3.5–11.3)

## 2025-03-11 PROCEDURE — 94003 VENT MGMT INPAT SUBQ DAY: CPT

## 2025-03-11 PROCEDURE — 2500000003 HC RX 250 WO HCPCS: Performed by: STUDENT IN AN ORGANIZED HEALTH CARE EDUCATION/TRAINING PROGRAM

## 2025-03-11 PROCEDURE — 85018 HEMOGLOBIN: CPT

## 2025-03-11 PROCEDURE — 2060000000 HC ICU INTERMEDIATE R&B

## 2025-03-11 PROCEDURE — 6370000000 HC RX 637 (ALT 250 FOR IP): Performed by: NURSE PRACTITIONER

## 2025-03-11 PROCEDURE — 6370000000 HC RX 637 (ALT 250 FOR IP): Performed by: FAMILY MEDICINE

## 2025-03-11 PROCEDURE — 36415 COLL VENOUS BLD VENIPUNCTURE: CPT

## 2025-03-11 PROCEDURE — 99232 SBSQ HOSP IP/OBS MODERATE 35: CPT | Performed by: STUDENT IN AN ORGANIZED HEALTH CARE EDUCATION/TRAINING PROGRAM

## 2025-03-11 PROCEDURE — 99233 SBSQ HOSP IP/OBS HIGH 50: CPT | Performed by: FAMILY MEDICINE

## 2025-03-11 PROCEDURE — 6360000002 HC RX W HCPCS: Performed by: FAMILY MEDICINE

## 2025-03-11 PROCEDURE — 6370000000 HC RX 637 (ALT 250 FOR IP): Performed by: STUDENT IN AN ORGANIZED HEALTH CARE EDUCATION/TRAINING PROGRAM

## 2025-03-11 PROCEDURE — 36430 TRANSFUSION BLD/BLD COMPNT: CPT

## 2025-03-11 PROCEDURE — 2580000003 HC RX 258: Performed by: FAMILY MEDICINE

## 2025-03-11 PROCEDURE — 6360000002 HC RX W HCPCS: Performed by: STUDENT IN AN ORGANIZED HEALTH CARE EDUCATION/TRAINING PROGRAM

## 2025-03-11 PROCEDURE — 85014 HEMATOCRIT: CPT

## 2025-03-11 PROCEDURE — 2580000003 HC RX 258: Performed by: STUDENT IN AN ORGANIZED HEALTH CARE EDUCATION/TRAINING PROGRAM

## 2025-03-11 PROCEDURE — 83735 ASSAY OF MAGNESIUM: CPT

## 2025-03-11 PROCEDURE — 82947 ASSAY GLUCOSE BLOOD QUANT: CPT

## 2025-03-11 PROCEDURE — 80076 HEPATIC FUNCTION PANEL: CPT

## 2025-03-11 PROCEDURE — 85610 PROTHROMBIN TIME: CPT

## 2025-03-11 PROCEDURE — 80048 BASIC METABOLIC PNL TOTAL CA: CPT

## 2025-03-11 PROCEDURE — 76705 ECHO EXAM OF ABDOMEN: CPT

## 2025-03-11 PROCEDURE — 85025 COMPLETE CBC W/AUTO DIFF WBC: CPT

## 2025-03-11 PROCEDURE — 99233 SBSQ HOSP IP/OBS HIGH 50: CPT | Performed by: INTERNAL MEDICINE

## 2025-03-11 PROCEDURE — 83690 ASSAY OF LIPASE: CPT

## 2025-03-11 PROCEDURE — 94640 AIRWAY INHALATION TREATMENT: CPT

## 2025-03-11 PROCEDURE — P9016 RBC LEUKOCYTES REDUCED: HCPCS

## 2025-03-11 RX ORDER — PANTOPRAZOLE SODIUM 40 MG/1
40 TABLET, DELAYED RELEASE ORAL
Status: DISCONTINUED | OUTPATIENT
Start: 2025-03-11 | End: 2025-03-16 | Stop reason: HOSPADM

## 2025-03-11 RX ORDER — SODIUM CHLORIDE 9 MG/ML
INJECTION, SOLUTION INTRAVENOUS PRN
Status: DISCONTINUED | OUTPATIENT
Start: 2025-03-11 | End: 2025-03-16 | Stop reason: HOSPADM

## 2025-03-11 RX ORDER — ACETAMINOPHEN 325 MG/1
650 TABLET ORAL ONCE
Status: COMPLETED | OUTPATIENT
Start: 2025-03-11 | End: 2025-03-11

## 2025-03-11 RX ADMIN — PANTOPRAZOLE SODIUM 40 MG: 40 TABLET, DELAYED RELEASE ORAL at 18:10

## 2025-03-11 RX ADMIN — ACETAMINOPHEN 650 MG: 325 TABLET ORAL at 06:44

## 2025-03-11 RX ADMIN — ACETYLCYSTEINE 600 MG: 200 INHALANT RESPIRATORY (INHALATION) at 21:14

## 2025-03-11 RX ADMIN — SODIUM CHLORIDE, PRESERVATIVE FREE 10 ML: 5 INJECTION INTRAVENOUS at 21:49

## 2025-03-11 RX ADMIN — IPRATROPIUM BROMIDE AND ALBUTEROL SULFATE 1 DOSE: .5; 2.5 SOLUTION RESPIRATORY (INHALATION) at 14:49

## 2025-03-11 RX ADMIN — GUAIFENESIN 1200 MG: 600 TABLET, EXTENDED RELEASE ORAL at 09:47

## 2025-03-11 RX ADMIN — MONTELUKAST 10 MG: 10 TABLET, FILM COATED ORAL at 21:48

## 2025-03-11 RX ADMIN — ACETYLCYSTEINE 600 MG: 200 INHALANT RESPIRATORY (INHALATION) at 08:33

## 2025-03-11 RX ADMIN — PHYTONADIONE 5 MG: 10 INJECTION, EMULSION INTRAMUSCULAR; INTRAVENOUS; SUBCUTANEOUS at 09:58

## 2025-03-11 RX ADMIN — GUAIFENESIN 1200 MG: 600 TABLET, EXTENDED RELEASE ORAL at 21:48

## 2025-03-11 RX ADMIN — IPRATROPIUM BROMIDE AND ALBUTEROL SULFATE 1 DOSE: .5; 2.5 SOLUTION RESPIRATORY (INHALATION) at 08:33

## 2025-03-11 RX ADMIN — IPRATROPIUM BROMIDE AND ALBUTEROL SULFATE 1 DOSE: .5; 2.5 SOLUTION RESPIRATORY (INHALATION) at 11:47

## 2025-03-11 RX ADMIN — IPRATROPIUM BROMIDE AND ALBUTEROL SULFATE 1 DOSE: .5; 2.5 SOLUTION RESPIRATORY (INHALATION) at 21:06

## 2025-03-11 RX ADMIN — AMIODARONE HYDROCHLORIDE 200 MG: 200 TABLET ORAL at 21:48

## 2025-03-11 RX ADMIN — PRAVASTATIN SODIUM 80 MG: 20 TABLET ORAL at 18:09

## 2025-03-11 RX ADMIN — SODIUM CHLORIDE, PRESERVATIVE FREE 40 MG: 5 INJECTION INTRAVENOUS at 09:47

## 2025-03-11 RX ADMIN — AMIODARONE HYDROCHLORIDE 200 MG: 200 TABLET ORAL at 09:47

## 2025-03-11 RX ADMIN — SODIUM CHLORIDE, PRESERVATIVE FREE 10 ML: 5 INJECTION INTRAVENOUS at 09:48

## 2025-03-11 ASSESSMENT — PAIN DESCRIPTION - ORIENTATION: ORIENTATION: RIGHT

## 2025-03-11 ASSESSMENT — PULMONARY FUNCTION TESTS
PIF_VALUE: 8
PIF_VALUE: 18
PIF_VALUE: 16
PIF_VALUE: 13

## 2025-03-11 ASSESSMENT — PAIN SCALES - GENERAL: PAINLEVEL_OUTOF10: 3

## 2025-03-11 ASSESSMENT — PAIN DESCRIPTION - LOCATION: LOCATION: KNEE

## 2025-03-11 ASSESSMENT — PAIN DESCRIPTION - DESCRIPTORS: DESCRIPTORS: DISCOMFORT;ACHING

## 2025-03-11 NOTE — PLAN OF CARE
Problem: Chronic Conditions and Co-morbidities  Goal: Patient's chronic conditions and co-morbidity symptoms are monitored and maintained or improved  Outcome: Progressing     Problem: Discharge Planning  Goal: Discharge to home or other facility with appropriate resources  Outcome: Progressing     Problem: Safety - Adult  Goal: Free from fall injury  Outcome: Progressing     Problem: ABCDS Injury Assessment  Goal: Absence of physical injury  Outcome: Progressing     Problem: Skin/Tissue Integrity  Goal: Skin integrity remains intact  Description: 1.  Monitor for areas of redness and/or skin breakdown  2.  Assess vascular access sites hourly  3.  Every 4-6 hours minimum:  Change oxygen saturation probe site  4.  Every 4-6 hours:  If on nasal continuous positive airway pressure, respiratory therapy assess nares and determine need for appliance change or resting period  Outcome: Progressing     Problem: Respiratory - Adult  Goal: Achieves optimal ventilation and oxygenation  Outcome: Progressing     Problem: Neurosensory - Adult  Goal: Achieves stable or improved neurological status  Outcome: Progressing     Problem: Cardiovascular - Adult  Goal: Maintains optimal cardiac output and hemodynamic stability  Outcome: Progressing  Goal: Absence of cardiac dysrhythmias or at baseline  Outcome: Progressing     Problem: Skin/Tissue Integrity - Adult  Goal: Skin integrity remains intact  Description: 1.  Monitor for areas of redness and/or skin breakdown  2.  Assess vascular access sites hourly  3.  Every 4-6 hours minimum:  Change oxygen saturation probe site  4.  Every 4-6 hours:  If on nasal continuous positive airway pressure, respiratory therapy assess nares and determine need for appliance change or resting period  Outcome: Progressing  Goal: Incisions, wounds, or drain sites healing without S/S of infection  Outcome: Progressing     Problem: Musculoskeletal - Adult  Goal: Return mobility to safest level of  function  Outcome: Progressing     Problem: Gastrointestinal - Adult  Goal: Minimal or absence of nausea and vomiting  Outcome: Progressing  Goal: Maintains or returns to baseline bowel function  Outcome: Progressing     Problem: Genitourinary - Adult  Goal: Absence of urinary retention  Outcome: Progressing     Problem: Infection - Adult  Goal: Absence of infection at discharge  Outcome: Progressing     Problem: Metabolic/Fluid and Electrolytes - Adult  Goal: Electrolytes maintained within normal limits  Outcome: Progressing

## 2025-03-11 NOTE — PLAN OF CARE
Problem: Chronic Conditions and Co-morbidities  Goal: Patient's chronic conditions and co-morbidity symptoms are monitored and maintained or improved  Outcome: Progressing     Problem: Discharge Planning  Goal: Discharge to home or other facility with appropriate resources  Outcome: Progressing     Problem: Safety - Adult  Goal: Free from fall injury  Outcome: Progressing     Problem: ABCDS Injury Assessment  Goal: Absence of physical injury  Outcome: Progressing     Problem: Skin/Tissue Integrity  Goal: Skin integrity remains intact  Description: 1.  Monitor for areas of redness and/or skin breakdown  2.  Assess vascular access sites hourly  3.  Every 4-6 hours minimum:  Change oxygen saturation probe site  4.  Every 4-6 hours:  If on nasal continuous positive airway pressure, respiratory therapy assess nares and determine need for appliance change or resting period  Outcome: Progressing     Problem: Respiratory - Adult  Goal: Achieves optimal ventilation and oxygenation  Outcome: Progressing     Problem: Neurosensory - Adult  Goal: Achieves stable or improved neurological status  Outcome: Progressing     Problem: Cardiovascular - Adult  Goal: Maintains optimal cardiac output and hemodynamic stability  Outcome: Progressing  Goal: Absence of cardiac dysrhythmias or at baseline  Outcome: Progressing     Problem: Skin/Tissue Integrity - Adult  Goal: Skin integrity remains intact  Description: 1.  Monitor for areas of redness and/or skin breakdown  2.  Assess vascular access sites hourly  3.  Every 4-6 hours minimum:  Change oxygen saturation probe site  4.  Every 4-6 hours:  If on nasal continuous positive airway pressure, respiratory therapy assess nares and determine need for appliance change or resting period  Outcome: Progressing

## 2025-03-11 NOTE — PROGRESS NOTES
[facility]   Gastroenterology Progress Note    Shazia Nuñez is a 69 y.o. female patient.  Hospitalization Day:2      Chief consult reason:   Acute pancreatitis    Subjective:  Patient seen and examined, no acute events overnight  Tolerating diet with minimal abdominal discomfort  Unable to have MRI due to body habitus  No rectal bleeding overnight  BUN has normalized, creatinine 1.5  Hemoglobin dropped again today from 7.5 to 6.7 g/dL, INR 3.9 higher than yesterday  VITALS:  BP (!) 103/34   Pulse 70   Temp 98.1 °F (36.7 °C) (Temporal)   Resp 20   Ht 1.651 m (5' 5\")   Wt (!) 148.3 kg (326 lb 15.1 oz)   LMP 2014   SpO2 99%   BMI 54.41 kg/m²   TEMPERATURE:  Current - Temp: 98.1 °F (36.7 °C); Max - Temp  Av.7 °F (37.1 °C)  Min: 97.7 °F (36.5 °C)  Max: 99.4 °F (37.4 °C)    Physical Assessment:  General appearance:  alert, cooperative and no distress  Mental Status:  oriented to person, place and time and normal affect  Lungs:  clear to auscultation bilaterally, normal effort  Heart:  regular rate and rhythm, no murmur  Abdomen:  soft, nontender, nondistended, normal bowel sounds, no masses, hepatomegaly, splenomegaly  Extremities:  no edema, redness, tenderness in the calves  Skin:  no gross lesions, rashes, induration    Data Review:    Labs and Imaging:       CBC:  Recent Labs     25  2107 25  0446 03/10/25  0944 03/10/25  1403 03/10/25  2132 25  0735   WBC 7.7 6.0 6.2  --   --  6.3   HGB 7.1* 7.2* 6.8* 6.6* 7.9* 6.7*   MCV 85.5 85.8 86.0  --   --  84.5   RDW 18.0* 18.1* 18.2*  --   --  17.7*    329 331  --   --  286       ANEMIA STUDIES:  No results for input(s): \"TIBC\", \"FERRITIN\", \"TGYRDHOG59\", \"FOLATE\", \"OCCULTBLD\" in the last 72 hours.    Invalid input(s): \"LABIRON\"    BMP:  Recent Labs     25  0446 03/10/25  0944 25  0735    140 140   K 3.5* 3.6* 3.4*    105 103   CO2 28 25 27   BUN 27* 21 18   CREATININE 1.8* 1.5* 1.5*  corrected by editing.     Attending Attestation:    I have discussed the care of Shazia Nuñez and I have examined the patient myselft and taken ros and hpi , including pertinent history and exam findings,  with the author of this note . I have reviewed the key elements of all parts of the encounter with the nurse practitioner/resident.  I agree with the assessment, plan and orders as documented by the above health care provider with the following addendum.     More than 50% of the time was spent taking care of this patient in addition to the nurse practitioner time.  That also included history taking follow-up physical examination and review of system.    Electronically signed by Moreno Renner MD

## 2025-03-11 NOTE — PROGRESS NOTES
Problem Relation Age of Onset    Asthma Mother     Mental Illness Mother     COPD Mother     Cancer Father 86        hairy cell leukemia, and leimyoma sarcoma     Stroke Father 86        minor    Parkinsonism Maternal Grandfather     Cancer Paternal Grandmother     Stroke Paternal Grandmother        Vitals:  BP (!) 103/34   Pulse 70   Temp 98.1 °F (36.7 °C) (Temporal)   Resp 20   Ht 1.651 m (5' 5\")   Wt (!) 148.3 kg (326 lb 15.1 oz)   LMP 2014   SpO2 99%   BMI 54.41 kg/m²   Temp (24hrs), Av.7 °F (37.1 °C), Min:97.7 °F (36.5 °C), Max:99.4 °F (37.4 °C)    Recent Labs     03/10/25  1621 03/10/25  2036 25  0142 25  0823   POCGLU 127* 109* 119* 100       I/O (24Hr):    Intake/Output Summary (Last 24 hours) at 3/11/2025 0835  Last data filed at 3/11/2025 0600  Gross per 24 hour   Intake 261.25 ml   Output 1900 ml   Net -1638.75 ml       Labs:  Hematology:  Recent Labs     25  0446 03/10/25  0944 03/10/25  1403 03/10/25  2132 25  0735   WBC 6.0 6.2  --   --  6.3   RBC 2.89* 2.72*  --   --  2.65*   HGB 7.2* 6.8* 6.6* 7.9* 6.7*   HCT 24.8* 23.4* 22.7* 27.9* 22.4*   MCV 85.8 86.0  --   --  84.5   MCH 24.9* 25.0*  --   --  25.3   MCHC 29.0 29.1  --   --  29.9   RDW 18.1* 18.2*  --   --  17.7*    331  --   --  286   MPV 8.2 8.4  --   --  8.4   INR 4.0 3.6  --   --  3.9     Chemistry:  Recent Labs     25  2107 25  2153 03/09/25  0446 03/10/25  0944     --  138 140   K 3.7  --  3.5* 3.6*     --  100 105   CO2   --     GLUCOSE 172*  --  128* 124*   BUN 28*  --  27* 21   CREATININE 1.8*  --  1.8* 1.5*   MG  --   --  1.7  --    ANIONGAP 12  --  10 10   LABGLOM 30*  --  30* 38*   CALCIUM 9.0  --  9.2 8.5*   CAION  --   --  1.18  --    PHOS  --   --  2.9  --    PROBNP 728*  --   --   --    TROPHS 34* 36*  --   --    LACTACIDWB  --   --  1.3  --      Recent Labs     25  2107 25  0404 25  0446 25  1625 256 03/10/25  0944  monitor hemoglobin and transfuse if drop below 7      Diabetes mellitus type 2, noninsulin dependent: Takes 8 units of Lantus at nighttime, basal was not started on admission, blood sugars controlled, will continue to monitor and reassess .-Continue with glycemic control, sliding scale hypoglycemia protocol      CKD: At baseline, continue to monitor daily avoid nephrotoxic agents     Bleeding from wound: Wound care    Hyperlipemia, mixed: Pravastatin    Anxiety     Discussed with pulmonologist Dr Mckenna Curtis MD  3/11/2025  8:35 AM

## 2025-03-11 NOTE — PROGRESS NOTES
Patients Hgb was 6.7, Dr Curtis notified. Patient received one unit of PRBCs redraw of patients H&H was 8.0

## 2025-03-11 NOTE — PROGRESS NOTES
2000. MRI staff at bedside to check patient if she is fit for the procedure. MRI staff said that the patient is not fit on the MRI table due to patient's size. Writer notified primary team.

## 2025-03-11 NOTE — PROGRESS NOTES
Met with pt in conjunction with infusion at request of cancer center staff re: lack of medical coverage.  Pt is 76-year-old female being managed for pancreatic cancer.  Review of chart reflects that previous financial assistance has  with pt carrying large balance at this time.  Discussed with pt.  Pt noted that she had mailed in an application approximately 2 weeks ago but it does not appear that it has been received by [facility] at this time.  Pt provided with additional information should she desire to have it directly submitted.  Advised that it appears that accounts are past due and at risk of being sent to collections if not addressed in timely fashion.      Financial Assistance Application submitted to Public Benefits Department per pt request.    DEMETRI Singleton, KRYSTAL-S  Oncology Social Worker   [phone]     Pharmacy Note  Warfarin Consult follow-up      Recent Labs     03/11/25  0735   INR 3.9     Recent Labs     03/09/25  0446 03/10/25  0944 03/10/25  1403 03/10/25  2132 03/11/25  0735   HGB 7.2* 6.8* 6.6* 7.9* 6.7*   HCT 24.8* 23.4* 22.7* 27.9* 22.4*    331  --   --  286       Current warfarin drug-drug interactions:      Date INR Dose                      Notes:           INR 3.9. GOAL 3.5-2.5. Will hold he dose today.            Daily PT/INR while inpatient.

## 2025-03-11 NOTE — PROGRESS NOTES
PULMONARY PROGRESS NOTE      Patient:  Shazia Nuñez  YOB: 1955    MRN: [id number]     Acct: [id number]     Admit date: 3/8/2025    REASON FOR CONSULT:- Increased Trach Secretions    Pt seen and Chart reviewed.    Subjective:   -Patient was seen and examined at bedside  -Resting comfortably on vent  -No increased work of breath or shortness of breath  -Mild bleeding noted in suction cannister, will continue to monitor  -Afebrile and hemodynamically stable     Diet:  Diet NPO Exceptions are: Sips of Water with Meds, Ice Chips      Medications:Current Inpatient    Scheduled Meds:   budesonide  0.5 mg Nebulization BID RT    acetylcysteine  600 mg Inhalation BID    [Held by provider] furosemide  20 mg Oral BID    montelukast  10 mg Oral Nightly    pravastatin  80 mg Oral QPM    ipratropium 0.5 mg-albuterol 2.5 mg  1 Dose Inhalation Q4H WA RT    amiodarone  200 mg Oral BID    guaiFENesin  1,200 mg Oral BID    sodium chloride flush  5-40 mL IntraVENous 2 times per day    insulin lispro  0-8 Units SubCUTAneous 4x Daily AC & HS    metoprolol tartrate  37.5 mg Oral BID    pantoprazole (PROTONIX) 40 mg in sodium chloride (PF) 0.9 % 10 mL injection  40 mg IntraVENous Q12H    warfarin placeholder: dosing by pharmacy   Oral RX Placeholder     Continuous Infusions:   sodium chloride      dextrose      sodium chloride      sodium chloride       PRN Meds:sodium chloride, calcium carbonate, lactulose, glucose, dextrose bolus **OR** dextrose bolus, glucagon (rDNA), dextrose, sodium chloride, sodium chloride flush, sodium chloride, potassium chloride **OR** potassium chloride, magnesium sulfate, ondansetron **OR** ondansetron, HYDROmorphone    Objective:      Physical Exam:  Vitals: BP (!) 103/34   Pulse 70   Temp 98.4 °F (36.9 °C) (Axillary)   Resp 20   Ht 1.651 m (5' 5\")   Wt (!) 148.3 kg (326 lb 15.1 oz)   LMP 07/16/2014   SpO2  pancreatitis without necrosis or infection, unspecified  Active Problems:    Anxiety    Hyperlipemia, mixed    Anticoagulated on Coumadin    Iron deficiency anemia due to chronic blood loss    Diabetes mellitus type 2, noninsulin dependent (HCC)    CKD (chronic kidney disease) stage 3, GFR 30-59 ml/min (HCC)    Bleeding from wound    (HFpEF) heart failure with preserved ejection fraction (HCC)    A-fib (HCC)    Acute on chronic hypoxic respiratory failure (HCC)    Obesity hypoventilation syndrome  Resolved Problems:    * No resolved hospital problems. *      Plan:  -Monitor secretion output for potential hemorrhage secondary to tracheal suctioning   -Continue tracheobronchial toilet  -Continue bronchodilators   -Monitor INR, slightly supratherapeutic   -On PPI   -Rest of patient management as per primary team  -Pulmonology will continue to follow       Axel John -MS4  Pulmonary & Critical Care Team Member

## 2025-03-12 LAB
ABO/RH: NORMAL
ALBUMIN SERPL-MCNC: 2.4 G/DL (ref 3.5–5.2)
ALBUMIN/GLOB SERPL: 0.6 {RATIO} (ref 1–2.5)
ALP SERPL-CCNC: 96 U/L (ref 35–104)
ALT SERPL-CCNC: <5 U/L (ref 10–35)
ANTIBODY SCREEN: NEGATIVE
ARM BAND NUMBER: NORMAL
AST SERPL-CCNC: 12 U/L (ref 10–35)
BASOPHILS # BLD: 0 K/UL (ref 0–0.2)
BASOPHILS NFR BLD: 0 % (ref 0–2)
BILIRUB DIRECT SERPL-MCNC: 0.3 MG/DL (ref 0–0.2)
BILIRUB INDIRECT SERPL-MCNC: 0.2 MG/DL (ref 0–1)
BILIRUB SERPL-MCNC: 0.5 MG/DL (ref 0–1.2)
BLOOD BANK BLOOD PRODUCT EXPIRATION DATE: NORMAL
BLOOD BANK BLOOD PRODUCT EXPIRATION DATE: NORMAL
BLOOD BANK DISPENSE STATUS: NORMAL
BLOOD BANK DISPENSE STATUS: NORMAL
BLOOD BANK ISBT PRODUCT BLOOD TYPE: 5100
BLOOD BANK ISBT PRODUCT BLOOD TYPE: 5100
BLOOD BANK PRODUCT CODE: NORMAL
BLOOD BANK PRODUCT CODE: NORMAL
BLOOD BANK SAMPLE EXPIRATION: NORMAL
BLOOD BANK UNIT TYPE AND RH: NORMAL
BLOOD BANK UNIT TYPE AND RH: NORMAL
BPU ID: NORMAL
BPU ID: NORMAL
COMPONENT: NORMAL
COMPONENT: NORMAL
CROSSMATCH RESULT: NORMAL
CROSSMATCH RESULT: NORMAL
EOSINOPHIL # BLD: 0.18 K/UL (ref 0–0.4)
EOSINOPHILS RELATIVE PERCENT: 3 % (ref 1–4)
ERYTHROCYTE [DISTWIDTH] IN BLOOD BY AUTOMATED COUNT: 17.6 % (ref 11.8–14.4)
GLOBULIN SER CALC-MCNC: 3.8 G/DL
GLUCOSE BLD-MCNC: 123 MG/DL (ref 65–105)
GLUCOSE BLD-MCNC: 127 MG/DL (ref 65–105)
GLUCOSE BLD-MCNC: 129 MG/DL (ref 65–105)
GLUCOSE BLD-MCNC: 147 MG/DL (ref 65–105)
GLUCOSE BLD-MCNC: 94 MG/DL (ref 65–105)
HCT VFR BLD AUTO: 27.2 % (ref 36.3–47.1)
HGB BLD-MCNC: 7.8 G/DL (ref 11.9–15.1)
IMM GRANULOCYTES # BLD AUTO: 0 K/UL (ref 0–0.3)
IMM GRANULOCYTES NFR BLD: 0 %
INR PPP: 1.7
LYMPHOCYTES NFR BLD: 0.77 K/UL (ref 1–4.8)
LYMPHOCYTES RELATIVE PERCENT: 13 % (ref 24–44)
MCH RBC QN AUTO: 25.7 PG (ref 25.2–33.5)
MCHC RBC AUTO-ENTMCNC: 28.7 G/DL (ref 28.4–34.8)
MCV RBC AUTO: 89.5 FL (ref 82.6–102.9)
MONOCYTES NFR BLD: 0.12 K/UL (ref 0.1–0.8)
MONOCYTES NFR BLD: 2 % (ref 1–7)
MORPHOLOGY: ABNORMAL
NEUTROPHILS NFR BLD: 82 % (ref 36–66)
NEUTS SEG NFR BLD: 4.83 K/UL (ref 1.8–7.7)
NRBC BLD-RTO: 0 PER 100 WBC
PLATELET # BLD AUTO: 288 K/UL (ref 138–453)
PMV BLD AUTO: 8.6 FL (ref 8.1–13.5)
PROT SERPL-MCNC: 6.2 G/DL (ref 6.6–8.7)
PROTHROMBIN TIME: 21 SEC (ref 11.7–14.9)
RBC # BLD AUTO: 3.04 M/UL (ref 3.95–5.11)
TRANSFUSION STATUS: NORMAL
TRANSFUSION STATUS: NORMAL
UNIT DIVISION: 0
UNIT DIVISION: 0
UNIT ISSUE DATE/TIME: NORMAL
UNIT ISSUE DATE/TIME: NORMAL
WBC OTHER # BLD: 5.9 K/UL (ref 3.5–11.3)

## 2025-03-12 PROCEDURE — 6370000000 HC RX 637 (ALT 250 FOR IP): Performed by: STUDENT IN AN ORGANIZED HEALTH CARE EDUCATION/TRAINING PROGRAM

## 2025-03-12 PROCEDURE — 82947 ASSAY GLUCOSE BLOOD QUANT: CPT

## 2025-03-12 PROCEDURE — 6370000000 HC RX 637 (ALT 250 FOR IP): Performed by: FAMILY MEDICINE

## 2025-03-12 PROCEDURE — 2700000000 HC OXYGEN THERAPY PER DAY

## 2025-03-12 PROCEDURE — 99232 SBSQ HOSP IP/OBS MODERATE 35: CPT | Performed by: STUDENT IN AN ORGANIZED HEALTH CARE EDUCATION/TRAINING PROGRAM

## 2025-03-12 PROCEDURE — 94003 VENT MGMT INPAT SUBQ DAY: CPT

## 2025-03-12 PROCEDURE — 36415 COLL VENOUS BLD VENIPUNCTURE: CPT

## 2025-03-12 PROCEDURE — 2500000003 HC RX 250 WO HCPCS: Performed by: STUDENT IN AN ORGANIZED HEALTH CARE EDUCATION/TRAINING PROGRAM

## 2025-03-12 PROCEDURE — 6370000000 HC RX 637 (ALT 250 FOR IP): Performed by: NURSE PRACTITIONER

## 2025-03-12 PROCEDURE — 85610 PROTHROMBIN TIME: CPT

## 2025-03-12 PROCEDURE — 6360000002 HC RX W HCPCS: Performed by: STUDENT IN AN ORGANIZED HEALTH CARE EDUCATION/TRAINING PROGRAM

## 2025-03-12 PROCEDURE — 99233 SBSQ HOSP IP/OBS HIGH 50: CPT | Performed by: FAMILY MEDICINE

## 2025-03-12 PROCEDURE — 6360000002 HC RX W HCPCS: Performed by: FAMILY MEDICINE

## 2025-03-12 PROCEDURE — 80076 HEPATIC FUNCTION PANEL: CPT

## 2025-03-12 PROCEDURE — 2060000000 HC ICU INTERMEDIATE R&B

## 2025-03-12 PROCEDURE — 99233 SBSQ HOSP IP/OBS HIGH 50: CPT | Performed by: INTERNAL MEDICINE

## 2025-03-12 PROCEDURE — 94640 AIRWAY INHALATION TREATMENT: CPT

## 2025-03-12 PROCEDURE — 85025 COMPLETE CBC W/AUTO DIFF WBC: CPT

## 2025-03-12 RX ORDER — BISACODYL 5 MG/1
20 TABLET, DELAYED RELEASE ORAL ONCE
Status: COMPLETED | OUTPATIENT
Start: 2025-03-12 | End: 2025-03-12

## 2025-03-12 RX ORDER — ACETAMINOPHEN 325 MG/1
650 TABLET ORAL EVERY 8 HOURS PRN
Status: DISCONTINUED | OUTPATIENT
Start: 2025-03-12 | End: 2025-03-16 | Stop reason: HOSPADM

## 2025-03-12 RX ORDER — POLYETHYLENE GLYCOL 3350 17 G/17G
238 POWDER, FOR SOLUTION ORAL ONCE
Status: COMPLETED | OUTPATIENT
Start: 2025-03-12 | End: 2025-03-12

## 2025-03-12 RX ADMIN — PANTOPRAZOLE SODIUM 40 MG: 40 TABLET, DELAYED RELEASE ORAL at 10:08

## 2025-03-12 RX ADMIN — POLYETHYLENE GLYCOL 3350 238 G: 17 POWDER, FOR SOLUTION ORAL at 20:45

## 2025-03-12 RX ADMIN — GUAIFENESIN 1200 MG: 600 TABLET, EXTENDED RELEASE ORAL at 20:42

## 2025-03-12 RX ADMIN — MONTELUKAST 10 MG: 10 TABLET, FILM COATED ORAL at 20:43

## 2025-03-12 RX ADMIN — PRAVASTATIN SODIUM 80 MG: 20 TABLET ORAL at 18:04

## 2025-03-12 RX ADMIN — ACETYLCYSTEINE 600 MG: 200 INHALANT RESPIRATORY (INHALATION) at 21:38

## 2025-03-12 RX ADMIN — FUROSEMIDE 20 MG: 20 TABLET ORAL at 20:42

## 2025-03-12 RX ADMIN — AMIODARONE HYDROCHLORIDE 200 MG: 200 TABLET ORAL at 09:58

## 2025-03-12 RX ADMIN — IPRATROPIUM BROMIDE AND ALBUTEROL SULFATE 1 DOSE: .5; 2.5 SOLUTION RESPIRATORY (INHALATION) at 16:58

## 2025-03-12 RX ADMIN — GUAIFENESIN 1200 MG: 600 TABLET, EXTENDED RELEASE ORAL at 09:59

## 2025-03-12 RX ADMIN — AMIODARONE HYDROCHLORIDE 200 MG: 200 TABLET ORAL at 20:42

## 2025-03-12 RX ADMIN — ACETAMINOPHEN 650 MG: 325 TABLET ORAL at 12:11

## 2025-03-12 RX ADMIN — METOPROLOL TARTRATE 37.5 MG: 25 TABLET, FILM COATED ORAL at 20:43

## 2025-03-12 RX ADMIN — IPRATROPIUM BROMIDE AND ALBUTEROL SULFATE 1 DOSE: .5; 2.5 SOLUTION RESPIRATORY (INHALATION) at 12:52

## 2025-03-12 RX ADMIN — SODIUM CHLORIDE, PRESERVATIVE FREE 10 ML: 5 INJECTION INTRAVENOUS at 10:05

## 2025-03-12 RX ADMIN — BISACODYL 20 MG: 5 TABLET, COATED ORAL at 14:25

## 2025-03-12 RX ADMIN — POLYETHYLENE GLYCOL 3350 238 G: 17 POWDER, FOR SOLUTION ORAL at 13:57

## 2025-03-12 RX ADMIN — BUDESONIDE INHALATION 500 MCG: 0.5 SUSPENSION RESPIRATORY (INHALATION) at 08:28

## 2025-03-12 RX ADMIN — PANTOPRAZOLE SODIUM 40 MG: 40 TABLET, DELAYED RELEASE ORAL at 18:04

## 2025-03-12 RX ADMIN — IPRATROPIUM BROMIDE AND ALBUTEROL SULFATE 1 DOSE: .5; 2.5 SOLUTION RESPIRATORY (INHALATION) at 08:28

## 2025-03-12 RX ADMIN — IPRATROPIUM BROMIDE AND ALBUTEROL SULFATE 1 DOSE: .5; 2.5 SOLUTION RESPIRATORY (INHALATION) at 21:37

## 2025-03-12 ASSESSMENT — PULMONARY FUNCTION TESTS
PIF_VALUE: 3
PIF_VALUE: 16
PIF_VALUE: 11
PIF_VALUE: 18
PIF_VALUE: 16
PIF_VALUE: 17
PIF_VALUE: 15

## 2025-03-12 ASSESSMENT — PAIN DESCRIPTION - DESCRIPTORS
DESCRIPTORS: ACHING
DESCRIPTORS: ACHING

## 2025-03-12 ASSESSMENT — PAIN DESCRIPTION - LOCATION
LOCATION: HEAD
LOCATION: HEAD

## 2025-03-12 ASSESSMENT — PAIN SCALES - GENERAL
PAINLEVEL_OUTOF10: 4
PAINLEVEL_OUTOF10: 0
PAINLEVEL_OUTOF10: 5

## 2025-03-12 ASSESSMENT — PAIN DESCRIPTION - ORIENTATION
ORIENTATION: MID
ORIENTATION: MID

## 2025-03-12 NOTE — PROGRESS NOTES
[facility]  Office: [phone]  Carlos Caro DO, Felipe Drew DO, Jerman Mack DO, Leonel Thomas DO, Larry Pisano MD, Monserrat Rosado MD, Sharon Perez MD, Beverley Curtis MD,  Jimi Messina MD, Kevin Cowan MD, Theresa Chavira MD,  Luis Coffman DO, Florencia Hernandez MD, Giorgi Salas MD, Ramone Caro DO, Mariel Bradshaw MD,  Bandar Anderson DO, Michelle Helm MD, Mireille Hermosillo MD, Yamileth Chong MD, Zo Montgomery MD,  Jordan Hernandez MD, Pilar Salcedo MD, Kevin Greenwood MD, Kevin Landon MD, Ramon Ellsworth MD, Ken Luna MD, Chemo Morton DO, Alvaro Reid MD, Luis Caicedo MD, Mohsin Reza, MD, Shirley Waterhouse, CNP,  Octavia Coreas CNP, Chemo Pelayo, CNP,  Юлия Calero, DNP, Kaci Rodgers, CNP, Princess Mandujano, CNP, Madelaine Burns, CNP, Ivone Mancia CNP, Val Hanson, PA-C, Isable Bustos, CNP, Karma Yost, CNP,  Nasrin Pritchard, CNP, Bebe Mckeon, CNP, Rosio Resendez, CNP,  Sandie Palmer, CNS, Jessi Montiel CNP, Dolly Tamez CNP,   Lea Johnson, CNP         [facility]   IN-PATIENT SERVICE   [facility]    Progress Note    3/12/2025    8:19 AM    Name:   Shazia Nuñez  MRN:     [id number]     Acct:      [id number]   Room:   [facility] Day:  3  Admit Date:  3/8/2025  8:54 PM    PCP:   [facility]  Code Status:  Full Code    Subjective:     C/C:   Chief Complaint   Patient presents with    Chest Pain     Interval History Status: not changed.     Patient seen and examined at bedside, no acute events overnight.  She was eating today, GI now is planning for EGD and colonoscopy  Pulm discussed with the patient and will plan for bronchoscopy  Bloody secretions are decreasing  INR is down today  Patient denies any chest pain, chills, fevers, nausea or vomiting.  Patient vitals, labs and all providers notes were reviewed,from overnight shift and morning updates were noted and discussed with the

## 2025-03-12 NOTE — PROGRESS NOTES
PULMONARY PROGRESS NOTE      Patient:  Shazia Nuñez  YOB: 1955    MRN: [id number]     Acct: [id number]     Admit date: 3/8/2025    REASON FOR CONSULT:- Increased Trach Secretions    Pt seen and Chart reviewed.    Subjective:   -Patient was seen and examined at bedside  -Patient was resting comfortably on the vent   -SpO2 was 100% on 40% oxygen  -No increased work of breath or shortness of breath noted  -Afebrile and hemodynamically stable       Diet:  ADULT DIET; Regular; 4 carb choices (60 gm/meal)      Medications:Current Inpatient    Scheduled Meds:   pantoprazole  40 mg Oral BID AC    budesonide  0.5 mg Nebulization BID RT    acetylcysteine  600 mg Inhalation BID    furosemide  20 mg Oral BID    montelukast  10 mg Oral Nightly    pravastatin  80 mg Oral QPM    ipratropium 0.5 mg-albuterol 2.5 mg  1 Dose Inhalation Q4H WA RT    amiodarone  200 mg Oral BID    guaiFENesin  1,200 mg Oral BID    sodium chloride flush  5-40 mL IntraVENous 2 times per day    insulin lispro  0-8 Units SubCUTAneous 4x Daily AC & HS    metoprolol tartrate  37.5 mg Oral BID    warfarin placeholder: dosing by pharmacy   Oral RX Placeholder     Continuous Infusions:   sodium chloride      dextrose      sodium chloride      sodium chloride       PRN Meds:acetaminophen, sodium chloride, calcium carbonate, lactulose, glucose, dextrose bolus **OR** dextrose bolus, glucagon (rDNA), dextrose, sodium chloride, sodium chloride flush, sodium chloride, potassium chloride **OR** potassium chloride, magnesium sulfate, ondansetron **OR** ondansetron, HYDROmorphone    Objective:      Physical Exam:  Vitals: BP (!) 133/40   Pulse 65   Temp 98.7 °F (37.1 °C) (Oral)   Resp 23   Ht 1.651 m (5' 5\")   Wt (!) 148.3 kg (326 lb 15.1 oz)   LMP 07/16/2014   SpO2 100%   BMI 54.41 kg/m²   24 hour intake/output:  Intake/Output Summary (Last 24 hours) at 3/12/2025  hours.    CULTURES:          Assessment:    Principal Problem:    Acute pancreatitis without necrosis or infection, unspecified  Active Problems:    Anxiety    Hyperlipemia, mixed    Anticoagulated on Coumadin    Iron deficiency anemia due to chronic blood loss    Diabetes mellitus type 2, noninsulin dependent (HCC)    CKD (chronic kidney disease) stage 3, GFR 30-59 ml/min (HCC)    Bleeding from wound    (HFpEF) heart failure with preserved ejection fraction (HCC)    A-fib (HCC)    Acute on chronic hypoxic respiratory failure (HCC)    Obesity hypoventilation syndrome  Resolved Problems:    * No resolved hospital problems. *      Plan:  -Monitor tracheostomy output   -Continue tracheobronchial toilet  -Continue home medications as tolerated  -Monitor INR  -Stable anemia      Axel John - MS4  Pulmonary & Critical Care Team Member

## 2025-03-12 NOTE — PROGRESS NOTES
[facility]   Gastroenterology Progress Note    Shazia Nuñez is a 69 y.o. female patient.  Hospitalization Day:3      Chief consult reason:   Acute pancreatitis    Subjective:  Patient seen and examined, no acute events overnight  Patient had right upper quadrant ultrasound consistent with hepatomegaly and steatosis no evidence of cholelithiasis or acute cholecystitis portal vein patent with normal hepatic flow  Hemoglobin stable this a.m. 7.8 g/dL, no leukocytosis  LFTs have normalized  INR 1.7 today    VITALS:  BP (!) 139/39   Pulse 71   Temp 98.2 °F (36.8 °C) (Oral)   Resp 14   Ht 1.651 m (5' 5\")   Wt (!) 148.3 kg (326 lb 15.1 oz)   LMP 2014   SpO2 100%   BMI 54.41 kg/m²   TEMPERATURE:  Current - Temp: 98.2 °F (36.8 °C); Max - Temp  Av.3 °F (36.8 °C)  Min: 98 °F (36.7 °C)  Max: 98.7 °F (37.1 °C)    Physical Assessment:  General appearance:  alert, cooperative and no distress  Mental Status:  oriented to person, place and time and normal affect  Lungs:  clear to auscultation bilaterally, normal effort  Heart:  regular rate and rhythm, no murmur  Abdomen:  soft, nontender, nondistended, normal bowel sounds, no masses, hepatomegaly, splenomegaly  Extremities:  no edema, redness, tenderness in the calves  Skin:  no gross lesions, rashes, induration    Data Review:    Labs and Imaging:       CBC:  Recent Labs     03/10/25  0944 03/10/25  1403 03/10/25  2132 25  0735 25  1434 25  0613   WBC 6.2  --   --  6.3  --  5.9   HGB 6.8* 6.6* 7.9* 6.7* 8.0* 7.8*   MCV 86.0  --   --  84.5  --  89.5   RDW 18.2*  --   --  17.7*  --  17.6*     --   --  286  --  288       ANEMIA STUDIES:  No results for input(s): \"TIBC\", \"FERRITIN\", \"CQBBFGEO66\", \"FOLATE\", \"OCCULTBLD\" in the last 72 hours.    Invalid input(s): \"LABIRON\"    BMP:  Recent Labs     03/10/25  0944 25  0735    140   K 3.6* 3.4*    103   CO2 25 27   BUN 21 18   CREATININE 1.5* 1.5*  discussed the care of Shazia Nuñez and I have examined the patient myselft and taken ros and hpi , including pertinent history and exam findings,  with the author of this note . I have reviewed the key elements of all parts of the encounter with the nurse practitioner/resident.  I agree with the assessment, plan and orders as documented by the above health care provider with the following addendum.     More than 50% of the time was spent taking care of this patient in addition to the nurse practitioner time.  That also included history taking follow-up physical examination and review of system.    Electronically signed by ARACELIS Martinez CNP

## 2025-03-12 NOTE — PLAN OF CARE
Problem: Respiratory - Adult  Goal: Achieves optimal ventilation and oxygenation  Outcome: Progressing  Flowsheets (Taken 3/12/2025 6032)  Achieves optimal ventilation and oxygenation:   Assess for changes in respiratory status   Position to facilitate oxygenation and minimize respiratory effort   Assess the need for suctioning and aspirate as needed   Assess and instruct to report shortness of breath or any respiratory difficulty   Respiratory therapy support as indicated

## 2025-03-12 NOTE — PLAN OF CARE
Problem: Chronic Conditions and Co-morbidities  Goal: Patient's chronic conditions and co-morbidity symptoms are monitored and maintained or improved  3/12/2025 0048 by Akil Granda, RN  Outcome: Progressing     Problem: Discharge Planning  Goal: Discharge to home or other facility with appropriate resources  3/12/2025 0048 by Akil Granda, RN  Outcome: Progressing     Problem: Safety - Adult  Goal: Free from fall injury  3/12/2025 0048 by Akil Granda, RN  Outcome: Progressing

## 2025-03-12 NOTE — PLAN OF CARE
Problem: Chronic Conditions and Co-morbidities  Goal: Patient's chronic conditions and co-morbidity symptoms are monitored and maintained or improved  3/12/2025 1111 by Feliz Casper RN  Outcome: Progressing  3/12/2025 0048 by Akil Granda RN  Outcome: Progressing     Problem: Discharge Planning  Goal: Discharge to home or other facility with appropriate resources  3/12/2025 1111 by Feliz Casper RN  Outcome: Progressing  3/12/2025 0048 by Akil Granda RN  Outcome: Progressing     Problem: Safety - Adult  Goal: Free from fall injury  3/12/2025 1111 by Feliz Casper RN  Outcome: Progressing  3/12/2025 0048 by Akil Granda RN  Outcome: Progressing     Problem: ABCDS Injury Assessment  Goal: Absence of physical injury  Outcome: Progressing     Problem: Skin/Tissue Integrity  Goal: Skin integrity remains intact  Description: 1.  Monitor for areas of redness and/or skin breakdown  2.  Assess vascular access sites hourly  3.  Every 4-6 hours minimum:  Change oxygen saturation probe site  4.  Every 4-6 hours:  If on nasal continuous positive airway pressure, respiratory therapy assess nares and determine need for appliance change or resting period  Outcome: Progressing     Problem: Respiratory - Adult  Goal: Achieves optimal ventilation and oxygenation  3/12/2025 1111 by Feliz Casper RN  Outcome: Progressing  3/12/2025 0840 by Alexa Suarez RCP  Outcome: Progressing  Flowsheets (Taken 3/12/2025 0828)  Achieves optimal ventilation and oxygenation:   Assess for changes in respiratory status   Position to facilitate oxygenation and minimize respiratory effort   Assess the need for suctioning and aspirate as needed   Assess and instruct to report shortness of breath or any respiratory difficulty   Respiratory therapy support as indicated     Problem: Neurosensory - Adult  Goal: Achieves stable or improved neurological status  Outcome: Progressing     Problem: Cardiovascular -

## 2025-03-13 ENCOUNTER — ANESTHESIA EVENT (OUTPATIENT)
Dept: OPERATING ROOM | Age: 70
End: 2025-03-13
Payer: MEDICARE

## 2025-03-13 ENCOUNTER — ANESTHESIA (OUTPATIENT)
Dept: OPERATING ROOM | Age: 70
End: 2025-03-13
Payer: MEDICARE

## 2025-03-13 PROBLEM — K64.9 HEMORRHOIDS: Status: ACTIVE | Noted: 2025-03-13

## 2025-03-13 PROBLEM — D62 ACUTE BLOOD LOSS ANEMIA: Status: ACTIVE | Noted: 2025-03-13

## 2025-03-13 PROBLEM — K62.1 RECTAL POLYP: Status: ACTIVE | Noted: 2025-03-13

## 2025-03-13 PROBLEM — K29.70 GASTRITIS WITHOUT BLEEDING: Status: ACTIVE | Noted: 2025-03-13

## 2025-03-13 LAB
ANION GAP SERPL CALCULATED.3IONS-SCNC: 11 MMOL/L (ref 9–16)
BASOPHILS # BLD: 0.04 K/UL (ref 0–0.2)
BASOPHILS NFR BLD: 1 % (ref 0–2)
BUN SERPL-MCNC: 15 MG/DL (ref 8–23)
CALCIUM SERPL-MCNC: 8.9 MG/DL (ref 8.6–10.4)
CHLORIDE SERPL-SCNC: 103 MMOL/L (ref 98–107)
CO2 SERPL-SCNC: 25 MMOL/L (ref 20–31)
CREAT SERPL-MCNC: 1.5 MG/DL (ref 0.6–0.9)
EOSINOPHIL # BLD: 0.17 K/UL (ref 0–0.44)
EOSINOPHILS RELATIVE PERCENT: 3 % (ref 1–4)
ERYTHROCYTE [DISTWIDTH] IN BLOOD BY AUTOMATED COUNT: 17.5 % (ref 11.8–14.4)
GFR, ESTIMATED: 38 ML/MIN/1.73M2
GLUCOSE BLD-MCNC: 105 MG/DL (ref 65–105)
GLUCOSE BLD-MCNC: 115 MG/DL (ref 65–105)
GLUCOSE BLD-MCNC: 127 MG/DL (ref 65–105)
GLUCOSE BLD-MCNC: 141 MG/DL (ref 65–105)
GLUCOSE BLD-MCNC: 147 MG/DL (ref 65–105)
GLUCOSE SERPL-MCNC: 106 MG/DL (ref 74–99)
HCT VFR BLD AUTO: 29.6 % (ref 36.3–47.1)
HGB BLD-MCNC: 8.4 G/DL (ref 11.9–15.1)
IMM GRANULOCYTES # BLD AUTO: 0.04 K/UL (ref 0–0.3)
IMM GRANULOCYTES NFR BLD: 1 %
INR PPP: 1.6
LYMPHOCYTES NFR BLD: 0.76 K/UL (ref 1.1–3.7)
LYMPHOCYTES RELATIVE PERCENT: 11 % (ref 24–43)
MCH RBC QN AUTO: 25.8 PG (ref 25.2–33.5)
MCHC RBC AUTO-ENTMCNC: 28.4 G/DL (ref 28.4–34.8)
MCV RBC AUTO: 91.1 FL (ref 82.6–102.9)
MONOCYTES NFR BLD: 0.5 K/UL (ref 0.1–1.2)
MONOCYTES NFR BLD: 7 % (ref 3–12)
NEUTROPHILS NFR BLD: 77 % (ref 36–65)
NEUTS SEG NFR BLD: 5.4 K/UL (ref 1.5–8.1)
NRBC BLD-RTO: 0 PER 100 WBC
PLATELET # BLD AUTO: 288 K/UL (ref 138–453)
PMV BLD AUTO: 8.5 FL (ref 8.1–13.5)
POTASSIUM SERPL-SCNC: 3.2 MMOL/L (ref 3.7–5.3)
PROTHROMBIN TIME: 19.5 SEC (ref 11.7–14.9)
RBC # BLD AUTO: 3.25 M/UL (ref 3.95–5.11)
RBC # BLD: ABNORMAL 10*6/UL
SODIUM SERPL-SCNC: 139 MMOL/L (ref 136–145)
WBC OTHER # BLD: 6.9 K/UL (ref 3.5–11.3)

## 2025-03-13 PROCEDURE — 3609012400 HC EGD TRANSORAL BIOPSY SINGLE/MULTIPLE: Performed by: STUDENT IN AN ORGANIZED HEALTH CARE EDUCATION/TRAINING PROGRAM

## 2025-03-13 PROCEDURE — 3700000001 HC ADD 15 MINUTES (ANESTHESIA): Performed by: STUDENT IN AN ORGANIZED HEALTH CARE EDUCATION/TRAINING PROGRAM

## 2025-03-13 PROCEDURE — 6370000000 HC RX 637 (ALT 250 FOR IP): Performed by: STUDENT IN AN ORGANIZED HEALTH CARE EDUCATION/TRAINING PROGRAM

## 2025-03-13 PROCEDURE — 43239 EGD BIOPSY SINGLE/MULTIPLE: CPT | Performed by: STUDENT IN AN ORGANIZED HEALTH CARE EDUCATION/TRAINING PROGRAM

## 2025-03-13 PROCEDURE — 0DB68ZX EXCISION OF STOMACH, VIA NATURAL OR ARTIFICIAL OPENING ENDOSCOPIC, DIAGNOSTIC: ICD-10-PCS | Performed by: STUDENT IN AN ORGANIZED HEALTH CARE EDUCATION/TRAINING PROGRAM

## 2025-03-13 PROCEDURE — 2500000003 HC RX 250 WO HCPCS: Performed by: STUDENT IN AN ORGANIZED HEALTH CARE EDUCATION/TRAINING PROGRAM

## 2025-03-13 PROCEDURE — 3700000000 HC ANESTHESIA ATTENDED CARE: Performed by: STUDENT IN AN ORGANIZED HEALTH CARE EDUCATION/TRAINING PROGRAM

## 2025-03-13 PROCEDURE — 88305 TISSUE EXAM BY PATHOLOGIST: CPT

## 2025-03-13 PROCEDURE — 2580000003 HC RX 258: Performed by: NURSE ANESTHETIST, CERTIFIED REGISTERED

## 2025-03-13 PROCEDURE — 6360000002 HC RX W HCPCS: Performed by: STUDENT IN AN ORGANIZED HEALTH CARE EDUCATION/TRAINING PROGRAM

## 2025-03-13 PROCEDURE — 82787 IGG 1 2 3 OR 4 EACH: CPT

## 2025-03-13 PROCEDURE — 6360000002 HC RX W HCPCS: Performed by: FAMILY MEDICINE

## 2025-03-13 PROCEDURE — 2700000000 HC OXYGEN THERAPY PER DAY

## 2025-03-13 PROCEDURE — 94003 VENT MGMT INPAT SUBQ DAY: CPT

## 2025-03-13 PROCEDURE — 85610 PROTHROMBIN TIME: CPT

## 2025-03-13 PROCEDURE — 94640 AIRWAY INHALATION TREATMENT: CPT

## 2025-03-13 PROCEDURE — 85025 COMPLETE CBC W/AUTO DIFF WBC: CPT

## 2025-03-13 PROCEDURE — 7100000000 HC PACU RECOVERY - FIRST 15 MIN: Performed by: STUDENT IN AN ORGANIZED HEALTH CARE EDUCATION/TRAINING PROGRAM

## 2025-03-13 PROCEDURE — 94761 N-INVAS EAR/PLS OXIMETRY MLT: CPT

## 2025-03-13 PROCEDURE — 99232 SBSQ HOSP IP/OBS MODERATE 35: CPT | Performed by: FAMILY MEDICINE

## 2025-03-13 PROCEDURE — 2709999900 HC NON-CHARGEABLE SUPPLY: Performed by: STUDENT IN AN ORGANIZED HEALTH CARE EDUCATION/TRAINING PROGRAM

## 2025-03-13 PROCEDURE — 6360000002 HC RX W HCPCS: Performed by: NURSE ANESTHETIST, CERTIFIED REGISTERED

## 2025-03-13 PROCEDURE — 80048 BASIC METABOLIC PNL TOTAL CA: CPT

## 2025-03-13 PROCEDURE — 45380 COLONOSCOPY AND BIOPSY: CPT | Performed by: STUDENT IN AN ORGANIZED HEALTH CARE EDUCATION/TRAINING PROGRAM

## 2025-03-13 PROCEDURE — 3609010300 HC COLONOSCOPY W/BIOPSY SINGLE/MULTIPLE: Performed by: STUDENT IN AN ORGANIZED HEALTH CARE EDUCATION/TRAINING PROGRAM

## 2025-03-13 PROCEDURE — 88342 IMHCHEM/IMCYTCHM 1ST ANTB: CPT

## 2025-03-13 PROCEDURE — 82947 ASSAY GLUCOSE BLOOD QUANT: CPT

## 2025-03-13 PROCEDURE — 0DBP8ZX EXCISION OF RECTUM, VIA NATURAL OR ARTIFICIAL OPENING ENDOSCOPIC, DIAGNOSTIC: ICD-10-PCS | Performed by: STUDENT IN AN ORGANIZED HEALTH CARE EDUCATION/TRAINING PROGRAM

## 2025-03-13 PROCEDURE — 99233 SBSQ HOSP IP/OBS HIGH 50: CPT | Performed by: INTERNAL MEDICINE

## 2025-03-13 PROCEDURE — 2060000000 HC ICU INTERMEDIATE R&B

## 2025-03-13 PROCEDURE — 36415 COLL VENOUS BLD VENIPUNCTURE: CPT

## 2025-03-13 PROCEDURE — 99222 1ST HOSP IP/OBS MODERATE 55: CPT | Performed by: INTERNAL MEDICINE

## 2025-03-13 PROCEDURE — 7100000001 HC PACU RECOVERY - ADDTL 15 MIN: Performed by: STUDENT IN AN ORGANIZED HEALTH CARE EDUCATION/TRAINING PROGRAM

## 2025-03-13 RX ORDER — SODIUM CHLORIDE, SODIUM LACTATE, POTASSIUM CHLORIDE, CALCIUM CHLORIDE 600; 310; 30; 20 MG/100ML; MG/100ML; MG/100ML; MG/100ML
INJECTION, SOLUTION INTRAVENOUS
Status: DISCONTINUED | OUTPATIENT
Start: 2025-03-13 | End: 2025-03-13 | Stop reason: SDUPTHER

## 2025-03-13 RX ORDER — SODIUM PHOSPHATE, DIBASIC AND SODIUM PHOSPHATE, MONOBASIC 7; 19 G/230ML; G/230ML
1 ENEMA RECTAL
Status: ACTIVE | OUTPATIENT
Start: 2025-03-13 | End: 2025-03-14

## 2025-03-13 RX ORDER — PROPOFOL 10 MG/ML
INJECTION, EMULSION INTRAVENOUS
Status: DISCONTINUED | OUTPATIENT
Start: 2025-03-13 | End: 2025-03-13 | Stop reason: SDUPTHER

## 2025-03-13 RX ORDER — PHENYLEPHRINE HCL IN 0.9% NACL 1 MG/10 ML
SYRINGE (ML) INTRAVENOUS
Status: DISCONTINUED | OUTPATIENT
Start: 2025-03-13 | End: 2025-03-13 | Stop reason: SDUPTHER

## 2025-03-13 RX ORDER — WARFARIN SODIUM 3 MG/1
3 TABLET ORAL
Status: COMPLETED | OUTPATIENT
Start: 2025-03-13 | End: 2025-03-13

## 2025-03-13 RX ADMIN — SODIUM CHLORIDE, PRESERVATIVE FREE 10 ML: 5 INJECTION INTRAVENOUS at 07:26

## 2025-03-13 RX ADMIN — Medication 100 MCG: at 12:26

## 2025-03-13 RX ADMIN — FUROSEMIDE 20 MG: 20 TABLET ORAL at 20:39

## 2025-03-13 RX ADMIN — Medication 100 MCG: at 12:45

## 2025-03-13 RX ADMIN — GUAIFENESIN 1200 MG: 600 TABLET, EXTENDED RELEASE ORAL at 20:38

## 2025-03-13 RX ADMIN — POTASSIUM CHLORIDE 10 MEQ: 7.46 INJECTION, SOLUTION INTRAVENOUS at 10:32

## 2025-03-13 RX ADMIN — Medication 100 MCG: at 12:51

## 2025-03-13 RX ADMIN — POTASSIUM CHLORIDE 10 MEQ: 7.46 INJECTION, SOLUTION INTRAVENOUS at 16:48

## 2025-03-13 RX ADMIN — Medication 100 MCG: at 12:54

## 2025-03-13 RX ADMIN — Medication 100 MCG: at 12:29

## 2025-03-13 RX ADMIN — AMIODARONE HYDROCHLORIDE 200 MG: 200 TABLET ORAL at 20:39

## 2025-03-13 RX ADMIN — ACETYLCYSTEINE 600 MG: 200 INHALANT RESPIRATORY (INHALATION) at 08:06

## 2025-03-13 RX ADMIN — SODIUM CHLORIDE, PRESERVATIVE FREE 10 ML: 5 INJECTION INTRAVENOUS at 20:38

## 2025-03-13 RX ADMIN — PANTOPRAZOLE SODIUM 40 MG: 40 TABLET, DELAYED RELEASE ORAL at 16:50

## 2025-03-13 RX ADMIN — METOPROLOL TARTRATE 37.5 MG: 25 TABLET, FILM COATED ORAL at 20:38

## 2025-03-13 RX ADMIN — MONTELUKAST 10 MG: 10 TABLET, FILM COATED ORAL at 20:39

## 2025-03-13 RX ADMIN — IPRATROPIUM BROMIDE AND ALBUTEROL SULFATE 1 DOSE: .5; 2.5 SOLUTION RESPIRATORY (INHALATION) at 15:40

## 2025-03-13 RX ADMIN — Medication 100 MCG: at 12:36

## 2025-03-13 RX ADMIN — IPRATROPIUM BROMIDE AND ALBUTEROL SULFATE 1 DOSE: .5; 2.5 SOLUTION RESPIRATORY (INHALATION) at 20:55

## 2025-03-13 RX ADMIN — PRAVASTATIN SODIUM 80 MG: 20 TABLET ORAL at 16:50

## 2025-03-13 RX ADMIN — SODIUM CHLORIDE, POTASSIUM CHLORIDE, SODIUM LACTATE AND CALCIUM CHLORIDE: 600; 310; 30; 20 INJECTION, SOLUTION INTRAVENOUS at 11:56

## 2025-03-13 RX ADMIN — WARFARIN SODIUM 3 MG: 3 TABLET ORAL at 17:50

## 2025-03-13 RX ADMIN — ACETYLCYSTEINE 600 MG: 200 INHALANT RESPIRATORY (INHALATION) at 20:56

## 2025-03-13 RX ADMIN — BUDESONIDE INHALATION 500 MCG: 0.5 SUSPENSION RESPIRATORY (INHALATION) at 20:57

## 2025-03-13 RX ADMIN — POTASSIUM CHLORIDE 10 MEQ: 7.46 INJECTION, SOLUTION INTRAVENOUS at 07:32

## 2025-03-13 RX ADMIN — POTASSIUM CHLORIDE 10 MEQ: 7.46 INJECTION, SOLUTION INTRAVENOUS at 14:39

## 2025-03-13 RX ADMIN — PROPOFOL 30 MG: 10 INJECTION, EMULSION INTRAVENOUS at 12:08

## 2025-03-13 RX ADMIN — Medication 100 MCG: at 12:21

## 2025-03-13 RX ADMIN — IPRATROPIUM BROMIDE AND ALBUTEROL SULFATE 1 DOSE: .5; 2.5 SOLUTION RESPIRATORY (INHALATION) at 08:06

## 2025-03-13 ASSESSMENT — PAIN - FUNCTIONAL ASSESSMENT: PAIN_FUNCTIONAL_ASSESSMENT: NONE - DENIES PAIN

## 2025-03-13 ASSESSMENT — PULMONARY FUNCTION TESTS
PIF_VALUE: 13
PIF_VALUE: 18
PIF_VALUE: 14
PIF_VALUE: 17
PIF_VALUE: 16

## 2025-03-13 NOTE — PLAN OF CARE
Problem: Respiratory - Adult  Goal: Achieves optimal ventilation and oxygenation  3/13/2025 1501 by Alexa Suarez, RCP  Outcome: Progressing  Flowsheets (Taken 3/13/2025 1430)  Achieves optimal ventilation and oxygenation:   Assess for changes in respiratory status   Oxygen supplementation based on oxygen saturation or arterial blood gases   Assess and instruct to report shortness of breath or any respiratory difficulty   Assess the need for suctioning and aspirate as needed   Respiratory therapy support as indicated

## 2025-03-13 NOTE — PLAN OF CARE
GI plan of care    Patient had endoscopy yesterday please see reports for recommendations  Patient will need to follow-up in the office with Dr. Sen in 3 to 4 weeks to discuss biopsy results and ongoing care.  No further recommendations at this time GI will sign off  Okay to resume AC AP from GI perspective today    Nagi Schwartz, LISA

## 2025-03-13 NOTE — OP NOTE
COLONOSCOPY    DATE OF PROCEDURE: 3/13/2025    SURGEON: Moreno Renner MD  Facility : [facility]  ASSISTANT: None  PREOPERATIVE DIAGNOSIS: Anemia    POSTOPERATIVE DIAGNOSIS: as described below    OPERATION: Total colonoscopy with biopsy    ANESTHESIA: Monitored Anesthesia Care (MAC)    ESTIMATED BLOOD LOSS: less than 50 cc    COMPLICATIONS: None.     SPECIMENS:  Was Obtained:     ID Type Source Tests Collected by Time Destination   A : Stomach Biopsy Tissue Stomach SURGICAL PATHOLOGY Moreon Renner MD 3/13/2025 1209    B : Rectal polyp bx Tissue Rectum SURGICAL PATHOLOGY Moreno Renner MD 3/13/2025 1253         HISTORY: The patient is a 69 y.o. year old female with history of above preop diagnosis.  I recommended colonoscopy with possible biopsy or polypectomy and I explained the risk, benefits, expected outcome, and alternatives to the procedure.  Risks included but are not limited to medication allergy, medication reaction, cardiovascular and respiratory problems, bleeding, perforation, infection, and/or missed diagnosis.  The patient understands and is in agreement.        PROCEDURE: Following arrival in the endoscopy room, the patient was placed in the left lateral decubitus position and final time-out accomplished in the presence of the nursing staff. Baseline vital signs were obtained and reviewed. The patient was given IV Monitored Anesthesia Care (MAC) and vitals monitoring per anesthesia department.     Digital rectal exam- normal    The colonoscope was inserted per rectum and advanced under direct vision to the cecum without difficulty.  Photodocumentation of the maximal extent reached (cecum, appendiceal orifice, ileocecal valve, and terminal ileum if indicated) and other findings was obtained.     Post sedation note :The patient's SPO2 remained above 90% throughout the procedure.the vital signs remained stable , and no immediate complication form the procedure noted, patient will be ready for

## 2025-03-13 NOTE — PROGRESS NOTES
Physical Therapy        Physical Therapy Cancel Note      DATE: 3/13/2025    NAME: Shazia Nñuez  MRN: [id number]   : 1955      Patient not seen this date for Physical Therapy due to:    Surgery/Procedure: EGD. Will continue to pursue as able.       Electronically signed by Alexa Dowd PTA on 3/13/2025 at 9:07 AM

## 2025-03-13 NOTE — PROGRESS NOTES
[facility]  Office: [phone]  Carlos Caro DO, Felipe Drew, DO, Jerman Mack DO, Leonel Thomas, DO, Larry Pisano MD, Monserrat Rosado MD, Sharon Perez MD, Beverley Curtis MD,  Jimi Messina MD, Kevin Cowan MD, Theresa Chavira MD,  Luis Coffman DO, Florencia Hernandez MD, iGorgi Salas MD, Ramone Caro DO, Mariel Bradshaw MD,  Bandar Anderson DO, Michelle Helm MD, Mireille Hermosillo MD, Yamileth Chong MD, Zo Montgomery MD,  Jordan Hernandez MD, Pilar Salcedo MD, Kevin Greenwood MD, Kevin Landon MD, Ramon Ellsworth MD, Ken Luna MD, Chemo Morton DO, Alvaro Reid MD, Luis Caicedo MD, Mohsin Reza, MD, Shirley Waterhouse, CNP,  Octavia Coreas CNP, Chemo Pelayo, CNP,  Юлия Calero, DNP, Kaci Rodgers, CNP, Princess Mandujano, CNP, Madelaine Burns, CNP, Ivone Mancia CNP, Val Hanson, PA-C, Isabel Bustos, CNP, Karma Yost, CNP,  Nasrin Pritchard, CNP, Bebe Mckeon, CNP, Rosio Resendez, CNP,  Sandie Palmer, CNS, Jessi Montiel CNP, Dolly Tamez CNP,   Lea Johnson, CNP         [facility]   IN-PATIENT SERVICE   [facility]    Progress Note    3/13/2025    12:55 PM    Name:   Shazia Nuñez  MRN:     [id number]     Acct:      [id number]   Room:   [facility]  IP Day:  4  Admit Date:  3/8/2025  8:54 PM    PCP:   [facility]  Code Status:  Full Code    Subjective:     C/C:   Chief Complaint   Patient presents with    Chest Pain     Interval History Status: Improved    Patient seen and examined at bedside, no acute events overnight.    Secretions are currently cleared   Plan for EGD and colonoscopy today  Blood pressure soft   K low   Patient denies any chest pain, chills, fevers, nausea or vomiting.  Patient vitals, labs and all providers notes were reviewed,from overnight shift and morning updates were noted and discussed with the nurse      Brief History:     Per chart  69-year-old female with past medical  Hyperlipemia, mixed, Hypertension, Osteoarthritis, Right femoral vein DVT (HCC), and Uterine hyperplasia.    Social History:   reports that she has never smoked. She has been exposed to tobacco smoke. She has never used smokeless tobacco. She reports that she does not currently use alcohol. She reports that she does not use drugs.     Family History:   Family History   Problem Relation Age of Onset    Asthma Mother     Mental Illness Mother     COPD Mother     Cancer Father 86        hairy cell leukemia, and leimyoma sarcoma     Stroke Father 86        minor    Parkinsonism Maternal Grandfather     Cancer Paternal Grandmother     Stroke Paternal Grandmother        Vitals:  BP (!) 106/52   Pulse 64   Temp 97.2 °F (36.2 °C) (Temporal)   Resp 12   Ht 1.651 m (5' 5\")   Wt (!) 148.3 kg (326 lb 15.1 oz)   LMP 2014   SpO2 100%   BMI 54.41 kg/m²   Temp (24hrs), Av °F (36.7 °C), Min:97.2 °F (36.2 °C), Max:98.4 °F (36.9 °C)    Recent Labs     25  1646 25  2017 25  0821 25  1113   POCGLU 147* 123* 105 127*       I/O (24Hr):    Intake/Output Summary (Last 24 hours) at 3/13/2025 1255  Last data filed at 3/12/2025 1523  Gross per 24 hour   Intake 221 ml   Output 200 ml   Net 21 ml       Labs:  Hematology:  Recent Labs     25  0735 25  1434 25  0613 25  0646 25  0621   WBC 6.3  --  5.9  --  6.9   RBC 2.65*  --  3.04*  --  3.25*   HGB 6.7* 8.0* 7.8*  --  8.4*   HCT 22.4* 27.7* 27.2*  --  29.6*   MCV 84.5  --  89.5  --  91.1   MCH 25.3  --  25.7  --  25.8   MCHC 29.9  --  28.7  --  28.4   RDW 17.7*  --  17.6*  --  17.5*     --  288  --  288   MPV 8.4  --  8.6  --  8.5   INR 3.9  --   --  1.7 1.6     Chemistry:  Recent Labs     25  0735 25  0621    139   K 3.4* 3.2*    103   CO2 27 25   GLUCOSE 112* 106*   BUN 18 15   CREATININE 1.5* 1.5*   MG 1.6  --    ANIONGAP 10 11   LABGLOM 38* 38*   CALCIUM 8.7 8.9     Recent Labs

## 2025-03-13 NOTE — PROGRESS NOTES
Pharmacy Note  Warfarin Consult follow-up    Recent Labs     03/13/25  0621   INR 1.6     Recent Labs     03/11/25  0735 03/11/25  1434 03/12/25  0613 03/13/25  0621   HGB 6.7* 8.0* 7.8* 8.4*   HCT 22.4* 27.7* 27.2* 29.6*     --  288 288       Current warfarin drug-drug interactions:amiodarone      Date INR Dose   3/8 4.6 Held   3/9 4.0    3/10 3.6    3/11 3.9    3/12 1.7    3/13 1.6 3 mg       Notes:Procedure completed today, confirmed with IM team the plan is to resume warfarin this evening.  Will start with warfarin 3 mg x 1 dose this evening since prior to admission dose is unclear.                     Daily PT/INR while inpatient.     Elise Ward PharmD, MINISTERIO, BCPS 3/13/2025 4:07 PM

## 2025-03-13 NOTE — ANESTHESIA PRE PROCEDURE
679, troponin 31 -> 36, creatinine 1.8 (baseline 1.8), WBC 7.1, INR 4.6.  Chest x-ray showed no acute findings.  CT abdomen pelvis showed mild edema of fat in the upper abdomen but not focally associated with the pancreas, large ventral hernia, perinephric edema around both kidneys suggesting chronic renal disease.  She was admitted for the management of acute pancreatitis.              Anesthesia Plan      general     ASA 4     (Trach to vent)  Induction: intravenous.    MIPS: Postoperative opioids intended and Prophylactic antiemetics administered.  Anesthetic plan and risks discussed with patient.    Use of blood products discussed with patient whom consented to blood products.    Plan discussed with CRNA.                    Regla Tao MD   3/13/2025             Normal gait / station

## 2025-03-13 NOTE — OP NOTE
Esophagogastroduodenoscopy    DATE OF PROCEDURE: 3/13/2025     SURGEON: Moreno Renner MD  Facility: [facility]  ASSISTANT: None  PREOPERATIVE DIAGNOSIS: Anemia    Diagnosis:    POSTOPERATIVE DIAGNOSIS: As described below (see findings and impression)    OPERATION: Upper GI endoscopy with Biopsy    ANESTHESIA: Monitored Anesthesia Care (MAC)     ESTIMATED BLOOD LOSS: Less than 50 ml    COMPLICATIONS: None.     SPECIMENS:  Was Obtained:     ID Type Source Tests Collected by Time Destination   A : Stomach Biopsy Tissue Stomach SURGICAL PATHOLOGY Moreno Renner MD 3/13/2025 1202         HISTORY: The patient is a 69 y.o. year old female with history of above preop diagnosis.  I recommended esophagogastroduodenoscopy with possible biopsy and I explained the risk, benefits, expected outcome, and alternatives to the procedure.  Risks included but are not limited to bleeding, infection, respiratory distress, hypotension, and perforation of the esophagus, stomach, or duodenum.  Patient understands and is in agreement.      PROCEDURE: The patient was given IV Monitored Anesthesia Care (MAC) and vitals monitoring per anesthesia department.  The patient was placed in the left lateral decubitus position. The patient was monitored continuously with ECG tracing, pulse oximetry, blood pressure monitoring, and direct observation. The gastroscope was inserted into the mouth and advanced under direct vision to second portion of the duodenum.  A careful inspection was made as the gastroscope was withdrawn, including a retroflexed view of the proximal stomach; findings and interventions are described below. Appropriate photodocumentation was obtained. Post sedation patient was stable with stable vital signs and stable O2 saturations.       Findings:    Retropharyngeal area was grossly normal appearing    Esophagus: Normal upper, middle, and lower esophagus    Stomach:    Fundus: normal  Mild erythematous gastritis gentle biopsy

## 2025-03-13 NOTE — PROGRESS NOTES
PULMONARY PROGRESS NOTE      Patient:  Shazia Nuñez  YOB: 1955    MRN: [id number]     Acct: [id number]     Admit date: 3/8/2025    REASON FOR CONSULT:- Increased tracheostomy secretions    Pt seen and Chart reviewed.    Subjective:   -Patient was seen and examined at bedside  -Patient was resting comfortably in bed  -SpO2 was 100% at 40% O2 on the ventilator  -Patient had question regarding trach replacement due to increased secretions and was educated that replacing her trach will no diminish the secretions.  Pt was receptive and understood the conversation  -Had minute ventilation of 7L    Diet:  Diet NPO      Medications:Current Inpatient    Scheduled Meds:   pantoprazole  40 mg Oral BID AC    budesonide  0.5 mg Nebulization BID RT    acetylcysteine  600 mg Inhalation BID    furosemide  20 mg Oral BID    montelukast  10 mg Oral Nightly    pravastatin  80 mg Oral QPM    ipratropium 0.5 mg-albuterol 2.5 mg  1 Dose Inhalation Q4H WA RT    amiodarone  200 mg Oral BID    guaiFENesin  1,200 mg Oral BID    sodium chloride flush  5-40 mL IntraVENous 2 times per day    insulin lispro  0-8 Units SubCUTAneous 4x Daily AC & HS    metoprolol tartrate  37.5 mg Oral BID    warfarin placeholder: dosing by pharmacy   Oral RX Placeholder     Continuous Infusions:   sodium chloride      dextrose      sodium chloride      sodium chloride       PRN Meds:sodium phosphate, acetaminophen, sodium chloride, calcium carbonate, lactulose, glucose, dextrose bolus **OR** dextrose bolus, glucagon (rDNA), dextrose, sodium chloride, sodium chloride flush, sodium chloride, potassium chloride **OR** potassium chloride, magnesium sulfate, ondansetron **OR** ondansetron, HYDROmorphone    Objective:      Physical Exam:  Vitals: BP (!) 106/52   Pulse 64   Temp 97.2 °F (36.2 °C) (Temporal)   Resp 12   Ht 1.651 m (5' 5\")   Wt (!) 148.3 kg (326 lb 15.1

## 2025-03-13 NOTE — CONSULTS
[facility]   Consult Note         Today's Date: 3/13/2025  Patient Name: Shazia Nuñez  Date of admission: 3/8/2025  8:54 PM  Patient's age: 69 y.o., 1955  Admission Dx: Acute pancreatitis without necrosis or infection, unspecified [K85.90]  Acute pancreatitis without infection or necrosis, unspecified pancreatitis type [K85.90]    Reason for Consult:  Cardiac evaluation    Requesting Physician: Beverley Curtis MD    REASON FOR CONSULT:  clearance for EGD and colonoscopy    History Obtained From:  Patient, chart, staff, records    HISTORY OF PRESENT ILLNESS:      The patient is a 69 y.o. female with PMH of Afib, DVT and PE on warfarin, HFpEF, HLD, HTN, and chronic hypoxic respiratory failure due to obesity hypoventilation syndrome s/p tracheostomy July 2024 who is admitted to the hospital for acute pancreatitis. She initially presented to the ER on 3/8 c/o lower chest pain in the epigastric region which began 3/7 in the evening. She initially had a a lipase of 679 in the ER which decreased to 28, troponin of 36, Cr of 1.5 (baseline), K of 3.2, GFR 38. Her HGB has been been fluctuating between 6.8-8.4 with most recent HGB 8.4. She received 2 PRBC transfusions on 3/10 and 3/11. EKG showd NSR, L axis deviation, incomplete RBBB. Last echo on 4/9/24 shows EF of 55-60%, mild increase of LV wall thickness, RV and RA dilation, mild to moderate MR. CXR shows increased opacification of lungs bi, more on the L side. Today she reports doing better. She endorses abdominal pain worst at the epigastric region. Denies CP, SOB at rest, fevers, chills, N/V, palpitations.    Past Medical History:    Past Medical History:   Diagnosis Date    Anxiety     Asthma     Atrial fibrillation (HCC)     A-fib noted on 05/25/2024 visit to ER    Bronchitis     COPD (chronic obstructive pulmonary disease) (HCC)     DDD (degenerative disc disease), lumbar     Depression     Diabetes mellitus (HCC)     Elevated glucose         Acute and chr resp failure, unsp w hypoxia or hypercapnia (HCC)    Malfunction of tracheostomy (HCC)    Acute on chronic hypoxic respiratory failure (HCC)    Ventilator-acquired pneumonia (HCC)    Tracheal stenosis    SHWETA (obstructive sleep apnea)    Obesity hypoventilation syndrome    Other tracheostomy complication (HCC)    Paroxysmal atrial fibrillation with RVR (HCC)    Left upper extremity swelling    Acute pancreatitis without necrosis or infection, unspecified         LABS:    Lab Results   Component Value Date    CHOL 131 11/14/2024    TRIG 74 03/09/2025    HDL 36 (L) 02/24/2025    VLDL 17 02/24/2025    CHOLHDLRATIO 3.7 02/24/2025     Lab Results   Component Value Date    TROPHS 36 (H) 03/08/2025     Lab Results   Component Value Date/Time     03/13/2025 06:21 AM    K 3.2 03/13/2025 06:21 AM     03/13/2025 06:21 AM    CO2 25 03/13/2025 06:21 AM    BUN 15 03/13/2025 06:21 AM    CREATININE 1.5 03/13/2025 06:21 AM    GLUCOSE 106 03/13/2025 06:21 AM    GLUCOSE 119 02/25/2012 09:38 AM    CALCIUM 8.9 03/13/2025 06:21 AM    LABGLOM 38 03/13/2025 06:21 AM    LABGLOM 62 04/16/2024 01:43 PM        EKG:   ECG reviewed and compared to serial priors. No acute ischemia.      Cardiac Testing:  Last Echo:  04/04/24    ECHO (TTE) COMPLETE (PRN CONTRAST/BUBBLE/STRAIN/3D) 04/10/2024  7:59 AM (Final)    Interpretation Summary    Left Ventricle: Normal left ventricular systolic function with a visually estimated EF of 55 - 60%. Left ventricle size is normal. Mildly increased wall thickness. Normal wall motion. Normal diastolic function.    Right Ventricle: Right ventricle is dilated. Normal systolic function.    Aortic Valve: No regurgitation. No stenosis.    Mitral Valve: Mild to moderate regurgitation.    Right Atrium: Right atrium is dilated.    Pericardium: No pericardial effusion.    Image quality is poor. Technically difficult study due to patient's body habitus and procedure performed with the patient in a

## 2025-03-13 NOTE — PROGRESS NOTES
Occupational Therapy    [facility]  Occupational Therapy Not Seen Note    DATE: 3/13/2025    NAME: Shazia Nuñez  MRN: [id number]   : 1955      Patient not seen this date for Occupational Therapy due to:    Surgery/Procedure: EGD    Next Scheduled Treatment: 3/14    Electronically signed by JIMMY Desouza on 3/13/2025 at 8:35 AM

## 2025-03-13 NOTE — PLAN OF CARE
Problem: Chronic Conditions and Co-morbidities  Goal: Patient's chronic conditions and co-morbidity symptoms are monitored and maintained or improved  3/13/2025 1302 by Feliz Casper RN  Outcome: Progressing  3/13/2025 0111 by Akil Granda RN  Outcome: Progressing     Problem: Discharge Planning  Goal: Discharge to home or other facility with appropriate resources  3/13/2025 1302 by Feliz Casper RN  Outcome: Progressing  3/13/2025 0111 by Akil Granda RN  Outcome: Progressing     Problem: Safety - Adult  Goal: Free from fall injury  3/13/2025 1302 by Feliz Casper RN  Outcome: Progressing  3/13/2025 0111 by Akil Granda RN  Outcome: Progressing     Problem: ABCDS Injury Assessment  Goal: Absence of physical injury  Outcome: Progressing     Problem: Skin/Tissue Integrity  Goal: Skin integrity remains intact  Description: 1.  Monitor for areas of redness and/or skin breakdown  2.  Assess vascular access sites hourly  3.  Every 4-6 hours minimum:  Change oxygen saturation probe site  4.  Every 4-6 hours:  If on nasal continuous positive airway pressure, respiratory therapy assess nares and determine need for appliance change or resting period  Outcome: Progressing     Problem: Respiratory - Adult  Goal: Achieves optimal ventilation and oxygenation  Outcome: Progressing     Problem: Neurosensory - Adult  Goal: Achieves stable or improved neurological status  Outcome: Progressing     Problem: Cardiovascular - Adult  Goal: Maintains optimal cardiac output and hemodynamic stability  Outcome: Progressing  Goal: Absence of cardiac dysrhythmias or at baseline  Outcome: Progressing     Problem: Skin/Tissue Integrity - Adult  Goal: Skin integrity remains intact  Description: 1.  Monitor for areas of redness and/or skin breakdown  2.  Assess vascular access sites hourly  3.  Every 4-6 hours minimum:  Change oxygen saturation probe site  4.  Every 4-6 hours:  If on nasal continuous positive airway

## 2025-03-14 LAB
ANTI-XA UNFRAC HEPARIN: 0.19 IU/L
ANTI-XA UNFRAC HEPARIN: <0.1 IU/L
GLUCOSE BLD-MCNC: 123 MG/DL (ref 65–105)
GLUCOSE BLD-MCNC: 132 MG/DL (ref 65–105)
GLUCOSE BLD-MCNC: 132 MG/DL (ref 65–105)
GLUCOSE BLD-MCNC: 140 MG/DL (ref 65–105)
HCT VFR BLD AUTO: 28.1 % (ref 36.3–47.1)
HGB BLD-MCNC: 8 G/DL (ref 11.9–15.1)
INR PPP: 1.6
PARTIAL THROMBOPLASTIN TIME: 30.2 SEC (ref 23–36.5)
POTASSIUM SERPL-SCNC: 4 MMOL/L (ref 3.7–5.3)
PROTHROMBIN TIME: 19.4 SEC (ref 11.7–14.9)

## 2025-03-14 PROCEDURE — 85520 HEPARIN ASSAY: CPT

## 2025-03-14 PROCEDURE — 6370000000 HC RX 637 (ALT 250 FOR IP): Performed by: STUDENT IN AN ORGANIZED HEALTH CARE EDUCATION/TRAINING PROGRAM

## 2025-03-14 PROCEDURE — 36415 COLL VENOUS BLD VENIPUNCTURE: CPT

## 2025-03-14 PROCEDURE — 2060000000 HC ICU INTERMEDIATE R&B

## 2025-03-14 PROCEDURE — 6360000002 HC RX W HCPCS: Performed by: STUDENT IN AN ORGANIZED HEALTH CARE EDUCATION/TRAINING PROGRAM

## 2025-03-14 PROCEDURE — 85610 PROTHROMBIN TIME: CPT

## 2025-03-14 PROCEDURE — 85730 THROMBOPLASTIN TIME PARTIAL: CPT

## 2025-03-14 PROCEDURE — 94640 AIRWAY INHALATION TREATMENT: CPT

## 2025-03-14 PROCEDURE — 94003 VENT MGMT INPAT SUBQ DAY: CPT

## 2025-03-14 PROCEDURE — 85018 HEMOGLOBIN: CPT

## 2025-03-14 PROCEDURE — 84132 ASSAY OF SERUM POTASSIUM: CPT

## 2025-03-14 PROCEDURE — 6360000002 HC RX W HCPCS: Performed by: FAMILY MEDICINE

## 2025-03-14 PROCEDURE — 99233 SBSQ HOSP IP/OBS HIGH 50: CPT | Performed by: NURSE PRACTITIONER

## 2025-03-14 PROCEDURE — 99233 SBSQ HOSP IP/OBS HIGH 50: CPT | Performed by: INTERNAL MEDICINE

## 2025-03-14 PROCEDURE — 82947 ASSAY GLUCOSE BLOOD QUANT: CPT

## 2025-03-14 PROCEDURE — 2700000000 HC OXYGEN THERAPY PER DAY

## 2025-03-14 PROCEDURE — 6370000000 HC RX 637 (ALT 250 FOR IP): Performed by: FAMILY MEDICINE

## 2025-03-14 PROCEDURE — 94761 N-INVAS EAR/PLS OXIMETRY MLT: CPT

## 2025-03-14 PROCEDURE — 2500000003 HC RX 250 WO HCPCS: Performed by: STUDENT IN AN ORGANIZED HEALTH CARE EDUCATION/TRAINING PROGRAM

## 2025-03-14 PROCEDURE — 99232 SBSQ HOSP IP/OBS MODERATE 35: CPT | Performed by: FAMILY MEDICINE

## 2025-03-14 PROCEDURE — 85014 HEMATOCRIT: CPT

## 2025-03-14 RX ORDER — METOPROLOL TARTRATE 25 MG/1
25 TABLET, FILM COATED ORAL 2 TIMES DAILY
Status: DISCONTINUED | OUTPATIENT
Start: 2025-03-14 | End: 2025-03-15

## 2025-03-14 RX ORDER — HEPARIN SODIUM 10000 [USP'U]/100ML
5-30 INJECTION, SOLUTION INTRAVENOUS CONTINUOUS
Status: CANCELLED | OUTPATIENT
Start: 2025-03-14

## 2025-03-14 RX ORDER — WARFARIN SODIUM 3 MG/1
3 TABLET ORAL
Status: COMPLETED | OUTPATIENT
Start: 2025-03-14 | End: 2025-03-14

## 2025-03-14 RX ORDER — HEPARIN SODIUM 1000 [USP'U]/ML
5000 INJECTION, SOLUTION INTRAVENOUS; SUBCUTANEOUS PRN
Status: CANCELLED | OUTPATIENT
Start: 2025-03-14

## 2025-03-14 RX ORDER — MIDODRINE HYDROCHLORIDE 5 MG/1
10 TABLET ORAL ONCE
Status: COMPLETED | OUTPATIENT
Start: 2025-03-14 | End: 2025-03-14

## 2025-03-14 RX ORDER — ALBUTEROL SULFATE 0.83 MG/ML
2.5 SOLUTION RESPIRATORY (INHALATION) EVERY 4 HOURS PRN
Status: DISCONTINUED | OUTPATIENT
Start: 2025-03-14 | End: 2025-03-16 | Stop reason: HOSPADM

## 2025-03-14 RX ORDER — HEPARIN SODIUM 1000 [USP'U]/ML
10000 INJECTION, SOLUTION INTRAVENOUS; SUBCUTANEOUS ONCE
Status: CANCELLED | OUTPATIENT
Start: 2025-03-14 | End: 2025-03-14

## 2025-03-14 RX ORDER — HEPARIN SODIUM 1000 [USP'U]/ML
10000 INJECTION, SOLUTION INTRAVENOUS; SUBCUTANEOUS PRN
Status: CANCELLED | OUTPATIENT
Start: 2025-03-14

## 2025-03-14 RX ORDER — HEPARIN SODIUM 1000 [USP'U]/ML
5000 INJECTION, SOLUTION INTRAVENOUS; SUBCUTANEOUS PRN
Status: DISCONTINUED | OUTPATIENT
Start: 2025-03-14 | End: 2025-03-16 | Stop reason: HOSPADM

## 2025-03-14 RX ORDER — HEPARIN SODIUM 1000 [USP'U]/ML
10000 INJECTION, SOLUTION INTRAVENOUS; SUBCUTANEOUS PRN
Status: DISCONTINUED | OUTPATIENT
Start: 2025-03-14 | End: 2025-03-16 | Stop reason: HOSPADM

## 2025-03-14 RX ORDER — HEPARIN SODIUM 10000 [USP'U]/100ML
5-30 INJECTION, SOLUTION INTRAVENOUS CONTINUOUS
Status: DISCONTINUED | OUTPATIENT
Start: 2025-03-14 | End: 2025-03-16 | Stop reason: HOSPADM

## 2025-03-14 RX ADMIN — IPRATROPIUM BROMIDE AND ALBUTEROL SULFATE 1 DOSE: .5; 2.5 SOLUTION RESPIRATORY (INHALATION) at 11:44

## 2025-03-14 RX ADMIN — PANTOPRAZOLE SODIUM 40 MG: 40 TABLET, DELAYED RELEASE ORAL at 17:26

## 2025-03-14 RX ADMIN — PANTOPRAZOLE SODIUM 40 MG: 40 TABLET, DELAYED RELEASE ORAL at 05:21

## 2025-03-14 RX ADMIN — BUDESONIDE INHALATION 500 MCG: 0.5 SUSPENSION RESPIRATORY (INHALATION) at 08:08

## 2025-03-14 RX ADMIN — ACETYLCYSTEINE 600 MG: 200 INHALANT RESPIRATORY (INHALATION) at 08:08

## 2025-03-14 RX ADMIN — SODIUM CHLORIDE, PRESERVATIVE FREE 10 ML: 5 INJECTION INTRAVENOUS at 20:35

## 2025-03-14 RX ADMIN — PRAVASTATIN SODIUM 80 MG: 20 TABLET ORAL at 17:26

## 2025-03-14 RX ADMIN — METOPROLOL TARTRATE 25 MG: 25 TABLET, FILM COATED ORAL at 20:35

## 2025-03-14 RX ADMIN — MONTELUKAST 10 MG: 10 TABLET, FILM COATED ORAL at 20:35

## 2025-03-14 RX ADMIN — GUAIFENESIN 1200 MG: 600 TABLET, EXTENDED RELEASE ORAL at 09:07

## 2025-03-14 RX ADMIN — FUROSEMIDE 20 MG: 20 TABLET ORAL at 20:35

## 2025-03-14 RX ADMIN — HEPARIN SODIUM 5000 UNITS: 1000 INJECTION INTRAVENOUS; SUBCUTANEOUS at 22:12

## 2025-03-14 RX ADMIN — SODIUM CHLORIDE, PRESERVATIVE FREE 10 ML: 5 INJECTION INTRAVENOUS at 09:08

## 2025-03-14 RX ADMIN — MIDODRINE HYDROCHLORIDE 10 MG: 5 TABLET ORAL at 13:55

## 2025-03-14 RX ADMIN — WARFARIN SODIUM 3 MG: 3 TABLET ORAL at 17:26

## 2025-03-14 RX ADMIN — FUROSEMIDE 20 MG: 20 TABLET ORAL at 09:07

## 2025-03-14 RX ADMIN — IPRATROPIUM BROMIDE AND ALBUTEROL SULFATE 1 DOSE: .5; 2.5 SOLUTION RESPIRATORY (INHALATION) at 08:08

## 2025-03-14 RX ADMIN — AMIODARONE HYDROCHLORIDE 200 MG: 200 TABLET ORAL at 09:07

## 2025-03-14 RX ADMIN — AMIODARONE HYDROCHLORIDE 200 MG: 200 TABLET ORAL at 20:35

## 2025-03-14 RX ADMIN — ALBUTEROL SULFATE 2.5 MG: 2.5 SOLUTION RESPIRATORY (INHALATION) at 15:48

## 2025-03-14 RX ADMIN — METOPROLOL TARTRATE 25 MG: 25 TABLET, FILM COATED ORAL at 09:07

## 2025-03-14 RX ADMIN — HEPARIN SODIUM 14 UNITS/KG/HR: 10000 INJECTION, SOLUTION INTRAVENOUS at 14:00

## 2025-03-14 RX ADMIN — IPRATROPIUM BROMIDE AND ALBUTEROL SULFATE 1 DOSE: .5; 2.5 SOLUTION RESPIRATORY (INHALATION) at 14:47

## 2025-03-14 RX ADMIN — ACETYLCYSTEINE 600 MG: 200 INHALANT RESPIRATORY (INHALATION) at 19:26

## 2025-03-14 RX ADMIN — IPRATROPIUM BROMIDE AND ALBUTEROL SULFATE 1 DOSE: .5; 2.5 SOLUTION RESPIRATORY (INHALATION) at 19:26

## 2025-03-14 RX ADMIN — GUAIFENESIN 1200 MG: 600 TABLET, EXTENDED RELEASE ORAL at 20:35

## 2025-03-14 ASSESSMENT — PULMONARY FUNCTION TESTS
PIF_VALUE: 15
PIF_VALUE: 17
PIF_VALUE: 16
PIF_VALUE: 17
PIF_VALUE: 14
PIF_VALUE: 18

## 2025-03-14 NOTE — PROGRESS NOTES
PULMONARY PROGRESS NOTE      Patient:  Shazia Nuñez  YOB: 1955    MRN: [id number]     Acct: [id number]     Admit date: 3/8/2025    REASON FOR CONSULT:- Increased tracheostomy secretions    Pt seen and Chart reviewed.    Subjective:   -Patient was seen and examined at bedside  -Patient was resting comfortably in bed  -SpO2 was 100% at 40% O2 on the ventilator    Review of Systems -   General ROS: Completed and except as mentioned above were negative   Psychological ROS:  Completed and except as mentioned above were negative  Allergy and Immunology ROS:  Completed and except as mentioned above were negative  Hematological and Lymphatic ROS:  Completed and except as mentioned above were negative  Respiratory ROS:  Completed and except as mentioned above were negative  Cardiovascular ROS:  Completed and except as mentioned above were negative  Gastrointestinal ROS: Completed and except as mentioned above were negative  Genito-Urinary ROS:  Completed and except as mentioned above were negative  Musculoskeletal ROS:  Completed and except as mentioned above were negative  Neurological ROS:  Completed and except as mentioned above were negative  Dermatological ROS:  Completed and except as mentioned above were negative      Diet:  ADULT DIET; Regular; 3 carb choices (45 gm/meal)      Medications:Current Inpatient    Scheduled Meds:   metoprolol tartrate  25 mg Oral BID    pantoprazole  40 mg Oral BID AC    budesonide  0.5 mg Nebulization BID RT    acetylcysteine  600 mg Inhalation BID    furosemide  20 mg Oral BID    montelukast  10 mg Oral Nightly    pravastatin  80 mg Oral QPM    ipratropium 0.5 mg-albuterol 2.5 mg  1 Dose Inhalation Q4H WA RT    amiodarone  200 mg Oral BID    guaiFENesin  1,200 mg Oral BID    sodium chloride flush  5-40 mL IntraVENous 2 times per day    insulin lispro  0-8 Units SubCUTAneous 4x Daily AC & HS

## 2025-03-14 NOTE — PROGRESS NOTES
03/14/25 0743   Care Plan - Respiratory Goals   Achieves optimal ventilation and oxygenation Assess for changes in respiratory status;Assess for changes in mentation and behavior;Position to facilitate oxygenation and minimize respiratory effort;Oxygen supplementation based on oxygen saturation or arterial blood gases;Encourage broncho-pulmonary hygiene including cough, deep breathe, incentive spirometry;Assess the need for suctioning and aspirate as needed;Assess and instruct to report shortness of breath or any respiratory difficulty;Respiratory therapy support as indicated

## 2025-03-14 NOTE — PLAN OF CARE
Problem: Discharge Planning  Goal: Discharge to home or other facility with appropriate resources  Outcome: Progressing-Discharge planning is in progress at this time.     Problem: Safety - Adult  Goal: Free from fall injury  Outcome: Progressing-Patient free from falls during this shift.     Problem: ABCDS Injury Assessment  Goal: Absence of physical injury  Outcome: Progressing-no new injuries during this shift.     Problem: Skin/Tissue Integrity  Goal: Skin integrity remains intact  Description: 1.  Monitor for areas of redness and/or skin breakdown  2.  Assess vascular access sites hourly  3.  Every 4-6 hours minimum:  Change oxygen saturation probe site  4.  Every 4-6 hours:  If on nasal continuous positive airway pressure, respiratory therapy assess nares and determine need for appliance change or resting period  Outcome: Progressing-No new skin abnormalities noted during this shift.      Problem: Skin/Tissue Integrity - Adult  Goal: Skin integrity remains intact  Description: 1.  Monitor for areas of redness and/or skin breakdown  2.  Assess vascular access sites hourly  3.  Every 4-6 hours minimum:  Change oxygen saturation probe site  4.  Every 4-6 hours:  If on nasal continuous positive airway pressure, respiratory therapy assess nares and determine need for appliance change or resting period  Outcome: Progressing-No new skin abnormalities noted during this shift.

## 2025-03-14 NOTE — PROGRESS NOTES
Pharmacy Note  Warfarin Consult follow-up      Recent Labs     03/14/25  0319   INR 1.6     Recent Labs     03/12/25  0613 03/13/25  0621 03/14/25  1020   HGB 7.8* 8.4* 8.0*   HCT 27.2* 29.6* 28.1*    288  --        Current warfarin drug-drug interactions: heparin    Date INR Dose   3/13 1.6 3mg   3/14 1.6        Notes:                   Heparin drip restarted today. Will give 3mg today.     Daily PT/INR while inpatient.      Sue Eason, PharmD  3/14/2025  1:41 PM

## 2025-03-14 NOTE — PROGRESS NOTES
Physician Progress Note      PATIENT:               SHOBHA ROGERS  [facility] #:                  [id number]  :                       1955  ADMIT DATE:       3/8/2025 8:54 PM  DISCH DATE:  RESPONDING  PROVIDER #:        Beverley Curtis MD          QUERY TEXT:    Patient admitted with Pancreatitis, noted to have Paroxysmal atrial   fibrillation and is maintained on Coumadin. If possible, please document in   progress notes and discharge summary if you are evaluating and/or treating any   of the following:?  ?  The medical record reflects the following:  Risk Factors: HTN,CKD  Clinical Indicators:  To ED with chest/abd pain- found to have Acute   pancreatitis. On Amio, beta-blockers and on chronic anticoagulation, coumadin   currently held for planned EGD and colonoscopy..Supratherapeutic INR  Treatment: Coumadin held for procedure with plan to resume. Vit K prior to   procedure, PRBC transfusion  Options provided:  -- Secondary hypercoagulable state in a patient with atrial fibrillation  -- Other - I will add my own diagnosis  -- Disagree - Not applicable / Not valid  -- Disagree - Clinically unable to determine / Unknown  -- Refer to Clinical Documentation Reviewer    PROVIDER RESPONSE TEXT:    This patient has secondary hypercoagulable state in a patient with atrial   fibrillation.    Query created by: Merry Jensen on 3/14/2025 8:05 AM      Electronically signed by:  Beverley Curtis MD 3/14/2025 8:09 AM

## 2025-03-14 NOTE — PROGRESS NOTES
Stroke Father 86        minor    Parkinsonism Maternal Grandfather     Cancer Paternal Grandmother     Stroke Paternal Grandmother        Vitals:  /63 Comment: recheck with site change sent to Dr. Curtis  Pulse 63   Temp 99.5 °F (37.5 °C) (Oral)   Resp 19   Ht 1.651 m (5' 5\")   Wt (!) 148.3 kg (326 lb 15.1 oz)   LMP 2014   SpO2 100%   BMI 54.41 kg/m²   Temp (24hrs), Av.4 °F (36.9 °C), Min:97.7 °F (36.5 °C), Max:99.5 °F (37.5 °C)    Recent Labs     25  1640 25  0737 25  1119   POCGLU 147* 141* 123* 132*       I/O (24Hr):    Intake/Output Summary (Last 24 hours) at 3/14/2025 1518  Last data filed at 3/14/2025 0600  Gross per 24 hour   Intake 800 ml   Output 850 ml   Net -50 ml       Labs:  Hematology:  Recent Labs     25  0625  0646 25  06259 25  1020   WBC 5.9  --  6.9  --   --    RBC 3.04*  --  3.25*  --   --    HGB 7.8*  --  8.4*  --  8.0*   HCT 27.2*  --  29.6*  --  28.1*   MCV 89.5  --  91.1  --   --    MCH 25.7  --  25.8  --   --    MCHC 28.7  --  28.4  --   --    RDW 17.6*  --  17.5*  --   --      --  288  --   --    MPV 8.6  --  8.5  --   --    INR  --  1.7 1.6 1.6  --      Chemistry:  Recent Labs     25  031     --    K 3.2* 4.0     --    CO2 25  --    GLUCOSE 106*  --    BUN 15  --    CREATININE 1.5*  --    ANIONGAP 11  --    LABGLOM 38*  --    CALCIUM 8.9  --      Recent Labs     25  0613 25  0848 25  1113 25  1338 25  1640 25  0737 25  1119   AST 12  --   --   --   --   --   --   --    ALT <5*  --   --   --   --   --   --   --    ALKPHOS 96  --   --   --   --   --   --   --    BILITOT 0.5  --   --   --   --   --   --   --    BILIDIR 0.3*  --   --   --   --   --   --   --    POCGLU  --    < > 127* 115* 147* 141* 123* 132*    < > = values in this interval not displayed.     ABG:  Lab Results   Component Value  nursing note reviewed.   Constitutional:       General: She is not in acute distress.     Appearance: She is morbidly obese.   HENT:      Head: Normocephalic and atraumatic.   Eyes:      Conjunctiva/sclera: Conjunctivae normal.      Pupils: Pupils are equal, round, and reactive to light.   Neck:      Trachea: Tracheostomy present.      Comments: On ventilator  Cardiovascular:      Rate and Rhythm: Normal rate and regular rhythm.      Heart sounds: No murmur heard.  Pulmonary:      Effort: Pulmonary effort is normal. No accessory muscle usage or respiratory distress.      Breath sounds: No stridor. No decreased breath sounds, wheezing, rhonchi or rales.   Abdominal:      General: Bowel sounds are normal. There is no distension.      Palpations: Abdomen is soft. Abdomen is not rigid.      Tenderness: There is no abdominal tenderness. There is no guarding.          Comments: L thigh wound    Musculoskeletal:         General: No tenderness.   Skin:     General: Skin is warm and dry.      Findings: No erythema, lesion or rash.   Neurological:      Mental Status: She is alert and oriented to person, place, and time.      Cranial Nerves: No cranial nerve deficit.      Motor: No seizure activity.   Psychiatric:         Speech: Speech normal.         Behavior: Behavior normal. Behavior is cooperative.         Assessment:        Hospital Problems           Last Modified POA    * (Principal) Acute pancreatitis without necrosis or infection, unspecified 3/9/2025 Yes    Anxiety 3/10/2025 Yes    Hyperlipemia, mixed 3/10/2025 Yes    Anticoagulated on Coumadin 3/10/2025 Yes    Iron deficiency anemia due to chronic blood loss 3/9/2025 Yes    Diabetes mellitus type 2, noninsulin dependent ([facility]) 3/10/2025 Yes    CKD (chronic kidney disease) stage 3, GFR 30-59 ml/min ([facility]) 3/10/2025 Yes    Bleeding from wound 3/10/2025 Yes    (HFpEF) heart failure with preserved ejection fraction ([facility]) 3/10/2025 Yes    A-fib ([facility]) 3/10/2025 Yes

## 2025-03-14 NOTE — PROGRESS NOTES
[facility]   Progress Note                   Date:   3/14/2025  Patient name: Shazia Nuñez  Date of admission:  3/8/2025  8:54 PM  MRN:   [id number]  YOB: 1955  PCP: [facility]    Reason for Admission: Acute pancreatitis without necrosis or infection, unspecified [K85.90]  Acute pancreatitis without infection or necrosis, unspecified pancreatitis type [K85.90]    Subjective:       Clinical Changes / Abnormalities: Pt seen and examined in the room.  Pt has chronic trach.  BP soft.  Labs, vitals and tele reviewed  SR        Medications:   Scheduled Meds:   pantoprazole  40 mg Oral BID AC    budesonide  0.5 mg Nebulization BID RT    acetylcysteine  600 mg Inhalation BID    furosemide  20 mg Oral BID    montelukast  10 mg Oral Nightly    pravastatin  80 mg Oral QPM    ipratropium 0.5 mg-albuterol 2.5 mg  1 Dose Inhalation Q4H WA RT    amiodarone  200 mg Oral BID    guaiFENesin  1,200 mg Oral BID    sodium chloride flush  5-40 mL IntraVENous 2 times per day    insulin lispro  0-8 Units SubCUTAneous 4x Daily AC & HS    metoprolol tartrate  37.5 mg Oral BID    warfarin placeholder: dosing by pharmacy   Oral RX Placeholder     Continuous Infusions:   sodium chloride      dextrose      sodium chloride      sodium chloride       CBC:   Recent Labs     03/11/25  1434 03/12/25  0613 03/13/25  0621   WBC  --  5.9 6.9   HGB 8.0* 7.8* 8.4*   PLT  --  288 288     BMP:    Recent Labs     03/13/25  0621      K 3.2*      CO2 25   BUN 15   CREATININE 1.5*   GLUCOSE 106*     Hepatic:   Recent Labs     03/12/25  0613   AST 12   ALT <5*   BILITOT 0.5   ALKPHOS 96     Troponin: No results for input(s): \"TROPHS\" in the last 72 hours.  BNP: No results for input(s): \"BNP\" in the last 72 hours.  Lipids: No results for input(s): \"CHOL\", \"HDL\" in the last 72 hours.    Invalid input(s): \"LDLCALCU\"  INR:   Recent Labs     03/12/25  0646 03/13/25  0621 03/14/25  0319   INR 1.7  Paroxysmal atrial fibrillation with RVR (HCC)     Left upper extremity swelling     Acute pancreatitis without necrosis or infection, unspecified     Acute blood loss anemia     Gastritis without bleeding     Hemorrhoids     Rectal polyp      Plan of Treatment:   PAF and hx of DVT/PE.  On Coumadin.  Heparin gtt restarted. Goal inr 2-3.  INR 1.6 today.    Keep K >4 and mag >2    Hx of afib.  NSR now. On amio and BB   Continue PO lasix.    Soft BPs. BB decreased.    No objection to d/c from cardiology standpoint.  Will follow up as outpt     Electronically signed by ARACELIS VIVAS CNP on 3/14/2025 at 8:12 AM  [facility]  [phone]

## 2025-03-14 NOTE — PROGRESS NOTES
03/14/25 1932   Care Plan - Respiratory Goals   Achieves optimal ventilation and oxygenation Assess for changes in respiratory status;Assess for changes in mentation and behavior;Position to facilitate oxygenation and minimize respiratory effort;Oxygen supplementation based on oxygen saturation or arterial blood gases;Encourage broncho-pulmonary hygiene including cough, deep breathe, incentive spirometry;Assess the need for suctioning and aspirate as needed;Assess and instruct to report shortness of breath or any respiratory difficulty;Respiratory therapy support as indicated

## 2025-03-14 NOTE — PLAN OF CARE
Problem: Chronic Conditions and Co-morbidities  Goal: Patient's chronic conditions and co-morbidity symptoms are monitored and maintained or improved  3/13/2025 2004 by Salas Blevins RN  Outcome: Progressing  3/13/2025 1302 by Feliz Casper RN  Outcome: Progressing     Problem: Discharge Planning  Goal: Discharge to home or other facility with appropriate resources  3/13/2025 2004 by Salas Blevins RN  Outcome: Progressing  3/13/2025 1302 by Feliz Casper RN  Outcome: Progressing     Problem: Safety - Adult  Goal: Free from fall injury  3/13/2025 2004 by Salas Blevins RN  Outcome: Progressing  3/13/2025 1302 by Feliz Casper RN  Outcome: Progressing     Problem: ABCDS Injury Assessment  Goal: Absence of physical injury  3/13/2025 2004 by Salas Blevins RN  Outcome: Progressing  3/13/2025 1302 by Feliz Casper RN  Outcome: Progressing     Problem: Skin/Tissue Integrity  Goal: Skin integrity remains intact  Description: 1.  Monitor for areas of redness and/or skin breakdown  2.  Assess vascular access sites hourly  3.  Every 4-6 hours minimum:  Change oxygen saturation probe site  4.  Every 4-6 hours:  If on nasal continuous positive airway pressure, respiratory therapy assess nares and determine need for appliance change or resting period  3/13/2025 2004 by Salas Blevins RN  Outcome: Progressing  3/13/2025 1302 by Feliz Casper RN  Outcome: Progressing     Problem: Respiratory - Adult  Goal: Achieves optimal ventilation and oxygenation  3/13/2025 2004 by Salas Blevins RN  Outcome: Progressing  3/13/2025 1501 by Alexa Suarez, RCANEESH  Outcome: Progressing  Flowsheets (Taken 3/13/2025 1430)  Achieves optimal ventilation and oxygenation:   Assess for changes in respiratory status   Oxygen supplementation based on oxygen saturation or arterial blood gases   Assess and instruct to report shortness of breath or any respiratory difficulty   Assess the need

## 2025-03-15 ENCOUNTER — APPOINTMENT (OUTPATIENT)
Dept: GENERAL RADIOLOGY | Age: 70
End: 2025-03-15
Payer: MEDICARE

## 2025-03-15 LAB
ANTI-XA UNFRAC HEPARIN: 0.38 IU/L
ANTI-XA UNFRAC HEPARIN: 0.47 IU/L
GLUCOSE BLD-MCNC: 130 MG/DL (ref 65–105)
GLUCOSE BLD-MCNC: 133 MG/DL (ref 65–105)
GLUCOSE BLD-MCNC: 139 MG/DL (ref 65–105)
GLUCOSE BLD-MCNC: 146 MG/DL (ref 65–105)
IGG4 SER-MCNC: 125 MG/DL (ref 1–123)
INR PPP: 1.8
PROTHROMBIN TIME: 21.7 SEC (ref 11.7–14.9)
SURGICAL PATHOLOGY REPORT: NORMAL

## 2025-03-15 PROCEDURE — 2060000000 HC ICU INTERMEDIATE R&B

## 2025-03-15 PROCEDURE — 6370000000 HC RX 637 (ALT 250 FOR IP): Performed by: STUDENT IN AN ORGANIZED HEALTH CARE EDUCATION/TRAINING PROGRAM

## 2025-03-15 PROCEDURE — 2700000000 HC OXYGEN THERAPY PER DAY

## 2025-03-15 PROCEDURE — 2500000003 HC RX 250 WO HCPCS: Performed by: STUDENT IN AN ORGANIZED HEALTH CARE EDUCATION/TRAINING PROGRAM

## 2025-03-15 PROCEDURE — 6370000000 HC RX 637 (ALT 250 FOR IP): Performed by: FAMILY MEDICINE

## 2025-03-15 PROCEDURE — 94761 N-INVAS EAR/PLS OXIMETRY MLT: CPT

## 2025-03-15 PROCEDURE — 99232 SBSQ HOSP IP/OBS MODERATE 35: CPT | Performed by: FAMILY MEDICINE

## 2025-03-15 PROCEDURE — 94640 AIRWAY INHALATION TREATMENT: CPT

## 2025-03-15 PROCEDURE — 89220 SPUTUM SPECIMEN COLLECTION: CPT

## 2025-03-15 PROCEDURE — 85520 HEPARIN ASSAY: CPT

## 2025-03-15 PROCEDURE — 71045 X-RAY EXAM CHEST 1 VIEW: CPT

## 2025-03-15 PROCEDURE — 6360000002 HC RX W HCPCS: Performed by: FAMILY MEDICINE

## 2025-03-15 PROCEDURE — 36415 COLL VENOUS BLD VENIPUNCTURE: CPT

## 2025-03-15 PROCEDURE — 99233 SBSQ HOSP IP/OBS HIGH 50: CPT | Performed by: INTERNAL MEDICINE

## 2025-03-15 PROCEDURE — 85610 PROTHROMBIN TIME: CPT

## 2025-03-15 PROCEDURE — 6360000002 HC RX W HCPCS: Performed by: STUDENT IN AN ORGANIZED HEALTH CARE EDUCATION/TRAINING PROGRAM

## 2025-03-15 PROCEDURE — 94003 VENT MGMT INPAT SUBQ DAY: CPT

## 2025-03-15 PROCEDURE — 82947 ASSAY GLUCOSE BLOOD QUANT: CPT

## 2025-03-15 RX ORDER — WARFARIN SODIUM 3 MG/1
3 TABLET ORAL
Status: COMPLETED | OUTPATIENT
Start: 2025-03-15 | End: 2025-03-15

## 2025-03-15 RX ORDER — METOPROLOL TARTRATE 25 MG/1
12.5 TABLET, FILM COATED ORAL 2 TIMES DAILY
Status: DISCONTINUED | OUTPATIENT
Start: 2025-03-15 | End: 2025-03-16

## 2025-03-15 RX ADMIN — BUDESONIDE INHALATION 500 MCG: 0.5 SUSPENSION RESPIRATORY (INHALATION) at 09:03

## 2025-03-15 RX ADMIN — METOPROLOL TARTRATE 12.5 MG: 25 TABLET, FILM COATED ORAL at 20:16

## 2025-03-15 RX ADMIN — FUROSEMIDE 20 MG: 20 TABLET ORAL at 08:46

## 2025-03-15 RX ADMIN — MONTELUKAST 10 MG: 10 TABLET, FILM COATED ORAL at 20:16

## 2025-03-15 RX ADMIN — PANTOPRAZOLE SODIUM 40 MG: 40 TABLET, DELAYED RELEASE ORAL at 16:42

## 2025-03-15 RX ADMIN — PRAVASTATIN SODIUM 80 MG: 20 TABLET ORAL at 17:50

## 2025-03-15 RX ADMIN — SODIUM CHLORIDE, PRESERVATIVE FREE 10 ML: 5 INJECTION INTRAVENOUS at 08:45

## 2025-03-15 RX ADMIN — ACETYLCYSTEINE 600 MG: 200 INHALANT RESPIRATORY (INHALATION) at 09:04

## 2025-03-15 RX ADMIN — HEPARIN SODIUM 16 UNITS/KG/HR: 10000 INJECTION, SOLUTION INTRAVENOUS at 12:41

## 2025-03-15 RX ADMIN — METOPROLOL TARTRATE 25 MG: 25 TABLET, FILM COATED ORAL at 08:45

## 2025-03-15 RX ADMIN — HEPARIN SODIUM 16 UNITS/KG/HR: 10000 INJECTION, SOLUTION INTRAVENOUS at 01:37

## 2025-03-15 RX ADMIN — IPRATROPIUM BROMIDE AND ALBUTEROL SULFATE 1 DOSE: .5; 2.5 SOLUTION RESPIRATORY (INHALATION) at 20:44

## 2025-03-15 RX ADMIN — ACETYLCYSTEINE 600 MG: 200 INHALANT RESPIRATORY (INHALATION) at 20:44

## 2025-03-15 RX ADMIN — IPRATROPIUM BROMIDE AND ALBUTEROL SULFATE 1 DOSE: .5; 2.5 SOLUTION RESPIRATORY (INHALATION) at 15:51

## 2025-03-15 RX ADMIN — SODIUM CHLORIDE, PRESERVATIVE FREE 10 ML: 5 INJECTION INTRAVENOUS at 20:15

## 2025-03-15 RX ADMIN — AMIODARONE HYDROCHLORIDE 200 MG: 200 TABLET ORAL at 08:45

## 2025-03-15 RX ADMIN — AMIODARONE HYDROCHLORIDE 200 MG: 200 TABLET ORAL at 20:16

## 2025-03-15 RX ADMIN — PANTOPRAZOLE SODIUM 40 MG: 40 TABLET, DELAYED RELEASE ORAL at 08:45

## 2025-03-15 RX ADMIN — FUROSEMIDE 20 MG: 20 TABLET ORAL at 20:15

## 2025-03-15 RX ADMIN — IPRATROPIUM BROMIDE AND ALBUTEROL SULFATE 1 DOSE: .5; 2.5 SOLUTION RESPIRATORY (INHALATION) at 09:04

## 2025-03-15 RX ADMIN — GUAIFENESIN 1200 MG: 600 TABLET, EXTENDED RELEASE ORAL at 20:15

## 2025-03-15 RX ADMIN — GUAIFENESIN 1200 MG: 600 TABLET, EXTENDED RELEASE ORAL at 08:46

## 2025-03-15 RX ADMIN — WARFARIN SODIUM 3 MG: 3 TABLET ORAL at 17:51

## 2025-03-15 RX ADMIN — BUDESONIDE INHALATION 500 MCG: 0.5 SUSPENSION RESPIRATORY (INHALATION) at 20:44

## 2025-03-15 ASSESSMENT — PULMONARY FUNCTION TESTS
PIF_VALUE: 18
PIF_VALUE: 19
PIF_VALUE: 15
PIF_VALUE: 10

## 2025-03-15 ASSESSMENT — PAIN SCALES - GENERAL: PAINLEVEL_OUTOF10: 0

## 2025-03-15 NOTE — PROGRESS NOTES
PULMONARY PROGRESS NOTE      Patient:  Shazia Nuñez  YOB: 1955    MRN: [id number]     Acct: [id number]     Admit date: 3/8/2025    REASON FOR CONSULT:- Increased tracheostomy secretions    Pt seen and Chart reviewed.  3/15/2025    Subjective:   -Patient was seen and examined at bedside  -Patient was resting comfortably in bed  -SpO2 was 100% at 40% O2 on the ventilator  Denied any shortness of breath or wheezing  No increasing tracheal secretions  No chest pain or pressure  Fluid balance -2.8 L    Review of Systems -   General ROS: Completed and except as mentioned above were negative   Psychological ROS:  Completed and except as mentioned above were negative  Allergy and Immunology ROS:  Completed and except as mentioned above were negative  Hematological and Lymphatic ROS:  Completed and except as mentioned above were negative  Respiratory ROS:  Completed and except as mentioned above were negative  Cardiovascular ROS:  Completed and except as mentioned above were negative  Gastrointestinal ROS: Completed and except as mentioned above were negative  Genito-Urinary ROS:  Completed and except as mentioned above were negative  Musculoskeletal ROS:  Completed and except as mentioned above were negative  Neurological ROS:  Completed and except as mentioned above were negative  Dermatological ROS:  Completed and except as mentioned above were negative      Diet:  ADULT DIET; Regular; 3 carb choices (45 gm/meal)      Medications:Current Inpatient    Scheduled Meds:   metoprolol tartrate  25 mg Oral BID    pantoprazole  40 mg Oral BID AC    budesonide  0.5 mg Nebulization BID RT    acetylcysteine  600 mg Inhalation BID    furosemide  20 mg Oral BID    montelukast  10 mg Oral Nightly    pravastatin  80 mg Oral QPM    ipratropium 0.5 mg-albuterol 2.5 mg  1 Dose Inhalation Q4H WA RT    amiodarone  200 mg Oral BID    guaiFENesin   monitor  -Continue suctioning to clear airway secretions  -Continue home medications as tolerated  -Anemia is stable but had some mild hypokalemia   -Monitor tracheostomy output  Continue ventilator support  Patient on 40% oxygen and has a minute ventilation of 3.8 L  Currently not receiving any tube feeding    Electronically signed by-  Dane Andres MD  3/15/2025 7:42 AM

## 2025-03-15 NOTE — PROGRESS NOTES
[facility]  Office: [phone]  Carlos Caro DO, Felipe Drew DO, Jerman Mack DO, Leonel Thomas DO, Larry Pisano MD, Monserrat Rosado MD, Sharon Perez MD, Beverley Curtis MD,  Jimi Messina MD, Kevin Cowan MD, Theresa Chavira MD,  Luis Coffman DO, Florencia Hernandez MD, Giorgi Salas MD, Ramone Caro DO, Mariel Bradshaw MD,  aBndar Anderson DO, Michelle Helm MD, Mireille Hermosillo MD, Yamileth Chong MD, Zo Montgomery MD,  Jordan Hernandez MD, Pilar Salcedo MD, Kevin Greenwood MD, Kevin Landon MD, Ramon Ellsworth MD, Ken Luna MD, Chemo Morton DO, Alvaro Reid MD, Luis Caicedo MD, Mohsin Reza, MD, Shirley Waterhouse, CNP,  Octavia Coreas CNP, Chemo Pelayo, CNP,  Юлия Calero, DNP, Kaci Rodgers, CNP, Princess Mandujano, CNP, Madelaine Burns, CNP, Ivone Mancia CNP, Val Hanson, PA-C, Isabel Bustos, CNP, Karma Yost, CNP,  Nasrin Pritchard, CNP, Bebe Mckeon, CNP, Rosio Resendez, CNP,  Sandie Palmer, CNS, Jessi Montiel CNP, Dolly Tamez CNP,   Lea Johnson, CNP         [facility]   IN-PATIENT SERVICE   [facility]    Progress Note    3/15/2025    10:14 AM    Name:   Shazia Nuñez  MRN:     [id number]     Acct:      [id number]   Room:   [facility] Day:  6  Admit Date:  3/8/2025  8:54 PM    PCP:   [facility]  Code Status:  Full Code    Subjective:     C/C:   Chief Complaint   Patient presents with    Chest Pain     Interval History Status: not changed.     Patient seen and examined at bedside, no acute events overnight.  Had multiple questions that I was able to answer for her   Patient denies any chest pain, chills, fevers, nausea or vomiting.  Patient vitals, labs and all providers notes were reviewed,from overnight shift and morning updates were noted and discussed with the nurse      Brief History:     Per chart  69-year-old female with past medical history of chronic hypoxic respiratory failure due  patient weight  -GI suspecting gallstone pancreatitis, but no cholelithiasis on ultrasound, this is ruled out     Acute on chronic hypoxic respiratory failure:    Obesity hypoventilation syndrome:  -Patient with  increased secretions that is now decreasing, it is now not bloody anymore  - Respiratory cultures are growing GRAM POSITIVE RODS RESEMBLING DIPHTHEROIDS HEAVY GROWTH Abnormal    -She has left lung opacification , pulmonology following , continue pulmonary hygiene  -Resume home nebulized treatment including Mucomyst and Pulmicort  -Continue mucolytics  -Continue tracheobronchial toilet  -Respiratory care       Heart failure with preserved ejection fraction :  - Continue lasix  - patient DBP has been very soft , BB almost  held all admission, BP and HR been okay without it      A-fib: On Amio, beta-blockers are held ,  on chronic anticoagulation, coumadin restarted     Supratherapeutic INR: Given continuous anemia and some bloody tracheal secretions I will give vitamin K x 1    History of DVT/PE: Anticoagulated on Coumadin, currently resumed , will add heparin to bridge given no concerns of bleeding    Acute on chronic anemia likely due to chronic blood loss from wound: Received total 2 units of packed RBCs 3/10 and 3/11  - S/P EGD/ coloscopy, okay to resume anticoagulation per GI  - Continue to monitor hemoglobin and transfuse if drop below 7         History of endometrial cancer undergone modified radical hysterectomy with BSO and regional lymphadenectomy on 7/17/2015.        Diabetes mellitus type 2, noninsulin dependent: Takes 8 units of Lantus at nighttime, basal was not started on admission, blood sugars controlled, will continue to monitor and reassess .-Continue with glycemic control, sliding scale hypoglycemia protocol     CKD: At baseline, continue to monitor daily avoid nephrotoxic agents    Chronic wounds: improving  Wound care    Hyperlipemia, mixed: Pravastatin    Anxiety     Discussed with the

## 2025-03-15 NOTE — PLAN OF CARE
Problem: Safety - Adult  Goal: Free from fall injury  3/15/2025 1503 by Eloise Barnes RN  Outcome: Progressing  3/15/2025 0320 by Seema Terrell RN  Outcome: Progressing     Problem: Neurosensory - Adult  Goal: Achieves stable or improved neurological status  3/15/2025 1503 by Eloise Barnes RN  Outcome: Progressing  3/15/2025 0320 by Seema Terrell RN  Outcome: Progressing     Problem: Cardiovascular - Adult  Goal: Maintains optimal cardiac output and hemodynamic stability  3/15/2025 1503 by Eloise Barnes RN  Outcome: Progressing  3/15/2025 0320 by Seema Terrell RN  Outcome: Progressing

## 2025-03-15 NOTE — PLAN OF CARE
shortness of breath or any respiratory difficulty   Respiratory therapy support as indicated     Problem: Neurosensory - Adult  Goal: Achieves stable or improved neurological status  Outcome: Progressing     Problem: Cardiovascular - Adult  Goal: Maintains optimal cardiac output and hemodynamic stability  Outcome: Progressing  Goal: Absence of cardiac dysrhythmias or at baseline  Outcome: Progressing     Problem: Skin/Tissue Integrity - Adult  Goal: Skin integrity remains intact  Description: 1.  Monitor for areas of redness and/or skin breakdown  2.  Assess vascular access sites hourly  3.  Every 4-6 hours minimum:  Change oxygen saturation probe site  4.  Every 4-6 hours:  If on nasal continuous positive airway pressure, respiratory therapy assess nares and determine need for appliance change or resting period  Outcome: Progressing  Goal: Incisions, wounds, or drain sites healing without S/S of infection  Outcome: Progressing     Problem: Musculoskeletal - Adult  Goal: Return mobility to safest level of function  Outcome: Progressing     Problem: Gastrointestinal - Adult  Goal: Minimal or absence of nausea and vomiting  Outcome: Progressing  Goal: Maintains or returns to baseline bowel function  Outcome: Progressing     Problem: Genitourinary - Adult  Goal: Absence of urinary retention  Outcome: Progressing     Problem: Infection - Adult  Goal: Absence of infection at discharge  Outcome: Progressing  Flowsheets (Taken 3/14/2025 2035)  Absence of infection at discharge: Assess and monitor for signs and symptoms of infection     Problem: Metabolic/Fluid and Electrolytes - Adult  Goal: Electrolytes maintained within normal limits  Outcome: Progressing  Flowsheets (Taken 3/14/2025 2035)  Electrolytes maintained within normal limits: Monitor labs and assess patient for signs and symptoms of electrolyte imbalances

## 2025-03-16 VITALS
RESPIRATION RATE: 22 BRPM | HEIGHT: 65 IN | DIASTOLIC BLOOD PRESSURE: 39 MMHG | TEMPERATURE: 98.1 F | WEIGHT: 293 LBS | SYSTOLIC BLOOD PRESSURE: 125 MMHG | OXYGEN SATURATION: 100 % | HEART RATE: 68 BPM | BODY MASS INDEX: 48.82 KG/M2

## 2025-03-16 LAB
ANTI-XA UNFRAC HEPARIN: 0.29 IU/L
ERYTHROCYTE [DISTWIDTH] IN BLOOD BY AUTOMATED COUNT: 17.5 % (ref 11.8–14.4)
GLUCOSE BLD-MCNC: 135 MG/DL (ref 65–105)
GLUCOSE BLD-MCNC: 156 MG/DL (ref 65–105)
GLUCOSE BLD-MCNC: 162 MG/DL (ref 65–105)
HCT VFR BLD AUTO: 25 % (ref 36.3–47.1)
HGB BLD-MCNC: 7.2 G/DL (ref 11.9–15.1)
INR PPP: 2
MCH RBC QN AUTO: 25.8 PG (ref 25.2–33.5)
MCHC RBC AUTO-ENTMCNC: 28.8 G/DL (ref 28.4–34.8)
MCV RBC AUTO: 89.6 FL (ref 82.6–102.9)
NRBC BLD-RTO: 0 PER 100 WBC
PLATELET # BLD AUTO: 236 K/UL (ref 138–453)
PMV BLD AUTO: 8.9 FL (ref 8.1–13.5)
PROTHROMBIN TIME: 23.8 SEC (ref 11.7–14.9)
RBC # BLD AUTO: 2.79 M/UL (ref 3.95–5.11)
WBC OTHER # BLD: 5 K/UL (ref 3.5–11.3)

## 2025-03-16 PROCEDURE — 99233 SBSQ HOSP IP/OBS HIGH 50: CPT | Performed by: INTERNAL MEDICINE

## 2025-03-16 PROCEDURE — 99239 HOSP IP/OBS DSCHRG MGMT >30: CPT | Performed by: FAMILY MEDICINE

## 2025-03-16 PROCEDURE — 6370000000 HC RX 637 (ALT 250 FOR IP): Performed by: STUDENT IN AN ORGANIZED HEALTH CARE EDUCATION/TRAINING PROGRAM

## 2025-03-16 PROCEDURE — 85610 PROTHROMBIN TIME: CPT

## 2025-03-16 PROCEDURE — 82947 ASSAY GLUCOSE BLOOD QUANT: CPT

## 2025-03-16 PROCEDURE — 6360000002 HC RX W HCPCS: Performed by: FAMILY MEDICINE

## 2025-03-16 PROCEDURE — 36415 COLL VENOUS BLD VENIPUNCTURE: CPT

## 2025-03-16 PROCEDURE — 6360000002 HC RX W HCPCS: Performed by: STUDENT IN AN ORGANIZED HEALTH CARE EDUCATION/TRAINING PROGRAM

## 2025-03-16 PROCEDURE — 2700000000 HC OXYGEN THERAPY PER DAY

## 2025-03-16 PROCEDURE — 94761 N-INVAS EAR/PLS OXIMETRY MLT: CPT

## 2025-03-16 PROCEDURE — 2500000003 HC RX 250 WO HCPCS: Performed by: STUDENT IN AN ORGANIZED HEALTH CARE EDUCATION/TRAINING PROGRAM

## 2025-03-16 PROCEDURE — 85027 COMPLETE CBC AUTOMATED: CPT

## 2025-03-16 PROCEDURE — 94003 VENT MGMT INPAT SUBQ DAY: CPT

## 2025-03-16 PROCEDURE — 94640 AIRWAY INHALATION TREATMENT: CPT

## 2025-03-16 PROCEDURE — 85520 HEPARIN ASSAY: CPT

## 2025-03-16 RX ORDER — AMIODARONE HYDROCHLORIDE 200 MG/1
200 TABLET ORAL 2 TIMES DAILY
DISCHARGE
Start: 2025-03-16

## 2025-03-16 RX ORDER — WARFARIN SODIUM 3 MG/1
3 TABLET ORAL
Status: COMPLETED | OUTPATIENT
Start: 2025-03-16 | End: 2025-03-16

## 2025-03-16 RX ORDER — PANTOPRAZOLE SODIUM 40 MG/1
40 TABLET, DELAYED RELEASE ORAL
Qty: 30 TABLET | Refills: 3 | Status: SHIPPED | OUTPATIENT
Start: 2025-03-16

## 2025-03-16 RX ADMIN — HEPARIN SODIUM 5000 UNITS: 1000 INJECTION INTRAVENOUS; SUBCUTANEOUS at 11:14

## 2025-03-16 RX ADMIN — HEPARIN SODIUM 16 UNITS/KG/HR: 10000 INJECTION, SOLUTION INTRAVENOUS at 00:31

## 2025-03-16 RX ADMIN — ACETYLCYSTEINE 600 MG: 200 INHALANT RESPIRATORY (INHALATION) at 07:41

## 2025-03-16 RX ADMIN — FUROSEMIDE 20 MG: 20 TABLET ORAL at 08:06

## 2025-03-16 RX ADMIN — IPRATROPIUM BROMIDE AND ALBUTEROL SULFATE 1 DOSE: .5; 2.5 SOLUTION RESPIRATORY (INHALATION) at 11:41

## 2025-03-16 RX ADMIN — PRAVASTATIN SODIUM 80 MG: 20 TABLET ORAL at 17:35

## 2025-03-16 RX ADMIN — GUAIFENESIN 1200 MG: 600 TABLET, EXTENDED RELEASE ORAL at 08:05

## 2025-03-16 RX ADMIN — ALBUTEROL SULFATE 2.5 MG: 2.5 SOLUTION RESPIRATORY (INHALATION) at 03:30

## 2025-03-16 RX ADMIN — PANTOPRAZOLE SODIUM 40 MG: 40 TABLET, DELAYED RELEASE ORAL at 17:08

## 2025-03-16 RX ADMIN — AMIODARONE HYDROCHLORIDE 200 MG: 200 TABLET ORAL at 08:06

## 2025-03-16 RX ADMIN — WARFARIN SODIUM 3 MG: 3 TABLET ORAL at 17:35

## 2025-03-16 RX ADMIN — SODIUM CHLORIDE, PRESERVATIVE FREE 10 ML: 5 INJECTION INTRAVENOUS at 08:06

## 2025-03-16 RX ADMIN — IPRATROPIUM BROMIDE AND ALBUTEROL SULFATE 1 DOSE: .5; 2.5 SOLUTION RESPIRATORY (INHALATION) at 07:41

## 2025-03-16 RX ADMIN — ACETAMINOPHEN 650 MG: 325 TABLET ORAL at 09:32

## 2025-03-16 RX ADMIN — IPRATROPIUM BROMIDE AND ALBUTEROL SULFATE 1 DOSE: .5; 2.5 SOLUTION RESPIRATORY (INHALATION) at 15:41

## 2025-03-16 RX ADMIN — PANTOPRAZOLE SODIUM 40 MG: 40 TABLET, DELAYED RELEASE ORAL at 05:17

## 2025-03-16 RX ADMIN — HEPARIN SODIUM 18 UNITS/KG/HR: 10000 INJECTION, SOLUTION INTRAVENOUS at 11:16

## 2025-03-16 ASSESSMENT — PAIN DESCRIPTION - DESCRIPTORS: DESCRIPTORS: ACHING

## 2025-03-16 ASSESSMENT — PAIN DESCRIPTION - ORIENTATION: ORIENTATION: POSTERIOR

## 2025-03-16 ASSESSMENT — PAIN DESCRIPTION - LOCATION: LOCATION: NECK

## 2025-03-16 ASSESSMENT — PAIN SCALES - WONG BAKER: WONGBAKER_NUMERICALRESPONSE: HURTS A LITTLE BIT

## 2025-03-16 ASSESSMENT — PAIN SCALES - GENERAL
PAINLEVEL_OUTOF10: 3
PAINLEVEL_OUTOF10: 2
PAINLEVEL_OUTOF10: 4

## 2025-03-16 ASSESSMENT — PULMONARY FUNCTION TESTS
PIF_VALUE: 18
PIF_VALUE: 18
PIF_VALUE: 14

## 2025-03-16 ASSESSMENT — PAIN - FUNCTIONAL ASSESSMENT: PAIN_FUNCTIONAL_ASSESSMENT: ACTIVITIES ARE NOT PREVENTED

## 2025-03-16 NOTE — DISCHARGE INSTR - COC
discontinued,  on chronic anticoagulation, coumadin restarted     Supratherapeutic INR: Given continuous anemia and some bloody tracheal secretions I will give vitamin K x 1    History of DVT/PE: Anticoagulated on Coumadin, currently resumed , will add heparin to bridge given no concerns of bleeding    Acute on chronic anemia likely due to chronic blood loss from wound: Received total 2 units of packed RBCs 3/10 and 3/11  - S/P EGD & coloscopy, okay to resume anticoagulation per GI  - Continue to monitor hemoglobin and transfuse if drop below 7         History of endometrial cancer undergone modified radical hysterectomy with BSO and regional lymphadenectomy on 7/17/2015.        Diabetes mellitus type 2, noninsulin dependent: Takes 8 units of Lantus at nighttime, basal was not started on admission, blood sugars controlled, will continue to monitor and reassess .-Continue with glycemic control, sliding scale hypoglycemia protocol     CKD: At baseline, continue to monitor daily avoid nephrotoxic agents    Chronic wounds: improving  Wound care    Hyperlipemia, mixed: Pravastatin    Anxiety     Discussed with the patient and the nurse     INR is therapeutic today, can discharge  PHYSICIAN SIGNATURE:  Electronically signed by Beverley Curtis MD on 3/16/25 at 11:56 AM EDT

## 2025-03-16 NOTE — PLAN OF CARE
Problem: Chronic Conditions and Co-morbidities  Goal: Patient's chronic conditions and co-morbidity symptoms are monitored and maintained or improved  Recent Flowsheet Documentation  Taken 3/16/2025 0800 by Claudia Babb RN  Care Plan - Patient's Chronic Conditions and Co-Morbidity Symptoms are Monitored and Maintained or Improved:   Monitor and assess patient's chronic conditions and comorbid symptoms for stability, deterioration, or improvement   Collaborate with multidisciplinary team to address chronic and comorbid conditions and prevent exacerbation or deterioration   Update acute care plan with appropriate goals if chronic or comorbid symptoms are exacerbated and prevent overall improvement and discharge  3/16/2025 0204 by Seema Terrell RN  Outcome: Progressing     Problem: Discharge Planning  Goal: Discharge to home or other facility with appropriate resources  3/16/2025 1320 by Claudia Babb RN  Outcome: Progressing  Flowsheets (Taken 3/16/2025 0800)  Discharge to home or other facility with appropriate resources:   Identify barriers to discharge with patient and caregiver   Arrange for needed discharge resources and transportation as appropriate   Identify discharge learning needs (meds, wound care, etc)   Arrange for interpreters to assist at discharge as needed   Refer to discharge planning if patient needs post-hospital services based on physician order or complex needs related to functional status, cognitive ability or social support system  3/16/2025 0204 by Seema Terrell RN  Outcome: Progressing     Problem: Safety - Adult  Goal: Free from fall injury  3/16/2025 1320 by Claudia Babb RN  Outcome: Progressing  3/16/2025 0204 by Seema Terrell, RN  Outcome: Progressing     Problem: ABCDS Injury Assessment  Goal: Absence of physical injury  3/16/2025 1320 by Claudia Babb RN  Outcome: Progressing  3/16/2025 0204 by Seema Terrell, RN  Outcome: Progressing

## 2025-03-16 NOTE — PROGRESS NOTES
Occupational Therapy    [facility]  Occupational Therapy Not Seen Note    DATE: 3/16/2025    NAME: Shazia Nuñez  MRN: [id number]   : 1955      Patient not seen this date for Occupational Therapy due to:    Patient Declined: Pt adamantly refused all grooming tasks demonstrated ability to raise BUE for grooming tasks however pt lacks motivation.  Pt adamently refused rolling for bed mobility and repositioning.   Disscussing with OTR to update POC    Next Scheduled Treatment: Attempt 3/17    Electronically signed by JIMMY Cho on 3/16/2025 at 11:07 AM

## 2025-03-16 NOTE — PROGRESS NOTES
03/15/25 2044   Care Plan - Respiratory Goals   Achieves optimal ventilation and oxygenation Assess for changes in respiratory status;Assess for changes in mentation and behavior;Position to facilitate oxygenation and minimize respiratory effort;Oxygen supplementation based on oxygen saturation or arterial blood gases;Encourage broncho-pulmonary hygiene including cough, deep breathe, incentive spirometry;Assess the need for suctioning and aspirate as needed;Assess and instruct to report shortness of breath or any respiratory difficulty;Respiratory therapy support as indicated

## 2025-03-16 NOTE — CARE COORDINATION
Call to [facility] to see if patient can return today, since has a discharge order and is a bed hold. [facility] is able to take patient today whenever can arrange for transport. Transport request faxed to [facility] for stretcher transport. Patient made aware of approval and admission to facility today.    [facility].    2:00 PMFaxed KJ/AVS tp [facility]. Call to brother Nicola to make aware of plan to transfer back to [facility] at 4 PM    Discharge Report    [facility]  Clinical Case Management Department  Written by: Terrie Oates RN/CM    Patient Name: Shazia WHITE [place]  Attending Provider: Beverley Curtis MD  Admit Date: 3/8/2025  8:54 PM  MRN: [id number]  Account: [id number]                     : 1955  Discharge Date: 3-16-26      Disposition: SNF = [facility] per [facility] via stretcher    Terrie Oates RN/CM    
Case Management   Daily Progress Note       Patient Name: Shazia Nuñez                   YOB: 1955  Diagnosis: Acute pancreatitis without necrosis or infection, unspecified [K85.90]  Acute pancreatitis without infection or necrosis, unspecified pancreatitis type [K85.90]                       GMLOS: 4.8 days  Length of Stay: 3  days    Anticipated Discharge Date: Two or more days before discharge    Readmission Risk (Low < 19, Mod (19-27), High > 27): Readmission Risk Score: 28.1      Patient Transitional Goal: Nursing Home    Current Transitional Plan    [] Home Independently    [] Home with HC    [] Skilled Nursing Facility    [] Acute Rehabilitation    [] Long Term Acute Care (LTAC)    [x] Other: nursing facility - long term care    Plan for the Stay (Medical Management) :    Possible scope. Monitoring H/H      Workflow Continuation (Additional Notes) :    Patient is a long term care resident at [facility] ventilator unit and plans to return. Spoke with ash in central intake at [facility] and confirmed patient is a long term care resident and bed hold and is able to return when medically ready.     Maria Luisa Finch RN  March 12, 2025         
Case Management   Daily Progress Note       Patient Name: Shazia Nuñez                   YOB: 1955  Diagnosis: Acute pancreatitis without necrosis or infection, unspecified [K85.90]  Acute pancreatitis without infection or necrosis, unspecified pancreatitis type [K85.90]                       GMLOS: 4.8 days  Length of Stay: 5  days    Anticipated Discharge Date: Two or more days before discharge    Readmission Risk (Low < 19, Mod (19-27), High > 27): Readmission Risk Score: 29      Patient Transitional Goal: Nursing Home    Current Transitional Plan    [] Home Independently    [] Home with HC    [] Skilled Nursing Facility    [] Acute Rehabilitation    [] Long Term Acute Care (LTAC)    [x] Other: nursing home    Plan for the Stay (Medical Management) :      Awaiting INR to be therapeutic. Patient INR is 1.6 today and needs to  be 2.5 - 3.5    Workflow Continuation (Additional Notes) :    Plan is return to [facility] long term care. Patient is a bed hold    Maria Luisa Finch RN  March 14, 2025         
    The Patient and/or Patient Representative Agree with the Discharge Plan? (P) Yes    [facility] [place]  Case Management Department  Ph:  Fax:    Case Management Services Information Letter Provided [x]

## 2025-03-16 NOTE — PROGRESS NOTES
[facility]  Office: [phone]  Carlos Caro DO, Felipe Drew DO, Jerman Mack DO, Leonel Thomas, DO, Larry Pisano MD, Monserrat Rosado MD, Sharon Perez MD, Beverley Curtis MD,  Jimi Messina MD, Kevin Cowan MD, Theresa Chavira MD,  Luis Coffman DO, Florencia Hernandez MD, Giorgi Salas MD, Ramone Caro DO, Mariel Bradshaw MD,  Bandar Anderson DO, Michelle Helm MD, Mireille Hermosillo MD, Yamileth Chong MD, Zo Montgomery MD,  Jordan Hernandez MD, Pilar Salcedo MD, Kevin Greenwood MD, Kevin Landon MD, Ramon Ellsworth MD, Ken Luna MD, Chemo Morton DO, Alvaro Reid MD, Luis Caicedo MD, Mohsin Reza, MD, Shirley Waterhouse, CNP,  Octavia Coreas CNP, Chemo Pelayo, CNP,  Юлия Calero, DNP, Kaci Rodgers, CNP, Princess Mandujano, CNP, Madelaine Burns, CNP, Ivone Mancia CNP, Val Hanson, PA-C, Isabel Bustos, CNP, Karma Yost, CNP,  Nasrin Pritchard, CNP, Bebe Mckeon, CNP, Rosio Resendez, CNP,  Sandie Palmer, CNS, Jessi Montiel CNP, Dolly Tamez CNP,   Lea Johnson, CNP         [facility]   IN-PATIENT SERVICE   [facility]    Progress Note    3/16/2025    11:53 AM    Name:   Shazia Nuñez  MRN:     [id number]     Acct:      [id number]   Room:   [facility]30-01   Day:  7  Admit Date:  3/8/2025  8:54 PM    PCP:   [facility]  Code Status:  Full Code    Subjective:     C/C:   Chief Complaint   Patient presents with    Chest Pain     Interval History Status: Continue to improve    Patient seen and examined at bedside, no acute events overnight.  Had multiple questions that I was able to answer for her   Patient denies any chest pain, chills, fevers, nausea or vomiting.  Patient vitals, labs and all providers notes were reviewed,from overnight shift and morning updates were noted and discussed with the nurse      Brief History:     Per chart  69-year-old female with past medical history of chronic hypoxic respiratory  spent        Beverley Curtis MD  3/16/2025  11:53 AM

## 2025-03-16 NOTE — PLAN OF CARE
Problem: Chronic Conditions and Co-morbidities  Goal: Patient's chronic conditions and co-morbidity symptoms are monitored and maintained or improved  Outcome: Progressing     Problem: Discharge Planning  Goal: Discharge to home or other facility with appropriate resources  Outcome: Progressing     Problem: Safety - Adult  Goal: Free from fall injury  3/16/2025 0204 by Seema Terrell RN  Outcome: Progressing  3/15/2025 1503 by Eloise Barnes RN  Outcome: Progressing     Problem: ABCDS Injury Assessment  Goal: Absence of physical injury  Outcome: Progressing     Problem: Skin/Tissue Integrity  Goal: Skin integrity remains intact  Description: 1.  Monitor for areas of redness and/or skin breakdown  2.  Assess vascular access sites hourly  3.  Every 4-6 hours minimum:  Change oxygen saturation probe site  4.  Every 4-6 hours:  If on nasal continuous positive airway pressure, respiratory therapy assess nares and determine need for appliance change or resting period  Outcome: Progressing     Problem: Respiratory - Adult  Goal: Achieves optimal ventilation and oxygenation  Outcome: Progressing  Flowsheets (Taken 3/15/2025 2044 by Gabrielle Adorno RCP)  Achieves optimal ventilation and oxygenation:   Assess for changes in respiratory status   Assess for changes in mentation and behavior   Position to facilitate oxygenation and minimize respiratory effort   Oxygen supplementation based on oxygen saturation or arterial blood gases   Encourage broncho-pulmonary hygiene including cough, deep breathe, incentive spirometry   Assess the need for suctioning and aspirate as needed   Assess and instruct to report shortness of breath or any respiratory difficulty   Respiratory therapy support as indicated     Problem: Neurosensory - Adult  Goal: Achieves stable or improved neurological status  3/16/2025 0204 by Seema Terrell RN  Outcome: Progressing     Problem: Cardiovascular - Adult  Goal: Maintains optimal cardiac

## 2025-03-16 NOTE — PROGRESS NOTES
Report called to Janet MOSES at [facility]. All questions answered. Transport arranged for 1600.     Electronically signed by Claudia Babb RN on 3/16/2025 at 2:33 PM

## 2025-03-16 NOTE — PROGRESS NOTES
Pharmacy Note  Warfarin Consult follow-up      Recent Labs     03/16/25  0949   INR 2.0     Recent Labs     03/14/25  1020 03/16/25  0949   HGB 8.0* 7.2*   HCT 28.1* 25.0*   PLT  --  236       Current warfarin drug-drug interactions:      Date INR Dose   3/15 1.8 3mg   3/16 2.0 3mg            Notes:          Give warfarin 3mg today 1800           Daily PT/INR while inpatient.

## 2025-03-16 NOTE — PROGRESS NOTES
PULMONARY PROGRESS NOTE      Patient:  Shazia Nuñez  YOB: 1955    MRN: [id number]     Acct: [id number]     Admit date: 3/8/2025    REASON FOR CONSULT:- Increased tracheostomy secretions    Pt seen and Chart reviewed.  3/16/2025    Subjective:   -Patient was seen and examined at bedside  -Patient was resting comfortably in bed  -SpO2 was 98 % at 40% O2 on the ventilator  Denied any shortness of breath or wheezing  No increasing tracheal secretions  No chest pain or pressure  Fluid balance -4.4 L    Review of Systems -   General ROS: Completed and except as mentioned above were negative   Psychological ROS:  Completed and except as mentioned above were negative  Allergy and Immunology ROS:  Completed and except as mentioned above were negative  Hematological and Lymphatic ROS:  Completed and except as mentioned above were negative  Respiratory ROS:  Completed and except as mentioned above were negative  Cardiovascular ROS:  Completed and except as mentioned above were negative  Gastrointestinal ROS: Completed and except as mentioned above were negative  Genito-Urinary ROS:  Completed and except as mentioned above were negative  Musculoskeletal ROS:  Completed and except as mentioned above were negative  Neurological ROS:  Completed and except as mentioned above were negative  Dermatological ROS:  Completed and except as mentioned above were negative      Diet:  ADULT DIET; Regular; 3 carb choices (45 gm/meal)      Medications:Current Inpatient    Scheduled Meds:   metoprolol tartrate  12.5 mg Oral BID    pantoprazole  40 mg Oral BID AC    budesonide  0.5 mg Nebulization BID RT    acetylcysteine  600 mg Inhalation BID    furosemide  20 mg Oral BID    montelukast  10 mg Oral Nightly    pravastatin  80 mg Oral QPM    ipratropium 0.5 mg-albuterol 2.5 mg  1 Dose Inhalation Q4H WA RT    amiodarone  200 mg Oral BID    guaiFENesin

## 2025-03-17 ENCOUNTER — HOSPITAL ENCOUNTER (OUTPATIENT)
Age: 70
Setting detail: SPECIMEN
Discharge: HOME | End: 2025-03-17
Payer: MEDICARE

## 2025-03-17 LAB
INR PPP: 2.2
PROTHROMBIN TIME: 25.2 SEC (ref 11.5–14.2)

## 2025-03-17 PROCEDURE — 85610 PROTHROMBIN TIME: CPT

## 2025-03-17 PROCEDURE — 36415 COLL VENOUS BLD VENIPUNCTURE: CPT

## 2025-03-17 NOTE — ANESTHESIA POSTPROCEDURE EVALUATION
Department of Anesthesiology  Postprocedure Note    Patient: Shazia Nuñez  MRN: [id number]  YOB: 1955  Date of evaluation: 3/17/2025    Procedure Summary       Date: 03/13/25 Room / Location: [address]    Anesthesia Start: 1156 Anesthesia Stop: 1315    Procedures:       ESOPHAGOGASTRODUODENOSCOPY BIOPSY      COLONOSCOPY BIOPSY Diagnosis:       Acute blood loss anemia      Rectal bleeding      (Acute blood loss anemia [D62])      (Rectal bleeding [K62.5])    Surgeons: Moreno Renner MD Responsible Provider: Regla Tao MD    Anesthesia Type: general ASA Status: 4            Anesthesia Type: No value filed.    Tom Phase I: Tom Score: 9    Tom Phase II:      Anesthesia Post Evaluation    Patient location during evaluation: bedside  Patient participation: complete - patient participated  Level of consciousness: awake and awake and alert  Pain score: 0  Airway patency: patent  Nausea & Vomiting: no nausea and no vomiting  Cardiovascular status: blood pressure returned to baseline and hemodynamically stable  Respiratory status: acceptable  Hydration status: euvolemic  Pain management: adequate        No notable events documented.

## 2025-03-19 ENCOUNTER — HOSPITAL ENCOUNTER (OUTPATIENT)
Age: 70
Setting detail: SPECIMEN
Discharge: HOME | End: 2025-03-19
Payer: MEDICARE

## 2025-03-19 LAB
ERYTHROCYTE [DISTWIDTH] IN BLOOD BY AUTOMATED COUNT: 18.1 % (ref 11.8–14.4)
HCT VFR BLD AUTO: 24.5 % (ref 36.3–47.1)
HGB BLD-MCNC: 7.4 G/DL (ref 11.9–15.1)
MCH RBC QN AUTO: 26.5 PG (ref 25.2–33.5)
MCHC RBC AUTO-ENTMCNC: 30.2 G/DL (ref 28.4–34.8)
MCV RBC AUTO: 87.8 FL (ref 82.6–102.9)
NRBC BLD-RTO: 0 PER 100 WBC
PLATELET # BLD AUTO: 237 K/UL (ref 138–453)
PMV BLD AUTO: 9.2 FL (ref 8.1–13.5)
RBC # BLD AUTO: 2.79 M/UL (ref 3.95–5.11)
WBC OTHER # BLD: 4.2 K/UL (ref 3.5–11.3)

## 2025-03-19 PROCEDURE — 36415 COLL VENOUS BLD VENIPUNCTURE: CPT

## 2025-03-19 PROCEDURE — 85027 COMPLETE CBC AUTOMATED: CPT

## 2025-03-20 ENCOUNTER — HOSPITAL ENCOUNTER (OUTPATIENT)
Age: 70
Setting detail: SPECIMEN
Discharge: HOME | End: 2025-03-20

## 2025-03-21 ENCOUNTER — HOSPITAL ENCOUNTER (OUTPATIENT)
Age: 70
Setting detail: SPECIMEN
Discharge: HOME | End: 2025-03-21

## 2025-03-21 LAB
INR PPP: 2.5
PROTHROMBIN TIME: 27.7 SEC (ref 11.5–14.2)

## 2025-03-21 PROCEDURE — 36415 COLL VENOUS BLD VENIPUNCTURE: CPT

## 2025-03-21 PROCEDURE — 85610 PROTHROMBIN TIME: CPT

## 2025-03-22 NOTE — DISCHARGE SUMMARY
[facility]  Office: [phone]  Calros Caro DO, Felipe Drew DO, Jerman Mack DO, Leonel Thomas DO, Larry Pisano MD, Monserrat Rosado MD, Sharon Perez MD, Beverley Curtis MD,  Jimi Messina MD, Kevin Cowan MD, Theresa Chavira MD,  Luis Coffman DO, Florencia Hernandez MD, Giorgi Salas MD, Ramone Caro DO, Mariel Bradshaw MD,  Bandar Anderson DO, Mireille Hermosillo MD, Yamileth Chong MD, Zo Montgomery MD,  Jordan Hernandez MD, Pilar Salcedo MD, Kevin Greenwood MD, Kevin Landon MD, Ramon Ellsworth MD, Ken Luna MD, Chemo Morton DO, Alvaro Reid MD, Luis Caicedo MD, Mohsin Reza, MD, Shirley Waterhouse, CNP,  Octavia Coreas, CNP, Chemo Pelayo, CNP,  Юлия Calero, DNP, Kaci Rodgers, CNP, Princess Mandujano, CNP, Madelaine Burns, CNP, Ivone Mancia, CNP, Val Hanson PA-C, Isabel Bustos, CNP, Karma Yost, CNP,  Nasrin Pritchard, CNP, Bebe Mckeon, CNP, Rosio Resendez, CNP,  Sandie Palmer, CNS, Jessi Montiel, CNP, Dolly Tamez, CNP,   Lea Johnson, CNP         [facility]   IN-PATIENT SERVICE   [facility]    Discharge Summary     Patient ID: Shazia Nuñez  :  1955   MRN: [id number]     ACCOUNT:  [id number]   Patient's PCP: [facility]  Admit Date: 3/8/2025   Discharge Date: 3/16/2025    Length of Stay: 7  Code Status:  Prior  Admitting Physician: Beverley Curtis MD  Discharge Physician: Beverley Curtis MD     Active Discharge Diagnoses:     Hospital Problem Lists:  Principal Problem:    Acute pancreatitis without necrosis or infection, unspecified  Active Problems:    Anxiety    Hyperlipemia, mixed    Anticoagulated on Coumadin    Iron deficiency anemia due to chronic blood loss    Diabetes mellitus type 2, noninsulin dependent (HCC)    CKD (chronic kidney disease) stage 3, GFR 30-59 ml/min (HCC)    Bleeding from wound    (HFpEF) heart failure with preserved ejection fraction (HCC)    A-fib (HCC)    Acute on

## 2025-03-24 ENCOUNTER — HOSPITAL ENCOUNTER (OUTPATIENT)
Age: 70
Setting detail: SPECIMEN
Discharge: HOME | End: 2025-03-24
Payer: MEDICARE

## 2025-03-24 LAB
INR PPP: 3.1
PROTHROMBIN TIME: 33.6 SEC (ref 11.5–14.2)

## 2025-03-24 PROCEDURE — 36415 COLL VENOUS BLD VENIPUNCTURE: CPT

## 2025-03-24 PROCEDURE — 85610 PROTHROMBIN TIME: CPT

## 2025-03-27 ENCOUNTER — HOSPITAL ENCOUNTER (OUTPATIENT)
Age: 70
Setting detail: SPECIMEN
Discharge: HOME | End: 2025-03-27

## 2025-03-27 LAB
INR PPP: 3.4
PROTHROMBIN TIME: 36.2 SEC (ref 11.5–14.2)

## 2025-03-27 PROCEDURE — 85610 PROTHROMBIN TIME: CPT

## 2025-03-27 PROCEDURE — 36415 COLL VENOUS BLD VENIPUNCTURE: CPT

## 2025-03-31 ENCOUNTER — HOSPITAL ENCOUNTER (OUTPATIENT)
Age: 70
Setting detail: SPECIMEN
Discharge: HOME | End: 2025-03-31
Payer: MEDICARE

## 2025-03-31 LAB
INR PPP: 3.2
PROTHROMBIN TIME: 34.2 SEC (ref 11.5–14.2)

## 2025-03-31 PROCEDURE — 36415 COLL VENOUS BLD VENIPUNCTURE: CPT

## 2025-03-31 PROCEDURE — 85610 PROTHROMBIN TIME: CPT

## 2025-04-03 ENCOUNTER — HOSPITAL ENCOUNTER (OUTPATIENT)
Age: 70
Setting detail: SPECIMEN
Discharge: HOME | End: 2025-04-03

## 2025-04-03 LAB
INR PPP: 3.2
PROTHROMBIN TIME: 33.9 SEC (ref 11.5–14.2)

## 2025-04-03 PROCEDURE — 36415 COLL VENOUS BLD VENIPUNCTURE: CPT

## 2025-04-03 PROCEDURE — 85610 PROTHROMBIN TIME: CPT

## 2025-04-04 ENCOUNTER — HOSPITAL ENCOUNTER (OUTPATIENT)
Age: 70
Setting detail: SPECIMEN
Discharge: HOME | End: 2025-04-04

## 2025-04-04 LAB
INR PPP: 3.3
PROTHROMBIN TIME: 34.9 SEC (ref 11.5–14.2)

## 2025-04-04 PROCEDURE — 36415 COLL VENOUS BLD VENIPUNCTURE: CPT

## 2025-04-04 PROCEDURE — 85610 PROTHROMBIN TIME: CPT

## 2025-04-11 ENCOUNTER — HOSPITAL ENCOUNTER (OUTPATIENT)
Age: 70
Setting detail: SPECIMEN
Discharge: HOME | End: 2025-04-11

## 2025-04-11 LAB
ANION GAP SERPL CALCULATED.3IONS-SCNC: 10 MMOL/L (ref 9–16)
BUN SERPL-MCNC: 34 MG/DL (ref 8–23)
CALCIUM SERPL-MCNC: 9.1 MG/DL (ref 8.8–10.2)
CHLORIDE SERPL-SCNC: 100 MMOL/L (ref 98–107)
CO2 SERPL-SCNC: 30 MMOL/L (ref 20–31)
CREAT SERPL-MCNC: 1.9 MG/DL (ref 0.5–0.9)
ERYTHROCYTE [DISTWIDTH] IN BLOOD BY AUTOMATED COUNT: 18.6 % (ref 11.8–14.4)
GFR, ESTIMATED: 29 ML/MIN/1.73M2
GLUCOSE SERPL-MCNC: 136 MG/DL (ref 82–115)
HCT VFR BLD AUTO: 25.6 % (ref 36.3–47.1)
HGB BLD-MCNC: 7.6 G/DL (ref 11.9–15.1)
MCH RBC QN AUTO: 26.8 PG (ref 25.2–33.5)
MCHC RBC AUTO-ENTMCNC: 29.7 G/DL (ref 28.4–34.8)
MCV RBC AUTO: 90.1 FL (ref 82.6–102.9)
NRBC BLD-RTO: 0 PER 100 WBC
PLATELET # BLD AUTO: 211 K/UL (ref 138–453)
PMV BLD AUTO: 9 FL (ref 8.1–13.5)
POTASSIUM SERPL-SCNC: 4 MMOL/L (ref 3.7–5.3)
RBC # BLD AUTO: 2.84 M/UL (ref 3.95–5.11)
SODIUM SERPL-SCNC: 139 MMOL/L (ref 136–145)
WBC OTHER # BLD: 4.8 K/UL (ref 3.5–11.3)

## 2025-04-11 PROCEDURE — 36415 COLL VENOUS BLD VENIPUNCTURE: CPT

## 2025-04-11 PROCEDURE — 80048 BASIC METABOLIC PNL TOTAL CA: CPT

## 2025-04-11 PROCEDURE — 85027 COMPLETE CBC AUTOMATED: CPT

## 2025-04-18 ENCOUNTER — HOSPITAL ENCOUNTER (OUTPATIENT)
Age: 70
Setting detail: SPECIMEN
Discharge: HOME | End: 2025-04-18

## 2025-04-18 LAB
ANION GAP SERPL CALCULATED.3IONS-SCNC: 12 MMOL/L (ref 9–16)
BNP SERPL-MCNC: 252 PG/ML (ref 0–125)
BUN SERPL-MCNC: 40 MG/DL (ref 8–23)
CALCIUM SERPL-MCNC: 9.3 MG/DL (ref 8.8–10.2)
CHLORIDE SERPL-SCNC: 98 MMOL/L (ref 98–107)
CO2 SERPL-SCNC: 29 MMOL/L (ref 20–31)
CREAT SERPL-MCNC: 2.1 MG/DL (ref 0.5–0.9)
ERYTHROCYTE [DISTWIDTH] IN BLOOD BY AUTOMATED COUNT: 18.6 % (ref 11.8–14.4)
GFR, ESTIMATED: 26 ML/MIN/1.73M2
GLUCOSE SERPL-MCNC: 117 MG/DL (ref 82–115)
HCT VFR BLD AUTO: 25.1 % (ref 36.3–47.1)
HGB BLD-MCNC: 7.5 G/DL (ref 11.9–15.1)
MCH RBC QN AUTO: 26.8 PG (ref 25.2–33.5)
MCHC RBC AUTO-ENTMCNC: 29.9 G/DL (ref 28.4–34.8)
MCV RBC AUTO: 89.6 FL (ref 82.6–102.9)
NRBC BLD-RTO: 0 PER 100 WBC
PLATELET # BLD AUTO: 198 K/UL (ref 138–453)
PMV BLD AUTO: 8.9 FL (ref 8.1–13.5)
POTASSIUM SERPL-SCNC: 3.8 MMOL/L (ref 3.7–5.3)
RBC # BLD AUTO: 2.8 M/UL (ref 3.95–5.11)
SODIUM SERPL-SCNC: 139 MMOL/L (ref 136–145)
WBC OTHER # BLD: 4.5 K/UL (ref 3.5–11.3)

## 2025-04-18 PROCEDURE — 83880 ASSAY OF NATRIURETIC PEPTIDE: CPT

## 2025-04-18 PROCEDURE — 80048 BASIC METABOLIC PNL TOTAL CA: CPT

## 2025-04-18 PROCEDURE — 85027 COMPLETE CBC AUTOMATED: CPT

## 2025-04-18 PROCEDURE — 36415 COLL VENOUS BLD VENIPUNCTURE: CPT

## 2025-04-25 ENCOUNTER — HOSPITAL ENCOUNTER (OUTPATIENT)
Age: 70
Setting detail: SPECIMEN
Discharge: HOME | End: 2025-04-25

## 2025-04-25 LAB
ANION GAP SERPL CALCULATED.3IONS-SCNC: 11 MMOL/L (ref 9–16)
BUN SERPL-MCNC: 34 MG/DL (ref 8–23)
CALCIUM SERPL-MCNC: 9.2 MG/DL (ref 8.8–10.2)
CHLORIDE SERPL-SCNC: 100 MMOL/L (ref 98–107)
CO2 SERPL-SCNC: 27 MMOL/L (ref 20–31)
CREAT SERPL-MCNC: 1.8 MG/DL (ref 0.5–0.9)
ERYTHROCYTE [DISTWIDTH] IN BLOOD BY AUTOMATED COUNT: 18.7 % (ref 11.8–14.4)
GFR, ESTIMATED: 30 ML/MIN/1.73M2
GLUCOSE SERPL-MCNC: 131 MG/DL (ref 82–115)
HCT VFR BLD AUTO: 23.9 % (ref 36.3–47.1)
HGB BLD-MCNC: 7.2 G/DL (ref 11.9–15.1)
MCH RBC QN AUTO: 27.4 PG (ref 25.2–33.5)
MCHC RBC AUTO-ENTMCNC: 30.1 G/DL (ref 28.4–34.8)
MCV RBC AUTO: 90.9 FL (ref 82.6–102.9)
NRBC BLD-RTO: 0 PER 100 WBC
PLATELET # BLD AUTO: 220 K/UL (ref 138–453)
PMV BLD AUTO: 9.1 FL (ref 8.1–13.5)
POTASSIUM SERPL-SCNC: 3.7 MMOL/L (ref 3.7–5.3)
RBC # BLD AUTO: 2.63 M/UL (ref 3.95–5.11)
SODIUM SERPL-SCNC: 138 MMOL/L (ref 136–145)
WBC OTHER # BLD: 5.8 K/UL (ref 3.5–11.3)

## 2025-04-25 PROCEDURE — 85027 COMPLETE CBC AUTOMATED: CPT

## 2025-04-25 PROCEDURE — 80048 BASIC METABOLIC PNL TOTAL CA: CPT

## 2025-04-25 PROCEDURE — 36415 COLL VENOUS BLD VENIPUNCTURE: CPT

## 2025-04-28 ENCOUNTER — HOSPITAL ENCOUNTER (INPATIENT)
Age: 70
LOS: 16 days | Discharge: NURSING, REHAB OR LONG-TERM CARE | DRG: 167 | End: 2025-05-14
Attending: STUDENT IN AN ORGANIZED HEALTH CARE EDUCATION/TRAINING PROGRAM | Admitting: INTERNAL MEDICINE
Payer: MEDICARE

## 2025-04-28 ENCOUNTER — APPOINTMENT (OUTPATIENT)
Dept: GENERAL RADIOLOGY | Age: 70
DRG: 167 | End: 2025-04-28
Payer: MEDICARE

## 2025-04-28 ENCOUNTER — ANESTHESIA (OUTPATIENT)
Dept: OPERATING ROOM | Age: 70
End: 2025-04-28
Payer: MEDICARE

## 2025-04-28 ENCOUNTER — APPOINTMENT (OUTPATIENT)
Dept: GENERAL RADIOLOGY | Age: 70
End: 2025-04-28
Attending: STUDENT IN AN ORGANIZED HEALTH CARE EDUCATION/TRAINING PROGRAM
Payer: MEDICARE

## 2025-04-28 ENCOUNTER — APPOINTMENT (OUTPATIENT)
Dept: CT IMAGING | Age: 70
DRG: 167 | End: 2025-04-28
Payer: MEDICARE

## 2025-04-28 ENCOUNTER — HOSPITAL ENCOUNTER (EMERGENCY)
Age: 70
Discharge: ANOTHER HOSPITAL | End: 2025-04-28
Attending: EMERGENCY MEDICINE
Payer: MEDICARE

## 2025-04-28 ENCOUNTER — ANESTHESIA EVENT (OUTPATIENT)
Dept: OPERATING ROOM | Age: 70
End: 2025-04-28
Payer: MEDICARE

## 2025-04-28 VITALS
DIASTOLIC BLOOD PRESSURE: 30 MMHG | RESPIRATION RATE: 17 BRPM | HEIGHT: 65 IN | WEIGHT: 293 LBS | TEMPERATURE: 98.9 F | BODY MASS INDEX: 48.82 KG/M2 | SYSTOLIC BLOOD PRESSURE: 137 MMHG | OXYGEN SATURATION: 100 % | HEART RATE: 74 BPM

## 2025-04-28 DIAGNOSIS — J98.8 AIRWAY OBSTRUCTION: ICD-10-CM

## 2025-04-28 DIAGNOSIS — J95.03 TRACHEOSTOMY MALFUNCTION (HCC): Primary | ICD-10-CM

## 2025-04-28 DIAGNOSIS — J39.8 GRANULATION TISSUE OF TRACHEA: Primary | ICD-10-CM

## 2025-04-28 LAB
ALBUMIN SERPL-MCNC: 2.8 G/DL (ref 3.5–5.2)
ALBUMIN SERPL-MCNC: 3.1 G/DL (ref 3.5–5.2)
ALBUMIN/GLOB SERPL: 0.7 {RATIO} (ref 1–2.5)
ALP SERPL-CCNC: 87 U/L (ref 35–104)
ALP SERPL-CCNC: 90 U/L (ref 35–104)
ALT SERPL-CCNC: 11 U/L (ref 10–35)
ALT SERPL-CCNC: 16 U/L (ref 10–35)
ANION GAP SERPL CALCULATED.3IONS-SCNC: 11 MMOL/L (ref 9–16)
ANION GAP SERPL CALCULATED.3IONS-SCNC: 12 MMOL/L (ref 9–16)
ANTI-XA UNFRAC HEPARIN: >2 IU/L
ARTERIAL PATENCY WRIST A: ABNORMAL
AST SERPL-CCNC: 18 U/L (ref 10–35)
AST SERPL-CCNC: 18 U/L (ref 10–35)
BASOPHILS # BLD: 0.07 K/UL (ref 0–0.2)
BASOPHILS # BLD: 0.1 K/UL (ref 0–0.2)
BASOPHILS NFR BLD: 1 % (ref 0–2)
BASOPHILS NFR BLD: 2 % (ref 0–2)
BDY SITE: ABNORMAL
BILIRUB SERPL-MCNC: 0.3 MG/DL (ref 0–1.2)
BILIRUB SERPL-MCNC: 0.4 MG/DL (ref 0–1.2)
BNP SERPL-MCNC: 401 PG/ML (ref 0–300)
BUN BLD-MCNC: 36 MG/DL (ref 8–26)
BUN SERPL-MCNC: 33 MG/DL (ref 8–23)
BUN SERPL-MCNC: 34 MG/DL (ref 8–23)
CA-I BLD-SCNC: 1.28 MMOL/L (ref 1.15–1.33)
CALCIUM SERPL-MCNC: 9.2 MG/DL (ref 8.6–10.4)
CALCIUM SERPL-MCNC: 9.5 MG/DL (ref 8.6–10.4)
CHLORIDE BLD-SCNC: 101 MMOL/L (ref 98–107)
CHLORIDE SERPL-SCNC: 102 MMOL/L (ref 98–107)
CHLORIDE SERPL-SCNC: 99 MMOL/L (ref 98–107)
CO2 BLD CALC-SCNC: 32 MMOL/L (ref 22–30)
CO2 SERPL-SCNC: 25 MMOL/L (ref 20–31)
CO2 SERPL-SCNC: 28 MMOL/L (ref 20–31)
COHGB MFR BLD: 0.2 % (ref 0–5)
CREAT SERPL-MCNC: 1.7 MG/DL (ref 0.6–0.9)
CREAT SERPL-MCNC: 1.9 MG/DL (ref 0.7–1.2)
EGFR, POC: 28 ML/MIN/1.73M2
EOSINOPHIL # BLD: 0 K/UL (ref 0–0.44)
EOSINOPHIL # BLD: 0.2 K/UL (ref 0–0.4)
EOSINOPHILS RELATIVE PERCENT: 0 % (ref 1–4)
EOSINOPHILS RELATIVE PERCENT: 3 % (ref 0–4)
ERYTHROCYTE [DISTWIDTH] IN BLOOD BY AUTOMATED COUNT: 17.7 % (ref 11.8–14.4)
ERYTHROCYTE [DISTWIDTH] IN BLOOD BY AUTOMATED COUNT: 18 % (ref 11.8–14.4)
ERYTHROCYTE [DISTWIDTH] IN BLOOD BY AUTOMATED COUNT: 19.1 % (ref 11.5–14.9)
FLUAV RNA RESP QL NAA+PROBE: NOT DETECTED
FLUBV RNA RESP QL NAA+PROBE: NOT DETECTED
GAS FLOW.O2 O2 DELIVERY SYS: ABNORMAL L/MIN
GFR, ESTIMATED: 28 ML/MIN/1.73M2
GFR, ESTIMATED: 32 ML/MIN/1.73M2
GLUCOSE BLD-MCNC: 187 MG/DL (ref 74–100)
GLUCOSE BLD-MCNC: 197 MG/DL (ref 65–105)
GLUCOSE BLD-MCNC: 208 MG/DL (ref 65–105)
GLUCOSE BLD-MCNC: 223 MG/DL (ref 65–105)
GLUCOSE SERPL-MCNC: 152 MG/DL (ref 74–99)
GLUCOSE SERPL-MCNC: 212 MG/DL (ref 74–99)
HCO3 ARTERIAL: 28.5 MMOL/L (ref 22–26)
HCO3 VENOUS: 32.7 MMOL/L (ref 22–29)
HCT VFR BLD AUTO: 25 % (ref 36–46)
HCT VFR BLD AUTO: 25.2 % (ref 36–46)
HCT VFR BLD AUTO: 26.3 % (ref 36.3–47.1)
HCT VFR BLD AUTO: 27.3 % (ref 36.3–47.1)
HGB BLD-MCNC: 7.3 G/DL (ref 11.9–15.1)
HGB BLD-MCNC: 7.6 G/DL (ref 11.9–15.1)
HGB BLD-MCNC: 8.2 G/DL (ref 12–16)
IMM GRANULOCYTES # BLD AUTO: 0.07 K/UL (ref 0–0.3)
IMM GRANULOCYTES NFR BLD: 1 %
INR PPP: 1.5
INR PPP: 1.6
LACTATE BLDV-SCNC: 0.8 MMOL/L (ref 0.5–1.9)
LYMPHOCYTES NFR BLD: 0.33 K/UL (ref 1.1–3.7)
LYMPHOCYTES NFR BLD: 0.7 K/UL (ref 1–4.8)
LYMPHOCYTES RELATIVE PERCENT: 12 % (ref 24–44)
LYMPHOCYTES RELATIVE PERCENT: 5 % (ref 24–43)
MAGNESIUM SERPL-MCNC: 1.9 MG/DL (ref 1.6–2.4)
MCH RBC QN AUTO: 25.9 PG (ref 25.2–33.5)
MCH RBC QN AUTO: 26.4 PG (ref 25.2–33.5)
MCH RBC QN AUTO: 27.2 PG (ref 26–34)
MCHC RBC AUTO-ENTMCNC: 27.8 G/DL (ref 28.4–34.8)
MCHC RBC AUTO-ENTMCNC: 27.8 G/DL (ref 28.4–34.8)
MCHC RBC AUTO-ENTMCNC: 32.7 G/DL (ref 31–37)
MCV RBC AUTO: 83.3 FL (ref 80–100)
MCV RBC AUTO: 93.2 FL (ref 82.6–102.9)
MCV RBC AUTO: 95.3 FL (ref 82.6–102.9)
METHEMOGLOBIN: 0.3 % (ref 0–1.9)
MONOCYTES NFR BLD: 0.13 K/UL (ref 0.1–1.2)
MONOCYTES NFR BLD: 0.4 K/UL (ref 0.1–1.3)
MONOCYTES NFR BLD: 2 % (ref 3–12)
MONOCYTES NFR BLD: 7 % (ref 1–7)
MORPHOLOGY: ABNORMAL
NEUTROPHILS NFR BLD: 76 % (ref 36–66)
NEUTROPHILS NFR BLD: 91 % (ref 36–65)
NEUTS SEG NFR BLD: 4.3 K/UL (ref 1.3–9.1)
NEUTS SEG NFR BLD: 6 K/UL (ref 1.5–8.1)
NRBC BLD-RTO: 0 PER 100 WBC
NRBC BLD-RTO: 0 PER 100 WBC
O2 SAT, ARTERIAL: 91.2 % (ref 95–98)
O2 SAT, VEN: 90.5 % (ref 60–85)
PARTIAL THROMBOPLASTIN TIME: 28.7 SEC (ref 23–36.5)
PCO2 ARTERIAL: 42 MMHG (ref 35–45)
PCO2 VENOUS: 55.2 MM HG (ref 41–51)
PH ARTERIAL: 7.45 (ref 7.35–7.45)
PH VENOUS: 7.38 (ref 7.32–7.43)
PLATELET # BLD AUTO: 225 K/UL (ref 138–453)
PLATELET # BLD AUTO: 230 K/UL (ref 138–453)
PLATELET # BLD AUTO: 284 K/UL (ref 150–450)
PMV BLD AUTO: 6.2 FL (ref 6–12)
PMV BLD AUTO: 8.4 FL (ref 8.1–13.5)
PMV BLD AUTO: 8.6 FL (ref 8.1–13.5)
PO2 ARTERIAL: 61.3 MMHG (ref 80–100)
PO2 VENOUS: 62.6 MM HG (ref 30–50)
POC ANION GAP: 9 MMOL/L (ref 7–16)
POC CREATININE: 1.9 MG/DL (ref 0.51–1.19)
POC HEMOGLOBIN (CALC): 8.6 G/DL (ref 12–16)
POC LACTIC ACID: 0.6 MMOL/L (ref 0.56–1.39)
POSITIVE BASE EXCESS, ART: 4.1 MMOL/L (ref 0–2)
POSITIVE BASE EXCESS, VEN: 6.6 MMOL/L (ref 0–3)
POTASSIUM BLD-SCNC: 3.7 MMOL/L (ref 3.5–4.5)
POTASSIUM SERPL-SCNC: 3.5 MMOL/L (ref 3.7–5.3)
POTASSIUM SERPL-SCNC: 4.3 MMOL/L (ref 3.7–5.3)
PROCALCITONIN SERPL-MCNC: 0.13 NG/ML (ref 0–0.09)
PROT SERPL-MCNC: 7 G/DL (ref 6.6–8.7)
PROT SERPL-MCNC: 7.6 G/DL (ref 6.6–8.7)
PROTHROMBIN TIME: 19.3 SEC (ref 11.8–14.6)
PROTHROMBIN TIME: 19.4 SEC (ref 11.7–14.9)
PT. POSITION: ABNORMAL
RBC # BLD AUTO: 2.76 M/UL (ref 3.95–5.11)
RBC # BLD AUTO: 2.93 M/UL (ref 3.95–5.11)
RBC # BLD AUTO: 3.02 M/UL (ref 4–5.2)
RESPIRATORY RATE: 21
SARS-COV-2 RNA RESP QL NAA+PROBE: NOT DETECTED
SODIUM BLD-SCNC: 141 MMOL/L (ref 138–146)
SODIUM SERPL-SCNC: 138 MMOL/L (ref 136–145)
SODIUM SERPL-SCNC: 139 MMOL/L (ref 136–145)
SOURCE: NORMAL
SPECIMEN DESCRIPTION: NORMAL
TEXT FOR RESPIRATORY: ABNORMAL
TROPONIN I SERPL HS-MCNC: 28 NG/L (ref 0–14)
TROPONIN I SERPL HS-MCNC: 31 NG/L (ref 0–14)
TROPONIN I SERPL HS-MCNC: 32 NG/L (ref 0–14)
WBC OTHER # BLD: 5.7 K/UL (ref 3.5–11)
WBC OTHER # BLD: 6.5 K/UL (ref 3.5–11.3)
WBC OTHER # BLD: 6.6 K/UL (ref 3.5–11.3)

## 2025-04-28 PROCEDURE — 6370000000 HC RX 637 (ALT 250 FOR IP): Performed by: OTOLARYNGOLOGY

## 2025-04-28 PROCEDURE — 99291 CRITICAL CARE FIRST HOUR: CPT | Performed by: INTERNAL MEDICINE

## 2025-04-28 PROCEDURE — 31614 TRACHEOSTOMA REVJ COMPLEX: CPT | Performed by: OTOLARYNGOLOGY

## 2025-04-28 PROCEDURE — 2709999900 HC NON-CHARGEABLE SUPPLY: Performed by: OTOLARYNGOLOGY

## 2025-04-28 PROCEDURE — 84145 PROCALCITONIN (PCT): CPT

## 2025-04-28 PROCEDURE — 2580000003 HC RX 258

## 2025-04-28 PROCEDURE — 71045 X-RAY EXAM CHEST 1 VIEW: CPT

## 2025-04-28 PROCEDURE — 3700000000 HC ANESTHESIA ATTENDED CARE: Performed by: OTOLARYNGOLOGY

## 2025-04-28 PROCEDURE — 82803 BLOOD GASES ANY COMBINATION: CPT

## 2025-04-28 PROCEDURE — 87040 BLOOD CULTURE FOR BACTERIA: CPT

## 2025-04-28 PROCEDURE — 85610 PROTHROMBIN TIME: CPT

## 2025-04-28 PROCEDURE — 6360000002 HC RX W HCPCS

## 2025-04-28 PROCEDURE — 96374 THER/PROPH/DIAG INJ IV PUSH: CPT

## 2025-04-28 PROCEDURE — 85027 COMPLETE CBC AUTOMATED: CPT

## 2025-04-28 PROCEDURE — 85520 HEPARIN ASSAY: CPT

## 2025-04-28 PROCEDURE — 6360000002 HC RX W HCPCS: Performed by: EMERGENCY MEDICINE

## 2025-04-28 PROCEDURE — 99285 EMERGENCY DEPT VISIT HI MDM: CPT

## 2025-04-28 PROCEDURE — 82947 ASSAY GLUCOSE BLOOD QUANT: CPT

## 2025-04-28 PROCEDURE — 2720000010 HC SURG SUPPLY STERILE: Performed by: OTOLARYNGOLOGY

## 2025-04-28 PROCEDURE — 36415 COLL VENOUS BLD VENIPUNCTURE: CPT

## 2025-04-28 PROCEDURE — 85025 COMPLETE CBC W/AUTO DIFF WBC: CPT

## 2025-04-28 PROCEDURE — 2500000003 HC RX 250 WO HCPCS: Performed by: OTOLARYNGOLOGY

## 2025-04-28 PROCEDURE — 83605 ASSAY OF LACTIC ACID: CPT

## 2025-04-28 PROCEDURE — 3700000001 HC ADD 15 MINUTES (ANESTHESIA): Performed by: OTOLARYNGOLOGY

## 2025-04-28 PROCEDURE — 2709999900 HC NON-CHARGEABLE SUPPLY

## 2025-04-28 PROCEDURE — 2700000000 HC OXYGEN THERAPY PER DAY

## 2025-04-28 PROCEDURE — 2500000003 HC RX 250 WO HCPCS

## 2025-04-28 PROCEDURE — 96375 TX/PRO/DX INJ NEW DRUG ADDON: CPT

## 2025-04-28 PROCEDURE — 94002 VENT MGMT INPAT INIT DAY: CPT

## 2025-04-28 PROCEDURE — 94640 AIRWAY INHALATION TREATMENT: CPT

## 2025-04-28 PROCEDURE — 83735 ASSAY OF MAGNESIUM: CPT

## 2025-04-28 PROCEDURE — 2000000000 HC ICU R&B

## 2025-04-28 PROCEDURE — 87641 MR-STAPH DNA AMP PROBE: CPT

## 2025-04-28 PROCEDURE — 6370000000 HC RX 637 (ALT 250 FOR IP)

## 2025-04-28 PROCEDURE — 82565 ASSAY OF CREATININE: CPT

## 2025-04-28 PROCEDURE — 82805 BLOOD GASES W/O2 SATURATION: CPT

## 2025-04-28 PROCEDURE — 31622 DX BRONCHOSCOPE/WASH: CPT | Performed by: OTOLARYNGOLOGY

## 2025-04-28 PROCEDURE — 84520 ASSAY OF UREA NITROGEN: CPT

## 2025-04-28 PROCEDURE — 80053 COMPREHEN METABOLIC PANEL: CPT

## 2025-04-28 PROCEDURE — 5A1955Z RESPIRATORY VENTILATION, GREATER THAN 96 CONSECUTIVE HOURS: ICD-10-PCS | Performed by: INTERNAL MEDICINE

## 2025-04-28 PROCEDURE — 85014 HEMATOCRIT: CPT

## 2025-04-28 PROCEDURE — 85730 THROMBOPLASTIN TIME PARTIAL: CPT

## 2025-04-28 PROCEDURE — 3600000005 HC SURGERY LEVEL 5 BASE: Performed by: OTOLARYNGOLOGY

## 2025-04-28 PROCEDURE — 36600 WITHDRAWAL OF ARTERIAL BLOOD: CPT

## 2025-04-28 PROCEDURE — 84484 ASSAY OF TROPONIN QUANT: CPT

## 2025-04-28 PROCEDURE — 94761 N-INVAS EAR/PLS OXIMETRY MLT: CPT

## 2025-04-28 PROCEDURE — 0BW18FZ REVISION OF TRACHEOSTOMY DEVICE IN TRACHEA, VIA NATURAL OR ARTIFICIAL OPENING ENDOSCOPIC: ICD-10-PCS | Performed by: OTOLARYNGOLOGY

## 2025-04-28 PROCEDURE — 82330 ASSAY OF CALCIUM: CPT

## 2025-04-28 PROCEDURE — 0B21XFZ CHANGE TRACHEOSTOMY DEVICE IN TRACHEA, EXTERNAL APPROACH: ICD-10-PCS | Performed by: OTOLARYNGOLOGY

## 2025-04-28 PROCEDURE — 2720000010 HC SURG SUPPLY STERILE

## 2025-04-28 PROCEDURE — 83880 ASSAY OF NATRIURETIC PEPTIDE: CPT

## 2025-04-28 PROCEDURE — 3600000015 HC SURGERY LEVEL 5 ADDTL 15MIN: Performed by: OTOLARYNGOLOGY

## 2025-04-28 PROCEDURE — 6360000002 HC RX W HCPCS: Performed by: INTERNAL MEDICINE

## 2025-04-28 PROCEDURE — 80051 ELECTROLYTE PANEL: CPT

## 2025-04-28 PROCEDURE — 20100 EXPL PENTRG WOUND NECK: CPT | Performed by: OTOLARYNGOLOGY

## 2025-04-28 PROCEDURE — 87636 SARSCOV2 & INF A&B AMP PRB: CPT

## 2025-04-28 RX ORDER — ONDANSETRON 4 MG/1
4 TABLET, ORALLY DISINTEGRATING ORAL EVERY 8 HOURS PRN
Status: DISCONTINUED | OUTPATIENT
Start: 2025-04-28 | End: 2025-05-14 | Stop reason: HOSPADM

## 2025-04-28 RX ORDER — POLYETHYLENE GLYCOL 3350 17 G/17G
17 POWDER, FOR SOLUTION ORAL DAILY PRN
Status: DISCONTINUED | OUTPATIENT
Start: 2025-04-28 | End: 2025-05-14 | Stop reason: HOSPADM

## 2025-04-28 RX ORDER — ALBUTEROL SULFATE 0.83 MG/ML
SOLUTION RESPIRATORY (INHALATION)
Status: COMPLETED
Start: 2025-04-28 | End: 2025-04-28

## 2025-04-28 RX ORDER — SODIUM CHLORIDE, SODIUM LACTATE, POTASSIUM CHLORIDE, CALCIUM CHLORIDE 600; 310; 30; 20 MG/100ML; MG/100ML; MG/100ML; MG/100ML
INJECTION, SOLUTION INTRAVENOUS
Status: DISCONTINUED | OUTPATIENT
Start: 2025-04-28 | End: 2025-04-28 | Stop reason: SDUPTHER

## 2025-04-28 RX ORDER — ACETAMINOPHEN 325 MG/1
650 TABLET ORAL EVERY 6 HOURS PRN
Status: DISCONTINUED | OUTPATIENT
Start: 2025-04-28 | End: 2025-05-14 | Stop reason: HOSPADM

## 2025-04-28 RX ORDER — GLYCOPYRROLATE 0.2 MG/ML
INJECTION INTRAMUSCULAR; INTRAVENOUS
Status: DISCONTINUED | OUTPATIENT
Start: 2025-04-28 | End: 2025-04-28 | Stop reason: SDUPTHER

## 2025-04-28 RX ORDER — HYDRALAZINE HYDROCHLORIDE 20 MG/ML
10 INJECTION INTRAMUSCULAR; INTRAVENOUS EVERY 6 HOURS PRN
Status: DISCONTINUED | OUTPATIENT
Start: 2025-04-28 | End: 2025-05-14 | Stop reason: HOSPADM

## 2025-04-28 RX ORDER — 0.9 % SODIUM CHLORIDE 0.9 %
500 INTRAVENOUS SOLUTION INTRAVENOUS ONCE
Status: COMPLETED | OUTPATIENT
Start: 2025-04-28 | End: 2025-04-28

## 2025-04-28 RX ORDER — POTASSIUM CHLORIDE 29.8 MG/ML
20 INJECTION INTRAVENOUS PRN
Status: DISCONTINUED | OUTPATIENT
Start: 2025-04-28 | End: 2025-05-14 | Stop reason: HOSPADM

## 2025-04-28 RX ORDER — ENOXAPARIN SODIUM 100 MG/ML
40 INJECTION SUBCUTANEOUS DAILY
Status: DISCONTINUED | OUTPATIENT
Start: 2025-04-28 | End: 2025-04-28

## 2025-04-28 RX ORDER — OXYMETAZOLINE HYDROCHLORIDE 0.05 G/100ML
SPRAY NASAL PRN
Status: DISCONTINUED | OUTPATIENT
Start: 2025-04-28 | End: 2025-04-28 | Stop reason: ALTCHOICE

## 2025-04-28 RX ORDER — MONTELUKAST SODIUM 10 MG/1
10 TABLET ORAL NIGHTLY
Status: DISCONTINUED | OUTPATIENT
Start: 2025-04-28 | End: 2025-05-14 | Stop reason: HOSPADM

## 2025-04-28 RX ORDER — FUROSEMIDE 10 MG/ML
20 INJECTION INTRAMUSCULAR; INTRAVENOUS DAILY
Status: DISCONTINUED | OUTPATIENT
Start: 2025-04-28 | End: 2025-04-30

## 2025-04-28 RX ORDER — HEPARIN SODIUM 10000 [USP'U]/100ML
5-30 INJECTION, SOLUTION INTRAVENOUS CONTINUOUS
Status: DISCONTINUED | OUTPATIENT
Start: 2025-04-28 | End: 2025-04-30

## 2025-04-28 RX ORDER — FENTANYL CITRATE 50 UG/ML
INJECTION, SOLUTION INTRAMUSCULAR; INTRAVENOUS
Status: DISCONTINUED | OUTPATIENT
Start: 2025-04-28 | End: 2025-04-28 | Stop reason: SDUPTHER

## 2025-04-28 RX ORDER — INSULIN GLARGINE 100 [IU]/ML
8 INJECTION, SOLUTION SUBCUTANEOUS NIGHTLY
Status: DISCONTINUED | OUTPATIENT
Start: 2025-04-28 | End: 2025-05-14 | Stop reason: HOSPADM

## 2025-04-28 RX ORDER — METOPROLOL TARTRATE 25 MG/1
37.5 TABLET, FILM COATED ORAL 2 TIMES DAILY
Status: DISCONTINUED | OUTPATIENT
Start: 2025-04-28 | End: 2025-05-14 | Stop reason: HOSPADM

## 2025-04-28 RX ORDER — ONDANSETRON 2 MG/ML
4 INJECTION INTRAMUSCULAR; INTRAVENOUS EVERY 6 HOURS PRN
Status: DISCONTINUED | OUTPATIENT
Start: 2025-04-28 | End: 2025-05-14 | Stop reason: HOSPADM

## 2025-04-28 RX ORDER — FENTANYL CITRATE 0.05 MG/ML
50 INJECTION, SOLUTION INTRAMUSCULAR; INTRAVENOUS ONCE
Status: COMPLETED | OUTPATIENT
Start: 2025-04-28 | End: 2025-04-28

## 2025-04-28 RX ORDER — HEPARIN SODIUM 1000 [USP'U]/ML
5000 INJECTION, SOLUTION INTRAVENOUS; SUBCUTANEOUS PRN
Status: DISCONTINUED | OUTPATIENT
Start: 2025-04-28 | End: 2025-04-30

## 2025-04-28 RX ORDER — MAGNESIUM SULFATE IN WATER 40 MG/ML
2000 INJECTION, SOLUTION INTRAVENOUS PRN
Status: DISCONTINUED | OUTPATIENT
Start: 2025-04-28 | End: 2025-05-14 | Stop reason: HOSPADM

## 2025-04-28 RX ORDER — ROCURONIUM BROMIDE 10 MG/ML
INJECTION, SOLUTION INTRAVENOUS
Status: DISCONTINUED | OUTPATIENT
Start: 2025-04-28 | End: 2025-04-28 | Stop reason: SDUPTHER

## 2025-04-28 RX ORDER — MIDAZOLAM HYDROCHLORIDE 2 MG/2ML
2 INJECTION, SOLUTION INTRAMUSCULAR; INTRAVENOUS ONCE
Status: COMPLETED | OUTPATIENT
Start: 2025-04-28 | End: 2025-04-28

## 2025-04-28 RX ORDER — HEPARIN SODIUM 10000 [USP'U]/100ML
5-30 INJECTION, SOLUTION INTRAVENOUS CONTINUOUS
Status: DISCONTINUED | OUTPATIENT
Start: 2025-04-28 | End: 2025-04-28

## 2025-04-28 RX ORDER — ALBUTEROL SULFATE 0.83 MG/ML
2.5 SOLUTION RESPIRATORY (INHALATION) ONCE
Status: COMPLETED | OUTPATIENT
Start: 2025-04-28 | End: 2025-04-28

## 2025-04-28 RX ORDER — SODIUM CHLORIDE 0.9 % (FLUSH) 0.9 %
5-40 SYRINGE (ML) INJECTION EVERY 12 HOURS SCHEDULED
Status: DISCONTINUED | OUTPATIENT
Start: 2025-04-28 | End: 2025-05-14 | Stop reason: HOSPADM

## 2025-04-28 RX ORDER — HEPARIN SODIUM 1000 [USP'U]/ML
10000 INJECTION, SOLUTION INTRAVENOUS; SUBCUTANEOUS ONCE
Status: DISCONTINUED | OUTPATIENT
Start: 2025-04-28 | End: 2025-04-28

## 2025-04-28 RX ORDER — HEPARIN SODIUM 1000 [USP'U]/ML
10000 INJECTION, SOLUTION INTRAVENOUS; SUBCUTANEOUS PRN
Status: DISCONTINUED | OUTPATIENT
Start: 2025-04-28 | End: 2025-04-28

## 2025-04-28 RX ORDER — ACETAMINOPHEN 650 MG/1
650 SUPPOSITORY RECTAL EVERY 6 HOURS PRN
Status: DISCONTINUED | OUTPATIENT
Start: 2025-04-28 | End: 2025-05-14 | Stop reason: HOSPADM

## 2025-04-28 RX ORDER — INSULIN LISPRO 100 [IU]/ML
0-8 INJECTION, SOLUTION INTRAVENOUS; SUBCUTANEOUS
Status: DISCONTINUED | OUTPATIENT
Start: 2025-04-28 | End: 2025-05-14 | Stop reason: HOSPADM

## 2025-04-28 RX ORDER — DEXMEDETOMIDINE HYDROCHLORIDE 100 UG/ML
INJECTION, SOLUTION INTRAVENOUS
Status: DISCONTINUED | OUTPATIENT
Start: 2025-04-28 | End: 2025-04-28 | Stop reason: SDUPTHER

## 2025-04-28 RX ORDER — MAGNESIUM HYDROXIDE 1200 MG/15ML
LIQUID ORAL CONTINUOUS PRN
Status: COMPLETED | OUTPATIENT
Start: 2025-04-28 | End: 2025-04-28

## 2025-04-28 RX ORDER — HEPARIN SODIUM 1000 [USP'U]/ML
10000 INJECTION, SOLUTION INTRAVENOUS; SUBCUTANEOUS PRN
Status: DISCONTINUED | OUTPATIENT
Start: 2025-04-28 | End: 2025-04-30

## 2025-04-28 RX ORDER — 0.9 % SODIUM CHLORIDE 0.9 %
500 INTRAVENOUS SOLUTION INTRAVENOUS ONCE
Status: DISCONTINUED | OUTPATIENT
Start: 2025-04-28 | End: 2025-04-28

## 2025-04-28 RX ORDER — DEXAMETHASONE SODIUM PHOSPHATE 10 MG/ML
10 INJECTION, SOLUTION INTRAMUSCULAR; INTRAVENOUS ONCE
Status: COMPLETED | OUTPATIENT
Start: 2025-04-28 | End: 2025-04-28

## 2025-04-28 RX ORDER — HEPARIN SODIUM 1000 [USP'U]/ML
5000 INJECTION, SOLUTION INTRAVENOUS; SUBCUTANEOUS PRN
Status: DISCONTINUED | OUTPATIENT
Start: 2025-04-28 | End: 2025-04-28

## 2025-04-28 RX ORDER — AMIODARONE HYDROCHLORIDE 200 MG/1
200 TABLET ORAL 2 TIMES DAILY
Status: DISCONTINUED | OUTPATIENT
Start: 2025-04-28 | End: 2025-05-14 | Stop reason: HOSPADM

## 2025-04-28 RX ORDER — SODIUM CHLORIDE 0.9 % (FLUSH) 0.9 %
5-40 SYRINGE (ML) INJECTION PRN
Status: DISCONTINUED | OUTPATIENT
Start: 2025-04-28 | End: 2025-05-14 | Stop reason: HOSPADM

## 2025-04-28 RX ORDER — HEPARIN SODIUM 1000 [USP'U]/ML
10000 INJECTION, SOLUTION INTRAVENOUS; SUBCUTANEOUS ONCE
Status: DISCONTINUED | OUTPATIENT
Start: 2025-04-28 | End: 2025-04-30

## 2025-04-28 RX ORDER — PRAVASTATIN SODIUM 20 MG
80 TABLET ORAL EVERY EVENING
Status: DISCONTINUED | OUTPATIENT
Start: 2025-04-28 | End: 2025-05-14 | Stop reason: HOSPADM

## 2025-04-28 RX ORDER — POTASSIUM CHLORIDE 7.45 MG/ML
10 INJECTION INTRAVENOUS PRN
Status: DISCONTINUED | OUTPATIENT
Start: 2025-04-28 | End: 2025-05-14 | Stop reason: HOSPADM

## 2025-04-28 RX ORDER — IPRATROPIUM BROMIDE AND ALBUTEROL SULFATE 2.5; .5 MG/3ML; MG/3ML
1 SOLUTION RESPIRATORY (INHALATION) EVERY 4 HOURS
Status: DISCONTINUED | OUTPATIENT
Start: 2025-04-28 | End: 2025-04-29

## 2025-04-28 RX ORDER — SODIUM CHLORIDE 9 MG/ML
INJECTION, SOLUTION INTRAVENOUS PRN
Status: DISCONTINUED | OUTPATIENT
Start: 2025-04-28 | End: 2025-05-14 | Stop reason: HOSPADM

## 2025-04-28 RX ORDER — PROPOFOL 10 MG/ML
INJECTION, EMULSION INTRAVENOUS
Status: DISCONTINUED | OUTPATIENT
Start: 2025-04-28 | End: 2025-04-28 | Stop reason: SDUPTHER

## 2025-04-28 RX ADMIN — IPRATROPIUM BROMIDE AND ALBUTEROL SULFATE 1 DOSE: 2.5; .5 SOLUTION RESPIRATORY (INHALATION) at 23:31

## 2025-04-28 RX ADMIN — DEXMEDETOMIDINE HYDROCHLORIDE 8 MCG: 100 INJECTION, SOLUTION INTRAVENOUS at 11:21

## 2025-04-28 RX ADMIN — PHENYLEPHRINE HYDROCHLORIDE 100 MCG: 10 INJECTION INTRAVENOUS at 11:35

## 2025-04-28 RX ADMIN — SODIUM CHLORIDE 500 ML: 0.9 INJECTION, SOLUTION INTRAVENOUS at 13:32

## 2025-04-28 RX ADMIN — HEPARIN SODIUM 12 UNITS/KG/HR: 10000 INJECTION, SOLUTION INTRAVENOUS at 20:44

## 2025-04-28 RX ADMIN — ALBUTEROL SULFATE 2.5 MG: 2.5 SOLUTION RESPIRATORY (INHALATION) at 08:15

## 2025-04-28 RX ADMIN — DEXMEDETOMIDINE HYDROCHLORIDE 12 MCG: 100 INJECTION, SOLUTION INTRAVENOUS at 11:19

## 2025-04-28 RX ADMIN — IPRATROPIUM BROMIDE AND ALBUTEROL SULFATE 1 DOSE: 2.5; .5 SOLUTION RESPIRATORY (INHALATION) at 19:37

## 2025-04-28 RX ADMIN — PHENYLEPHRINE HYDROCHLORIDE 100 MCG: 10 INJECTION INTRAVENOUS at 11:43

## 2025-04-28 RX ADMIN — MIDAZOLAM 2 MG: 1 INJECTION INTRAMUSCULAR; INTRAVENOUS at 08:23

## 2025-04-28 RX ADMIN — ENOXAPARIN SODIUM 40 MG: 100 INJECTION SUBCUTANEOUS at 17:13

## 2025-04-28 RX ADMIN — SODIUM CHLORIDE, PRESERVATIVE FREE 10 ML: 5 INJECTION INTRAVENOUS at 20:27

## 2025-04-28 RX ADMIN — HEPARIN SODIUM 12 UNITS/KG/HR: 10000 INJECTION, SOLUTION INTRAVENOUS at 20:33

## 2025-04-28 RX ADMIN — ALBUTEROL SULFATE 2.5 MG: 0.83 SOLUTION RESPIRATORY (INHALATION) at 08:15

## 2025-04-28 RX ADMIN — SODIUM CHLORIDE, PRESERVATIVE FREE 10 ML: 5 INJECTION INTRAVENOUS at 13:32

## 2025-04-28 RX ADMIN — MICONAZOLE NITRATE: 20 POWDER TOPICAL at 20:50

## 2025-04-28 RX ADMIN — PHENYLEPHRINE HYDROCHLORIDE 100 MCG: 10 INJECTION INTRAVENOUS at 11:47

## 2025-04-28 RX ADMIN — SODIUM CHLORIDE, POTASSIUM CHLORIDE, SODIUM LACTATE AND CALCIUM CHLORIDE: 600; 310; 30; 20 INJECTION, SOLUTION INTRAVENOUS at 12:01

## 2025-04-28 RX ADMIN — PROPOFOL 50 MG: 10 INJECTION, EMULSION INTRAVENOUS at 11:48

## 2025-04-28 RX ADMIN — INSULIN GLARGINE 8 UNITS: 100 INJECTION, SOLUTION SUBCUTANEOUS at 20:48

## 2025-04-28 RX ADMIN — INSULIN LISPRO 2 UNITS: 100 INJECTION, SOLUTION INTRAVENOUS; SUBCUTANEOUS at 20:26

## 2025-04-28 RX ADMIN — SODIUM CHLORIDE, POTASSIUM CHLORIDE, SODIUM LACTATE AND CALCIUM CHLORIDE: 600; 310; 30; 20 INJECTION, SOLUTION INTRAVENOUS at 11:11

## 2025-04-28 RX ADMIN — IPRATROPIUM BROMIDE AND ALBUTEROL SULFATE 1 DOSE: 2.5; .5 SOLUTION RESPIRATORY (INHALATION) at 15:44

## 2025-04-28 RX ADMIN — FENTANYL CITRATE 50 MCG: 50 INJECTION INTRAMUSCULAR; INTRAVENOUS at 08:22

## 2025-04-28 RX ADMIN — DEXAMETHASONE SODIUM PHOSPHATE 10 MG: 10 INJECTION, SOLUTION INTRAMUSCULAR; INTRAVENOUS at 08:25

## 2025-04-28 RX ADMIN — Medication 50 MG: at 11:24

## 2025-04-28 RX ADMIN — Medication 50 MG: at 11:27

## 2025-04-28 RX ADMIN — INSULIN LISPRO 2 UNITS: 100 INJECTION, SOLUTION INTRAVENOUS; SUBCUTANEOUS at 17:39

## 2025-04-28 RX ADMIN — CEFEPIME 2000 MG: 2 INJECTION, POWDER, FOR SOLUTION INTRAVENOUS at 10:23

## 2025-04-28 RX ADMIN — PROPOFOL 50 MG: 10 INJECTION, EMULSION INTRAVENOUS at 11:46

## 2025-04-28 RX ADMIN — SODIUM CHLORIDE, PRESERVATIVE FREE 40 MG: 5 INJECTION INTRAVENOUS at 17:13

## 2025-04-28 RX ADMIN — ROCURONIUM BROMIDE 50 MG: 10 INJECTION INTRAVENOUS at 11:49

## 2025-04-28 RX ADMIN — GLYCOPYRROLATE 0.2 MG: 0.2 INJECTION INTRAMUSCULAR; INTRAVENOUS at 11:13

## 2025-04-28 RX ADMIN — PROPOFOL 50 MG: 10 INJECTION, EMULSION INTRAVENOUS at 11:32

## 2025-04-28 RX ADMIN — DEXMEDETOMIDINE HYDROCHLORIDE 8 MCG: 100 INJECTION, SOLUTION INTRAVENOUS at 11:31

## 2025-04-28 RX ADMIN — FENTANYL CITRATE 100 MCG: 50 INJECTION, SOLUTION INTRAMUSCULAR; INTRAVENOUS at 11:34

## 2025-04-28 RX ADMIN — DEXMEDETOMIDINE HYDROCHLORIDE 8 MCG: 100 INJECTION, SOLUTION INTRAVENOUS at 11:22

## 2025-04-28 ASSESSMENT — PULMONARY FUNCTION TESTS
PIF_VALUE: 18
PIF_VALUE: 29
PIF_VALUE: 23
PIF_VALUE: 17
PIF_VALUE: 18

## 2025-04-28 ASSESSMENT — PAIN SCALES - GENERAL: PAINLEVEL_OUTOF10: 0

## 2025-04-28 NOTE — BRIEF OP NOTE
Brief Postoperative Note      Patient: Shazia Nuñez  YOB: 1955  MRN: [id number]    Date of Procedure: 4/28/2025    Pre-Op Diagnosis Codes:      * Airway compromise [J98.8]    Post-Op Diagnosis: Same       Procedure(s):  FLEXIBLE BRONCHOSCOPY, TRACH REVISION    Surgeon(s):  Richard Kat, Riky Crain, MD Jarvis, Naseem CORNELL MD    Assistant:  * No surgical staff found *    Anesthesia: General    Estimated Blood Loss (mL): Minimal    Complications: None    Specimens:   * No specimens in log *    Implants:  * No implants in log *      Drains: * No LDAs found *    Findings:  Infection Present At Time Of Surgery (PATOS) (choose all levels that have infection present):  No infection present  Other Findings:  Huge left paratracheal false passage with trach in false passage on arrival.   Trach replaced into trachea under direct visualization and confirmed with flexible bronchoscopy  6-0 Proximal XLT Shiley placed, sutured in place      \Will need post-procedure CXR on arrival to PICU to evaluate for pneumothorax/pneumomediastinum  (intra-op portable x-ray could not penetrate soft tissue effectively)    Electronically signed by Naseem Jarvis MD on 4/28/2025 at 12:20 PM

## 2025-04-28 NOTE — ED PROVIDER NOTES
[facility] EMERGENCY DEPARTMENT  Emergency Department Encounter  Emergency Medicine Resident     Pt Name:Shazia Nuñez  MRN: [id number]  Birthdate 1955  Date of evaluation: 4/28/25  PCP:  [facility]  Note Started: 10:25 AM EDT      CHIEF COMPLAINT       Chief Complaint   Patient presents with    Respiratory Distress       HISTORY OF PRESENT ILLNESS  (Location/Symptom, Timing/Onset, Context/Setting, Quality, Duration, Modifying Factors, Severity.)      Shazia Nuñez is a 69 y.o. female who presents with tracheostomy malfunction.  Patient resides at a skilled nursing facility, attempt was made to replace trach today.  Patient subsequently became short of breath and was sent to outlying facility for further evaluation.  X-ray showed small right apical pneumo, extensive pneumomediastinum.  Pulmonology performed bedside bronchoscopy, possible large mass/granuloma tissue visualized obstructing trachea, worse with expiration.    Upon initial evaluation patient has left sided chest pressure and neck pain.  She denies any overt chest pain or shortness of breath.  No recent fever, chills, myalgias night sweats.    PAST MEDICAL / SURGICAL / SOCIAL / FAMILY HISTORY      has a past medical history of Anxiety, Asthma, Atrial fibrillation (HCC), Bronchitis, COPD (chronic obstructive pulmonary disease) (HCC), DDD (degenerative disc disease), lumbar, Depression, Diabetes mellitus (HCC), Elevated glucose, Headache(784.0), Hernia of abdominal cavity, HH (hiatus hernia), Hyperlipemia, mixed, Hypertension, Osteoarthritis, Right femoral vein DVT (HCC), and Uterine hyperplasia.       has a past surgical history that includes Dilation and curettage of uterus (10/08 and 09/07); Breast reduction surgery (12/01/1997); Breast reduction surgery (12/01/1997); Tympanostomy tube placement (03/01/1967); Tonsillectomy and adenoidectomy (01/01/1962); Hysterectomy (07/17/2015); tracheostomy (N/A,

## 2025-04-28 NOTE — ED TRIAGE NOTES
Mode of arrival (squad #, walk in, police, etc) : EMS        Chief complaint(s): Tracheostomy problem, Hypoxemia        Arrival Note (brief scenario, treatment PTA, etc).: Patient came to ED transported via EMS with the above complaints. Patient is coming from [facility], accdg to EMS they tried to change the tracheostomy in the facility and feels like something is not right so they decided to send her here in the ED. Upon arrival patient is hypoxic with an O2 sat of 66%.         C= \"Have you ever felt that you should Cut down on your drinking?\"  ARIES  A= \"Have people Annoyed you by criticizing your drinking?\"  ARIES  G= \"Have you ever felt bad or Guilty about your drinking?\"  ARIES  E= \"Have you ever had a drink as an Eye-opener first thing in the morning to steady your nerves or to help a hangover?\"  ARIES    Deferred []      Reason for deferring: N/A    *If yes to two or more: probable alcohol abuse.*

## 2025-04-28 NOTE — ED PROVIDER NOTES
[facility]     Emergency Department     Faculty Attestation    I performed a history and physical examination of the patient and discussed management with the resident. I have reviewed and agree with the resident’s findings including all diagnostic interpretations, and treatment plans as written at the time of my review. Any areas of disagreement are noted on the chart. I was personally present for the key portions of any procedures. I have documented in the chart those procedures where I was not present during the key portions. For Physician Assistant/ Nurse Practitioner cases/documentation I have personally evaluated this patient and have completed at least one if not all key elements of the E/M (history, physical exam, and MDM). Additional findings are as noted.    PtName: Shazia Nuñez  MRN: [id number]  Birthdate 1955  Date of evaluation: 4/28/25  Note Started: 9:53 AM EDT    Primary Care Physician: [facility]    Brief HPI:  Patient is a 69-year-old female presenting to the emergency department with difficulty breathing.  The patient had attempted trach exchange at her care facility this morning.  Afterwards had oxygen desaturation and neck pain.  Patient was evaluated outside hospital emergency department was found to have subcutaneous air in the chest and neck, possible pneumothorax.  She had a bronchoscopy which revealed soft tissue mass distal to the trach site.  Patient was transferred on nonrebreather.  She reports continued left-sided neck pain, shortness of breath.    Pertinent Physical Exam Findings:  Vitals:    04/28/25 0948   BP: (!) 143/53   Pulse: 78   Resp: 14   SpO2: 100%   Appears uncomfortable, tachypneic however no acute respiratory distress, trach site with dried blood, tracheostomy tube is in place, bilateral lung sounds present.    Medical Decision Making: Patient is a 69 y.o. female presenting to the emergency

## 2025-04-28 NOTE — CONSULTS
[facility]   CONSULT NOTE:      DATE OF CONSULT 4/28/2025    REASON FOR CONSULTATION:  Pulmonary and critical care management      PCP [facility]     CHIEF COMPLAINT: Acute on chronic hypoxic respiratory failure, tracheostomy dislodged    HISTORY OF PRESENT ILLNESS:     Tara is a morbidly obese 69-year-old white female with a history of SHWETA/OHS noncompliant with BiPAP.  She is known to us from her admissions in early July of 2024.  She presented on 2 occasions with acute on chronic hypoxic and hypercapnic respiratory failure, with altered mental status.  On her first admission, she was rescued with noninvasive ventilation, and did not require intubation.  She was sent back to the [facility] where she promptly refused to wear her BiPAP.  Subsequently she was readmitted to at the end of July and was essentially BiPAP dependent and underwent emergent tracheostomy on July 26 (Brittani #7 XLT).  The patient was eventually placed on nocturnal ventilation and discharged to the [facility].  Subsequently, in September she was seen in the office and then emergently sent to the ER where she had to be intubated because she cannot be ventilator around her tracheostomy.  She was then transferred to [facility] where tracheostomy revision was done on September 16 by ENT (Dr. Riky Andrade).  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    She was admitted in March of this year to [place] because of increased tracheal secretions, she was placed on broad-spectrum antibiotics pulmonary hygiene and bronchodilators.  However these were stopped from what I can see and she underwent an EGD and colonoscopy which did not show any acute evidence of bleeding.  She was anemic and also she had been on warfarin for previous PE in 2018.  Her INR was subtherapeutic at that time.  At that time, she was on the ventilator she was

## 2025-04-28 NOTE — ED PROVIDER NOTES
EMERGENCY DEPARTMENT ENCOUNTER    Pt Name: Shazia Nuñez  MRN: [id number]  Birthdate 1955  Date of evaluation: 4/28/25  CHIEF COMPLAINT       Chief Complaint   Patient presents with    tracheostomy problem     HISTORY OF PRESENT ILLNESS   This is a trach and vented patient presenting from a nursing home for tracheostomy problem.  They said they were attempting to change out her tracheostomy when they noticed bleeding from the site.  On arrival, patient had an oxygen saturation of 67%.  Tracheostomy was exchanged by the previous ED provider prior to my arrival.  Direct visualization showed some some glottic narrowing and swelling.    The history is provided by the EMS personnel.           REVIEW OF SYSTEMS     Review of Systems   Unable to perform ROS: Acuity of condition     PASTMEDICAL HISTORY     Past Medical History:   Diagnosis Date    Anxiety     Asthma     Atrial fibrillation (HCC)     A-fib noted on 05/25/2024 visit to ER    Bronchitis     COPD (chronic obstructive pulmonary disease) (HCC)     DDD (degenerative disc disease), lumbar     Depression     Diabetes mellitus (HCC)     Elevated glucose     Headache(784.0)     Hernia of abdominal cavity     HH (hiatus hernia)     Hyperlipemia, mixed     Hypertension     Osteoarthritis     Right femoral vein DVT ([facility]) 05/2009    Dr. Elizabeth    Uterine hyperplasia      Past Problem List  Patient Active Problem List   Diagnosis Code    Anxiety F41.9    Depression F32.A    Asthma J45.909    DDD (degenerative disc disease), lumbar M51.369    Seasonal allergies J30.2    Hyperlipemia, mixed E78.2    Anticoagulated on Coumadin Z79.01    Family history of breast cancer Z80.3    Endometrial cancer (HCC) C54.1    Normocytic anemia D64.9    S/P FABBY-BSO (total abdominal hysterectomy and bilateral salpingo-oophorectomy) Z90.710, Z90.722, Z90.79    Essential hypertension I10    Iron deficiency anemia due to chronic blood loss D50.0    BPPV (benign paroxysmal positional

## 2025-04-28 NOTE — CONSULTS
OTOLARYNGOLOGY ATTENDING NOTE    Called emergently to ED on arrival to assess trach with respiratory distress.  The trach was in a false passage and unable to be repositioned at bedside despite flexible bronchoscopy.  While patient was stable, the decision to proceed with emergent DL/Bronch and trach revision with ECMO on standby was made.  I discussed the life-threatening nature of the procedure and need to try to secure the airway.  She provided consent and asked that I also inform her brother and her friend on her behalf both before and after the procedure.  Per her request, I gave an update to both of them.    See Dr. Jarvis's op note.

## 2025-04-28 NOTE — PROCEDURES
PROCEDURE NOTE  Date: 2025   Name: Shazia Nuñez  YOB: 1955    Procedures    Bronchoscopy    Pre-Op diagnosis: Obstruction of the airway    Postop diagnosis: Large mass/granuloma obstructing trachea by approximately 70%    Medications given 1 mg of Versed    Findings: Informed consent was obtained from the patient despite her being on a trach collar.  She is alert and oriented and I felt she was able to give consent.  2 nurses witnessed the consent.  The procedure was considered emergent because of the concern of obstruction of the airway.    The patient had a #6 cuffed tracheostomy that had been inserted in the emergency room due to the patient's low oxygen saturation.  Apparently the previous tracheostomy was dislodged.    The bronchoscope was entered through the tracheostomy tube.  A large mass was seen initially obstructing the tube.  There was some oozing from the mass.  It was felt to be a granuloma.  When the patient took deep breaths and, the airway opened up but when she , the airways seem to close.  The mass in my opinion was acting like a \"ball in valve\" obstruction.    The slim bronchoscope was passed into the trachea, but because of the concerns of oozing/bleeding, no further advancement was done.  We did not visualize the warren.  We did not enter the major bronchi.    I did not appear that there was significant secretions in the tracheostomy tube itself    The patient tolerated the procedure well.  We will make plans to transfer the patient to [place]      Granuloma/mass obstructing tracheostomy tube on expiration      Small opening leading to trachea on inspiration

## 2025-04-28 NOTE — PLAN OF CARE
Problem: Chronic Conditions and Co-morbidities  Goal: Patient's chronic conditions and co-morbidity symptoms are monitored and maintained or improved  4/28/2025 1800 by Yari Acosta RN  Outcome: Progressing  4/28/2025 1759 by Yari Acosta, RN  Outcome: Progressing     Problem: Discharge Planning  Goal: Discharge to home or other facility with appropriate resources  4/28/2025 1800 by Yari Acosta, RN  Outcome: Progressing  4/28/2025 1759 by Yari Acosta, RN  Outcome: Progressing

## 2025-04-28 NOTE — ED NOTES
Report given to JOSUÉ Dennison from ED.   Report method in person   The following was reviewed with receiving RN:   Current vital signs:  BP (!) 153/43   Pulse 85   Temp 98.9 °F (37.2 °C) (Axillary)   Resp 21   Ht 1.651 m (5' 5\")   Wt (!) 172.4 kg (380 lb)   LMP 07/16/2014   SpO2 92%   BMI 63.24 kg/m²                      Any medication or safety alerts were reviewed. Any pending diagnostics and notifications were also reviewed, as well as any safety concerns or issues, abnormal labs, abnormal imaging, and abnormal assessment findings. Questions were answered.

## 2025-04-28 NOTE — ED NOTES
Pt tx from [place] ED  Per report patient was having difficulty breathing with O2 saturation 67%  Pt is ventilated with trach at baseline.   Pt reports left sided neck pain,   Pt on NR on arrival.

## 2025-04-29 ENCOUNTER — APPOINTMENT (OUTPATIENT)
Dept: GENERAL RADIOLOGY | Age: 70
DRG: 167 | End: 2025-04-29
Payer: MEDICARE

## 2025-04-29 LAB
ANION GAP SERPL CALCULATED.3IONS-SCNC: 11 MMOL/L (ref 9–16)
ANTI-XA UNFRAC HEPARIN: 1.94 IU/L
ANTI-XA UNFRAC HEPARIN: >2 IU/L
BASOPHILS # BLD: 0 K/UL (ref 0–0.2)
BASOPHILS NFR BLD: 0 % (ref 0–2)
BUN SERPL-MCNC: 32 MG/DL (ref 8–23)
CALCIUM SERPL-MCNC: 9.6 MG/DL (ref 8.6–10.4)
CHLORIDE SERPL-SCNC: 101 MMOL/L (ref 98–107)
CO2 SERPL-SCNC: 26 MMOL/L (ref 20–31)
CREAT SERPL-MCNC: 1.6 MG/DL (ref 0.6–0.9)
EOSINOPHIL # BLD: 0 K/UL (ref 0–0.4)
EOSINOPHILS RELATIVE PERCENT: 0 % (ref 1–4)
ERYTHROCYTE [DISTWIDTH] IN BLOOD BY AUTOMATED COUNT: 17.5 % (ref 11.8–14.4)
FIO2: 60
GFR, ESTIMATED: 35 ML/MIN/1.73M2
GLUCOSE BLD-MCNC: 133 MG/DL (ref 65–105)
GLUCOSE BLD-MCNC: 133 MG/DL (ref 65–105)
GLUCOSE BLD-MCNC: 150 MG/DL (ref 65–105)
GLUCOSE BLD-MCNC: 179 MG/DL (ref 65–105)
GLUCOSE BLD-MCNC: 202 MG/DL (ref 74–100)
GLUCOSE SERPL-MCNC: 184 MG/DL (ref 74–99)
HCT VFR BLD AUTO: 25.1 % (ref 36.3–47.1)
HGB BLD-MCNC: 7.1 G/DL (ref 11.9–15.1)
IMM GRANULOCYTES # BLD AUTO: 0 K/UL (ref 0–0.3)
IMM GRANULOCYTES NFR BLD: 0 %
INR PPP: 1.5
LYMPHOCYTES NFR BLD: 0.76 K/UL (ref 1–4.8)
LYMPHOCYTES RELATIVE PERCENT: 11 % (ref 24–44)
MCH RBC QN AUTO: 26.3 PG (ref 25.2–33.5)
MCHC RBC AUTO-ENTMCNC: 28.3 G/DL (ref 28.4–34.8)
MCV RBC AUTO: 93 FL (ref 82.6–102.9)
MONOCYTES NFR BLD: 0.14 K/UL (ref 0.1–0.8)
MONOCYTES NFR BLD: 2 % (ref 1–7)
MORPHOLOGY: ABNORMAL
MRSA, DNA, NASAL: ABNORMAL
NEUTROPHILS NFR BLD: 87 % (ref 36–66)
NEUTS SEG NFR BLD: 6 K/UL (ref 1.8–7.7)
NRBC BLD-RTO: 0 PER 100 WBC
O2 DELIVERY DEVICE: ABNORMAL
PARTIAL THROMBOPLASTIN TIME: 64.6 SEC (ref 23–36.5)
PARTIAL THROMBOPLASTIN TIME: 65.6 SEC (ref 23–36.5)
PARTIAL THROMBOPLASTIN TIME: >180 SEC (ref 23–36.5)
PLATELET # BLD AUTO: 228 K/UL (ref 138–453)
PMV BLD AUTO: 8.6 FL (ref 8.1–13.5)
POC HCO3: 30.8 MMOL/L (ref 21–28)
POC O2 SATURATION: 99.7 % (ref 94–98)
POC PCO2: 52 MM HG (ref 35–48)
POC PH: 7.38 (ref 7.35–7.45)
POC PO2: 211.8 MM HG (ref 83–108)
POSITIVE BASE EXCESS, ART: 5 MMOL/L (ref 0–3)
POTASSIUM SERPL-SCNC: 4 MMOL/L (ref 3.7–5.3)
PROTHROMBIN TIME: 18.8 SEC (ref 11.7–14.9)
RBC # BLD AUTO: 2.7 M/UL (ref 3.95–5.11)
SODIUM SERPL-SCNC: 138 MMOL/L (ref 136–145)
SPECIMEN DESCRIPTION: ABNORMAL
WBC OTHER # BLD: 6.9 K/UL (ref 3.5–11.3)

## 2025-04-29 PROCEDURE — BD11YZZ FLUOROSCOPY OF ESOPHAGUS USING OTHER CONTRAST: ICD-10-PCS | Performed by: RADIOLOGY

## 2025-04-29 PROCEDURE — 85730 THROMBOPLASTIN TIME PARTIAL: CPT

## 2025-04-29 PROCEDURE — 99213 OFFICE O/P EST LOW 20 MIN: CPT

## 2025-04-29 PROCEDURE — 2500000003 HC RX 250 WO HCPCS

## 2025-04-29 PROCEDURE — 6370000000 HC RX 637 (ALT 250 FOR IP)

## 2025-04-29 PROCEDURE — 94003 VENT MGMT INPAT SUBQ DAY: CPT

## 2025-04-29 PROCEDURE — 85520 HEPARIN ASSAY: CPT

## 2025-04-29 PROCEDURE — 74220 X-RAY XM ESOPHAGUS 1CNTRST: CPT

## 2025-04-29 PROCEDURE — 94761 N-INVAS EAR/PLS OXIMETRY MLT: CPT

## 2025-04-29 PROCEDURE — 2580000003 HC RX 258

## 2025-04-29 PROCEDURE — 82947 ASSAY GLUCOSE BLOOD QUANT: CPT

## 2025-04-29 PROCEDURE — 85610 PROTHROMBIN TIME: CPT

## 2025-04-29 PROCEDURE — 82803 BLOOD GASES ANY COMBINATION: CPT

## 2025-04-29 PROCEDURE — 36415 COLL VENOUS BLD VENIPUNCTURE: CPT

## 2025-04-29 PROCEDURE — 6360000002 HC RX W HCPCS

## 2025-04-29 PROCEDURE — 2060000000 HC ICU INTERMEDIATE R&B

## 2025-04-29 PROCEDURE — 6360000004 HC RX CONTRAST MEDICATION

## 2025-04-29 PROCEDURE — 85025 COMPLETE CBC W/AUTO DIFF WBC: CPT

## 2025-04-29 PROCEDURE — 99291 CRITICAL CARE FIRST HOUR: CPT | Performed by: INTERNAL MEDICINE

## 2025-04-29 PROCEDURE — 94640 AIRWAY INHALATION TREATMENT: CPT

## 2025-04-29 PROCEDURE — 80048 BASIC METABOLIC PNL TOTAL CA: CPT

## 2025-04-29 PROCEDURE — 36600 WITHDRAWAL OF ARTERIAL BLOOD: CPT

## 2025-04-29 PROCEDURE — 6370000000 HC RX 637 (ALT 250 FOR IP): Performed by: INTERNAL MEDICINE

## 2025-04-29 PROCEDURE — 2700000000 HC OXYGEN THERAPY PER DAY

## 2025-04-29 RX ORDER — SODIUM CHLORIDE, SODIUM LACTATE, POTASSIUM CHLORIDE, CALCIUM CHLORIDE 600; 310; 30; 20 MG/100ML; MG/100ML; MG/100ML; MG/100ML
INJECTION, SOLUTION INTRAVENOUS CONTINUOUS
Status: DISCONTINUED | OUTPATIENT
Start: 2025-04-29 | End: 2025-05-01

## 2025-04-29 RX ORDER — IPRATROPIUM BROMIDE AND ALBUTEROL SULFATE 2.5; .5 MG/3ML; MG/3ML
1 SOLUTION RESPIRATORY (INHALATION) 2 TIMES DAILY
Status: DISCONTINUED | OUTPATIENT
Start: 2025-04-29 | End: 2025-05-14 | Stop reason: HOSPADM

## 2025-04-29 RX ADMIN — SODIUM CHLORIDE, SODIUM LACTATE, POTASSIUM CHLORIDE, AND CALCIUM CHLORIDE: .6; .31; .03; .02 INJECTION, SOLUTION INTRAVENOUS at 00:13

## 2025-04-29 RX ADMIN — HEPARIN SODIUM 14 UNITS/KG/HR: 10000 INJECTION, SOLUTION INTRAVENOUS at 09:24

## 2025-04-29 RX ADMIN — SODIUM CHLORIDE, PRESERVATIVE FREE 10 ML: 5 INJECTION INTRAVENOUS at 20:24

## 2025-04-29 RX ADMIN — HEPARIN SODIUM 5000 UNITS: 1000 INJECTION INTRAVENOUS; SUBCUTANEOUS at 04:56

## 2025-04-29 RX ADMIN — INSULIN GLARGINE 8 UNITS: 100 INJECTION, SOLUTION SUBCUTANEOUS at 20:31

## 2025-04-29 RX ADMIN — PRAVASTATIN SODIUM 80 MG: 20 TABLET ORAL at 17:35

## 2025-04-29 RX ADMIN — SODIUM CHLORIDE, SODIUM LACTATE, POTASSIUM CHLORIDE, AND CALCIUM CHLORIDE: .6; .31; .03; .02 INJECTION, SOLUTION INTRAVENOUS at 22:31

## 2025-04-29 RX ADMIN — IPRATROPIUM BROMIDE AND ALBUTEROL SULFATE 1 DOSE: 2.5; .5 SOLUTION RESPIRATORY (INHALATION) at 07:34

## 2025-04-29 RX ADMIN — MICONAZOLE NITRATE: 20 POWDER TOPICAL at 20:24

## 2025-04-29 RX ADMIN — MICONAZOLE NITRATE: 20 POWDER TOPICAL at 09:26

## 2025-04-29 RX ADMIN — MONTELUKAST 10 MG: 10 TABLET, FILM COATED ORAL at 20:19

## 2025-04-29 RX ADMIN — IPRATROPIUM BROMIDE AND ALBUTEROL SULFATE 1 DOSE: 2.5; .5 SOLUTION RESPIRATORY (INHALATION) at 03:26

## 2025-04-29 RX ADMIN — SODIUM CHLORIDE, PRESERVATIVE FREE 40 MG: 5 INJECTION INTRAVENOUS at 09:25

## 2025-04-29 RX ADMIN — IPRATROPIUM BROMIDE AND ALBUTEROL SULFATE 1 DOSE: 2.5; .5 SOLUTION RESPIRATORY (INHALATION) at 11:04

## 2025-04-29 RX ADMIN — IPRATROPIUM BROMIDE AND ALBUTEROL SULFATE 1 DOSE: 2.5; .5 SOLUTION RESPIRATORY (INHALATION) at 19:49

## 2025-04-29 RX ADMIN — SODIUM CHLORIDE, PRESERVATIVE FREE 10 ML: 5 INJECTION INTRAVENOUS at 09:26

## 2025-04-29 RX ADMIN — AMIODARONE HYDROCHLORIDE 200 MG: 200 TABLET ORAL at 20:19

## 2025-04-29 RX ADMIN — IOHEXOL 70 ML: 240 INJECTION, SOLUTION INTRATHECAL; INTRAVASCULAR; INTRAVENOUS; ORAL at 13:51

## 2025-04-29 ASSESSMENT — PULMONARY FUNCTION TESTS
PIF_VALUE: 16
PIF_VALUE: 17
PIF_VALUE: 16
PIF_VALUE: 16
PIF_VALUE: 10
PIF_VALUE: 16
PIF_VALUE: 19
PIF_VALUE: 16
PIF_VALUE: 12
PIF_VALUE: 17
PIF_VALUE: 16

## 2025-04-29 ASSESSMENT — ENCOUNTER SYMPTOMS: SHORTNESS OF BREATH: 1

## 2025-04-29 ASSESSMENT — PAIN SCALES - GENERAL: PAINLEVEL_OUTOF10: 0

## 2025-04-29 NOTE — TRANSFER CENTER NOTE
Active Problem List    Diagnosis Date Noted    Paroxysmal atrial fibrillation with RVR ([facility]) 09/19/2024    Mild asthma 08/10/2022    Airway obstruction 04/28/2025    Tracheostomy malfunction ([facility]) 04/28/2025    Acute blood loss anemia 03/13/2025    Gastritis without bleeding 03/13/2025    Hemorrhoids 03/13/2025    Rectal polyp 03/13/2025    Acute pancreatitis without necrosis or infection, unspecified 03/09/2025    Left upper extremity swelling 09/24/2024    Other tracheostomy complication ([facility]) 09/17/2024    Ventilator-acquired pneumonia ([facility]) 09/15/2024    Tracheal stenosis 09/15/2024    SHWETA (obstructive sleep apnea) 09/15/2024    Obesity hypoventilation syndrome ([facility]) 09/15/2024    Acute on chronic hypoxic respiratory failure ([facility]) 09/14/2024    Malfunction of tracheostomy ([facility]) 09/12/2024    Respiratory failure ([facility]) 09/11/2024    Acute and chr resp failure, unsp w hypoxia or hypercapnia ([facility]) 09/11/2024    Infection of tracheostomy stoma ([facility]) 08/16/2024    Multiple drug resistant organism (MDRO) culture positive 07/27/2024    Allergy to multiple antibiotics 07/27/2024    MRSA carrier 07/27/2024    Acute respiratory failure with hypoxia and hypercarbia ([facility]) 07/22/2024    Type 2 respiratory failure ([facility]) 07/22/2024    Metabolic encephalopathy 07/10/2024    (HFpEF) heart failure with preserved ejection fraction ([facility]) 07/10/2024    A-fib ([facility]) 07/10/2024    COPD exacerbation ([facility]) 07/04/2024    Acute on chronic diastolic heart failure ([facility]) 07/04/2024    Acute cystitis without hematuria 07/04/2024    SOB (shortness of breath) 05/28/2024    Shortness of breath 05/25/2024    Thrombocytopenia 04/13/2024    Pancytopenia ([facility]) 04/08/2024    Cytopenia 04/05/2024    Increased oxygen demand 04/05/2024    Cellulitis of abdominal wall 12/14/2023    Traumatic hematoma of left knee 11/16/2023    Bleeding from wound 10/16/2023    COVID 10/15/2023    Supratherapeutic INR 10/14/2023    Abdominal wall skin ulcer, with fat layer

## 2025-04-29 NOTE — CARE COORDINATION
Case Management Assessment  Initial Evaluation    Date/Time of Evaluation: 4/29/2025 12:37 PM  Assessment Completed by: Felisha Meade RN    If patient is discharged prior to next notation, then this note serves as note for discharge by case management.    Patient Name: Shazia Nuñez                   YOB: 1955  Diagnosis: Airway obstruction [J98.8]  Tracheostomy malfunction (HCC) [J95.03]                   Date / Time: 4/28/2025  9:44 AM    Patient Admission Status: Inpatient   Readmission Risk (Low < 19, Mod (19-27), High > 27): Readmission Risk Score: 32.5    Current PCP: [facility]  PCP verified by CM? (P) Yes ( at [facility])    Chart Reviewed: Yes      History Provided by: Patient  Patient Orientation: Alert and Oriented, Person, Place, Situation, Self    Patient Cognition: Alert    Hospitalization in the last 30 days (Readmission):  No    If yes, Readmission Assessment in CM Navigator will be completed.    Advance Directives:      Code Status: Full Code   Patient's Primary Decision Maker is: (P) Legal Next of Kin    Primary Decision Maker: Álvaro Nuñez - Brother/Sister - [phone]    Discharge Planning:    Patient lives with: (P) Other (Comment) ([facility]) Type of Home: (P) Skilled Nursing Facility  Primary Care Giver: Self  Patient Support Systems include: Other (Comment) (staff at [facility])   Current Financial resources: (P) Medicare, Medicaid  Current community resources: (P) ECF/Home Care ([facility])  Current services prior to admission: (P) Extended Care Facility            Current DME:              Type of Home Care services:  (P) None    ADLS  Prior functional level: (P) Assistance with the following:, Bathing, Dressing, Toileting, Cooking, Housework, Shopping, Mobility  Current functional level: (P) Assistance with the following:, Bathing, Dressing, Toileting, Cooking, Housework, Shopping, Mobility    PT AM-PAC:   /24  OT AM-PAC:

## 2025-04-29 NOTE — PLAN OF CARE
Problem: Chronic Conditions and Co-morbidities  Goal: Patient's chronic conditions and co-morbidity symptoms are monitored and maintained or improved  4/29/2025 0413 by Makenna Lundberg RN  Outcome: Progressing     Problem: Discharge Planning  Goal: Discharge to home or other facility with appropriate resources  4/29/2025 0413 by Makenna Lundberg RN  Outcome: Progressing     Problem: Pain  Goal: Verbalizes/displays adequate comfort level or baseline comfort level  Outcome: Progressing     Problem: Safety - Adult  Goal: Free from fall injury  Outcome: Progressing

## 2025-04-29 NOTE — ANESTHESIA PRE PROCEDURE
Department of Anesthesiology  Preprocedure Note       Name:  Shazia Nuñez   Age:  69 y.o.  :  1955                                          MRN:  [id number]         Date:  2025      Surgeon: Surgeon(s):  Richard Kat, Riky Crain, Naseem Fortune MD    Procedure: Procedure(s):  FLEXIBLE BRONCHOSCOPY, TRACH REVISION    Medications prior to admission:   Prior to Admission medications    Medication Sig Start Date End Date Taking? Authorizing Provider   amiodarone (CORDARONE) 200 MG tablet Take 1 tablet by mouth 2 times daily 3/16/25   Beverley Curtis MD   pantoprazole (PROTONIX) 40 MG tablet Take 1 tablet by mouth 2 times daily (before meals) 3/16/25   Beverley Curtis MD   cetirizine (ZYRTEC) 10 MG tablet Take 1 tablet by mouth nightly    ProviderZeina MD   fluticasone (VERAMYST) 27.5 MCG/SPRAY nasal spray 2 sprays by Each Nostril route daily    Zeina Castro MD   ondansetron (ZOFRAN) 4 MG tablet Take 1 tablet by mouth every 6 hours Indications: Nausea    Zeina Castro MD   furosemide (LASIX) 20 MG tablet Take 1 tablet by mouth 2 times daily 10/30/24   Zeina Castro MD   guaiFENesin (MUCINEX) 600 MG extended release tablet Take 2 tablets by mouth 2 times daily    Zenia Castro MD   budesonide (PULMICORT) 0.5 MG/2ML nebulizer suspension  24   Zeina Castro MD   ipratropium 0.5 mg-albuterol 2.5 mg (DUONEB) 0.5-2.5 (3) MG/3ML SOLN nebulizer solution Inhale 3 mLs into the lungs every 4 hours    Zeina Castro MD   acetylcysteine (MUCOMYST) 20 % nebulizer solution  10/15/24   Zeina Castro MD   albuterol (PROVENTIL) (2.5 MG/3ML) 0.083% nebulizer solution  10/6/24   Zeina Castro MD   warfarin (COUMADIN) 10 MG tablet Goal INR >2 24   Wendy Call MD   insulin glargine (LANTUS) 100 UNIT/ML injection vial Inject 8 Units into the skin nightly 24   Wendy Call MD   metoprolol tartrate 37.5 MG TABS

## 2025-04-29 NOTE — PLAN OF CARE
Problem: Chronic Conditions and Co-morbidities  Goal: Patient's chronic conditions and co-morbidity symptoms are monitored and maintained or improved  4/29/2025 1009 by Jose Juan Patel RN  Outcome: Progressing  4/29/2025 0413 by Makenna Lundberg RN  Outcome: Progressing     Problem: Discharge Planning  Goal: Discharge to home or other facility with appropriate resources  4/29/2025 1009 by Jose Juan Patel RN  Outcome: Progressing  4/29/2025 0413 by Makenna Lundberg RN  Outcome: Progressing     Problem: Pain  Goal: Verbalizes/displays adequate comfort level or baseline comfort level  4/29/2025 1009 by Jose Juan Patel RN  Outcome: Progressing  4/29/2025 0413 by Makenna Lundberg RN  Outcome: Progressing     Problem: Safety - Adult  Goal: Free from fall injury  4/29/2025 1009 by Jose Juan Patel RN  Outcome: Progressing  4/29/2025 0413 by Makenna Lundberg RN  Outcome: Progressing

## 2025-04-29 NOTE — ANESTHESIA POSTPROCEDURE EVALUATION
Department of Anesthesiology  Postprocedure Note    Patient: Shazia Nuñez  MRN: [id number]  YOB: 1955  Date of evaluation: 4/29/2025    Procedure Summary       Date: 04/28/25 Room / Location: [address]    Anesthesia Start: 1111 Anesthesia Stop: 1243    Procedure: FLEXIBLE BRONCHOSCOPY, TRACH REVISION Diagnosis:       Airway compromise      (Airway compromise [J98.8])    Surgeons: Richard aKt MD Responsible Provider: Regla Tao MD    Anesthesia Type: general ASA Status: 4 - Emergent            Anesthesia Type: No value filed.    Tom Phase I:      Tom Phase II:      Anesthesia Post Evaluation    Patient location during evaluation: ICU  Patient participation: complete - patient cannot participate  Level of consciousness: sedated and ventilated  Pain score: 2  Airway patency: patent  Nausea & Vomiting: no nausea and no vomiting  Cardiovascular status: blood pressure returned to baseline and hemodynamically stable  Respiratory status: acceptable  Hydration status: euvolemic  Pain management: adequate        There were no known notable events for this encounter.

## 2025-04-30 LAB
ANION GAP SERPL CALCULATED.3IONS-SCNC: 9 MMOL/L (ref 9–16)
ANTI-XA UNFRAC HEPARIN: 1.94 IU/L
BASOPHILS # BLD: 0 K/UL (ref 0–0.2)
BASOPHILS NFR BLD: 0 % (ref 0–2)
BUN SERPL-MCNC: 30 MG/DL (ref 8–23)
CALCIUM SERPL-MCNC: 9.6 MG/DL (ref 8.6–10.4)
CHLORIDE SERPL-SCNC: 104 MMOL/L (ref 98–107)
CO2 SERPL-SCNC: 28 MMOL/L (ref 20–31)
CREAT SERPL-MCNC: 1.5 MG/DL (ref 0.6–0.9)
EOSINOPHIL # BLD: 0.07 K/UL (ref 0–0.44)
EOSINOPHILS RELATIVE PERCENT: 1 % (ref 1–4)
ERYTHROCYTE [DISTWIDTH] IN BLOOD BY AUTOMATED COUNT: 17.8 % (ref 11.8–14.4)
GFR, ESTIMATED: 38 ML/MIN/1.73M2
GLUCOSE BLD-MCNC: 113 MG/DL (ref 65–105)
GLUCOSE BLD-MCNC: 119 MG/DL (ref 65–105)
GLUCOSE BLD-MCNC: 119 MG/DL (ref 65–105)
GLUCOSE BLD-MCNC: 141 MG/DL (ref 65–105)
GLUCOSE SERPL-MCNC: 115 MG/DL (ref 74–99)
HCT VFR BLD AUTO: 24.8 % (ref 36.3–47.1)
HCT VFR BLD AUTO: 25.5 % (ref 36.3–47.1)
HGB BLD-MCNC: 6.9 G/DL (ref 11.9–15.1)
HGB BLD-MCNC: 7.3 G/DL (ref 11.9–15.1)
IMM GRANULOCYTES # BLD AUTO: 0 K/UL (ref 0–0.3)
IMM GRANULOCYTES NFR BLD: 0 %
INR PPP: 1.5
LYMPHOCYTES NFR BLD: 0.99 K/UL (ref 1.1–3.7)
LYMPHOCYTES RELATIVE PERCENT: 15 % (ref 24–43)
MCH RBC QN AUTO: 26.1 PG (ref 25.2–33.5)
MCHC RBC AUTO-ENTMCNC: 27.8 G/DL (ref 28.4–34.8)
MCV RBC AUTO: 93.9 FL (ref 82.6–102.9)
MONOCYTES NFR BLD: 0.53 K/UL (ref 0.1–1.2)
MONOCYTES NFR BLD: 8 % (ref 3–12)
MORPHOLOGY: ABNORMAL
MORPHOLOGY: ABNORMAL
NEUTROPHILS NFR BLD: 76 % (ref 36–65)
NEUTS SEG NFR BLD: 5.01 K/UL (ref 1.5–8.1)
NRBC BLD-RTO: 0 PER 100 WBC
PARTIAL THROMBOPLASTIN TIME: 70.8 SEC (ref 23–36.5)
PLATELET # BLD AUTO: 245 K/UL (ref 138–453)
PMV BLD AUTO: 8.9 FL (ref 8.1–13.5)
POTASSIUM SERPL-SCNC: 3.9 MMOL/L (ref 3.7–5.3)
PROTHROMBIN TIME: 18.3 SEC (ref 11.7–14.9)
RBC # BLD AUTO: 2.64 M/UL (ref 3.95–5.11)
SODIUM SERPL-SCNC: 141 MMOL/L (ref 136–145)
WBC OTHER # BLD: 6.6 K/UL (ref 3.5–11.3)

## 2025-04-30 PROCEDURE — 2580000003 HC RX 258

## 2025-04-30 PROCEDURE — 2700000000 HC OXYGEN THERAPY PER DAY

## 2025-04-30 PROCEDURE — 99291 CRITICAL CARE FIRST HOUR: CPT | Performed by: INTERNAL MEDICINE

## 2025-04-30 PROCEDURE — 94003 VENT MGMT INPAT SUBQ DAY: CPT

## 2025-04-30 PROCEDURE — 86900 BLOOD TYPING SEROLOGIC ABO: CPT

## 2025-04-30 PROCEDURE — 36430 TRANSFUSION BLD/BLD COMPNT: CPT

## 2025-04-30 PROCEDURE — 85610 PROTHROMBIN TIME: CPT

## 2025-04-30 PROCEDURE — 80048 BASIC METABOLIC PNL TOTAL CA: CPT

## 2025-04-30 PROCEDURE — 85014 HEMATOCRIT: CPT

## 2025-04-30 PROCEDURE — 82947 ASSAY GLUCOSE BLOOD QUANT: CPT

## 2025-04-30 PROCEDURE — 6360000002 HC RX W HCPCS

## 2025-04-30 PROCEDURE — 2060000000 HC ICU INTERMEDIATE R&B

## 2025-04-30 PROCEDURE — 30233N1 TRANSFUSION OF NONAUTOLOGOUS RED BLOOD CELLS INTO PERIPHERAL VEIN, PERCUTANEOUS APPROACH: ICD-10-PCS | Performed by: INTERNAL MEDICINE

## 2025-04-30 PROCEDURE — 36415 COLL VENOUS BLD VENIPUNCTURE: CPT

## 2025-04-30 PROCEDURE — 2500000003 HC RX 250 WO HCPCS

## 2025-04-30 PROCEDURE — 86901 BLOOD TYPING SEROLOGIC RH(D): CPT

## 2025-04-30 PROCEDURE — 6370000000 HC RX 637 (ALT 250 FOR IP)

## 2025-04-30 PROCEDURE — 86923 COMPATIBILITY TEST ELECTRIC: CPT

## 2025-04-30 PROCEDURE — 94761 N-INVAS EAR/PLS OXIMETRY MLT: CPT

## 2025-04-30 PROCEDURE — 6370000000 HC RX 637 (ALT 250 FOR IP): Performed by: INTERNAL MEDICINE

## 2025-04-30 PROCEDURE — 85520 HEPARIN ASSAY: CPT

## 2025-04-30 PROCEDURE — 85018 HEMOGLOBIN: CPT

## 2025-04-30 PROCEDURE — P9016 RBC LEUKOCYTES REDUCED: HCPCS

## 2025-04-30 PROCEDURE — 85025 COMPLETE CBC W/AUTO DIFF WBC: CPT

## 2025-04-30 PROCEDURE — 86850 RBC ANTIBODY SCREEN: CPT

## 2025-04-30 PROCEDURE — 94640 AIRWAY INHALATION TREATMENT: CPT

## 2025-04-30 PROCEDURE — 85730 THROMBOPLASTIN TIME PARTIAL: CPT

## 2025-04-30 RX ORDER — FUROSEMIDE 20 MG/1
20 TABLET ORAL 2 TIMES DAILY
Status: DISCONTINUED | OUTPATIENT
Start: 2025-05-01 | End: 2025-05-14 | Stop reason: HOSPADM

## 2025-04-30 RX ORDER — ENOXAPARIN SODIUM 100 MG/ML
1 INJECTION SUBCUTANEOUS EVERY 12 HOURS
Status: DISCONTINUED | OUTPATIENT
Start: 2025-04-30 | End: 2025-04-30

## 2025-04-30 RX ORDER — FUROSEMIDE 10 MG/ML
20 INJECTION INTRAMUSCULAR; INTRAVENOUS ONCE
Status: COMPLETED | OUTPATIENT
Start: 2025-04-30 | End: 2025-04-30

## 2025-04-30 RX ORDER — SODIUM CHLORIDE 9 MG/ML
INJECTION, SOLUTION INTRAVENOUS PRN
Status: DISCONTINUED | OUTPATIENT
Start: 2025-04-30 | End: 2025-05-14 | Stop reason: HOSPADM

## 2025-04-30 RX ADMIN — INSULIN GLARGINE 8 UNITS: 100 INJECTION, SOLUTION SUBCUTANEOUS at 21:25

## 2025-04-30 RX ADMIN — SODIUM CHLORIDE, PRESERVATIVE FREE 10 ML: 5 INJECTION INTRAVENOUS at 07:38

## 2025-04-30 RX ADMIN — ACETAMINOPHEN 650 MG: 325 TABLET ORAL at 21:24

## 2025-04-30 RX ADMIN — FUROSEMIDE 20 MG: 10 INJECTION, SOLUTION INTRAMUSCULAR; INTRAVENOUS at 17:42

## 2025-04-30 RX ADMIN — SODIUM CHLORIDE, PRESERVATIVE FREE 10 ML: 5 INJECTION INTRAVENOUS at 21:26

## 2025-04-30 RX ADMIN — AMIODARONE HYDROCHLORIDE 200 MG: 200 TABLET ORAL at 21:24

## 2025-04-30 RX ADMIN — PRAVASTATIN SODIUM 80 MG: 20 TABLET ORAL at 17:42

## 2025-04-30 RX ADMIN — MONTELUKAST 10 MG: 10 TABLET, FILM COATED ORAL at 21:25

## 2025-04-30 RX ADMIN — ACETAMINOPHEN 650 MG: 325 TABLET ORAL at 08:34

## 2025-04-30 RX ADMIN — MICONAZOLE NITRATE: 20 POWDER TOPICAL at 07:38

## 2025-04-30 RX ADMIN — AMIODARONE HYDROCHLORIDE 200 MG: 200 TABLET ORAL at 07:37

## 2025-04-30 RX ADMIN — MICONAZOLE NITRATE: 20 POWDER TOPICAL at 21:26

## 2025-04-30 RX ADMIN — SODIUM CHLORIDE, SODIUM LACTATE, POTASSIUM CHLORIDE, AND CALCIUM CHLORIDE: .6; .31; .03; .02 INJECTION, SOLUTION INTRAVENOUS at 09:02

## 2025-04-30 RX ADMIN — IPRATROPIUM BROMIDE AND ALBUTEROL SULFATE 1 DOSE: 2.5; .5 SOLUTION RESPIRATORY (INHALATION) at 08:23

## 2025-04-30 RX ADMIN — IPRATROPIUM BROMIDE AND ALBUTEROL SULFATE 1 DOSE: 2.5; .5 SOLUTION RESPIRATORY (INHALATION) at 20:35

## 2025-04-30 RX ADMIN — SODIUM CHLORIDE, SODIUM LACTATE, POTASSIUM CHLORIDE, AND CALCIUM CHLORIDE: .6; .31; .03; .02 INJECTION, SOLUTION INTRAVENOUS at 19:05

## 2025-04-30 RX ADMIN — APIXABAN 5 MG: 5 TABLET, FILM COATED ORAL at 11:31

## 2025-04-30 RX ADMIN — SODIUM CHLORIDE, PRESERVATIVE FREE 40 MG: 5 INJECTION INTRAVENOUS at 07:37

## 2025-04-30 RX ADMIN — HEPARIN SODIUM 11 UNITS/KG/HR: 10000 INJECTION, SOLUTION INTRAVENOUS at 01:43

## 2025-04-30 RX ADMIN — APIXABAN 5 MG: 5 TABLET, FILM COATED ORAL at 21:24

## 2025-04-30 ASSESSMENT — PAIN DESCRIPTION - ORIENTATION
ORIENTATION: LEFT;RIGHT
ORIENTATION: RIGHT;LEFT

## 2025-04-30 ASSESSMENT — PULMONARY FUNCTION TESTS
PIF_VALUE: 17
PIF_VALUE: 16
PIF_VALUE: 16
PIF_VALUE: 17
PIF_VALUE: 16

## 2025-04-30 ASSESSMENT — PAIN SCALES - GENERAL
PAINLEVEL_OUTOF10: 7
PAINLEVEL_OUTOF10: 7
PAINLEVEL_OUTOF10: 1

## 2025-04-30 ASSESSMENT — PAIN DESCRIPTION - LOCATION
LOCATION: INCISION
LOCATION: INCISION
LOCATION: OTHER (COMMENT)

## 2025-04-30 ASSESSMENT — PAIN DESCRIPTION - DESCRIPTORS
DESCRIPTORS: ACHING;DISCOMFORT
DESCRIPTORS: CRAMPING;BURNING

## 2025-04-30 NOTE — CARE COORDINATION
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.03]                       GMLOS: 2.4 days  Length of Stay: 2  days    Anticipated Discharge Date: Ready for discharge, dependent on Hemoglobin    Readmission Risk (Low < 19, Mod (19-27), High > 27): Readmission Risk Score: 33.9        Current Transitional Plan    [] Home Independently    [] Home with HC    [x] Skilled Nursing Facility    [] Acute Rehabilitation    [] Long Term Acute Care (LTAC)    [] Other:     Plan for the Stay (Medical Management) :  I/S  FiO2 35%, PEEP of 8, Plans to dc today if pt's Hgb remains stable after unit of blood and if OK w/ ENT.         Workflow Continuation (Additional Notes) :  Spoke to Zandra ureña/ [place] at [facility], [phone], She confirmed pt is a long term bed hold and can return when dc'd, no HENS, Pre-cert or COVID test need.   Set up For 7pm.   Report: [phone]  FAX: [phone]  Call June ureña/ [phone]    [facility] informed  that ENT rounded and states they want to monitor pt for 7-10 more days before dc to SNF.   Informed Zandra ureña/ Admissions at [facility]  Informed Marylou ureña/ [facility] that pt will not be discharging today.      BALDEMAR RIVERA RN  April 30, 2025

## 2025-04-30 NOTE — CONSENT
Informed Consent for Blood Component Transfusion Note    I have discussed with the patient the rationale for blood component transfusion; its benefits in treating or preventing fatigue, organ damage, or death; and its risk which includes mild transfusion reactions, rare risk of blood borne infection, or more serious but rare reactions. I have discussed the alternatives to transfusion, including the risk and consequences of not receiving transfusion. The patient had an opportunity to ask questions and had agreed to proceed with transfusion of blood components.    Electronically signed by Dawit Gill MD on 4/30/25 at 3:37 PM EDT

## 2025-04-30 NOTE — PLAN OF CARE
Problem: Chronic Conditions and Co-morbidities  Goal: Patient's chronic conditions and co-morbidity symptoms are monitored and maintained or improved  4/30/2025 1844 by Reddy Pruett RN  Outcome: Progressing  4/30/2025 0449 by Makenna Lundberg RN  Outcome: Progressing     Problem: Pain  Goal: Verbalizes/displays adequate comfort level or baseline comfort level  4/30/2025 1844 by Reddy Pruett RN  Outcome: Progressing  4/30/2025 0449 by Makenna Lundberg RN  Outcome: Progressing     Problem: Safety - Adult  Goal: Free from fall injury  4/30/2025 1844 by Reddy Pruett RN  Outcome: Progressing  4/30/2025 0449 by Makenna Lundberg RN  Outcome: Progressing     Problem: ABCDS Injury Assessment  Goal: Absence of physical injury  4/30/2025 1844 by Reddy Pruett RN  Outcome: Progressing  4/30/2025 0449 by Makenna Lundberg RN  Outcome: Progressing

## 2025-04-30 NOTE — PLAN OF CARE
Problem: OXYGENATION/RESPIRATORY FUNCTION  Goal: Patient will maintain patent airway  Outcome: Ongoing  Goal: Patient will achieve/maintain normal respiratory rate/effort  Respiratory rate and effort will be within normal limits for the patient  Outcome: Ongoing    Problem: MECHANICAL VENTILATION  Goal: Patient will maintain patent airway  Outcome: Ongoing  Goal: Oral health is maintained or improved  Outcome: Ongoing  Goal: Trach tube will be managed safely  Outcome: Ongoing  Goal: Ability to express needs and understand communication  Outcome: Ongoing  Goal: Mobility/activity is maintained at optimum level for patient  Outcome: Ongoing    Problem: ASPIRATION PRECAUTIONS  Goal: Patient’s risk of aspiration is minimized  Outcome: Ongoing    Problem: SKIN INTEGRITY  Goal: Skin integrity is maintained or improved  Outcome: Ongoing

## 2025-05-01 LAB
ABO/RH: NORMAL
ANION GAP SERPL CALCULATED.3IONS-SCNC: 10 MMOL/L (ref 9–16)
ANTIBODY SCREEN: NEGATIVE
ARM BAND NUMBER: NORMAL
BASOPHILS # BLD: <0.03 K/UL (ref 0–0.2)
BASOPHILS NFR BLD: 0 % (ref 0–2)
BLOOD BANK BLOOD PRODUCT EXPIRATION DATE: NORMAL
BLOOD BANK DISPENSE STATUS: NORMAL
BLOOD BANK ISBT PRODUCT BLOOD TYPE: 9500
BLOOD BANK PRODUCT CODE: NORMAL
BLOOD BANK SAMPLE EXPIRATION: NORMAL
BLOOD BANK UNIT TYPE AND RH: NORMAL
BPU ID: NORMAL
BUN SERPL-MCNC: 30 MG/DL (ref 8–23)
CALCIUM SERPL-MCNC: 9.3 MG/DL (ref 8.6–10.4)
CHLORIDE SERPL-SCNC: 102 MMOL/L (ref 98–107)
CO2 SERPL-SCNC: 29 MMOL/L (ref 20–31)
COMPONENT: NORMAL
CREAT SERPL-MCNC: 1.6 MG/DL (ref 0.6–0.9)
CROSSMATCH RESULT: NORMAL
EOSINOPHIL # BLD: 0.16 K/UL (ref 0–0.44)
EOSINOPHILS RELATIVE PERCENT: 3 % (ref 1–4)
ERYTHROCYTE [DISTWIDTH] IN BLOOD BY AUTOMATED COUNT: 19.6 % (ref 11.8–14.4)
GFR, ESTIMATED: 35 ML/MIN/1.73M2
GLUCOSE BLD-MCNC: 126 MG/DL (ref 65–105)
GLUCOSE BLD-MCNC: 129 MG/DL (ref 65–105)
GLUCOSE BLD-MCNC: 144 MG/DL (ref 65–105)
GLUCOSE BLD-MCNC: 163 MG/DL (ref 65–105)
GLUCOSE SERPL-MCNC: 120 MG/DL (ref 74–99)
HCT VFR BLD AUTO: 24.9 % (ref 36.3–47.1)
HGB BLD-MCNC: 7.3 G/DL (ref 11.9–15.1)
IMM GRANULOCYTES # BLD AUTO: <0.03 K/UL (ref 0–0.3)
IMM GRANULOCYTES NFR BLD: 0 %
LYMPHOCYTES NFR BLD: 0.93 K/UL (ref 1.1–3.7)
LYMPHOCYTES RELATIVE PERCENT: 19 % (ref 24–43)
MCH RBC QN AUTO: 26.6 PG (ref 25.2–33.5)
MCHC RBC AUTO-ENTMCNC: 29.3 G/DL (ref 28.4–34.8)
MCV RBC AUTO: 90.9 FL (ref 82.6–102.9)
MONOCYTES NFR BLD: 0.45 K/UL (ref 0.1–1.2)
MONOCYTES NFR BLD: 9 % (ref 3–12)
NEUTROPHILS NFR BLD: 67 % (ref 36–65)
NEUTS SEG NFR BLD: 3.23 K/UL (ref 1.5–8.1)
NRBC BLD-RTO: 0 PER 100 WBC
PARTIAL THROMBOPLASTIN TIME: 30.5 SEC (ref 23–36.5)
PLATELET # BLD AUTO: 240 K/UL (ref 138–453)
PMV BLD AUTO: 8.5 FL (ref 8.1–13.5)
POTASSIUM SERPL-SCNC: 4 MMOL/L (ref 3.7–5.3)
RBC # BLD AUTO: 2.74 M/UL (ref 3.95–5.11)
RBC # BLD: ABNORMAL 10*6/UL
SODIUM SERPL-SCNC: 141 MMOL/L (ref 136–145)
TRANSFUSION STATUS: NORMAL
UNIT DIVISION: 0
UNIT ISSUE DATE/TIME: NORMAL
WBC OTHER # BLD: 4.8 K/UL (ref 3.5–11.3)

## 2025-05-01 PROCEDURE — 85025 COMPLETE CBC W/AUTO DIFF WBC: CPT

## 2025-05-01 PROCEDURE — 2500000003 HC RX 250 WO HCPCS

## 2025-05-01 PROCEDURE — 99233 SBSQ HOSP IP/OBS HIGH 50: CPT | Performed by: INTERNAL MEDICINE

## 2025-05-01 PROCEDURE — 94640 AIRWAY INHALATION TREATMENT: CPT

## 2025-05-01 PROCEDURE — 99213 OFFICE O/P EST LOW 20 MIN: CPT

## 2025-05-01 PROCEDURE — 6370000000 HC RX 637 (ALT 250 FOR IP)

## 2025-05-01 PROCEDURE — 94003 VENT MGMT INPAT SUBQ DAY: CPT

## 2025-05-01 PROCEDURE — 6360000002 HC RX W HCPCS

## 2025-05-01 PROCEDURE — 99221 1ST HOSP IP/OBS SF/LOW 40: CPT | Performed by: STUDENT IN AN ORGANIZED HEALTH CARE EDUCATION/TRAINING PROGRAM

## 2025-05-01 PROCEDURE — 2060000000 HC ICU INTERMEDIATE R&B

## 2025-05-01 PROCEDURE — 2700000000 HC OXYGEN THERAPY PER DAY

## 2025-05-01 PROCEDURE — 82947 ASSAY GLUCOSE BLOOD QUANT: CPT

## 2025-05-01 PROCEDURE — 80048 BASIC METABOLIC PNL TOTAL CA: CPT

## 2025-05-01 PROCEDURE — 2580000003 HC RX 258

## 2025-05-01 PROCEDURE — 6370000000 HC RX 637 (ALT 250 FOR IP): Performed by: INTERNAL MEDICINE

## 2025-05-01 PROCEDURE — 85730 THROMBOPLASTIN TIME PARTIAL: CPT

## 2025-05-01 PROCEDURE — 36415 COLL VENOUS BLD VENIPUNCTURE: CPT

## 2025-05-01 PROCEDURE — 94761 N-INVAS EAR/PLS OXIMETRY MLT: CPT

## 2025-05-01 RX ADMIN — AMIODARONE HYDROCHLORIDE 200 MG: 200 TABLET ORAL at 20:52

## 2025-05-01 RX ADMIN — AMIODARONE HYDROCHLORIDE 200 MG: 200 TABLET ORAL at 07:58

## 2025-05-01 RX ADMIN — MICONAZOLE NITRATE: 20 POWDER TOPICAL at 09:00

## 2025-05-01 RX ADMIN — SODIUM CHLORIDE, SODIUM LACTATE, POTASSIUM CHLORIDE, AND CALCIUM CHLORIDE: .6; .31; .03; .02 INJECTION, SOLUTION INTRAVENOUS at 07:16

## 2025-05-01 RX ADMIN — PRAVASTATIN SODIUM 80 MG: 20 TABLET ORAL at 20:52

## 2025-05-01 RX ADMIN — SODIUM CHLORIDE, PRESERVATIVE FREE 10 ML: 5 INJECTION INTRAVENOUS at 07:58

## 2025-05-01 RX ADMIN — MONTELUKAST 10 MG: 10 TABLET, FILM COATED ORAL at 20:52

## 2025-05-01 RX ADMIN — APIXABAN 5 MG: 5 TABLET, FILM COATED ORAL at 20:52

## 2025-05-01 RX ADMIN — IPRATROPIUM BROMIDE AND ALBUTEROL SULFATE 1 DOSE: 2.5; .5 SOLUTION RESPIRATORY (INHALATION) at 19:46

## 2025-05-01 RX ADMIN — ACETAMINOPHEN 650 MG: 325 TABLET ORAL at 05:03

## 2025-05-01 RX ADMIN — ACETAMINOPHEN 650 MG: 325 TABLET ORAL at 21:48

## 2025-05-01 RX ADMIN — FUROSEMIDE 20 MG: 20 TABLET ORAL at 19:10

## 2025-05-01 RX ADMIN — MICONAZOLE NITRATE: 20 POWDER TOPICAL at 20:50

## 2025-05-01 RX ADMIN — SODIUM CHLORIDE, PRESERVATIVE FREE 10 ML: 5 INJECTION INTRAVENOUS at 20:51

## 2025-05-01 RX ADMIN — FUROSEMIDE 20 MG: 20 TABLET ORAL at 07:58

## 2025-05-01 RX ADMIN — INSULIN GLARGINE 8 UNITS: 100 INJECTION, SOLUTION SUBCUTANEOUS at 20:51

## 2025-05-01 RX ADMIN — APIXABAN 5 MG: 5 TABLET, FILM COATED ORAL at 07:58

## 2025-05-01 RX ADMIN — IPRATROPIUM BROMIDE AND ALBUTEROL SULFATE 1 DOSE: 2.5; .5 SOLUTION RESPIRATORY (INHALATION) at 07:45

## 2025-05-01 RX ADMIN — SODIUM CHLORIDE, PRESERVATIVE FREE 40 MG: 5 INJECTION INTRAVENOUS at 07:58

## 2025-05-01 RX ADMIN — ACETAMINOPHEN 650 MG: 325 TABLET ORAL at 16:05

## 2025-05-01 ASSESSMENT — PAIN SCALES - GENERAL
PAINLEVEL_OUTOF10: 3
PAINLEVEL_OUTOF10: 0
PAINLEVEL_OUTOF10: 1
PAINLEVEL_OUTOF10: 0
PAINLEVEL_OUTOF10: 4
PAINLEVEL_OUTOF10: 1
PAINLEVEL_OUTOF10: 1

## 2025-05-01 ASSESSMENT — PULMONARY FUNCTION TESTS
PIF_VALUE: 16
PIF_VALUE: 17
PIF_VALUE: 17

## 2025-05-01 NOTE — PLAN OF CARE
Problem: Respiratory - Adult  Goal: Achieves optimal ventilation and oxygenation  5/1/2025 1954 by Aretha Simpson RCP  Outcome: Progressing   BRONCHOSPASM/BRONCHOCONSTRICTION     [x]         IMPROVE AERATION/BREATH SOUNDS  [x]   ADMINISTER BRONCHODILATOR THERAPY AS APPROPRIATE  [x]   ASSESS BREATH SOUNDS  []   IMPLEMENT AEROSOL/MDI PROTOCOL  [x]   PATIENT EDUCATION AS NEEDED  Problem: OXYGENATION/RESPIRATORY FUNCTION  Goal: Patient will maintain patent airway  Outcome: Ongoing  Goal: Patient will achieve/maintain normal respiratory rate/effort  Respiratory rate and effort will be within normal limits for the patient  Outcome: Ongoing    Problem: MECHANICAL VENTILATION  Goal: Patient will maintain patent airway  Outcome: Ongoing  Goal: Oral health is maintained or improved  Outcome: Ongoing  Goal: ET tube will be managed safely  Outcome: Ongoing  Goal: Ability to express needs and understand communication  Outcome: Ongoing  Goal: Mobility/activity is maintained at optimum level for patient  Outcome: Ongoing    Problem: ASPIRATION PRECAUTIONS  Goal: Patient’s risk of aspiration is minimized  Outcome: Ongoing    Problem: SKIN INTEGRITY  Goal: Skin integrity is maintained or improved  Outcome: Ongoing

## 2025-05-01 NOTE — PLAN OF CARE
Problem: Chronic Conditions and Co-morbidities  Goal: Patient's chronic conditions and co-morbidity symptoms are monitored and maintained or improved  5/1/2025 0619 by Roula Swann RN  Outcome: Progressing  Flowsheets (Taken 5/1/2025 0145)  Care Plan - Patient's Chronic Conditions and Co-Morbidity Symptoms are Monitored and Maintained or Improved:   Monitor and assess patient's chronic conditions and comorbid symptoms for stability, deterioration, or improvement   Collaborate with multidisciplinary team to address chronic and comorbid conditions and prevent exacerbation or deterioration   Update acute care plan with appropriate goals if chronic or comorbid symptoms are exacerbated and prevent overall improvement and discharge  4/30/2025 2229 by Margot Rodriguez RN  Outcome: Progressing  4/30/2025 1844 by Reddy Pruett RN  Outcome: Progressing     Problem: Discharge Planning  Goal: Discharge to home or other facility with appropriate resources  5/1/2025 0619 by Roula Swann RN  Outcome: Progressing  Flowsheets (Taken 5/1/2025 0145)  Discharge to home or other facility with appropriate resources:   Identify barriers to discharge with patient and caregiver   Arrange for needed discharge resources and transportation as appropriate   Identify discharge learning needs (meds, wound care, etc)   Refer to discharge planning if patient needs post-hospital services based on physician order or complex needs related to functional status, cognitive ability or social support system  4/30/2025 2229 by Margot Rodriguez RN  Outcome: Progressing  4/30/2025 1844 by Reddy Pruett RN  Outcome: Progressing     Problem: Pain  Goal: Verbalizes/displays adequate comfort level or baseline comfort level  5/1/2025 0619 by Roula Swann RN  Outcome: Progressing  Flowsheets (Taken 5/1/2025 0503)  Verbalizes/displays adequate comfort level or baseline comfort level:   Encourage patient to monitor pain and request assistance

## 2025-05-01 NOTE — CONSULTS
Otolaryngology staff note    POD 2 s/p trach stoma revision after false passage    Patient was seen and examined on rounds this morning  Tolerating PO intake, awake, eating on my arrival in bed.    Trach in good position. Thick secretions around stoma. Stoma/incision healing. Silk sutures in place    This patient should remain in hospital while stoma heals as she had significant airway event with trach dislodgement into false passage    Can be transferred out of ICU if/when appropriate per primary team but should remain in hospital until healing established and trach sutures removed    Will need to return to OR for 1st trach change in -2-3 weeks    --------------------------------------------------  Naseem Jarvis MD  Pediatric Otolaryngology-Head and Neck Surgery  [facility]    Office  ph# [phone]    Also available in [place]

## 2025-05-01 NOTE — PLAN OF CARE
Problem: Chronic Conditions and Co-morbidities  Goal: Patient's chronic conditions and co-morbidity symptoms are monitored and maintained or improved  5/1/2025 0853 by Chemo Henry RN  Outcome: Progressing  5/1/2025 0619 by Roula Swann RN  Outcome: Progressing  Flowsheets (Taken 5/1/2025 0145)  Care Plan - Patient's Chronic Conditions and Co-Morbidity Symptoms are Monitored and Maintained or Improved:   Monitor and assess patient's chronic conditions and comorbid symptoms for stability, deterioration, or improvement   Collaborate with multidisciplinary team to address chronic and comorbid conditions and prevent exacerbation or deterioration   Update acute care plan with appropriate goals if chronic or comorbid symptoms are exacerbated and prevent overall improvement and discharge  4/30/2025 2229 by Margot Rodriguez RN  Outcome: Progressing     Problem: Discharge Planning  Goal: Discharge to home or other facility with appropriate resources  5/1/2025 0853 by Chemo Henry RN  Outcome: Progressing  5/1/2025 0619 by Roula Swann RN  Outcome: Progressing  Flowsheets (Taken 5/1/2025 0145)  Discharge to home or other facility with appropriate resources:   Identify barriers to discharge with patient and caregiver   Arrange for needed discharge resources and transportation as appropriate   Identify discharge learning needs (meds, wound care, etc)   Refer to discharge planning if patient needs post-hospital services based on physician order or complex needs related to functional status, cognitive ability or social support system  4/30/2025 2229 by Margot Rodriguez RN  Outcome: Progressing     Problem: Pain  Goal: Verbalizes/displays adequate comfort level or baseline comfort level  5/1/2025 0853 by Chemo Henry RN  Outcome: Progressing  5/1/2025 0619 by Roula Swann RN  Outcome: Progressing  Flowsheets (Taken 5/1/2025 0503)  Verbalizes/displays adequate comfort level or baseline

## 2025-05-01 NOTE — DISCHARGE INSTR - COC
50 mcg/0.5 mL 02/02/2023    COVID-19, MODERNA Booster BLUE border, (age 18y+), IM, 50mcg/0.25mL 05/03/2022, 10/07/2022    COVID-19, PFIZER, 2024/25, (age 12y+), IM, 30mcg/0.3mL 10/29/2024    FLUARIX 3 YEARS AND OVER 10/26/2015    Hepatitis B 11/17/1991    Influenza A ([id number]) Vaccine IM 12/12/2009    Influenza Virus Vaccine 01/14/2014, 09/30/2014, 10/08/2018, 10/17/2019    Influenza Whole 09/28/2009    Influenza, AFLURIA (age 3 y+), FLUZONE, (age 6 mo+), Quadv MDV, 0.5mL 09/29/2016    Influenza, FLUAD, (age 65 y+), IM, Quadv, 0.5mL 10/07/2022, 11/14/2023    Influenza, FLUARIX, FLULAVAL, FLUZONE (age 6 mo+) and AFLURIA, (age 3 y+), Quadv PF, 0.5mL 10/26/2015, 10/17/2019, 11/07/2020    Pneumococcal, PPSV23, PNEUMOVAX 23, (age 2y+), SC/IM, 0.5mL 01/12/1998    TDaP, ADACEL (age 10y-64y), BOOSTRIX (age 10y+), IM, 0.5mL 03/25/2008, 08/30/2018    Zoster Recombinant (Shingrix) 03/13/2019, 05/16/2019       Active Problems:  Patient Active Problem List   Diagnosis Code    Anxiety F41.9    Depression F32.A    Asthma J45.909    DDD (degenerative disc disease), lumbar M51.369    Seasonal allergies J30.2    Hyperlipemia, mixed E78.2    Anticoagulated on Coumadin Z79.01    Family history of breast cancer Z80.3    Endometrial cancer (HCC) C54.1    Normocytic anemia D64.9    S/P FABBY-BSO (total abdominal hysterectomy and bilateral salpingo-oophorectomy) Z90.710, Z90.722, Z90.79    Essential hypertension I10    Iron deficiency anemia due to chronic blood loss D50.0    BPPV (benign paroxysmal positional vertigo) H81.10    Stage 3a chronic kidney disease ([facility]) N18.31    Chronic deep vein thrombosis (DVT) of femoral vein of right lower extremity ([facility]) I82.511    Pain and swelling of right lower leg M79.661, M79.89    BMI 60.0-69.9, adult ([facility]) Z68.44    Pulmonary embolism on right ([facility]) I26.99    Acute on chronic respiratory failure with hypoxia and hypercapnia ([facility]) J96.21, J96.22    Diabetes mellitus type 2, noninsulin dependent ([facility])

## 2025-05-01 NOTE — PLAN OF CARE
Problem: Chronic Conditions and Co-morbidities  Goal: Patient's chronic conditions and co-morbidity symptoms are monitored and maintained or improved  4/30/2025 2229 by Margot Rodriguez RN  Outcome: Progressing  4/30/2025 1844 by Reddy Pruett RN  Outcome: Progressing     Problem: Discharge Planning  Goal: Discharge to home or other facility with appropriate resources  4/30/2025 2229 by Margot Rodriguez RN  Outcome: Progressing  4/30/2025 1844 by Reddy Pruett RN  Outcome: Progressing     Problem: Pain  Goal: Verbalizes/displays adequate comfort level or baseline comfort level  4/30/2025 2229 by Margot Rodriguez RN  Outcome: Progressing  4/30/2025 1844 by Reddy Pruett RN  Outcome: Progressing     Problem: Safety - Adult  Goal: Free from fall injury  4/30/2025 2229 by Margot Rodriguez RN  Outcome: Progressing  4/30/2025 1844 by Reddy Pruett RN  Outcome: Progressing     Problem: ABCDS Injury Assessment  Goal: Absence of physical injury  4/30/2025 2229 by Margot Rodriguez RN  Outcome: Progressing  4/30/2025 1844 by Reddy Pruett RN  Outcome: Progressing

## 2025-05-01 NOTE — H&P
Critical Care - History and Physical Examination    Patient's name:  Shazia Nuñez  Medical Record Number: [id number]  Patient's account/billing number: [id number]  Patient's YOB: 1955  Age: 69 y.o.  Date of Admission: 4/28/2025  9:44 AM  Reason of ICU admission:   Date of History and Physical Examination: 4/28/2025      Primary Care Physician: [facility]  Attending Physician:    Code Status: Full Code    Chief complaint:     Reason for ICU admission:       History Of Present Illness:   History was obtained from chart review and the patient.      Shazia Nuñez is a 69 y.o. female with a PMH of a fib, DVT, PE on warfarin, obesity hypoventilation syndrome, HFpEF, tracheostomy(July 2024) presenting to the MICU s/p trach revision with ENT. Patient [facility] to [facility] where she had multiple episodes of oxygen desaturations. Patient was then transferred to [facility] for higher level of care due concerns for subcutaneous emphysema and pneumothorax secondary to the dislogment of the tracheostomy tube.     Patient went to the OR with ENT where the successfully placed a 6-0 proximal XLT shiley. Additionally a left paratracheal false passage was observed by the ENT which occurred during the initial placement of the trach tube at [facility].    Fluids: None  Feeding: N.p.o.  Analgesics: Tylenol 650 mg every 6  Sedation: None  Thrombo-prophylaxis: Heparin infusion  Mobilization: Limited  Head Up: At 30 degrees  Hemodynamics: Stable, no pressor support  Ulcer Prophylaxis: Pantoprazole 40 Mg once daily  Glycemic Control: Medium dose sliding scale  Spontaneous breathing trial: Not applicable  Bowel management: GlycoLax once daily  Indwelling catheter: Yes, Sonny  Drug De-escalation: De-escalate propofol      Past Medical History:        Diagnosis Date    Anxiety     Asthma     Atrial fibrillation (HCC)     A-fib noted on 05/25/2024 visit to ER    Bronchitis     COPD 
sarcoma     Stroke Father 86        minor    Parkinsonism Maternal Grandfather     Cancer Paternal Grandmother     Stroke Paternal Grandmother        Review of Systems:     Positive and Negative as described in HPI.    Review of Systems   Constitutional:  Positive for fatigue.   HENT:  Positive for voice change.    Cardiovascular: Negative.    Gastrointestinal: Negative.    Musculoskeletal:         Cellulitis and pain in lower extremities   Neurological: Negative.        Physical Exam:   BP (!) 140/57   Pulse 86   Temp 98.4 °F (36.9 °C) (Oral)   Resp 21   Ht 1.651 m (5' 5\")   Wt (!) 161.8 kg (356 lb 11.2 oz)   LMP 2014   SpO2 95%   BMI 59.36 kg/m²   Temp (24hrs), Av.8 °F (37.1 °C), Min:98.4 °F (36.9 °C), Max:99.1 °F (37.3 °C)    Recent Labs     25  1127 25  1703 25  2115 25  0752   POCGLU 119* 141* 119* 144*       Intake/Output Summary (Last 24 hours) at 2025 1114  Last data filed at 2025 0000  Gross per 24 hour   Intake 1699.67 ml   Output 1375 ml   Net 324.67 ml       Physical Exam  Constitutional:       General: She is not in acute distress.     Appearance: Normal appearance. She is obese. She is ill-appearing.   HENT:      Head: Normocephalic and atraumatic.      Comments: Tracheostomy present     Mouth/Throat:      Mouth: Mucous membranes are moist.   Eyes:      General: No scleral icterus.     Extraocular Movements: Extraocular movements intact.      Pupils: Pupils are equal, round, and reactive to light.   Cardiovascular:      Rate and Rhythm: Normal rate and regular rhythm.      Heart sounds: No murmur heard.  Pulmonary:      Effort: No respiratory distress.      Breath sounds: No wheezing or rales.   Abdominal:      General: There is no distension.      Tenderness: There is no abdominal tenderness. There is no rebound.   Musculoskeletal:         General: No tenderness or deformity.      Cervical back: Normal range of motion. No rigidity or tenderness.

## 2025-05-02 LAB
GLUCOSE BLD-MCNC: 114 MG/DL (ref 65–105)
GLUCOSE BLD-MCNC: 147 MG/DL (ref 65–105)
GLUCOSE BLD-MCNC: 148 MG/DL (ref 65–105)
GLUCOSE BLD-MCNC: 160 MG/DL (ref 65–105)
PARTIAL THROMBOPLASTIN TIME: 30 SEC (ref 23–36.5)

## 2025-05-02 PROCEDURE — 2060000000 HC ICU INTERMEDIATE R&B

## 2025-05-02 PROCEDURE — 2580000003 HC RX 258

## 2025-05-02 PROCEDURE — 99233 SBSQ HOSP IP/OBS HIGH 50: CPT | Performed by: INTERNAL MEDICINE

## 2025-05-02 PROCEDURE — 6370000000 HC RX 637 (ALT 250 FOR IP)

## 2025-05-02 PROCEDURE — 82947 ASSAY GLUCOSE BLOOD QUANT: CPT

## 2025-05-02 PROCEDURE — 94640 AIRWAY INHALATION TREATMENT: CPT

## 2025-05-02 PROCEDURE — 2500000003 HC RX 250 WO HCPCS

## 2025-05-02 PROCEDURE — 85730 THROMBOPLASTIN TIME PARTIAL: CPT

## 2025-05-02 PROCEDURE — 99231 SBSQ HOSP IP/OBS SF/LOW 25: CPT | Performed by: STUDENT IN AN ORGANIZED HEALTH CARE EDUCATION/TRAINING PROGRAM

## 2025-05-02 PROCEDURE — 94003 VENT MGMT INPAT SUBQ DAY: CPT

## 2025-05-02 PROCEDURE — 94761 N-INVAS EAR/PLS OXIMETRY MLT: CPT

## 2025-05-02 PROCEDURE — 36415 COLL VENOUS BLD VENIPUNCTURE: CPT

## 2025-05-02 PROCEDURE — 6370000000 HC RX 637 (ALT 250 FOR IP): Performed by: INTERNAL MEDICINE

## 2025-05-02 PROCEDURE — 2700000000 HC OXYGEN THERAPY PER DAY

## 2025-05-02 PROCEDURE — 6360000002 HC RX W HCPCS

## 2025-05-02 RX ORDER — PANTOPRAZOLE SODIUM 40 MG/1
40 TABLET, DELAYED RELEASE ORAL
Status: DISCONTINUED | OUTPATIENT
Start: 2025-05-03 | End: 2025-05-14 | Stop reason: HOSPADM

## 2025-05-02 RX ADMIN — MONTELUKAST 10 MG: 10 TABLET, FILM COATED ORAL at 21:04

## 2025-05-02 RX ADMIN — FUROSEMIDE 20 MG: 20 TABLET ORAL at 08:22

## 2025-05-02 RX ADMIN — APIXABAN 5 MG: 5 TABLET, FILM COATED ORAL at 21:04

## 2025-05-02 RX ADMIN — ACETAMINOPHEN 650 MG: 325 TABLET ORAL at 22:39

## 2025-05-02 RX ADMIN — FUROSEMIDE 20 MG: 20 TABLET ORAL at 16:21

## 2025-05-02 RX ADMIN — ACETAMINOPHEN 650 MG: 325 TABLET ORAL at 04:11

## 2025-05-02 RX ADMIN — SODIUM CHLORIDE, PRESERVATIVE FREE 10 ML: 5 INJECTION INTRAVENOUS at 21:04

## 2025-05-02 RX ADMIN — APIXABAN 5 MG: 5 TABLET, FILM COATED ORAL at 08:22

## 2025-05-02 RX ADMIN — ACETAMINOPHEN 650 MG: 325 TABLET ORAL at 10:13

## 2025-05-02 RX ADMIN — PRAVASTATIN SODIUM 80 MG: 20 TABLET ORAL at 16:21

## 2025-05-02 RX ADMIN — AMIODARONE HYDROCHLORIDE 200 MG: 200 TABLET ORAL at 21:04

## 2025-05-02 RX ADMIN — INSULIN GLARGINE 8 UNITS: 100 INJECTION, SOLUTION SUBCUTANEOUS at 21:04

## 2025-05-02 RX ADMIN — SODIUM CHLORIDE, PRESERVATIVE FREE 10 ML: 5 INJECTION INTRAVENOUS at 08:23

## 2025-05-02 RX ADMIN — IPRATROPIUM BROMIDE AND ALBUTEROL SULFATE 1 DOSE: 2.5; .5 SOLUTION RESPIRATORY (INHALATION) at 19:53

## 2025-05-02 RX ADMIN — ACETAMINOPHEN 650 MG: 325 TABLET ORAL at 16:21

## 2025-05-02 RX ADMIN — IPRATROPIUM BROMIDE AND ALBUTEROL SULFATE 1 DOSE: 2.5; .5 SOLUTION RESPIRATORY (INHALATION) at 09:17

## 2025-05-02 RX ADMIN — MICONAZOLE NITRATE: 20 POWDER TOPICAL at 21:04

## 2025-05-02 RX ADMIN — MICONAZOLE NITRATE: 20 POWDER TOPICAL at 08:22

## 2025-05-02 RX ADMIN — AMIODARONE HYDROCHLORIDE 200 MG: 200 TABLET ORAL at 08:22

## 2025-05-02 RX ADMIN — SODIUM CHLORIDE, PRESERVATIVE FREE 40 MG: 5 INJECTION INTRAVENOUS at 08:22

## 2025-05-02 ASSESSMENT — PULMONARY FUNCTION TESTS
PIF_VALUE: 16
PIF_VALUE: 17

## 2025-05-02 ASSESSMENT — PAIN SCALES - GENERAL
PAINLEVEL_OUTOF10: 1
PAINLEVEL_OUTOF10: 3
PAINLEVEL_OUTOF10: 4
PAINLEVEL_OUTOF10: 0
PAINLEVEL_OUTOF10: 4
PAINLEVEL_OUTOF10: 2
PAINLEVEL_OUTOF10: 2
PAINLEVEL_OUTOF10: 0
PAINLEVEL_OUTOF10: 0
PAINLEVEL_OUTOF10: 2
PAINLEVEL_OUTOF10: 0
PAINLEVEL_OUTOF10: 4

## 2025-05-02 ASSESSMENT — PAIN DESCRIPTION - DESCRIPTORS
DESCRIPTORS: ACHING
DESCRIPTORS: DULL

## 2025-05-02 ASSESSMENT — PAIN DESCRIPTION - ORIENTATION
ORIENTATION: MID
ORIENTATION: MID

## 2025-05-02 ASSESSMENT — PAIN DESCRIPTION - LOCATION
LOCATION: NECK
LOCATION: NECK

## 2025-05-02 NOTE — PLAN OF CARE
Problem: Chronic Conditions and Co-morbidities  Goal: Patient's chronic conditions and co-morbidity symptoms are monitored and maintained or improved  Outcome: Progressing  Flowsheets (Taken 5/2/2025 0730)  Care Plan - Patient's Chronic Conditions and Co-Morbidity Symptoms are Monitored and Maintained or Improved:   Monitor and assess patient's chronic conditions and comorbid symptoms for stability, deterioration, or improvement   Collaborate with multidisciplinary team to address chronic and comorbid conditions and prevent exacerbation or deterioration   Update acute care plan with appropriate goals if chronic or comorbid symptoms are exacerbated and prevent overall improvement and discharge     Problem: Discharge Planning  Goal: Discharge to home or other facility with appropriate resources  Outcome: Progressing  Flowsheets (Taken 5/2/2025 0730)  Discharge to home or other facility with appropriate resources:   Identify barriers to discharge with patient and caregiver   Arrange for needed discharge resources and transportation as appropriate   Identify discharge learning needs (meds, wound care, etc)   Refer to discharge planning if patient needs post-hospital services based on physician order or complex needs related to functional status, cognitive ability or social support system     Problem: Pain  Goal: Verbalizes/displays adequate comfort level or baseline comfort level  Outcome: Progressing     Problem: Safety - Adult  Goal: Free from fall injury  Outcome: Progressing     Problem: ABCDS Injury Assessment  Goal: Absence of physical injury  Outcome: Progressing     Problem: Respiratory - Adult  Goal: Achieves optimal ventilation and oxygenation  Outcome: Progressing  Flowsheets (Taken 5/2/2025 0730)  Achieves optimal ventilation and oxygenation:   Assess for changes in respiratory status   Assess for changes in mentation and behavior   Position to facilitate oxygenation and minimize respiratory effort

## 2025-05-02 NOTE — PLAN OF CARE
Problem: Chronic Conditions and Co-morbidities  Goal: Patient's chronic conditions and co-morbidity symptoms are monitored and maintained or improved  Outcome: Progressing  Flowsheets (Taken 5/1/2025 2000)  Care Plan - Patient's Chronic Conditions and Co-Morbidity Symptoms are Monitored and Maintained or Improved:   Monitor and assess patient's chronic conditions and comorbid symptoms for stability, deterioration, or improvement   Collaborate with multidisciplinary team to address chronic and comorbid conditions and prevent exacerbation or deterioration   Update acute care plan with appropriate goals if chronic or comorbid symptoms are exacerbated and prevent overall improvement and discharge     Problem: Discharge Planning  Goal: Discharge to home or other facility with appropriate resources  Outcome: Progressing  Flowsheets (Taken 5/1/2025 2000)  Discharge to home or other facility with appropriate resources:   Identify barriers to discharge with patient and caregiver   Arrange for needed discharge resources and transportation as appropriate   Identify discharge learning needs (meds, wound care, etc)   Refer to discharge planning if patient needs post-hospital services based on physician order or complex needs related to functional status, cognitive ability or social support system     Problem: Pain  Goal: Verbalizes/displays adequate comfort level or baseline comfort level  Outcome: Progressing     Problem: Safety - Adult  Goal: Free from fall injury  Outcome: Progressing  Flowsheets (Taken 5/1/2025 2000)  Free From Fall Injury: Instruct family/caregiver on patient safety     Problem: ABCDS Injury Assessment  Goal: Absence of physical injury  Outcome: Progressing  Flowsheets (Taken 5/1/2025 2000)  Absence of Physical Injury: Implement safety measures based on patient assessment     Problem: Respiratory - Adult  Goal: Achieves optimal ventilation and oxygenation  5/2/2025 0347 by Roula Swann RN  Outcome:

## 2025-05-02 NOTE — CONSULTS
Otolaryngology staff note    POD 3 s/p trach stoma revision after false passage    Patient was seen and examined on rounds this afternoon... txfr'd out of ICU       Trach in good position. Thick secretions around stoma. Stoma/incision healing. Silk sutures in place    This patient should remain in hospital while stoma heals as she had significant airway event with trach dislodgement into false passage    Should remain in hospital until healing established and trach sutures removed    Will need to return to OR for 1st trach change in ~2-3 weeks    --------------------------------------------------  Naseem Jarvis MD  Pediatric Otolaryngology-Head and Neck Surgery  [facility]    Office  ph# [phone]    Also available in [employer]

## 2025-05-03 PROBLEM — Z99.11 VENTILATOR DEPENDENT (HCC): Status: ACTIVE | Noted: 2025-05-03

## 2025-05-03 PROBLEM — Z93.0 TRACHEOSTOMY DEPENDENT (HCC): Status: ACTIVE | Noted: 2025-05-03

## 2025-05-03 LAB
ANION GAP SERPL CALCULATED.3IONS-SCNC: 10 MMOL/L (ref 9–16)
BUN SERPL-MCNC: 27 MG/DL (ref 8–23)
CALCIUM SERPL-MCNC: 9.1 MG/DL (ref 8.6–10.4)
CHLORIDE SERPL-SCNC: 99 MMOL/L (ref 98–107)
CO2 SERPL-SCNC: 33 MMOL/L (ref 20–31)
CREAT SERPL-MCNC: 1.6 MG/DL (ref 0.6–0.9)
GFR, ESTIMATED: 35 ML/MIN/1.73M2
GLUCOSE BLD-MCNC: 128 MG/DL (ref 65–105)
GLUCOSE BLD-MCNC: 153 MG/DL (ref 65–105)
GLUCOSE BLD-MCNC: 158 MG/DL (ref 65–105)
GLUCOSE BLD-MCNC: 170 MG/DL (ref 65–105)
GLUCOSE SERPL-MCNC: 118 MG/DL (ref 74–99)
MICROORGANISM SPEC CULT: NORMAL
MICROORGANISM SPEC CULT: NORMAL
PARTIAL THROMBOPLASTIN TIME: 31.3 SEC (ref 23–36.5)
POTASSIUM SERPL-SCNC: 3.9 MMOL/L (ref 3.7–5.3)
SERVICE CMNT-IMP: NORMAL
SERVICE CMNT-IMP: NORMAL
SODIUM SERPL-SCNC: 142 MMOL/L (ref 136–145)
SPECIMEN DESCRIPTION: NORMAL
SPECIMEN DESCRIPTION: NORMAL

## 2025-05-03 PROCEDURE — 99231 SBSQ HOSP IP/OBS SF/LOW 25: CPT | Performed by: STUDENT IN AN ORGANIZED HEALTH CARE EDUCATION/TRAINING PROGRAM

## 2025-05-03 PROCEDURE — 80048 BASIC METABOLIC PNL TOTAL CA: CPT

## 2025-05-03 PROCEDURE — 94003 VENT MGMT INPAT SUBQ DAY: CPT

## 2025-05-03 PROCEDURE — 6370000000 HC RX 637 (ALT 250 FOR IP): Performed by: INTERNAL MEDICINE

## 2025-05-03 PROCEDURE — 82947 ASSAY GLUCOSE BLOOD QUANT: CPT

## 2025-05-03 PROCEDURE — 6370000000 HC RX 637 (ALT 250 FOR IP)

## 2025-05-03 PROCEDURE — 99233 SBSQ HOSP IP/OBS HIGH 50: CPT | Performed by: INTERNAL MEDICINE

## 2025-05-03 PROCEDURE — 36415 COLL VENOUS BLD VENIPUNCTURE: CPT

## 2025-05-03 PROCEDURE — 85730 THROMBOPLASTIN TIME PARTIAL: CPT

## 2025-05-03 PROCEDURE — 2700000000 HC OXYGEN THERAPY PER DAY

## 2025-05-03 PROCEDURE — 6370000000 HC RX 637 (ALT 250 FOR IP): Performed by: STUDENT IN AN ORGANIZED HEALTH CARE EDUCATION/TRAINING PROGRAM

## 2025-05-03 PROCEDURE — 2500000003 HC RX 250 WO HCPCS

## 2025-05-03 PROCEDURE — 2060000000 HC ICU INTERMEDIATE R&B

## 2025-05-03 PROCEDURE — 94761 N-INVAS EAR/PLS OXIMETRY MLT: CPT

## 2025-05-03 PROCEDURE — 94640 AIRWAY INHALATION TREATMENT: CPT

## 2025-05-03 RX ADMIN — AMIODARONE HYDROCHLORIDE 200 MG: 200 TABLET ORAL at 20:33

## 2025-05-03 RX ADMIN — FUROSEMIDE 20 MG: 20 TABLET ORAL at 17:13

## 2025-05-03 RX ADMIN — INSULIN GLARGINE 8 UNITS: 100 INJECTION, SOLUTION SUBCUTANEOUS at 20:31

## 2025-05-03 RX ADMIN — SODIUM CHLORIDE, PRESERVATIVE FREE 10 ML: 5 INJECTION INTRAVENOUS at 20:31

## 2025-05-03 RX ADMIN — APIXABAN 5 MG: 5 TABLET, FILM COATED ORAL at 20:33

## 2025-05-03 RX ADMIN — ACETAMINOPHEN 650 MG: 325 TABLET ORAL at 18:47

## 2025-05-03 RX ADMIN — PANTOPRAZOLE SODIUM 40 MG: 40 TABLET, DELAYED RELEASE ORAL at 05:29

## 2025-05-03 RX ADMIN — MICONAZOLE NITRATE: 20 POWDER TOPICAL at 20:32

## 2025-05-03 RX ADMIN — IPRATROPIUM BROMIDE AND ALBUTEROL SULFATE 1 DOSE: 2.5; .5 SOLUTION RESPIRATORY (INHALATION) at 08:09

## 2025-05-03 RX ADMIN — MICONAZOLE NITRATE: 20 POWDER TOPICAL at 08:09

## 2025-05-03 RX ADMIN — MONTELUKAST 10 MG: 10 TABLET, FILM COATED ORAL at 20:32

## 2025-05-03 RX ADMIN — ACETAMINOPHEN 650 MG: 325 TABLET ORAL at 13:05

## 2025-05-03 RX ADMIN — FUROSEMIDE 20 MG: 20 TABLET ORAL at 08:10

## 2025-05-03 RX ADMIN — IPRATROPIUM BROMIDE AND ALBUTEROL SULFATE 1 DOSE: 2.5; .5 SOLUTION RESPIRATORY (INHALATION) at 19:51

## 2025-05-03 RX ADMIN — AMIODARONE HYDROCHLORIDE 200 MG: 200 TABLET ORAL at 08:10

## 2025-05-03 RX ADMIN — ACETAMINOPHEN 650 MG: 325 TABLET ORAL at 05:29

## 2025-05-03 RX ADMIN — PRAVASTATIN SODIUM 80 MG: 20 TABLET ORAL at 17:13

## 2025-05-03 RX ADMIN — APIXABAN 5 MG: 5 TABLET, FILM COATED ORAL at 08:10

## 2025-05-03 ASSESSMENT — PAIN SCALES - GENERAL
PAINLEVEL_OUTOF10: 0
PAINLEVEL_OUTOF10: 2
PAINLEVEL_OUTOF10: 5
PAINLEVEL_OUTOF10: 1
PAINLEVEL_OUTOF10: 3
PAINLEVEL_OUTOF10: 4
PAINLEVEL_OUTOF10: 3

## 2025-05-03 ASSESSMENT — PULMONARY FUNCTION TESTS
PIF_VALUE: 16

## 2025-05-03 ASSESSMENT — PAIN DESCRIPTION - DESCRIPTORS
DESCRIPTORS: ACHING
DESCRIPTORS: ACHING

## 2025-05-03 ASSESSMENT — PAIN DESCRIPTION - LOCATION
LOCATION: NECK
LOCATION: NECK

## 2025-05-03 ASSESSMENT — PAIN DESCRIPTION - ORIENTATION
ORIENTATION: MID
ORIENTATION: MID

## 2025-05-03 NOTE — PLAN OF CARE
Problem: Respiratory - Adult  Goal: Achieves optimal ventilation and oxygenation  5/3/2025 1958 by Aretha Simpson RCP  Outcome: Progressing  BRONCHOSPASM/BRONCHOCONSTRICTION     [x]         IMPROVE AERATION/BREATH SOUNDS  [x]   ADMINISTER BRONCHODILATOR THERAPY AS APPROPRIATE  [x]   ASSESS BREATH SOUNDS  []   IMPLEMENT AEROSOL/MDI PROTOCOL  [x]   PATIENT EDUCATION AS NEEDED  Problem: OXYGENATION/RESPIRATORY FUNCTION  Goal: Patient will maintain patent airway  Outcome: Ongoing  Goal: Patient will achieve/maintain normal respiratory rate/effort  Respiratory rate and effort will be within normal limits for the patient  Outcome: Ongoing    Problem: MECHANICAL VENTILATION  Goal: Patient will maintain patent airway  Outcome: Ongoing  Goal: Oral health is maintained or improved  Outcome: Ongoing  Goal: ET tube will be managed safely  Outcome: Ongoing  Goal: Ability to express needs and understand communication  Outcome: Ongoing  Goal: Mobility/activity is maintained at optimum level for patient  Outcome: Ongoing    Problem: ASPIRATION PRECAUTIONS  Goal: Patient’s risk of aspiration is minimized  Outcome: Ongoing    Problem: SKIN INTEGRITY  Goal: Skin integrity is maintained or improved  Outcome: Ongoing

## 2025-05-03 NOTE — PLAN OF CARE
Problem: Chronic Conditions and Co-morbidities  Goal: Patient's chronic conditions and co-morbidity symptoms are monitored and maintained or improved  5/2/2025 2121 by Roula Swann RN  Outcome: Progressing  Flowsheets (Taken 5/2/2025 2117)  Care Plan - Patient's Chronic Conditions and Co-Morbidity Symptoms are Monitored and Maintained or Improved:   Monitor and assess patient's chronic conditions and comorbid symptoms for stability, deterioration, or improvement   Collaborate with multidisciplinary team to address chronic and comorbid conditions and prevent exacerbation or deterioration   Update acute care plan with appropriate goals if chronic or comorbid symptoms are exacerbated and prevent overall improvement and discharge  5/2/2025 1806 by Eliza Lainez RN  Outcome: Progressing  Flowsheets (Taken 5/2/2025 0730)  Care Plan - Patient's Chronic Conditions and Co-Morbidity Symptoms are Monitored and Maintained or Improved:   Monitor and assess patient's chronic conditions and comorbid symptoms for stability, deterioration, or improvement   Collaborate with multidisciplinary team to address chronic and comorbid conditions and prevent exacerbation or deterioration   Update acute care plan with appropriate goals if chronic or comorbid symptoms are exacerbated and prevent overall improvement and discharge     Problem: Discharge Planning  Goal: Discharge to home or other facility with appropriate resources  5/2/2025 2121 by Roula Swann, RN  Outcome: Progressing  Flowsheets (Taken 5/2/2025 2117)  Discharge to home or other facility with appropriate resources:   Identify barriers to discharge with patient and caregiver   Arrange for needed discharge resources and transportation as appropriate   Identify discharge learning needs (meds, wound care, etc)   Refer to discharge planning if patient needs post-hospital services based on physician order or complex needs related to functional status, cognitive ability

## 2025-05-03 NOTE — PLAN OF CARE
Problem: Chronic Conditions and Co-morbidities  Goal: Patient's chronic conditions and co-morbidity symptoms are monitored and maintained or improved  5/3/2025 0717 by Eliza Lainez RN  Outcome: Progressing  5/2/2025 2121 by Roula Swann, RN  Outcome: Progressing  Flowsheets (Taken 5/2/2025 2117)  Care Plan - Patient's Chronic Conditions and Co-Morbidity Symptoms are Monitored and Maintained or Improved:   Monitor and assess patient's chronic conditions and comorbid symptoms for stability, deterioration, or improvement   Collaborate with multidisciplinary team to address chronic and comorbid conditions and prevent exacerbation or deterioration   Update acute care plan with appropriate goals if chronic or comorbid symptoms are exacerbated and prevent overall improvement and discharge  5/2/2025 1806 by Eliza Lainez RN  Outcome: Progressing  Flowsheets (Taken 5/2/2025 0730)  Care Plan - Patient's Chronic Conditions and Co-Morbidity Symptoms are Monitored and Maintained or Improved:   Monitor and assess patient's chronic conditions and comorbid symptoms for stability, deterioration, or improvement   Collaborate with multidisciplinary team to address chronic and comorbid conditions and prevent exacerbation or deterioration   Update acute care plan with appropriate goals if chronic or comorbid symptoms are exacerbated and prevent overall improvement and discharge     Problem: Discharge Planning  Goal: Discharge to home or other facility with appropriate resources  5/3/2025 0717 by Eliza Lainez RN  Outcome: Progressing  5/2/2025 2121 by Roula Swann, RN  Outcome: Progressing  Flowsheets (Taken 5/2/2025 2117)  Discharge to home or other facility with appropriate resources:   Identify barriers to discharge with patient and caregiver   Arrange for needed discharge resources and transportation as appropriate   Identify discharge learning needs (meds, wound care, etc)   Refer to discharge planning if

## 2025-05-04 LAB
ANION GAP SERPL CALCULATED.3IONS-SCNC: 9 MMOL/L (ref 9–16)
BUN SERPL-MCNC: 27 MG/DL (ref 8–23)
CALCIUM SERPL-MCNC: 9.2 MG/DL (ref 8.6–10.4)
CHLORIDE SERPL-SCNC: 99 MMOL/L (ref 98–107)
CO2 SERPL-SCNC: 32 MMOL/L (ref 20–31)
CREAT SERPL-MCNC: 1.6 MG/DL (ref 0.6–0.9)
ERYTHROCYTE [DISTWIDTH] IN BLOOD BY AUTOMATED COUNT: 18.4 % (ref 11.8–14.4)
GFR, ESTIMATED: 35 ML/MIN/1.73M2
GLUCOSE BLD-MCNC: 122 MG/DL (ref 65–105)
GLUCOSE BLD-MCNC: 123 MG/DL (ref 65–105)
GLUCOSE BLD-MCNC: 155 MG/DL (ref 65–105)
GLUCOSE BLD-MCNC: 173 MG/DL (ref 65–105)
GLUCOSE SERPL-MCNC: 123 MG/DL (ref 74–99)
HCT VFR BLD AUTO: 27.6 % (ref 36.3–47.1)
HGB BLD-MCNC: 7.8 G/DL (ref 11.9–15.1)
MCH RBC QN AUTO: 26.2 PG (ref 25.2–33.5)
MCHC RBC AUTO-ENTMCNC: 28.3 G/DL (ref 28.4–34.8)
MCV RBC AUTO: 92.6 FL (ref 82.6–102.9)
NRBC BLD-RTO: 0 PER 100 WBC
PLATELET # BLD AUTO: 244 K/UL (ref 138–453)
PMV BLD AUTO: 8.7 FL (ref 8.1–13.5)
POTASSIUM SERPL-SCNC: 3.9 MMOL/L (ref 3.7–5.3)
RBC # BLD AUTO: 2.98 M/UL (ref 3.95–5.11)
SODIUM SERPL-SCNC: 140 MMOL/L (ref 136–145)
WBC OTHER # BLD: 4.7 K/UL (ref 3.5–11.3)

## 2025-05-04 PROCEDURE — 94761 N-INVAS EAR/PLS OXIMETRY MLT: CPT

## 2025-05-04 PROCEDURE — 6370000000 HC RX 637 (ALT 250 FOR IP)

## 2025-05-04 PROCEDURE — 94003 VENT MGMT INPAT SUBQ DAY: CPT

## 2025-05-04 PROCEDURE — 36415 COLL VENOUS BLD VENIPUNCTURE: CPT

## 2025-05-04 PROCEDURE — 2700000000 HC OXYGEN THERAPY PER DAY

## 2025-05-04 PROCEDURE — 80048 BASIC METABOLIC PNL TOTAL CA: CPT

## 2025-05-04 PROCEDURE — 82947 ASSAY GLUCOSE BLOOD QUANT: CPT

## 2025-05-04 PROCEDURE — 99233 SBSQ HOSP IP/OBS HIGH 50: CPT | Performed by: INTERNAL MEDICINE

## 2025-05-04 PROCEDURE — 2500000003 HC RX 250 WO HCPCS

## 2025-05-04 PROCEDURE — 85027 COMPLETE CBC AUTOMATED: CPT

## 2025-05-04 PROCEDURE — 6370000000 HC RX 637 (ALT 250 FOR IP): Performed by: INTERNAL MEDICINE

## 2025-05-04 PROCEDURE — 6370000000 HC RX 637 (ALT 250 FOR IP): Performed by: STUDENT IN AN ORGANIZED HEALTH CARE EDUCATION/TRAINING PROGRAM

## 2025-05-04 PROCEDURE — 94640 AIRWAY INHALATION TREATMENT: CPT

## 2025-05-04 PROCEDURE — 99231 SBSQ HOSP IP/OBS SF/LOW 25: CPT | Performed by: STUDENT IN AN ORGANIZED HEALTH CARE EDUCATION/TRAINING PROGRAM

## 2025-05-04 PROCEDURE — 2060000000 HC ICU INTERMEDIATE R&B

## 2025-05-04 RX ADMIN — FUROSEMIDE 20 MG: 20 TABLET ORAL at 16:33

## 2025-05-04 RX ADMIN — ACETAMINOPHEN 650 MG: 325 TABLET ORAL at 07:47

## 2025-05-04 RX ADMIN — PANTOPRAZOLE SODIUM 40 MG: 40 TABLET, DELAYED RELEASE ORAL at 05:30

## 2025-05-04 RX ADMIN — INSULIN GLARGINE 8 UNITS: 100 INJECTION, SOLUTION SUBCUTANEOUS at 20:01

## 2025-05-04 RX ADMIN — AMIODARONE HYDROCHLORIDE 200 MG: 200 TABLET ORAL at 20:01

## 2025-05-04 RX ADMIN — AMIODARONE HYDROCHLORIDE 200 MG: 200 TABLET ORAL at 07:46

## 2025-05-04 RX ADMIN — SODIUM CHLORIDE, PRESERVATIVE FREE 10 ML: 5 INJECTION INTRAVENOUS at 20:01

## 2025-05-04 RX ADMIN — ACETAMINOPHEN 650 MG: 325 TABLET ORAL at 16:33

## 2025-05-04 RX ADMIN — IPRATROPIUM BROMIDE AND ALBUTEROL SULFATE 1 DOSE: 2.5; .5 SOLUTION RESPIRATORY (INHALATION) at 19:58

## 2025-05-04 RX ADMIN — APIXABAN 5 MG: 5 TABLET, FILM COATED ORAL at 07:47

## 2025-05-04 RX ADMIN — ACETAMINOPHEN 650 MG: 325 TABLET ORAL at 22:37

## 2025-05-04 RX ADMIN — PRAVASTATIN SODIUM 80 MG: 20 TABLET ORAL at 16:33

## 2025-05-04 RX ADMIN — IPRATROPIUM BROMIDE AND ALBUTEROL SULFATE 1 DOSE: 2.5; .5 SOLUTION RESPIRATORY (INHALATION) at 08:45

## 2025-05-04 RX ADMIN — MONTELUKAST 10 MG: 10 TABLET, FILM COATED ORAL at 20:01

## 2025-05-04 RX ADMIN — ACETAMINOPHEN 650 MG: 325 TABLET ORAL at 01:11

## 2025-05-04 RX ADMIN — APIXABAN 5 MG: 5 TABLET, FILM COATED ORAL at 20:01

## 2025-05-04 RX ADMIN — MICONAZOLE NITRATE: 20 POWDER TOPICAL at 07:47

## 2025-05-04 RX ADMIN — MICONAZOLE NITRATE: 20 POWDER TOPICAL at 20:02

## 2025-05-04 RX ADMIN — FUROSEMIDE 20 MG: 20 TABLET ORAL at 07:46

## 2025-05-04 ASSESSMENT — PAIN SCALES - GENERAL
PAINLEVEL_OUTOF10: 0
PAINLEVEL_OUTOF10: 7
PAINLEVEL_OUTOF10: 3
PAINLEVEL_OUTOF10: 4
PAINLEVEL_OUTOF10: 4

## 2025-05-04 ASSESSMENT — PAIN DESCRIPTION - LOCATION
LOCATION: INCISION;THROAT
LOCATION: NECK

## 2025-05-04 ASSESSMENT — PAIN DESCRIPTION - ORIENTATION
ORIENTATION: MID;INNER
ORIENTATION: MID

## 2025-05-04 ASSESSMENT — PAIN DESCRIPTION - DESCRIPTORS
DESCRIPTORS: ACHING
DESCRIPTORS: ACHING
DESCRIPTORS: DULL

## 2025-05-04 ASSESSMENT — PULMONARY FUNCTION TESTS
PIF_VALUE: 16

## 2025-05-04 NOTE — PLAN OF CARE
Problem: Chronic Conditions and Co-morbidities  Goal: Patient's chronic conditions and co-morbidity symptoms are monitored and maintained or improved  Outcome: Progressing  Flowsheets (Taken 5/3/2025 0800 by Eliza Lainez, RN)  Care Plan - Patient's Chronic Conditions and Co-Morbidity Symptoms are Monitored and Maintained or Improved:   Monitor and assess patient's chronic conditions and comorbid symptoms for stability, deterioration, or improvement   Collaborate with multidisciplinary team to address chronic and comorbid conditions and prevent exacerbation or deterioration   Update acute care plan with appropriate goals if chronic or comorbid symptoms are exacerbated and prevent overall improvement and discharge     Problem: Discharge Planning  Goal: Discharge to home or other facility with appropriate resources  Outcome: Progressing  Flowsheets (Taken 5/3/2025 0800 by Eliza Lainez, RN)  Discharge to home or other facility with appropriate resources:   Identify barriers to discharge with patient and caregiver   Arrange for needed discharge resources and transportation as appropriate   Identify discharge learning needs (meds, wound care, etc)   Refer to discharge planning if patient needs post-hospital services based on physician order or complex needs related to functional status, cognitive ability or social support system     Problem: Pain  Goal: Verbalizes/displays adequate comfort level or baseline comfort level  Outcome: Progressing     Problem: Safety - Adult  Goal: Free from fall injury  Outcome: Progressing     Problem: ABCDS Injury Assessment  Goal: Absence of physical injury  Outcome: Progressing     Problem: Respiratory - Adult  Goal: Achieves optimal ventilation and oxygenation  5/3/2025 2122 by Salas Blevins RN  Outcome: Progressing  5/3/2025 1958 by Aretha Simpson RCP  Outcome: Progressing  Flowsheets  Taken 5/3/2025 0810 by Katherine York RCP  Achieves optimal ventilation and

## 2025-05-04 NOTE — PLAN OF CARE
Problem: Chronic Conditions and Co-morbidities  Goal: Patient's chronic conditions and co-morbidity symptoms are monitored and maintained or improved  5/4/2025 0726 by Eliza Lainez RN  Outcome: Progressing  5/3/2025 2122 by Salas Blevins RN  Outcome: Progressing  Flowsheets (Taken 5/3/2025 0800 by Eliza Lainez RN)  Care Plan - Patient's Chronic Conditions and Co-Morbidity Symptoms are Monitored and Maintained or Improved:   Monitor and assess patient's chronic conditions and comorbid symptoms for stability, deterioration, or improvement   Collaborate with multidisciplinary team to address chronic and comorbid conditions and prevent exacerbation or deterioration   Update acute care plan with appropriate goals if chronic or comorbid symptoms are exacerbated and prevent overall improvement and discharge     Problem: Discharge Planning  Goal: Discharge to home or other facility with appropriate resources  5/4/2025 0726 by Eliza Lainez RN  Outcome: Progressing  5/3/2025 2122 by Salas Blevins RN  Outcome: Progressing  Flowsheets (Taken 5/3/2025 0800 by Eliza Lainez RN)  Discharge to home or other facility with appropriate resources:   Identify barriers to discharge with patient and caregiver   Arrange for needed discharge resources and transportation as appropriate   Identify discharge learning needs (meds, wound care, etc)   Refer to discharge planning if patient needs post-hospital services based on physician order or complex needs related to functional status, cognitive ability or social support system     Problem: Pain  Goal: Verbalizes/displays adequate comfort level or baseline comfort level  5/4/2025 0726 by Eliza Lainez RN  Outcome: Progressing  5/3/2025 2122 by Salas Blevins RN  Outcome: Progressing     Problem: Safety - Adult  Goal: Free from fall injury  5/4/2025 0726 by Eliza Lainez RN  Outcome: Progressing  5/3/2025 2122 by Salas Blevins RN  Outcome:

## 2025-05-05 LAB
GLUCOSE BLD-MCNC: 127 MG/DL (ref 65–105)
GLUCOSE BLD-MCNC: 143 MG/DL (ref 65–105)
GLUCOSE BLD-MCNC: 150 MG/DL (ref 65–105)
GLUCOSE BLD-MCNC: 172 MG/DL (ref 65–105)

## 2025-05-05 PROCEDURE — 97530 THERAPEUTIC ACTIVITIES: CPT

## 2025-05-05 PROCEDURE — 6370000000 HC RX 637 (ALT 250 FOR IP): Performed by: INTERNAL MEDICINE

## 2025-05-05 PROCEDURE — 97535 SELF CARE MNGMENT TRAINING: CPT

## 2025-05-05 PROCEDURE — 6370000000 HC RX 637 (ALT 250 FOR IP): Performed by: STUDENT IN AN ORGANIZED HEALTH CARE EDUCATION/TRAINING PROGRAM

## 2025-05-05 PROCEDURE — 2500000003 HC RX 250 WO HCPCS

## 2025-05-05 PROCEDURE — 82947 ASSAY GLUCOSE BLOOD QUANT: CPT

## 2025-05-05 PROCEDURE — 6370000000 HC RX 637 (ALT 250 FOR IP)

## 2025-05-05 PROCEDURE — 94003 VENT MGMT INPAT SUBQ DAY: CPT

## 2025-05-05 PROCEDURE — 2060000000 HC ICU INTERMEDIATE R&B

## 2025-05-05 PROCEDURE — 94640 AIRWAY INHALATION TREATMENT: CPT

## 2025-05-05 PROCEDURE — 97166 OT EVAL MOD COMPLEX 45 MIN: CPT

## 2025-05-05 PROCEDURE — 99231 SBSQ HOSP IP/OBS SF/LOW 25: CPT | Performed by: STUDENT IN AN ORGANIZED HEALTH CARE EDUCATION/TRAINING PROGRAM

## 2025-05-05 PROCEDURE — 94761 N-INVAS EAR/PLS OXIMETRY MLT: CPT

## 2025-05-05 PROCEDURE — 2700000000 HC OXYGEN THERAPY PER DAY

## 2025-05-05 PROCEDURE — 99232 SBSQ HOSP IP/OBS MODERATE 35: CPT | Performed by: STUDENT IN AN ORGANIZED HEALTH CARE EDUCATION/TRAINING PROGRAM

## 2025-05-05 PROCEDURE — 99211 OFF/OP EST MAY X REQ PHY/QHP: CPT

## 2025-05-05 RX ORDER — IPRATROPIUM BROMIDE AND ALBUTEROL SULFATE 2.5; .5 MG/3ML; MG/3ML
1 SOLUTION RESPIRATORY (INHALATION) EVERY 4 HOURS PRN
Status: DISCONTINUED | OUTPATIENT
Start: 2025-05-05 | End: 2025-05-14 | Stop reason: HOSPADM

## 2025-05-05 RX ADMIN — FUROSEMIDE 20 MG: 20 TABLET ORAL at 18:15

## 2025-05-05 RX ADMIN — PANTOPRAZOLE SODIUM 40 MG: 40 TABLET, DELAYED RELEASE ORAL at 05:01

## 2025-05-05 RX ADMIN — IPRATROPIUM BROMIDE AND ALBUTEROL SULFATE 1 DOSE: 2.5; .5 SOLUTION RESPIRATORY (INHALATION) at 19:37

## 2025-05-05 RX ADMIN — ACETAMINOPHEN 650 MG: 325 TABLET ORAL at 12:35

## 2025-05-05 RX ADMIN — ACETAMINOPHEN 650 MG: 325 TABLET ORAL at 23:24

## 2025-05-05 RX ADMIN — PRAVASTATIN SODIUM 80 MG: 20 TABLET ORAL at 18:15

## 2025-05-05 RX ADMIN — ACETAMINOPHEN 650 MG: 325 TABLET ORAL at 05:01

## 2025-05-05 RX ADMIN — MONTELUKAST 10 MG: 10 TABLET, FILM COATED ORAL at 21:16

## 2025-05-05 RX ADMIN — INSULIN GLARGINE 8 UNITS: 100 INJECTION, SOLUTION SUBCUTANEOUS at 21:16

## 2025-05-05 RX ADMIN — AMIODARONE HYDROCHLORIDE 200 MG: 200 TABLET ORAL at 21:16

## 2025-05-05 RX ADMIN — SODIUM CHLORIDE, PRESERVATIVE FREE 10 ML: 5 INJECTION INTRAVENOUS at 21:17

## 2025-05-05 RX ADMIN — IPRATROPIUM BROMIDE AND ALBUTEROL SULFATE 1 DOSE: 2.5; .5 SOLUTION RESPIRATORY (INHALATION) at 03:35

## 2025-05-05 RX ADMIN — FUROSEMIDE 20 MG: 20 TABLET ORAL at 07:41

## 2025-05-05 RX ADMIN — IPRATROPIUM BROMIDE AND ALBUTEROL SULFATE 1 DOSE: 2.5; .5 SOLUTION RESPIRATORY (INHALATION) at 08:31

## 2025-05-05 RX ADMIN — AMIODARONE HYDROCHLORIDE 200 MG: 200 TABLET ORAL at 07:41

## 2025-05-05 RX ADMIN — SODIUM CHLORIDE, PRESERVATIVE FREE 10 ML: 5 INJECTION INTRAVENOUS at 07:42

## 2025-05-05 RX ADMIN — MICONAZOLE NITRATE: 20 POWDER TOPICAL at 21:19

## 2025-05-05 RX ADMIN — APIXABAN 5 MG: 5 TABLET, FILM COATED ORAL at 21:16

## 2025-05-05 RX ADMIN — APIXABAN 5 MG: 5 TABLET, FILM COATED ORAL at 07:41

## 2025-05-05 ASSESSMENT — PULMONARY FUNCTION TESTS
PIF_VALUE: 16

## 2025-05-05 ASSESSMENT — PAIN DESCRIPTION - ORIENTATION: ORIENTATION: MID;INNER

## 2025-05-05 ASSESSMENT — PAIN DESCRIPTION - LOCATION
LOCATION: THROAT;INCISION
LOCATION: NECK

## 2025-05-05 ASSESSMENT — PAIN SCALES - GENERAL
PAINLEVEL_OUTOF10: 0
PAINLEVEL_OUTOF10: 7
PAINLEVEL_OUTOF10: 3

## 2025-05-05 ASSESSMENT — PAIN DESCRIPTION - DESCRIPTORS: DESCRIPTORS: ACHING

## 2025-05-05 NOTE — PLAN OF CARE
Problem: Chronic Conditions and Co-morbidities  Goal: Patient's chronic conditions and co-morbidity symptoms are monitored and maintained or improved  5/5/2025 0825 by Chemo Henry RN  Outcome: Progressing  5/5/2025 0602 by Florin Natarajan RN  Outcome: Progressing     Problem: Discharge Planning  Goal: Discharge to home or other facility with appropriate resources  5/5/2025 0825 by Chemo Henry RN  Outcome: Progressing  5/5/2025 0602 by Florin Natarajan RN  Outcome: Progressing     Problem: Pain  Goal: Verbalizes/displays adequate comfort level or baseline comfort level  5/5/2025 0825 by Chemo Henry RN  Outcome: Progressing  5/5/2025 0602 by Florin Natarajan RN  Outcome: Progressing     Problem: Safety - Adult  Goal: Free from fall injury  5/5/2025 0825 by Chemo Henry RN  Outcome: Progressing  5/5/2025 0602 by Florin Natarajan RN  Outcome: Progressing     Problem: ABCDS Injury Assessment  Goal: Absence of physical injury  5/5/2025 0825 by Chemo Henry RN  Outcome: Progressing  5/5/2025 0602 by Florin Natarajan RN  Outcome: Progressing     Problem: Respiratory - Adult  Goal: Achieves optimal ventilation and oxygenation  5/5/2025 0825 by Chemo Henry RN  Outcome: Progressing  5/5/2025 0602 by Florin Natarajan RN  Outcome: Progressing  5/4/2025 2005 by Aretha Simpson RCP  Outcome: Progressing

## 2025-05-05 NOTE — PLAN OF CARE
Problem: Respiratory - Adult  Goal: Achieves optimal ventilation and oxygenation  5/4/2025 2005 by Aretha Simpson RCP  Outcome: Progressing   BRONCHOSPASM/BRONCHOCONSTRICTION     [x]         IMPROVE AERATION/BREATH SOUNDS  [x]   ADMINISTER BRONCHODILATOR THERAPY AS APPROPRIATE  [x]   ASSESS BREATH SOUNDS  []   IMPLEMENT AEROSOL/MDI PROTOCOL  [x]   PATIENT EDUCATION AS NEEDED  Problem: OXYGENATION/RESPIRATORY FUNCTION  Goal: Patient will maintain patent airway  Outcome: Ongoing  Goal: Patient will achieve/maintain normal respiratory rate/effort  Respiratory rate and effort will be within normal limits for the patient  Outcome: Ongoing    Problem: MECHANICAL VENTILATION  Goal: Patient will maintain patent airway  Outcome: Ongoing  Goal: Oral health is maintained or improved  Outcome: Ongoing  Goal: ET tube will be managed safely  Outcome: Ongoing  Goal: Ability to express needs and understand communication  Outcome: Ongoing  Goal: Mobility/activity is maintained at optimum level for patient  Outcome: Ongoing    Problem: ASPIRATION PRECAUTIONS  Goal: Patient’s risk of aspiration is minimized  Outcome: Ongoing    Problem: SKIN INTEGRITY  Goal: Skin integrity is maintained or improved  Outcome: Ongoing

## 2025-05-05 NOTE — PLAN OF CARE
Problem: Chronic Conditions and Co-morbidities  Goal: Patient's chronic conditions and co-morbidity symptoms are monitored and maintained or improved  Outcome: Progressing     Problem: Discharge Planning  Goal: Discharge to home or other facility with appropriate resources  Outcome: Progressing     Problem: Pain  Goal: Verbalizes/displays adequate comfort level or baseline comfort level  Outcome: Progressing     Problem: Safety - Adult  Goal: Free from fall injury  Outcome: Progressing     Problem: ABCDS Injury Assessment  Goal: Absence of physical injury  Outcome: Progressing     Problem: Respiratory - Adult  Goal: Achieves optimal ventilation and oxygenation  5/5/2025 0602 by Florin Natarajan RN  Outcome: Progressing  5/4/2025 2005 by Aretha Simpson RCP  Outcome: Progressing

## 2025-05-05 NOTE — CARE COORDINATION
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       Workflow Continuation (Additional Notes) :    Plan continues to be snf- [facility] bed hold.       NICKIE CADET  May 5, 2025

## 2025-05-06 LAB
GLUCOSE BLD-MCNC: 145 MG/DL (ref 65–105)
GLUCOSE BLD-MCNC: 156 MG/DL (ref 65–105)
GLUCOSE BLD-MCNC: 165 MG/DL (ref 65–105)
GLUCOSE BLD-MCNC: 185 MG/DL (ref 65–105)

## 2025-05-06 PROCEDURE — 6370000000 HC RX 637 (ALT 250 FOR IP)

## 2025-05-06 PROCEDURE — 6370000000 HC RX 637 (ALT 250 FOR IP): Performed by: INTERNAL MEDICINE

## 2025-05-06 PROCEDURE — 97162 PT EVAL MOD COMPLEX 30 MIN: CPT

## 2025-05-06 PROCEDURE — 2500000003 HC RX 250 WO HCPCS

## 2025-05-06 PROCEDURE — 82947 ASSAY GLUCOSE BLOOD QUANT: CPT

## 2025-05-06 PROCEDURE — 2060000000 HC ICU INTERMEDIATE R&B

## 2025-05-06 PROCEDURE — 99231 SBSQ HOSP IP/OBS SF/LOW 25: CPT

## 2025-05-06 PROCEDURE — 94640 AIRWAY INHALATION TREATMENT: CPT

## 2025-05-06 PROCEDURE — 6370000000 HC RX 637 (ALT 250 FOR IP): Performed by: STUDENT IN AN ORGANIZED HEALTH CARE EDUCATION/TRAINING PROGRAM

## 2025-05-06 PROCEDURE — 97530 THERAPEUTIC ACTIVITIES: CPT

## 2025-05-06 PROCEDURE — 94003 VENT MGMT INPAT SUBQ DAY: CPT

## 2025-05-06 PROCEDURE — 99233 SBSQ HOSP IP/OBS HIGH 50: CPT | Performed by: INTERNAL MEDICINE

## 2025-05-06 PROCEDURE — 99232 SBSQ HOSP IP/OBS MODERATE 35: CPT | Performed by: STUDENT IN AN ORGANIZED HEALTH CARE EDUCATION/TRAINING PROGRAM

## 2025-05-06 RX ADMIN — AMIODARONE HYDROCHLORIDE 200 MG: 200 TABLET ORAL at 20:27

## 2025-05-06 RX ADMIN — AMIODARONE HYDROCHLORIDE 200 MG: 200 TABLET ORAL at 08:40

## 2025-05-06 RX ADMIN — INSULIN LISPRO 2 UNITS: 100 INJECTION, SOLUTION INTRAVENOUS; SUBCUTANEOUS at 20:28

## 2025-05-06 RX ADMIN — PRAVASTATIN SODIUM 80 MG: 20 TABLET ORAL at 18:04

## 2025-05-06 RX ADMIN — FUROSEMIDE 20 MG: 20 TABLET ORAL at 08:40

## 2025-05-06 RX ADMIN — IPRATROPIUM BROMIDE AND ALBUTEROL SULFATE 1 DOSE: 2.5; .5 SOLUTION RESPIRATORY (INHALATION) at 20:57

## 2025-05-06 RX ADMIN — SODIUM CHLORIDE, PRESERVATIVE FREE 10 ML: 5 INJECTION INTRAVENOUS at 20:39

## 2025-05-06 RX ADMIN — INSULIN GLARGINE 8 UNITS: 100 INJECTION, SOLUTION SUBCUTANEOUS at 20:28

## 2025-05-06 RX ADMIN — MICONAZOLE NITRATE: 20 POWDER TOPICAL at 20:29

## 2025-05-06 RX ADMIN — SODIUM CHLORIDE, PRESERVATIVE FREE 10 ML: 5 INJECTION INTRAVENOUS at 08:40

## 2025-05-06 RX ADMIN — APIXABAN 5 MG: 5 TABLET, FILM COATED ORAL at 08:40

## 2025-05-06 RX ADMIN — PANTOPRAZOLE SODIUM 40 MG: 40 TABLET, DELAYED RELEASE ORAL at 06:17

## 2025-05-06 RX ADMIN — MONTELUKAST 10 MG: 10 TABLET, FILM COATED ORAL at 20:27

## 2025-05-06 RX ADMIN — APIXABAN 5 MG: 5 TABLET, FILM COATED ORAL at 20:27

## 2025-05-06 RX ADMIN — ACETAMINOPHEN 650 MG: 325 TABLET ORAL at 20:27

## 2025-05-06 RX ADMIN — ACETAMINOPHEN 650 MG: 325 TABLET ORAL at 12:58

## 2025-05-06 RX ADMIN — FUROSEMIDE 20 MG: 20 TABLET ORAL at 18:02

## 2025-05-06 RX ADMIN — IPRATROPIUM BROMIDE AND ALBUTEROL SULFATE 1 DOSE: 2.5; .5 SOLUTION RESPIRATORY (INHALATION) at 09:33

## 2025-05-06 RX ADMIN — ACETAMINOPHEN 650 MG: 325 TABLET ORAL at 06:21

## 2025-05-06 RX ADMIN — MICONAZOLE NITRATE: 20 POWDER TOPICAL at 08:40

## 2025-05-06 ASSESSMENT — PULMONARY FUNCTION TESTS
PIF_VALUE: 16
PIF_VALUE: 15
PIF_VALUE: 16
PIF_VALUE: 15
PIF_VALUE: 16

## 2025-05-06 ASSESSMENT — PAIN - FUNCTIONAL ASSESSMENT: PAIN_FUNCTIONAL_ASSESSMENT: ACTIVITIES ARE NOT PREVENTED

## 2025-05-06 ASSESSMENT — PAIN DESCRIPTION - DESCRIPTORS
DESCRIPTORS: ACHING
DESCRIPTORS: ACHING
DESCRIPTORS: ACHING;DISCOMFORT;DULL

## 2025-05-06 ASSESSMENT — PAIN SCALES - GENERAL
PAINLEVEL_OUTOF10: 5
PAINLEVEL_OUTOF10: 3

## 2025-05-06 ASSESSMENT — PAIN DESCRIPTION - ORIENTATION
ORIENTATION: MID;INNER
ORIENTATION: MID;RIGHT;LEFT
ORIENTATION: MID;INNER

## 2025-05-06 ASSESSMENT — PAIN SCALES - WONG BAKER: WONGBAKER_NUMERICALRESPONSE: NO HURT

## 2025-05-06 ASSESSMENT — PAIN DESCRIPTION - LOCATION
LOCATION: INCISION;THROAT
LOCATION: INCISION;THROAT
LOCATION: HEAD;NECK

## 2025-05-06 NOTE — PLAN OF CARE
Problem: Respiratory - Adult  Goal: Achieves optimal ventilation and oxygenation  5/5/2025 2131 by Sydney Caceres RCP  Outcome: Progressing

## 2025-05-06 NOTE — PLAN OF CARE
Problem: Chronic Conditions and Co-morbidities  Goal: Patient's chronic conditions and co-morbidity symptoms are monitored and maintained or improved  Outcome: Progressing  Flowsheets (Taken 5/6/2025 0800)  Care Plan - Patient's Chronic Conditions and Co-Morbidity Symptoms are Monitored and Maintained or Improved: Monitor and assess patient's chronic conditions and comorbid symptoms for stability, deterioration, or improvement     Problem: Discharge Planning  Goal: Discharge to home or other facility with appropriate resources  Outcome: Progressing  Flowsheets (Taken 5/6/2025 0800)  Discharge to home or other facility with appropriate resources: Identify barriers to discharge with patient and caregiver     Problem: Pain  Goal: Verbalizes/displays adequate comfort level or baseline comfort level  Outcome: Progressing     Problem: Safety - Adult  Goal: Free from fall injury  Outcome: Progressing     Problem: ABCDS Injury Assessment  Goal: Absence of physical injury  Outcome: Progressing     Problem: Respiratory - Adult  Goal: Achieves optimal ventilation and oxygenation  Outcome: Progressing  Flowsheets (Taken 5/6/2025 0935 by Homa Nieves RCP)  Achieves optimal ventilation and oxygenation:   Respiratory therapy support as indicated   Assess and instruct to report shortness of breath or any respiratory difficulty   Assess the need for suctioning and aspirate as needed   Encourage broncho-pulmonary hygiene including cough, deep breathe, incentive spirometry   Oxygen supplementation based on oxygen saturation or arterial blood gases   Position to facilitate oxygenation and minimize respiratory effort   Assess for changes in mentation and behavior   Assess for changes in respiratory status

## 2025-05-06 NOTE — PLAN OF CARE
Problem: Chronic Conditions and Co-morbidities  Goal: Patient's chronic conditions and co-morbidity symptoms are monitored and maintained or improved  5/5/2025 2133 by Salas Blevins RN  Outcome: Progressing  5/5/2025 0825 by Chemo Henry RN  Outcome: Progressing     Problem: Discharge Planning  Goal: Discharge to home or other facility with appropriate resources  5/5/2025 2133 by Salas Blevins RN  Outcome: Progressing  5/5/2025 0825 by Chemo Henry RN  Outcome: Progressing     Problem: Pain  Goal: Verbalizes/displays adequate comfort level or baseline comfort level  5/5/2025 2133 by Salas Blevins RN  Outcome: Progressing  5/5/2025 0825 by Chemo Henry RN  Outcome: Progressing     Problem: Safety - Adult  Goal: Free from fall injury  5/5/2025 2133 by Salas Blevins RN  Outcome: Progressing  5/5/2025 0825 by Chemo Henry RN  Outcome: Progressing     Problem: ABCDS Injury Assessment  Goal: Absence of physical injury  5/5/2025 2133 by Salas Blevins RN  Outcome: Progressing  5/5/2025 0825 by Chemo Henry RN  Outcome: Progressing     Problem: Respiratory - Adult  Goal: Achieves optimal ventilation and oxygenation  5/5/2025 2133 by Salas Blevins RN  Outcome: Progressing  5/5/2025 2131 by Sydney Caceres RCP  Outcome: Progressing  5/5/2025 0825 by Chemo Henry RN  Outcome: Progressing

## 2025-05-07 LAB
ANION GAP SERPL CALCULATED.3IONS-SCNC: 9 MMOL/L (ref 9–16)
BASOPHILS # BLD: 0.03 K/UL (ref 0–0.2)
BASOPHILS NFR BLD: 1 % (ref 0–2)
BUN SERPL-MCNC: 28 MG/DL (ref 8–23)
CALCIUM SERPL-MCNC: 9.5 MG/DL (ref 8.6–10.4)
CHLORIDE SERPL-SCNC: 96 MMOL/L (ref 98–107)
CO2 SERPL-SCNC: 36 MMOL/L (ref 20–31)
CREAT SERPL-MCNC: 1.8 MG/DL (ref 0.6–0.9)
EOSINOPHIL # BLD: 0.15 K/UL (ref 0–0.44)
EOSINOPHILS RELATIVE PERCENT: 3 % (ref 1–4)
ERYTHROCYTE [DISTWIDTH] IN BLOOD BY AUTOMATED COUNT: 18.6 % (ref 11.8–14.4)
GFR, ESTIMATED: 30 ML/MIN/1.73M2
GLUCOSE BLD-MCNC: 155 MG/DL (ref 65–105)
GLUCOSE BLD-MCNC: 166 MG/DL (ref 65–105)
GLUCOSE BLD-MCNC: 193 MG/DL (ref 65–105)
GLUCOSE BLD-MCNC: 196 MG/DL (ref 65–105)
GLUCOSE SERPL-MCNC: 172 MG/DL (ref 74–99)
HCT VFR BLD AUTO: 26.9 % (ref 36.3–47.1)
HGB BLD-MCNC: 7.7 G/DL (ref 11.9–15.1)
IMM GRANULOCYTES # BLD AUTO: <0.03 K/UL (ref 0–0.3)
IMM GRANULOCYTES NFR BLD: 0 %
LYMPHOCYTES NFR BLD: 0.73 K/UL (ref 1.1–3.7)
LYMPHOCYTES RELATIVE PERCENT: 15 % (ref 24–43)
MCH RBC QN AUTO: 26.4 PG (ref 25.2–33.5)
MCHC RBC AUTO-ENTMCNC: 28.6 G/DL (ref 28.4–34.8)
MCV RBC AUTO: 92.1 FL (ref 82.6–102.9)
MONOCYTES NFR BLD: 0.52 K/UL (ref 0.1–1.2)
MONOCYTES NFR BLD: 11 % (ref 3–12)
NEUTROPHILS NFR BLD: 70 % (ref 36–65)
NEUTS SEG NFR BLD: 3.38 K/UL (ref 1.5–8.1)
NRBC BLD-RTO: 0.4 PER 100 WBC
PLATELET # BLD AUTO: 228 K/UL (ref 138–453)
PMV BLD AUTO: 8.8 FL (ref 8.1–13.5)
POTASSIUM SERPL-SCNC: 3.7 MMOL/L (ref 3.7–5.3)
RBC # BLD AUTO: 2.92 M/UL (ref 3.95–5.11)
RBC # BLD: ABNORMAL 10*6/UL
SODIUM SERPL-SCNC: 141 MMOL/L (ref 136–145)
WBC OTHER # BLD: 4.8 K/UL (ref 3.5–11.3)

## 2025-05-07 PROCEDURE — 94761 N-INVAS EAR/PLS OXIMETRY MLT: CPT

## 2025-05-07 PROCEDURE — 6370000000 HC RX 637 (ALT 250 FOR IP): Performed by: INTERNAL MEDICINE

## 2025-05-07 PROCEDURE — 36415 COLL VENOUS BLD VENIPUNCTURE: CPT

## 2025-05-07 PROCEDURE — 85025 COMPLETE CBC W/AUTO DIFF WBC: CPT

## 2025-05-07 PROCEDURE — 99233 SBSQ HOSP IP/OBS HIGH 50: CPT | Performed by: INTERNAL MEDICINE

## 2025-05-07 PROCEDURE — 6370000000 HC RX 637 (ALT 250 FOR IP)

## 2025-05-07 PROCEDURE — 2500000003 HC RX 250 WO HCPCS

## 2025-05-07 PROCEDURE — 94640 AIRWAY INHALATION TREATMENT: CPT

## 2025-05-07 PROCEDURE — 2060000000 HC ICU INTERMEDIATE R&B

## 2025-05-07 PROCEDURE — 2700000000 HC OXYGEN THERAPY PER DAY

## 2025-05-07 PROCEDURE — 94003 VENT MGMT INPAT SUBQ DAY: CPT

## 2025-05-07 PROCEDURE — 82947 ASSAY GLUCOSE BLOOD QUANT: CPT

## 2025-05-07 PROCEDURE — 80048 BASIC METABOLIC PNL TOTAL CA: CPT

## 2025-05-07 PROCEDURE — 99232 SBSQ HOSP IP/OBS MODERATE 35: CPT | Performed by: STUDENT IN AN ORGANIZED HEALTH CARE EDUCATION/TRAINING PROGRAM

## 2025-05-07 PROCEDURE — 6360000002 HC RX W HCPCS: Performed by: STUDENT IN AN ORGANIZED HEALTH CARE EDUCATION/TRAINING PROGRAM

## 2025-05-07 PROCEDURE — 6370000000 HC RX 637 (ALT 250 FOR IP): Performed by: STUDENT IN AN ORGANIZED HEALTH CARE EDUCATION/TRAINING PROGRAM

## 2025-05-07 RX ORDER — ENOXAPARIN SODIUM 100 MG/ML
1 INJECTION SUBCUTANEOUS 2 TIMES DAILY
Status: DISCONTINUED | OUTPATIENT
Start: 2025-05-07 | End: 2025-05-11

## 2025-05-07 RX ADMIN — ACETAMINOPHEN 650 MG: 325 TABLET ORAL at 08:20

## 2025-05-07 RX ADMIN — PRAVASTATIN SODIUM 80 MG: 20 TABLET ORAL at 17:24

## 2025-05-07 RX ADMIN — ENOXAPARIN SODIUM 160 MG: 100 INJECTION SUBCUTANEOUS at 21:29

## 2025-05-07 RX ADMIN — ACETAMINOPHEN 650 MG: 325 TABLET ORAL at 01:59

## 2025-05-07 RX ADMIN — AMIODARONE HYDROCHLORIDE 200 MG: 200 TABLET ORAL at 21:30

## 2025-05-07 RX ADMIN — INSULIN LISPRO 2 UNITS: 100 INJECTION, SOLUTION INTRAVENOUS; SUBCUTANEOUS at 21:40

## 2025-05-07 RX ADMIN — ACETAMINOPHEN 650 MG: 325 TABLET ORAL at 21:44

## 2025-05-07 RX ADMIN — PANTOPRAZOLE SODIUM 40 MG: 40 TABLET, DELAYED RELEASE ORAL at 06:01

## 2025-05-07 RX ADMIN — MICONAZOLE NITRATE: 20 POWDER TOPICAL at 21:36

## 2025-05-07 RX ADMIN — INSULIN GLARGINE 8 UNITS: 100 INJECTION, SOLUTION SUBCUTANEOUS at 21:30

## 2025-05-07 RX ADMIN — INSULIN LISPRO 2 UNITS: 100 INJECTION, SOLUTION INTRAVENOUS; SUBCUTANEOUS at 06:01

## 2025-05-07 RX ADMIN — FUROSEMIDE 20 MG: 20 TABLET ORAL at 16:35

## 2025-05-07 RX ADMIN — APIXABAN 5 MG: 5 TABLET, FILM COATED ORAL at 08:21

## 2025-05-07 RX ADMIN — MICONAZOLE NITRATE: 20 POWDER TOPICAL at 08:22

## 2025-05-07 RX ADMIN — ACETAMINOPHEN 650 MG: 325 TABLET ORAL at 14:34

## 2025-05-07 RX ADMIN — IPRATROPIUM BROMIDE AND ALBUTEROL SULFATE 1 DOSE: 2.5; .5 SOLUTION RESPIRATORY (INHALATION) at 18:53

## 2025-05-07 RX ADMIN — MONTELUKAST 10 MG: 10 TABLET, FILM COATED ORAL at 21:30

## 2025-05-07 RX ADMIN — SODIUM CHLORIDE, PRESERVATIVE FREE 10 ML: 5 INJECTION INTRAVENOUS at 21:37

## 2025-05-07 RX ADMIN — AMIODARONE HYDROCHLORIDE 200 MG: 200 TABLET ORAL at 08:20

## 2025-05-07 RX ADMIN — IPRATROPIUM BROMIDE AND ALBUTEROL SULFATE 1 DOSE: 2.5; .5 SOLUTION RESPIRATORY (INHALATION) at 08:31

## 2025-05-07 RX ADMIN — SODIUM CHLORIDE, PRESERVATIVE FREE 10 ML: 5 INJECTION INTRAVENOUS at 08:21

## 2025-05-07 RX ADMIN — FUROSEMIDE 20 MG: 20 TABLET ORAL at 08:20

## 2025-05-07 ASSESSMENT — PAIN SCALES - WONG BAKER
WONGBAKER_NUMERICALRESPONSE: NO HURT

## 2025-05-07 ASSESSMENT — PULMONARY FUNCTION TESTS
PIF_VALUE: 16

## 2025-05-07 ASSESSMENT — PAIN DESCRIPTION - DESCRIPTORS
DESCRIPTORS: DISCOMFORT
DESCRIPTORS: DISCOMFORT
DESCRIPTORS: ACHING
DESCRIPTORS: DISCOMFORT

## 2025-05-07 ASSESSMENT — PAIN DESCRIPTION - LOCATION
LOCATION: INCISION;THROAT
LOCATION: NECK

## 2025-05-07 ASSESSMENT — PAIN SCALES - GENERAL
PAINLEVEL_OUTOF10: 8
PAINLEVEL_OUTOF10: 0
PAINLEVEL_OUTOF10: 3
PAINLEVEL_OUTOF10: 0
PAINLEVEL_OUTOF10: 3

## 2025-05-07 ASSESSMENT — PAIN DESCRIPTION - ORIENTATION: ORIENTATION: INNER

## 2025-05-07 NOTE — CONSULTS
VAT consulted for PIV access/site rotation.  RFA #20 PIV flushes and aspirates blood without complication.  No redness, drainage, or swelling around insertion site.  CDI dressing.  Patient politely refuses to be poked.  VAT recommends continued use of existing PIV.  Primary RN notified.

## 2025-05-07 NOTE — PLAN OF CARE
Problem: Chronic Conditions and Co-morbidities  Goal: Patient's chronic conditions and co-morbidity symptoms are monitored and maintained or improved  5/6/2025 2153 by Salas Blevins RN  Outcome: Progressing  5/6/2025 1727 by Eloise Barnes RN  Outcome: Progressing  Flowsheets (Taken 5/6/2025 0800)  Care Plan - Patient's Chronic Conditions and Co-Morbidity Symptoms are Monitored and Maintained or Improved: Monitor and assess patient's chronic conditions and comorbid symptoms for stability, deterioration, or improvement     Problem: Discharge Planning  Goal: Discharge to home or other facility with appropriate resources  5/6/2025 2153 by Salas Blevins RN  Outcome: Progressing  5/6/2025 1727 by Eloise Barnes RN  Outcome: Progressing  Flowsheets (Taken 5/6/2025 0800)  Discharge to home or other facility with appropriate resources: Identify barriers to discharge with patient and caregiver     Problem: Pain  Goal: Verbalizes/displays adequate comfort level or baseline comfort level  5/6/2025 2153 by Salas Blevins RN  Outcome: Progressing  5/6/2025 1727 by Eloise Barnes RN  Outcome: Progressing     Problem: Safety - Adult  Goal: Free from fall injury  5/6/2025 2153 by Salas Blevins RN  Outcome: Progressing  5/6/2025 1727 by Eloise Barnes RN  Outcome: Progressing     Problem: ABCDS Injury Assessment  Goal: Absence of physical injury  5/6/2025 2153 by Salas Blevins RN  Outcome: Progressing  5/6/2025 1727 by Eloise Barnes RN  Outcome: Progressing     Problem: Respiratory - Adult  Goal: Achieves optimal ventilation and oxygenation  5/6/2025 2153 by Salas Blevins RN  Outcome: Progressing  5/6/2025 1727 by Eloise Barnes RN  Outcome: Progressing  Flowsheets (Taken 5/6/2025 0935 by Homa Nieves RCP)  Achieves optimal ventilation and oxygenation:   Respiratory therapy support as indicated   Assess and instruct to report shortness of breath or any respiratory difficulty   Assess

## 2025-05-08 LAB
GLUCOSE BLD-MCNC: 120 MG/DL (ref 65–105)
GLUCOSE BLD-MCNC: 138 MG/DL (ref 65–105)
GLUCOSE BLD-MCNC: 163 MG/DL (ref 65–105)
GLUCOSE BLD-MCNC: 184 MG/DL (ref 65–105)

## 2025-05-08 PROCEDURE — 2700000000 HC OXYGEN THERAPY PER DAY

## 2025-05-08 PROCEDURE — 82947 ASSAY GLUCOSE BLOOD QUANT: CPT

## 2025-05-08 PROCEDURE — 2500000003 HC RX 250 WO HCPCS

## 2025-05-08 PROCEDURE — 6370000000 HC RX 637 (ALT 250 FOR IP)

## 2025-05-08 PROCEDURE — 94003 VENT MGMT INPAT SUBQ DAY: CPT

## 2025-05-08 PROCEDURE — 6360000002 HC RX W HCPCS: Performed by: STUDENT IN AN ORGANIZED HEALTH CARE EDUCATION/TRAINING PROGRAM

## 2025-05-08 PROCEDURE — 6370000000 HC RX 637 (ALT 250 FOR IP): Performed by: STUDENT IN AN ORGANIZED HEALTH CARE EDUCATION/TRAINING PROGRAM

## 2025-05-08 PROCEDURE — 99233 SBSQ HOSP IP/OBS HIGH 50: CPT | Performed by: INTERNAL MEDICINE

## 2025-05-08 PROCEDURE — 6370000000 HC RX 637 (ALT 250 FOR IP): Performed by: INTERNAL MEDICINE

## 2025-05-08 PROCEDURE — 2060000000 HC ICU INTERMEDIATE R&B

## 2025-05-08 PROCEDURE — 94761 N-INVAS EAR/PLS OXIMETRY MLT: CPT

## 2025-05-08 PROCEDURE — 99232 SBSQ HOSP IP/OBS MODERATE 35: CPT | Performed by: STUDENT IN AN ORGANIZED HEALTH CARE EDUCATION/TRAINING PROGRAM

## 2025-05-08 PROCEDURE — 94640 AIRWAY INHALATION TREATMENT: CPT

## 2025-05-08 RX ORDER — GLYCOPYRROLATE 0.2 MG/ML
0.1 INJECTION INTRAMUSCULAR; INTRAVENOUS 2 TIMES DAILY PRN
Status: DISCONTINUED | OUTPATIENT
Start: 2025-05-08 | End: 2025-05-14 | Stop reason: HOSPADM

## 2025-05-08 RX ORDER — GLYCOPYRROLATE 0.2 MG/ML
0.1 INJECTION INTRAMUSCULAR; INTRAVENOUS ONCE
Status: DISCONTINUED | OUTPATIENT
Start: 2025-05-08 | End: 2025-05-08

## 2025-05-08 RX ADMIN — FUROSEMIDE 20 MG: 20 TABLET ORAL at 08:54

## 2025-05-08 RX ADMIN — MICONAZOLE NITRATE: 20 POWDER TOPICAL at 20:39

## 2025-05-08 RX ADMIN — FUROSEMIDE 20 MG: 20 TABLET ORAL at 16:41

## 2025-05-08 RX ADMIN — INSULIN GLARGINE 8 UNITS: 100 INJECTION, SOLUTION SUBCUTANEOUS at 20:32

## 2025-05-08 RX ADMIN — ENOXAPARIN SODIUM 160 MG: 100 INJECTION SUBCUTANEOUS at 20:31

## 2025-05-08 RX ADMIN — SODIUM CHLORIDE, PRESERVATIVE FREE 5 ML: 5 INJECTION INTRAVENOUS at 10:55

## 2025-05-08 RX ADMIN — PRAVASTATIN SODIUM 80 MG: 20 TABLET ORAL at 16:41

## 2025-05-08 RX ADMIN — ENOXAPARIN SODIUM 160 MG: 100 INJECTION SUBCUTANEOUS at 08:54

## 2025-05-08 RX ADMIN — AMIODARONE HYDROCHLORIDE 200 MG: 200 TABLET ORAL at 20:31

## 2025-05-08 RX ADMIN — AMIODARONE HYDROCHLORIDE 200 MG: 200 TABLET ORAL at 08:54

## 2025-05-08 RX ADMIN — ACETAMINOPHEN 650 MG: 325 TABLET ORAL at 16:41

## 2025-05-08 RX ADMIN — PANTOPRAZOLE SODIUM 40 MG: 40 TABLET, DELAYED RELEASE ORAL at 08:54

## 2025-05-08 RX ADMIN — IPRATROPIUM BROMIDE AND ALBUTEROL SULFATE 1 DOSE: 2.5; .5 SOLUTION RESPIRATORY (INHALATION) at 00:50

## 2025-05-08 RX ADMIN — INSULIN LISPRO 2 UNITS: 100 INJECTION, SOLUTION INTRAVENOUS; SUBCUTANEOUS at 20:31

## 2025-05-08 RX ADMIN — IPRATROPIUM BROMIDE AND ALBUTEROL SULFATE 1 DOSE: 2.5; .5 SOLUTION RESPIRATORY (INHALATION) at 08:16

## 2025-05-08 RX ADMIN — SODIUM CHLORIDE, PRESERVATIVE FREE 10 ML: 5 INJECTION INTRAVENOUS at 20:32

## 2025-05-08 RX ADMIN — ACETAMINOPHEN 650 MG: 325 TABLET ORAL at 08:55

## 2025-05-08 RX ADMIN — IPRATROPIUM BROMIDE AND ALBUTEROL SULFATE 1 DOSE: 2.5; .5 SOLUTION RESPIRATORY (INHALATION) at 19:18

## 2025-05-08 RX ADMIN — MONTELUKAST 10 MG: 10 TABLET, FILM COATED ORAL at 20:31

## 2025-05-08 ASSESSMENT — PAIN DESCRIPTION - LOCATION
LOCATION: NECK
LOCATION: NECK
LOCATION: THROAT

## 2025-05-08 ASSESSMENT — PULMONARY FUNCTION TESTS
PIF_VALUE: 16
PIF_VALUE: 15
PIF_VALUE: 15
PIF_VALUE: 16
PIF_VALUE: 17
PIF_VALUE: 16
PIF_VALUE: 16

## 2025-05-08 ASSESSMENT — PAIN SCALES - GENERAL
PAINLEVEL_OUTOF10: 3
PAINLEVEL_OUTOF10: 8
PAINLEVEL_OUTOF10: 1
PAINLEVEL_OUTOF10: 3
PAINLEVEL_OUTOF10: 0

## 2025-05-08 ASSESSMENT — PAIN - FUNCTIONAL ASSESSMENT: PAIN_FUNCTIONAL_ASSESSMENT: ACTIVITIES ARE NOT PREVENTED

## 2025-05-08 ASSESSMENT — PAIN DESCRIPTION - ORIENTATION
ORIENTATION: MID
ORIENTATION: MID

## 2025-05-08 NOTE — CARE COORDINATION
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.03]                       GMLOS: 2.4 days  Length of Stay: 10  days    Anticipated Discharge Date: Two or more days before discharge    Readmission Risk (Low < 19, Mod (19-27), High > 27): Readmission Risk Score: 32.8        Current Transitional Plan    [] Home Independently    [] Home with HC    [x] Skilled Nursing Facility    [] Acute Rehabilitation    [] Long Term Acute Care (LTAC)    [] Other:     Plan for the Stay (Medical Management) :    Trach revision, plan is trach change Monday by ENT, pt requiring frequent suctioning, goal is return to [facility] LT patient    Workflow Continuation (Additional Notes) :        Heidi Ko  May 8, 2025

## 2025-05-08 NOTE — PLAN OF CARE
Problem: Chronic Conditions and Co-morbidities  Goal: Patient's chronic conditions and co-morbidity symptoms are monitored and maintained or improved  Outcome: Progressing     Problem: Discharge Planning  Goal: Discharge to home or other facility with appropriate resources  Outcome: Progressing     Problem: Pain  Goal: Verbalizes/displays adequate comfort level or baseline comfort level  Outcome: Progressing     Problem: Safety - Adult  Goal: Free from fall injury  Outcome: Progressing     Problem: ABCDS Injury Assessment  Goal: Absence of physical injury  Outcome: Progressing     Problem: Respiratory - Adult  Goal: Achieves optimal ventilation and oxygenation  Outcome: Progressing

## 2025-05-09 LAB
ANION GAP SERPL CALCULATED.3IONS-SCNC: 11 MMOL/L (ref 9–16)
BASOPHILS # BLD: <0.03 K/UL (ref 0–0.2)
BASOPHILS NFR BLD: 0 % (ref 0–2)
BUN SERPL-MCNC: 27 MG/DL (ref 8–23)
CALCIUM SERPL-MCNC: 9.6 MG/DL (ref 8.6–10.4)
CHLORIDE SERPL-SCNC: 95 MMOL/L (ref 98–107)
CO2 SERPL-SCNC: 33 MMOL/L (ref 20–31)
CREAT SERPL-MCNC: 1.9 MG/DL (ref 0.6–0.9)
EOSINOPHIL # BLD: 0.15 K/UL (ref 0–0.44)
EOSINOPHILS RELATIVE PERCENT: 3 % (ref 1–4)
ERYTHROCYTE [DISTWIDTH] IN BLOOD BY AUTOMATED COUNT: 18.6 % (ref 11.8–14.4)
GFR, ESTIMATED: 28 ML/MIN/1.73M2
GLUCOSE BLD-MCNC: 110 MG/DL (ref 65–105)
GLUCOSE BLD-MCNC: 131 MG/DL (ref 65–105)
GLUCOSE BLD-MCNC: 132 MG/DL (ref 65–105)
GLUCOSE BLD-MCNC: 156 MG/DL (ref 65–105)
GLUCOSE SERPL-MCNC: 107 MG/DL (ref 74–99)
HCT VFR BLD AUTO: 27.4 % (ref 36.3–47.1)
HGB BLD-MCNC: 8.3 G/DL (ref 11.9–15.1)
IMM GRANULOCYTES # BLD AUTO: 0.03 K/UL (ref 0–0.3)
IMM GRANULOCYTES NFR BLD: 1 %
LYMPHOCYTES NFR BLD: 1.08 K/UL (ref 1.1–3.7)
LYMPHOCYTES RELATIVE PERCENT: 23 % (ref 24–43)
MCH RBC QN AUTO: 26.9 PG (ref 25.2–33.5)
MCHC RBC AUTO-ENTMCNC: 30.3 G/DL (ref 28.4–34.8)
MCV RBC AUTO: 88.7 FL (ref 82.6–102.9)
MONOCYTES NFR BLD: 0.81 K/UL (ref 0.1–1.2)
MONOCYTES NFR BLD: 17 % (ref 3–12)
NEUTROPHILS NFR BLD: 56 % (ref 36–65)
NEUTS SEG NFR BLD: 2.71 K/UL (ref 1.5–8.1)
NRBC BLD-RTO: 0 PER 100 WBC
PLATELET # BLD AUTO: 201 K/UL (ref 138–453)
PMV BLD AUTO: 9.1 FL (ref 8.1–13.5)
POTASSIUM SERPL-SCNC: 4.4 MMOL/L (ref 3.7–5.3)
RBC # BLD AUTO: 3.09 M/UL (ref 3.95–5.11)
RBC # BLD: ABNORMAL 10*6/UL
SODIUM SERPL-SCNC: 139 MMOL/L (ref 136–145)
WBC OTHER # BLD: 4.8 K/UL (ref 3.5–11.3)

## 2025-05-09 PROCEDURE — 99232 SBSQ HOSP IP/OBS MODERATE 35: CPT | Performed by: STUDENT IN AN ORGANIZED HEALTH CARE EDUCATION/TRAINING PROGRAM

## 2025-05-09 PROCEDURE — 2500000003 HC RX 250 WO HCPCS

## 2025-05-09 PROCEDURE — 36415 COLL VENOUS BLD VENIPUNCTURE: CPT

## 2025-05-09 PROCEDURE — 6360000002 HC RX W HCPCS: Performed by: STUDENT IN AN ORGANIZED HEALTH CARE EDUCATION/TRAINING PROGRAM

## 2025-05-09 PROCEDURE — 2060000000 HC ICU INTERMEDIATE R&B

## 2025-05-09 PROCEDURE — 94640 AIRWAY INHALATION TREATMENT: CPT

## 2025-05-09 PROCEDURE — 94003 VENT MGMT INPAT SUBQ DAY: CPT

## 2025-05-09 PROCEDURE — 6370000000 HC RX 637 (ALT 250 FOR IP): Performed by: STUDENT IN AN ORGANIZED HEALTH CARE EDUCATION/TRAINING PROGRAM

## 2025-05-09 PROCEDURE — 82947 ASSAY GLUCOSE BLOOD QUANT: CPT

## 2025-05-09 PROCEDURE — 85025 COMPLETE CBC W/AUTO DIFF WBC: CPT

## 2025-05-09 PROCEDURE — 6370000000 HC RX 637 (ALT 250 FOR IP)

## 2025-05-09 PROCEDURE — 6370000000 HC RX 637 (ALT 250 FOR IP): Performed by: INTERNAL MEDICINE

## 2025-05-09 PROCEDURE — 99233 SBSQ HOSP IP/OBS HIGH 50: CPT | Performed by: INTERNAL MEDICINE

## 2025-05-09 PROCEDURE — 80048 BASIC METABOLIC PNL TOTAL CA: CPT

## 2025-05-09 RX ADMIN — AMIODARONE HYDROCHLORIDE 200 MG: 200 TABLET ORAL at 08:13

## 2025-05-09 RX ADMIN — IPRATROPIUM BROMIDE AND ALBUTEROL SULFATE 1 DOSE: 2.5; .5 SOLUTION RESPIRATORY (INHALATION) at 19:54

## 2025-05-09 RX ADMIN — AMIODARONE HYDROCHLORIDE 200 MG: 200 TABLET ORAL at 20:38

## 2025-05-09 RX ADMIN — INSULIN GLARGINE 8 UNITS: 100 INJECTION, SOLUTION SUBCUTANEOUS at 20:34

## 2025-05-09 RX ADMIN — PANTOPRAZOLE SODIUM 40 MG: 40 TABLET, DELAYED RELEASE ORAL at 04:16

## 2025-05-09 RX ADMIN — ACETAMINOPHEN 650 MG: 325 TABLET ORAL at 23:30

## 2025-05-09 RX ADMIN — FUROSEMIDE 20 MG: 20 TABLET ORAL at 16:33

## 2025-05-09 RX ADMIN — ENOXAPARIN SODIUM 160 MG: 100 INJECTION SUBCUTANEOUS at 08:13

## 2025-05-09 RX ADMIN — IPRATROPIUM BROMIDE AND ALBUTEROL SULFATE 1 DOSE: 2.5; .5 SOLUTION RESPIRATORY (INHALATION) at 08:58

## 2025-05-09 RX ADMIN — PRAVASTATIN SODIUM 80 MG: 20 TABLET ORAL at 16:33

## 2025-05-09 RX ADMIN — SODIUM CHLORIDE, PRESERVATIVE FREE 5 ML: 5 INJECTION INTRAVENOUS at 08:14

## 2025-05-09 RX ADMIN — SODIUM CHLORIDE, PRESERVATIVE FREE 10 ML: 5 INJECTION INTRAVENOUS at 20:52

## 2025-05-09 RX ADMIN — FUROSEMIDE 20 MG: 20 TABLET ORAL at 08:13

## 2025-05-09 RX ADMIN — MICONAZOLE NITRATE: 20 POWDER TOPICAL at 08:14

## 2025-05-09 RX ADMIN — MONTELUKAST 10 MG: 10 TABLET, FILM COATED ORAL at 20:32

## 2025-05-09 RX ADMIN — ACETAMINOPHEN 650 MG: 325 TABLET ORAL at 16:33

## 2025-05-09 RX ADMIN — ENOXAPARIN SODIUM 160 MG: 100 INJECTION SUBCUTANEOUS at 20:34

## 2025-05-09 RX ADMIN — ACETAMINOPHEN 650 MG: 325 TABLET ORAL at 04:16

## 2025-05-09 ASSESSMENT — PAIN DESCRIPTION - LOCATION
LOCATION: NECK
LOCATION: HEAD;THROAT
LOCATION: NECK
LOCATION: THROAT

## 2025-05-09 ASSESSMENT — PAIN DESCRIPTION - DESCRIPTORS: DESCRIPTORS: ACHING;SORE

## 2025-05-09 ASSESSMENT — PAIN SCALES - WONG BAKER: WONGBAKER_NUMERICALRESPONSE: HURTS A LITTLE BIT

## 2025-05-09 ASSESSMENT — PULMONARY FUNCTION TESTS
PIF_VALUE: 16
PIF_VALUE: 15

## 2025-05-09 ASSESSMENT — PAIN SCALES - GENERAL
PAINLEVEL_OUTOF10: 4
PAINLEVEL_OUTOF10: 3
PAINLEVEL_OUTOF10: 0
PAINLEVEL_OUTOF10: 3
PAINLEVEL_OUTOF10: 4

## 2025-05-09 ASSESSMENT — PAIN - FUNCTIONAL ASSESSMENT: PAIN_FUNCTIONAL_ASSESSMENT: ACTIVITIES ARE NOT PREVENTED

## 2025-05-09 NOTE — CARE COORDINATION
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 Patient is a bedhold.     Maria Luisa Finch, RN  May 9, 2025

## 2025-05-09 NOTE — PLAN OF CARE
Problem: Chronic Conditions and Co-morbidities  Goal: Patient's chronic conditions and co-morbidity symptoms are monitored and maintained or improved  5/8/2025 2048 by Radha Vargas RN  Outcome: Progressing  5/8/2025 1826 by Yohannes Sanchez RN  Outcome: Progressing     Problem: Discharge Planning  Goal: Discharge to home or other facility with appropriate resources  5/8/2025 2048 by Radha Vargas RN  Outcome: Progressing  5/8/2025 1826 by Yohannes Sanchez RN  Outcome: Progressing     Problem: Pain  Goal: Verbalizes/displays adequate comfort level or baseline comfort level  5/8/2025 2048 by Radha Vargas RN  Outcome: Progressing  5/8/2025 1826 by Yohannes Sanchez RN  Outcome: Progressing     Problem: Safety - Adult  Goal: Free from fall injury  5/8/2025 2048 by Radha Vargas RN  Outcome: Progressing  5/8/2025 1826 by Yohannes Sanchez RN  Outcome: Progressing     Problem: ABCDS Injury Assessment  Goal: Absence of physical injury  5/8/2025 2048 by Radha Vargas RN  Outcome: Progressing  5/8/2025 1826 by Yohannes Sanchez RN  Outcome: Progressing     Problem: Respiratory - Adult  Goal: Achieves optimal ventilation and oxygenation  5/8/2025 2048 by Radha Vargas RN  Outcome: Progressing  5/8/2025 1826 by Yohannes Sanchez RN  Outcome: Progressing

## 2025-05-09 NOTE — PLAN OF CARE
Problem: Chronic Conditions and Co-morbidities  Goal: Patient's chronic conditions and co-morbidity symptoms are monitored and maintained or improved  Outcome: Completed     Problem: Discharge Planning  Goal: Discharge to home or other facility with appropriate resources  Outcome: Completed     Problem: Pain  Goal: Verbalizes/displays adequate comfort level or baseline comfort level  Outcome: Completed     Problem: Safety - Adult  Goal: Free from fall injury  Outcome: Completed     Problem: ABCDS Injury Assessment  Goal: Absence of physical injury  Outcome: Completed     Problem: Respiratory - Adult  Goal: Achieves optimal ventilation and oxygenation  Outcome: Completed

## 2025-05-10 LAB
GLUCOSE BLD-MCNC: 143 MG/DL (ref 65–105)
GLUCOSE BLD-MCNC: 159 MG/DL (ref 65–105)
GLUCOSE BLD-MCNC: 178 MG/DL (ref 65–105)
GLUCOSE BLD-MCNC: 221 MG/DL (ref 65–105)

## 2025-05-10 PROCEDURE — 82947 ASSAY GLUCOSE BLOOD QUANT: CPT

## 2025-05-10 PROCEDURE — 2060000000 HC ICU INTERMEDIATE R&B

## 2025-05-10 PROCEDURE — 2700000000 HC OXYGEN THERAPY PER DAY

## 2025-05-10 PROCEDURE — 6360000002 HC RX W HCPCS: Performed by: STUDENT IN AN ORGANIZED HEALTH CARE EDUCATION/TRAINING PROGRAM

## 2025-05-10 PROCEDURE — 94640 AIRWAY INHALATION TREATMENT: CPT

## 2025-05-10 PROCEDURE — 94003 VENT MGMT INPAT SUBQ DAY: CPT

## 2025-05-10 PROCEDURE — 6370000000 HC RX 637 (ALT 250 FOR IP)

## 2025-05-10 PROCEDURE — 99233 SBSQ HOSP IP/OBS HIGH 50: CPT | Performed by: INTERNAL MEDICINE

## 2025-05-10 PROCEDURE — 2500000003 HC RX 250 WO HCPCS

## 2025-05-10 PROCEDURE — 99232 SBSQ HOSP IP/OBS MODERATE 35: CPT | Performed by: STUDENT IN AN ORGANIZED HEALTH CARE EDUCATION/TRAINING PROGRAM

## 2025-05-10 PROCEDURE — 6370000000 HC RX 637 (ALT 250 FOR IP): Performed by: STUDENT IN AN ORGANIZED HEALTH CARE EDUCATION/TRAINING PROGRAM

## 2025-05-10 PROCEDURE — 6370000000 HC RX 637 (ALT 250 FOR IP): Performed by: INTERNAL MEDICINE

## 2025-05-10 PROCEDURE — 94761 N-INVAS EAR/PLS OXIMETRY MLT: CPT

## 2025-05-10 RX ADMIN — PANTOPRAZOLE SODIUM 40 MG: 40 TABLET, DELAYED RELEASE ORAL at 06:08

## 2025-05-10 RX ADMIN — IPRATROPIUM BROMIDE AND ALBUTEROL SULFATE 1 DOSE: 2.5; .5 SOLUTION RESPIRATORY (INHALATION) at 21:06

## 2025-05-10 RX ADMIN — FUROSEMIDE 20 MG: 20 TABLET ORAL at 18:12

## 2025-05-10 RX ADMIN — ENOXAPARIN SODIUM 160 MG: 100 INJECTION SUBCUTANEOUS at 07:52

## 2025-05-10 RX ADMIN — AMIODARONE HYDROCHLORIDE 200 MG: 200 TABLET ORAL at 07:52

## 2025-05-10 RX ADMIN — FUROSEMIDE 20 MG: 20 TABLET ORAL at 07:52

## 2025-05-10 RX ADMIN — AMIODARONE HYDROCHLORIDE 200 MG: 200 TABLET ORAL at 20:40

## 2025-05-10 RX ADMIN — MONTELUKAST 10 MG: 10 TABLET, FILM COATED ORAL at 20:40

## 2025-05-10 RX ADMIN — SODIUM CHLORIDE, PRESERVATIVE FREE 10 ML: 5 INJECTION INTRAVENOUS at 07:52

## 2025-05-10 RX ADMIN — INSULIN GLARGINE 8 UNITS: 100 INJECTION, SOLUTION SUBCUTANEOUS at 20:40

## 2025-05-10 RX ADMIN — ACETAMINOPHEN 650 MG: 325 TABLET ORAL at 12:36

## 2025-05-10 RX ADMIN — ACETAMINOPHEN 650 MG: 325 TABLET ORAL at 20:39

## 2025-05-10 RX ADMIN — SODIUM CHLORIDE, PRESERVATIVE FREE 10 ML: 5 INJECTION INTRAVENOUS at 20:40

## 2025-05-10 RX ADMIN — ENOXAPARIN SODIUM 160 MG: 100 INJECTION SUBCUTANEOUS at 20:40

## 2025-05-10 RX ADMIN — IPRATROPIUM BROMIDE AND ALBUTEROL SULFATE 1 DOSE: 2.5; .5 SOLUTION RESPIRATORY (INHALATION) at 07:31

## 2025-05-10 RX ADMIN — INSULIN LISPRO 2 UNITS: 100 INJECTION, SOLUTION INTRAVENOUS; SUBCUTANEOUS at 15:11

## 2025-05-10 RX ADMIN — ACETAMINOPHEN 650 MG: 325 TABLET ORAL at 06:08

## 2025-05-10 RX ADMIN — PRAVASTATIN SODIUM 80 MG: 20 TABLET ORAL at 18:12

## 2025-05-10 ASSESSMENT — PAIN SCALES - GENERAL
PAINLEVEL_OUTOF10: 3
PAINLEVEL_OUTOF10: 0
PAINLEVEL_OUTOF10: 4
PAINLEVEL_OUTOF10: 1
PAINLEVEL_OUTOF10: 1
PAINLEVEL_OUTOF10: 3

## 2025-05-10 ASSESSMENT — PULMONARY FUNCTION TESTS
PIF_VALUE: 16

## 2025-05-10 ASSESSMENT — PAIN DESCRIPTION - DESCRIPTORS
DESCRIPTORS: ACHING

## 2025-05-10 ASSESSMENT — PAIN DESCRIPTION - LOCATION
LOCATION: NECK
LOCATION: NECK
LOCATION: HEAD

## 2025-05-10 ASSESSMENT — PAIN - FUNCTIONAL ASSESSMENT: PAIN_FUNCTIONAL_ASSESSMENT: ACTIVITIES ARE NOT PREVENTED

## 2025-05-10 NOTE — PLAN OF CARE
Problem: Respiratory - Adult  Goal: Achieves optimal ventilation and oxygenation  Recent Flowsheet Documentation  Taken 5/9/2025 1955 by Matthew Cosby RCP  Achieves optimal ventilation and oxygenation:   Respiratory therapy support as indicated   Assess and instruct to report shortness of breath or any respiratory difficulty   Assess the need for suctioning and aspirate as needed   Encourage broncho-pulmonary hygiene including cough, deep breathe, incentive spirometry   Initiate smoking cessation protocol as indicated   Oxygen supplementation based on oxygen saturation or arterial blood gases   Position to facilitate oxygenation and minimize respiratory effort   Assess for changes in mentation and behavior   Assess for changes in respiratory status

## 2025-05-10 NOTE — PLAN OF CARE
Problem: Discharge Planning  Goal: Discharge to home or other facility with appropriate resources  5/10/2025 1016 by Brittny Ocampo RN  Outcome: Progressing  5/9/2025 2103 by Alicia Taylor RN  Outcome: Progressing     Problem: Pain  Goal: Verbalizes/displays adequate comfort level or baseline comfort level  5/10/2025 1016 by Brittny Ocampo RN  Outcome: Progressing  5/9/2025 2103 by Alicia Taylor RN  Outcome: Progressing     Problem: Safety - Adult  Goal: Free from fall injury  5/10/2025 1016 by Brittny Ocampo RN  Outcome: Progressing  5/9/2025 2103 by Alicia Taylor, RN  Outcome: Progressing

## 2025-05-11 ENCOUNTER — ANESTHESIA EVENT (OUTPATIENT)
Dept: OPERATING ROOM | Age: 70
DRG: 167 | End: 2025-05-11
Payer: MEDICARE

## 2025-05-11 LAB
ANION GAP SERPL CALCULATED.3IONS-SCNC: 11 MMOL/L (ref 9–16)
BASOPHILS # BLD: 0.03 K/UL (ref 0–0.2)
BASOPHILS NFR BLD: 1 % (ref 0–2)
BUN SERPL-MCNC: 29 MG/DL (ref 8–23)
CALCIUM SERPL-MCNC: 9.4 MG/DL (ref 8.6–10.4)
CHLORIDE SERPL-SCNC: 96 MMOL/L (ref 98–107)
CO2 SERPL-SCNC: 31 MMOL/L (ref 20–31)
CREAT SERPL-MCNC: 2 MG/DL (ref 0.6–0.9)
EOSINOPHIL # BLD: 0.12 K/UL (ref 0–0.44)
EOSINOPHILS RELATIVE PERCENT: 3 % (ref 1–4)
ERYTHROCYTE [DISTWIDTH] IN BLOOD BY AUTOMATED COUNT: 18.4 % (ref 11.8–14.4)
GFR, ESTIMATED: 27 ML/MIN/1.73M2
GLUCOSE BLD-MCNC: 129 MG/DL (ref 65–105)
GLUCOSE BLD-MCNC: 156 MG/DL (ref 65–105)
GLUCOSE BLD-MCNC: 156 MG/DL (ref 65–105)
GLUCOSE BLD-MCNC: 177 MG/DL (ref 65–105)
GLUCOSE SERPL-MCNC: 150 MG/DL (ref 74–99)
HCT VFR BLD AUTO: 27 % (ref 36.3–47.1)
HGB BLD-MCNC: 7.8 G/DL (ref 11.9–15.1)
IMM GRANULOCYTES # BLD AUTO: <0.03 K/UL (ref 0–0.3)
IMM GRANULOCYTES NFR BLD: 1 %
LYMPHOCYTES NFR BLD: 0.88 K/UL (ref 1.1–3.7)
LYMPHOCYTES RELATIVE PERCENT: 20 % (ref 24–43)
MCH RBC QN AUTO: 26.9 PG (ref 25.2–33.5)
MCHC RBC AUTO-ENTMCNC: 28.9 G/DL (ref 28.4–34.8)
MCV RBC AUTO: 93.1 FL (ref 82.6–102.9)
MONOCYTES NFR BLD: 0.51 K/UL (ref 0.1–1.2)
MONOCYTES NFR BLD: 12 % (ref 3–12)
NEUTROPHILS NFR BLD: 65 % (ref 36–65)
NEUTS SEG NFR BLD: 2.85 K/UL (ref 1.5–8.1)
NRBC BLD-RTO: 0 PER 100 WBC
PLATELET # BLD AUTO: 219 K/UL (ref 138–453)
PMV BLD AUTO: 9.1 FL (ref 8.1–13.5)
POTASSIUM SERPL-SCNC: 3.8 MMOL/L (ref 3.7–5.3)
RBC # BLD AUTO: 2.9 M/UL (ref 3.95–5.11)
RBC # BLD: ABNORMAL 10*6/UL
SODIUM SERPL-SCNC: 138 MMOL/L (ref 136–145)
WBC OTHER # BLD: 4.4 K/UL (ref 3.5–11.3)

## 2025-05-11 PROCEDURE — 2060000000 HC ICU INTERMEDIATE R&B

## 2025-05-11 PROCEDURE — 2500000003 HC RX 250 WO HCPCS

## 2025-05-11 PROCEDURE — 99232 SBSQ HOSP IP/OBS MODERATE 35: CPT | Performed by: STUDENT IN AN ORGANIZED HEALTH CARE EDUCATION/TRAINING PROGRAM

## 2025-05-11 PROCEDURE — 99233 SBSQ HOSP IP/OBS HIGH 50: CPT | Performed by: INTERNAL MEDICINE

## 2025-05-11 PROCEDURE — 94761 N-INVAS EAR/PLS OXIMETRY MLT: CPT

## 2025-05-11 PROCEDURE — 6370000000 HC RX 637 (ALT 250 FOR IP)

## 2025-05-11 PROCEDURE — 2700000000 HC OXYGEN THERAPY PER DAY

## 2025-05-11 PROCEDURE — 6360000002 HC RX W HCPCS: Performed by: STUDENT IN AN ORGANIZED HEALTH CARE EDUCATION/TRAINING PROGRAM

## 2025-05-11 PROCEDURE — 6370000000 HC RX 637 (ALT 250 FOR IP): Performed by: STUDENT IN AN ORGANIZED HEALTH CARE EDUCATION/TRAINING PROGRAM

## 2025-05-11 PROCEDURE — 80048 BASIC METABOLIC PNL TOTAL CA: CPT

## 2025-05-11 PROCEDURE — 82947 ASSAY GLUCOSE BLOOD QUANT: CPT

## 2025-05-11 PROCEDURE — 85025 COMPLETE CBC W/AUTO DIFF WBC: CPT

## 2025-05-11 PROCEDURE — 36415 COLL VENOUS BLD VENIPUNCTURE: CPT

## 2025-05-11 PROCEDURE — 6370000000 HC RX 637 (ALT 250 FOR IP): Performed by: INTERNAL MEDICINE

## 2025-05-11 PROCEDURE — 94640 AIRWAY INHALATION TREATMENT: CPT

## 2025-05-11 PROCEDURE — 94003 VENT MGMT INPAT SUBQ DAY: CPT

## 2025-05-11 RX ADMIN — INSULIN GLARGINE 8 UNITS: 100 INJECTION, SOLUTION SUBCUTANEOUS at 19:59

## 2025-05-11 RX ADMIN — IPRATROPIUM BROMIDE AND ALBUTEROL SULFATE 1 DOSE: 2.5; .5 SOLUTION RESPIRATORY (INHALATION) at 08:08

## 2025-05-11 RX ADMIN — MICONAZOLE NITRATE: 20 POWDER TOPICAL at 20:03

## 2025-05-11 RX ADMIN — FUROSEMIDE 20 MG: 20 TABLET ORAL at 08:28

## 2025-05-11 RX ADMIN — ACETAMINOPHEN 650 MG: 325 TABLET ORAL at 13:27

## 2025-05-11 RX ADMIN — AMIODARONE HYDROCHLORIDE 200 MG: 200 TABLET ORAL at 08:28

## 2025-05-11 RX ADMIN — IPRATROPIUM BROMIDE AND ALBUTEROL SULFATE 1 DOSE: 2.5; .5 SOLUTION RESPIRATORY (INHALATION) at 19:27

## 2025-05-11 RX ADMIN — ENOXAPARIN SODIUM 160 MG: 100 INJECTION SUBCUTANEOUS at 08:28

## 2025-05-11 RX ADMIN — ACETAMINOPHEN 650 MG: 325 TABLET ORAL at 19:57

## 2025-05-11 RX ADMIN — ACETAMINOPHEN 650 MG: 325 TABLET ORAL at 06:56

## 2025-05-11 RX ADMIN — PANTOPRAZOLE SODIUM 40 MG: 40 TABLET, DELAYED RELEASE ORAL at 06:56

## 2025-05-11 RX ADMIN — FUROSEMIDE 20 MG: 20 TABLET ORAL at 17:56

## 2025-05-11 RX ADMIN — MONTELUKAST 10 MG: 10 TABLET, FILM COATED ORAL at 19:57

## 2025-05-11 RX ADMIN — PRAVASTATIN SODIUM 80 MG: 20 TABLET ORAL at 17:56

## 2025-05-11 RX ADMIN — SODIUM CHLORIDE, PRESERVATIVE FREE 10 ML: 5 INJECTION INTRAVENOUS at 10:36

## 2025-05-11 RX ADMIN — SODIUM CHLORIDE, PRESERVATIVE FREE 10 ML: 5 INJECTION INTRAVENOUS at 20:03

## 2025-05-11 RX ADMIN — AMIODARONE HYDROCHLORIDE 200 MG: 200 TABLET ORAL at 19:57

## 2025-05-11 ASSESSMENT — PAIN SCALES - GENERAL
PAINLEVEL_OUTOF10: 4
PAINLEVEL_OUTOF10: 0
PAINLEVEL_OUTOF10: 3
PAINLEVEL_OUTOF10: 3
PAINLEVEL_OUTOF10: 2

## 2025-05-11 ASSESSMENT — PAIN DESCRIPTION - LOCATION
LOCATION: NECK
LOCATION: NECK

## 2025-05-11 ASSESSMENT — PULMONARY FUNCTION TESTS
PIF_VALUE: 16
PIF_VALUE: 15
PIF_VALUE: 16

## 2025-05-11 ASSESSMENT — PAIN DESCRIPTION - ORIENTATION: ORIENTATION: MID

## 2025-05-11 ASSESSMENT — PAIN DESCRIPTION - DESCRIPTORS
DESCRIPTORS: DULL
DESCRIPTORS: ACHING

## 2025-05-11 NOTE — PLAN OF CARE
Problem: Discharge Planning  Goal: Discharge to home or other facility with appropriate resources  5/11/2025 0710 by Eliza Lainez RN  Outcome: Progressing  5/11/2025 0430 by Sheri Topete, RN  Outcome: Progressing     Problem: Pain  Goal: Verbalizes/displays adequate comfort level or baseline comfort level  5/11/2025 0710 by Eliza Lainez RN  Outcome: Progressing  5/11/2025 0430 by Sheri Topete, RN  Outcome: Progressing     Problem: Safety - Adult  Goal: Free from fall injury  5/11/2025 0710 by Eliza Lainez, RN  Outcome: Progressing  5/11/2025 0430 by Sheri Topete, RN  Outcome: Progressing

## 2025-05-11 NOTE — ANESTHESIA PRE PROCEDURE
Department of Anesthesiology  Preprocedure Note       Name:  Shazia Nuñez   Age:  69 y.o.  :  1955                                          MRN:  [id number]         Date:  2025      Surgeon: Surgeon(s):  Riky Andrade MD    Procedure: Procedure(s):  DIRECT LARYNGOSCOPY, BRONCHOSCOPY  TRACHEOTOMY CHANGE, POSSIBLE TRACHEOTOMY REVISION    Medications prior to admission:   Prior to Admission medications    Medication Sig Start Date End Date Taking? Authorizing Provider   amiodarone (CORDARONE) 200 MG tablet Take 1 tablet by mouth 2 times daily 3/16/25   Beverley Curtis MD   pantoprazole (PROTONIX) 40 MG tablet Take 1 tablet by mouth 2 times daily (before meals) 3/16/25   Beverley Curtis MD   cetirizine (ZYRTEC) 10 MG tablet Take 1 tablet by mouth nightly    Zeina Castro MD   fluticasone (VERAMYST) 27.5 MCG/SPRAY nasal spray 2 sprays by Each Nostril route daily    Zeina Castro MD   ondansetron (ZOFRAN) 4 MG tablet Take 1 tablet by mouth every 6 hours Indications: Nausea    Zeina Castro MD   furosemide (LASIX) 20 MG tablet Take 1 tablet by mouth 2 times daily 10/30/24   Zeina Castro MD   guaiFENesin (MUCINEX) 600 MG extended release tablet Take 2 tablets by mouth 2 times daily    Zeina Castro MD   budesonide (PULMICORT) 0.5 MG/2ML nebulizer suspension  24   Zeina Castro MD   ipratropium 0.5 mg-albuterol 2.5 mg (DUONEB) 0.5-2.5 (3) MG/3ML SOLN nebulizer solution Inhale 3 mLs into the lungs every 4 hours    Zeina Castro MD   acetylcysteine (MUCOMYST) 20 % nebulizer solution  10/15/24   Zeina Castro MD   albuterol (PROVENTIL) (2.5 MG/3ML) 0.083% nebulizer solution  10/6/24   Zeina Castro MD   warfarin (COUMADIN) 10 MG tablet Goal INR >2 24   Wendy Call MD   insulin glargine (LANTUS) 100 UNIT/ML injection vial Inject 8 Units into the skin nightly 24   Wendy Call MD   metoprolol tartrate 37.5 MG TABS  05/12/25 0010 05/12/25 0334 05/12/25 0405   BP:  (!) 107/44  (!) 106/53   Pulse: 76 72 94 72   Resp: 12 16 20 17   Temp:  98.6 °F (37 °C)  98.5 °F (36.9 °C)   TempSrc:  Oral  Oral   SpO2: 99% 100% 100% 100%   Weight:       Height:                                                  BP Readings from Last 3 Encounters:   05/12/25 (!) 106/53   04/28/25 (!) 137/30   03/16/25 (!) 125/39       NPO Status:                                                                                 BMI:   Wt Readings from Last 3 Encounters:   05/09/25 (!) 160.3 kg (353 lb 6.4 oz)   04/28/25 (!) 172.4 kg (380 lb)   03/16/25 (!) 172.4 kg (380 lb 1.2 oz)     Body mass index is 58.81 kg/m².    CBC:   Lab Results   Component Value Date/Time    WBC 4.4 05/11/2025 08:41 AM    RBC 2.90 05/11/2025 08:41 AM    RBC 4.44 02/25/2012 09:38 AM    HGB 7.8 05/11/2025 08:41 AM    HCT 27.0 05/11/2025 08:41 AM    MCV 93.1 05/11/2025 08:41 AM    RDW 18.4 05/11/2025 08:41 AM     05/11/2025 08:41 AM     02/25/2012 09:38 AM       CMP:   Lab Results   Component Value Date/Time     05/11/2025 08:41 AM    K 3.8 05/11/2025 08:41 AM    CL 96 05/11/2025 08:41 AM    CO2 31 05/11/2025 08:41 AM    BUN 29 05/11/2025 08:41 AM    CREATININE 2.0 05/11/2025 08:41 AM    GFRAA 54 08/18/2022 06:17 AM    LABGLOM 27 05/11/2025 08:41 AM    LABGLOM 62 04/16/2024 01:43 PM    GLUCOSE 150 05/11/2025 08:41 AM    GLUCOSE 119 02/25/2012 09:38 AM    CALCIUM 9.4 05/11/2025 08:41 AM    BILITOT 0.3 04/28/2025 01:46 PM    ALKPHOS 87 04/28/2025 01:46 PM    AST 18 04/28/2025 01:46 PM    ALT 11 04/28/2025 01:46 PM       POC Tests:   Recent Labs     05/11/25 1951   POCGLU 177*       Coags:   Lab Results   Component Value Date/Time    PROTIME 18.3 04/30/2025 11:42 AM    PROTIME 29.7 03/28/2024 10:30 AM    INR 1.5 04/30/2025 11:42 AM    APTT 31.3 05/03/2025 05:50 AM       HCG (If Applicable): No results found for: \"PREGTESTUR\", \"PREGSERUM\", \"HCG\", \"HCGQUANT\"     ABGs:   Lab

## 2025-05-12 ENCOUNTER — ANESTHESIA (OUTPATIENT)
Dept: OPERATING ROOM | Age: 70
DRG: 167 | End: 2025-05-12
Payer: MEDICARE

## 2025-05-12 PROBLEM — E11.69 TYPE 2 DIABETES MELLITUS WITH OTHER SPECIFIED COMPLICATION (HCC): Status: ACTIVE | Noted: 2018-05-04

## 2025-05-12 LAB
GLUCOSE BLD-MCNC: 106 MG/DL (ref 65–105)
GLUCOSE BLD-MCNC: 144 MG/DL (ref 65–105)
GLUCOSE BLD-MCNC: 176 MG/DL (ref 65–105)
GLUCOSE BLD-MCNC: 180 MG/DL (ref 65–105)

## 2025-05-12 PROCEDURE — 2500000003 HC RX 250 WO HCPCS

## 2025-05-12 PROCEDURE — 6370000000 HC RX 637 (ALT 250 FOR IP)

## 2025-05-12 PROCEDURE — 2700000000 HC OXYGEN THERAPY PER DAY

## 2025-05-12 PROCEDURE — 94761 N-INVAS EAR/PLS OXIMETRY MLT: CPT

## 2025-05-12 PROCEDURE — 2580000003 HC RX 258

## 2025-05-12 PROCEDURE — 7100000000 HC PACU RECOVERY - FIRST 15 MIN: Performed by: STUDENT IN AN ORGANIZED HEALTH CARE EDUCATION/TRAINING PROGRAM

## 2025-05-12 PROCEDURE — 3600000004 HC SURGERY LEVEL 4 BASE: Performed by: STUDENT IN AN ORGANIZED HEALTH CARE EDUCATION/TRAINING PROGRAM

## 2025-05-12 PROCEDURE — 2709999900 HC NON-CHARGEABLE SUPPLY: Performed by: STUDENT IN AN ORGANIZED HEALTH CARE EDUCATION/TRAINING PROGRAM

## 2025-05-12 PROCEDURE — 3600000014 HC SURGERY LEVEL 4 ADDTL 15MIN: Performed by: STUDENT IN AN ORGANIZED HEALTH CARE EDUCATION/TRAINING PROGRAM

## 2025-05-12 PROCEDURE — 82947 ASSAY GLUCOSE BLOOD QUANT: CPT

## 2025-05-12 PROCEDURE — 31502 CHANGE OF WINDPIPE AIRWAY: CPT | Performed by: STUDENT IN AN ORGANIZED HEALTH CARE EDUCATION/TRAINING PROGRAM

## 2025-05-12 PROCEDURE — 31645 BRNCHSC W/THER ASPIR 1ST: CPT | Performed by: STUDENT IN AN ORGANIZED HEALTH CARE EDUCATION/TRAINING PROGRAM

## 2025-05-12 PROCEDURE — 94003 VENT MGMT INPAT SUBQ DAY: CPT

## 2025-05-12 PROCEDURE — 99232 SBSQ HOSP IP/OBS MODERATE 35: CPT | Performed by: STUDENT IN AN ORGANIZED HEALTH CARE EDUCATION/TRAINING PROGRAM

## 2025-05-12 PROCEDURE — 7100000001 HC PACU RECOVERY - ADDTL 15 MIN: Performed by: STUDENT IN AN ORGANIZED HEALTH CARE EDUCATION/TRAINING PROGRAM

## 2025-05-12 PROCEDURE — 0BJ18ZZ INSPECTION OF TRACHEA, VIA NATURAL OR ARTIFICIAL OPENING ENDOSCOPIC: ICD-10-PCS | Performed by: STUDENT IN AN ORGANIZED HEALTH CARE EDUCATION/TRAINING PROGRAM

## 2025-05-12 PROCEDURE — 6370000000 HC RX 637 (ALT 250 FOR IP): Performed by: STUDENT IN AN ORGANIZED HEALTH CARE EDUCATION/TRAINING PROGRAM

## 2025-05-12 PROCEDURE — 2500000003 HC RX 250 WO HCPCS: Performed by: ANESTHESIOLOGY

## 2025-05-12 PROCEDURE — 94640 AIRWAY INHALATION TREATMENT: CPT

## 2025-05-12 PROCEDURE — 0B938ZZ DRAINAGE OF RIGHT MAIN BRONCHUS, VIA NATURAL OR ARTIFICIAL OPENING ENDOSCOPIC: ICD-10-PCS | Performed by: STUDENT IN AN ORGANIZED HEALTH CARE EDUCATION/TRAINING PROGRAM

## 2025-05-12 PROCEDURE — 0B978ZZ DRAINAGE OF LEFT MAIN BRONCHUS, VIA NATURAL OR ARTIFICIAL OPENING ENDOSCOPIC: ICD-10-PCS | Performed by: STUDENT IN AN ORGANIZED HEALTH CARE EDUCATION/TRAINING PROGRAM

## 2025-05-12 PROCEDURE — 3700000000 HC ANESTHESIA ATTENDED CARE: Performed by: STUDENT IN AN ORGANIZED HEALTH CARE EDUCATION/TRAINING PROGRAM

## 2025-05-12 PROCEDURE — 2060000000 HC ICU INTERMEDIATE R&B

## 2025-05-12 PROCEDURE — 99233 SBSQ HOSP IP/OBS HIGH 50: CPT | Performed by: INTERNAL MEDICINE

## 2025-05-12 PROCEDURE — 0B21XFZ CHANGE TRACHEOSTOMY DEVICE IN TRACHEA, EXTERNAL APPROACH: ICD-10-PCS | Performed by: STUDENT IN AN ORGANIZED HEALTH CARE EDUCATION/TRAINING PROGRAM

## 2025-05-12 PROCEDURE — 3700000001 HC ADD 15 MINUTES (ANESTHESIA): Performed by: STUDENT IN AN ORGANIZED HEALTH CARE EDUCATION/TRAINING PROGRAM

## 2025-05-12 PROCEDURE — 6360000002 HC RX W HCPCS: Performed by: ANESTHESIOLOGY

## 2025-05-12 PROCEDURE — 2720000010 HC SURG SUPPLY STERILE: Performed by: STUDENT IN AN ORGANIZED HEALTH CARE EDUCATION/TRAINING PROGRAM

## 2025-05-12 PROCEDURE — 2720000010 HC SURG SUPPLY STERILE

## 2025-05-12 RX ORDER — PROPOFOL 10 MG/ML
INJECTION, EMULSION INTRAVENOUS
Status: DISCONTINUED | OUTPATIENT
Start: 2025-05-12 | End: 2025-05-12 | Stop reason: SDUPTHER

## 2025-05-12 RX ORDER — ONDANSETRON 2 MG/ML
4 INJECTION INTRAMUSCULAR; INTRAVENOUS
Status: DISCONTINUED | OUTPATIENT
Start: 2025-05-12 | End: 2025-05-12 | Stop reason: HOSPADM

## 2025-05-12 RX ORDER — ONDANSETRON 2 MG/ML
INJECTION INTRAMUSCULAR; INTRAVENOUS
Status: DISCONTINUED | OUTPATIENT
Start: 2025-05-12 | End: 2025-05-12 | Stop reason: SDUPTHER

## 2025-05-12 RX ORDER — HYDRALAZINE HYDROCHLORIDE 20 MG/ML
10 INJECTION INTRAMUSCULAR; INTRAVENOUS
Status: DISCONTINUED | OUTPATIENT
Start: 2025-05-12 | End: 2025-05-12 | Stop reason: HOSPADM

## 2025-05-12 RX ORDER — SODIUM CHLORIDE 0.9 % (FLUSH) 0.9 %
5-40 SYRINGE (ML) INJECTION PRN
Status: DISCONTINUED | OUTPATIENT
Start: 2025-05-12 | End: 2025-05-12 | Stop reason: HOSPADM

## 2025-05-12 RX ORDER — LIDOCAINE HYDROCHLORIDE 10 MG/ML
INJECTION, SOLUTION EPIDURAL; INFILTRATION; INTRACAUDAL; PERINEURAL
Status: DISCONTINUED | OUTPATIENT
Start: 2025-05-12 | End: 2025-05-12 | Stop reason: SDUPTHER

## 2025-05-12 RX ORDER — FENTANYL CITRATE 50 UG/ML
INJECTION, SOLUTION INTRAMUSCULAR; INTRAVENOUS
Status: DISCONTINUED | OUTPATIENT
Start: 2025-05-12 | End: 2025-05-12 | Stop reason: SDUPTHER

## 2025-05-12 RX ORDER — SUCCINYLCHOLINE CHLORIDE 20 MG/ML
INJECTION INTRAMUSCULAR; INTRAVENOUS
Status: DISCONTINUED | OUTPATIENT
Start: 2025-05-12 | End: 2025-05-12 | Stop reason: SDUPTHER

## 2025-05-12 RX ORDER — PHENYLEPHRINE HCL IN 0.9% NACL 1 MG/10 ML
SYRINGE (ML) INTRAVENOUS
Status: DISCONTINUED | OUTPATIENT
Start: 2025-05-12 | End: 2025-05-12 | Stop reason: SDUPTHER

## 2025-05-12 RX ORDER — LIDOCAINE HYDROCHLORIDE 40 MG/ML
INJECTION, SOLUTION RETROBULBAR
Status: DISCONTINUED | OUTPATIENT
Start: 2025-05-12 | End: 2025-05-12 | Stop reason: SDUPTHER

## 2025-05-12 RX ORDER — NALOXONE HYDROCHLORIDE 0.4 MG/ML
INJECTION, SOLUTION INTRAMUSCULAR; INTRAVENOUS; SUBCUTANEOUS PRN
Status: DISCONTINUED | OUTPATIENT
Start: 2025-05-12 | End: 2025-05-12 | Stop reason: HOSPADM

## 2025-05-12 RX ORDER — ROCURONIUM BROMIDE 10 MG/ML
INJECTION, SOLUTION INTRAVENOUS
Status: DISCONTINUED | OUTPATIENT
Start: 2025-05-12 | End: 2025-05-12 | Stop reason: SDUPTHER

## 2025-05-12 RX ORDER — SODIUM CHLORIDE 0.9 % (FLUSH) 0.9 %
5-40 SYRINGE (ML) INJECTION EVERY 12 HOURS SCHEDULED
Status: DISCONTINUED | OUTPATIENT
Start: 2025-05-12 | End: 2025-05-12 | Stop reason: HOSPADM

## 2025-05-12 RX ORDER — LIDOCAINE HYDROCHLORIDE 40 MG/ML
SOLUTION TOPICAL PRN
Status: DISCONTINUED | OUTPATIENT
Start: 2025-05-12 | End: 2025-05-12 | Stop reason: ALTCHOICE

## 2025-05-12 RX ORDER — SODIUM CHLORIDE 9 MG/ML
INJECTION, SOLUTION INTRAVENOUS PRN
Status: DISCONTINUED | OUTPATIENT
Start: 2025-05-12 | End: 2025-05-12 | Stop reason: HOSPADM

## 2025-05-12 RX ORDER — LABETALOL HYDROCHLORIDE 5 MG/ML
10 INJECTION, SOLUTION INTRAVENOUS
Status: DISCONTINUED | OUTPATIENT
Start: 2025-05-12 | End: 2025-05-12 | Stop reason: HOSPADM

## 2025-05-12 RX ADMIN — ACETAMINOPHEN 650 MG: 325 TABLET ORAL at 12:42

## 2025-05-12 RX ADMIN — PRAVASTATIN SODIUM 80 MG: 20 TABLET ORAL at 18:07

## 2025-05-12 RX ADMIN — LIDOCAINE HYDROCHLORIDE 12 MG: 40 INJECTION, SOLUTION RETROBULBAR; TOPICAL at 07:46

## 2025-05-12 RX ADMIN — AMIODARONE HYDROCHLORIDE 200 MG: 200 TABLET ORAL at 09:02

## 2025-05-12 RX ADMIN — IPRATROPIUM BROMIDE AND ALBUTEROL SULFATE 1 DOSE: 2.5; .5 SOLUTION RESPIRATORY (INHALATION) at 19:26

## 2025-05-12 RX ADMIN — PROPOFOL 100 MCG/KG/MIN: 10 INJECTION, EMULSION INTRAVENOUS at 07:42

## 2025-05-12 RX ADMIN — SODIUM CHLORIDE, PRESERVATIVE FREE 10 ML: 5 INJECTION INTRAVENOUS at 09:02

## 2025-05-12 RX ADMIN — INSULIN LISPRO 2 UNITS: 100 INJECTION, SOLUTION INTRAVENOUS; SUBCUTANEOUS at 21:07

## 2025-05-12 RX ADMIN — ONDANSETRON 4 MG: 2 INJECTION, SOLUTION INTRAMUSCULAR; INTRAVENOUS at 08:03

## 2025-05-12 RX ADMIN — PANTOPRAZOLE SODIUM 40 MG: 40 TABLET, DELAYED RELEASE ORAL at 09:01

## 2025-05-12 RX ADMIN — AMIODARONE HYDROCHLORIDE 200 MG: 200 TABLET ORAL at 21:08

## 2025-05-12 RX ADMIN — SUCCINYLCHOLINE CHLORIDE 200 MG: 20 INJECTION, SOLUTION INTRAMUSCULAR; INTRAVENOUS at 07:41

## 2025-05-12 RX ADMIN — PROPOFOL 150 MG: 10 INJECTION, EMULSION INTRAVENOUS at 07:41

## 2025-05-12 RX ADMIN — ROCURONIUM BROMIDE 30 MG: 50 INJECTION INTRAVENOUS at 07:42

## 2025-05-12 RX ADMIN — INSULIN GLARGINE 8 UNITS: 100 INJECTION, SOLUTION SUBCUTANEOUS at 21:07

## 2025-05-12 RX ADMIN — Medication 100 MCG: at 07:48

## 2025-05-12 RX ADMIN — LIDOCAINE HYDROCHLORIDE 50 MG: 10 INJECTION, SOLUTION EPIDURAL; INFILTRATION; INTRACAUDAL; PERINEURAL at 07:41

## 2025-05-12 RX ADMIN — IPRATROPIUM BROMIDE AND ALBUTEROL SULFATE 1 DOSE: 2.5; .5 SOLUTION RESPIRATORY (INHALATION) at 09:03

## 2025-05-12 RX ADMIN — FUROSEMIDE 20 MG: 20 TABLET ORAL at 18:07

## 2025-05-12 RX ADMIN — SODIUM CHLORIDE: 9 INJECTION, SOLUTION INTRAVENOUS at 07:39

## 2025-05-12 RX ADMIN — FUROSEMIDE 20 MG: 20 TABLET ORAL at 09:02

## 2025-05-12 RX ADMIN — FENTANYL CITRATE 50 MCG: 50 INJECTION, SOLUTION INTRAMUSCULAR; INTRAVENOUS at 07:41

## 2025-05-12 RX ADMIN — METOPROLOL TARTRATE 37.5 MG: 25 TABLET, FILM COATED ORAL at 21:08

## 2025-05-12 RX ADMIN — SODIUM CHLORIDE, PRESERVATIVE FREE 10 ML: 5 INJECTION INTRAVENOUS at 21:14

## 2025-05-12 RX ADMIN — MONTELUKAST 10 MG: 10 TABLET, FILM COATED ORAL at 21:13

## 2025-05-12 RX ADMIN — MICONAZOLE NITRATE: 20 POWDER TOPICAL at 09:03

## 2025-05-12 RX ADMIN — MICONAZOLE NITRATE: 20 POWDER TOPICAL at 21:13

## 2025-05-12 RX ADMIN — SUGAMMADEX 500 MG: 100 INJECTION, SOLUTION INTRAVENOUS at 08:00

## 2025-05-12 ASSESSMENT — PAIN SCALES - GENERAL
PAINLEVEL_OUTOF10: 0
PAINLEVEL_OUTOF10: 4
PAINLEVEL_OUTOF10: 2
PAINLEVEL_OUTOF10: 2
PAINLEVEL_OUTOF10: 0

## 2025-05-12 ASSESSMENT — PULMONARY FUNCTION TESTS
PIF_VALUE: 16

## 2025-05-12 ASSESSMENT — PAIN DESCRIPTION - LOCATION: LOCATION: HEAD

## 2025-05-12 ASSESSMENT — PAIN DESCRIPTION - DESCRIPTORS: DESCRIPTORS: ACHING

## 2025-05-12 NOTE — BRIEF OP NOTE
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]    Post-Op Diagnosis: Same       Procedure(s):  BRONCHOSCOPY WITH ASPIRATION  TRACHEOTOMY CHANGE    Surgeon(s):  Riky Andrade MD    Assistant:  Felipe Hutchins MD    Anesthesia: General    Estimated Blood Loss (mL): Minimal    Complications: None    Specimens:   * No specimens in log *    Implants:  * No implants in log *      Drains:   External Urinary Catheter (Active)   Site Assessment Clean,dry & intact 05/11/25 1800   Placement Repositioned 05/11/25 1800   Securement Method Securing device (Describe) 05/11/25 1800   Catheter Care Catheter/Wick replaced 05/11/25 1000   Perineal Care Yes 05/11/25 1800   Suction 40 mmgHg continuous 05/11/25 1800   Urine Color Yellow 05/11/25 1800   Urine Appearance Hazy 05/11/25 1800   Urine Odor Malodorous 05/11/25 1800   Output (mL) 650 mL 05/11/25 1800       Findings:  Infection Present At Time Of Surgery (PATOS) (choose all levels that have infection present):  No infection present  Other Findings: Uncomplicated tracheostomy tube change.  Upsized to 7 XLT proximal cuffed.  Bronchoscopy with diffuse thin, clear secretions that was aspirated    Electronically signed by Riky Andrade MD on 5/12/2025 at 8:01 AM

## 2025-05-13 LAB
ANION GAP SERPL CALCULATED.3IONS-SCNC: 12 MMOL/L (ref 9–16)
BASOPHILS # BLD: <0.03 K/UL (ref 0–0.2)
BASOPHILS NFR BLD: 1 % (ref 0–2)
BUN SERPL-MCNC: 35 MG/DL (ref 8–23)
CALCIUM SERPL-MCNC: 9.4 MG/DL (ref 8.6–10.4)
CHLORIDE SERPL-SCNC: 95 MMOL/L (ref 98–107)
CO2 SERPL-SCNC: 30 MMOL/L (ref 20–31)
CREAT SERPL-MCNC: 2.1 MG/DL (ref 0.6–0.9)
EOSINOPHIL # BLD: 0.16 K/UL (ref 0–0.44)
EOSINOPHILS RELATIVE PERCENT: 4 % (ref 1–4)
ERYTHROCYTE [DISTWIDTH] IN BLOOD BY AUTOMATED COUNT: 18.5 % (ref 11.8–14.4)
GFR, ESTIMATED: 25 ML/MIN/1.73M2
GLUCOSE BLD-MCNC: 118 MG/DL (ref 65–105)
GLUCOSE BLD-MCNC: 121 MG/DL (ref 65–105)
GLUCOSE BLD-MCNC: 143 MG/DL (ref 65–105)
GLUCOSE BLD-MCNC: 147 MG/DL (ref 65–105)
GLUCOSE BLD-MCNC: 158 MG/DL (ref 65–105)
GLUCOSE SERPL-MCNC: 112 MG/DL (ref 74–99)
HCT VFR BLD AUTO: 26.9 % (ref 36.3–47.1)
HGB BLD-MCNC: 7.7 G/DL (ref 11.9–15.1)
IMM GRANULOCYTES # BLD AUTO: <0.03 K/UL (ref 0–0.3)
IMM GRANULOCYTES NFR BLD: 0 %
LYMPHOCYTES NFR BLD: 0.95 K/UL (ref 1.1–3.7)
LYMPHOCYTES RELATIVE PERCENT: 23 % (ref 24–43)
MAGNESIUM SERPL-MCNC: 1.8 MG/DL (ref 1.6–2.4)
MCH RBC QN AUTO: 26.1 PG (ref 25.2–33.5)
MCHC RBC AUTO-ENTMCNC: 28.6 G/DL (ref 28.4–34.8)
MCV RBC AUTO: 91.2 FL (ref 82.6–102.9)
MONOCYTES NFR BLD: 0.51 K/UL (ref 0.1–1.2)
MONOCYTES NFR BLD: 12 % (ref 3–12)
NEUTROPHILS NFR BLD: 60 % (ref 36–65)
NEUTS SEG NFR BLD: 2.51 K/UL (ref 1.5–8.1)
NRBC BLD-RTO: 0 PER 100 WBC
PLATELET # BLD AUTO: 243 K/UL (ref 138–453)
PMV BLD AUTO: 8.9 FL (ref 8.1–13.5)
POTASSIUM SERPL-SCNC: 3.9 MMOL/L (ref 3.7–5.3)
RBC # BLD AUTO: 2.95 M/UL (ref 3.95–5.11)
RBC # BLD: ABNORMAL 10*6/UL
SODIUM SERPL-SCNC: 137 MMOL/L (ref 136–145)
WBC OTHER # BLD: 4.2 K/UL (ref 3.5–11.3)

## 2025-05-13 PROCEDURE — 2060000000 HC ICU INTERMEDIATE R&B

## 2025-05-13 PROCEDURE — 6370000000 HC RX 637 (ALT 250 FOR IP): Performed by: STUDENT IN AN ORGANIZED HEALTH CARE EDUCATION/TRAINING PROGRAM

## 2025-05-13 PROCEDURE — 2500000003 HC RX 250 WO HCPCS

## 2025-05-13 PROCEDURE — 6370000000 HC RX 637 (ALT 250 FOR IP)

## 2025-05-13 PROCEDURE — 94640 AIRWAY INHALATION TREATMENT: CPT

## 2025-05-13 PROCEDURE — 2700000000 HC OXYGEN THERAPY PER DAY

## 2025-05-13 PROCEDURE — 80048 BASIC METABOLIC PNL TOTAL CA: CPT

## 2025-05-13 PROCEDURE — 83735 ASSAY OF MAGNESIUM: CPT

## 2025-05-13 PROCEDURE — 82947 ASSAY GLUCOSE BLOOD QUANT: CPT

## 2025-05-13 PROCEDURE — 85025 COMPLETE CBC W/AUTO DIFF WBC: CPT

## 2025-05-13 PROCEDURE — 99233 SBSQ HOSP IP/OBS HIGH 50: CPT | Performed by: INTERNAL MEDICINE

## 2025-05-13 PROCEDURE — 94003 VENT MGMT INPAT SUBQ DAY: CPT

## 2025-05-13 PROCEDURE — 94761 N-INVAS EAR/PLS OXIMETRY MLT: CPT

## 2025-05-13 PROCEDURE — 99231 SBSQ HOSP IP/OBS SF/LOW 25: CPT | Performed by: STUDENT IN AN ORGANIZED HEALTH CARE EDUCATION/TRAINING PROGRAM

## 2025-05-13 RX ORDER — DEXTROSE MONOHYDRATE 100 MG/ML
INJECTION, SOLUTION INTRAVENOUS CONTINUOUS PRN
Status: DISCONTINUED | OUTPATIENT
Start: 2025-05-13 | End: 2025-05-14 | Stop reason: HOSPADM

## 2025-05-13 RX ORDER — GLUCAGON 1 MG/ML
1 KIT INJECTION PRN
Status: DISCONTINUED | OUTPATIENT
Start: 2025-05-13 | End: 2025-05-14 | Stop reason: HOSPADM

## 2025-05-13 RX ADMIN — AMIODARONE HYDROCHLORIDE 200 MG: 200 TABLET ORAL at 22:11

## 2025-05-13 RX ADMIN — FUROSEMIDE 20 MG: 20 TABLET ORAL at 17:05

## 2025-05-13 RX ADMIN — PANTOPRAZOLE SODIUM 40 MG: 40 TABLET, DELAYED RELEASE ORAL at 06:15

## 2025-05-13 RX ADMIN — PRAVASTATIN SODIUM 80 MG: 20 TABLET ORAL at 17:05

## 2025-05-13 RX ADMIN — MONTELUKAST 10 MG: 10 TABLET, FILM COATED ORAL at 22:14

## 2025-05-13 RX ADMIN — INSULIN GLARGINE 8 UNITS: 100 INJECTION, SOLUTION SUBCUTANEOUS at 22:16

## 2025-05-13 RX ADMIN — AMIODARONE HYDROCHLORIDE 200 MG: 200 TABLET ORAL at 08:41

## 2025-05-13 RX ADMIN — METOPROLOL TARTRATE 37.5 MG: 25 TABLET, FILM COATED ORAL at 22:14

## 2025-05-13 RX ADMIN — SODIUM CHLORIDE, PRESERVATIVE FREE 10 ML: 5 INJECTION INTRAVENOUS at 08:42

## 2025-05-13 RX ADMIN — MICONAZOLE NITRATE: 20 POWDER TOPICAL at 22:15

## 2025-05-13 RX ADMIN — FUROSEMIDE 20 MG: 20 TABLET ORAL at 08:42

## 2025-05-13 RX ADMIN — IPRATROPIUM BROMIDE AND ALBUTEROL SULFATE 1 DOSE: 2.5; .5 SOLUTION RESPIRATORY (INHALATION) at 19:27

## 2025-05-13 RX ADMIN — APIXABAN 5 MG: 5 TABLET, FILM COATED ORAL at 08:42

## 2025-05-13 RX ADMIN — IPRATROPIUM BROMIDE AND ALBUTEROL SULFATE 1 DOSE: 2.5; .5 SOLUTION RESPIRATORY (INHALATION) at 09:39

## 2025-05-13 RX ADMIN — METOPROLOL TARTRATE 37.5 MG: 25 TABLET, FILM COATED ORAL at 08:42

## 2025-05-13 RX ADMIN — APIXABAN 5 MG: 5 TABLET, FILM COATED ORAL at 22:15

## 2025-05-13 RX ADMIN — MICONAZOLE NITRATE: 20 POWDER TOPICAL at 08:42

## 2025-05-13 ASSESSMENT — PULMONARY FUNCTION TESTS
PIF_VALUE: 16
PIF_VALUE: 15
PIF_VALUE: 16
PIF_VALUE: 16
PIF_VALUE: 17
PIF_VALUE: 16
PIF_VALUE: 16

## 2025-05-13 ASSESSMENT — PAIN SCALES - GENERAL
PAINLEVEL_OUTOF10: 0
PAINLEVEL_OUTOF10: 0

## 2025-05-13 NOTE — PLAN OF CARE
Problem: Discharge Planning  Goal: Discharge to home or other facility with appropriate resources  Outcome: Progressing     Problem: Pain  Goal: Verbalizes/displays adequate comfort level or baseline comfort level  Outcome: Progressing     Problem: Safety - Adult  Goal: Free from fall injury  Outcome: Progressing  Flowsheets (Taken 5/13/2025 0132)  Free From Fall Injury: Instruct family/caregiver on patient safety

## 2025-05-13 NOTE — OP NOTE
Operative Note      Patient: Shazia Nuñez  YOB: 1955  MRN: [id number]    Date of Procedure: 4/28/2025    Pre-Op Diagnosis Codes:      * Airway compromise [J98.8]    Post-Op Diagnosis: Same       Procedure(s):  FLEXIBLE BRONCHOSCOPY, TRACH REVISION    Surgeon(s):  Richard Kat MD Grischkan, Jonathan M, MD    Assistant:   * No surgical staff found *    Anesthesia: General    Estimated Blood Loss (mL): less than 50     Complications: None    Specimens:   * No specimens in log *    Implants:  * No implants in log *      Drains: * No LDAs found *    Findings:  Infection Present At Time Of Surgery (PATOS) (choose all levels that have infection present):  No infection present  Other Findings:  Huge left paratracheal false passage with trach in false passage on arrival.   Trach replaced into trachea under direct visualization and confirmed with flexible bronchoscopy  6-0 Proximal XLT Shiley placed, sutured in place       Indications for Procedure:   The patient is a 69-year-old female who presented to the emergency department in respiratory distress.  She had noted pneumothorax and subcutaneous emphysema and was ventilating poorly through her existing tracheostomy tube.    She has had a trach for quite some time and has had problems in the past with her stoma.  She was transferred from an outside hospital where a flexible fiberoptic bronchoscopy was performed suspicious for an obstructing granuloma versus tracheal perforation    We made arrangements for her to be transferred to the operating room for exploration of her neck as well as bronchoscopy to evaluate the airway, replace the trach.  We had vascular surgery on standby in case ECMO would be needed for supplemental oxygenation.    The plan was discussed with the patient, as well as her brother and the family member and we deemed this an emergent surgical procedure.    Detailed Description of Procedure:  On the day of surgery, the patient was 
using a glide scope by anesthesia.  The flexible bronchoscope was then placed into the ETT and under direct visualization the previously placed tracheostomy tube was removed revealing a widely patent with a mild degree of tracheomalacia.  The stoma was explored which was found to be in excellent condition without granulation, bleeding, masses, lesions.  A new 7 cuffed proximal XLT Shiley tracheostomy tube was placed with ease.  The tracheostomy tube was secured with Velcro strap.  Tracheoscopy was performed and the tracheostomy tube was found to be well-positioned over the warren.  No cuff leak on ventilation.  The patient was turned over to anesthesia for emergence    Electronically signed by Riky Andrade MD on 5/13/2025 at 1:06 PM

## 2025-05-13 NOTE — PLAN OF CARE
Problem: Chronic Conditions and Co-morbidities  Goal: Patient's chronic conditions and co-morbidity symptoms are monitored and maintained or improved  Outcome: Progressing     Problem: Discharge Planning  Goal: Discharge to home or other facility with appropriate resources  5/13/2025 0938 by Emily Caban RN  Outcome: Progressing  5/13/2025 0133 by Mayra Duarte RN  Outcome: Progressing     Problem: Pain  Goal: Verbalizes/displays adequate comfort level or baseline comfort level  Recent Flowsheet Documentation  Taken 5/13/2025 0730 by Emily Caban RN  Verbalizes/displays adequate comfort level or baseline comfort level: Encourage patient to monitor pain and request assistance     Problem: Pain  Goal: Verbalizes/displays adequate comfort level or baseline comfort level  5/13/2025 0938 by Emily Caban RN  Outcome: Progressing  Flowsheets (Taken 5/13/2025 0730)  Verbalizes/displays adequate comfort level or baseline comfort level: Encourage patient to monitor pain and request assistance  5/13/2025 0133 by Mayra Duarte RN  Outcome: Progressing     Problem: Safety - Adult  Goal: Free from fall injury  Recent Flowsheet Documentation  Taken 5/13/2025 0936 by Emily Caban RN  Free From Fall Injury: Instruct family/caregiver on patient safety  Taken 5/13/2025 0132 by Mayra Duarte RN  Free From Fall Injury: Instruct family/caregiver on patient safety     Problem: Safety - Adult  Goal: Free from fall injury  5/13/2025 0938 by Emily Caban RN  Outcome: Progressing  Flowsheets (Taken 5/13/2025 0936)  Free From Fall Injury: Instruct family/caregiver on patient safety  5/13/2025 0133 by Mayra Duarte RN  Outcome: Progressing  Flowsheets (Taken 5/13/2025 0132)  Free From Fall Injury: Instruct family/caregiver on patient safety     Problem: ABCDS Injury Assessment  Goal: Absence of physical injury  Recent Flowsheet Documentation  Taken 5/13/2025 0936 by Emily Caban RN  Absence of Physical Injury:

## 2025-05-14 ENCOUNTER — TELEPHONE (OUTPATIENT)
Dept: OTOLARYNGOLOGY | Age: 70
End: 2025-05-14

## 2025-05-14 VITALS
HEART RATE: 75 BPM | OXYGEN SATURATION: 100 % | HEIGHT: 65 IN | DIASTOLIC BLOOD PRESSURE: 50 MMHG | RESPIRATION RATE: 22 BRPM | TEMPERATURE: 98.2 F | BODY MASS INDEX: 48.82 KG/M2 | WEIGHT: 293 LBS | SYSTOLIC BLOOD PRESSURE: 116 MMHG

## 2025-05-14 LAB
ANION GAP SERPL CALCULATED.3IONS-SCNC: 14 MMOL/L (ref 9–16)
BASOPHILS # BLD: 0.03 K/UL (ref 0–0.2)
BASOPHILS NFR BLD: 1 % (ref 0–2)
BUN SERPL-MCNC: 38 MG/DL (ref 8–23)
CALCIUM SERPL-MCNC: 9.3 MG/DL (ref 8.6–10.4)
CHLORIDE SERPL-SCNC: 94 MMOL/L (ref 98–107)
CO2 SERPL-SCNC: 28 MMOL/L (ref 20–31)
CREAT SERPL-MCNC: 2.1 MG/DL (ref 0.6–0.9)
EOSINOPHIL # BLD: 0.18 K/UL (ref 0–0.44)
EOSINOPHILS RELATIVE PERCENT: 4 % (ref 1–4)
ERYTHROCYTE [DISTWIDTH] IN BLOOD BY AUTOMATED COUNT: 18.2 % (ref 11.8–14.4)
GFR, ESTIMATED: 25 ML/MIN/1.73M2
GLUCOSE BLD-MCNC: 152 MG/DL (ref 65–105)
GLUCOSE BLD-MCNC: 153 MG/DL (ref 65–105)
GLUCOSE SERPL-MCNC: 141 MG/DL (ref 74–99)
HCT VFR BLD AUTO: 26.7 % (ref 36.3–47.1)
HGB BLD-MCNC: 7.9 G/DL (ref 11.9–15.1)
IMM GRANULOCYTES # BLD AUTO: <0.03 K/UL (ref 0–0.3)
IMM GRANULOCYTES NFR BLD: 0 %
LYMPHOCYTES NFR BLD: 0.85 K/UL (ref 1.1–3.7)
LYMPHOCYTES RELATIVE PERCENT: 19 % (ref 24–43)
MAGNESIUM SERPL-MCNC: 1.9 MG/DL (ref 1.6–2.4)
MCH RBC QN AUTO: 27.6 PG (ref 25.2–33.5)
MCHC RBC AUTO-ENTMCNC: 29.6 G/DL (ref 28.4–34.8)
MCV RBC AUTO: 93.4 FL (ref 82.6–102.9)
MONOCYTES NFR BLD: 0.55 K/UL (ref 0.1–1.2)
MONOCYTES NFR BLD: 13 % (ref 3–12)
NEUTROPHILS NFR BLD: 63 % (ref 36–65)
NEUTS SEG NFR BLD: 2.78 K/UL (ref 1.5–8.1)
NRBC BLD-RTO: 0 PER 100 WBC
PLATELET # BLD AUTO: 264 K/UL (ref 138–453)
PMV BLD AUTO: 8.8 FL (ref 8.1–13.5)
POTASSIUM SERPL-SCNC: 4 MMOL/L (ref 3.7–5.3)
RBC # BLD AUTO: 2.86 M/UL (ref 3.95–5.11)
RBC # BLD: ABNORMAL 10*6/UL
SODIUM SERPL-SCNC: 136 MMOL/L (ref 136–145)
WBC OTHER # BLD: 4.4 K/UL (ref 3.5–11.3)

## 2025-05-14 PROCEDURE — 85025 COMPLETE CBC W/AUTO DIFF WBC: CPT

## 2025-05-14 PROCEDURE — 6370000000 HC RX 637 (ALT 250 FOR IP): Performed by: STUDENT IN AN ORGANIZED HEALTH CARE EDUCATION/TRAINING PROGRAM

## 2025-05-14 PROCEDURE — 36415 COLL VENOUS BLD VENIPUNCTURE: CPT

## 2025-05-14 PROCEDURE — 83735 ASSAY OF MAGNESIUM: CPT

## 2025-05-14 PROCEDURE — 94761 N-INVAS EAR/PLS OXIMETRY MLT: CPT

## 2025-05-14 PROCEDURE — 80048 BASIC METABOLIC PNL TOTAL CA: CPT

## 2025-05-14 PROCEDURE — 94003 VENT MGMT INPAT SUBQ DAY: CPT

## 2025-05-14 PROCEDURE — 6370000000 HC RX 637 (ALT 250 FOR IP)

## 2025-05-14 PROCEDURE — 2700000000 HC OXYGEN THERAPY PER DAY

## 2025-05-14 PROCEDURE — 82947 ASSAY GLUCOSE BLOOD QUANT: CPT

## 2025-05-14 PROCEDURE — 99232 SBSQ HOSP IP/OBS MODERATE 35: CPT | Performed by: STUDENT IN AN ORGANIZED HEALTH CARE EDUCATION/TRAINING PROGRAM

## 2025-05-14 PROCEDURE — 99232 SBSQ HOSP IP/OBS MODERATE 35: CPT | Performed by: INTERNAL MEDICINE

## 2025-05-14 PROCEDURE — 94640 AIRWAY INHALATION TREATMENT: CPT

## 2025-05-14 PROCEDURE — 2500000003 HC RX 250 WO HCPCS

## 2025-05-14 RX ADMIN — FUROSEMIDE 20 MG: 20 TABLET ORAL at 08:27

## 2025-05-14 RX ADMIN — IPRATROPIUM BROMIDE AND ALBUTEROL SULFATE 1 DOSE: 2.5; .5 SOLUTION RESPIRATORY (INHALATION) at 07:54

## 2025-05-14 RX ADMIN — ACETAMINOPHEN 650 MG: 325 TABLET ORAL at 09:16

## 2025-05-14 RX ADMIN — APIXABAN 5 MG: 5 TABLET, FILM COATED ORAL at 08:27

## 2025-05-14 RX ADMIN — SODIUM CHLORIDE, PRESERVATIVE FREE 10 ML: 5 INJECTION INTRAVENOUS at 08:27

## 2025-05-14 RX ADMIN — PANTOPRAZOLE SODIUM 40 MG: 40 TABLET, DELAYED RELEASE ORAL at 08:27

## 2025-05-14 RX ADMIN — MICONAZOLE NITRATE: 20 POWDER TOPICAL at 08:27

## 2025-05-14 RX ADMIN — METOPROLOL TARTRATE 37.5 MG: 25 TABLET, FILM COATED ORAL at 08:27

## 2025-05-14 RX ADMIN — AMIODARONE HYDROCHLORIDE 200 MG: 200 TABLET ORAL at 08:27

## 2025-05-14 ASSESSMENT — PAIN SCALES - GENERAL
PAINLEVEL_OUTOF10: 6
PAINLEVEL_OUTOF10: 1

## 2025-05-14 ASSESSMENT — PULMONARY FUNCTION TESTS
PIF_VALUE: 16

## 2025-05-14 NOTE — PROGRESS NOTES
05/03/25 1715   Surgical Airway  04/28/25 Shiley   Placement Date/Time: 04/28/25 1148   Placement Verified By: Capnometry;Auscultation;Direct visualization  Surgical Airway Type: Tracheostomy  Brand: (c) Brittani  Size: 6   Status Secured   Site Assessment Oozing Secretions;Dry;Swelling   Site Care Cleansed;Dried;Dressing applied   Inner Cannula Care Changed/new   Ties Assessment Intact;Secure     Trach care complete. Swelling, redness, and oozing secretions present.   
   05/06/25 0935   Care Plan - Respiratory Goals   Achieves optimal ventilation and oxygenation Respiratory therapy support as indicated;Assess and instruct to report shortness of breath or any respiratory difficulty;Assess the need for suctioning and aspirate as needed;Encourage broncho-pulmonary hygiene including cough, deep breathe, incentive spirometry;Oxygen supplementation based on oxygen saturation or arterial blood gases;Position to facilitate oxygenation and minimize respiratory effort;Assess for changes in mentation and behavior;Assess for changes in respiratory status       
   05/06/25 2057   Surgical Airway  04/28/25 Brittani   Placement Date/Time: 04/28/25 1148   Placement Verified By: Capnometry;Auscultation;Direct visualization  Surgical Airway Type: Tracheostomy  Brand: (c) Brittani  Size: 6   Status Secured   Site Assessment Red   Site Care (S)  Cleansed;Dressing applied;Protective barrier to skin   Inner Cannula Care (S)  Changed/new   Ties Assessment (S)  Clean;Intact;Secure   Cuff Pressure   (mlt)   Spare Trach at Bedside Yes   Spare Trach Tube One Size Smaller at Bedside Yes   Ambu Bag With Mask at Bedside Yes     Trach are peformed  
   05/08/25 5571   Care Plan - Respiratory Goals   Achieves optimal ventilation and oxygenation Assess for changes in respiratory status;Assess for changes in mentation and behavior;Position to facilitate oxygenation and minimize respiratory effort;Oxygen supplementation based on oxygen saturation or arterial blood gases;Encourage broncho-pulmonary hygiene including cough, deep breathe, incentive spirometry;Assess the need for suctioning and aspirate as needed;Assess and instruct to report shortness of breath or any respiratory difficulty;Respiratory therapy support as indicated       
   05/09/25 1955   Care Plan - Respiratory Goals   Achieves optimal ventilation and oxygenation Respiratory therapy support as indicated;Assess and instruct to report shortness of breath or any respiratory difficulty;Assess the need for suctioning and aspirate as needed;Encourage broncho-pulmonary hygiene including cough, deep breathe, incentive spirometry;Initiate smoking cessation protocol as indicated;Oxygen supplementation based on oxygen saturation or arterial blood gases;Position to facilitate oxygenation and minimize respiratory effort;Assess for changes in mentation and behavior;Assess for changes in respiratory status       
  Physician Progress Note      PATIENT:               SHOBHA ROGERS  CSN #:                  [id number]  :                       1955  ADMIT DATE:       2025 9:44 AM  DISCH DATE:  RESPONDING  PROVIDER #:        Florencia Hernandez MD          QUERY TEXT:    CKD is documented on the medical record on  per IM.  Please specify CKD   stage:    The clinical indicators include:   IM note-chronic kidney disease, kidney function is stable at baseline,   continue to monitor BMP, continue to avoid nephrotoxic agent.  PMH-CKD stage   3,  BUN 33, 34, creatinine 1.9, 1.7, 1.5, 1.6, GFR 28, 35, 38  Options provided:  -- Chronic kidney disease Stage 1  -- Chronic kidney disease Stage 2  -- Chronic kidney disease Stage 3, unspecified  -- Chronic kidney disease Stage 3a  -- Chronic kidney disease Stage 3b  -- Chronic kidney disease Stage 4  -- Chronic kidney disease Stage 5  -- Chronic kidney disease Stage 5, requiring dialysis  -- End Stage Renal Disease  -- Other - I will add my own diagnosis  -- Disagree - Not applicable / Not valid  -- Disagree - Clinically unable to determine / Unknown  -- Refer to Clinical Documentation Reviewer    PROVIDER RESPONSE TEXT:    The patient has chronic kidney disease Stage 3.    Query created by: Annika Scanlon on 2025 9:06 AM      Electronically signed by:  Florencia Hernandez MD 2025 3:23 PM          
 [facility]        NAME:  Shazia Nuñez  MEDICAL RECORD NUMBER:  [id number]  AGE: 69 y.o.   GENDER: female  : 1955  TODAY'S DATE:  2025    Subjective:     Reason for WOCN Evaluation and Assessment: \"abdomen wound, LLQ\"      Shazia Nuñez is a 69 y.o. female referred by:   [x] Physician  [] Nursing  [] Other:     Wound Identification:  Wound Type: non-healing/non-surgical  Contributing Factors: decreased tissue oxygenation        Ulcerations over a large ventral hernia due to poor perfusion and tensile limit of skin.     Objective:      BP (!) 118/38   Pulse 66   Temp 98.7 °F (37.1 °C) (Oral)   Resp 17   Ht 1.651 m (5' 5\")   Wt (!) 162.5 kg (358 lb 4 oz)   LMP 2014   SpO2 100%   BMI 59.62 kg/m²   Emmanuel Risk Score: Emmanuel Scale Score: 12    LABS    CBC:   Lab Results   Component Value Date/Time    WBC 6.9 2025 03:45 AM    RBC 2.70 2025 03:45 AM    RBC 4.44 2012 09:38 AM    HGB 7.1 2025 03:45 AM    HCT 25.1 2025 03:45 AM     CMP:  Albumin:  No results found for: \"LABALBU\"  PT/INR:    Lab Results   Component Value Date/Time    PROTIME 18.8 2025 03:45 AM    PROTIME 29.7 2024 10:30 AM    INR 1.5 2025 03:45 AM     HgBA1c:    Lab Results   Component Value Date/Time    LABA1C 6.0 2025 07:00 AM     PTT: No components found for: \"LABPTT\"      Assessment:       Measurements:  Wound 24 Abdomen Lower;Right large ventral hernia (Active)   Wound Image   25   Wound Etiology Other 25 1120   Dressing Status New dressing applied 25 1120   Wound Cleansed Cleansed with saline 25 1120   Dressing/Treatment Collagen;Foam 25 1120   Dressing Change Due 25 1120   Wound Length (cm) 2.4 cm 25   Wound Width (cm) 8.9 cm 25   Wound Depth (cm) 0.1 cm 25   Wound Surface Area (cm^2) 21.36 cm^2 25   Change in Wound Size % (l*w) -47.31 25 
 [facility]  Consult Note       NAME:  Shazia Nuñez  MEDICAL RECORD NUMBER:  [id number]  AGE: 69 y.o.   GENDER: female  : 1955  TODAY'S DATE:  2025    Subjective:     Reason for WOCN Evaluation and Assessment: \"abdomen wound, LLQ\"     Subjective  Shazia Nuñez is a 69 y.o. female referred by:   [x] Physician  [] Nursing  [] Other:      Wound Identification:  Wound Type: non-healing/non-surgical  Contributing Factors: decreased tissue oxygenation                                        Ulcerations over a large ventral hernia due to poor perfusion and tensile limit of skin.             Objective:      BP (!) 140/57   Pulse 86   Temp 98.4 °F (36.9 °C) (Oral)   Resp 21   Ht 1.651 m (5' 5\")   Wt (!) 161.8 kg (356 lb 11.2 oz)   LMP 2014   SpO2 95%   BMI 59.36 kg/m²   Emmanuel Risk Score: Emmanuel Scale Score: 15    LABS    CBC:   Lab Results   Component Value Date/Time    WBC 6.6 2025 08:02 AM    RBC 2.64 2025 08:02 AM    RBC 4.44 2012 09:38 AM    HGB 7.3 2025 04:32 PM    HCT 25.5 2025 04:32 PM     CMP:  Albumin:  No results found for: \"LABALBU\"  PT/INR:    Lab Results   Component Value Date/Time    PROTIME 18.3 2025 11:42 AM    PROTIME 29.7 2024 10:30 AM    INR 1.5 2025 11:42 AM     HgBA1c:    Lab Results   Component Value Date/Time    LABA1C 6.0 2025 07:00 AM     PTT: No components found for: \"LABPTT\"      Assessment:       Measurements:     25 1015   Wound 24 Abdomen Lower large ventral hernia   Date First Assessed/Time First Assessed: 24 1900   Present on Original Admission: Yes  Primary Wound Type: (c) Other (comment)  Location: Abdomen  Wound Location Orientation: Lower  Wound Description (Comments): large ventral hernia   Wound Image    Wound Etiology Other   Dressing Status New dressing applied   Wound Cleansed Cleansed with saline   Dressing/Treatment Collagen;Foam   Dressing Change Due 
1111 - BELONGINGS KEPT AT WOW WITH CIRCULATING RN (CELL PHONE, , GLASSES)  
Attending Physician Statement  I have discussed the care of Shazia Nuñez, including pertinent history and exam findings with the resident. I have reviewed the key elements of all parts of the encounter with the resident. I have seen and examined the patient with the resident.  I agree with the assessment and plan and status of the problem list as documented.    I saw the patient during the round today, chart reviewed overnight events noted procedure note which was  just done by ENT was seen.  Please see full pulmonary progress note by pulmonary resident Dr. Bermeo    Patient remained on ventilator on CPAP/pressure support 8/8/35% overnight currently on ventilator with the same setting.  Patient trach was upsized to #7 XLT proximal cuffed trach and tracheostomy site look okay.  She denies any dysphagia coughing or choking.  Monitor tracheostomy secretions.  Tracheostomy care.  Aspiration precaution.    This note is created with the assistance of a speech recognition program.  While intent was to generate a document that actually reflects the content of the visit, the document can still have some errors including those of syntax and sound-alike substitutions which may escape proof reading.  It such instances, actual meaning can be extrapolated by contextual diversion.         Colin Gilbert MD  5/12/2025 11:55 AM   
Attending Physician Statement  I have discussed the care of Shazia Nuñez, including pertinent history and exam findings with the resident. I have reviewed the key elements of all parts of the encounter with the resident. I have seen and examined the patient with the resident.  I agree with the assessment and plan and status of the problem list as documented.    Please see pulmonary progress note by pulmonary resident Dr. Bermeo  Saw the patient during around today, chart reviewed overnight events noted  Patient has been hemodynamically stable.  Patient remained on ventilator around-the-clock on CPAP/pressure support 8/8/35%.  She is alert and awake denies dysphagia on oral feeding does not complain of increase secretions or shortness of breath  Tracheostomy site look okay there is mild drainage on the dressing present.  Patient has been followed by ENT for tracheostomy.  Continue with tracheostomy care continue with ventilatory support.  Tracheostomy secretions are moderate per respiratory therapist required suctioning so far twice this morning.  Continue monitor intake and output on Lasix twice daily.  Robinul as needed.  On amiodarone and on Eliquis.  Okay to discharge to ventilator facility from pulmonary standpoint.    This note is created with the assistance of a speech recognition program.  While intent was to generate a document that actually reflects the content of the visit, the document can still have some errors including those of syntax and sound-alike substitutions which may escape proof reading.  It such instances, actual meaning can be extrapolated by contextual diversion.         Colin Gilbert MD  5/14/2025 12:12 PM   
[facility]  Office: [phone]  Carlos Caro DO, Felipe Drew DO, Jerman Mack DO, Leonel Thomas, DO, Larry Pisano MD, Monserrat Rosado MD, Sharon Perez MD, Beverley Curtis MD,  Jimi Messina MD, Kevin Cowan MD, Theresa Chavira MD,  Luis Coffman DO, Florencia Hernandez MD, Giorgi Salas MD, Ramone Caro DO, Mariel Bradshaw MD,  Bandar Anderson DO, Mireille Hermosillo MD, Yamileth Chong MD, Zo Montgomery MD,  Jordan Hernandez MD, Pilar Salcedo MD, Kevin Greenwood MD, Kevin Landon MD, Ramon Ellsworth MD, Ken Luna MD, Chemo Morton DO, Mirian Merida MD, Alvaro Reid MD, Luis Caicedo MD, Mohsin Reza, MD, Lynn Cardoza MD, Shirley Waterhouse, CNP,  Octavia Coreas, CNP, Chemo Pelayo, CNP,  Юлия Calero, DNP, Kaci Rodgers, CNP, Princess Mandujano, CNP, Madelaine Burns, CNP, Ivone Mancia, CNP, Val Hanson, PA-C, Isabel Bustos, CNP, Karma Yost, CNP,  Nasrin Pritchard, CNP, Bebe Mckeon, CNP, Blayne Evangelista, PA-C, Rosio Resendez, CNP,  Sandie Palmer, CNS, Jessi Montiel, CNP, Dolly Tamez, CNP,   Lea Johnson, CNP         [facility]   IN-PATIENT SERVICE   [facility]    Progress Note    5/9/2025    3:23 PM    Name:   Shazia Nuñez  MRN:     [id number]     Acct:      [id number]   Room:   0408/0408-01  IP Day:  11  Admit Date:  4/28/2025  9:44 AM    PCP:   [facility]  Code Status:  Full Code    Subjective:     C/C:   Chief Complaint   Patient presents with    Respiratory Distress     Interval History Status: not changed.     Patient seen at bedside.    She feels better.  She feels that pain is under control.  No chest pain or shortness of breath.  Secretions have improved  Labs and vitals reviewed  Brief History:     69-year-old female with past medical history of PE dvt, history of tracheostomy was admitted to ICU status post trach revision with ENT, patient initially presented to [facility] for discomfort after she 
Brief ENT Note:    Reason for care-s/p trach revision after false passage 4/28.    Ms. Nuñez was see on evening rounds.  Continues healing well.      Trach in good position. Thick secretions around stoma. Stoma/incision healing. Silk sutures in place     This patient should remain in hospital while stoma heals as she had significant airway event with trach dislodgement into false passage     Should remain in hospital until healing established and trach sutures to be removed 5/9/25, removal by ENT.     Plan to return to OR for 1st trach change week of 5/12, will update when date and time are set.    Pt. Requests that brother be phoned and updated on plan of care.    Fresh Trach Precautions    -Do not cut trach sutures or shoestring strap for any reason ENT to remove  -Keep velcro straps tight and secure at all times. Two fingers should barely fit under the straps.   -Do not place more than 1 drain sponge (not 1 pack, but one single drain sponge) under the tracheostomy tube at a time  -Keep an extra tracheostomy tube of the same size and one size smaller at bedside at all times (If a cuffed tube is in place then extra trachs should be cuffed. If an uncuffed tube is in place then the extra trachs should be uncuffed. If the tracheostomy tube is an XLT then extra trachs should be XLTs)  -Please have a suture removal kit, empty syringe, and flexible suction with suction set up at bedside at all times  -Please make sure that extra inner cannulas fit the tracheostomy tube that is currently in place (note that XLT inner cannulas are different)  --------------------------------------------------  OPAL Dawson  Otolaryngology - Head and Neck Surgery  [facility]       
Brief ENT Note:    Shazia Nuñez is a 69 y.o. who is being seen for follow-up of Reason for care-s/p trach revision after false passage 4/28.     Physical exam:6-0 Proximal XLT Shiley placed, sutured in place with velcro trach collar to secure,Trach in good position. Scant amount of thick secretions around stoma. Stoma/incision healing. Silk sutures in place, trach site tender to touch.  See Wound Ostomy care note .  Opticell AG to lower portion of skin around trach.     Discussed with Dr. Andrade plan of care; will remove sutures on Monday 5/12 in OR with trach change.      To bedside and reviewed plan with patient and staff RN.  Patient is relieved.  C/o tenderness at trach site.    Plan:  Fresh Trach Precautions     -Do not cut trach sutures or shoestring strap for any reason ENT to remove  -Keep velcro straps tight and secure at all times. Two fingers should barely fit under the straps.   -Do not place more than 1 drain sponge (not 1 pack, but one single drain sponge) under the tracheostomy tube at a time See Wound Ostomy care note .  Opticell AG to lower portion of skin around trach.   -Keep an extra tracheostomy tube of the same size and one size smaller at bedside at all times (If a cuffed tube is in place then extra trachs should be cuffed. If an uncuffed tube is in place then the extra trachs should be uncuffed. If the tracheostomy tube is an XLT then extra trachs should be XLTs)  -Please have a suture removal kit, empty syringe, and flexible suction with suction set up at bedside at all times  -Please make sure that extra inner cannulas fit the tracheostomy tube that is currently in place (note that XLT inner cannulas are different)  --------------------------------------------------  OPAL Dawson  Otolaryngology - Head and Neck Surgery  [facility]    
Brief ENT Note:    Shazia uNñez is a 69 y.o. who is being seen for follow-up of Reason for care-s/p trach revision after false passage 4/28; suture removal and trach change 5/12    Subjective:  Sitting up in bed.  States it is a little difficult for her to swallow.  Wants to know when she can have cuff deflated so that she can talk    Physical exam:7-0 Proximal XLT Shiley placed, in place with velcro trach collar to secure,Trach in good position. Stoma/incision healing. Secretions suctioned are pink to clear.  Patient states she had some jello.  Trach site mildly tender to touch.  Opticell AG to skin around trach. Trach ties snug to two fingers passage around neck.     Discussed post-op care with patient and staff RN     Will continue to monitor; discuss with primary team plan for discharge as we are now 24 hours post op.  Discussed with Dr. Meza     Plan:  Fresh Trach Precautions     -Keep velcro straps tight and secure at all times. Two fingers should barely fit under the straps.   -Do not place more than 1 drain sponge (not 1 pack, but one single drain sponge) under the tracheostomy tube at a time See Wound Ostomy care notes .  Opticell AG to lower portion of skin around trach.   -Keep an extra tracheostomy tube of the same size and one size smaller at bedside at all times (If a cuffed tube is in place then extra trachs should be cuffed. If an uncuffed tube is in place then the extra trachs should be uncuffed. If the tracheostomy tube is an XLT then extra trachs should be XLTs)  -Please have a suture removal kit, empty syringe, and flexible suction with suction set up at bedside at all times  -Please make sure that extra inner cannulas fit the tracheostomy tube that is currently in place (note that XLT inner cannulas are different)     Caren REAVESP-C  Pediatric Otolaryngology-Head and Neck Surgery  [facility]  Available through [employer] 
[facility]  Office: [phone]  Carlos Caro DO, Felipe Drew DO, Jerman Mack DO, Leonel Thomas DO, Larry Pisano MD, Monserrat Rosado MD, Sharon Perez MD, Beverley Curtis MD,  Jimi Messina MD, Kevin Cowan MD, Theresa Chavira MD,  Luis Coffman DO, Florencia Hernandez MD, Giorgi Salas MD, Ramone Caro DO, Mariel Bradshaw MD,  Bandar Anderson DO, Mireille Hermosillo MD, Yamileth Chong MD, Zo Montgomery MD,  Jordan Hernandez MD, Pilar Salcedo MD, Kevin Greenwood MD, Kevin Landon MD, Ramon Ellsworth MD, Ken Luna MD, Chemo Morton DO, Mirian Merida MD, Alvaro Reid MD, Luis Caicedo MD, Mohsin Reza, MD, Lynn Cardoza MD, Shirley Waterhouse, CNP,  Octavia Coreas, CNP, Chemo Pelayo, CNP,  Юлия Calero, DNP, Kaci Rodgers, CNP, Princess Mandujano, CNP, Madelaine Burns, CNP, Ivone Mancia, CNP, Val Hanson, PA-C, Isabel Bustos, CNP, Karma Yost, CNP,  Nasrin Pritchard, CNP, Bebe Mckeon, CNP, Blayne Evangelista, PA-C, Rosio Resendez, CNP,  Sandie Palmer, CNS, Jessi Montiel, CNP, Dolly Tamez, CNP,   Lea Johnson, CNP         [facility]   IN-PATIENT SERVICE   [facility]    Progress Note    5/4/2025    10:12 AM    Name:   Shazia Nuñez  MRN:     [id number]     Acct:      [id number]   Room:   0408/0408-01  IP Day:  6  Admit Date:  4/28/2025  9:44 AM    PCP:   [facility]  Code Status:  Full Code    Subjective:     C/C:   Chief Complaint   Patient presents with    Respiratory Distress     Interval History Status: improved.     Patient was seen and examined, she was seen in no acute distress  Pain control.    Lab vital sign consultant notes reviewed    Brief History:     69-year-old female with past medical history of PE dvt, history of tracheostomy was admitted to ICU status post trach revision with ENT, patient initially presented to [facility] for discomfort after she experienced episode of desaturation then  she was 
ENT/OTOLARYNGOLOGY SUBSEQUENT CARE PROGRESS NOTE     REASON FOR CARE: Reason for care-s/p trach revision after false passage 4/28.      HISTORY OF PRESENT ILLNESS:   Shazia Nuñez is a 69 y.o. who is being seen for follow-up of Reason for care-s/p trach revision after false passage 4/28.     Subjective:  Sitting up in bed, texting on phone.  Asking questions through vocalization and writing out on clip board.    Pertinent Examination:   GENERAL: well developed and well nourished and in no acute distress  HEAD: normocephalic and atraumatic  EYES: no eyelid swelling, no conjunctival injection or exudate, pupils equal round and reactive to light  EXTERNAL EARS: normal  EAR EXAM: deferred  NOSE: nares patent, normal mucosa  MOUTH/THROAT: mucous membranes moist, no focal lesions, no tonsillar enlargement or exudate  NECK: 6-0 Proximal XLT Shiley placed, sutured in place with velcro trach collar to secure,Trach in good position. Scant amount of thick secretions around stoma. Stoma/incision healing. Silk sutures in place, trach site tender to touch  RESPIRATORY: trach to vent - CPAP/PS FIO2 35% ETCO2 47  NEUROLOGICAL:  cranial nerves II-XII are grossly intact     RELEVANT LABS/STUDIES:   Additional data reviewed:    None    Procedures:    None    Surgical risk factors:  None     IMPRESSION AND RECOMMENDATIONS:   Shazia Nuñez is a 69 y.o. female with s/p trach revision after false passage 4/28.     Discussed with Dr. Andrade,staff RN, patient and phoned her brother Álvaro Nuñez medical POA .       Plan:  This patient should remain in hospital while stoma heals as she had significant airway event with trach dislodgement into false passage     Should remain in hospital until healing established and trach sutures to be removed 5/9/25, removal by ENT.     Plan to return to OR for 1st trach change on  5/12, @ 0710     Fresh Trach Precautions     -Do not cut trach sutures or shoestring strap for any reason ENT to 
Evening post op check s/p Bronchoscopy with aspiration, tracheotomy change     Seen and examined on rounds this evening  Patient awake, sitting up in bed  Taking PO well without nausea or vomiting  Pain well controlled  States that trach ties feel tight, explained they need to be two fingers snug to maintain integrity of the trach and avoid decannulation, states understanding  It is more comfortable without the sutures      Physical exam:7-0 Proximal XLT Shiley placed, in place with velcro trach collar to secure,Trach in good position. Stoma/incision healing. Secretions suctioned are clear. Trach site mildly tender to touch.  .  Opticell AG to lower portion of skin around trach.      Discussed post-op care with patient and staff RN  All questions answered     Will continue to monitor  Discussed with Dr. Meza    Plan:  Fresh Trach Precautions     -Keep velcro straps tight and secure at all times. Two fingers should barely fit under the straps.   -Do not place more than 1 drain sponge (not 1 pack, but one single drain sponge) under the tracheostomy tube at a time See Wound Ostomy care notes .  Opticell AG to lower portion of skin around trach.   -Keep an extra tracheostomy tube of the same size and one size smaller at bedside at all times (If a cuffed tube is in place then extra trachs should be cuffed. If an uncuffed tube is in place then the extra trachs should be uncuffed. If the tracheostomy tube is an XLT then extra trachs should be XLTs)  -Please have a suture removal kit, empty syringe, and flexible suction with suction set up at bedside at all times  -Please make sure that extra inner cannulas fit the tracheostomy tube that is currently in place (note that XLT inner cannulas are different)    Caren Jorge FNP-C  Pediatric Otolaryngology-Head and Neck Surgery  [facility] [facility]  Available through [employer]   
[facility]  Office: [phone]  Carlos Caro DO, Felipe Drew DO, Jerman Mack DO, Leonel Thomas, DO, Larry Pisano MD, Monserrat Rosado MD, Sharon Perez MD, Bevelrey Curtis MD,  Jimi Messina MD, Kevin Cowan MD, Theresa Chavira MD,  Luis Coffman DO, Florencia Hernandez MD, Giorgi Salas MD, Ramone Caro DO, Mariel Bradshaw MD,  Bandar Anderson DO, Mireille Hermosillo MD, Yamileth Chong MD, Zo Montgomery MD,  Jordan Hernandez MD, Pilar Salcedo MD, Kevin Greenwood MD, Kevin Landon MD, Ramon Ellsworth MD, Ken Luna MD, Chemo Morton DO, Mirian Merida MD, Alvaro Reid MD, Luis Caicedo MD, Mohsin Reza, MD, Lynn Cardoza MD, Shirley Waterhouse, CNP,  Octavia Coreas, CNP, Chemo Pelayo, CNP,  Юлия Calero, DNP, Kaci Rodgers, CNP, Princess Mandujano, CNP, Madelaine Burns, CNP, Ivone Mancia, CNP, Val Hanson, PA-C, Isabel Bustos, CNP, Karma Yost, CNP,  Nasrin Pritchard, CNP, Bebe Mckeon, CNP, Blayne Evangelista, PA-C, Rosio Resenedz, CNP,  Sandie Palmer, CNS, Jessi Montiel, CNP, Dolly Tamez, CNP,   Lea Johnson, CNP         [facility]   IN-PATIENT SERVICE   [facility]    Progress Note    5/8/2025    2:10 PM    Name:   Shazia Nuñez  MRN:     [id number]     Acct:      [id number]   Room:   0408/0408-01  IP Day:  10  Admit Date:  4/28/2025  9:44 AM    PCP:   [facility]  Code Status:  Full Code    Subjective:     C/C:   Chief Complaint   Patient presents with    Respiratory Distress     Interval History Status: not changed.     Patient seen at bedside.  Patient appeared uncomfortable and reports increased secretions.  No chest pain  No acute events overnight  Vitals stable  Glucose 130s  Brief History:     69-year-old female with past medical history of PE dvt, history of tracheostomy was admitted to ICU status post trach revision with ENT, patient initially presented to [facility] for discomfort after she experienced 
INTENSIVE CARE UNIT  Resident Physician Progress Note    Patient - Shazia Nuñez  Date of Admission -  2025  9:44 AM  Date of Evaluation -  2025  Room and Bed Number -  3018/3018-01   Hospital Day - 1      SUBJECTIVE:     OVERNIGHT EVENTS:    No acute events overnight, off sedation.     TODAY:  Fluids:LR at 100cc/hr  Feeding:NPO  Analgesics:Tylenol 650mg Q6 PRN   Sedation:None  Thrombo-prophylaxis: Heparin gtt  Mobilization: LImited  Head Up:30 degrees  Hemodynamics: stable, no pressors  Ulcer Prophylaxis:  Pantoprazole 40mg OD  Glycemic Control:MDSS  Spontaneous breathing trial: NA  Bowel management:Glycolax once daily   Indwelling catheter: No, EUC  De-escalation: Transfer to SD      LABS:  CPAP 16/640/8/40  Ph 7.380/pCO2 52, pO2 211, HCO3 30.8  Hb 7.1,   Na 138, K 4,BUN 32, Creat 1.6, GFR 35  APTT 64.6, PT-INR 18.8/1.5    AWAKE & FOLLOWING COMMANDS:  [] No   [x] Yes    SECRETIONS Amount:  [x] Small [] Moderate  [] Large  [] None  Color:     [] White [] Colored  [] Bloody    SEDATION:  RAAS Score:  [] Propofol gtt  [] Versed gtt  [] Ativan gtt   [x] No Sedation    PARALYZED:  [x] No    [] Yes    VASOPRESSORS:  [x] No    [] Yes  [] Levophed [] Dopamine [] Vasopressin  [] Dobutamine [] Phenylephrine [] Epinephrine      OBJECTIVE:     VITAL SIGNS:  BP (!) 108/31   Pulse 70   Temp 98.7 °F (37.1 °C) (Oral)   Resp 25   Ht 1.651 m (5' 5\")   Wt (!) 162.5 kg (358 lb 4 oz)   LMP 2014   SpO2 100%   BMI 59.62 kg/m²   Tmax over 24 hours:  Temp (24hrs), Av.9 °F (36.6 °C), Min:96.8 °F (36 °C), Max:98.7 °F (37.1 °C)      Patient Vitals for the past 8 hrs:   BP Temp Temp src Pulse Resp SpO2 Weight   25 0742 -- -- -- 70 25 100 % --   25 0741 -- -- -- 75 21 100 % --   25 0600 -- -- -- -- -- -- (!) 162.5 kg (358 lb 4 oz)   25 0500 (!) 108/31 -- -- 68 15 99 % --   25 0440 -- -- -- 70 15 100 % --   25 0400 (!) 123/33 98.7 °F (37.1 °C) Oral 69 16 100 % -- 
INTENSIVE CARE UNIT  Resident Physician Progress Note    Patient - Shazia Nuñez  Date of Admission -  2025  9:44 AM  Date of Evaluation -  2025  Room and Bed Number -  3018/3018-01   Hospital Day - 2      SUBJECTIVE:     OVERNIGHT EVENTS:    No acute events overnight, passed the esophageogram study. Hb this AM was 6.9    TODAY:      Fluids: None  Feeding: Regular diet  Analgesics:Tylenol 650mg Q6 PRN   Sedation:None  Thrombo-prophylaxis: Heparin gtt(paused) transition to eliquis  Mobilization: Limited  Head Up: 30 degrees  Hemodynamics: stable, no pressors  Ulcer Prophylaxis:  Pantoprazole 40mg OD  Glycemic Control: MDSS  Spontaneous breathing trial: NA  Bowel management:Glycolax once daily   Indwelling catheter: No, EUC  De-escalation: Intiation of eliquis, Transfer to SD      LABS:  CPAP 35  Hb 6.9/24.8  Na 141/K 3.9  Bun 30/1.5  U/O 750cc. +2.9L    AWAKE & FOLLOWING COMMANDS:  [] No   [x] Yes    SECRETIONS Amount:  [x] Small [] Moderate  [] Large  [] None  Color:     [] White [] Colored  [] Bloody    SEDATION:  RAAS Score:  [] Propofol gtt  [] Versed gtt  [] Ativan gtt   [x] No Sedation    PARALYZED:  [x] No    [] Yes    VASOPRESSORS:  [x] No    [] Yes  [] Levophed [] Dopamine [] Vasopressin  [] Dobutamine [] Phenylephrine [] Epinephrine      OBJECTIVE:     VITAL SIGNS:  BP (!) 115/30   Pulse 80   Temp 98.2 °F (36.8 °C) (Oral)   Resp 20   Ht 1.651 m (5' 5\")   Wt (!) 161.8 kg (356 lb 11.2 oz)   LMP 2014   SpO2 100%   BMI 59.36 kg/m²   Tmax over 24 hours:  Temp (24hrs), Av.5 °F (36.9 °C), Min:98.1 °F (36.7 °C), Max:98.9 °F (37.2 °C)      Patient Vitals for the past 8 hrs:   BP Temp Temp src Pulse Resp SpO2 Weight   25 0823 -- -- -- 80 20 100 % --   25 0800 (!) 115/30 98.2 °F (36.8 °C) Oral 78 20 100 % --   25 0700 (!)  -- -- 65 15 100 % --   25 0600 (!) 113 -- -- 64 13 100 % (!) 161.8 kg (356 lb 11.2 oz)   25 0500 (!) 122/34 -- -- 65 12 
Nutrition Assessment     Type and Reason for Visit: Initial, LOS    Nutrition Recommendations/Plan:   Continue diet as tolerated.     Malnutrition Assessment:  Malnutrition Status: No malnutrition    Nutrition Assessment:  Pt seen for LOS. Adm trach malfunction. plan for trach exchange in 2-3 weeks. Per RN, pt eating well at meals. Noted % intake at meals this admission. Pt able to mouth responses at visit. Reports a good appetite and eating well. Weights reviewed. Labs include Glu 123-165, BUN 27, Cr 1.6. Pt denies issues at this time.    Estimated Daily Nutrient Needs:  Energy (kcal):  4243-2200 kcal/d Weight Used for Energy Requirements: Current     Protein (g):  115-125 gm/d Weight Used for Protein Requirements: Ideal        Fluid (ml/day):  per MD Method Used for Fluid Requirements: Defer to provider    Nutrition Related Findings:   Meds/labs reviewed. LBM 5/5. Wound Type: Pressure Injury, Stage III (Stage 3 PI to throat per wound care)    Current Nutrition Therapies:    ADULT DIET; Regular; 4 carb choices (60 gm/meal)    Anthropometric Measures:  Height: 165.1 cm (5' 5\")  Current Body Wt: 164.1 kg (361 lb 12.4 oz)   BMI: 60.2     Nutrition Diagnosis:   No nutrition diagnosis at this time     Nutrition Interventions:   Food and/or Nutrient Delivery: Continue Current Diet  Nutrition Education/Counseling: No recommendation at this time  Coordination of Nutrition Care: Continue to monitor while inpatient  Plan of Care discussed with: RN, pt    Goals:  Goals: Maintain adequate nutrition status, prior to discharge  Type of Goal: New goal  Previous Goal Met: New Goal    Nutrition Monitoring and Evaluation:   Behavioral-Environmental Outcomes: None Identified  Food/Nutrient Intake Outcomes: Food and Nutrient Intake  Physical Signs/Symptoms Outcomes: Biochemical Data, Nutrition Focused Physical Findings, Chewing or Swallowing, Weight, GI Status, Fluid Status or Edema    Discharge Planning:    No discharge needs 
Nutrition Assessment     Type and Reason for Visit: Reassess    Nutrition Recommendations/Plan:   Continue diet as tolerated.     Malnutrition Assessment:  Malnutrition Status: No malnutrition    Nutrition Assessment:  Follow up. s/p trach revision. Per RN, pt has a good appetite and eating well at meals. Continues to have excellent % intake at meals this admission. Weights reviewed. No acute nutrition issues at this time.    Estimated Daily Nutrient Needs:  Energy (kcal):  0143-7971 kcal/d Weight Used for Energy Requirements: Current     Protein (g):  115-125 gm/d Weight Used for Protein Requirements: Ideal        Fluid (ml/day):  per MD Method Used for Fluid Requirements: Defer to provider    Nutrition Related Findings:   Meds/labs reviewed. LBM 5/13. Wound Type: Pressure Injury (PI to throat per chart)    Current Nutrition Therapies:    ADULT DIET; Regular; 4 carb choices (60 gm/meal)    Anthropometric Measures:  Height: 165.1 cm (5' 5\")  Current Body Wt: 156.7 kg (345 lb 7.4 oz)   BMI: 57.5    Nutrition Diagnosis:   No nutrition diagnosis at this time    Nutrition Interventions:   Food and/or Nutrient Delivery: Continue Current Diet  Nutrition Education/Counseling: No recommendation at this time  Coordination of Nutrition Care: Continue to monitor while inpatient  Plan of Care discussed with: RN    Goals:  Goals: Maintain adequate nutrition status, prior to discharge  Type of Goal: Continue current goal  Previous Goal Met: Goal(s) Achieved    Nutrition Monitoring and Evaluation:   Behavioral-Environmental Outcomes: None Identified  Food/Nutrient Intake Outcomes: Food and Nutrient Intake  Physical Signs/Symptoms Outcomes: Biochemical Data, Nutrition Focused Physical Findings, Chewing or Swallowing, Weight, GI Status, Fluid Status or Edema    Discharge Planning:    No discharge needs at this time     Janina Meyer RD  Contact: [phone]    
Occupational Therapy    [facility]  Occupational Therapy Not Seen Note    DATE: 5/3/2025    NAME: Shazia Nuñez  MRN: [id number]   : 1955      Patient not seen this date for Occupational Therapy due to:    Patient Declined: Pt declined d/t tired/fatigued and attempting to sleep. Pt educated on benefit of participation with Pt agreeable to attempt tomorrow. Will continue to follow.     Next Scheduled Treatment: 25    Electronically signed by JAVIER CRAVEN on 5/3/2025 at 2:44 PM    
Occupational Therapy    [facility]  Occupational Therapy Not Seen Note    DATE: 2025    NAME: Shazia Nuñez  MRN: [id number]   : 1955      Patient not seen this date for Occupational Therapy due to:    Patient is not appropriate for active participation in EOB/OOB OT evaluation/treatment at this time.      Electronically signed by Desiree Mendoza OT on 2025 at 3:31 PM    
Occupational Therapy    [facility]  Occupational Therapy Not Seen Note    DATE: 2025    NAME: Shazia Nuñez  MRN: [id number]   : 1955      Patient not seen this date for Occupational Therapy due to: Pt states \"I'm not in the mood\"    Next Scheduled Treatment: 5/15/25    Electronically signed by JIMMY BOONE on 2025 at 10:42 AM   
Occupational Therapy    [facility]  Occupational Therapy Not Seen Note    DATE: 2025    NAME: Shazia Nuñez  MRN: [id number]   : 1955      Patient not seen this date for Occupational Therapy due to:    Patient Declined: adamantly refused, RN aware    Next Scheduled Treatment: will check back at a later date    Electronically signed by DOMENICA Mcqueen on 2025 at 9:55 AM   
Occupational Therapy    [facility]  Occupational Therapy Not Seen Note    DATE: 2025    NAME: Shazia Nuñez  MRN: [id number]   : 1955      Patient not seen this date for Occupational Therapy due to:    Patient Declined: Polite decline d/t fatigue.    Next Scheduled Treatment: Attempt on  as appropriate.    Electronically signed by Rai Marshall OT on 2025 at 11:19 AM    
Occupational Therapy    [facility]  Occupational Therapy Not Seen Note    DATE: 2025    NAME: Shazia Nuñez  MRN: [id number]   : 1955      Patient not seen this date for Occupational Therapy due to:    Patient Declined: pt declined EOB and OOB activities d/t pain despite education and encouragement.    Next Scheduled Treatment: 25    Electronically signed by JAVIER Roldan on 2025 at 11:58 AM    
[facility]  Office: [phone]  Carlos Caro DO, Felipe Drew DO, Jerman Mack DO, Leonel Thomas, DO, Larry Pisano MD, Monserrat Rosado MD, Sharon Perez MD, Beverley Curtis MD,  Jimi Messina MD, Kevin Cowan MD, Theresa Chavira MD,  Luis Coffman DO, Florencia Hernandez MD, Giorgi Salas MD, Ramone Caro DO, Mariel Bradshaw MD,  Bandar Anderson DO, Mireille Hermosillo MD, Yamileth Chong MD, Zo Montgomery MD,  Jordan Hernandez MD, Pilar Salcedo MD, Kevin Greenwood MD, Kevin Landon MD, Ramon Ellsworth MD, Ken Luna MD, Chemo Morton DO, Mirian Merida MD, Alvaro Reid MD, Luis Caicedo MD, Mohsin Reza, MD, Lynn Cardoza MD, Shirley Waterhouse, CNP,  Octavia Coreas, CNP, Chemo Pelayo, CNP,  Юлия Calero, DNP, Kaci Rodgers, CNP, Princess Mandujano, CNP, Madelaine Burns, CNP, Ivone Mancia, CNP, Val Hanson, PA-C, Isabel Bustos, CNP, Karma Yost, CNP,  Nasrin Pritchard, CNP, Bebe Mckeon, CNP, Blayne Evangelista, PA-C, Rosio Resendez, CNP,  Sandie Palmer, CNS, Jessi Montiel, CNP, Dolly Tamez, CNP,   Lea Johnson, CNP         [facility]   IN-PATIENT SERVICE   [facility]    Progress Note    5/2/2025    12:48 PM    Name:   Shazia Nuñez  MRN:     [id number]     Acct:      [id number]   Room:   0408/0408-01  IP Day:  4  Admit Date:  4/28/2025  9:44 AM    PCP:   [facility]  Code Status:  Full Code    Subjective:     C/C:   Chief Complaint   Patient presents with    Respiratory Distress     Interval History Status: improved.     Patient was seen and examined, she was seen in no acute distress, feeling better  Pain control.  With Tylenol  Lab vital sign consultant notes reviewed  Brief History:     69-year-old female with past medical history of PE dvt, history of tracheostomy was admitted to ICU status post trach revision with ENT, patient initially presented to [facility] for discomfort after she experienced episode of 
PT BELONGINGS SENT WITH PT TO ROOM 3018 (CELL PHONE, , GLASSES)  
PULMONARY & CRITICAL CARE MEDICINE PROGRESS NOTE     Patient:  Shazai Nuñez  MRN: [id number]  Admit date: 2025  Primary Care Physician: [facility]  Consulting Physician: Kevin Landon*  CODE Status: Full Code  LOS: 7     SUBJECTIVE     CHIEF COMPLAINT/REASON FOR INITIAL CONSULT:Respiratory Distress    BRIEF HOSPITAL COURSE:  The patient is a 69 y.o. female who arrives s/p trach revision with ENT due to false passage. 6-0 XLT shiley was placed. Patient was kept NPO and not on any sedation. Afterwards post procedure, patient passed FL esophagogram. Hemodynamically stable, transfer to stepdown    INTERVAL HISTORY:  25  Patient remained afebrile overnight.  Hemodynamically stable.  No acute hypoxic events were reported.  Secretions around the tracheostomy tube are smaller dry and mild erythema present around the site.  No bleeding from trach site.    Recent Labs     25  0532   WBC 4.7     In: 700   Out: 2100 [Urine:2100]    REVIEW OF SYSTEMS: Tracheostomy not communicative but able to write  Review of Systems   Constitutional:  Negative for appetite change, fatigue and fever.   Respiratory:  Negative for cough, chest tightness and shortness of breath.    Cardiovascular:  Negative for chest pain.   Gastrointestinal:  Negative for abdominal pain, constipation, diarrhea, nausea and vomiting.   Neurological:  Negative for weakness, light-headedness and headaches.     OBJECTIVE     VITAL SIGNS:   LAST:  /60   Pulse 79   Temp 98.8 °F (37.1 °C) (Oral)   Resp 17   Ht 1.651 m (5' 5\")   Wt (!) 164.2 kg (361 lb 15.9 oz)   LMP 2014   SpO2 98%   BMI 60.24 kg/m²   8-24 HR RANGE:  TEMP Temp  Av.9 °F (37.2 °C)  Min: 98.4 °F (36.9 °C)  Max: 99.2 °F (37.3 °C)   BP Systolic (24hrs), Av , Min:126 , Max:134      Diastolic (24hrs), Av, Min:46, Max:60     PULSE Pulse  Av.6  Min: 66  Max: 80   RR Resp  Avg: 15.7  Min: 15  Max: 17   O2 SAT SpO2  Avg: 
PULMONARY & CRITICAL CARE MEDICINE PROGRESS NOTE     Patient:  Shazia Nuñez  MRN: [id number]  Admit date: 2025  Primary Care Physician: [facility]  Consulting Physician: Florencia Hernandez MD  CODE Status: Full Code  LOS: 9     SUBJECTIVE     CHIEF COMPLAINT/REASON FOR INITIAL CONSULT:Respiratory Distress    BRIEF HOSPITAL COURSE:  The patient is a 69 y.o. female who arrives s/p trach revision with ENT due to false passage. 6-0 XLT shiley was placed. Patient was kept NPO and not on any sedation. Afterwards post procedure, patient passed FL esophagogram. Hemodynamically stable, transfer to stepdown    INTERVAL HISTORY:  25    Patient remained afebrile overnight.  Hemodynamically stable.  No acute hypoxic events were reported.  Secretions around the tracheostomy tube are smaller dry and mild erythema present around the site.  No bleeding from trach site.    Recent Labs     25  0622   WBC 4.8     In: 1700   Out: 1200 [Urine:1200]      OBJECTIVE     VITAL SIGNS:   LAST:  BP (!) 122/40   Pulse 72   Temp 97.9 °F (36.6 °C) (Oral)   Resp 13   Ht 1.651 m (5' 5\")   Wt (!) 161.4 kg (355 lb 13.2 oz)   LMP 2014   SpO2 100%   BMI 59.21 kg/m²   8-24 HR RANGE:  TEMP Temp  Av °F (36.7 °C)  Min: 97.7 °F (36.5 °C)  Max: 98.4 °F (36.9 °C)   BP Systolic (24hrs), Av , Min:114 , Max:140      Diastolic (24hrs), Av, Min:40, Max:50     PULSE Pulse  Av.3  Min: 70  Max: 84   RR Resp  Av.8  Min: 12  Max: 19   O2 SAT SpO2  Av.3 %  Min: 98 %  Max: 100 %   OXYGEN DELIVERY No data recorded         PHYSICAL EXAM:  Physical Exam  Vitals and nursing note reviewed.   Constitutional:       General: She is awake.   Neck:      Trachea: Tracheostomy present. No abnormal tracheal secretions.   Cardiovascular:      Heart sounds: Normal heart sounds.   Pulmonary:      Effort: Pulmonary effort is normal.      Breath sounds: Normal breath sounds.   Chest:      Chest wall: No 
PULMONARY & CRITICAL CARE MEDICINE PROGRESS NOTE     Patient:  Shazia Nuñez  MRN: [id number]  Admit date: 2025  Primary Care Physician: [facility]  Consulting Physician: Bandar Anderson DO  CODE Status: Full Code  LOS: 15     SUBJECTIVE     CHIEF COMPLAINT/REASON FOR INITIAL CONSULT:Respiratory Distress    BRIEF HOSPITAL COURSE:  The patient is a 69 y.o. female who arrives s/p trach revision with ENT due to false passage. 6-0 XLT shiley was placed. Patient was kept NPO and not on any sedation. Afterwards post procedure, patient passed FL esophagogram. Hemodynamically stable, transfer to stepdown    INTERVAL HISTORY:  25  Hemodynamically stable.  Afebrile overnight.  No acute hypoxic events were reported.  Patient was arousable follows command awake look comfortable on ventilator not in distress.  On mechanical ventilation via tracheostomy.  On mechanical ventilator CPAP/pressure support 8/8/35% day and night  Status post trach exchange, trach was upsized to #7 XLT proximal cuffed trach on 2020.    No bleeding reported from the tracheostomy tracheostomy site look dry without drainage    OBJECTIVE     VITAL SIGNS:   LAST:  BP (!) 93/44   Pulse 62   Temp 97.9 °F (36.6 °C) (Oral)   Resp 17   Ht 1.651 m (5' 5\")   Wt (!) 156.8 kg (345 lb 10.9 oz)   LMP 2014   SpO2 98%   BMI 57.52 kg/m²   8-24 HR RANGE:  TEMP Temp  Av.6 °F (37 °C)  Min: 97.9 °F (36.6 °C)  Max: 99.1 °F (37.3 °C)   BP Systolic (24hrs), Av , Min:93 , Max:127      Diastolic (24hrs), Av, Min:44, Max:55     PULSE Pulse  Av.9  Min: 62  Max: 87   RR Resp  Avg: 15.8  Min: 13  Max: 18   O2 SAT SpO2  Av.5 %  Min: 98 %  Max: 100 %   OXYGEN DELIVERY No data recorded         PHYSICAL EXAM:  Physical Exam  Vitals and nursing note reviewed.   Constitutional:       General: She is awake.   Neck:      Trachea: Tracheostomy present. No abnormal tracheal secretions.   Cardiovascular:      Heart 
PULMONARY & CRITICAL CARE MEDICINE PROGRESS NOTE     Patient:  Shazia Nuñez  MRN: [id number]  Admit date: 2025  Primary Care Physician: [facility]  Consulting Physician: Kevin Landon*  CODE Status: Full Code  LOS: 6     SUBJECTIVE     CHIEF COMPLAINT/REASON FOR INITIAL CONSULT:Respiratory Distress    BRIEF HOSPITAL COURSE:  The patient is a 69 y.o. female who arrives s/p trach revision with ENT due to false passage. 6-0 XLT shiley was placed. Patient was kept NPO and not on any sedation. Afterwards post procedure, patient passed FL esophagogram. Hemodynamically stable, transfer to stepdown    INTERVAL HISTORY:  25  Patient remained afebrile overnight.  Hemodynamically stable.  No acute hypoxic events were reported.  Patient remained on ventilator with CPAP/pressure support around-the-clock.  Secretions around the tracheostomy tube are smaller dry and mild erythema present around the site.  No bleeding from trach site.  On ventilator CPAP/pressure support 8/8/30% tidal volumes around 500-600 and respiratory rate is 16-20.    Recent Labs     25  0532   WBC 4.7     In: 920   Out: 2350 [Urine:2350]    REVIEW OF SYSTEMS: Tracheostomy not communicative but able to write  Review of Systems   Constitutional:  Negative for appetite change, fatigue and fever.   Respiratory:  Negative for cough, chest tightness and shortness of breath.    Cardiovascular:  Negative for chest pain.   Gastrointestinal:  Negative for abdominal pain, constipation, diarrhea, nausea and vomiting.   Neurological:  Negative for weakness, light-headedness and headaches.     OBJECTIVE     VITAL SIGNS:   LAST:  BP (!) 132/47   Pulse 76   Temp 98.4 °F (36.9 °C) (Oral)   Resp 17   Ht 1.651 m (5' 5\")   Wt (!) 165.1 kg (363 lb 15.7 oz)   LMP 2014   SpO2 99%   BMI 60.57 kg/m²   8-24 HR RANGE:  TEMP Temp  Av.4 °F (36.9 °C)  Min: 98.1 °F (36.7 °C)  Max: 98.6 °F (37 °C)   BP Systolic 
PULMONARY & CRITICAL CARE MEDICINE PROGRESS NOTE     Patient:  Shazia Nuñez  MRN: [id number]  Admit date: 2025  Primary Care Physician: [facility]  Consulting Physician: Florencia Hernandez MD  CODE Status: Full Code  LOS: 12     SUBJECTIVE     CHIEF COMPLAINT/REASON FOR INITIAL CONSULT:Respiratory Distress    BRIEF HOSPITAL COURSE:  The patient is a 69 y.o. female who arrives s/p trach revision with ENT due to false passage. 6-0 XLT shiley was placed. Patient was kept NPO and not on any sedation. Afterwards post procedure, patient passed FL esophagogram. Hemodynamically stable, transfer to stepdown    INTERVAL HISTORY:  05/10/25    Patient remained afebrile overnight.  Hemodynamically stable.  No acute hypoxic events were reported.  No bleeding from trach site.  Patient has no concerns and complaints.    Recent Labs     25  0521   WBC 4.8     In: 702   Out: 2000 [Urine:2000]      OBJECTIVE     VITAL SIGNS:   LAST:  /63   Pulse 80   Temp 98.9 °F (37.2 °C) (Oral)   Resp 20   Ht 1.651 m (5' 5\")   Wt (!) 160.3 kg (353 lb 6.4 oz)   LMP 2014   SpO2 100%   BMI 58.81 kg/m²   8-24 HR RANGE:  TEMP Temp  Av.8 °F (37.1 °C)  Min: 98.5 °F (36.9 °C)  Max: 98.9 °F (37.2 °C)   BP Systolic (24hrs), Av , Min:103 , Max:123      Diastolic (24hrs), Av, Min:43, Max:63     PULSE Pulse  Av.5  Min: 66  Max: 85   RR Resp  Av  Min: 15  Max: 20   O2 SAT SpO2  Av.5 %  Min: 99 %  Max: 100 %   OXYGEN DELIVERY No data recorded         PHYSICAL EXAM:  Physical Exam  Vitals and nursing note reviewed.   Constitutional:       General: She is awake.   Neck:      Trachea: Tracheostomy present. No abnormal tracheal secretions.   Cardiovascular:      Heart sounds: Normal heart sounds.   Pulmonary:      Effort: Pulmonary effort is normal.      Breath sounds: Normal breath sounds.   Chest:      Chest wall: No tenderness.   Abdominal:      Palpations: Abdomen is soft.      
PULMONARY & CRITICAL CARE MEDICINE PROGRESS NOTE     Patient:  Shazia Nuñez  MRN: [id number]  Admit date: 2025  Primary Care Physician: [facility]  Consulting Physician: Florencia Hernandez MD  CODE Status: Full Code  LOS: 8     SUBJECTIVE     CHIEF COMPLAINT/REASON FOR INITIAL CONSULT:Respiratory Distress    BRIEF HOSPITAL COURSE:  The patient is a 69 y.o. female who arrives s/p trach revision with ENT due to false passage. 6-0 XLT shiley was placed. Patient was kept NPO and not on any sedation. Afterwards post procedure, patient passed FL esophagogram. Hemodynamically stable, transfer to stepdown    INTERVAL HISTORY:  25    Patient remained afebrile overnight.  Hemodynamically stable.  No acute hypoxic events were reported.  Secretions around the tracheostomy tube are smaller dry and mild erythema present around the site.  No bleeding from trach site.    Recent Labs     25  0532   WBC 4.7     In: 780   Out: 1600 [Urine:1600]    REVIEW OF SYSTEMS: Tracheostomy not communicative but able to write  Review of Systems   Constitutional:  Negative for appetite change, fatigue and fever.   Respiratory:  Negative for cough, chest tightness and shortness of breath.    Cardiovascular:  Negative for chest pain.   Gastrointestinal:  Negative for abdominal pain, constipation, diarrhea, nausea and vomiting.   Neurological:  Negative for weakness, light-headedness and headaches.     OBJECTIVE     VITAL SIGNS:   LAST:  BP (!) 140/95   Pulse 75   Temp 98.8 °F (37.1 °C) (Oral)   Resp 17   Ht 1.651 m (5' 5\")   Wt (!) 164.1 kg (361 lb 12.4 oz)   LMP 2014   SpO2 99%   BMI 60.20 kg/m²   8-24 HR RANGE:  TEMP Temp  Av.9 °F (37.2 °C)  Min: 98.8 °F (37.1 °C)  Max: 99 °F (37.2 °C)   BP Systolic (24hrs), Av , Min:117 , Max:140      Diastolic (24hrs), Av, Min:45, Max:95     PULSE Pulse  Av.2  Min: 63  Max: 79   RR Resp  Avg: 15.7  Min: 13  Max: 17   O2 SAT SpO2  Av.3 %  
PULMONARY & CRITICAL CARE MEDICINE PROGRESS NOTE     Patient:  Shazia Nuñez  MRN: [id number]  Admit date: 2025  Primary Care Physician: [facility]  Consulting Physician: Kevin Landon*  CODE Status: Full Code  LOS: 4     SUBJECTIVE     CHIEF COMPLAINT/REASON FOR INITIAL CONSULT:Respiratory Distress    BRIEF HOSPITAL COURSE:  The patient is a 69 y.o. female who arrives s/p trach revision with ENT due to false passage. 6-0 XLT shiley was placed. Patient was kept NPO and not on any sedation. Afterwards post procedure, patient passed FL esophagogram. Hemodynamically stable, transfer to stepdown    INTERVAL HISTORY:  25  Patient remained afebrile overnight.  Hemodynamically stable.  No bleeding from trach site.  Continue tracheostomy care.     Recent Labs     25  1021   WBC 4.8     No intake/output data recorded.    REVIEW OF SYSTEMS:  Review of Systems   Constitutional:  Negative for appetite change, fatigue and fever.   Respiratory:  Negative for cough, chest tightness and shortness of breath.    Cardiovascular:  Negative for chest pain.   Gastrointestinal:  Negative for abdominal pain, constipation, diarrhea, nausea and vomiting.   Neurological:  Negative for weakness, light-headedness and headaches.     OBJECTIVE     VITAL SIGNS:   LAST:  BP (!) 131/47   Pulse 66   Temp 98.1 °F (36.7 °C) (Oral)   Resp 21   Ht 1.651 m (5' 5\")   Wt (!) 168.3 kg (371 lb 0.6 oz)   LMP 2014   SpO2 99%   BMI 61.74 kg/m²   8-24 HR RANGE:  TEMP Temp  Av.9 °F (36.6 °C)  Min: 97 °F (36.1 °C)  Max: 98.4 °F (36.9 °C)   BP Systolic (24hrs), Av , Min:121 , Max:142      Diastolic (24hrs), Av, Min:46, Max:58     PULSE Pulse  Av.7  Min: 64  Max: 76   RR Resp  Av.3  Min: 15  Max: 21   O2 SAT SpO2  Av.8 %  Min: 98 %  Max: 99 %   OXYGEN DELIVERY No data recorded         PHYSICAL EXAM:  Physical Exam  Vitals and nursing note reviewed.   Constitutional:       
PULMONARY & CRITICAL CARE MEDICINE PROGRESS NOTE     Patient:  Shazia Nuñez  MRN: [id number]  Admit date: 2025  Primary Care Physician: [facility]  Consulting Physician: Florencia Hernandez MD  CODE Status: Full Code  LOS: 10     SUBJECTIVE     CHIEF COMPLAINT/REASON FOR INITIAL CONSULT:Respiratory Distress    BRIEF HOSPITAL COURSE:  The patient is a 69 y.o. female who arrives s/p trach revision with ENT due to false passage. 6-0 XLT shiley was placed. Patient was kept NPO and not on any sedation. Afterwards post procedure, patient passed FL esophagogram. Hemodynamically stable, transfer to stepdown    INTERVAL HISTORY:  25    Patient remained afebrile overnight.  Hemodynamically stable.  No acute hypoxic events were reported.  No bleeding from trach site.  Patient has no concerns and complaints.    Recent Labs     25  0622   WBC 4.8     In: 970   Out: 1100 [Urine:1100]      OBJECTIVE     VITAL SIGNS:   LAST:  BP (!) 117/42   Pulse 73   Temp 98.4 °F (36.9 °C) (Oral)   Resp 13   Ht 1.651 m (5' 5\")   Wt (!) 161.4 kg (355 lb 13.2 oz)   LMP 2014   SpO2 100%   BMI 59.21 kg/m²   8-24 HR RANGE:  TEMP Temp  Av.4 °F (36.9 °C)  Min: 97.9 °F (36.6 °C)  Max: 98.8 °F (37.1 °C)   BP Systolic (24hrs), Av , Min:107 , Max:130      Diastolic (24hrs), Av, Min:40, Max:53     PULSE Pulse  Av.8  Min: 67  Max: 88   RR Resp  Avg: 15.8  Min: 13  Max: 18   O2 SAT SpO2  Av %  Min: 100 %  Max: 100 %   OXYGEN DELIVERY No data recorded         PHYSICAL EXAM:  Physical Exam  Vitals and nursing note reviewed.   Constitutional:       General: She is awake.   Neck:      Trachea: Tracheostomy present. No abnormal tracheal secretions.   Cardiovascular:      Heart sounds: Normal heart sounds.   Pulmonary:      Effort: Pulmonary effort is normal.      Breath sounds: Normal breath sounds.   Chest:      Chest wall: No tenderness.   Abdominal:      Palpations: Abdomen is soft.      
PULMONARY & CRITICAL CARE MEDICINE PROGRESS NOTE     Patient:  Shazia Nuñez  MRN: [id number]  Admit date: 2025  Primary Care Physician: [facility]  Consulting Physician: Florencia Hernandez MD  CODE Status: Full Code  LOS: 14     SUBJECTIVE     CHIEF COMPLAINT/REASON FOR INITIAL CONSULT:Respiratory Distress    BRIEF HOSPITAL COURSE:  The patient is a 69 y.o. female who arrives s/p trach revision with ENT due to false passage. 6-0 XLT shiley was placed. Patient was kept NPO and not on any sedation. Afterwards post procedure, patient passed FL esophagogram. Hemodynamically stable, transfer to stepdown    INTERVAL HISTORY:  25  Hemodynamically stable  On mechanical ventilation via tracheostomy  Status post trach exchange, trach was upsized to #7 XLT proximal cuffed trach.  No acute pathology seen on tracheostomy site.    OBJECTIVE     VITAL SIGNS:   LAST:  BP (!) 117/39   Pulse 86   Temp 99.7 °F (37.6 °C) (Oral)   Resp 20   Ht 1.651 m (5' 5\")   Wt (!) 156.9 kg (345 lb 14.4 oz)   LMP 2014   SpO2 100%   BMI 57.56 kg/m²   8-24 HR RANGE:  TEMP Temp  Av.9 °F (37.2 °C)  Min: 97.9 °F (36.6 °C)  Max: 99.9 °F (37.7 °C)   BP Systolic (24hrs), Av , Min:103 , Max:143      Diastolic (24hrs), Av, Min:35, Max:114     PULSE Pulse  Av.7  Min: 72  Max: 103   RR Resp  Av.7  Min: 15  Max: 20   O2 SAT SpO2  Av.7 %  Min: 98 %  Max: 100 %   OXYGEN DELIVERY No data recorded         PHYSICAL EXAM:  Physical Exam  Vitals and nursing note reviewed.   Constitutional:       General: She is awake.   Neck:      Trachea: Tracheostomy present. No abnormal tracheal secretions.   Cardiovascular:      Heart sounds: Normal heart sounds.   Pulmonary:      Effort: Pulmonary effort is normal.      Breath sounds: Normal breath sounds.   Chest:      Chest wall: No tenderness.   Abdominal:      Palpations: Abdomen is soft.      Tenderness: There is no abdominal tenderness. 
Patient has refused trach care, suctioning, and oral care at this time. Benefits of care explained.  
Phoned brother Álvaro Savannah- medical POA at the request of patient and updated on plan of care from ENT perspective.  Removing sutures from trach buy ENT on 5/9/25 and trach change in OR by ENT the following week.  States understanding and would like a call back when set time and date for OR so he can have meeting with [facility] to plan for transfer back out to them.  
Physical Therapy        Physical Therapy Cancel Note      DATE: 2025    NAME: Shazia Nuñez  MRN: [id number]   : 1955      Patient not seen this date for Physical Therapy due to:    Patient Declined: Patient declined treatment this date. Therapist provided encouragement, however, patient politely declined. Patient with reports of going to OR tomorrow. RN notified.    Will check back as able / appropriate.     Electronically signed by Bianca Griggs PTA on 2025 at 10:03 AM      
Physical Therapy        Physical Therapy Cancel Note      DATE: 2025    NAME: Shazia Nuñez  MRN: [id number]   : 1955      Patient not seen this date for Physical Therapy due to:    Patient Declined: EOB/ OOB d/t concern with pain at trach site/ neck with recent procedure. Pt edu on AAROM knee flexion with sheet assisting d/t pt request. All questions answered. Pt agreeable to ck tomorrow for possible EOB/ OOB.       Electronically signed by Estevan Daily PT on 2025 at 11:47 AM      
Physical Therapy        Physical Therapy Cancel Note      DATE: 2025    NAME: Shazia Nuñez  MRN: [id number]   : 1955      Patient not seen this date for Physical Therapy due to:    Patient Declined: adamantly refused, RN aware       Electronically signed by Alexa Xiong PTA on 2025 at 4:01 PM   
Physical Therapy        Physical Therapy Cancel Note      DATE: 2025    NAME: Shazia Nuñez  MRN: [id number]   : 1955      Patient not seen this date for Physical Therapy due to:    Declines mobility and physical therapy.      Electronically signed by Zoila Stacy PT on 2025 at 10:36 AM      
Physical Therapy        Physical Therapy Cancel Note      DATE: 5/10/2025    NAME: Shazia Nuñez  MRN: [id number]   : 1955      Patient not seen this date for Physical Therapy due to:    Patient Declined: pt encouraged to participate with therapy however defer d/t reports of fatigue. RN aware.      Electronically signed by SYEDA VALENTIN PTA on 5/10/2025 at 12:16 PM      
Physical Therapy        Physical Therapy Cancel Note      DATE: 5/3/2025    NAME: Shazia Nuñez  MRN: [id number]   : 1955      Patient not seen this date for Physical Therapy due to:    Declines physical therapy eval.  Sutures too sore.      Electronically signed by Zoila Stacy PT on 5/3/2025 at 12:24 PM      
Physical Therapy  Facility/Department: [address] STEPDOWN   Physical Therapy Initial Evaluation    Patient Name: Shazia Nuñez        MRN: [id number]    : 1955    Date of Service: 2025    Chief Complaint   Patient presents with    Respiratory Distress     Past Medical History:  has a past medical history of Anxiety, Asthma, Atrial fibrillation (HCC), Bronchitis, COPD (chronic obstructive pulmonary disease) (HCC), DDD (degenerative disc disease), lumbar, Depression, Diabetes mellitus (HCC), Elevated glucose, Headache(784.0), Hernia of abdominal cavity, HH (hiatus hernia), Hyperlipemia, mixed, Hypertension, Osteoarthritis, Right femoral vein DVT (HCC), and Uterine hyperplasia.  Past Surgical History:  has a past surgical history that includes Dilation and curettage of uterus (10/08 and ); Breast reduction surgery (1997); Breast reduction surgery (1997); Tympanostomy tube placement (1967); Tonsillectomy and adenoidectomy (1962); Hysterectomy (2015); tracheostomy (N/A, 2024); tracheostomy (N/A, 2024); Esophagogastroduodenoscopy (2025); Upper gastrointestinal endoscopy (N/A, 3/13/2025); Colonoscopy (N/A, 3/13/2025); and Tracheal surgery (N/A, 2025).    Discharge Recommendations  Discharge Recommendations: Patient would benefit from continued therapy after discharge  PT Equipment Recommendations  Equipment Needed: No    Assessment  Body Structures, Functions, Activity Limitations Requiring Skilled Therapeutic Intervention: Decreased functional mobility , Decreased tolerance to work activity, Decreased strength, Decreased endurance, Decreased balance, Increased pain  Assessment: Pt presents with baseline debility, genearalized weakness  and is non ambulatory, pt require max assist for bed mobilty, dependent for transfers and is non ambulatory Pt will continue to benefit from PT intervention  to progress functional abilty and mobility, improved sitting 
[facility]  Office: [phone]  Carlos Caro DO, Felipe Drew DO, Jerman Mack DO, Leonel Thomas, DO, Larry Pisano MD, Monserrat Rosado MD, Sharon Perez MD, Beverley Curtis MD,  Jimi Messina MD, Kevin Cowan MD, Theresa Chavira MD,  Luis Coffman DO, Florencia Hernandez MD, Giorgi Salas MD, Ramone Caro DO, Mariel Bradshaw MD,  Bandar Anderson DO, Mireille Hermosillo MD, Yamileth Chong MD, Zo Montgomery MD,  Jordan Hernandez MD, Pilar Salcedo MD, Kevin Greenwood MD, Kevin Landon MD, Ramon Ellsworth MD, Ken Luna MD, Chemo Morton DO, Mirian Merida MD, Alvaro Reid MD, Luis Caicedo MD, Mohsin Reza, MD, Lynn Cardoza MD, Shirley Waterhouse, CNP,  Octavia Coreas, CNP, Chemo Pelayo, CNP,  Юлия Calero, DNP, Kaci Rodgers, CNP, Princess Mandujano, CNP, Madelaine Burns, CNP, Ivone Mancia, CNP, Val Hanson, PA-C, Isabel Bustos, CNP, Karma Yost, CNP,  Nasrin Pritchard, CNP, Bebe Mckeon, CNP, Blayne Evangelista, PA-C, Rosio Resendez, CNP,  Sandie Palmer, CNS, Jessi Montiel, CNP, Dolly Tamez, CNP,   Lea Johnson, CNP         [facility]   IN-PATIENT SERVICE   [facility]    Progress Note    5/3/2025    11:36 AM    Name:   Shazia Nuñez  MRN:     [id number]     Acct:      [id number]   Room:   0408/0408-01  IP Day:  5  Admit Date:  4/28/2025  9:44 AM    PCP:   [facility]  Code Status:  Full Code    Subjective:     C/C:   Chief Complaint   Patient presents with    Respiratory Distress     Interval History Status: improved.     Patient was seen and examined, she was seen in no acute distress  Pain control.  With Tylenol  Lab vital sign consultant notes reviewed  Discussed with RN  Brief History:     69-year-old female with past medical history of PE dvt, history of tracheostomy was admitted to ICU status post trach revision with ENT, patient initially presented to [facility] for discomfort after she experienced episode of 
Placed sureprep spray on site and optifoam AG under flange. Once order was placed.  
Problem: OXYGENATION/RESPIRATORY FUNCTION  Goal: Patient will maintain patent airway  Outcome: Ongoing  Goal: Patient will achieve/maintain normal respiratory rate/effort  Respiratory rate and effort will be within normal limits for the patient  Outcome: Ongoing    Problem: MECHANICAL VENTILATION  Goal: Patient will maintain patent airway  Outcome: Ongoing  Goal: Oral health is maintained or improved  Outcome: Ongoing  Goal: ET tube will be managed safely  Outcome: Ongoing  Goal: Ability to express needs and understand communication  Outcome: Ongoing  Goal: Mobility/activity is maintained at optimum level for patient  Outcome: Ongoing    Problem: ASPIRATION PRECAUTIONS  Goal: Patient’s risk of aspiration is minimized  Outcome: Ongoing    Problem: SKIN INTEGRITY  Goal: Skin integrity is maintained or improved  Outcome: Ongoing                    
[facility]  Office: [phone]  Carlos Caro DO, Felipe Drew DO, Jerman Mack DO, Leonel Thomas, DO, Larry Pisano MD, Monserrat Rosado MD, Sharon Perez MD, Beverley Curtis MD,  Jimi Messina MD, Kevin Cowan MD, Theresa Chavira MD,  Luis Coffman DO, Florencia Hernandez MD, Giorgi Salas MD, Ramone Caro DO, Mariel Bradshaw MD,  Bandar Anderson DO, Mireille Hermosillo MD, Yamileth Chong MD, Zo Montgomery MD,  Jordan Hernandez MD, Pilar Salcedo MD, Kevin Greenwood MD, Kevin Landon MD, Ramon Ellsworth MD, Ken Luna MD, Chemo Morton DO, Mirian Merida MD, Alvaro Reid MD, Luis Caicedo MD, Mohsin Reza, MD, Lynn Cardoza MD, Shirley Waterhouse, CNP,  Octavia Coreas, CNP, Chemo Pelayo, CNP,  Юлия Calero, DNP, Kaci Rodgers, CNP, Princess Mandujano, CNP, Madelaine Burns, CNP, Ivone Mancia, CNP, Val Hanson, PA-C, Isabel Bustos, CNP, Karma Yost, CNP,  Nasrin Pritchard, CNP, Bebe Mckeon, CNP, Blayne Evangelista, PA-C, Rosio Resendez, CNP,  Sandie Palmer, CNS, Jessi Montiel, CNP, Dolly Tamez, CNP,   Lea Johnson, CNP         [facility]   IN-PATIENT SERVICE   [facility]    Progress Note    5/12/2025    1:19 PM    Name:   Shazia Nuñez  MRN:     [id number]     Acct:      [id number]   Room:   0408/0408-01  IP Day:  14  Admit Date:  4/28/2025  9:44 AM    PCP:   [facility]  Code Status:  Full Code    Subjective:     C/C:   Chief Complaint   Patient presents with    Respiratory Distress     Interval History Status: not changed.     Patient seen at bedside.    Patient is back from work.  Her trach was changed today.  No acute complaints.  She has some clear phlegm.  Pain is adequately controlled.  Blood pressure stable.  Glucose 144.  Brief History:     69-year-old female with past medical history of PE dvt, history of tracheostomy was admitted to ICU status post trach revision with ENT, [facility]  Office: [phone]  Carlos Caro DO, Felipe Drew DO, Jerman Mack DO, Leonel Thomas, DO, Larry Pisano MD, Monserrat Rosado MD, Sharon Perez MD, Beverley Curtis MD,  Jimi Messina MD, Kevin Cowan MD, Theresa Chavira MD,  Luis Coffman DO, Florencia Hernandez MD, Giorgi Salas MD, Ramone Caro DO, Mariel Bradshaw MD,  Bandar Anderson DO, Mireille Hermosillo MD, Yamileth Chong MD, Zo Montgomery MD,  Jordan Hernandez MD, Pilar Salcedo MD, Kevin Greenwood MD, Kevin Landon MD, Ramon Ellsworth MD, Ken Luna MD, Chemo Morton DO, Mirian Merida MD, Alvaro Reid MD, uLis Caicedo MD, Mohsin Reza, MD, Lynn Cardoza MD, Shirley Waterhouse, CNP,  Octavia Coreas, CNP, Chemo Pelayo, CNP,  Юлия Calero, DNP, Kaci Rodgers, CNP, Princess Mandujano, CNP, Madelaine Burns, CNP, Ivone Mancia, CNP, Val Hanson, PA-C, Isabel Bustos, CNP, Karma Yost, CNP,  Nasrin Pritchard, CNP, Bebe Mckeon, CNP, Blayne Evangelista, PA-C, Rosio Resendez, CNP,  Sandie Palmer, CNS, Jessi Montiel, CNP, Dolly Tamez, CNP,   Lea Johnson, CNP         [facility]   IN-PATIENT SERVICE   [facility]    Progress Note    5/13/2025    2:28 PM    Name:   Shazia Nuñez  MRN:     [id number]     Acct:      [id number]   Room:   0408/0408-01  IP Day:  15  Admit Date:  4/28/2025  9:44 AM    PCP:   [facility]  Code Status:  Full Code    Subjective:     C/C:   Chief Complaint   Patient presents with    Respiratory Distress     Interval History Status: not changed.     Vitals reviewed, afebrile Tmax 99.9 but intermittently softer blood pressures otherwise hemodynamically stable.  Saturating well on ventilator.  Labs reviewed, elevated BUN and creatinine 35 and 2.1 respectively but stable. No leukocytosis, hemoglobin low but stable, platelets are stable.   Overnight patient had no significant events.     On examination patient status post tracheostomy exchange. 
[facility]  Office: [phone]  Carlos Caro DO, Felipe Drew, DO, Jerman Mack DO, Leonel Thomas, DO, Larry Pisano MD, Monserrat Rosado MD, Sharon Perez MD, Beverley Curtis MD,  Jimi Messina MD, Kevin Cowan MD, Theresa Chaviar MD,  Luis Coffman DO, Florencia Hernandez MD, Giorgi Salas MD, Ramone Caro DO, Mariel Bradshaw MD,  Bandar Anderson DO, Mireille Hermosillo MD, Yamileth Chong MD, Zo Montgomery MD,  Jordan Hernandez MD, Pilar Salcedo MD, Kevin Greenwood MD, Kevin Landon MD, Ramon Ellsworth MD, Ken Luna MD, Chemo Morton DO, Mirian Merida MD, Alvaro Reid MD, Luis Caicedo MD, Mohsin Reza, MD, Lynn Cardoza MD, Shirley Waterhouse, CNP,  Octavia Coreas, CNP, Chemo Pelayo, CNP,  Юлия Calero, DNP, Kaci Rodgers, CNP, Princess Mandujano, CNP, Madelaine Burns, CNP, Ivone Mancia, CNP, Val Hanson, PA-C, Isabel Bustos, CNP, Karma Yost, CNP,  Nasrin Pritchard, CNP, Bebe Mckeon, CNP, Blayne Evangelista, PA-C, Rosio Resendez, CNP,  Sandie Palmer, CNS, Jessi Montiel, CNP, Dolly Tamez, CNP,   Lea Johnson, CNP         [facility]   IN-PATIENT SERVICE   [facility]    Progress Note    5/6/2025    3:10 PM    Name:   Shazia Nuñez  MRN:     [id number]     Acct:      [id number]   Room:   0408/0408-01  IP Day:  8  Admit Date:  4/28/2025  9:44 AM    PCP:   [facility]  Code Status:  Full Code    Subjective:     C/C:   Chief Complaint   Patient presents with    Respiratory Distress     Interval History Status: not changed.     Patient is comfortable in the bed.  No chest pain, no cough.  She does report some soreness in her throat.  Blood pressure remained stable.  She is on vent support patient glucose 145.  Brief History:     69-year-old female with past medical history of PE dvt, history of tracheostomy was admitted to ICU status post trach revision with ENT, patient initially presented to [facility] for discomfort 
[facility]  Office: [phone]  Carlos Caro DO, Felipe rDew DO, Jerman Mack DO, Leonel Thomas, DO, Larry Pisano MD, Monserrat Rosado MD, Sharon Perez MD, Beverley Curtis MD,  Jimi Messina MD, Kevin Cowan MD, Theresa Chavira MD,  Luis Coffman DO, Florencia Hernandez MD, Giorgi Salas MD, Ramone Caro DO, Mariel Bradshaw MD,  Bandar Anderson DO, Mireille Hermosillo MD, Yamileth Chong MD, Zo Montgomery MD,  Jordan Hernandez MD, Pilar Salcedo MD, Kevin Greenwood MD, Kevin Landon MD, Ramon Ellsworth MD, Ken Luna MD, Chemo Morton DO, Mirian Merida MD, Alvaro Reid MD, Luis Caicedo MD, Mohsin Reza, MD, Lynn Cardoza MD, Shirley Waterhouse, CNP,  Octavia Coreas, CNP, Chemo Pelayo, CNP,  Юлия Calero, DNP, Kaci Rodgers, CNP, Princess Mandujano, CNP, Madelaine Burns, CNP, Ivone Mancia, CNP, Val Hanson, PA-C, Isabel Bustos, CNP, Karma Yost, CNP,  Nasrin Pritchard, CNP, Bebe Mckeon, CNP, Blayne Evangelista, PA-C, Rosio Resendez, CNP,  Sandie Palmer, CNS, Jessi Montiel, CNP, Dolly Tamez, CNP,   Lea Johnson, CNP         [facility]   IN-PATIENT SERVICE   [facility]    Progress Note    5/7/2025    10:50 AM    Name:   Shazia Nuñez  MRN:     [id number]     Acct:      [id number]   Room:   0408/0408-01  IP Day:  9  Admit Date:  4/28/2025  9:44 AM    PCP:   [facility]  Code Status:  Full Code    Subjective:     C/C:   Chief Complaint   Patient presents with    Respiratory Distress     Interval History Status: not changed.     Patient seen at bedside.  Patient appeared uncomfortable and reported pain in her throat.  No chest pain.  No cough.  Plan is for suture removal on 5/9 and then trach exchange 5/12.  Blood pressure around 140 systolic.  No fever.  Creatinine is 1.8  Bicarb up to 36.  Sodium 141.  Hemoglobin 7.7.  Brief History:     69-year-old female with past medical history of PE dvt, history of tracheostomy was 
[facility]  Office: [phone]  Carlos Caro DO, Felipe Drew DO, Jerman Mack DO, Leonel Thomas, DO, Larry Pisano MD, Monserrat Rosado MD, Sharon Perez MD, Beverley Curtis MD,  Jimi Messina MD, Kevin Cowan MD, Theresa Chavira MD,  Luis Coffman DO, Florencia Hernandez MD, Giorgi Salas MD, Ramone Caro DO, Mariel Bradshaw MD,  Bandar Anderson DO, Mireille Hermosillo MD, Yamileth Chong MD, Zo Montgomery MD,  Jordan Hernandez MD, Pilar Salcedo MD, Kevin Greenwood MD, Kevin Landon MD, Ramon Ellsworth MD, Ken Luna MD, Chemo Morton DO, Mirian Merida MD, Alvaro Reid MD, Luis Caicedo MD, Mohsin Reza, MD, Lynn Cardoza MD, Shirley Waterhouse, CNP,  Octavia Coreas, CNP, Chemo Pelayo, CNP,  Юлия Calero, DNP, Kaci Rodgers, CNP, Princess Mandujano, CNP, Madelaine Burns, CNP, Ivone Mancia, CNP, Val Hanson, PA-C, Isabel Bustos, CNP, Karma Yost, CNP,  Nasrin Pritchard, CNP, Bebe Mckeon, CNP, Blayne Evangelista, PA-C, Rosio Resendez, CNP,  Sandie Palmer, CNS, Jessi Montiel, CNP, Dolly Tamez, CNP,   eLa Johnson, CNP         [facility]   IN-PATIENT SERVICE   [facility]    Progress Note    5/10/2025    1:04 PM    Name:   Shazia Nuñez  MRN:     [id number]     Acct:      [id number]   Room:   0408/0408-01  IP Day:  12  Admit Date:  4/28/2025  9:44 AM    PCP:   [facility]  Code Status:  Full Code    Subjective:     C/C:   Chief Complaint   Patient presents with    Respiratory Distress     Interval History Status: not changed.     Patient seen at bedside.    She feels better.  Patient reports of burning during urination.  She states that She has turned and cleaned appropriately.  Vitals stable  Brief History:     69-year-old female with past medical history of PE dvt, history of tracheostomy was admitted to ICU status post trach revision with ENT, patient initially presented to [facility] for discomfort after she 
[facility]  Office: [phone]  Carlos Caro DO, Fleipe Drew DO, Jerman Mack DO, Leonel Thomas, DO, Larry Pisano MD, Monserrat Rosado MD, Sharon Perez MD, Beverley Curtis MD,  Jimi Messina MD, Kevin Cowan MD, Theresa Chavira MD,  Luis Coffman DO, Florencia Hernandez MD, Giorgi Salas MD, Ramone Caro DO, Mariel Bradshaw MD,  Bandar Anderson DO, Mireille Hermosillo MD, Yamileth Chong MD, Zo Montgomery MD,  Jordan Hernandez MD, Pilar Salcedo MD, Kevin Greenwood MD, Kevin Landon MD, Ramon Ellsworth MD, Ken Luna MD, Chemo Morton DO, Mirian Merida MD, Alvaro Reid MD, Luis Caicedo MD, Mohsin Reza, MD, Lynn Cardoza MD, Shirley Waterhouse, CNP,  Octavia Coreas, CNP, Chemo Pelayo, CNP,  Юлия Calero, DNP, Kaci Rodgers, CNP, Princess Mandujano, CNP, Madelaine Burns, CNP, Ivone Mancia, CNP, Val Hanson, PA-C, Isabel Bustos, CNP, Karma Yost, CNP,  Nasrin Pritchard, CNP, Bebe Mckeon, CNP, Blayne Evangelista, PA-C, Rosio Resendez, CNP,  Sandie Palmer, CNS, Jessi Montiel, CNP, Dolly Tamez, CNP,   Lea Johnson, CNP         [facility]   IN-PATIENT SERVICE   [facility]    Progress Note    5/11/2025    10:14 AM    Name:   Shazia Nuñez  MRN:     [id number]     Acct:      [id number]   Room:   0408/0408-01  IP Day:  13  Admit Date:  4/28/2025  9:44 AM    PCP:   [facility]  Code Status:  Full Code    Subjective:     C/C:   Chief Complaint   Patient presents with    Respiratory Distress     Interval History Status: not changed.     Patient seen at bedside.    She is sleepy today  She feels better. Urinary burning is better  No other complaints  Brief History:     69-year-old female with past medical history of PE dvt, history of tracheostomy was admitted to ICU status post trach revision with ENT, patient initially presented to [facility] for discomfort after she experienced episode of desaturation then she was transferred 
Blanchable erythema       The trach is sutured in place;  Posey foam trach ties are in place.  Thin mucous noted from around the trach tube; skin is erythematous with a full thickness ulceration noted under the right lower corner of the sutured faceplate.  Position of sutures does not permit use of split gauze underneath the face plate.  Skin was cleansed using saline and cotton applicator;  patted dry.  SurePREP barrier film was applied to protect skin from exposure to moisture.  Patient did allow placement of small strip of Opticell AG hydrofiber underneath the lower corners of the faceplate.    Reviewed with RN     Response to treatment:  With complaints of pain.       Plan   Plan of Care:   Suggest addition of SurePREP barrier film spray to the trach care routine BID  Place small strip of Opticell AG to wound, under faceplate.  Change trach ties daily.        Specialty Bed Required : Yes   [x] Low Air Loss   [x] Pressure Redistribution  [] Fluid Immersion  [x] Bariatric  [] Total Pressure Relief  [] Other:     Current Diet: ADULT DIET; Regular; 4 carb choices (60 gm/meal)      Patient/Caregiver Teaching:  Level of patient/caregiver understanding able to:   [] Indicates understanding       [x] Needs reinforcement  [] Unsuccessful      [x] Verbal Understanding  [] Demonstrated understanding       [] No evidence of learning  [] Refused teaching         [] N/A     ESHA VERAS RN BSN CWON  
General: She is awake.   Neck:      Trachea: Tracheostomy present. No abnormal tracheal secretions.   Cardiovascular:      Heart sounds: Normal heart sounds.   Pulmonary:      Effort: Pulmonary effort is normal.      Breath sounds: Normal breath sounds.   Chest:      Chest wall: No tenderness.   Abdominal:      Palpations: Abdomen is soft.      Tenderness: There is no abdominal tenderness.   Musculoskeletal:      Right lower leg: No edema.      Left lower leg: No edema.   Neurological:      Mental Status: She is alert and oriented to person, place, and time.   Psychiatric:         Behavior: Behavior is cooperative.         DATA REVIEW     CURRENT MEDICATIONS:  Scheduled Meds:   apixaban  5 mg Oral BID    furosemide  20 mg Oral BID    ipratropium 0.5 mg-albuterol 2.5 mg  1 Dose Inhalation BID    sodium chloride flush  5-40 mL IntraVENous 2 times per day    amiodarone  200 mg Oral BID    insulin glargine  8 Units SubCUTAneous Nightly    [Held by provider] metoprolol tartrate  37.5 mg Oral BID    montelukast  10 mg Oral Nightly    pravastatin  80 mg Oral QPM    insulin lispro  0-8 Units SubCUTAneous 4x Daily AC & HS    pantoprazole (PROTONIX) 40 mg in sodium chloride (PF) 0.9 % 10 mL injection  40 mg IntraVENous Daily    miconazole   Topical BID     Continuous Infusions:   sodium chloride      lactated ringers 100 mL/hr at 05/01/25 0716    sodium chloride         INPUT/OUTPUT:  In: 2168.7 [P.O.:300; I.V.:1595.7; Blood:272.9]  Out: 1375 [Urine:1375]  Date 05/01/25 0000 - 05/01/25 2359   Shift 6723-4069 3817-2121 6992-6897 24 Hour Total   INTAKE   Shift Total(mL/kg)       OUTPUT   Urine(mL/kg/hr) 700(0.5)   700   Shift Total(mL/kg) 700(4.3)   700(4.3)   Weight (kg) 161.8 161.8 161.8 161.8        LABORATORY RESULTS:  BLOOD GASES:   Recent Labs     04/29/25  0436   POCPH 7.380   POCPCO2 52.0*   POCPO2 211.8*   POCHCO3 30.8*   [id number] 99.7*     COMPLETE BLOOD COUNTS:   Recent Labs     04/28/25  1346 04/28/25 2000 
tenderness.   Abdominal:      Palpations: Abdomen is soft.      Tenderness: There is no abdominal tenderness.   Musculoskeletal:      Right lower leg: No edema.      Left lower leg: No edema.   Neurological:      Mental Status: She is alert and oriented to person, place, and time.   Psychiatric:         Behavior: Behavior is cooperative.         DATA REVIEW     CURRENT MEDICATIONS:  Scheduled Meds:   apixaban  5 mg Oral BID    pantoprazole  40 mg Oral QAM AC    furosemide  20 mg Oral BID    ipratropium 0.5 mg-albuterol 2.5 mg  1 Dose Inhalation BID    sodium chloride flush  5-40 mL IntraVENous 2 times per day    amiodarone  200 mg Oral BID    insulin glargine  8 Units SubCUTAneous Nightly    metoprolol tartrate  37.5 mg Oral BID    montelukast  10 mg Oral Nightly    pravastatin  80 mg Oral QPM    insulin lispro  0-8 Units SubCUTAneous 4x Daily AC & HS    miconazole   Topical BID     Continuous Infusions:   dextrose      sodium chloride      sodium chloride Stopped (05/12/25 0806)       INPUT/OUTPUT:  In: 1320 [P.O.:1320]  Out: 650 [Urine:650]  Date 05/14/25 0000 - 05/14/25 2359   Shift 0897-2557 6272-7167 9729-7560 24 Hour Total   INTAKE   P.O.(mL/kg/hr)  360  360   Shift Total(mL/kg)  360(2.3)  360(2.3)   OUTPUT   Urine(mL/kg/hr) 650(0.5)   650   Shift Total(mL/kg) 650(4.1)   650(4.1)   Weight (kg) 156.7 156.7 156.7 156.7            LABORATORY RESULTS:  BLOOD GASES:   No results for input(s): \"POCPH\", \"POCPCO2\", \"POCPO2\", \"POCHCO3\", \"[id number]\" in the last 72 hours.    COMPLETE BLOOD COUNTS:   Recent Labs     05/13/25  0753 05/14/25  0711   WBC 4.2 4.4   HGB 7.7* 7.9*   HCT 26.9* 26.7*   MCV 91.2 93.4    264   LYMPHOPCT 23* 19*   RBC 2.95* 2.86*   MCH 26.1 27.6   MCHC 28.6 29.6   RDW 18.5* 18.2*     C-REACTIVE PROTEIN:   No results for input(s): \"CRP\" in the last 72 hours.  LACTATE DEHYDROGENASE:   No results for input(s): \"LDH\" in the last 72 hours.  BASIC METABOLIC PROFILE:   Recent Labs     05/13/25  0754 
transferred to [facility]  for higher level of care due to concern of  pneumothorax secondary dislodgment of the tracheostomy tube, and during hospital course ENT consulted, 6-0 XLT was placed and patient was transferred from ICU to hospitalist team on 5/1     Review of Systems:     Review of Systems   Constitutional:  Positive for fatigue.   HENT:  Positive for voice change.    Cardiovascular: Negative.    Gastrointestinal: Negative.    Musculoskeletal:         Cellulitis and pain in lower extremities   Neurological: Negative.        Medications:     Allergies:    Allergies   Allergen Reactions    Amoxil [Amoxicillin Trihydrate] Hives    Neomycin     Bacitracin     Neosporin [Bacitracin-Polymyxin B]     Penicillins     Codeine      headaches    Neosporin [Bacitracin-Neomycin-Polymyxin] Rash    Polysporin [Bacitracin-Polymyxin B] Rash       Current Meds:   Scheduled Meds:    pantoprazole  40 mg Oral QAM AC    apixaban  5 mg Oral BID    furosemide  20 mg Oral BID    ipratropium 0.5 mg-albuterol 2.5 mg  1 Dose Inhalation BID    sodium chloride flush  5-40 mL IntraVENous 2 times per day    amiodarone  200 mg Oral BID    insulin glargine  8 Units SubCUTAneous Nightly    [Held by provider] metoprolol tartrate  37.5 mg Oral BID    montelukast  10 mg Oral Nightly    pravastatin  80 mg Oral QPM    insulin lispro  0-8 Units SubCUTAneous 4x Daily AC & HS    miconazole   Topical BID     Continuous Infusions:    sodium chloride      sodium chloride       PRN Meds: ipratropium 0.5 mg-albuterol 2.5 mg, sodium chloride, sodium chloride flush, sodium chloride, potassium chloride **OR** potassium chloride, magnesium sulfate, ondansetron **OR** ondansetron, polyethylene glycol, acetaminophen **OR** acetaminophen, hydrALAZINE    Data:     Past Medical History:   has a past medical history of Anxiety, Asthma, Atrial fibrillation (HCC), Bronchitis, COPD (chronic obstructive pulmonary disease) (HCC), DDD (degenerative disc disease), 
Stopped (25 0806)     PRN Meds: glucose, dextrose bolus **OR** dextrose bolus, glucagon (rDNA), dextrose, glycopyrrolate, ipratropium 0.5 mg-albuterol 2.5 mg, sodium chloride, sodium chloride flush, sodium chloride, potassium chloride **OR** potassium chloride, magnesium sulfate, ondansetron **OR** ondansetron, polyethylene glycol, acetaminophen **OR** acetaminophen, hydrALAZINE    Data:     Past Medical History:   has a past medical history of Anxiety, Asthma, Atrial fibrillation (HCC), Bronchitis, COPD (chronic obstructive pulmonary disease) (HCC), DDD (degenerative disc disease), lumbar, Depression, Diabetes mellitus (HCC), Elevated glucose, Headache(784.0), Hernia of abdominal cavity, HH (hiatus hernia), Hyperlipemia, mixed, Hypertension, Osteoarthritis, Right femoral vein DVT (HCC), and Uterine hyperplasia.    Social History:   reports that she has never smoked. She has been exposed to tobacco smoke. She has never used smokeless tobacco. She reports that she does not currently use alcohol. She reports that she does not use drugs.     Family History:   Family History   Problem Relation Age of Onset    Asthma Mother     Mental Illness Mother     COPD Mother     Cancer Father 86        hairy cell leukemia, and leimyoma sarcoma     Stroke Father 86        minor    Parkinsonism Maternal Grandfather     Cancer Paternal Grandmother     Stroke Paternal Grandmother        Vitals:  BP (!) 121/49   Pulse 67   Temp 99.1 °F (37.3 °C) (Oral)   Resp 19   Ht 1.651 m (5' 5\")   Wt (!) 156.7 kg (345 lb 7.4 oz)   LMP 2014   SpO2 100%   BMI 57.49 kg/m²   Temp (24hrs), Av.2 °F (36.8 °C), Min:97.9 °F (36.6 °C), Max:99.1 °F (37.3 °C)    Recent Labs     25  1108 25  1554 25  0705   POCGLU 147* 143* 158* 152*       I/O (24Hr):    Intake/Output Summary (Last 24 hours) at 2025 0923  Last data filed at 2025 0643  Gross per 24 hour   Intake 960 ml   Output 650 ml   Net 310 
\"ALKPHOS\", \"BILITOT\" in the last 72 hours.    Invalid input(s): \"PROT\"    COAGULATION PROFILE:   No results for input(s): \"INR\", \"PROTIME\", \"APTT\" in the last 72 hours.    D-DIMER:  No results for input(s): \"DDIMER\" in the last 72 hours.  LACTIC ACID:  No results for input(s): \"LACTA\" in the last 72 hours.  CARDIAC ENZYMES:  No results for input(s): \"CKTOTAL\", \"CKMB\", \"CKMBINDEX\", \"TROPONINI\" in the last 72 hours.    Invalid input(s): \"TROPONIN\", \"HSTROP\"  BRAIN NATRIURETIC PEPTIDE/PRO-BRAIN NATRURETIC PEPTIDE:   No results for input(s): \"BNP\", \"PROBNP\" in the last 72 hours.  TRIGLYCERIDES:  No results for input(s): \"TRIG\" in the last 72 hours.     MICROBIOLOGY RESULTS:  URINE CULTURE: No components found for: \"CURINE\"  BLOOD CULTURE: No components found for: \"CBLOOD\", \"CFUNGUSBL\"  SPUTUM CULTURE: No components found for: \"CSPUTUM\"    No results for input(s): \"SPUTUM\", \"SPECDESC\", \"SPECIAL\", \"CULTURE\", \"STATUS\", \"ORG\", \"CDIFFTOXPCR\", \"MPNEUM\", \"MPNEUG\", \"CAMPYLOBPCR\", \"SALMONELLAPC\", \"SHIGAPCR\", \"SHIGELLAPCR\", \"LACTOQL\" in the last 72 hours.    Invalid input(s): \"CURINE\", \"CBLOOD\", \"CFUNGUSBL\"       PATHOLOGY RESULTS:    RADIOLOGY REPORTS:  FL ESOPHAGRAM   Final Result   No obvious esophageal leak or extraluminal contrast within the limitations of   this exam.         XR CHEST PORTABLE   Final Result   1. Limited studies related to body habitus and presence of artifact.   2. Opacity in the right upper lobe may represent infiltrate.   3. Suggestion of left-sided subcutaneous emphysema.         XR CHEST PORTABLE   Final Result   1. Limited studies related to body habitus and presence of artifact.   2. Opacity in the right upper lobe may represent infiltrate.   3. Suggestion of left-sided subcutaneous emphysema.              ECHOCARDIOGRAM:   Results for orders placed during the hospital encounter of 04/04/24    Echo (TTE) complete (PRN contrast/bubble/strain/3D)    Interpretation Summary    Left Ventricle: Normal left 
time, pt likely often confined to bed with occaisonally sitting EOB)  Prior Level of Assist for Transfers: Needs assistance  Active : No  Patient's  Info: ambulance  Occupation: Retired;On disability    Vision/Hearing  Vision  Vision: Impaired  Vision Exceptions: Wears glasses at all times  Hearing  Hearing: Within functional limits    BUE Assessment  Gross Assessment  AROM: Within functional limits  PROM: Within functional limits  Strength: Within functional limits (5/5 at B/L hands/elbows, shoulders were 4+/5)  Coordination: Within functional limits  Tone: Normal  Sensation: Intact  Hand Dominance: Right     Objective  Orientation  Overall Orientation Status: Within Functional Limits  Orientation Level: Oriented X4  Cognition  Overall Cognitive Status: WFL  Cognition Comment: Pt makes needs known through lip-reading    Activities of Daily Living  Feeding: Contact guard assistance;Setup;Increased time to complete  Grooming: Setup;Increased time to complete;Moderate assistance  Grooming Skilled Clinical Factors: Pt sat on EOB and writer had to assist with hair-care tasks as pt's hands were kept on bed to stabilize pt while sittng unsupported, has trach since July 2024  UE Bathing: Maximum assistance;Setup;Increased time to complete  LE Bathing: Dependent/Total  UE Dressing: Moderate assistance;Setup;Increased time to complete  LE Dressing: Dependent/Total  Toileting: Maximum assistance;Increased time to complete  Toileting Skilled Clinical Factors: Pt does well rolling L/R in bed but unable to assist with john-bottom care functionally, major hernia at Abd noted, pt limited also by high BMI    Balance  Balance  Sitting:  (Pt sat on EOB for 8 min total at SBA)  Standing:  (ARIES, pt reports not standing at baseline)    Transfers/Mobility  Bed mobility  Supine to Sit: Partial/Moderate assistance;2 Person assistance  Sit to Supine: Partial/Moderate assistance;2 Person assistance  Scooting: Substantial/Maximal 
infection due to ESBL Klebsiella    Pulmonary HTN (HCC)    Urinary retention    History of DVT (deep vein thrombosis)    Bilateral leg edema    CKD (chronic kidney disease) stage 3, GFR 30-59 ml/min ([facility])    Class 3 severe obesity due to excess calories with serious comorbidity and body mass index (BMI) of 60.0 to 69.9 in adult (HCC)    Osteopenia    Balance problems    Endometrial adenocarcinoma (HCC)    Dyspnea    Mild asthma    Open abdominal wall wound    Ventral hernia without obstruction or gangrene    Abdominal wall skin ulcer, with fat layer exposed ([facility])    Supratherapeutic INR    COVID    Bleeding from wound    Traumatic hematoma of left knee    Cellulitis of abdominal wall    Cytopenia    Increased oxygen demand    Pancytopenia (HCC)    Thrombocytopenia    Shortness of breath    SOB (shortness of breath)    COPD exacerbation (HCC)    Acute on chronic diastolic heart failure ([facility])    Acute cystitis without hematuria    Metabolic encephalopathy    (HFpEF) heart failure with preserved ejection fraction (HCC)    A-fib ([facility])    Acute respiratory failure with hypoxia and hypercarbia ([facility])    Type 2 respiratory failure (HCC)    Multiple drug resistant organism (MDRO) culture positive    Allergy to multiple antibiotics    MRSA carrier    Infection of tracheostomy stoma (HCC)    Respiratory failure (HCC)    Acute and chr resp failure, unsp w hypoxia or hypercapnia (HCC)    Malfunction of tracheostomy ([facility])    Acute on chronic hypoxic respiratory failure (HCC)    Ventilator-acquired pneumonia (HCC)    Tracheal stenosis    SHWETA (obstructive sleep apnea)    Obesity hypoventilation syndrome (HCC)    Other tracheostomy complication (HCC)    Paroxysmal atrial fibrillation with RVR ([facility])    Left upper extremity swelling    Acute pancreatitis without necrosis or infection, unspecified    Acute blood loss anemia    Gastritis without bleeding    Hemorrhoids    Rectal polyp    Airway obstruction    Tracheostomy malfunction 
service.    Patient remained on ventilator CPAP/pressure support.  Continue tracheostomy care monitor tracheostomy secretions.  Maintain bronchopulmonary hygiene.  Continue aspiration precautions.  Continue bronchodilators on DuoNeb aerosol.  Eliquis on hold and on amiodarone  On Lasix 20 mg twice daily monitor I/O, electrolytes with a goal of even/negative fluid balance.  DVT and stress ulcer prophylaxis.  Physical/occupational therapy.    It was my pleasure to evaluate Shazia Nuñez today. I would like to thank you for allowing me to participate in the care of this patient.  Please feel free to call with any further questions or concerns. We will continue to follow.     Luigi Bermeo MD  Pulmonary and Critical Care Medicine  5/9/2025, 2:32 PM                 This note is created with the assistance of a speech recognition program.  While intending to generate a document that actually reflects the content of the visit, the document can still have some errors including those of syntax and sound-alike substitutions which may escape proof reading.  It such instances, actual meaning can be extrapolated by contextual diversion.

## 2025-05-14 NOTE — CARE COORDINATION
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.03]                       GMLOS: 2.4 days  Length of Stay: 16  days    Anticipated Discharge Date: Ready for discharge    Readmission Risk (Low < 19, Mod (19-27), High > 27): Readmission Risk Score: 33.8        Current Transitional Plan    [] Home Independently    [] Home with HC    [x] Skilled Nursing Facility    [] Acute Rehabilitation    [] Long Term Acute Care (LTAC)    [] Other:     Plan for the Stay (Medical Management) : Ready for Discharge          Workflow Continuation (Additional Notes) : Patient ready for discharge today, [facility] has bed hold, notified of patient's discharge and they are good to accept her back today. Writer asked to call June at [phone] with time of transport.    1515 [facility] called with transport time of 1630 back to [facility],  for June at [facility] when  time is scheduled for, updated RN.        Bisi West  May 14, 2025

## 2025-05-14 NOTE — DISCHARGE INSTRUCTIONS
Follow up with your PCP in 3 days. Call for an appointment as soon as possible.  Follow-up with specialist as instructed.  Call for an appointment as soon as possible.  ENT follow up in one month.    Medications as instructed.  Return to the emergency department immediately for any new or worsening concerns.

## 2025-05-14 NOTE — PLAN OF CARE
Problem: Chronic Conditions and Co-morbidities  Goal: Patient's chronic conditions and co-morbidity symptoms are monitored and maintained or improved  Recent Flowsheet Documentation  Taken 5/14/2025 0716 by Emily Caban RN  Care Plan - Patient's Chronic Conditions and Co-Morbidity Symptoms are Monitored and Maintained or Improved: Monitor and assess patient's chronic conditions and comorbid symptoms for stability, deterioration, or improvement     Problem: Chronic Conditions and Co-morbidities  Goal: Patient's chronic conditions and co-morbidity symptoms are monitored and maintained or improved  5/14/2025 1519 by Emily Caban RN  Outcome: Completed  5/14/2025 0930 by Emily Caban RN  Outcome: Progressing  Flowsheets (Taken 5/14/2025 0716)  Care Plan - Patient's Chronic Conditions and Co-Morbidity Symptoms are Monitored and Maintained or Improved: Monitor and assess patient's chronic conditions and comorbid symptoms for stability, deterioration, or improvement     Problem: Discharge Planning  Goal: Discharge to home or other facility with appropriate resources  Recent Flowsheet Documentation  Taken 5/14/2025 0716 by Emily Caban RN  Discharge to home or other facility with appropriate resources: Identify barriers to discharge with patient and caregiver     Problem: Discharge Planning  Goal: Discharge to home or other facility with appropriate resources  5/14/2025 1519 by Emily Caban RN  Outcome: Completed  5/14/2025 0930 by Emily Caban RN  Outcome: Progressing  Flowsheets (Taken 5/14/2025 0716)  Discharge to home or other facility with appropriate resources: Identify barriers to discharge with patient and caregiver     Problem: Pain  Goal: Verbalizes/displays adequate comfort level or baseline comfort level  Recent Flowsheet Documentation  Taken 5/14/2025 1145 by Emily Caban RN  Verbalizes/displays adequate comfort level or baseline comfort level: Encourage patient to monitor pain and

## 2025-05-14 NOTE — DISCHARGE SUMMARY
[facility]  Office: [phone]  Carlos Caro DO, Felipe Drew DO, Jerman Mack DO, Leonel Thomas DO, Larry Pisano MD, Monserrat Rosado MD, Sharon Perez MD, Beverley Curtis MD,  Jimi Messina MD, Kevin Cowan MD, Theresa Chavira MD,  Luis Coffman DO, Florencia Hernandez MD, Giorgi Salas MD, Ramone Caro DO, Mariel Bradshaw MD,  Bandar Anderson DO, Mireille Hermosillo MD, Yamileth Chong MD, Zo [facility] MD,  Jordan Hernandez MD, Pilar Salcedo MD, Kevin Greenwood MD, Kevin Landon MD, Ramon Ellsworth MD, Ken Luna MD, Chemo Morton DO, Mirian Merida MD, Alvaro Reid MD, Luis Caicedo MD, Mohsin Reza, MD, Lynn Cardoza MD, Shirley Waterhouse, CNP,  Octavia Coreas, CNP, Chemo Pelayo, CNP,  Юлия Calero, DNP, Kaci Rodgers, CNP, Princess Mandujano, CNP, Madelaine Burns, CNP, Ivone Mancia, CNP, Val Hanson, PA-C, Isabel Bustos, CNP, Karma Yost, CNP,  Nasrin Pritchard, CNP, Bebe Mckeon, CNP, Blayne Evangelista, PA-C, Rosio Resendez, CNP,  Sandie Palmer, CNS, Jessi Montiel, CNP, Dolly Tamez, CNP,   Lea Johnson, CNP         [facility]   IN-PATIENT SERVICE   [facility]    Discharge Summary     Patient ID: Shazia Nuñez  :  1955   MRN: [id number]     ACCOUNT:  [id number]   Patient's PCP: [facility]  Admit Date: 2025   Discharge Date: 2025    Length of Stay: 16  Code Status:  Full Code  Admitting Physician: Dane Andres MD  Discharge Physician: Bandar Anderson DO     Active Discharge Diagnoses:     Hospital Problem Lists:  Principal Problem:    Airway obstruction  Active Problems:    Paroxysmal atrial fibrillation with RVR (HCC)    Hyperlipemia, mixed    Essential hypertension    Stage 3a chronic kidney disease (HCC)    Type 2 diabetes mellitus with other specified complication (HCC)    Class 3 severe obesity due to excess calories with serious comorbidity and body mass index (BMI) of 50.0 to

## 2025-05-14 NOTE — PLAN OF CARE
Problem: Chronic Conditions and Co-morbidities  Goal: Patient's chronic conditions and co-morbidity symptoms are monitored and maintained or improved  Recent Flowsheet Documentation  Taken 5/14/2025 0716 by Emily Caban RN  Care Plan - Patient's Chronic Conditions and Co-Morbidity Symptoms are Monitored and Maintained or Improved: Monitor and assess patient's chronic conditions and comorbid symptoms for stability, deterioration, or improvement     Problem: Chronic Conditions and Co-morbidities  Goal: Patient's chronic conditions and co-morbidity symptoms are monitored and maintained or improved  Outcome: Progressing  Flowsheets (Taken 5/14/2025 0716)  Care Plan - Patient's Chronic Conditions and Co-Morbidity Symptoms are Monitored and Maintained or Improved: Monitor and assess patient's chronic conditions and comorbid symptoms for stability, deterioration, or improvement     Problem: Discharge Planning  Goal: Discharge to home or other facility with appropriate resources  Recent Flowsheet Documentation  Taken 5/14/2025 0716 by Emily Caban RN  Discharge to home or other facility with appropriate resources: Identify barriers to discharge with patient and caregiver     Problem: Discharge Planning  Goal: Discharge to home or other facility with appropriate resources  Outcome: Progressing  Flowsheets (Taken 5/14/2025 0716)  Discharge to home or other facility with appropriate resources: Identify barriers to discharge with patient and caregiver     Problem: Pain  Goal: Verbalizes/displays adequate comfort level or baseline comfort level  Recent Flowsheet Documentation  Taken 5/14/2025 0715 by Emily Caban RN  Verbalizes/displays adequate comfort level or baseline comfort level: Encourage patient to monitor pain and request assistance     Problem: Pain  Goal: Verbalizes/displays adequate comfort level or baseline comfort level  Outcome: Progressing  Flowsheets (Taken 5/14/2025 0715)  Verbalizes/displays

## 2025-05-15 NOTE — TELEPHONE ENCOUNTER
Phoned [facility] at [facility] to review follow up plan for Ms. Nuñez and spoke with staff nurse.  Requested for facility to arrange for clinic visit in 1-3 months, calling our office to make an appointment.  Also reviewed that patient wants to be able to speak with her trach but at this time, her vent settings are too high.  If she is able to wean and not need the ventilator they can deflate the cuff to allow her to speak or use a Passy Celsa Valve when vent dependence has decreased.

## 2025-05-21 NOTE — ANESTHESIA POSTPROCEDURE EVALUATION
Department of Anesthesiology  Postprocedure Note    Patient: Shazia Nuñez  MRN: [id number]  YOB: 1955  Date of evaluation: 5/21/2025    Procedure Summary       Date: 05/12/25 Room / Location: [address]    Anesthesia Start: 0739 Anesthesia Stop: 0821    Procedures:       BRONCHOSCOPY WITH ASPIRATION      TRACHEOTOMY CHANGE Diagnosis:       Tracheostomy dependent (HCC)      (Tracheostomy dependent (HCC) [Z93.0])    Surgeons: Riky Andrade MD Responsible Provider: Mathew Bermudez MD    Anesthesia Type: general ASA Status: 4            Anesthesia Type: No value filed.    Tom Phase I: Tom Score: 9    Tom Phase II:      Anesthesia Post Evaluation    Patient location during evaluation: PACU  Patient participation: complete - patient participated  Level of consciousness: awake  Airway patency: patent  Nausea & Vomiting: no nausea and no vomiting  Cardiovascular status: blood pressure returned to baseline  Respiratory status: acceptable  Hydration status: euvolemic  Comments: BP (!) 116/50   Pulse 75   Temp 98.2 °F (36.8 °C) (Oral)   Resp 22   Ht 1.651 m (5' 5\")   Wt (!) 156.7 kg (345 lb 7.4 oz)   LMP 07/16/2014   SpO2 100%   BMI 57.49 kg/m²   Pain Assessment: None - Denies Pain         No notable events documented.

## 2025-07-07 ENCOUNTER — HOSPITAL ENCOUNTER (OUTPATIENT)
Age: 70
Setting detail: SPECIMEN
Discharge: HOME | End: 2025-07-07
Payer: MEDICARE

## 2025-07-07 LAB
ANION GAP SERPL CALCULATED.3IONS-SCNC: 9 MMOL/L (ref 9–16)
BUN SERPL-MCNC: 33 MG/DL (ref 8–23)
CALCIUM SERPL-MCNC: 9.4 MG/DL (ref 8.8–10.2)
CHLORIDE SERPL-SCNC: 97 MMOL/L (ref 98–107)
CHOLEST SERPL-MCNC: 134 MG/DL (ref 0–199)
CHOLESTEROL/HDL RATIO: 3.4
CO2 SERPL-SCNC: 36 MMOL/L (ref 20–31)
CREAT SERPL-MCNC: 1.8 MG/DL (ref 0.5–0.9)
ERYTHROCYTE [DISTWIDTH] IN BLOOD BY AUTOMATED COUNT: 16.2 % (ref 11.8–14.4)
EST. AVERAGE GLUCOSE BLD GHB EST-MCNC: 114 MG/DL
GFR, ESTIMATED: 31 ML/MIN/1.73M2
GLUCOSE SERPL-MCNC: 116 MG/DL (ref 82–115)
HBA1C MFR BLD: 5.6 % (ref 4–6)
HCT VFR BLD AUTO: 26.8 % (ref 36.3–47.1)
HDLC SERPL-MCNC: 39 MG/DL
HGB BLD-MCNC: 8 G/DL (ref 11.9–15.1)
IRON SATN MFR SERPL: 13 % (ref 20–55)
IRON SERPL-MCNC: 26 UG/DL (ref 37–145)
LDLC SERPL CALC-MCNC: 82 MG/DL (ref 0–100)
MCH RBC QN AUTO: 28.4 PG (ref 25.2–33.5)
MCHC RBC AUTO-ENTMCNC: 29.9 G/DL (ref 28.4–34.8)
MCV RBC AUTO: 95 FL (ref 82.6–102.9)
NRBC BLD-RTO: 0 PER 100 WBC
PLATELET # BLD AUTO: 197 K/UL (ref 138–453)
PMV BLD AUTO: 9.2 FL (ref 8.1–13.5)
POTASSIUM SERPL-SCNC: 4.2 MMOL/L (ref 3.7–5.3)
RBC # BLD AUTO: 2.82 M/UL (ref 3.95–5.11)
SODIUM SERPL-SCNC: 142 MMOL/L (ref 136–145)
TIBC SERPL-MCNC: 198 UG/DL (ref 250–450)
TRIGL SERPL-MCNC: 63 MG/DL
UNSATURATED IRON BINDING CAPACITY: 172 UG/DL (ref 112–347)
VLDLC SERPL CALC-MCNC: 13 MG/DL (ref 1–30)
WBC OTHER # BLD: 5.3 K/UL (ref 3.5–11.3)

## 2025-07-07 PROCEDURE — 80048 BASIC METABOLIC PNL TOTAL CA: CPT

## 2025-07-07 PROCEDURE — 83036 HEMOGLOBIN GLYCOSYLATED A1C: CPT

## 2025-07-07 PROCEDURE — 80061 LIPID PANEL: CPT

## 2025-07-07 PROCEDURE — 83550 IRON BINDING TEST: CPT

## 2025-07-07 PROCEDURE — 36415 COLL VENOUS BLD VENIPUNCTURE: CPT

## 2025-07-07 PROCEDURE — 85027 COMPLETE CBC AUTOMATED: CPT

## 2025-07-07 PROCEDURE — 83540 ASSAY OF IRON: CPT

## 2025-07-17 ENCOUNTER — HOSPITAL ENCOUNTER (OUTPATIENT)
Age: 70
Setting detail: SPECIMEN
Discharge: HOME | End: 2025-07-17

## 2025-07-17 LAB
ERYTHROCYTE [DISTWIDTH] IN BLOOD BY AUTOMATED COUNT: 16.3 % (ref 11.8–14.4)
HCT VFR BLD AUTO: 25.1 % (ref 36.3–47.1)
HGB BLD-MCNC: 7.6 G/DL (ref 11.9–15.1)
MCH RBC QN AUTO: 28.5 PG (ref 25.2–33.5)
MCHC RBC AUTO-ENTMCNC: 30.3 G/DL (ref 28.4–34.8)
MCV RBC AUTO: 94 FL (ref 82.6–102.9)
NRBC BLD-RTO: 0 PER 100 WBC
PLATELET # BLD AUTO: 228 K/UL (ref 138–453)
PMV BLD AUTO: 8.6 FL (ref 8.1–13.5)
RBC # BLD AUTO: 2.67 M/UL (ref 3.95–5.11)
WBC OTHER # BLD: 4.2 K/UL (ref 3.5–11.3)

## 2025-07-17 PROCEDURE — 85027 COMPLETE CBC AUTOMATED: CPT

## 2025-07-17 PROCEDURE — 36415 COLL VENOUS BLD VENIPUNCTURE: CPT

## 2025-07-31 PROBLEM — Z93.0 TRACHEOSTOMY DEPENDENCE (HCC): Status: ACTIVE | Noted: 2025-07-31

## 2025-08-14 RX ORDER — GUAIFENESIN 600 MG/1
600 TABLET, EXTENDED RELEASE ORAL 2 TIMES DAILY
COMMUNITY

## 2025-08-17 ENCOUNTER — ANESTHESIA EVENT (OUTPATIENT)
Dept: OPERATING ROOM | Age: 70
End: 2025-08-17
Payer: COMMERCIAL

## 2025-08-18 ENCOUNTER — HOSPITAL ENCOUNTER (OUTPATIENT)
Age: 70
Setting detail: OUTPATIENT SURGERY
Discharge: NURSING, REHAB OR LONG-TERM CARE | End: 2025-08-18
Attending: STUDENT IN AN ORGANIZED HEALTH CARE EDUCATION/TRAINING PROGRAM | Admitting: STUDENT IN AN ORGANIZED HEALTH CARE EDUCATION/TRAINING PROGRAM
Payer: COMMERCIAL

## 2025-08-18 ENCOUNTER — ANESTHESIA (OUTPATIENT)
Dept: OPERATING ROOM | Age: 70
End: 2025-08-18
Payer: COMMERCIAL

## 2025-08-18 VITALS
DIASTOLIC BLOOD PRESSURE: 43 MMHG | BODY MASS INDEX: 58.98 KG/M2 | SYSTOLIC BLOOD PRESSURE: 111 MMHG | HEART RATE: 63 BPM | WEIGHT: 293 LBS | RESPIRATION RATE: 15 BRPM | TEMPERATURE: 97.7 F | OXYGEN SATURATION: 100 %

## 2025-08-18 LAB
ABO + RH BLD: NORMAL
ARM BAND NUMBER: NORMAL
BLOOD BANK SAMPLE EXPIRATION: NORMAL
BLOOD GROUP ANTIBODIES SERPL: NEGATIVE
BUN BLD-MCNC: 43 MG/DL (ref 8–26)
CA-I BLD-SCNC: 1.28 MMOL/L (ref 1.15–1.33)
CHLORIDE BLD-SCNC: 93 MMOL/L (ref 98–107)
CO2 BLD CALC-SCNC: 45 MMOL/L (ref 22–30)
CRITICAL ACTION: NORMAL
CRITICAL NOTIFICATION DATE/TIME: NORMAL
CRITICAL NOTIFICATION: NORMAL
CRITICAL VALUE READ BACK: YES
EGFR, POC: 25 ML/MIN/1.73M2
GLUCOSE BLD-MCNC: 107 MG/DL (ref 65–105)
GLUCOSE BLD-MCNC: 123 MG/DL (ref 74–100)
HCO3 VENOUS: 46.1 MMOL/L (ref 22–29)
HCT VFR BLD AUTO: 46 % (ref 36–46)
O2 SAT, VEN: 30.4 % (ref 60–85)
PCO2 VENOUS: 86.7 MM HG (ref 41–51)
PH VENOUS: 7.33 (ref 7.32–7.43)
PO2 VENOUS: 22.3 MM HG (ref 30–50)
POC ANION GAP: 10 MMOL/L (ref 7–16)
POC CREATININE: 2.1 MG/DL (ref 0.51–1.19)
POC HEMOGLOBIN (CALC): 15.6 G/DL (ref 12–16)
POC LACTIC ACID: 1.2 MMOL/L (ref 0.56–1.39)
POSITIVE BASE EXCESS, VEN: 15.1 MMOL/L (ref 0–3)
POTASSIUM BLD-SCNC: 4.3 MMOL/L (ref 3.5–4.5)
SODIUM BLD-SCNC: 147 MMOL/L (ref 138–146)

## 2025-08-18 PROCEDURE — 31525 DX LARYNGOSCOPY EXCL NB: CPT | Performed by: STUDENT IN AN ORGANIZED HEALTH CARE EDUCATION/TRAINING PROGRAM

## 2025-08-18 PROCEDURE — 82330 ASSAY OF CALCIUM: CPT

## 2025-08-18 PROCEDURE — 86850 RBC ANTIBODY SCREEN: CPT

## 2025-08-18 PROCEDURE — 83605 ASSAY OF LACTIC ACID: CPT

## 2025-08-18 PROCEDURE — 7100000010 HC PHASE II RECOVERY - FIRST 15 MIN: Performed by: STUDENT IN AN ORGANIZED HEALTH CARE EDUCATION/TRAINING PROGRAM

## 2025-08-18 PROCEDURE — 2580000003 HC RX 258: Performed by: ANESTHESIOLOGY

## 2025-08-18 PROCEDURE — 82803 BLOOD GASES ANY COMBINATION: CPT

## 2025-08-18 PROCEDURE — 82565 ASSAY OF CREATININE: CPT

## 2025-08-18 PROCEDURE — 6360000002 HC RX W HCPCS

## 2025-08-18 PROCEDURE — 85014 HEMATOCRIT: CPT

## 2025-08-18 PROCEDURE — 7100000001 HC PACU RECOVERY - ADDTL 15 MIN: Performed by: STUDENT IN AN ORGANIZED HEALTH CARE EDUCATION/TRAINING PROGRAM

## 2025-08-18 PROCEDURE — 7100000011 HC PHASE II RECOVERY - ADDTL 15 MIN: Performed by: STUDENT IN AN ORGANIZED HEALTH CARE EDUCATION/TRAINING PROGRAM

## 2025-08-18 PROCEDURE — 3600000014 HC SURGERY LEVEL 4 ADDTL 15MIN: Performed by: STUDENT IN AN ORGANIZED HEALTH CARE EDUCATION/TRAINING PROGRAM

## 2025-08-18 PROCEDURE — 3700000000 HC ANESTHESIA ATTENDED CARE: Performed by: STUDENT IN AN ORGANIZED HEALTH CARE EDUCATION/TRAINING PROGRAM

## 2025-08-18 PROCEDURE — 7100000000 HC PACU RECOVERY - FIRST 15 MIN: Performed by: STUDENT IN AN ORGANIZED HEALTH CARE EDUCATION/TRAINING PROGRAM

## 2025-08-18 PROCEDURE — 2709999900 HC NON-CHARGEABLE SUPPLY: Performed by: STUDENT IN AN ORGANIZED HEALTH CARE EDUCATION/TRAINING PROGRAM

## 2025-08-18 PROCEDURE — 86900 BLOOD TYPING SEROLOGIC ABO: CPT

## 2025-08-18 PROCEDURE — 31502 CHANGE OF WINDPIPE AIRWAY: CPT | Performed by: STUDENT IN AN ORGANIZED HEALTH CARE EDUCATION/TRAINING PROGRAM

## 2025-08-18 PROCEDURE — 84520 ASSAY OF UREA NITROGEN: CPT

## 2025-08-18 PROCEDURE — 3600000004 HC SURGERY LEVEL 4 BASE: Performed by: STUDENT IN AN ORGANIZED HEALTH CARE EDUCATION/TRAINING PROGRAM

## 2025-08-18 PROCEDURE — 80051 ELECTROLYTE PANEL: CPT

## 2025-08-18 PROCEDURE — 86901 BLOOD TYPING SEROLOGIC RH(D): CPT

## 2025-08-18 PROCEDURE — 3700000001 HC ADD 15 MINUTES (ANESTHESIA): Performed by: STUDENT IN AN ORGANIZED HEALTH CARE EDUCATION/TRAINING PROGRAM

## 2025-08-18 PROCEDURE — 82947 ASSAY GLUCOSE BLOOD QUANT: CPT

## 2025-08-18 RX ORDER — ONDANSETRON 2 MG/ML
INJECTION INTRAMUSCULAR; INTRAVENOUS
Status: DISCONTINUED | OUTPATIENT
Start: 2025-08-18 | End: 2025-08-18 | Stop reason: SDUPTHER

## 2025-08-18 RX ORDER — PHENYLEPHRINE HCL IN 0.9% NACL 1 MG/10 ML
SYRINGE (ML) INTRAVENOUS
Status: DISCONTINUED | OUTPATIENT
Start: 2025-08-18 | End: 2025-08-18 | Stop reason: SDUPTHER

## 2025-08-18 RX ORDER — LIDOCAINE HYDROCHLORIDE 10 MG/ML
INJECTION, SOLUTION EPIDURAL; INFILTRATION; INTRACAUDAL; PERINEURAL
Status: DISCONTINUED | OUTPATIENT
Start: 2025-08-18 | End: 2025-08-18 | Stop reason: SDUPTHER

## 2025-08-18 RX ORDER — PROCHLORPERAZINE EDISYLATE 5 MG/ML
5 INJECTION INTRAMUSCULAR; INTRAVENOUS
Status: DISCONTINUED | OUTPATIENT
Start: 2025-08-18 | End: 2025-08-18 | Stop reason: HOSPADM

## 2025-08-18 RX ORDER — FENTANYL CITRATE 50 UG/ML
INJECTION, SOLUTION INTRAMUSCULAR; INTRAVENOUS
Status: DISCONTINUED | OUTPATIENT
Start: 2025-08-18 | End: 2025-08-18 | Stop reason: SDUPTHER

## 2025-08-18 RX ORDER — SODIUM CHLORIDE 9 MG/ML
INJECTION, SOLUTION INTRAVENOUS PRN
Status: DISCONTINUED | OUTPATIENT
Start: 2025-08-18 | End: 2025-08-18 | Stop reason: HOSPADM

## 2025-08-18 RX ORDER — SODIUM CHLORIDE 0.9 % (FLUSH) 0.9 %
5-40 SYRINGE (ML) INJECTION PRN
Status: DISCONTINUED | OUTPATIENT
Start: 2025-08-18 | End: 2025-08-18 | Stop reason: HOSPADM

## 2025-08-18 RX ORDER — PROPOFOL 10 MG/ML
INJECTION, EMULSION INTRAVENOUS
Status: DISCONTINUED | OUTPATIENT
Start: 2025-08-18 | End: 2025-08-18 | Stop reason: SDUPTHER

## 2025-08-18 RX ORDER — SODIUM CHLORIDE 0.9 % (FLUSH) 0.9 %
5-40 SYRINGE (ML) INJECTION EVERY 12 HOURS SCHEDULED
Status: DISCONTINUED | OUTPATIENT
Start: 2025-08-18 | End: 2025-08-18 | Stop reason: HOSPADM

## 2025-08-18 RX ORDER — SODIUM CHLORIDE, SODIUM LACTATE, POTASSIUM CHLORIDE, CALCIUM CHLORIDE 600; 310; 30; 20 MG/100ML; MG/100ML; MG/100ML; MG/100ML
INJECTION, SOLUTION INTRAVENOUS CONTINUOUS
Status: DISCONTINUED | OUTPATIENT
Start: 2025-08-18 | End: 2025-08-18 | Stop reason: HOSPADM

## 2025-08-18 RX ADMIN — ONDANSETRON 4 MG: 2 INJECTION, SOLUTION INTRAMUSCULAR; INTRAVENOUS at 10:36

## 2025-08-18 RX ADMIN — SODIUM CHLORIDE, SODIUM LACTATE, POTASSIUM CHLORIDE, AND CALCIUM CHLORIDE: .6; .31; .03; .02 INJECTION, SOLUTION INTRAVENOUS at 10:03

## 2025-08-18 RX ADMIN — LIDOCAINE HYDROCHLORIDE 50 MG: 10 INJECTION, SOLUTION EPIDURAL; INFILTRATION; INTRACAUDAL; PERINEURAL at 10:22

## 2025-08-18 RX ADMIN — Medication 200 MCG: at 10:37

## 2025-08-18 RX ADMIN — PROPOFOL 120 MCG/KG/MIN: 10 INJECTION, EMULSION INTRAVENOUS at 10:29

## 2025-08-18 RX ADMIN — FENTANYL CITRATE 50 MCG: 50 INJECTION, SOLUTION INTRAMUSCULAR; INTRAVENOUS at 10:29

## 2025-08-18 RX ADMIN — PROPOFOL 100 MG: 10 INJECTION, EMULSION INTRAVENOUS at 10:25

## 2025-08-18 ASSESSMENT — PAIN SCALES - GENERAL
PAINLEVEL_OUTOF10: 0

## 2025-08-18 ASSESSMENT — ENCOUNTER SYMPTOMS: SHORTNESS OF BREATH: 1

## 2025-08-18 ASSESSMENT — PAIN - FUNCTIONAL ASSESSMENT: PAIN_FUNCTIONAL_ASSESSMENT: 0-10

## 2025-08-18 ASSESSMENT — PAIN DESCRIPTION - DESCRIPTORS: DESCRIPTORS: ACHING

## (undated) DEVICE — SPONGE DRN W4XL4IN RAYON/POLYESTER 6 PLY NONWOVEN PRECUT 2 PER PK

## (undated) DEVICE — SUTURE PERMAHAND SZ 3-0 L18IN NONABSORBABLE BLK SILK BRAID A184H

## (undated) DEVICE — SUTURE VICRYL + SZ 3-0 L27IN ABSRB UD L26MM SH 1/2 CIR VCP416H

## (undated) DEVICE — Device: Brand: BIVONA

## (undated) DEVICE — PREMIUM DRY TRAY LF: Brand: MEDLINE INDUSTRIES, INC.

## (undated) DEVICE — STRAP POS FOAM SFT STR FOR KNEE BODY

## (undated) DEVICE — SUTURE VICRYL + SZ 0 L27IN ABSRB VLT L26MM UR-6 5/8 CIR VCP603H

## (undated) DEVICE — SUTURE PERMA-HAND 2-0 L30IN NONABSORBABLE BLK MH L36MM 1/2 K843H

## (undated) DEVICE — GLOVE SURG SZ 8 CRM LTX FREE POLYISOPRENE POLYMER BEAD ANTI

## (undated) DEVICE — SUTURE ETHILON SZ 3-0 L18IN NONABSORBABLE BLK L24MM FS-1 3/8 663G

## (undated) DEVICE — STRAP ARMBRD W1.5XL32IN FOAM STR YET SFT W/ HK AND LOOP

## (undated) DEVICE — BLADE SURG 15 TWIN BK CARBON STL STRL CISION LF DISP

## (undated) DEVICE — SYRINGE MED 10ML LUERLOCK TIP W/O SFTY DISP

## (undated) DEVICE — BLADE ES ELASTOMERIC COAT INSUL DURABLE BEND UPTO 90DEG

## (undated) DEVICE — SUTURE VICRYL + SZ 2-0 L27IN ABSRB VLT UR-6 5/8 CIR TAPR PNT VCP602H

## (undated) DEVICE — NEEDLE HYPO 27GA L15IN REG BVL W O SFTY FOR SYR DISPOSABLE

## (undated) DEVICE — Device

## (undated) DEVICE — SUPERSET STRAIGHT CATHETER MOUNT 22F-22M/15F >= 70MM-150MM: Brand: SUPERSET STRAIGHT CATHETER MOUNT 22F-22M/15F >= 70MM-150MM

## (undated) DEVICE — SPONGE,NEURO,0.5"X3",XR,STRL,LF,10/PK: Brand: MEDLINE

## (undated) DEVICE — DRAPE,T,LAPARO,TRANS,STERILE: Brand: MEDLINE

## (undated) DEVICE — GEL US 20GM NONIRRITATING OVERWRAPPED FILE PCH TRNSMIT

## (undated) DEVICE — TUBE TRACH AD L100MM OD12.3MM ID7MM PROX EXTN CUF HI VOL LO

## (undated) DEVICE — CORD ES L12FT BPLR FRCP

## (undated) DEVICE — ELECTRODE ELECSURG NDL 2.8 INX7.2 CM COAT INSUL EDGE

## (undated) DEVICE — SUTURE VICRYL SZ 2-0 L27IN ABSRB UD L26MM SH 1/2 CIR J417H

## (undated) DEVICE — ST. CHARLES BASIC SET UP: Brand: MEDLINE INDUSTRIES, INC.

## (undated) DEVICE — APPLICATOR MEDICATED 10.5 CC SOLUTION HI LT ORNG CHLORAPREP

## (undated) DEVICE — GLOVE ORTHO 7 1/2   MSG9475

## (undated) DEVICE — TOWEL SURG W17XL27IN BLU NONFENESTRATED RADPQ DISPOSABLE ST

## (undated) DEVICE — DUAL LUMEN STOMACH TUBE: Brand: SALEM SUMP

## (undated) DEVICE — SUTURE PROL SZ 2-0 L30IN NONABSORBABLE BLU L26MM CT-2 1/2 8411H

## (undated) DEVICE — SEALER ENDOSCP NANO COAT OPN DIV CRV L JAW LIGASURE IMPACT

## (undated) DEVICE — STRAP,POSITIONING,KNEE/BODY,FOAM,4X60": Brand: MEDLINE

## (undated) DEVICE — POSITIONER HD 2IN RASPBERRY FOAM MULT RNG W/O CVR DISP

## (undated) DEVICE — SEALER LAP SM L18.8CM OPN JAW HAND/FOOT SWCH FORCETRIAD

## (undated) DEVICE — TUBE TRACH AD L100MM OD12.3MM ID7MM DST EXTN CUF HI VOL LO

## (undated) DEVICE — PENCIL SMK EVAC BLADE COAT 70 MM BUTTON SWITCH NEPTUNE E-SEP

## (undated) DEVICE — ASCOPE 4 BRONCHO SLIM: Brand: ASCOPE™ 4 BRONCHO SLIM 3.8/1.2

## (undated) DEVICE — GLOVE ORANGE PI 8   MSG9080

## (undated) DEVICE — NEPTUNE E-SEP SMOKE EVACUATION PENCIL, COATED, 70MM BLADE, PUSH BUTTON SWITCH: Brand: NEPTUNE E-SEP

## (undated) DEVICE — POSITIONER,HEAD,MULTIRING,36CS: Brand: MEDLINE

## (undated) DEVICE — COUNTER NDL 10 COUNT HLD 20 FOAM BLK SGL MAG

## (undated) DEVICE — BLADE,CARBON-STEEL,15,STRL,DISPOSABLE,TB: Brand: MEDLINE

## (undated) DEVICE — SPONGE NEUROSURGICAL W05XL3IN WHT COT HI QUAL FBR FLX PATTY

## (undated) DEVICE — Z DISCONTINUED USE 2220240 SUTURE VCRL SZ 2-0 L27IN ABSRB VLT L26MM UR-6 5/8 CIR J602H

## (undated) DEVICE — SINGLE-USE BIOPSY FORCEPS: Brand: RADIAL JAW 4

## (undated) DEVICE — GLOVE ORANGE PI 8 1/2   MSG9085

## (undated) DEVICE — SUTURE PERMAHAND SZ 2-0 L18IN NONABSORBABLE BLK L26MM SH C012D

## (undated) DEVICE — Z DISCONTINUED USE 2220190 SUTURE VICRYL SZ 3-0 L27IN ABSRB UD L26MM SH 1/2 CIR J416H

## (undated) DEVICE — BLADE SURG 11 TWIN BK CARBON STL STRL CISION LF DISP

## (undated) DEVICE — HOLDER TRACH TB W1IN FIT UPTO 19.5IN NK 2 PC ADJ BLU

## (undated) DEVICE — POUCH INSTR W6.75XL11.5IN FRST 2 PKT ADH FOR ORTH AND

## (undated) DEVICE — MASTISOL ADHESIVE LIQ 2/3ML

## (undated) DEVICE — SUTURE VICRYL + SZ 3 0 L54IN ABSRB UD POLYGLACTIN 910 W VCP285G

## (undated) DEVICE — SYSTEM TISS RETEN TRS

## (undated) DEVICE — BLADE,CARBON-STEEL,11,STRL,DISPOSABLE,TB: Brand: MEDLINE

## (undated) DEVICE — SUTURE PERMAHAND SZ 2-0 L12X18IN NONABSORBABLE BLK SILK A185H

## (undated) DEVICE — SOLUTION SCRB 4OZ 4% CHG H2O AIDED FOR PREOPERATIVE SKIN

## (undated) DEVICE — MARKER,SKIN,WI/RULER AND LABELS: Brand: MEDLINE

## (undated) DEVICE — SUTURE PERMA-HAND SZ 2-0 L30IN NONABSORBABLE BLK L26MM SH K833H

## (undated) DEVICE — BRONCHOSCOPE GS BFLEX 2 SLIM 3.8 SU